# Patient Record
Sex: MALE | Race: OTHER | NOT HISPANIC OR LATINO
[De-identification: names, ages, dates, MRNs, and addresses within clinical notes are randomized per-mention and may not be internally consistent; named-entity substitution may affect disease eponyms.]

---

## 2017-01-09 ENCOUNTER — APPOINTMENT (OUTPATIENT)
Dept: NEUROLOGY | Facility: CLINIC | Age: 62
End: 2017-01-09

## 2017-01-09 VITALS
WEIGHT: 185 LBS | SYSTOLIC BLOOD PRESSURE: 125 MMHG | DIASTOLIC BLOOD PRESSURE: 80 MMHG | HEIGHT: 67 IN | HEART RATE: 74 BPM | BODY MASS INDEX: 29.03 KG/M2

## 2017-01-09 DIAGNOSIS — Z81.8 FAMILY HISTORY OF OTHER MENTAL AND BEHAVIORAL DISORDERS: ICD-10-CM

## 2017-01-09 DIAGNOSIS — M25.512 PAIN IN RIGHT SHOULDER: ICD-10-CM

## 2017-01-09 DIAGNOSIS — Z87.891 PERSONAL HISTORY OF NICOTINE DEPENDENCE: ICD-10-CM

## 2017-01-09 DIAGNOSIS — E11.9 TYPE 2 DIABETES MELLITUS W/OUT COMPLICATIONS: ICD-10-CM

## 2017-01-09 DIAGNOSIS — I10 ESSENTIAL (PRIMARY) HYPERTENSION: ICD-10-CM

## 2017-01-09 DIAGNOSIS — M25.511 PAIN IN RIGHT SHOULDER: ICD-10-CM

## 2017-01-09 DIAGNOSIS — E78.5 HYPERLIPIDEMIA, UNSPECIFIED: ICD-10-CM

## 2017-01-09 DIAGNOSIS — Z87.820 PERSONAL HISTORY OF TRAUMATIC BRAIN INJURY: ICD-10-CM

## 2017-01-09 RX ORDER — INSULIN DETEMIR 100 [IU]/ML
100 INJECTION, SOLUTION SUBCUTANEOUS
Refills: 0 | Status: ACTIVE | COMMUNITY
Start: 2017-01-09

## 2017-02-21 ENCOUNTER — APPOINTMENT (OUTPATIENT)
Dept: NEUROLOGY | Facility: CLINIC | Age: 62
End: 2017-02-21

## 2017-02-21 VITALS
BODY MASS INDEX: 29.03 KG/M2 | WEIGHT: 185 LBS | HEIGHT: 67 IN | SYSTOLIC BLOOD PRESSURE: 140 MMHG | HEART RATE: 75 BPM | DIASTOLIC BLOOD PRESSURE: 70 MMHG

## 2017-02-21 DIAGNOSIS — R41.89 OTHER SYMPTOMS AND SIGNS INVOLVING COGNITIVE FUNCTIONS AND AWARENESS: ICD-10-CM

## 2017-03-27 ENCOUNTER — FORM ENCOUNTER (OUTPATIENT)
Age: 62
End: 2017-03-27

## 2017-03-28 ENCOUNTER — OUTPATIENT (OUTPATIENT)
Dept: OUTPATIENT SERVICES | Facility: HOSPITAL | Age: 62
LOS: 1 days | End: 2017-03-28
Payer: SELF-PAY

## 2017-03-28 ENCOUNTER — APPOINTMENT (OUTPATIENT)
Dept: NUCLEAR MEDICINE | Facility: IMAGING CENTER | Age: 62
End: 2017-03-28

## 2017-03-28 DIAGNOSIS — R41.89 OTHER SYMPTOMS AND SIGNS INVOLVING COGNITIVE FUNCTIONS AND AWARENESS: ICD-10-CM

## 2017-03-28 PROCEDURE — A9552: CPT

## 2017-03-28 PROCEDURE — 78608 BRAIN IMAGING (PET): CPT

## 2017-04-24 ENCOUNTER — APPOINTMENT (OUTPATIENT)
Dept: NEUROLOGY | Facility: CLINIC | Age: 62
End: 2017-04-24

## 2017-04-24 VITALS — SYSTOLIC BLOOD PRESSURE: 131 MMHG | HEART RATE: 67 BPM | HEIGHT: 67 IN | DIASTOLIC BLOOD PRESSURE: 81 MMHG

## 2017-04-24 RX ORDER — VENLAFAXINE HYDROCHLORIDE 37.5 MG/1
37.5 CAPSULE, EXTENDED RELEASE ORAL DAILY
Qty: 30 | Refills: 2 | Status: DISCONTINUED | COMMUNITY
Start: 2017-02-21 | End: 2017-04-24

## 2017-04-24 RX ORDER — INSULIN DETEMIR 100 [IU]/ML
100 INJECTION, SOLUTION SUBCUTANEOUS
Qty: 15 | Refills: 0 | Status: ACTIVE | COMMUNITY
Start: 2017-02-21

## 2017-04-24 RX ORDER — OXYMETAZOLINE HCL 0.05% 0.05 MG/ML
31G X 8 MM SPRAY NASAL
Qty: 100 | Refills: 0 | Status: ACTIVE | COMMUNITY
Start: 2016-06-13

## 2017-05-30 ENCOUNTER — RX RENEWAL (OUTPATIENT)
Age: 62
End: 2017-05-30

## 2017-06-20 ENCOUNTER — APPOINTMENT (OUTPATIENT)
Dept: NEUROLOGY | Facility: CLINIC | Age: 62
End: 2017-06-20

## 2017-06-20 VITALS
WEIGHT: 185 LBS | HEART RATE: 74 BPM | DIASTOLIC BLOOD PRESSURE: 78 MMHG | BODY MASS INDEX: 29.03 KG/M2 | SYSTOLIC BLOOD PRESSURE: 132 MMHG | HEIGHT: 67 IN

## 2017-06-20 RX ORDER — GEMFIBROZIL 600 MG/1
600 TABLET, FILM COATED ORAL DAILY
Refills: 0 | Status: DISCONTINUED | COMMUNITY
Start: 2017-02-21 | End: 2017-06-20

## 2017-06-20 RX ORDER — MELOXICAM 7.5 MG/1
7.5 TABLET ORAL
Refills: 0 | Status: DISCONTINUED | COMMUNITY
Start: 2017-01-09 | End: 2017-06-20

## 2017-06-20 RX ORDER — SIMVASTATIN 40 MG/1
40 TABLET, FILM COATED ORAL
Refills: 0 | Status: DISCONTINUED | COMMUNITY
Start: 2017-01-09 | End: 2017-06-20

## 2017-06-20 RX ORDER — SAXAGLIPTIN AND METFORMIN HYDROCHLORIDE 2.5; 1 MG/1; MG/1
2.5-1 TABLET, FILM COATED, EXTENDED RELEASE ORAL TWICE DAILY
Refills: 0 | Status: DISCONTINUED | COMMUNITY
Start: 2017-01-09 | End: 2017-06-20

## 2018-04-24 ENCOUNTER — APPOINTMENT (OUTPATIENT)
Dept: NEUROLOGY | Facility: CLINIC | Age: 63
End: 2018-04-24
Payer: COMMERCIAL

## 2018-04-24 VITALS — SYSTOLIC BLOOD PRESSURE: 133 MMHG | HEART RATE: 60 BPM | DIASTOLIC BLOOD PRESSURE: 86 MMHG

## 2018-04-24 DIAGNOSIS — G30.0 ALZHEIMER'S DISEASE WITH EARLY ONSET: ICD-10-CM

## 2018-04-24 DIAGNOSIS — F02.80 ALZHEIMER'S DISEASE WITH EARLY ONSET: ICD-10-CM

## 2018-04-24 PROCEDURE — 99214 OFFICE O/P EST MOD 30 MIN: CPT

## 2018-04-24 RX ORDER — LANCETS 28 GAUGE
EACH MISCELLANEOUS
Qty: 100 | Refills: 0 | Status: COMPLETED | COMMUNITY
Start: 2018-02-14

## 2018-04-24 RX ORDER — INSULIN DEGLUDEC INJECTION 200 U/ML
200 INJECTION, SOLUTION SUBCUTANEOUS
Qty: 9 | Refills: 0 | Status: COMPLETED | COMMUNITY
Start: 2018-04-20

## 2018-04-24 RX ORDER — LISINOPRIL 10 MG/1
10 TABLET ORAL DAILY
Refills: 0 | Status: DISCONTINUED | COMMUNITY
Start: 2017-01-09 | End: 2018-04-24

## 2018-04-24 RX ORDER — BLOOD SUGAR DIAGNOSTIC
STRIP MISCELLANEOUS
Qty: 200 | Refills: 0 | Status: COMPLETED | COMMUNITY
Start: 2018-04-20

## 2018-04-24 RX ORDER — ERGOCALCIFEROL 1.25 MG/1
1.25 MG CAPSULE, LIQUID FILLED ORAL
Refills: 0 | Status: DISCONTINUED | COMMUNITY
Start: 2017-01-09 | End: 2018-04-24

## 2018-04-24 RX ORDER — INSULIN DEGLUDEC INJECTION 200 U/ML
200 INJECTION, SOLUTION SUBCUTANEOUS
Refills: 0 | Status: ACTIVE | COMMUNITY
Start: 2018-04-24

## 2018-04-24 RX ORDER — GLIMEPIRIDE 4 MG/1
4 TABLET ORAL DAILY
Refills: 0 | Status: DISCONTINUED | COMMUNITY
Start: 2017-01-09 | End: 2018-04-24

## 2018-04-24 RX ORDER — GLIPIZIDE 10 MG/1
10 TABLET ORAL
Qty: 30 | Refills: 0 | Status: COMPLETED | COMMUNITY
Start: 2018-04-20

## 2018-04-24 RX ORDER — GEMFIBROZIL 600 MG/1
600 TABLET, FILM COATED ORAL DAILY
Refills: 0 | Status: DISCONTINUED | COMMUNITY
Start: 2017-06-20 | End: 2018-04-24

## 2018-04-24 RX ORDER — ASPIRIN 81 MG/1
81 TABLET, CHEWABLE ORAL
Qty: 30 | Refills: 3 | Status: DISCONTINUED | COMMUNITY
Start: 2017-01-09 | End: 2018-04-24

## 2018-04-24 RX ORDER — ELECTROLYTES/DEXTROSE
SOLUTION, ORAL ORAL DAILY
Refills: 0 | Status: DISCONTINUED | COMMUNITY
Start: 2017-01-09 | End: 2018-04-24

## 2018-05-22 RX ORDER — DONEPEZIL HYDROCHLORIDE 10 MG/1
10 TABLET ORAL DAILY
Qty: 30 | Refills: 3 | Status: DISCONTINUED | COMMUNITY
Start: 2017-04-24 | End: 2018-05-22

## 2018-06-18 ENCOUNTER — APPOINTMENT (OUTPATIENT)
Dept: PEDIATRIC MEDICAL GENETICS | Facility: CLINIC | Age: 63
End: 2018-06-18

## 2018-07-17 ENCOUNTER — MEDICATION RENEWAL (OUTPATIENT)
Age: 63
End: 2018-07-17

## 2018-07-19 ENCOUNTER — RX RENEWAL (OUTPATIENT)
Age: 63
End: 2018-07-19

## 2018-10-15 ENCOUNTER — RX RENEWAL (OUTPATIENT)
Age: 63
End: 2018-10-15

## 2019-03-19 ENCOUNTER — APPOINTMENT (OUTPATIENT)
Dept: NEUROLOGY | Facility: CLINIC | Age: 64
End: 2019-03-19
Payer: COMMERCIAL

## 2019-03-19 VITALS
WEIGHT: 193 LBS | BODY MASS INDEX: 30.29 KG/M2 | HEIGHT: 67 IN | HEART RATE: 62 BPM | DIASTOLIC BLOOD PRESSURE: 83 MMHG | SYSTOLIC BLOOD PRESSURE: 163 MMHG

## 2019-03-19 DIAGNOSIS — F32.9 ANXIETY DISORDER, UNSPECIFIED: ICD-10-CM

## 2019-03-19 DIAGNOSIS — F41.9 ANXIETY DISORDER, UNSPECIFIED: ICD-10-CM

## 2019-03-19 DIAGNOSIS — F22 DELUSIONAL DISORDERS: ICD-10-CM

## 2019-03-19 PROCEDURE — 99214 OFFICE O/P EST MOD 30 MIN: CPT

## 2019-03-19 RX ORDER — GLIPIZIDE 10 MG/1
10 TABLET ORAL DAILY
Refills: 0 | Status: ACTIVE | COMMUNITY
Start: 2018-04-24

## 2019-03-19 NOTE — ASSESSMENT
[FreeTextEntry1] : Assessment:\par 62yo RH HM, with progressive cognitive impairment-memory, speech, executive issues. \par Given PET and family Hx, likely EOAD, but the recent progression, with disinhibited behavior and loss of speech, with perseverations suggests FTD. Network analysis had pointed out to PO, but there is no Parkinsonism at all. 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\par Paranoid ideation\par \par Plan:\par -raise Galantamine to 16mg Er\par -start Seroquel 50mg XR\par -will try to pursue genetic evaluation.\par \par A thorough discussion was entertained with the patient/caregiver regarding the use of psychoactive medications, their possible benefits and AE profile, including the risk of cardiovascular complications.\par We discussed the benefits of being active, physically and mentally, and the need to to establish a routine in this respect.\par Driving abilities and firearms possession and use were discussed, in relation to progression of the cognitive decline, and the need to assess them periodically.\par Patient/caregiver understand and agree with the plan.\par
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
## 2019-03-19 NOTE — DATA REVIEWED
[de-identified] : MRI BRAIN from 11/2016 (report only): no major findings.  [de-identified] : FDG-PET: PD? LBD? ADRP seems more likely.  [de-identified] : Labs: TSH, ft4, CBC, CMP, FA, wnl.

## 2019-03-19 NOTE — PHYSICAL EXAM
[General Appearance - Alert] : alert [General Appearance - In No Acute Distress] : in no acute distress [Oriented To Time, Place, And Person] : oriented to person, place, and time [Impaired Insight] : insight and judgment were intact [Affect] : the affect was normal [Person] : oriented to person [Remote Intact] : remote memory intact [Registration Intact] : recent registration memory intact [Concentration Intact] : normal concentrating ability [Visual Intact] : visual attention was ~T not ~L decreased [Naming Objects] : no difficulty naming common objects [Repeating Phrases] : no difficulty repeating a phrase [Writing A Sentence] : no difficulty writing a sentence [Fluency] : fluency intact [Comprehension] : comprehension intact [Reading] : reading intact [Current Events] : adequate knowledge of current events [Past History] : adequate knowledge of personal past history [Vocabulary] : adequate range of vocabulary [Total Score ___ / 30] : the patient achieved a score of [unfilled] /30 [Date / Time ___ / 5] : date / time [unfilled] / 5 [Place ___ / 5] : place [unfilled] / 5 [Registration ___ / 3] : registration [unfilled] / 3 [Serial Sevens ___/5] : serial sevens [unfilled] / 5 [Naming 2 Objects ___ / 2] : naming two objects [unfilled] / 2 [Repeating a Sentence ___ / 1] : repeating a sentence [unfilled] / 1 [Writing a Sentence ___ / 1] : write sentence [unfilled] / 1 [3-stage Verbal Command ___ / 3] : three-stage verbal command [unfilled] / 3 [Written Command ___ / 1] : written command [unfilled] / 1 [Copy a Design ___ / 1] : copy a design [unfilled] / 1 [Recall ___ / 3] : recall [unfilled] / 3 [Cranial Nerves Optic (II)] : visual acuity intact bilaterally,  visual fields full to confrontation, pupils equal round and reactive to light [Cranial Nerves Oculomotor (III)] : extraocular motion intact [Cranial Nerves Trigeminal (V)] : facial sensation intact symmetrically [Cranial Nerves Facial (VII)] : face symmetrical [Cranial Nerves Vestibulocochlear (VIII)] : hearing was intact bilaterally [Cranial Nerves Glossopharyngeal (IX)] : tongue and palate midline [Cranial Nerves Accessory (XI - Cranial And Spinal)] : head turning and shoulder shrug symmetric [Cranial Nerves Hypoglossal (XII)] : there was no tongue deviation with protrusion [Motor Strength] : muscle strength was normal in all four extremities [Involuntary Movements] : no involuntary movements were seen [No Muscle Atrophy] : normal bulk in all four extremities [Motor Handedness Right-Handed] : the patient is right hand dominant [Sensation Tactile Decrease] : light touch was intact [Sensation Pain / Temperature Decrease] : pain and temperature was intact [Sensation Vibration Decrease] : vibration was intact [Proprioception] : proprioception was intact [Balance] : balance was intact [2+] : Ankle jerk left 2+ [Sclera] : the sclera and conjunctiva were normal [PERRL With Normal Accommodation] : pupils were equal in size, round, reactive to light, with normal accommodation [Extraocular Movements] : extraocular movements were intact [Optic Disc Abnormality] : the optic disc were normal in size and color [No APD] : no afferent pupillary defect [No YVAN] : no internuclear ophthalmoplegia [Full Visual Field] : full visual field [Outer Ear] : the ears and nose were normal in appearance [Oropharynx] : the oropharynx was normal [Neck Appearance] : the appearance of the neck was normal [Neck Cervical Mass (___cm)] : no neck mass was observed [Jugular Venous Distention Increased] : there was no jugular-venous distention [Thyroid Nodule] : there were no palpable thyroid nodules [Thyroid Diffuse Enlargement] : the thyroid was not enlarged [Auscultation Breath Sounds / Voice Sounds] : lungs were clear to auscultation bilaterally [Heart Rate And Rhythm] : heart rate was normal and rhythm regular [Heart Sounds] : normal S1 and S2 [Heart Sounds Gallop] : no gallops [Heart Sounds Pericardial Friction Rub] : no pericardial rub [Murmurs] : no murmurs [Arterial Pulses Carotid] : carotid pulses were normal with no bruits [Edema] : there was no peripheral edema [Full Pulse] : the pedal pulses are present [Bowel Sounds] : normal bowel sounds [Abdomen Soft] : soft [Abdomen Tenderness] : non-tender [Abdomen Mass (___ Cm)] : no abdominal mass palpated [No CVA Tenderness] : no ~M costovertebral angle tenderness [No Spinal Tenderness] : no spinal tenderness [Abnormal Walk] : normal gait [Nail Clubbing] : no clubbing  or cyanosis of the fingernails [Musculoskeletal - Swelling] : no joint swelling seen [Motor Tone] : muscle strength and tone were normal [Skin Color & Pigmentation] : normal skin color and pigmentation [Skin Turgor] : normal skin turgor [] : no rash [FreeTextEntry1] : Speech is poor, circumlocutions and tangential.\par Poor judgement. [Place] : disoriented to place [Time] : disoriented to time [Short Term Intact] : short term memory impaired [Span Intact] : the attention span was decreased [Motor Strength Upper Extremities Bilaterally] : strength was normal in both upper extremities [Motor Strength Lower Extremities Bilaterally] : strength was normal in both lower extremities [Romberg's Sign] : Romberg's sign was negtive [Allodynia] : no ~T allodynia present [Hyperesthesia] : no hyperesthesia [Dysesthesia] : no dysesthesia [Past-pointing] : there was no past-pointing [Limited Balance] : balance was intact [Coordination - Dysmetria Impaired Finger-to-Nose Bilateral] : not present [Tremor] : no tremor present [Plantar Reflex Right Only] : normal on the right [Coordination - Dysmetria Impaired Heel-to-Shin Bilateral] : not present [___] : absent on the right [Plantar Reflex Left Only] : normal on the left [___] : absent on the left [FreeTextEntry4] : Alt pattern: intact.\par Clock: 1/3-only 10 numbers; cannot tell time on clocks.\par Luria: difficulty remembering sequence, may perseverate on flat, but overall gets it. \par A: 2/min, stops; animals: 8/min.\par Speech is poorer. [FreeTextEntry8] : no shuffling of gait today. [FreeTextEntry5] : mildly slurred and slow speech, ? spastic?

## 2019-03-19 NOTE — REASON FOR VISIT
[Follow-Up: _____] : a [unfilled] follow-up visit [Spouse] : spouse [Other: _____] : [unfilled] [FreeTextEntry1] : EOAD

## 2019-04-17 ENCOUNTER — RX RENEWAL (OUTPATIENT)
Age: 64
End: 2019-04-17

## 2019-04-23 ENCOUNTER — APPOINTMENT (OUTPATIENT)
Dept: OTHER | Facility: CLINIC | Age: 64
End: 2019-04-23

## 2019-05-23 DIAGNOSIS — G47.00 INSOMNIA, UNSPECIFIED: ICD-10-CM

## 2019-05-23 RX ORDER — GALANTAMINE 16 MG/1
16 CAPSULE, EXTENDED RELEASE ORAL
Qty: 30 | Refills: 2 | Status: DISCONTINUED | COMMUNITY
Start: 2018-05-22 | End: 2019-05-23

## 2019-06-28 ENCOUNTER — APPOINTMENT (OUTPATIENT)
Dept: PEDIATRIC MEDICAL GENETICS | Facility: CLINIC | Age: 64
End: 2019-06-28
Payer: COMMERCIAL

## 2019-06-28 PROCEDURE — 99242 OFF/OP CONSLTJ NEW/EST SF 20: CPT

## 2019-06-28 NOTE — PHYSICAL EXAM
[de-identified] : I tried to have Mr. Sterling perform serial 7's, but he was unable to even start.

## 2019-06-28 NOTE — FAMILY HISTORY
[FreeTextEntry1] : Romero's wife stated that his father, 3 paternal uncles and his paternal grandfather had a similar condition, and that his father  at age 63. His mother became affected with (possible) early onset Alzheimer's at age 70. Romero's daughter is concerned, and is hesitant to have children because of this history.

## 2019-06-28 NOTE — CONSULT LETTER
[Consult Letter:] : I had the pleasure of evaluating your patient, [unfilled]. [Dear  ___] : Dear  [unfilled], [Please see my note below.] : Please see my note below. [Sincerely,] : Sincerely, [FreeTextEntry2] : Dr. Oz Marie [FreeTextEntry3] : Stan Fair MD, PhD\par Division of Medical Genetics and Human Genomics\par [facility]\par

## 2019-06-28 NOTE — REASON FOR VISIT
[Initial - Scheduled] : [unfilled]  is being seen for  ~M an initial scheduled visit [Spouse] : spouse [FreeTextEntry3] : to evaluate for Frontotemporal Dementia (FTD) in this 63 year old male. Archana Stewart is the genetic counselor involved.
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
## 2019-07-30 ENCOUNTER — APPOINTMENT (OUTPATIENT)
Dept: NEUROLOGY | Facility: CLINIC | Age: 64
End: 2019-07-30
Payer: MEDICARE

## 2019-07-30 VITALS — HEART RATE: 92 BPM | DIASTOLIC BLOOD PRESSURE: 77 MMHG | SYSTOLIC BLOOD PRESSURE: 136 MMHG

## 2019-07-30 DIAGNOSIS — G31.09 OTHER FRONTOTEMPORAL DEMENTIA: ICD-10-CM

## 2019-07-30 DIAGNOSIS — F02.80 OTHER FRONTOTEMPORAL DEMENTIA: ICD-10-CM

## 2019-07-30 DIAGNOSIS — R45.1 RESTLESSNESS AND AGITATION: ICD-10-CM

## 2019-07-30 PROCEDURE — 99214 OFFICE O/P EST MOD 30 MIN: CPT

## 2019-07-30 RX ORDER — RISPERIDONE 1 MG/ML
1 SOLUTION ORAL TWICE DAILY
Qty: 30 | Refills: 0 | Status: ACTIVE | COMMUNITY
Start: 2019-07-30 | End: 1900-01-01
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
## 2019-07-30 NOTE — ASSESSMENT
[FreeTextEntry1] : Assessment:\par 63yo RH HM, with progressive cognitive impairment-memory, speech, executive issues. \par Given PET and family Hx, likely EOAD, but the recent progression, with disinhibited behavior and loss of speech, with perseverations suggests FTD. Network analysis had pointed out to PD, but there is no Parkinsonism at all. 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\par Paranoid ideation\par \par Plan:\par -restart Seroquel 50mg XR and use 25mg 2 tabs PRN along the day\par -will consider further changes along the way\par -encourage social and physical activity.\par \par A thorough discussion was entertained with the patient/caregiver regarding the use of psychoactive medications, their possible benefits and AE profile, including the risk of cardiovascular complications.\par We discussed the benefits of being active, physically and mentally, and the need to to establish a routine in this respect.\par Driving abilities and firearms possession and use were discussed, in relation to progression of the cognitive decline, and the need to assess them periodically.\par Patient/caregiver understand and agree with the plan.\par

## 2019-07-30 NOTE — DATA REVIEWED
[de-identified] : Labs: TSH, ft4, CBC, CMP, FA, wnl.  [de-identified] : MRI BRAIN from 11/2016 (report only): no major findings.  [de-identified] : FDG-PET: PD? LBD? ADRP seems more likely.

## 2019-07-30 NOTE — PHYSICAL EXAM
[General Appearance - Alert] : alert [General Appearance - In No Acute Distress] : in no acute distress [Oriented To Time, Place, And Person] : oriented to person, place, and time [Impaired Insight] : insight and judgment were intact [Affect] : the affect was normal [Person] : oriented to person [Remote Intact] : remote memory intact [Registration Intact] : recent registration memory intact [Concentration Intact] : normal concentrating ability [Visual Intact] : visual attention was ~T not ~L decreased [Naming Objects] : no difficulty naming common objects [Repeating Phrases] : no difficulty repeating a phrase [Writing A Sentence] : no difficulty writing a sentence [Fluency] : fluency intact [Comprehension] : comprehension intact [Reading] : reading intact [Current Events] : adequate knowledge of current events [Past History] : adequate knowledge of personal past history [Vocabulary] : adequate range of vocabulary [Total Score ___ / 30] : the patient achieved a score of [unfilled] /30 [Date / Time ___ / 5] : date / time [unfilled] / 5 [Place ___ / 5] : place [unfilled] / 5 [Registration ___ / 3] : registration [unfilled] / 3 [Serial Sevens ___/5] : serial sevens [unfilled] / 5 [Naming 2 Objects ___ / 2] : naming two objects [unfilled] / 2 [Repeating a Sentence ___ / 1] : repeating a sentence [unfilled] / 1 [Writing a Sentence ___ / 1] : write sentence [unfilled] / 1 [3-stage Verbal Command ___ / 3] : three-stage verbal command [unfilled] / 3 [Written Command ___ / 1] : written command [unfilled] / 1 [Copy a Design ___ / 1] : copy a design [unfilled] / 1 [Cranial Nerves Oculomotor (III)] : extraocular motion intact [Cranial Nerves Optic (II)] : visual acuity intact bilaterally,  visual fields full to confrontation, pupils equal round and reactive to light [Recall ___ / 3] : recall [unfilled] / 3 [Cranial Nerves Trigeminal (V)] : facial sensation intact symmetrically [Cranial Nerves Facial (VII)] : face symmetrical [Cranial Nerves Vestibulocochlear (VIII)] : hearing was intact bilaterally [Cranial Nerves Accessory (XI - Cranial And Spinal)] : head turning and shoulder shrug symmetric [Cranial Nerves Glossopharyngeal (IX)] : tongue and palate midline [Cranial Nerves Hypoglossal (XII)] : there was no tongue deviation with protrusion [No Muscle Atrophy] : normal bulk in all four extremities [Motor Strength] : muscle strength was normal in all four extremities [Involuntary Movements] : no involuntary movements were seen [Motor Handedness Right-Handed] : the patient is right hand dominant [Sensation Vibration Decrease] : vibration was intact [Sensation Pain / Temperature Decrease] : pain and temperature was intact [Sensation Tactile Decrease] : light touch was intact [Proprioception] : proprioception was intact [Balance] : balance was intact [2+] : Ankle jerk left 2+ [Sclera] : the sclera and conjunctiva were normal [Extraocular Movements] : extraocular movements were intact [PERRL With Normal Accommodation] : pupils were equal in size, round, reactive to light, with normal accommodation [Optic Disc Abnormality] : the optic disc were normal in size and color [No APD] : no afferent pupillary defect [No YVAN] : no internuclear ophthalmoplegia [Full Visual Field] : full visual field [Outer Ear] : the ears and nose were normal in appearance [Oropharynx] : the oropharynx was normal [Neck Appearance] : the appearance of the neck was normal [Neck Cervical Mass (___cm)] : no neck mass was observed [Jugular Venous Distention Increased] : there was no jugular-venous distention [Thyroid Diffuse Enlargement] : the thyroid was not enlarged [Thyroid Nodule] : there were no palpable thyroid nodules [Heart Sounds] : normal S1 and S2 [Heart Rate And Rhythm] : heart rate was normal and rhythm regular [Auscultation Breath Sounds / Voice Sounds] : lungs were clear to auscultation bilaterally [Murmurs] : no murmurs [Heart Sounds Gallop] : no gallops [Heart Sounds Pericardial Friction Rub] : no pericardial rub [Full Pulse] : the pedal pulses are present [Arterial Pulses Carotid] : carotid pulses were normal with no bruits [Edema] : there was no peripheral edema [Abdomen Tenderness] : non-tender [Bowel Sounds] : normal bowel sounds [Abdomen Soft] : soft [No CVA Tenderness] : no ~M costovertebral angle tenderness [Abdomen Mass (___ Cm)] : no abdominal mass palpated [Nail Clubbing] : no clubbing  or cyanosis of the fingernails [No Spinal Tenderness] : no spinal tenderness [Abnormal Walk] : normal gait [Musculoskeletal - Swelling] : no joint swelling seen [Motor Tone] : muscle strength and tone were normal [Skin Turgor] : normal skin turgor [] : no rash [Skin Color & Pigmentation] : normal skin color and pigmentation [FreeTextEntry1] : Speech is poor, circumlocutions and tangential.\par Poor judgement.\par Exam stable. [Place] : disoriented to place [Short Term Intact] : short term memory impaired [Time] : disoriented to time [Motor Strength Upper Extremities Bilaterally] : strength was normal in both upper extremities [Span Intact] : the attention span was decreased [Romberg's Sign] : Romberg's sign was negtive [Motor Strength Lower Extremities Bilaterally] : strength was normal in both lower extremities [Hyperesthesia] : no hyperesthesia [Dysesthesia] : no dysesthesia [Allodynia] : no ~T allodynia present [Tremor] : no tremor present [Past-pointing] : there was no past-pointing [Limited Balance] : balance was intact [Coordination - Dysmetria Impaired Heel-to-Shin Bilateral] : not present [Coordination - Dysmetria Impaired Finger-to-Nose Bilateral] : not present [Plantar Reflex Right Only] : normal on the right [___] : absent on the left [___] : absent on the right [Plantar Reflex Left Only] : normal on the left [FreeTextEntry5] : mildly slurred and slow speech, ? spastic? [FreeTextEntry4] : Alt pattern: intact.\par Clock: 1/3-only 10 numbers; cannot tell time on clocks.\par Luria: difficulty remembering sequence, may perseverate on flat, but overall gets it. \par A: 2/min, stops; animals: 8/min.\par Speech is poorer. [FreeTextEntry8] : no shuffling of gait today.

## 2019-10-09 ENCOUNTER — RX RENEWAL (OUTPATIENT)
Age: 64
End: 2019-10-09

## 2019-10-09 RX ORDER — QUETIAPINE 50 MG/1
50 TABLET, FILM COATED, EXTENDED RELEASE ORAL
Qty: 30 | Refills: 2 | Status: ACTIVE | COMMUNITY
Start: 2019-03-19 | End: 1900-01-01

## 2019-10-30 RX ORDER — QUETIAPINE FUMARATE 25 MG/1
25 TABLET ORAL TWICE DAILY
Qty: 120 | Refills: 2 | Status: ACTIVE | COMMUNITY
Start: 2019-05-23 | End: 1900-01-01

## 2019-11-11 ENCOUNTER — APPOINTMENT (OUTPATIENT)
Dept: NEUROLOGY | Facility: CLINIC | Age: 64
End: 2019-11-11

## 2019-12-31 ENCOUNTER — EMERGENCY (EMERGENCY)
Facility: HOSPITAL | Age: 64
LOS: 1 days | Discharge: HOME | End: 2019-12-31
Attending: EMERGENCY MEDICINE
Payer: MEDICARE

## 2019-12-31 VITALS
DIASTOLIC BLOOD PRESSURE: 79 MMHG | HEIGHT: 68 IN | TEMPERATURE: 98 F | SYSTOLIC BLOOD PRESSURE: 135 MMHG | RESPIRATION RATE: 16 BRPM | HEART RATE: 74 BPM | WEIGHT: 179.9 LBS | OXYGEN SATURATION: 96 %

## 2019-12-31 PROCEDURE — 99283 EMERGENCY DEPT VISIT LOW MDM: CPT

## 2019-12-31 RX ORDER — QUETIAPINE FUMARATE 200 MG/1
25 TABLET, FILM COATED ORAL ONCE
Refills: 0 | Status: COMPLETED | OUTPATIENT
Start: 2019-12-31 | End: 2019-12-31

## 2019-12-31 RX ADMIN — QUETIAPINE FUMARATE 25 MILLIGRAM(S): 200 TABLET, FILM COATED ORAL at 23:37

## 2019-12-31 NOTE — ED PROVIDER NOTE - CLINICAL SUMMARY MEDICAL DECISION MAKING FREE TEXT BOX
pt with hx of frontal lobe dementia   now calm and cooperative at baseline  spoke to [facility] and will send back via ambulance

## 2019-12-31 NOTE — ED PROVIDER NOTE - PATIENT PORTAL LINK FT
You can access the FollowMyHealth Patient Portal offered by [facility] by registering at the following website: [email or web address]. By joining [employer]’s FollowMyHealth portal, you will also be able to view your health information using other applications (apps) compatible with our system.
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
## 2020-01-01 VITALS
HEART RATE: 73 BPM | SYSTOLIC BLOOD PRESSURE: 113 MMHG | RESPIRATION RATE: 15 BRPM | OXYGEN SATURATION: 96 % | TEMPERATURE: 98 F | DIASTOLIC BLOOD PRESSURE: 77 MMHG

## 2020-01-01 PROCEDURE — 99283 EMERGENCY DEPT VISIT LOW MDM: CPT

## 2020-01-01 RX ADMIN — Medication 2 MILLIGRAM(S): at 00:51

## 2020-01-01 NOTE — ED ADULT NURSE NOTE - OBJECTIVE STATEMENT
pt brought in by ambulance from Assisted living for agitation. CUrrently cooperative. Placed on yellow gown. c/o of hoarse throat otherwise denies any other complaint

## 2020-01-01 NOTE — ED ADULT NURSE NOTE - NSIMPLEMENTINTERV_GEN_ALL_ED
Implemented All Universal Safety Interventions:  [place] to call system. Call bell, personal items and telephone within reach. Instruct patient to call for assistance. Room bathroom lighting operational. Non-slip footwear when patient is off stretcher. Physically safe environment: no spills, clutter or unnecessary equipment. Stretcher in lowest position, wheels locked, appropriate side rails in place.

## 2020-01-28 ENCOUNTER — EMERGENCY (EMERGENCY)
Facility: HOSPITAL | Age: 65
LOS: 1 days | Discharge: HOME | End: 2020-01-28
Attending: EMERGENCY MEDICINE
Payer: MEDICARE

## 2020-01-28 VITALS
RESPIRATION RATE: 18 BRPM | HEART RATE: 66 BPM | SYSTOLIC BLOOD PRESSURE: 104 MMHG | OXYGEN SATURATION: 99 % | DIASTOLIC BLOOD PRESSURE: 75 MMHG | TEMPERATURE: 99 F

## 2020-01-28 VITALS
HEART RATE: 118 BPM | SYSTOLIC BLOOD PRESSURE: 113 MMHG | DIASTOLIC BLOOD PRESSURE: 75 MMHG | TEMPERATURE: 98 F | WEIGHT: 169.98 LBS | OXYGEN SATURATION: 98 % | RESPIRATION RATE: 17 BRPM

## 2020-01-28 LAB
ALBUMIN SERPL ELPH-MCNC: 3.8 G/DL — SIGNIFICANT CHANGE UP (ref 3.5–5)
ALP SERPL-CCNC: 66 U/L — SIGNIFICANT CHANGE UP (ref 40–120)
ALT FLD-CCNC: 23 U/L DA — SIGNIFICANT CHANGE UP (ref 10–60)
ANION GAP SERPL CALC-SCNC: 7 MMOL/L — SIGNIFICANT CHANGE UP (ref 5–17)
APTT BLD: 33.7 SEC — SIGNIFICANT CHANGE UP (ref 27.5–36.3)
AST SERPL-CCNC: 21 U/L — SIGNIFICANT CHANGE UP (ref 10–40)
BASOPHILS # BLD AUTO: 0.02 K/UL — SIGNIFICANT CHANGE UP (ref 0–0.2)
BASOPHILS NFR BLD AUTO: 0.3 % — SIGNIFICANT CHANGE UP (ref 0–2)
BILIRUB SERPL-MCNC: 0.5 MG/DL — SIGNIFICANT CHANGE UP (ref 0.2–1.2)
BUN SERPL-MCNC: 11 MG/DL — SIGNIFICANT CHANGE UP (ref 7–18)
CALCIUM SERPL-MCNC: 9.1 MG/DL — SIGNIFICANT CHANGE UP (ref 8.4–10.5)
CHLORIDE SERPL-SCNC: 104 MMOL/L — SIGNIFICANT CHANGE UP (ref 96–108)
CO2 SERPL-SCNC: 30 MMOL/L — SIGNIFICANT CHANGE UP (ref 22–31)
CREAT SERPL-MCNC: 0.89 MG/DL — SIGNIFICANT CHANGE UP (ref 0.5–1.3)
EOSINOPHIL # BLD AUTO: 0.13 K/UL — SIGNIFICANT CHANGE UP (ref 0–0.5)
EOSINOPHIL NFR BLD AUTO: 2.2 % — SIGNIFICANT CHANGE UP (ref 0–6)
GLUCOSE SERPL-MCNC: 180 MG/DL — HIGH (ref 70–99)
HCT VFR BLD CALC: 40.2 % — SIGNIFICANT CHANGE UP (ref 39–50)
HGB BLD-MCNC: 13.8 G/DL — SIGNIFICANT CHANGE UP (ref 13–17)
IMM GRANULOCYTES NFR BLD AUTO: 0.2 % — SIGNIFICANT CHANGE UP (ref 0–1.5)
INR BLD: 1.07 RATIO — SIGNIFICANT CHANGE UP (ref 0.88–1.16)
LACTATE SERPL-SCNC: 1.3 MMOL/L — SIGNIFICANT CHANGE UP (ref 0.7–2)
LYMPHOCYTES # BLD AUTO: 1.66 K/UL — SIGNIFICANT CHANGE UP (ref 1–3.3)
LYMPHOCYTES # BLD AUTO: 28.4 % — SIGNIFICANT CHANGE UP (ref 13–44)
MCHC RBC-ENTMCNC: 29.1 PG — SIGNIFICANT CHANGE UP (ref 27–34)
MCHC RBC-ENTMCNC: 34.3 GM/DL — SIGNIFICANT CHANGE UP (ref 32–36)
MCV RBC AUTO: 84.8 FL — SIGNIFICANT CHANGE UP (ref 80–100)
MONOCYTES # BLD AUTO: 0.42 K/UL — SIGNIFICANT CHANGE UP (ref 0–0.9)
MONOCYTES NFR BLD AUTO: 7.2 % — SIGNIFICANT CHANGE UP (ref 2–14)
NEUTROPHILS # BLD AUTO: 3.6 K/UL — SIGNIFICANT CHANGE UP (ref 1.8–7.4)
NEUTROPHILS NFR BLD AUTO: 61.7 % — SIGNIFICANT CHANGE UP (ref 43–77)
NRBC # BLD: 0 /100 WBCS — SIGNIFICANT CHANGE UP (ref 0–0)
PLATELET # BLD AUTO: 211 K/UL — SIGNIFICANT CHANGE UP (ref 150–400)
POTASSIUM SERPL-MCNC: 4.4 MMOL/L — SIGNIFICANT CHANGE UP (ref 3.5–5.3)
POTASSIUM SERPL-SCNC: 4.4 MMOL/L — SIGNIFICANT CHANGE UP (ref 3.5–5.3)
PROT SERPL-MCNC: 7.1 G/DL — SIGNIFICANT CHANGE UP (ref 6–8.3)
PROTHROM AB SERPL-ACNC: 11.9 SEC — SIGNIFICANT CHANGE UP (ref 10–12.9)
RBC # BLD: 4.74 M/UL — SIGNIFICANT CHANGE UP (ref 4.2–5.8)
RBC # FLD: 12.2 % — SIGNIFICANT CHANGE UP (ref 10.3–14.5)
SODIUM SERPL-SCNC: 141 MMOL/L — SIGNIFICANT CHANGE UP (ref 135–145)
TROPONIN I SERPL-MCNC: <0.015 NG/ML — SIGNIFICANT CHANGE UP (ref 0–0.04)
WBC # BLD: 5.84 K/UL — SIGNIFICANT CHANGE UP (ref 3.8–10.5)
WBC # FLD AUTO: 5.84 K/UL — SIGNIFICANT CHANGE UP (ref 3.8–10.5)

## 2020-01-28 PROCEDURE — 85610 PROTHROMBIN TIME: CPT

## 2020-01-28 PROCEDURE — 85027 COMPLETE CBC AUTOMATED: CPT

## 2020-01-28 PROCEDURE — 99284 EMERGENCY DEPT VISIT MOD MDM: CPT

## 2020-01-28 PROCEDURE — 87040 BLOOD CULTURE FOR BACTERIA: CPT

## 2020-01-28 PROCEDURE — 80053 COMPREHEN METABOLIC PANEL: CPT

## 2020-01-28 PROCEDURE — 71045 X-RAY EXAM CHEST 1 VIEW: CPT | Mod: 26

## 2020-01-28 PROCEDURE — 83605 ASSAY OF LACTIC ACID: CPT

## 2020-01-28 PROCEDURE — 70450 CT HEAD/BRAIN W/O DYE: CPT | Mod: 26

## 2020-01-28 PROCEDURE — 99284 EMERGENCY DEPT VISIT MOD MDM: CPT | Mod: 25

## 2020-01-28 PROCEDURE — 71045 X-RAY EXAM CHEST 1 VIEW: CPT

## 2020-01-28 PROCEDURE — 85730 THROMBOPLASTIN TIME PARTIAL: CPT

## 2020-01-28 PROCEDURE — 70450 CT HEAD/BRAIN W/O DYE: CPT

## 2020-01-28 PROCEDURE — 96374 THER/PROPH/DIAG INJ IV PUSH: CPT

## 2020-01-28 PROCEDURE — 36415 COLL VENOUS BLD VENIPUNCTURE: CPT

## 2020-01-28 PROCEDURE — 84484 ASSAY OF TROPONIN QUANT: CPT

## 2020-01-28 NOTE — ED PROVIDER NOTE - PSYCHIATRIC, MLM
Alert and oriented to person, place, time/situation. normal mood and affect. no apparent risk to self or others. patient calm and cooperative, follows commands.
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
## 2020-01-28 NOTE — ED PROVIDER NOTE - OBJECTIVE STATEMENT
63yo M with hx frontal lobe dementia and NIDDM presents from senior care after an episode of agitation. Patient unable to give details regarding his agitated episode.

## 2020-01-28 NOTE — ED PROVIDER NOTE - PATIENT PORTAL LINK FT
You can access the FollowMyHealth Patient Portal offered by [facility] by registering at the following website: [email or web address]. By joining [employer]’s FollowMyHealth portal, you will also be able to view your health information using other applications (apps) compatible with our system.

## 2020-01-28 NOTE — ED ADULT NURSE NOTE - NSIMPLEMENTINTERV_GEN_ALL_ED
Implemented All Universal Safety Interventions:  [place] to call system. Call bell, personal items and telephone within reach. Instruct patient to call for assistance. Room bathroom lighting operational. Non-slip footwear when patient is off stretcher. Physically safe environment: no spills, clutter or unnecessary equipment. Stretcher in lowest position, wheels locked, appropriate side rails in place.

## 2020-01-28 NOTE — ED PROVIDER NOTE - CLINICAL SUMMARY MEDICAL DECISION MAKING FREE TEXT BOX
63yo M with hx frontal lobe dementia and NIDDM presents from snf after an episode of agitation. In ED patient calm and cooperative.   labs unremarkable, CT head unremarkable.  patient went to bathroom but did not give staff urine sample.  Discussed above with Dr. Raymundo who agrees with plan for discharge back to snf.

## 2020-02-02 LAB
CULTURE RESULTS: SIGNIFICANT CHANGE UP
CULTURE RESULTS: SIGNIFICANT CHANGE UP
SPECIMEN SOURCE: SIGNIFICANT CHANGE UP
SPECIMEN SOURCE: SIGNIFICANT CHANGE UP

## 2020-04-16 NOTE — ED ADULT NURSE REASSESSMENT NOTE - NS ED NURSE REASSESS COMMENT FT1
English Pt discharged, awaiting ambulette for transport back to [facility], spoke with supervisor Dustin to inform about patient's discharge. Pt in no distress at this time. Nursing monitoring continues.

## 2020-04-30 ENCOUNTER — INPATIENT (INPATIENT)
Facility: HOSPITAL | Age: 65
LOS: 49 days | Discharge: NURSING, REHAB OR LONG-TERM CARE | DRG: 177 | End: 2020-06-19
Attending: INTERNAL MEDICINE | Admitting: INTERNAL MEDICINE
Payer: MEDICARE

## 2020-04-30 VITALS
OXYGEN SATURATION: 98 % | HEIGHT: 66 IN | SYSTOLIC BLOOD PRESSURE: 112 MMHG | HEART RATE: 69 BPM | WEIGHT: 160.06 LBS | RESPIRATION RATE: 18 BRPM | TEMPERATURE: 98 F | DIASTOLIC BLOOD PRESSURE: 71 MMHG

## 2020-04-30 LAB
ALBUMIN SERPL ELPH-MCNC: 3.6 G/DL — SIGNIFICANT CHANGE UP (ref 3.5–5)
ALP SERPL-CCNC: 63 U/L — SIGNIFICANT CHANGE UP (ref 40–120)
ALT FLD-CCNC: 24 U/L DA — SIGNIFICANT CHANGE UP (ref 10–60)
ANION GAP SERPL CALC-SCNC: 9 MMOL/L — SIGNIFICANT CHANGE UP (ref 5–17)
APTT BLD: 29.6 SEC — SIGNIFICANT CHANGE UP (ref 27.5–36.3)
AST SERPL-CCNC: 16 U/L — SIGNIFICANT CHANGE UP (ref 10–40)
BILIRUB SERPL-MCNC: 0.5 MG/DL — SIGNIFICANT CHANGE UP (ref 0.2–1.2)
BUN SERPL-MCNC: 23 MG/DL — HIGH (ref 7–18)
CALCIUM SERPL-MCNC: 8.9 MG/DL — SIGNIFICANT CHANGE UP (ref 8.4–10.5)
CHLORIDE SERPL-SCNC: 101 MMOL/L — SIGNIFICANT CHANGE UP (ref 96–108)
CO2 SERPL-SCNC: 27 MMOL/L — SIGNIFICANT CHANGE UP (ref 22–31)
CREAT SERPL-MCNC: 1.07 MG/DL — SIGNIFICANT CHANGE UP (ref 0.5–1.3)
GLUCOSE SERPL-MCNC: 346 MG/DL — HIGH (ref 70–99)
HCT VFR BLD CALC: 40.2 % — SIGNIFICANT CHANGE UP (ref 39–50)
HGB BLD-MCNC: 13.8 G/DL — SIGNIFICANT CHANGE UP (ref 13–17)
INR BLD: 1.11 RATIO — SIGNIFICANT CHANGE UP (ref 0.88–1.16)
LACTATE SERPL-SCNC: 1.7 MMOL/L — SIGNIFICANT CHANGE UP (ref 0.7–2)
MAGNESIUM SERPL-MCNC: 1.9 MG/DL — SIGNIFICANT CHANGE UP (ref 1.6–2.6)
MCHC RBC-ENTMCNC: 28.7 PG — SIGNIFICANT CHANGE UP (ref 27–34)
MCHC RBC-ENTMCNC: 34.3 GM/DL — SIGNIFICANT CHANGE UP (ref 32–36)
MCV RBC AUTO: 83.6 FL — SIGNIFICANT CHANGE UP (ref 80–100)
NRBC # BLD: 0 /100 WBCS — SIGNIFICANT CHANGE UP (ref 0–0)
PLATELET # BLD AUTO: 213 K/UL — SIGNIFICANT CHANGE UP (ref 150–400)
POTASSIUM SERPL-MCNC: 4.5 MMOL/L — SIGNIFICANT CHANGE UP (ref 3.5–5.3)
POTASSIUM SERPL-SCNC: 4.5 MMOL/L — SIGNIFICANT CHANGE UP (ref 3.5–5.3)
PROT SERPL-MCNC: 7.5 G/DL — SIGNIFICANT CHANGE UP (ref 6–8.3)
PROTHROM AB SERPL-ACNC: 12.6 SEC — SIGNIFICANT CHANGE UP (ref 10–12.9)
RBC # BLD: 4.81 M/UL — SIGNIFICANT CHANGE UP (ref 4.2–5.8)
RBC # FLD: 11.9 % — SIGNIFICANT CHANGE UP (ref 10.3–14.5)
SODIUM SERPL-SCNC: 137 MMOL/L — SIGNIFICANT CHANGE UP (ref 135–145)
WBC # BLD: 4.75 K/UL — SIGNIFICANT CHANGE UP (ref 3.8–10.5)
WBC # FLD AUTO: 4.75 K/UL — SIGNIFICANT CHANGE UP (ref 3.8–10.5)

## 2020-04-30 PROCEDURE — 93010 ELECTROCARDIOGRAM REPORT: CPT

## 2020-04-30 RX ORDER — HALOPERIDOL DECANOATE 100 MG/ML
5 INJECTION INTRAMUSCULAR ONCE
Refills: 0 | Status: COMPLETED | OUTPATIENT
Start: 2020-04-30 | End: 2020-04-30

## 2020-04-30 RX ADMIN — HALOPERIDOL DECANOATE 5 MILLIGRAM(S): 100 INJECTION INTRAMUSCULAR at 23:37

## 2020-04-30 RX ADMIN — Medication 2 MILLIGRAM(S): at 23:37

## 2020-04-30 NOTE — ED PROVIDER NOTE - CLINICAL SUMMARY MEDICAL DECISION MAKING FREE TEXT BOX
64 year old male with seizure like activity. vitals WNL. PE as above. 64 year old male with seizure like activity. vitals WNL. PE as above.  labs are unremarkable. ct head WNL. no seizure activity in ED. pt required sedation 2/2 agitation likely sundowning. will admit for first time seizure.

## 2020-04-30 NOTE — ED PROVIDER NOTE - TOBACCO USE
Kenyetta Allison  1942  [id number]    Situation:  Verbal report given from: THAO Burton RN  Procedure: Procedure(s):  CATARACT EXTRACTION WITH INTRA OCULAR LENS IMPLANT LEFT EYE, COMPLEX WITH IRIS HOOKS    Background:    Preoperative diagnosis: Combined form of age-related cataract, left eye [H25.812]    Postoperative diagnosis: Combined form of age-related cataract, left eye [H25.812]    :  Dr. Rosina Simon    Assistant(s): Circ-1: Michael Yanez RN  Scrub Tech-1: Lisa Stover    Specimens: * No specimens in log *    Assessment:  Intra-procedure medications         Anesthesia gave intra-procedure sedation and medications, see anesthesia flow sheet     Intravenous fluids: LR@ KVO     Vital signs stable       Recommendation:    Permission to share finding with family or friend yes Unknown if ever smoked

## 2020-04-30 NOTE — ED PROVIDER NOTE - OBJECTIVE STATEMENT
64 year old male PMH dementia and DM coming in with seizures like activity while sitting at AL. pt unable to provide further history. denies all complaints at this time.

## 2020-05-01 DIAGNOSIS — F03.90 UNSPECIFIED DEMENTIA WITHOUT BEHAVIORAL DISTURBANCE: ICD-10-CM

## 2020-05-01 DIAGNOSIS — U07.1 COVID-19: ICD-10-CM

## 2020-05-01 DIAGNOSIS — R56.9 UNSPECIFIED CONVULSIONS: ICD-10-CM

## 2020-05-01 DIAGNOSIS — Z29.9 ENCOUNTER FOR PROPHYLACTIC MEASURES, UNSPECIFIED: ICD-10-CM

## 2020-05-01 DIAGNOSIS — E11.9 TYPE 2 DIABETES MELLITUS WITHOUT COMPLICATIONS: ICD-10-CM

## 2020-05-01 DIAGNOSIS — Z71.89 OTHER SPECIFIED COUNSELING: ICD-10-CM

## 2020-05-01 LAB
24R-OH-CALCIDIOL SERPL-MCNC: 21.1 NG/ML — LOW (ref 30–80)
A1C WITH ESTIMATED AVERAGE GLUCOSE RESULT: 9.9 % — HIGH (ref 4–5.6)
ALBUMIN SERPL ELPH-MCNC: 3.4 G/DL — LOW (ref 3.5–5)
ALP SERPL-CCNC: 59 U/L — SIGNIFICANT CHANGE UP (ref 40–120)
ALT FLD-CCNC: 21 U/L DA — SIGNIFICANT CHANGE UP (ref 10–60)
ANION GAP SERPL CALC-SCNC: 7 MMOL/L — SIGNIFICANT CHANGE UP (ref 5–17)
AST SERPL-CCNC: 15 U/L — SIGNIFICANT CHANGE UP (ref 10–40)
BASOPHILS # BLD AUTO: 0.01 K/UL — SIGNIFICANT CHANGE UP (ref 0–0.2)
BASOPHILS NFR BLD AUTO: 0.2 % — SIGNIFICANT CHANGE UP (ref 0–2)
BILIRUB SERPL-MCNC: 0.5 MG/DL — SIGNIFICANT CHANGE UP (ref 0.2–1.2)
BUN SERPL-MCNC: 14 MG/DL — SIGNIFICANT CHANGE UP (ref 7–18)
CALCIUM SERPL-MCNC: 8.5 MG/DL — SIGNIFICANT CHANGE UP (ref 8.4–10.5)
CHLORIDE SERPL-SCNC: 105 MMOL/L — SIGNIFICANT CHANGE UP (ref 96–108)
CHOLEST SERPL-MCNC: 161 MG/DL — SIGNIFICANT CHANGE UP (ref 10–199)
CK MB BLD-MCNC: <1.4 % — SIGNIFICANT CHANGE UP (ref 0–3.5)
CK MB CFR SERPL CALC: <1 NG/ML — SIGNIFICANT CHANGE UP (ref 0–3.6)
CK SERPL-CCNC: 73 U/L — SIGNIFICANT CHANGE UP (ref 35–232)
CO2 SERPL-SCNC: 28 MMOL/L — SIGNIFICANT CHANGE UP (ref 22–31)
CREAT SERPL-MCNC: 0.69 MG/DL — SIGNIFICANT CHANGE UP (ref 0.5–1.3)
CRP SERPL-MCNC: 2.96 MG/DL — HIGH (ref 0–0.4)
D DIMER BLD IA.RAPID-MCNC: <150 NG/ML DDU — SIGNIFICANT CHANGE UP
EOSINOPHIL # BLD AUTO: 0.02 K/UL — SIGNIFICANT CHANGE UP (ref 0–0.5)
EOSINOPHIL NFR BLD AUTO: 0.5 % — SIGNIFICANT CHANGE UP (ref 0–6)
ERYTHROCYTE [SEDIMENTATION RATE] IN BLOOD: 36 MM/HR — HIGH (ref 0–20)
ESTIMATED AVERAGE GLUCOSE: 237 MG/DL — HIGH (ref 68–114)
FERRITIN SERPL-MCNC: 1129 NG/ML — HIGH (ref 30–400)
FOLATE SERPL-MCNC: 19.2 NG/ML — SIGNIFICANT CHANGE UP
GLUCOSE BLDC GLUCOMTR-MCNC: 154 MG/DL — HIGH (ref 70–99)
GLUCOSE BLDC GLUCOMTR-MCNC: 185 MG/DL — HIGH (ref 70–99)
GLUCOSE BLDC GLUCOMTR-MCNC: 233 MG/DL — HIGH (ref 70–99)
GLUCOSE SERPL-MCNC: 170 MG/DL — HIGH (ref 70–99)
HCT VFR BLD CALC: 38.4 % — LOW (ref 39–50)
HDLC SERPL-MCNC: 35 MG/DL — LOW
HGB BLD-MCNC: 13.3 G/DL — SIGNIFICANT CHANGE UP (ref 13–17)
IMM GRANULOCYTES NFR BLD AUTO: 0.5 % — SIGNIFICANT CHANGE UP (ref 0–1.5)
LDH SERPL L TO P-CCNC: 188 U/L — SIGNIFICANT CHANGE UP (ref 120–225)
LIPID PNL WITH DIRECT LDL SERPL: 95 MG/DL — SIGNIFICANT CHANGE UP
LYMPHOCYTES # BLD AUTO: 0.72 K/UL — LOW (ref 1–3.3)
LYMPHOCYTES # BLD AUTO: 16.5 % — SIGNIFICANT CHANGE UP (ref 13–44)
MAGNESIUM SERPL-MCNC: 1.8 MG/DL — SIGNIFICANT CHANGE UP (ref 1.6–2.6)
MCHC RBC-ENTMCNC: 28.2 PG — SIGNIFICANT CHANGE UP (ref 27–34)
MCHC RBC-ENTMCNC: 34.6 GM/DL — SIGNIFICANT CHANGE UP (ref 32–36)
MCV RBC AUTO: 81.4 FL — SIGNIFICANT CHANGE UP (ref 80–100)
MONOCYTES # BLD AUTO: 0.41 K/UL — SIGNIFICANT CHANGE UP (ref 0–0.9)
MONOCYTES NFR BLD AUTO: 9.4 % — SIGNIFICANT CHANGE UP (ref 2–14)
NEUTROPHILS # BLD AUTO: 3.18 K/UL — SIGNIFICANT CHANGE UP (ref 1.8–7.4)
NEUTROPHILS NFR BLD AUTO: 72.9 % — SIGNIFICANT CHANGE UP (ref 43–77)
NRBC # BLD: 0 /100 WBCS — SIGNIFICANT CHANGE UP (ref 0–0)
NT-PROBNP SERPL-SCNC: 126 PG/ML — HIGH (ref 0–125)
PHOSPHATE SERPL-MCNC: 2.3 MG/DL — LOW (ref 2.5–4.5)
PLATELET # BLD AUTO: 203 K/UL — SIGNIFICANT CHANGE UP (ref 150–400)
POTASSIUM SERPL-MCNC: 3.4 MMOL/L — LOW (ref 3.5–5.3)
POTASSIUM SERPL-SCNC: 3.4 MMOL/L — LOW (ref 3.5–5.3)
PROCALCITONIN SERPL-MCNC: 0.06 NG/ML — SIGNIFICANT CHANGE UP (ref 0.02–0.1)
PROLACTIN SERPL-MCNC: 16.5 NG/ML — SIGNIFICANT CHANGE UP (ref 4.1–18.4)
PROT SERPL-MCNC: 6.9 G/DL — SIGNIFICANT CHANGE UP (ref 6–8.3)
RBC # BLD: 4.72 M/UL — SIGNIFICANT CHANGE UP (ref 4.2–5.8)
RBC # FLD: 11.6 % — SIGNIFICANT CHANGE UP (ref 10.3–14.5)
SARS-COV-2 RNA SPEC QL NAA+PROBE: DETECTED
SODIUM SERPL-SCNC: 140 MMOL/L — SIGNIFICANT CHANGE UP (ref 135–145)
TOTAL CHOLESTEROL/HDL RATIO MEASUREMENT: 4.6 RATIO — SIGNIFICANT CHANGE UP (ref 3.4–9.6)
TRIGL SERPL-MCNC: 153 MG/DL — HIGH (ref 10–149)
TROPONIN I SERPL-MCNC: <0.015 NG/ML — SIGNIFICANT CHANGE UP (ref 0–0.04)
TSH SERPL-MCNC: 2.39 UU/ML — SIGNIFICANT CHANGE UP (ref 0.34–4.82)
VIT B12 SERPL-MCNC: 402 PG/ML — SIGNIFICANT CHANGE UP (ref 232–1245)
WBC # BLD: 4.36 K/UL — SIGNIFICANT CHANGE UP (ref 3.8–10.5)
WBC # FLD AUTO: 4.36 K/UL — SIGNIFICANT CHANGE UP (ref 3.8–10.5)

## 2020-05-01 PROCEDURE — 99233 SBSQ HOSP IP/OBS HIGH 50: CPT

## 2020-05-01 PROCEDURE — 99255 IP/OBS CONSLTJ NEW/EST HI 80: CPT

## 2020-05-01 PROCEDURE — 71045 X-RAY EXAM CHEST 1 VIEW: CPT | Mod: 26

## 2020-05-01 PROCEDURE — 99285 EMERGENCY DEPT VISIT HI MDM: CPT | Mod: CS

## 2020-05-01 PROCEDURE — 70450 CT HEAD/BRAIN W/O DYE: CPT | Mod: 26

## 2020-05-01 RX ORDER — ENOXAPARIN SODIUM 100 MG/ML
40 INJECTION SUBCUTANEOUS DAILY
Refills: 0 | Status: DISCONTINUED | OUTPATIENT
Start: 2020-05-01 | End: 2020-05-15

## 2020-05-01 RX ORDER — QUETIAPINE FUMARATE 200 MG/1
50 TABLET, FILM COATED ORAL AT BEDTIME
Refills: 0 | Status: DISCONTINUED | OUTPATIENT
Start: 2020-05-01 | End: 2020-05-02

## 2020-05-01 RX ORDER — INSULIN LISPRO 100/ML
VIAL (ML) SUBCUTANEOUS EVERY 6 HOURS
Refills: 0 | Status: DISCONTINUED | OUTPATIENT
Start: 2020-05-01 | End: 2020-05-03

## 2020-05-01 RX ORDER — QUETIAPINE FUMARATE 200 MG/1
25 TABLET, FILM COATED ORAL DAILY
Refills: 0 | Status: DISCONTINUED | OUTPATIENT
Start: 2020-05-02 | End: 2020-05-02

## 2020-05-01 RX ORDER — HALOPERIDOL DECANOATE 100 MG/ML
2.5 INJECTION INTRAMUSCULAR ONCE
Refills: 0 | Status: COMPLETED | OUTPATIENT
Start: 2020-05-01 | End: 2020-05-01

## 2020-05-01 RX ORDER — ACETAMINOPHEN 500 MG
650 TABLET ORAL EVERY 6 HOURS
Refills: 0 | Status: DISCONTINUED | OUTPATIENT
Start: 2020-05-01 | End: 2020-06-01

## 2020-05-01 RX ORDER — ALBUTEROL 90 UG/1
2 AEROSOL, METERED ORAL EVERY 6 HOURS
Refills: 0 | Status: DISCONTINUED | OUTPATIENT
Start: 2020-05-01 | End: 2020-06-01

## 2020-05-01 RX ORDER — MIRTAZAPINE 45 MG/1
7.5 TABLET, ORALLY DISINTEGRATING ORAL DAILY
Refills: 0 | Status: DISCONTINUED | OUTPATIENT
Start: 2020-05-01 | End: 2020-05-02

## 2020-05-01 RX ORDER — DEXTROSE MONOHYDRATE, SODIUM CHLORIDE, AND POTASSIUM CHLORIDE 50; .745; 4.5 G/1000ML; G/1000ML; G/1000ML
1000 INJECTION, SOLUTION INTRAVENOUS
Refills: 0 | Status: DISCONTINUED | OUTPATIENT
Start: 2020-05-01 | End: 2020-05-02

## 2020-05-01 RX ORDER — OLANZAPINE 15 MG/1
2.5 TABLET, FILM COATED ORAL DAILY
Refills: 0 | Status: DISCONTINUED | OUTPATIENT
Start: 2020-05-02 | End: 2020-05-02

## 2020-05-01 RX ORDER — HALOPERIDOL DECANOATE 100 MG/ML
1 INJECTION INTRAMUSCULAR ONCE
Refills: 0 | Status: COMPLETED | OUTPATIENT
Start: 2020-05-01 | End: 2020-05-01

## 2020-05-01 RX ORDER — OLANZAPINE 15 MG/1
5 TABLET, FILM COATED ORAL AT BEDTIME
Refills: 0 | Status: DISCONTINUED | OUTPATIENT
Start: 2020-05-01 | End: 2020-05-02

## 2020-05-01 RX ORDER — MIRTAZAPINE 45 MG/1
7.5 TABLET, ORALLY DISINTEGRATING ORAL DAILY
Refills: 0 | Status: DISCONTINUED | OUTPATIENT
Start: 2020-05-02 | End: 2020-05-02

## 2020-05-01 RX ORDER — ERGOCALCIFEROL 1.25 MG/1
50000 CAPSULE ORAL
Refills: 0 | Status: DISCONTINUED | OUTPATIENT
Start: 2020-05-01 | End: 2020-05-15

## 2020-05-01 RX ORDER — LEVETIRACETAM 250 MG/1
500 TABLET, FILM COATED ORAL
Refills: 0 | Status: DISCONTINUED | OUTPATIENT
Start: 2020-05-01 | End: 2020-05-05

## 2020-05-01 RX ADMIN — ENOXAPARIN SODIUM 40 MILLIGRAM(S): 100 INJECTION SUBCUTANEOUS at 11:38

## 2020-05-01 RX ADMIN — Medication 2: at 04:21

## 2020-05-01 RX ADMIN — Medication 1: at 17:20

## 2020-05-01 RX ADMIN — QUETIAPINE FUMARATE 50 MILLIGRAM(S): 200 TABLET, FILM COATED ORAL at 21:58

## 2020-05-01 RX ADMIN — MIRTAZAPINE 7.5 MILLIGRAM(S): 45 TABLET, ORALLY DISINTEGRATING ORAL at 21:58

## 2020-05-01 RX ADMIN — Medication 1: at 11:37

## 2020-05-01 RX ADMIN — OLANZAPINE 5 MILLIGRAM(S): 15 TABLET, FILM COATED ORAL at 21:58

## 2020-05-01 RX ADMIN — HALOPERIDOL DECANOATE 2.5 MILLIGRAM(S): 100 INJECTION INTRAMUSCULAR at 23:28

## 2020-05-01 RX ADMIN — HALOPERIDOL DECANOATE 1 MILLIGRAM(S): 100 INJECTION INTRAMUSCULAR at 19:05

## 2020-05-01 RX ADMIN — Medication 2 MILLIGRAM(S): at 00:26

## 2020-05-01 NOTE — ED ADULT NURSE NOTE - NSIMPLEMENTINTERV_GEN_ALL_ED
Implemented All Fall Risk Interventions:  [place] to call system. Call bell, personal items and telephone within reach. Instruct patient to call for assistance. Room bathroom lighting operational. Non-slip footwear when patient is off stretcher. Physically safe environment: no spills, clutter or unnecessary equipment. Stretcher in lowest position, wheels locked, appropriate side rails in place. Provide visual cue, wrist band, yellow gown, etc. Monitor gait and stability. Monitor for mental status changes and reorient to person, place, and time. Review medications for side effects contributing to fall risk. Reinforce activity limits and safety measures with patient and family.

## 2020-05-01 NOTE — H&P ADULT - NSHPPHYSICALEXAM_GEN_ALL_CORE
Vital Signs Last 24 Hrs  T(C): 36.4 (30 Apr 2020 21:25), Max: 36.8 (30 Apr 2020 20:53)  T(F): 97.5 (30 Apr 2020 21:25), Max: 98.2 (30 Apr 2020 20:53)  HR: 69 (30 Apr 2020 20:53) (69 - 69)  BP: 112/71 (30 Apr 2020 20:53) (112/71 - 112/71)  BP(mean): --  RR: 18 (30 Apr 2020 20:53) (18 - 18)  SpO2: 98% (30 Apr 2020 20:53) (98% - 98%) Vital Signs Last 24 Hrs  T(C): 36.4 (30 Apr 2020 21:25), Max: 36.8 (30 Apr 2020 20:53)  T(F): 97.5 (30 Apr 2020 21:25), Max: 98.2 (30 Apr 2020 20:53)  HR: 69 (30 Apr 2020 20:53) (69 - 69)  BP: 112/71 (30 Apr 2020 20:53) (112/71 - 112/71)  BP(mean): --  RR: 18 (30 Apr 2020 20:53) (18 - 18)  SpO2: 98% (30 Apr 2020 20:53) (98% - 98%)      · Physical Examination:   moves all extremities spontaneously. AAOx0  · EYES: Clear bilaterally, pupils equal, round and reactive to light. EOMI  · CARDIAC: Normal rate, regular rhythm.  Heart sounds S1, S2.  No murmurs, rubs or gallops.  · RESPIRATORY: Breath sounds clear and equal bilaterally.  · GASTROINTESTINAL: Abdomen soft, non-tender, no guarding.  · SKIN: Skin normal color for race, warm, dry and intact. No evidence of rash.

## 2020-05-01 NOTE — CHART NOTE - NSCHARTNOTEFT_GEN_A_CORE
Source of information: JAZIEL LOPEZ, Chart review  Patient language: English  : n/a    HPI:  64 year old male from [facility]  with PMH dementia and DM coming in with seizures like activity while sitting at AL. pt unable to provide further history (poor historian) . denies all complaints at this time. NKDA     In ED :   EKG : Sinus rhythm with PVCs   Afebrile, no WBC elevation  normal electrolytes and renal functions   Lactate 1.7  CT head : unremarkable CT study of the brain. No acute abnormality suggested.  CXR : Clear lungs   no Leukocytosis  lymphocyte; WNl   pt is afebrile  O2 Sat on RA 98%     ****Morning team to contact Assisted Living for home med rec and GOC ****   Full code for now (01 May 2020 03:13)      Called by primary RN reporting patient is agitated, restless and trying to get out of bed. Pt received haldol 1mg IV at 19:05. Pt seen and examined at bedside. Pt awake laying in bed, calm. While attempting to speak with patient, pt turned in bed. No restless/ agitation noted. No non-verbal s/s of pain/ discomfort noted. No seizure activity noted. Pt on room air. Informed RN. Will continue to monitor.     PAST MEDICAL & SURGICAL HISTORY:  Dementia  No significant past surgical history      FAMILY HISTORY: non documented. unable to assess d/t mental status      Social History:  non documented. unable to assess d/t mental status    Allergies    No Known Allergies    MEDICATIONS  (STANDING):  dextrose 5% + sodium chloride 0.9% with potassium chloride 20 mEq/L 1000 milliLiter(s) (50 mL/Hr) IV Continuous <Continuous>  enoxaparin Injectable 40 milliGRAM(s) SubCutaneous daily  ergocalciferol 65698 Unit(s) Oral every week  insulin lispro (HumaLOG) corrective regimen sliding scale   SubCutaneous every 6 hours  levETIRAcetam 500 milliGRAM(s) Oral two times a day    MEDICATIONS  (PRN):  acetaminophen  Suppository .. 650 milliGRAM(s) Rectal every 6 hours PRN Temp greater or equal to 38C (100.4F)  ALBUTerol    90 MICROgram(s) HFA Inhaler 2 Puff(s) Inhalation every 6 hours PRN Shortness of Breath and/or Wheezing  guaiFENesin   Syrup  (Sugar-Free) 100 milliGRAM(s) Oral every 6 hours PRN Cough  LORazepam   Injectable 2 milliGRAM(s) IV Push every 6 hours PRN seizures      Vital Signs Last 24 Hrs  T(C): 36.7 (01 May 2020 15:53), Max: 37 (01 May 2020 04:57)  T(F): 98 (01 May 2020 15:53), Max: 98.6 (01 May 2020 04:57)  HR: 81 (01 May 2020 15:53) (80 - 110)  BP: 145/71 (01 May 2020 15:53) (114/74 - 145/71)  BP(mean): --  RR: 18 (01 May 2020 15:53) (17 - 18)  SpO2: 100% (01 May 2020 15:53) (97% - 100%)    LABS: Reviewed                          13.3   4.36  )-----------( 203      ( 01 May 2020 09:36 )             38.4     05-01    140  |  105  |  14  ----------------------------<  170<H>  3.4<L>   |  28  |  0.69    Ca    8.5      01 May 2020 09:36  Phos  2.3     05-01  Mg     1.8     05-01    TPro  6.9  /  Alb  3.4<L>  /  TBili  0.5  /  DBili  x   /  AST  15  /  ALT  21  /  AlkPhos  59  05-01    PT/INR - ( 30 Apr 2020 21:21 )   PT: 12.6 sec;   INR: 1.11 ratio         PTT - ( 30 Apr 2020 21:21 )  PTT:29.6 sec  LIVER FUNCTIONS - ( 01 May 2020 09:36 )  Alb: 3.4 g/dL / Pro: 6.9 g/dL / ALK PHOS: 59 U/L / ALT: 21 U/L DA / AST: 15 U/L / GGT: x             CAPILLARY BLOOD GLUCOSE  233 (01 May 2020 04:22)      POCT Blood Glucose.: 154 mg/dL (01 May 2020 17:15)  POCT Blood Glucose.: 185 mg/dL (01 May 2020 11:34)  POCT Blood Glucose.: 233 mg/dL (01 May 2020 04:19)  POCT Blood Glucose.: 338 mg/dL (30 Apr 2020 21:02)        COVID-19 PCR: Detected (01 May 2020 03:02)      Radiology: Reviewed    REVIEW OF SYSTEMS:  unable to perform d/t altered mental status     PHYSICAL EXAM:  GENERAL: awake, confused, turning in bed, NAD  CHEST/LUNG: Even and unlabored on room air   HEART: Regular rate and rhythm  MUSCULOSKELETAL: moves all extremities equally against gravity

## 2020-05-01 NOTE — ED ADULT NURSE NOTE - OBJECTIVE STATEMENT
awake male brought in by ems for seizure at assisted living facility. pt with hx of dementia and seizures. unknown baseline mental status.

## 2020-05-01 NOTE — H&P ADULT - PROBLEM SELECTOR PLAN 2
- p/w seizures  - CXR : Clear lungs   - no Leukocytosis  - lymphocyte; WNl   - pt is afebrile  - O2 Sat on RA 98%   - will differ on Abs for now  - Also Will rule out covid 19; testing : contact and airborne isolation precaution , Aspiration Precautions .  - c/w Albuterol Inhaler  - Supportive care , AntPyeretic Tylenol PRN  and anti-tussives Robitussin PRN , Supplemental O2 via nasal cannula if needed  - f/u Procalcitonin  , Legionella Ag , strept , mycoplasma antigen    - f/u Coags , D-Dimers , ESR , CRP , LDH, ferritin , Lactate , Cardiac enzymes , G6PD   ** (please get CRP daily and get ESR , D-Dimers , LDH, ferritin , Cardiac enzymes , Coags every 3 days if COVID +ve ) - p/w seizures  - CXR : Clear lungs   - no Leukocytosis  - lymphocyte; WNl   - pt is afebrile  - O2 Sat on RA 98%   - will differ on Abs for now  - Also Will rule out covid 19; testing : contact and airborne isolation precaution , Aspiration Precautions .  - c/w Albuterol Inhaler  - Supportive care , AntPyeretic Tylenol PRN  and anti-tussives Robitussin PRN , Supplemental O2 via nasal cannula if needed  - f/u Procalcitonin  - f/u Coags , D-Dimers , ESR , CRP , LDH, ferritin , Lactate , Cardiac enzymes , G6PD   ** (please get CRP daily and get ESR , D-Dimers , LDH, ferritin , Cardiac enzymes , Coags every 3 days if COVID +ve )

## 2020-05-01 NOTE — PROGRESS NOTE ADULT - SUBJECTIVE AND OBJECTIVE BOX
Pt Name: JAZIEL LOPEZ  MRN: [id number]      64yMaleHPI:  64 year old male from [facility]  with PMH dementia and DM coming in with seizures like activity while sitting at AL. pt unable to provide further history (poor historian) .    Patient seen and evaluated at bedside. Patient is confused and a poor historian, unable to provide history at this time.    In ED :   EKG : Sinus rythm with PVCs   Afebrile, no WBC elevation  normal electrolytes and renal functions   Lactate 1.7  CT head : unremarkable CT study of the brain. No acute abnormality suggested.  CXR : Clear lungs   no Leukocytosis  lymphocyte; WNl   pt is afebrile  O2 Sat on RA 98%     PHYSICAL EXAM:    Vital Signs Last 24 Hrs  T(C): 36.3 (01 May 2020 07:35), Max: 37 (01 May 2020 04:57)  T(F): 97.4 (01 May 2020 07:35), Max: 98.6 (01 May 2020 04:57)  HR: 110 (01 May 2020 07:35) (69 - 110)  BP: 126/90 (01 May 2020 07:35) (112/71 - 132/84)  BP(mean): --  RR: 17 (01 May 2020 07:35) (17 - 18)  SpO2: 97% (01 May 2020 07:35) (97% - 100%)    Gen: Confused  Extremities: no clubbing/cyanosis, no edema, moves all extremities spontaneously  Vascular:  DP/PT 2+ b/l, brisk  Skin: no rash on visible skin        LABS:                        13.3   4.36  )-----------( 203      ( 01 May 2020 09:36 )             38.4     05-01    140  |  105  |  14  ----------------------------<  170<H>  3.4<L>   |  28  |  0.69    Ca    8.5      01 May 2020 09:36  Phos  2.3     05-01  Mg     1.8     05-01    TPro  6.9  /  Alb  3.4<L>  /  TBili  0.5  /  DBili  x   /  AST  15  /  ALT  21  /  AlkPhos  59  05-01    PT/INR - ( 30 Apr 2020 21:21 )   PT: 12.6 sec;   INR: 1.11 ratio         PTT - ( 30 Apr 2020 21:21 )  PTT:29.6 sec

## 2020-05-01 NOTE — PROGRESS NOTE ADULT - PROBLEM SELECTOR PLAN 2
- Afebrile, no WBC elevation  - normal electrolytes and renal functions   - CT head : unremarkable CT study of the brain. No acute abnormality suggested.  - Aspiration precaution/Seizure precaution/Fall precautions   - c/w Ativan PRN  - NPO for now - speech and swallow eval pending  - f/u Prolactin , Vitamin B12 & Folate  - Neurology, Dr. Ray consulted overnight who recommends a MR brain without contrast and EEG

## 2020-05-01 NOTE — PROGRESS NOTE ADULT - PROBLEM SELECTOR PLAN 3
Home meds: Metformin 1000mg bid  - Monitor blood sugars AC/HS and adjust as needed  - goal -180.   - f/u HgA1c  - HSS

## 2020-05-01 NOTE — H&P ADULT - ASSESSMENT
64 year old male PMH dementia and DM coming in with seizures like activity while sitting at AL. pt unable to provide further history. denies all complaints at this time.      In ED :       Pt is admitted for management of Seizure 64 year old male from [facility]  with PMH dementia and DM coming in with seizures like activity while sitting at AL. pt unable to provide further history (poor historian) . denies all complaints at this time. NKDA     In ED :   EKG : Sinus rythm with PVCs   Afebrile, no WBC elevation  normal electrolytes and renal functions   Lactate 1.7  CT head : unremarkable CT study of the brain. No acute abnormality suggested.  CXR : Clear lungs   no Leukocytosis  lymphocyte; WNl   pt is afebrile  O2 Sat on RA 98%     ****Morning team to contact Assisted Living for home med rec and GOC ****   Full code for now     Pt is admitted for management of Seizure

## 2020-05-01 NOTE — H&P ADULT - PROBLEM SELECTOR PLAN 1
- p/w episode of seizure at AL   - no tongue bite or Lock jaw  - No focal weakness, CN 2-12 intact  - Afebrile, no WBC elevation  - normal electrolytes and renal functions   - Lactate 1.7  - CT head : unremarkable CT study of the brain. No acute abnormality suggested.  - Aspiration precaution/Seizure precaution/Fall precautions   - c/w Ativan PRN  - NPO for now  - f/u Orthostatics   - f/u Prolactin , Vitamin B12 & Folate  - f/u PT  ** Neurology consulted Dr Ray.

## 2020-05-01 NOTE — CHART NOTE - NSCHARTNOTEFT_GEN_A_CORE
Source of information: JAZIEL LOPEZ, Chart review  Patient language: English  : n/a    HPI:  64 year old male from [facility]  with PMH dementia and DM coming in with seizures like activity while sitting at AL. pt unable to provide further history (poor historian) . denies all complaints at this time. NKDA     In ED :   EKG : Sinus rhythm with PVCs   Afebrile, no WBC elevation  normal electrolytes and renal functions   Lactate 1.7  CT head : unremarkable CT study of the brain. No acute abnormality suggested.  CXR : Clear lungs   no Leukocytosis  lymphocyte; WNl   pt is afebrile  O2 Sat on RA 98%     ****Morning team to contact Assisted Living for home med rec and GOC ****   Full code for now (01 May 2020 03:13)      Called by primary RN reporting patient is agitated, restless and trying to get out of bed. Pt received haldol 1mg IV at 19:05. Pt seen and examined at bedside. Pt awake laying in bed, restless. Pt reaching arms in the arm, moving restlessly in bed. Pt talking to himself. No non-verbal s/s of pain/ discomfort noted. No seizure activity noted. Pt on room air. EKG reviewed. Haldol 2.5mg IV x 1 ordered. Informed RN. Will continue to monitor.     PAST MEDICAL & SURGICAL HISTORY:  Dementia  No significant past surgical history      FAMILY HISTORY: non documented. unable to assess d/t mental status      Social History:  non documented. unable to assess d/t mental status    Allergies    No Known Allergies    MEDICATIONS  (STANDING):  dextrose 5% + sodium chloride 0.9% with potassium chloride 20 mEq/L 1000 milliLiter(s) (50 mL/Hr) IV Continuous <Continuous>  enoxaparin Injectable 40 milliGRAM(s) SubCutaneous daily  ergocalciferol 77954 Unit(s) Oral every week  insulin lispro (HumaLOG) corrective regimen sliding scale   SubCutaneous every 6 hours  levETIRAcetam 500 milliGRAM(s) Oral two times a day    MEDICATIONS  (PRN):  acetaminophen  Suppository .. 650 milliGRAM(s) Rectal every 6 hours PRN Temp greater or equal to 38C (100.4F)  ALBUTerol    90 MICROgram(s) HFA Inhaler 2 Puff(s) Inhalation every 6 hours PRN Shortness of Breath and/or Wheezing  guaiFENesin   Syrup  (Sugar-Free) 100 milliGRAM(s) Oral every 6 hours PRN Cough  LORazepam   Injectable 2 milliGRAM(s) IV Push every 6 hours PRN seizures      Vital Signs Last 24 Hrs  T(C): 36.7 (01 May 2020 15:53), Max: 37 (01 May 2020 04:57)  T(F): 98 (01 May 2020 15:53), Max: 98.6 (01 May 2020 04:57)  HR: 81 (01 May 2020 15:53) (80 - 110)  BP: 145/71 (01 May 2020 15:53) (114/74 - 145/71)  BP(mean): --  RR: 18 (01 May 2020 15:53) (17 - 18)  SpO2: 100% (01 May 2020 15:53) (97% - 100%)    LABS: Reviewed                          13.3   4.36  )-----------( 203      ( 01 May 2020 09:36 )             38.4     05-01    140  |  105  |  14  ----------------------------<  170<H>  3.4<L>   |  28  |  0.69    Ca    8.5      01 May 2020 09:36  Phos  2.3     05-01  Mg     1.8     05-01    TPro  6.9  /  Alb  3.4<L>  /  TBili  0.5  /  DBili  x   /  AST  15  /  ALT  21  /  AlkPhos  59  05-01    PT/INR - ( 30 Apr 2020 21:21 )   PT: 12.6 sec;   INR: 1.11 ratio         PTT - ( 30 Apr 2020 21:21 )  PTT:29.6 sec  LIVER FUNCTIONS - ( 01 May 2020 09:36 )  Alb: 3.4 g/dL / Pro: 6.9 g/dL / ALK PHOS: 59 U/L / ALT: 21 U/L DA / AST: 15 U/L / GGT: x             CAPILLARY BLOOD GLUCOSE  233 (01 May 2020 04:22)      POCT Blood Glucose.: 154 mg/dL (01 May 2020 17:15)  POCT Blood Glucose.: 185 mg/dL (01 May 2020 11:34)  POCT Blood Glucose.: 233 mg/dL (01 May 2020 04:19)  POCT Blood Glucose.: 338 mg/dL (30 Apr 2020 21:02)        COVID-19 PCR: Detected (01 May 2020 03:02)      Radiology: Reviewed    REVIEW OF SYSTEMS:  unable to perform d/t altered mental status     PHYSICAL EXAM:  GENERAL: awake, confused, restless turning in bed, NAD  CHEST/LUNG: Even and unlabored on room air   HEART: Regular rate and rhythm  MUSCULOSKELETAL: moves all extremities equally against gravity

## 2020-05-01 NOTE — ED ADULT NURSE NOTE - ISOLATION TYPE:
Bettina Adams is a 23year old female. HPI:   Patient presents for physical exam. Comes in alone. 1. Mood Disorder: \"it's been better. \" she is focusing on taking things one day at a time. She has been reading. Focusing on her goals and tasks.  Has n °C) (Oral)   Resp 13   Ht 64.25\"   Wt 168 lb   LMP 03/19/2018 (Within Days)   SpO2 99%   Breastfeeding?  No   BMI 28.61 kg/m²   GENERAL: A&O well developed, well nourished,in no apparent distress  SKIN: no rashes,no suspicious lesions  HEENT: atraumatic, M None

## 2020-05-01 NOTE — CONSULT NOTE ADULT - SUBJECTIVE AND OBJECTIVE BOX
***TEMPLATE ONLY***      Patient is a 64y old  Male who presents with a chief complaint of seizures (01 May 2020 03:13)      HPI:  64 year old male from [facility]  with PMH dementia and DM coming in with seizures like activity while sitting at AL. pt unable to provide further history (poor historian) . denies all complaints at this time. NKDA     In ED :   EKG : Sinus rythm with PVCs   Afebrile, no WBC elevation  normal electrolytes and renal functions   Lactate 1.7  CT head : unremarkable CT study of the brain. No acute abnormality suggested.  CXR : Clear lungs   no Leukocytosis  lymphocyte; WNl   pt is afebrile  O2 Sat on RA 98%     ****Morning team to contact Assisted Living for home med rec and GOC ****   Full code for now (01 May 2020 03:13)         The seizure is described as  Seizure onset at  The frequency of seizure is  The seizure is brought up by  The patient has been previously on     History of head trauma/ concussion:  History of meningitis:  History of febrile seizures:  Family history of seizures:    Neurological Review of Systems:  No difficulty with language.  No vision loss or double vision.  No dizziness, vertigo or new hearing loss.  No difficulty with speech or swallowing.  No focal weakness.  No focal sensory changes.  No numbness or tingling in the bilateral lower extremities.  No difficulty with balance.  No difficulty with ambulation.        MEDICATIONS  (STANDING):  dextrose 5% + sodium chloride 0.9% with potassium chloride 20 mEq/L 1000 milliLiter(s) (50 mL/Hr) IV Continuous <Continuous>  enoxaparin Injectable 40 milliGRAM(s) SubCutaneous daily  insulin lispro (HumaLOG) corrective regimen sliding scale   SubCutaneous every 6 hours    MEDICATIONS  (PRN):  acetaminophen  Suppository .. 650 milliGRAM(s) Rectal every 6 hours PRN Temp greater or equal to 38C (100.4F)  ALBUTerol    90 MICROgram(s) HFA Inhaler 2 Puff(s) Inhalation every 6 hours PRN Shortness of Breath and/or Wheezing  guaiFENesin   Syrup  (Sugar-Free) 100 milliGRAM(s) Oral every 6 hours PRN Cough  LORazepam   Injectable 2 milliGRAM(s) IV Push every 6 hours PRN seizures    Allergies    No Known Allergies    Intolerances      PAST MEDICAL & SURGICAL HISTORY:  Dementia  No significant past surgical history    FAMILY HISTORY:    SOCIAL HISTORY: non smoker/ former smoker/ active smoker    Review of Systems:  Constitutional: No generalized weakness. No fevers or chills.                    Eyes, Ears, Mouth, Throat: No vision loss   Respiratory: No shortness of breath or cough.                                Cardiovascular: No chest pain or palpitations  Gastrointestinal: No nausea or vomiting.                                         Genitourinary: No urinary incontinence or burning on urination.  Musculoskeletal: No joint pain.                                                           Dermatologic: No rash.  Neurological: as per HPI                                                                      Psychiatric: No behavioral problems.  Endocrine: No known hypoglycemia.               Hematologic/Lymphatic: No easy bleeding.    O:  Vital Signs Last 24 Hrs  T(C): 36.3 (01 May 2020 07:35), Max: 37 (01 May 2020 04:57)  T(F): 97.4 (01 May 2020 07:35), Max: 98.6 (01 May 2020 04:57)  HR: 110 (01 May 2020 07:35) (69 - 110)  BP: 126/90 (01 May 2020 07:35) (112/71 - 132/84)  BP(mean): --  RR: 17 (01 May 2020 07:35) (17 - 18)  SpO2: 97% (01 May 2020 07:35) (97% - 100%)    General Exam:   General appearance: No acute distress                 Cardiovascular: Pedal dorsalis pulses intact bilaterally    Mental Status: Orientated to self, date and place.  Attention intact.  No dysarthria, aphasia or neglect.  Knowledge intact.  Registration intact.  Short and long term memory grossly intact.      Cranial Nerves: CN I - not tested.  PERRL, EOMI, VFF, no nystagmus or diplopia.  No APD.  Fundi not visualized.  CN V1-3 intact to light touch and pinprick.  No facial asymmetry.  Hearing intact to finger rub bilaterally.  Tongue, uvula and palate midline.  Sternocleidomastoid and Trapezius intact bilaterally.    Motor:   Tone: normal.                  Strength intact throughout  No pronator drift bilaterally                      No dysmetria on finger-nose-finger or heel-shin-heel  No truncal ataxia.  No resting, postural or action tremor.  No myoclonus.    Sensation: intact to light touch, pinprick, vibration and proprioception    Deep Tendon Reflexes: 1+ bilateral biceps, triceps, brachioradialis, knee and ankle  Toes flexor bilaterally    Gait: normal and stable.  Rhomberg -eitan.    Other:     LABS:                        13.3   4.36  )-----------( 203      ( 01 May 2020 09:36 )             38.4     05-01    140  |  105  |  14  ----------------------------<  170<H>  3.4<L>   |  28  |  0.69    Ca    8.5      01 May 2020 09:36  Phos  2.3     05-01  Mg     1.8     05-01    TPro  6.9  /  Alb  3.4<L>  /  TBili  0.5  /  DBili  x   /  AST  15  /  ALT  21  /  AlkPhos  59  05-01    PT/INR - ( 30 Apr 2020 21:21 )   PT: 12.6 sec;   INR: 1.11 ratio         PTT - ( 30 Apr 2020 21:21 )  PTT:29.6 sec      RADIOLOGY & ADDITIONAL STUDIES:    < from: CT Head No Cont (05.01.20 @ 01:44) > (images reviewed)  COMPARISON: CT head 1/20/2020    TECHNIQUE: Axial noncontrast CT images from the skull base to the vertex were obtained and submitted for interpretation. Coronal and sagittal reformatted images were performed. Bone and soft tissue windows were evaluated.    FINDINGS:  Patient motion degrades image quality.    There is no acute intracranial mass-effect, hemorrhage, midline shift,or abnormal extra-axial fluid collection. Dural thickening with calcification is noted.    Mild central volume loss and mild chronic microvascular ischemic changes are noted. No evidence hydrocephalus. Basal cisterns are patent.     Right greater than left maxillary sinus mucosal thickening with underlying retention cysts and/or polyps are noted. Scattered mucosal thickening noted. Remaining paranasal sinuses and mastoid air cells are clear. Calvarium is intact.     IMPRESSION:     No acute intracranial bleeding, mass effect, or shift.                 LUPIS AVINA M.D., ATTENDING RADIOLOGIST  This document has been electronically signed. May  1 2020  2:26AM    < end of copied text >      Impression:  Presumed seizure in patient with Alzheimers Dementia    Recommendations:  1.         pls evaluate for infectious cause as per primary team  2.         MRI brain without contrast  3.         EEG  4.         Please give Ativan 2mg for active seizure, can give another 2mg dose of Ativan if the seizure continues or re-occurs, for a maximum of 6mg Ativan in 24 hours.  5.         Seizure and fall precautions  .        The patient has been advised that driving is not allowed for 1 year after a seizure by [facility] law.  The patient is advised to avoid swimming and any activities that may put their life at danger shall they have a seizure.    11.        DVT PPx    Thank you for the courtesy of this consult. Patient is a 64y old  Male who presents with a chief complaint of seizures (01 May 2020 03:13)      HPI:  64 year old male from [facility]  with PMH dementia and DM coming in with seizures like activity while sitting at AL. Description of the seizure is not available.  It is unclear if the patient has had prior seizures as patient is unable to provide further history (poor historian) . Denies all complaints at this time.    In ED :   EKG : Sinus rythm with PVCs   Afebrile, no WBC elevation  normal electrolytes and renal functions   Lactate 1.7  CT head : unremarkable CT study of the brain. No acute abnormality suggested.  CXR : Clear lungs   no Leukocytosis  lymphocyte; WNl   pt is afebrile  O2 Sat on RA 98%       MEDICATIONS  (STANDING):  dextrose 5% + sodium chloride 0.9% with potassium chloride 20 mEq/L 1000 milliLiter(s) (50 mL/Hr) IV Continuous <Continuous>  enoxaparin Injectable 40 milliGRAM(s) SubCutaneous daily  insulin lispro (HumaLOG) corrective regimen sliding scale   SubCutaneous every 6 hours    MEDICATIONS  (PRN):  acetaminophen  Suppository .. 650 milliGRAM(s) Rectal every 6 hours PRN Temp greater or equal to 38C (100.4F)  ALBUTerol    90 MICROgram(s) HFA Inhaler 2 Puff(s) Inhalation every 6 hours PRN Shortness of Breath and/or Wheezing  guaiFENesin   Syrup  (Sugar-Free) 100 milliGRAM(s) Oral every 6 hours PRN Cough  LORazepam   Injectable 2 milliGRAM(s) IV Push every 6 hours PRN seizures    Allergies    No Known Allergies    Intolerances      PAST MEDICAL & SURGICAL HISTORY:  Dementia  No significant past surgical history    FAMILY HISTORY: unknown, patient unable to say    SOCIAL HISTORY: non smoker    Review of Systems:  Constitutional: No fevers.                    Eyes, Ears, Mouth, Throat: Uknown if has vision loss   Respiratory: No shortness of breath or cough.                                Cardiovascular: Uknown if has chest pain or palpitations  Gastrointestinal: Uknown if has vomiting.                                         Genitourinary: Uknown if has  urinary incontinence.  Musculoskeletal: Uknown if has joint pain.                                                           Dermatologic: Uknown if has rash.  Neurological: as per HPI                                                                      Psychiatric: No known behavioral problems.  Endocrine: No known hypoglycemia.               Hematologic/Lymphatic: Uknown if has easy bleeding.    O:  Vital Signs Last 24 Hrs  T(C): 36.3 (01 May 2020 07:35), Max: 37 (01 May 2020 04:57)  T(F): 97.4 (01 May 2020 07:35), Max: 98.6 (01 May 2020 04:57)  HR: 110 (01 May 2020 07:35) (69 - 110)  BP: 126/90 (01 May 2020 07:35) (112/71 - 132/84)  BP(mean): --  RR: 17 (01 May 2020 07:35) (17 - 18)  SpO2: 97% (01 May 2020 07:35) (97% - 100%)    General Exam:   General appearance: No acute distress                 Cardiovascular: Pedal dorsalis pulses intact bilaterally    Mental Status: Orientated to self but not to date and place.  Attention intact.  No gross dysarthria, aphasia or neglect.  Knowledge, Registration and memory unreliable.      Cranial Nerves: CN I - not tested.  PERRL, EOMI, VFF, no nystagmus or diplopia.  No APD.  Fundi not visualized.  CN V1-3 intact to light touch.  No facial asymmetry.  Hearing intact to finger rub bilaterally.  Tongue, uvula and palate midline.  Sternocleidomastoid and Trapezius intact bilaterally.    Motor:   Tone: normal.                  Strength intact throughout  No pronator drift bilaterally                      No dysmetria on finger-nose-finger or heel-shin-heel    Sensation: intact to light touch    Deep Tendon Reflexes: 1+ bilateral biceps, triceps, brachioradialis, knee and ankle  Toes flexor bilaterally    Gait: patient unable at this time    Other:     LABS:                        13.3   4.36  )-----------( 203      ( 01 May 2020 09:36 )             38.4     05-01    140  |  105  |  14  ----------------------------<  170<H>  3.4<L>   |  28  |  0.69    Ca    8.5      01 May 2020 09:36  Phos  2.3     05-01  Mg     1.8     05-01    TPro  6.9  /  Alb  3.4<L>  /  TBili  0.5  /  DBili  x   /  AST  15  /  ALT  21  /  AlkPhos  59  05-01    PT/INR - ( 30 Apr 2020 21:21 )   PT: 12.6 sec;   INR: 1.11 ratio         PTT - ( 30 Apr 2020 21:21 )  PTT:29.6 sec      RADIOLOGY & ADDITIONAL STUDIES:    < from: CT Head No Cont (05.01.20 @ 01:44) > (images reviewed)  COMPARISON: CT head 1/20/2020    TECHNIQUE: Axial noncontrast CT images from the skull base to the vertex were obtained and submitted for interpretation. Coronal and sagittal reformatted images were performed. Bone and soft tissue windows were evaluated.    FINDINGS:  Patient motion degrades image quality.    There is no acute intracranial mass-effect, hemorrhage, midline shift,or abnormal extra-axial fluid collection. Dural thickening with calcification is noted.    Mild central volume loss and mild chronic microvascular ischemic changes are noted. No evidence hydrocephalus. Basal cisterns are patent.     Right greater than left maxillary sinus mucosal thickening with underlying retention cysts and/or polyps are noted. Scattered mucosal thickening noted. Remaining paranasal sinuses and mastoid air cells are clear. Calvarium is intact.     IMPRESSION:     No acute intracranial bleeding, mass effect, or shift.                 LUPIS AVINA M.D., ATTENDING RADIOLOGIST  This document has been electronically signed. May  1 2020  2:26AM    < end of copied text >    COVID-19 PCR: Detected: This test has been validated by [employer] to be accurate;  though it has not been FDA cleared/approved by the usual pathway.  As with all laboratory tests, results should be correlated with clinical  findings.  https://www.fda.gov/media/072194/download  https://www.fda.gov/media/360219/download (05.01.20 @ 03:02)

## 2020-05-01 NOTE — H&P ADULT - PROBLEM SELECTOR PLAN 3
****Morning team to contact Assisted Living for home med rec****   - will start sliding scale  - Monitor blood sugars AC/HS and adjust as needed  - goal -180.   - f/u HgA1c  - Carbohydrate consistent diabetic diet with evening snacks.

## 2020-05-01 NOTE — PROGRESS NOTE ADULT - ASSESSMENT
64 year old male from [facility]  with PMH dementia and DM coming in with seizures like activity while sitting at AL. pt unable to provide further history (poor historian) .

## 2020-05-01 NOTE — PROGRESS NOTE ADULT - PROBLEM SELECTOR PLAN 1
- R/o COVID   - pt is afebrile  - O2 Sat on %  - will differ on Abx for now  - Also Will rule out covid 19; testing : contact and airborne isolation precaution , Aspiration Precautions .  - c/w Albuterol Inhaler  - Supportive care , AntPyeretic Tylenol PRN  and anti-tussives Robitussin PRN , Supplemental O2 via nasal cannula if needed

## 2020-05-01 NOTE — CONSULT NOTE ADULT - ASSESSMENT
Impression:  Presumed seizure in patient with Alzheimers Dementia in patient who is COVID 19 +eitan    Recommendations:  1.         pls evaluate for infectious cause as per primary team  2.         start Keppra 500mg bid    3.         EEG and MRI brain without contrast as outpatient once the patient's COVID 19 infection resolves  4.         Please give Ativan 2mg for active seizure, can give another 2mg dose of Ativan if the seizure continues or re-occurs, for a maximum of 6mg Ativan in 24 hours.  5.         Seizure and fall precautions  6.        The patient does not drive  7.        DVT PPx    dw Dr. Mirza  Thank you for the courtesy of this consult.

## 2020-05-02 LAB
ALBUMIN SERPL ELPH-MCNC: 3.2 G/DL — LOW (ref 3.5–5)
ALP SERPL-CCNC: 60 U/L — SIGNIFICANT CHANGE UP (ref 40–120)
ALT FLD-CCNC: 18 U/L DA — SIGNIFICANT CHANGE UP (ref 10–60)
ANION GAP SERPL CALC-SCNC: 9 MMOL/L — SIGNIFICANT CHANGE UP (ref 5–17)
AST SERPL-CCNC: 18 U/L — SIGNIFICANT CHANGE UP (ref 10–40)
BILIRUB SERPL-MCNC: 0.7 MG/DL — SIGNIFICANT CHANGE UP (ref 0.2–1.2)
BUN SERPL-MCNC: 11 MG/DL — SIGNIFICANT CHANGE UP (ref 7–18)
CALCIUM SERPL-MCNC: 8.5 MG/DL — SIGNIFICANT CHANGE UP (ref 8.4–10.5)
CHLORIDE SERPL-SCNC: 108 MMOL/L — SIGNIFICANT CHANGE UP (ref 96–108)
CO2 SERPL-SCNC: 25 MMOL/L — SIGNIFICANT CHANGE UP (ref 22–31)
CREAT SERPL-MCNC: 0.6 MG/DL — SIGNIFICANT CHANGE UP (ref 0.5–1.3)
GLUCOSE BLDC GLUCOMTR-MCNC: 145 MG/DL — HIGH (ref 70–99)
GLUCOSE BLDC GLUCOMTR-MCNC: 153 MG/DL — HIGH (ref 70–99)
GLUCOSE BLDC GLUCOMTR-MCNC: 169 MG/DL — HIGH (ref 70–99)
GLUCOSE BLDC GLUCOMTR-MCNC: 172 MG/DL — HIGH (ref 70–99)
GLUCOSE BLDC GLUCOMTR-MCNC: 176 MG/DL — HIGH (ref 70–99)
GLUCOSE BLDC GLUCOMTR-MCNC: 184 MG/DL — HIGH (ref 70–99)
GLUCOSE BLDC GLUCOMTR-MCNC: 199 MG/DL — HIGH (ref 70–99)
GLUCOSE SERPL-MCNC: 133 MG/DL — HIGH (ref 70–99)
HCT VFR BLD CALC: 37.9 % — LOW (ref 39–50)
HCV AB S/CO SERPL IA: 0.15 S/CO — SIGNIFICANT CHANGE UP (ref 0–0.99)
HCV AB SERPL-IMP: SIGNIFICANT CHANGE UP
HGB BLD-MCNC: 13.3 G/DL — SIGNIFICANT CHANGE UP (ref 13–17)
MAGNESIUM SERPL-MCNC: 1.7 MG/DL — SIGNIFICANT CHANGE UP (ref 1.6–2.6)
MCHC RBC-ENTMCNC: 29 PG — SIGNIFICANT CHANGE UP (ref 27–34)
MCHC RBC-ENTMCNC: 35.1 GM/DL — SIGNIFICANT CHANGE UP (ref 32–36)
MCV RBC AUTO: 82.8 FL — SIGNIFICANT CHANGE UP (ref 80–100)
NRBC # BLD: 0 /100 WBCS — SIGNIFICANT CHANGE UP (ref 0–0)
PHOSPHATE SERPL-MCNC: 3.1 MG/DL — SIGNIFICANT CHANGE UP (ref 2.5–4.5)
PLATELET # BLD AUTO: 197 K/UL — SIGNIFICANT CHANGE UP (ref 150–400)
POTASSIUM SERPL-MCNC: 3.1 MMOL/L — LOW (ref 3.5–5.3)
POTASSIUM SERPL-SCNC: 3.1 MMOL/L — LOW (ref 3.5–5.3)
PROT SERPL-MCNC: 6.7 G/DL — SIGNIFICANT CHANGE UP (ref 6–8.3)
RBC # BLD: 4.58 M/UL — SIGNIFICANT CHANGE UP (ref 4.2–5.8)
RBC # FLD: 11.7 % — SIGNIFICANT CHANGE UP (ref 10.3–14.5)
SODIUM SERPL-SCNC: 142 MMOL/L — SIGNIFICANT CHANGE UP (ref 135–145)
T PALLIDUM AB TITR SER: NEGATIVE — SIGNIFICANT CHANGE UP
VIT B12 SERPL-MCNC: 378 PG/ML — SIGNIFICANT CHANGE UP (ref 232–1245)
WBC # BLD: 4.45 K/UL — SIGNIFICANT CHANGE UP (ref 3.8–10.5)
WBC # FLD AUTO: 4.45 K/UL — SIGNIFICANT CHANGE UP (ref 3.8–10.5)

## 2020-05-02 RX ORDER — HALOPERIDOL DECANOATE 100 MG/ML
1 INJECTION INTRAMUSCULAR DAILY
Refills: 0 | Status: DISCONTINUED | OUTPATIENT
Start: 2020-05-02 | End: 2020-05-02

## 2020-05-02 RX ORDER — HALOPERIDOL DECANOATE 100 MG/ML
1 INJECTION INTRAMUSCULAR EVERY 4 HOURS
Refills: 0 | Status: DISCONTINUED | OUTPATIENT
Start: 2020-05-02 | End: 2020-05-12

## 2020-05-02 RX ORDER — OLANZAPINE 15 MG/1
5 TABLET, FILM COATED ORAL
Refills: 0 | Status: DISCONTINUED | OUTPATIENT
Start: 2020-05-02 | End: 2020-05-14

## 2020-05-02 RX ORDER — QUETIAPINE FUMARATE 200 MG/1
50 TABLET, FILM COATED ORAL DAILY
Refills: 0 | Status: DISCONTINUED | OUTPATIENT
Start: 2020-05-02 | End: 2020-05-02

## 2020-05-02 RX ORDER — QUETIAPINE FUMARATE 200 MG/1
25 TABLET, FILM COATED ORAL
Refills: 0 | Status: DISCONTINUED | OUTPATIENT
Start: 2020-05-02 | End: 2020-05-14

## 2020-05-02 RX ORDER — MIRTAZAPINE 45 MG/1
3.75 TABLET, ORALLY DISINTEGRATING ORAL
Refills: 0 | Status: DISCONTINUED | OUTPATIENT
Start: 2020-05-02 | End: 2020-05-15

## 2020-05-02 RX ORDER — POTASSIUM CHLORIDE 20 MEQ
10 PACKET (EA) ORAL
Refills: 0 | Status: COMPLETED | OUTPATIENT
Start: 2020-05-02 | End: 2020-05-02

## 2020-05-02 RX ORDER — QUETIAPINE FUMARATE 200 MG/1
50 TABLET, FILM COATED ORAL
Refills: 0 | Status: DISCONTINUED | OUTPATIENT
Start: 2020-05-02 | End: 2020-05-14

## 2020-05-02 RX ORDER — HALOPERIDOL DECANOATE 100 MG/ML
1 INJECTION INTRAMUSCULAR
Refills: 0 | Status: DISCONTINUED | OUTPATIENT
Start: 2020-05-02 | End: 2020-05-12

## 2020-05-02 RX ORDER — HALOPERIDOL DECANOATE 100 MG/ML
2.5 INJECTION INTRAMUSCULAR EVERY 8 HOURS
Refills: 0 | Status: DISCONTINUED | OUTPATIENT
Start: 2020-05-02 | End: 2020-05-02

## 2020-05-02 RX ORDER — OLANZAPINE 15 MG/1
2.5 TABLET, FILM COATED ORAL
Refills: 0 | Status: DISCONTINUED | OUTPATIENT
Start: 2020-05-02 | End: 2020-05-14

## 2020-05-02 RX ORDER — HALOPERIDOL DECANOATE 100 MG/ML
2 INJECTION INTRAMUSCULAR AT BEDTIME
Refills: 0 | Status: DISCONTINUED | OUTPATIENT
Start: 2020-05-02 | End: 2020-05-02

## 2020-05-02 RX ORDER — MIRTAZAPINE 45 MG/1
7.5 TABLET, ORALLY DISINTEGRATING ORAL
Refills: 0 | Status: DISCONTINUED | OUTPATIENT
Start: 2020-05-02 | End: 2020-05-15

## 2020-05-02 RX ORDER — POTASSIUM CHLORIDE 20 MEQ
40 PACKET (EA) ORAL EVERY 4 HOURS
Refills: 0 | Status: DISCONTINUED | OUTPATIENT
Start: 2020-05-02 | End: 2020-05-02

## 2020-05-02 RX ORDER — HALOPERIDOL DECANOATE 100 MG/ML
2 INJECTION INTRAMUSCULAR
Refills: 0 | Status: DISCONTINUED | OUTPATIENT
Start: 2020-05-02 | End: 2020-05-12

## 2020-05-02 RX ADMIN — Medication 40 MILLIEQUIVALENT(S): at 10:26

## 2020-05-02 RX ADMIN — Medication 100 MILLIEQUIVALENT(S): at 18:55

## 2020-05-02 RX ADMIN — MIRTAZAPINE 7.5 MILLIGRAM(S): 45 TABLET, ORALLY DISINTEGRATING ORAL at 12:21

## 2020-05-02 RX ADMIN — MIRTAZAPINE 7.5 MILLIGRAM(S): 45 TABLET, ORALLY DISINTEGRATING ORAL at 20:16

## 2020-05-02 RX ADMIN — Medication 100 MILLIEQUIVALENT(S): at 20:04

## 2020-05-02 RX ADMIN — Medication 1: at 00:15

## 2020-05-02 RX ADMIN — Medication 1: at 12:19

## 2020-05-02 RX ADMIN — Medication 1 MILLIGRAM(S): at 15:47

## 2020-05-02 RX ADMIN — OLANZAPINE 5 MILLIGRAM(S): 15 TABLET, FILM COATED ORAL at 20:23

## 2020-05-02 RX ADMIN — ENOXAPARIN SODIUM 40 MILLIGRAM(S): 100 INJECTION SUBCUTANEOUS at 12:19

## 2020-05-02 RX ADMIN — ERGOCALCIFEROL 50000 UNIT(S): 1.25 CAPSULE ORAL at 12:20

## 2020-05-02 RX ADMIN — Medication 1 MILLIGRAM(S): at 20:20

## 2020-05-02 RX ADMIN — OLANZAPINE 2.5 MILLIGRAM(S): 15 TABLET, FILM COATED ORAL at 12:19

## 2020-05-02 RX ADMIN — HALOPERIDOL DECANOATE 2.5 MILLIGRAM(S): 100 INJECTION INTRAMUSCULAR at 12:53

## 2020-05-02 RX ADMIN — LEVETIRACETAM 500 MILLIGRAM(S): 250 TABLET, FILM COATED ORAL at 06:17

## 2020-05-02 RX ADMIN — MIRTAZAPINE 7.5 MILLIGRAM(S): 45 TABLET, ORALLY DISINTEGRATING ORAL at 12:20

## 2020-05-02 RX ADMIN — Medication 1: at 17:46

## 2020-05-02 RX ADMIN — QUETIAPINE FUMARATE 25 MILLIGRAM(S): 200 TABLET, FILM COATED ORAL at 12:20

## 2020-05-02 RX ADMIN — QUETIAPINE FUMARATE 50 MILLIGRAM(S): 200 TABLET, FILM COATED ORAL at 20:16

## 2020-05-02 RX ADMIN — Medication 100 MILLIEQUIVALENT(S): at 17:47

## 2020-05-02 NOTE — PROGRESS NOTE ADULT - SUBJECTIVE AND OBJECTIVE BOX
CHIEF COMPLAINT:Patient is a 64y old  Male who presents with a chief complaint of seizures.Pt s/p agitation requiring IV haldolx2.    	  REVIEW OF SYSTEMS:    [x ] Unable to obtain    PHYSICAL EXAM:  T(C): 36.4 (05-02-20 @ 08:28), Max: 36.8 (05-01-20 @ 23:45)  HR: 86 (05-02-20 @ 08:28) (81 - 102)  BP: 138/79 (05-02-20 @ 08:28) (138/79 - 151/71)  RR: 18 (05-02-20 @ 08:28) (18 - 18)  SpO2: 96% (05-02-20 @ 08:28) (96% - 100%)  Wt(kg): --  I&O's Summary      Appearance: Normal	  HEENT:   Normal oral mucosa, PERRL, EOMI	  Lymphatic: No lymphadenopathy  Cardiovascular: Normal S1 S2, No JVD, No murmurs, No edema  Respiratory: Lungs clear to auscultation	  Gastrointestinal:  Soft, Non-tender, + BS	  Skin: No rashes, No ecchymoses, No cyanosis	  Extremities: Normal range of motion, No clubbing, cyanosis or edema  Vascular: Peripheral pulses palpable 2+ bilaterally    MEDICATIONS  (STANDING):  enoxaparin Injectable 40 milliGRAM(s) SubCutaneous daily  ergocalciferol 93330 Unit(s) Oral every week  insulin lispro (HumaLOG) corrective regimen sliding scale   SubCutaneous every 6 hours  levETIRAcetam 500 milliGRAM(s) Oral two times a day  mirtazapine 7.5 milliGRAM(s) Oral daily  mirtazapine 7.5 milliGRAM(s) Oral daily  OLANZapine 5 milliGRAM(s) Oral at bedtime  OLANZapine 2.5 milliGRAM(s) Oral daily  potassium chloride   Powder 40 milliEquivalent(s) Oral every 4 hours  QUEtiapine 25 milliGRAM(s) Oral daily  QUEtiapine 50 milliGRAM(s) Oral at bedtime    	  	  LABS:	 	    CARDIAC MARKERS ( 01 May 2020 09:36 )  <0.015 ng/mL / x     / 73 U/L / x     / <1.0 ng/mL                         13.3   4.45  )-----------( 197      ( 02 May 2020 06:51 )             37.9     05-02    142  |  108  |  11  ----------------------------<  133<H>  3.1<L>   |  25  |  0.60    Ca    8.5      02 May 2020 06:51  Phos  3.1     05-02  Mg     1.7     05-02    TPro  6.7  /  Alb  3.2<L>  /  TBili  0.7  /  DBili  x   /  AST  18  /  ALT  18  /  AlkPhos  60  05-02    proBNP: Serum Pro-Brain Natriuretic Peptide: 126 pg/mL (05-01 @ 09:36)    Lipid Profile: Cholesterol 161  LDL 95  HDL 35      A1C with Estimated Average Glucose Result: 9.9: Method: Immunoassay       Reference Range                4.0-5.6%       High risk (prediabetic)        5.7-6.4%       Diabetic, diagnostic             >=6.5%       ADA diabetic treatment goal       <7.0%  The Hemoglobin A1c testing is NGSP-certified.Reference ranges are based  upon the 2010 recommendations of  the American Diabetes Association.  Interpretation may vary for children  and adolescents. % (05.01.20 @ 13:56)      TSH: Thyroid Stimulating Hormone, Serum: 2.39 uU/mL (05-01 @ 09:36)      Vitamin D, 25-Hydroxy: 21.1: VITD Interpretive Data Result:  30.0-80.0 ng/mL Optimal levels (Reference Range)  >80.0 ng/mL Toxicity possible  20.0-29.0 ng/mL Insufficiency  10.0-19.0 ng/mL Mild to Moderate Deficiency  <10.0 ng/mL Severe Deficiency  Optimal levels for 25-Hydroxy vitamin D are 30.0 ng/mL and above based  upon the Endocrine Society guidelines 2011.  However, there is a lack of  consensus on this and the [place] of Medicine recommends 20.0 ng/mL and  above as optimal levels.  Vitamin D results may vary depending on the  method of analysis.  The Roche mal [id number] electrochemiluminescent  immunoassay method measures both D2 and D3. ng/mL (05.01.20 @ 13:56)    	    COVID-19 PCR: Detected: This test has been validated by [employer] to be accurate;  though it has not been FDA cleared/approved by the usual pathway.  As with all laboratory tests, results should be correlated with clinical  findings.  https://www.fda.gov/media/849411/download  https://www.fda.gov/media/829823/download (05.01.20 @ 03:02)

## 2020-05-02 NOTE — PROGRESS NOTE ADULT - ASSESSMENT
64 yr old male with PMHX of DM,dementia sent to ER from facility for seizure and now COVID+.  1.Seizure-Neurology eval noted, rec keppra 500mg bid and MRI,EEG as outpatient.  2.COVID +-ID eval called.  3.Speech and swallow eval noted, Dysphagia I diet.  4.DM-Insulin.  5.D/C IVF.  6.Replace k+.  7.Dementia with behavioral problems-cont current medication.RPR-p.  8.Replace vit d.  9.GI and DVT prophylaxis.

## 2020-05-02 NOTE — CHART NOTE - NSCHARTNOTEFT_GEN_A_CORE
Called by nurse stating that patient was agitated, trying to climb and stand on bed, as well as trying to pull out IV.  Patient was given 2.5mg Halidol about 20minutes prior to this episode however still remained agitated.   Patient was placed in wrist restraints to prevent patient from standing on bed and to prevent loosing IV access.    Dr. Mirza was made aware, and recommended me to consult psycho on call.  Spoke with Dr. Wilkes who recommended the following:      > UA r/o infectious process      > C/w current psych med regimen, however if patient not tolerating or taking his PO Zyprexa of 2.5mg qam and zyprexa 5mg qhs will give Haldol prn 1mg (in place of morning zyprexa dose) and 2mg respectively (in place of evening zyprexa dose      > Haldol 1mg IVP q4 prn agitation      > Ativan 1mg IVP q4 prn breakthrough agitation     - UA + UCx ordered   - Dr. Mirza made carlson

## 2020-05-02 NOTE — CHART NOTE - NSCHARTNOTEFT_GEN_A_CORE
Contacted [facility] at [phone] to verify medication regimen which sounds somewhat unusual. Have to call back in 15' Contacted Janelle at [phone] to verify medication regimen   RN relayed it as follows:   Remeron 3.75mg at 9am, 7.5mg at 1pm - 5pm - 9pm   Seroquel 25mg at 9am, 50mg at 9pm  Zyprexa 2.5mg at 9am, 5mg at 9pm  Indicates patient has a long h/o agitation - hence multiple times a day dosing and polypharmacy Contacted Janelle Babcock at [phone] to verify medication regimen   RN relayed it as follows:   Remeron 3.75mg at 9am, 7.5mg at 1pm - 5pm - 9pm   Seroquel 25mg at 9am, 50mg at 9pm  Zyprexa 2.5mg at 9am, 5mg at 9pm  Indicates patient has a long h/o agitation - hence multiple times a day dosing and polypharmacy  regimen reordered    Pt covid+ may be delirious on the top of baseline dementia with behavioral disturbance  UA pending  CT head negative   CXR shows atelectasis  ID consult pending

## 2020-05-03 ENCOUNTER — TRANSCRIPTION ENCOUNTER (OUTPATIENT)
Age: 65
End: 2020-05-03

## 2020-05-03 LAB
ALBUMIN SERPL ELPH-MCNC: 3.2 G/DL — LOW (ref 3.5–5)
ALP SERPL-CCNC: 69 U/L — SIGNIFICANT CHANGE UP (ref 40–120)
ALT FLD-CCNC: 20 U/L DA — SIGNIFICANT CHANGE UP (ref 10–60)
ANION GAP SERPL CALC-SCNC: 7 MMOL/L — SIGNIFICANT CHANGE UP (ref 5–17)
AST SERPL-CCNC: 19 U/L — SIGNIFICANT CHANGE UP (ref 10–40)
BILIRUB SERPL-MCNC: 0.9 MG/DL — SIGNIFICANT CHANGE UP (ref 0.2–1.2)
BUN SERPL-MCNC: 14 MG/DL — SIGNIFICANT CHANGE UP (ref 7–18)
CALCIUM SERPL-MCNC: 8.8 MG/DL — SIGNIFICANT CHANGE UP (ref 8.4–10.5)
CHLORIDE SERPL-SCNC: 110 MMOL/L — HIGH (ref 96–108)
CO2 SERPL-SCNC: 25 MMOL/L — SIGNIFICANT CHANGE UP (ref 22–31)
CREAT SERPL-MCNC: 0.69 MG/DL — SIGNIFICANT CHANGE UP (ref 0.5–1.3)
GLUCOSE BLDC GLUCOMTR-MCNC: 134 MG/DL — HIGH (ref 70–99)
GLUCOSE BLDC GLUCOMTR-MCNC: 145 MG/DL — HIGH (ref 70–99)
GLUCOSE BLDC GLUCOMTR-MCNC: 149 MG/DL — HIGH (ref 70–99)
GLUCOSE BLDC GLUCOMTR-MCNC: 157 MG/DL — HIGH (ref 70–99)
GLUCOSE BLDC GLUCOMTR-MCNC: 160 MG/DL — HIGH (ref 70–99)
GLUCOSE SERPL-MCNC: 143 MG/DL — HIGH (ref 70–99)
HCT VFR BLD CALC: 39.5 % — SIGNIFICANT CHANGE UP (ref 39–50)
HGB BLD-MCNC: 13.8 G/DL — SIGNIFICANT CHANGE UP (ref 13–17)
MAGNESIUM SERPL-MCNC: 1.7 MG/DL — SIGNIFICANT CHANGE UP (ref 1.6–2.6)
MCHC RBC-ENTMCNC: 28.8 PG — SIGNIFICANT CHANGE UP (ref 27–34)
MCHC RBC-ENTMCNC: 34.9 GM/DL — SIGNIFICANT CHANGE UP (ref 32–36)
MCV RBC AUTO: 82.5 FL — SIGNIFICANT CHANGE UP (ref 80–100)
NRBC # BLD: 0 /100 WBCS — SIGNIFICANT CHANGE UP (ref 0–0)
PHOSPHATE SERPL-MCNC: 2.7 MG/DL — SIGNIFICANT CHANGE UP (ref 2.5–4.5)
PLATELET # BLD AUTO: 233 K/UL — SIGNIFICANT CHANGE UP (ref 150–400)
POTASSIUM SERPL-MCNC: 3.5 MMOL/L — SIGNIFICANT CHANGE UP (ref 3.5–5.3)
POTASSIUM SERPL-SCNC: 3.5 MMOL/L — SIGNIFICANT CHANGE UP (ref 3.5–5.3)
PROT SERPL-MCNC: 7.2 G/DL — SIGNIFICANT CHANGE UP (ref 6–8.3)
RBC # BLD: 4.79 M/UL — SIGNIFICANT CHANGE UP (ref 4.2–5.8)
RBC # FLD: 11.8 % — SIGNIFICANT CHANGE UP (ref 10.3–14.5)
SODIUM SERPL-SCNC: 142 MMOL/L — SIGNIFICANT CHANGE UP (ref 135–145)
WBC # BLD: 5.69 K/UL — SIGNIFICANT CHANGE UP (ref 3.8–10.5)
WBC # FLD AUTO: 5.69 K/UL — SIGNIFICANT CHANGE UP (ref 3.8–10.5)

## 2020-05-03 RX ORDER — SODIUM CHLORIDE 9 MG/ML
1000 INJECTION, SOLUTION INTRAVENOUS
Refills: 0 | Status: DISCONTINUED | OUTPATIENT
Start: 2020-05-03 | End: 2020-05-13

## 2020-05-03 RX ORDER — INSULIN LISPRO 100/ML
VIAL (ML) SUBCUTANEOUS
Refills: 0 | Status: DISCONTINUED | OUTPATIENT
Start: 2020-05-03 | End: 2020-05-14

## 2020-05-03 RX ORDER — DEXTROSE 50 % IN WATER 50 %
15 SYRINGE (ML) INTRAVENOUS ONCE
Refills: 0 | Status: DISCONTINUED | OUTPATIENT
Start: 2020-05-03 | End: 2020-06-02

## 2020-05-03 RX ORDER — GLUCAGON INJECTION, SOLUTION 0.5 MG/.1ML
1 INJECTION, SOLUTION SUBCUTANEOUS ONCE
Refills: 0 | Status: DISCONTINUED | OUTPATIENT
Start: 2020-05-03 | End: 2020-06-02

## 2020-05-03 RX ORDER — INSULIN LISPRO 100/ML
VIAL (ML) SUBCUTANEOUS AT BEDTIME
Refills: 0 | Status: DISCONTINUED | OUTPATIENT
Start: 2020-05-03 | End: 2020-05-17

## 2020-05-03 RX ORDER — DEXTROSE 50 % IN WATER 50 %
25 SYRINGE (ML) INTRAVENOUS ONCE
Refills: 0 | Status: DISCONTINUED | OUTPATIENT
Start: 2020-05-03 | End: 2020-06-02

## 2020-05-03 RX ORDER — DEXTROSE 50 % IN WATER 50 %
12.5 SYRINGE (ML) INTRAVENOUS ONCE
Refills: 0 | Status: DISCONTINUED | OUTPATIENT
Start: 2020-05-03 | End: 2020-06-02

## 2020-05-03 RX ORDER — LANOLIN ALCOHOL/MO/W.PET/CERES
3 CREAM (GRAM) TOPICAL AT BEDTIME
Refills: 0 | Status: DISCONTINUED | OUTPATIENT
Start: 2020-05-03 | End: 2020-05-14

## 2020-05-03 RX ADMIN — QUETIAPINE FUMARATE 50 MILLIGRAM(S): 200 TABLET, FILM COATED ORAL at 21:02

## 2020-05-03 RX ADMIN — MIRTAZAPINE 7.5 MILLIGRAM(S): 45 TABLET, ORALLY DISINTEGRATING ORAL at 21:02

## 2020-05-03 RX ADMIN — MIRTAZAPINE 3.75 MILLIGRAM(S): 45 TABLET, ORALLY DISINTEGRATING ORAL at 08:20

## 2020-05-03 RX ADMIN — ENOXAPARIN SODIUM 40 MILLIGRAM(S): 100 INJECTION SUBCUTANEOUS at 12:10

## 2020-05-03 RX ADMIN — LEVETIRACETAM 500 MILLIGRAM(S): 250 TABLET, FILM COATED ORAL at 05:31

## 2020-05-03 RX ADMIN — Medication 1: at 12:09

## 2020-05-03 RX ADMIN — Medication 3 MILLIGRAM(S): at 21:02

## 2020-05-03 RX ADMIN — MIRTAZAPINE 7.5 MILLIGRAM(S): 45 TABLET, ORALLY DISINTEGRATING ORAL at 12:10

## 2020-05-03 RX ADMIN — Medication 1 MILLIGRAM(S): at 05:34

## 2020-05-03 RX ADMIN — QUETIAPINE FUMARATE 25 MILLIGRAM(S): 200 TABLET, FILM COATED ORAL at 08:20

## 2020-05-03 RX ADMIN — Medication 1: at 00:11

## 2020-05-03 RX ADMIN — OLANZAPINE 2.5 MILLIGRAM(S): 15 TABLET, FILM COATED ORAL at 08:21

## 2020-05-03 RX ADMIN — OLANZAPINE 5 MILLIGRAM(S): 15 TABLET, FILM COATED ORAL at 21:02

## 2020-05-03 RX ADMIN — HALOPERIDOL DECANOATE 1 MILLIGRAM(S): 100 INJECTION INTRAMUSCULAR at 14:35

## 2020-05-03 NOTE — DISCHARGE NOTE PROVIDER - NSDCFUADDINST_GEN_ALL_CORE_FT
CORONAVIRUS INSTRUCTIONS:   Based on your current clinical status and stability, it has been determined that you no longer need hospitalization and can recover while remaining in self-quarantine at home. You should follow the prevention steps below until a healthcare provider or local or state health department says you can return to your normal activities.     1. You should restrict activities outside your home, except for getting medical care.   2. Do not go to work, school, or public areas.   3. Avoid using public transportation, ride-sharing, or taxis.   4. Separate yourself from other people and animals in your home as much as possible.  When you are around other people (e.g., sharing a room or vehicle) you should wear a facemask.  5. Wash your hands often with soap and water for at least 20 seconds, especially after blowing your nose, coughing, or sneezing; going to the bathroom; and before eating or preparing food.  6. Cover your mouth and nose with a tissue when you cough or sneeze. Throw used tissues in a lined trash can. Immediately wash your hands with soap and water for at least 20 seconds  7. High touch surfaces include counters, tabletops, doorknobs, bathroom fixtures, toilets, phones, keyboards, tablets, and bedside tables.  8. Avoid sharing dishes, drinking glasses, cups, eating utensils, towels, or bedding with other people or pets in your home. After using these items, they should be washed thoroughly with soap and water.  You are strongly advised to seek prompt medical attention if your illness worsens or you develop new symptoms like fever or difficulty breathing.   Please call  [phone] to speak with your discharging physician if you have any questions.

## 2020-05-03 NOTE — DISCHARGE NOTE PROVIDER - HOSPITAL COURSE
64 year old male from [facility]  with PMH dementia and DM coming in with seizures like activity while sitting at AL. Admitted for seizures. Neurology consulted, recommending keppra 500mg BID and EEG and MRI brain without contrast as outpatient once the patient's COVID 19 infection resolves. Found to be COVID positive. Pt with behavioral disturbances while in hospital, recommendations provided by psych.         NOT COMPLETE          CORONAVIRUS INSTRUCTIONS:     Based on your current clinical status and stability, it has been determined that you no longer need hospitalization and can recover while remaining in self-quarantine at home. You should follow the prevention steps below until a healthcare provider or local or state health department says you can return to your normal activities.         1. You should restrict activities outside your home, except for getting medical care.     2. Do not go to work, school, or public areas.     3. Avoid using public transportation, ride-sharing, or taxis.     4. Separate yourself from other people and animals in your home as much as possible.  When you are around other people (e.g., sharing a room or vehicle) you should wear a facemask.    5. Wash your hands often with soap and water for at least 20 seconds, especially after blowing your nose, coughing, or sneezing; going to the bathroom; and before eating or preparing food.    6. Cover your mouth and nose with a tissue when you cough or sneeze. Throw used tissues in a lined trash can. Immediately wash your hands with soap and water for at least 20 seconds    7. High touch surfaces include counters, tabletops, doorknobs, bathroom fixtures, toilets, phones, keyboards, tablets, and bedside tables.    8. Avoid sharing dishes, drinking glasses, cups, eating utensils, towels, or bedding with other people or pets in your home. After using these items, they should be washed thoroughly with soap and water.    You are strongly advised to seek prompt medical attention if your illness worsens or you develop new symptoms like fever or difficulty breathing.     Please call  [phone] to speak with your discharging physician if you have any questions. 64 year old male from [facility]  with PMH dementia and DM coming in with seizures like activity while sitting at AL. Admitted for seizures. Neurology consulted, recommending keppra 500mg BID and EEG and MRI brain without contrast as outpatient once the patient's COVID 19 infection resolves. Found to be COVID positive. Pt with behavioral disturbances while in hospital, recommendations provided by psych. ID consulted, no need for treatment.         NOT COMPLETE     updated 5/7         CORONAVIRUS INSTRUCTIONS:     Based on your current clinical status and stability, it has been determined that you no longer need hospitalization and can recover while remaining in self-quarantine at home. You should follow the prevention steps below until a healthcare provider or local or state health department says you can return to your normal activities.         1. You should restrict activities outside your home, except for getting medical care.     2. Do not go to work, school, or public areas.     3. Avoid using public transportation, ride-sharing, or taxis.     4. Separate yourself from other people and animals in your home as much as possible.  When you are around other people (e.g., sharing a room or vehicle) you should wear a facemask.    5. Wash your hands often with soap and water for at least 20 seconds, especially after blowing your nose, coughing, or sneezing; going to the bathroom; and before eating or preparing food.    6. Cover your mouth and nose with a tissue when you cough or sneeze. Throw used tissues in a lined trash can. Immediately wash your hands with soap and water for at least 20 seconds    7. High touch surfaces include counters, tabletops, doorknobs, bathroom fixtures, toilets, phones, keyboards, tablets, and bedside tables.    8. Avoid sharing dishes, drinking glasses, cups, eating utensils, towels, or bedding with other people or pets in your home. After using these items, they should be washed thoroughly with soap and water.    You are strongly advised to seek prompt medical attention if your illness worsens or you develop new symptoms like fever or difficulty breathing.     Please call  [phone] to speak with your discharging physician if you have any questions. 64 year old male from [facility]  with PMH dementia and DM who presented  with seizures like activity while sitting at AL. In ED , pt afebrile, SpO2 98 on RA, EKG showed SR with PVC's, Head CT unremarkable, CXR clear lungs, no leukocytosis. COVID 19 detected. Seen by Neuro .No indication for antiepileptic medications now. Pt with long h/o behavioral disturbance on multiple standing antipsychotics, for which pt was seen by Psych and antipsychotic meds adjusted. Hospital course complicated by AMS,  unable to take po,  hypernatremia on D5W , MAXIME, uncontrolled DM with hyperglycemia , on basal/bolus Lantus/Humalog /HISS. Head CT shows right frontal sinus sinusitis, negative for hemorrhagic stroke/infarct.  Endo and Nephrology on board. Pt developed fever , likely due to Aspiration PNA and sinusitis, started on Zosyn. ID Dr. Barry on board.     Pt continues with lethargy, no po intake. pt's daughter Ailin, agreeable to NGT,  tube feeds and free water started via NGT         NOT COMPLETE     updated 5/17         CORONAVIRUS INSTRUCTIONS:     Based on your current clinical status and stability, it has been determined that you no longer need hospitalization and can recover while remaining in self-quarantine at home. You should follow the prevention steps below until a healthcare provider or local or state health department says you can return to your normal activities.         1. You should restrict activities outside your home, except for getting medical care.     2. Do not go to work, school, or public areas.     3. Avoid using public transportation, ride-sharing, or taxis.     4. Separate yourself from other people and animals in your home as much as possible.  When you are around other people (e.g., sharing a room or vehicle) you should wear a facemask.    5. Wash your hands often with soap and water for at least 20 seconds, especially after blowing your nose, coughing, or sneezing; going to the bathroom; and before eating or preparing food.    6. Cover your mouth and nose with a tissue when you cough or sneeze. Throw used tissues in a lined trash can. Immediately wash your hands with soap and water for at least 20 seconds    7. High touch surfaces include counters, tabletops, doorknobs, bathroom fixtures, toilets, phones, keyboards, tablets, and bedside tables.    8. Avoid sharing dishes, drinking glasses, cups, eating utensils, towels, or bedding with other people or pets in your home. After using these items, they should be washed thoroughly with soap and water.    You are strongly advised to seek prompt medical attention if your illness worsens or you develop new symptoms like fever or difficulty breathing.     Please call   to speak with your discharging physician if you have any questions. 64 year old male from [facility]  with PMH dementia and DM who presented  with seizures like activity while sitting at AL. In ED , pt afebrile, SpO2 98 on RA, EKG showed SR with PVC's, Head CT unremarkable, CXR clear lungs, no leukocytosis. COVID 19 detected. Seen by Neuro .No indication for antiepileptic medications now. Pt with long h/o behavioral disturbance on multiple standing antipsychotics, for which pt was seen by Psych and antipsychotic meds adjusted. Hospital course complicated by AMS,  unable to take po,  hypernatremia on D5W , MAXIME, uncontrolled DM with hyperglycemia , on basal/bolus Lantus/Humalog /HISS. Head CT shows right frontal sinus sinusitis, negative for hemorrhagic stroke/infarct.  Endo and Nephrology on board. Pt developed fever , likely due to Aspiration PNA and sinusitis, started on Zosyn. ID Dr. Barry on board.     Pt continues with lethargy, no po intake. pt's daughter Ailin, agreeable to NGT,  tube feeds and free water started via NGT then eventually pt underwent to OR for new PEG tube placement on 6.2.    Tube feeding started as GI recommended on 6.3, tolerating tube feeding well, so decision was made to d/c to [facility].             NOT COMPLETE     updated 5/17         CORONAVIRUS INSTRUCTIONS:     Based on your current clinical status and stability, it has been determined that you no longer need hospitalization and can recover while remaining in self-quarantine at home. You should follow the prevention steps below until a healthcare provider or local or state health department says you can return to your normal activities.         1. You should restrict activities outside your home, except for getting medical care.     2. Do not go to work, school, or public areas.     3. Avoid using public transportation, ride-sharing, or taxis.     4. Separate yourself from other people and animals in your home as much as possible.  When you are around other people (e.g., sharing a room or vehicle) you should wear a facemask.    5. Wash your hands often with soap and water for at least 20 seconds, especially after blowing your nose, coughing, or sneezing; going to the bathroom; and before eating or preparing food.    6. Cover your mouth and nose with a tissue when you cough or sneeze. Throw used tissues in a lined trash can. Immediately wash your hands with soap and water for at least 20 seconds    7. High touch surfaces include counters, tabletops, doorknobs, bathroom fixtures, toilets, phones, keyboards, tablets, and bedside tables.    8. Avoid sharing dishes, drinking glasses, cups, eating utensils, towels, or bedding with other people or pets in your home. After using these items, they should be washed thoroughly with soap and water.    You are strongly advised to seek prompt medical attention if your illness worsens or you develop new symptoms like fever or difficulty breathing.     Please call  [phone] to speak with your discharging physician if you have any questions. 64 year old male from [facility]  with PMH dementia and DM who presented  with seizures like activity while sitting at AL. In ED , pt afebrile, SpO2 98 on RA, EKG showed SR with PVC's, Head CT unremarkable, CXR clear lungs, no leukocytosis. COVID 19 detected. Seen by Neuro. No indication for antiepileptic medications now. Pt with long h/o behavioral disturbance on multiple standing antipsychotics, for which pt was seen by Psyche and antipsychotic meds adjusted. Hospital course complicated by AMS, unable to take po, for which NG tube feeds initiated,  hypernatremia on D5W, MAXIME, uncontrolled DM with hyperglycemia , on basal/bolus Lantus/Humalog /HISS. Head CT shows right frontal sinus sinusitis, negative for hemorrhagic stroke/infarct.  Endo and Nephrology following. Pt developed fever, likely due to Aspiration PNA and sinusitis, treated with zosyn.  ID following. Pt less lethargic, agitated at times. Antipsychotic home regimen resumed. S/p peg placement on 06/02/2020 for FTT 2/2 to dysphagia and poor oral intake related to changes in mental status. Pt tolerates Peg tube feeding. Pt with low grade fever, T max 100.1 associated with tachycardia. CXR shows Opacification of the left lower lung field suggestive of atelectasis, effusion, and/or pneumonia, CT chest with left lower lobe pneumonia, no PE. Pt also with cellulitis of abdominal wall. Treated with IV antibiotics. Pt is no longer eligible for AL and will need [facility] placement, choices were given to daughter by C/M.  COVID 6/16 positive, positive antibodies with high index of 9.6.  Repeated tests showed covid negative x 2.         Pt is stable for discharge to facility.         This patient had an extended hospital stay. This is a brief summary of hospital course. Please refer to medical record, including daily progress notes for further information.

## 2020-05-03 NOTE — CHART NOTE - NSCHARTNOTEFT_GEN_A_CORE
Discontinuing IV Ativan - please use IV Haldol PRN instead. There is a concern for Covid-delirium and Ativan can prolong confusion.   Not on any COVID meds - as per PA "awaiting ID consult" to see if there is an infectious cause (?)  Still awaiting UA as well   Please call back if patient continues to be agitated 24hrs after home meds have been administered as ordered - regimen checked with [facility] staff. Pt with a long h/o behavioral disturbance on multiple antipsychotics; he was off his meds for a while on the unit before they were reordered correctly which may, along with possible Covid delirium cause his agitation right now Pt with a long h/o behavioral disturbance on multiple standing antipsychotics tested COVID+  Not on any COVID meds - as per PA "awaiting ID consult" to see if there is an infectious cause (?)    Plan  Treat COVID  Still awaiting UA   Discontinuing IV Ativan - please use IV Haldol PRN instead. There is a concern for Covid-delirium and Ativan can prolong confusion.   Keppra is known to increase agitation - see with neurology if patient could be on a different antiepileptic  Home regimen reinstated; checked with [facility] staff; he was off his meds for a while on the unit before they were reordered correctly which may, along with possible Covid delirium and Keppra on board, cause his agitation right now.   Please call back if patient continues to be agitated 24hrs after home meds have been administered as ordered

## 2020-05-03 NOTE — DISCHARGE NOTE PROVIDER - CARE PROVIDER_API CALL
Scout Raymundo (DO)  Medicine  [address] [place] [address], [address] [place] [address]  Phone: [phone]  Fax: [phone]  Follow Up Time:

## 2020-05-03 NOTE — DISCHARGE NOTE PROVIDER - NSDCCPCAREPLAN_GEN_ALL_CORE_FT
PRINCIPAL DISCHARGE DIAGNOSIS  Diagnosis: Seizure  Assessment and Plan of Treatment: You were admitted for seizures. You were seen by neurology who recommended keppra 500mg twice a day. Please have an MRI and EEG as an outpatient when your covid 19 resolves. Follow up with neurology as an outpatient.      SECONDARY DISCHARGE DIAGNOSES  Diagnosis: Dementia  Assessment and Plan of Treatment: Please continue psych medications and follow up with your psychiatrist for continued care. PRINCIPAL DISCHARGE DIAGNOSIS  Diagnosis: Seizure  Assessment and Plan of Treatment: You were admitted for seizures. You were seen by neurology who recommended keppra 500mg twice a day. Please have an MRI and EEG as an outpatient when your covid 19 resolves. Follow up with neurology as an outpatient.      SECONDARY DISCHARGE DIAGNOSES  Diagnosis: Dementia  Assessment and Plan of Treatment: Please continue psych medications and follow up with your psychiatrist for continued care.    Diagnosis: Uncontrolled diabetes mellitus  Assessment and Plan of Treatment: HgA1C this admission 9.9  Make sure you get your HgA1c checked every three months.  If you take oral diabetes medications, check your blood glucose two times a day.  If you take insulin, check your blood glucose before meals and at bedtime.  It's important not to skip any meals.  Keep a log of your blood glucose results and always take it with you to your doctor appointments.  Keep a list of your current medications including injectables and over the counter medications and bring this medication list with you to all your doctor appointments.  If you have not seen your ophthalmologist this year call for appointment.  Check your feet daily for redness, sores, or openings. Do not self treat. If no improvement in two days call your primary care physician for an appointment.      Diagnosis: 2019 novel coronavirus disease (COVID-19)  Assessment and Plan of Treatment: 2019 novel coronavirus disease (COVID-19) PRINCIPAL DISCHARGE DIAGNOSIS  Diagnosis: Dementia  Assessment and Plan of Treatment: Please continue psych medications and follow up with your psychiatrist for continued care.      SECONDARY DISCHARGE DIAGNOSES  Diagnosis: Seizure  Assessment and Plan of Treatment: You were admitted for seizures. You were seen by neurology who recommended keppra 500mg twice a day. however, later on this admission keppra was d/gianna since EEG was normal without epilepsy form.        Diagnosis: Uncontrolled diabetes mellitus  Assessment and Plan of Treatment: HgA1C this admission 9.9  Make sure you get your HgA1c checked every three months.  If you take oral diabetes medications, check your blood glucose two times a day.  If you take insulin, check your blood glucose before meals and at bedtime.  It's important not to skip any meals.  Keep a log of your blood glucose results and always take it with you to your doctor appointments.  Keep a list of your current medications including injectables and over the counter medications and bring this medication list with you to all your doctor appointments.  If you have not seen your ophthalmologist this year call for appointment.  Check your feet daily for redness, sores, or openings. Do not self treat. If no improvement in two days call your primary care physician for an appointment.      Diagnosis: 2019 novel coronavirus disease (COVID-19)  Assessment and Plan of Treatment: 2019 novel coronavirus disease (COVID-19)    Diagnosis: PEG (percutaneous endoscopic gastrostomy) status  Assessment and Plan of Treatment: PEG (percutaneous endoscopic gastrostomy) status PRINCIPAL DISCHARGE DIAGNOSIS  Diagnosis: Dementia  Assessment and Plan of Treatment: Please continue psych medications and follow up with your psychiatrist for continued care.      SECONDARY DISCHARGE DIAGNOSES  Diagnosis: PNA (pneumonia)  Assessment and Plan of Treatment: You were treated with antibiotics for pneumonia, likely aspiration.  Please complete course with two more days of augmentin via PEG. Follow up with your primary care doctor at the facility. Seek medical attention if you develop fevers, chills, or shortness of breath.    Diagnosis: Cellulitis, abdominal wall  Assessment and Plan of Treatment: You were treated with antibiotics.    Diagnosis: Seizure  Assessment and Plan of Treatment: You were admitted for seizures. You were seen by neurology who recommended keppra 500mg twice a day. however, later on this admission keppra was d/gianna since EEG was normal without epilepsy form.        Diagnosis: PEG (percutaneous endoscopic gastrostomy) status  Assessment and Plan of Treatment: You had a PEG placed. It had some issues initially with leaking but that has now resolved. Follow up with your primary care doctor at the facility for further management. Continue tube feeds.    Diagnosis: Uncontrolled diabetes mellitus  Assessment and Plan of Treatment: HgA1C this admission 9.9  Continue lantus, humolog premeal and sliding scale.   Follow up with your primary care doctor or endocrinologist for further management of your diabetes at your facility.    Diagnosis: 2019 novel coronavirus disease (COVID-19)  Assessment and Plan of Treatment: You had covid early on in your hospital stay. You are now recovered and have tested positive for antibodies. PRINCIPAL DISCHARGE DIAGNOSIS  Diagnosis: Dementia  Assessment and Plan of Treatment: Please continue psych medications and follow up with your psychiatrist for continued care.      SECONDARY DISCHARGE DIAGNOSES  Diagnosis: PNA (pneumonia)  Assessment and Plan of Treatment: You were treated with antibiotics for pneumonia, likely aspiration.  Please complete course with two more days of augmentin via PEG. Follow up with your primary care doctor at the facility. Seek medical attention if you develop fevers, chills, or shortness of breath.    Diagnosis: Cellulitis, abdominal wall  Assessment and Plan of Treatment: You were treated with antibiotics.    Diagnosis: Seizure  Assessment and Plan of Treatment: You were admitted for seizures. You were seen by neurology who recommended keppra 500mg twice a day. however, later on this admission keppra was d/gianna since EEG was normal without epilepsy form.        Diagnosis: PEG (percutaneous endoscopic gastrostomy) status  Assessment and Plan of Treatment: You had a PEG placed. It had some issues initially with leaking but that has now resolved. Follow up with your primary care doctor at the facility for further management. Continue tube feeds.    Diagnosis: Uncontrolled diabetes mellitus  Assessment and Plan of Treatment: HgA1C this admission 9.9  Continue lantus, humolog premeal and sliding scale.   Follow up with your primary care doctor or endocrinologist for further management of your diabetes at your facility.    Diagnosis: Deep tissue injury  Assessment and Plan of Treatment: -There is an intact DTI to the L. Heel (1cm x 1cm) without drainage  -There is an Unstageable Pressure Injury to the Coccyx (3cm x 1cm x 0.2cm) with slough and scant drainage	  -Clean all wounds with normal saline and apply skin prep to the surrounding skin  -Apply Collagenase ointment to the slough areas of the Coccyx wound, apply saline moistened gauze to the wound bed, and cover with a Foam dressing Daily PRN  -Leave the L. Heel wound open to air  -Elevate/float the patients heels using heel protectors and reposition the patient Q 2hrs using wedges or pillows      Diagnosis: 2019 novel coronavirus disease (COVID-19)  Assessment and Plan of Treatment: You had covid early on in your hospital stay. You are now recovered and have tested positive for antibodies.

## 2020-05-03 NOTE — DISCHARGE NOTE PROVIDER - NSDCFUSCHEDAPPT_GEN_ALL_CORE_FT
JAZIEL LOPEZ ; 06/23/2020 ; [facility] [address] JAZIEL LOPEZ ; 06/23/2020 ; [facility] [address] [place] JAZIEL LOPEZ ; 06/23/2020 ; [facility] [address] [place] JAZIEL LOPEZ ; 06/23/2020 ; [facility] [address] [place]

## 2020-05-03 NOTE — PROGRESS NOTE ADULT - ASSESSMENT
64 yr old male with PMHX of DM,dementia sent to ER from facility for seizure and now COVID+,Delirium.  1.Seizure-Neurology eval noted, rec keppra 500mg bid and MRI,EEG as outpatient.  2.COVID +-ID eval, supportive treatment.  3.Speech and swallow eval noted, Dysphagia I diet.  4.DM-Insulin.  5.Delirium-Psych eval appreciated.  6.Dementia with behavioral problems-cont current medication with PRN haldol.  7.Replace vit d.  8.GI and DVT prophylaxis.

## 2020-05-03 NOTE — DISCHARGE NOTE PROVIDER - NSDCMRMEDTOKEN_GEN_ALL_CORE_FT
docusate potassium 100 mg oral capsule: 3 cap(s) orally once a day (at bedtime), As Needed for constipation  metFORMIN 1000 mg oral tablet: 1 tab(s) orally 2 times a day  mirtazapine 7.5 mg oral tablet: 0.5 tab(s) orally once a day (in the morning)  mirtazapine 7.5 mg oral tablet: 1 tab(s) orally 3 times a day    Takes at 1pm, 5pm and 9pm  OLANZapine 2.5 mg oral tablet: 1 tab(s) orally once a day (in the morning)  OLANZapine 5 mg oral tablet: 1 tab(s) orally once a day (at bedtime)  SEROquel 25 mg oral tablet: 1 tab(s) orally once a day (in the morning)  SEROquel 50 mg oral tablet: 1 tab(s) orally once a day (at bedtime) acetaminophen 160 mg/5 mL oral suspension: 20.31 milliliter(s) orally every 6 hours via PEG, As needed, Temp greater or equal to 38C (100.4F)  albuterol 90 mcg/inh inhalation aerosol: 2 puff(s) inhaled every 6 hours, As needed, Shortness of Breath and/or Wheezing  ascorbic acid 500 mg oral tablet: 1 tab(s) orally 2 times a day via PEG  Augmentin 875 mg-125 mg oral tablet: 1 tab(s) orally 2 times a day via PEG   last dose 6/20/20  cholecalciferol oral tablet: 1000 unit(s) orally once a day via PEG  collagenase 250 units/g topical ointment: 1 application topically once a day  HumaLOG 100 units/mL subcutaneous solution:   2 Unit(s) if Glucose 151 - 200  4 Unit(s) if Glucose 201 - 250  6 Unit(s) if Glucose 251 - 300  8 Unit(s) if Glucose 301 - 350  10 Unit(s) if Glucose 351 - 400  12 Unit(s) if Glucose Greater Than 400  HumaLOG 100 units/mL subcutaneous solution: 8 unit(s) subcutaneous every 6 hours  insulin glargine: 18 unit(s) subcutaneous once a day (at bedtime)  mirtazapine 7.5 mg oral tablet: 1 tab(s) orally once a day (at bedtime) via PEG  Multiple Vitamins with Minerals oral tablet: 1 tab(s) orally once a day via PEG  OLANZapine 2.5 mg oral tablet: 1 tab(s) orally once a day (at bedtime) via PEG  pantoprazole 40 mg oral granule, delayed release: 40 milligram(s) by gastrostomy tube once a day  QUEtiapine 25 mg oral tablet: 1 tab(s) orally once a day (at bedtime) via PEG  zinc sulfate 220 mg oral capsule: 1 cap(s) orally once a day via PEG

## 2020-05-03 NOTE — CONSULT NOTE ADULT - ASSESSMENT
COVID - 19 infection     Plan - No treatment from my end needed  might needed to be anticoagulated though  reconsult prn.

## 2020-05-03 NOTE — CONSULT NOTE ADULT - SUBJECTIVE AND OBJECTIVE BOX
HPI:  64 year old male from [facility]  with PMH dementia and DM coming in with seizures like activity while sitting at AL. pt unable to provide further history (poor historian) . denies all complaints at this time. NKDA     In ED :   EKG : Sinus rythm with PVCs   Afebrile, no WBC elevation  normal electrolytes and renal functions   Lactate 1.7  CT head : unremarkable CT study of the brain. No acute abnormality suggested.  CXR : Clear lungs   no Leukocytosis  lymphocyte; WNl   pt is afebrile  O2 Sat on RA 98%     ****Morning team to contact Assisted Living for home med rec and GOC ****   Full code for now (01 May 2020 03:13)      PAST MEDICAL & SURGICAL HISTORY:  Dementia  No significant past surgical history      No Known Allergies      Meds:  acetaminophen  Suppository .. 650 milliGRAM(s) Rectal every 6 hours PRN  ALBUTerol    90 MICROgram(s) HFA Inhaler 2 Puff(s) Inhalation every 6 hours PRN  enoxaparin Injectable 40 milliGRAM(s) SubCutaneous daily  ergocalciferol 31649 Unit(s) Oral every week  guaiFENesin   Syrup  (Sugar-Free) 100 milliGRAM(s) Oral every 6 hours PRN  haloperidol    Injectable 1 milliGRAM(s) IV Push every 4 hours PRN  haloperidol    Injectable 1 milliGRAM(s) IV Push <User Schedule> PRN  haloperidol    Injectable 2 milliGRAM(s) IV Push <User Schedule> PRN  insulin lispro (HumaLOG) corrective regimen sliding scale   SubCutaneous every 6 hours  levETIRAcetam 500 milliGRAM(s) Oral two times a day  melatonin 3 milliGRAM(s) Oral at bedtime  mirtazapine 7.5 milliGRAM(s) Oral <User Schedule>  mirtazapine 3.75 milliGRAM(s) Oral <User Schedule>  OLANZapine 2.5 milliGRAM(s) Oral <User Schedule>  OLANZapine 5 milliGRAM(s) Oral <User Schedule>  QUEtiapine 50 milliGRAM(s) Oral <User Schedule>  QUEtiapine 25 milliGRAM(s) Oral <User Schedule>      SOCIAL HISTORY:    FAMILY HISTORY:      VITALS:  Vital Signs Last 24 Hrs  T(C): 36.6 (03 May 2020 08:16), Max: 37.1 (03 May 2020 00:02)  T(F): 97.8 (03 May 2020 08:16), Max: 98.7 (03 May 2020 00:02)  HR: 90 (03 May 2020 10:32) (56 - 92)  BP: 117/65 (03 May 2020 08:16) (117/65 - 152/73)  BP(mean): --  RR: 16 (03 May 2020 08:16) (16 - 17)  SpO2: 98% (03 May 2020 10:32) (93% - 99%)    LABS/DIAGNOSTIC TESTS:                          13.8   5.69  )-----------( 233      ( 03 May 2020 07:05 )             39.5     WBC Count: 5.69 K/uL (05-03 @ 07:05)  WBC Count: 4.45 K/uL (05-02 @ 06:51)  WBC Count: 4.36 K/uL (05-01 @ 09:36)  WBC Count: 4.75 K/uL (04-30 @ 21:21)      05-03    142  |  110<H>  |  14  ----------------------------<  143<H>  3.5   |  25  |  0.69    Ca    8.8      03 May 2020 07:05  Phos  2.7     05-03  Mg     1.7     05-03    TPro  7.2  /  Alb  3.2<L>  /  TBili  0.9  /  DBili  x   /  AST  19  /  ALT  20  /  AlkPhos  69  05-03          LIVER FUNCTIONS - ( 03 May 2020 07:05 )  Alb: 3.2 g/dL / Pro: 7.2 g/dL / ALK PHOS: 69 U/L / ALT: 20 U/L DA / AST: 19 U/L / GGT: x                 LACTATE:    ABG -     CULTURES:       RADIOLOGY:< from: Xray Chest 1 View- PORTABLE-Urgent (05.01.20 @ 03:15) >    EXAM:  XR CHEST PORTABLE URGENT 1V                            PROCEDURE DATE:  05/01/2020          INTERPRETATION:  CLINICAL STATEMENT: Chest pain.    TECHNIQUE: AP view of the chest.    COMPARISON: 1/28/2020    FINDINGS/  IMPRESSION:  Mild opacityright lung base likely atelectasis.    No pleural effusion    Heart size within normal limits.                  GEORGE EARL M.D., ATTENDING RADIOLOGIST  This document has been electronically signed. May  1 2020  8:08AM        < end of copied text >        ROS  [  ] UNABLE TO ELICIT HPI:  64 year old male from [facility]  with PMH dementia and DM coming in with seizures like activity while sitting at AL. pt unable to provide further history (poor historian) . denies all complaints at this time. NKDA     In ED :   EKG : Sinus rythm with PVCs   Afebrile, no WBC elevation  normal electrolytes and renal functions   Lactate 1.7  CT head : unremarkable CT study of the brain. No acute abnormality suggested.  CXR : Clear lungs   no Leukocytosis  lymphocyte; WNl   pt is afebrile  O2 Sat on RA 98%     ****Morning team to contact Assisted Living for home med rec and GOC ****   Full code for now (01 May 2020 03:13)      History as above , who is  from an assisted living facility and has major psych issues, he came in with a seizure and was found to be COVID positive here. He is afebrile  and has no signs of acute infection. He was very agitated and so got haldol and is sedated now.    PAST MEDICAL & SURGICAL HISTORY:  Dementia  No significant past surgical history      No Known Allergies      Meds:  acetaminophen  Suppository .. 650 milliGRAM(s) Rectal every 6 hours PRN  ALBUTerol    90 MICROgram(s) HFA Inhaler 2 Puff(s) Inhalation every 6 hours PRN  enoxaparin Injectable 40 milliGRAM(s) SubCutaneous daily  ergocalciferol 90478 Unit(s) Oral every week  guaiFENesin   Syrup  (Sugar-Free) 100 milliGRAM(s) Oral every 6 hours PRN  haloperidol    Injectable 1 milliGRAM(s) IV Push every 4 hours PRN  haloperidol    Injectable 1 milliGRAM(s) IV Push <User Schedule> PRN  haloperidol    Injectable 2 milliGRAM(s) IV Push <User Schedule> PRN  insulin lispro (HumaLOG) corrective regimen sliding scale   SubCutaneous every 6 hours  levETIRAcetam 500 milliGRAM(s) Oral two times a day  melatonin 3 milliGRAM(s) Oral at bedtime  mirtazapine 7.5 milliGRAM(s) Oral <User Schedule>  mirtazapine 3.75 milliGRAM(s) Oral <User Schedule>  OLANZapine 2.5 milliGRAM(s) Oral <User Schedule>  OLANZapine 5 milliGRAM(s) Oral <User Schedule>  QUEtiapine 50 milliGRAM(s) Oral <User Schedule>  QUEtiapine 25 milliGRAM(s) Oral <User Schedule>      SOCIAL HISTORY: unknown    FAMILY HISTORY: unknown      VITALS:  Vital Signs Last 24 Hrs  T(C): 36.6 (03 May 2020 08:16), Max: 37.1 (03 May 2020 00:02)  T(F): 97.8 (03 May 2020 08:16), Max: 98.7 (03 May 2020 00:02)  HR: 90 (03 May 2020 10:32) (56 - 92)  BP: 117/65 (03 May 2020 08:16) (117/65 - 152/73)  BP(mean): --  RR: 16 (03 May 2020 08:16) (16 - 17)  SpO2: 98% (03 May 2020 10:32) (93% - 99%)    LABS/DIAGNOSTIC TESTS:                          13.8   5.69  )-----------( 233      ( 03 May 2020 07:05 )             39.5     WBC Count: 5.69 K/uL (05-03 @ 07:05)  WBC Count: 4.45 K/uL (05-02 @ 06:51)  WBC Count: 4.36 K/uL (05-01 @ 09:36)  WBC Count: 4.75 K/uL (04-30 @ 21:21)      05-03    142  |  110<H>  |  14  ----------------------------<  143<H>  3.5   |  25  |  0.69    Ca    8.8      03 May 2020 07:05  Phos  2.7     05-03  Mg     1.7     05-03    TPro  7.2  /  Alb  3.2<L>  /  TBili  0.9  /  DBili  x   /  AST  19  /  ALT  20  /  AlkPhos  69  05-03          LIVER FUNCTIONS - ( 03 May 2020 07:05 )  Alb: 3.2 g/dL / Pro: 7.2 g/dL / ALK PHOS: 69 U/L / ALT: 20 U/L DA / AST: 19 U/L / GGT: x                 LACTATE:    ABG -     CULTURES:       RADIOLOGY:< from: Xray Chest 1 View- PORTABLE-Urgent (05.01.20 @ 03:15) >    EXAM:  XR CHEST PORTABLE URGENT 1V                            PROCEDURE DATE:  05/01/2020          INTERPRETATION:  CLINICAL STATEMENT: Chest pain.    TECHNIQUE: AP view of the chest.    COMPARISON: 1/28/2020    FINDINGS/  IMPRESSION:  Mild opacityright lung base likely atelectasis.    No pleural effusion    Heart size within normal limits.                  GEORGE EARL M.D., ATTENDING RADIOLOGIST  This document has been electronically signed. May  1 2020  8:08AM        < end of copied text >        ROS  [ x ] UNABLE TO ELICIT

## 2020-05-03 NOTE — PROGRESS NOTE ADULT - SUBJECTIVE AND OBJECTIVE BOX
CARDIOLOGY/MEDICAL ATTENDING    CHIEF COMPLAINT:Patient is a 64y old  Male who presents with a chief complaint of seizures.Pt with recurrent agitation, requiring IV haldol.    	  REVIEW OF SYSTEMS:    [x ] Unable to obtain    PHYSICAL EXAM:  T(C): 36.6 (05-03-20 @ 08:16), Max: 37.1 (05-03-20 @ 00:02)  HR: 90 (05-03-20 @ 10:32) (56 - 92)  BP: 117/65 (05-03-20 @ 08:16) (117/65 - 152/73)  RR: 16 (05-03-20 @ 08:16) (16 - 17)  SpO2: 98% (05-03-20 @ 10:32) (93% - 99%)  Wt(kg): --  I&O's Summary      Appearance: Normal	  HEENT:   Normal oral mucosa, PERRL, EOMI	  Lymphatic: No lymphadenopathy  Cardiovascular: Normal S1 S2, No JVD, No murmurs, No edema  Respiratory: Lungs clear to auscultation	  Gastrointestinal:  Soft, Non-tender, + BS	  Skin: No rashes, No ecchymoses, No cyanosis	  Extremities: Normal range of motion, No clubbing, cyanosis or edema  Vascular: Peripheral pulses palpable 2+ bilaterally    MEDICATIONS  (STANDING):  enoxaparin Injectable 40 milliGRAM(s) SubCutaneous daily  ergocalciferol 76319 Unit(s) Oral every week  insulin lispro (HumaLOG) corrective regimen sliding scale   SubCutaneous every 6 hours  levETIRAcetam 500 milliGRAM(s) Oral two times a day  melatonin 3 milliGRAM(s) Oral at bedtime  mirtazapine 7.5 milliGRAM(s) Oral <User Schedule>  mirtazapine 3.75 milliGRAM(s) Oral <User Schedule>  OLANZapine 2.5 milliGRAM(s) Oral <User Schedule>  OLANZapine 5 milliGRAM(s) Oral <User Schedule>  QUEtiapine 50 milliGRAM(s) Oral <User Schedule>  QUEtiapine 25 milliGRAM(s) Oral <User Schedule>        	  LABS:	 	                        13.8   5.69  )-----------( 233      ( 03 May 2020 07:05 )             39.5     05-03    142  |  110<H>  |  14  ----------------------------<  143<H>  3.5   |  25  |  0.69    Ca    8.8      03 May 2020 07:05  Phos  2.7     05-03  Mg     1.7     05-03    TPro  7.2  /  Alb  3.2<L>  /  TBili  0.9  /  DBili  x   /  AST  19  /  ALT  20  /  AlkPhos  69  05-03    proBNP: Serum Pro-Brain Natriuretic Peptide: 126 pg/mL (05-01 @ 09:36)    Lipid Profile: Cholesterol 161  LDL 95  HDL 35        TSH: Thyroid Stimulating Hormone, Serum: 2.39 uU/mL (05-01 @ 09:36)

## 2020-05-04 LAB
ALBUMIN SERPL ELPH-MCNC: 3.5 G/DL — SIGNIFICANT CHANGE UP (ref 3.5–5)
ALP SERPL-CCNC: 72 U/L — SIGNIFICANT CHANGE UP (ref 40–120)
ALT FLD-CCNC: 21 U/L DA — SIGNIFICANT CHANGE UP (ref 10–60)
ANION GAP SERPL CALC-SCNC: 13 MMOL/L — SIGNIFICANT CHANGE UP (ref 5–17)
AST SERPL-CCNC: 15 U/L — SIGNIFICANT CHANGE UP (ref 10–40)
BASOPHILS # BLD AUTO: 0.01 K/UL — SIGNIFICANT CHANGE UP (ref 0–0.2)
BASOPHILS NFR BLD AUTO: 0.1 % — SIGNIFICANT CHANGE UP (ref 0–2)
BILIRUB SERPL-MCNC: 1.1 MG/DL — SIGNIFICANT CHANGE UP (ref 0.2–1.2)
BUN SERPL-MCNC: 19 MG/DL — HIGH (ref 7–18)
CALCIUM SERPL-MCNC: 9.5 MG/DL — SIGNIFICANT CHANGE UP (ref 8.4–10.5)
CHLORIDE SERPL-SCNC: 107 MMOL/L — SIGNIFICANT CHANGE UP (ref 96–108)
CK SERPL-CCNC: 194 U/L — SIGNIFICANT CHANGE UP (ref 35–232)
CO2 SERPL-SCNC: 23 MMOL/L — SIGNIFICANT CHANGE UP (ref 22–31)
CREAT SERPL-MCNC: 0.72 MG/DL — SIGNIFICANT CHANGE UP (ref 0.5–1.3)
CRP SERPL-MCNC: 5.75 MG/DL — HIGH (ref 0–0.4)
D DIMER BLD IA.RAPID-MCNC: 261 NG/ML DDU — HIGH
EOSINOPHIL # BLD AUTO: 0.04 K/UL — SIGNIFICANT CHANGE UP (ref 0–0.5)
EOSINOPHIL NFR BLD AUTO: 0.6 % — SIGNIFICANT CHANGE UP (ref 0–6)
FERRITIN SERPL-MCNC: 1107 NG/ML — HIGH (ref 30–400)
GLUCOSE BLDC GLUCOMTR-MCNC: 139 MG/DL — HIGH (ref 70–99)
GLUCOSE BLDC GLUCOMTR-MCNC: 149 MG/DL — HIGH (ref 70–99)
GLUCOSE BLDC GLUCOMTR-MCNC: 183 MG/DL — HIGH (ref 70–99)
GLUCOSE BLDC GLUCOMTR-MCNC: 190 MG/DL — HIGH (ref 70–99)
GLUCOSE SERPL-MCNC: 149 MG/DL — HIGH (ref 70–99)
HCT VFR BLD CALC: 42 % — SIGNIFICANT CHANGE UP (ref 39–50)
HGB BLD-MCNC: 14.6 G/DL — SIGNIFICANT CHANGE UP (ref 13–17)
IMM GRANULOCYTES NFR BLD AUTO: 0.3 % — SIGNIFICANT CHANGE UP (ref 0–1.5)
LDH SERPL L TO P-CCNC: 207 U/L — SIGNIFICANT CHANGE UP (ref 120–225)
LYMPHOCYTES # BLD AUTO: 0.81 K/UL — LOW (ref 1–3.3)
LYMPHOCYTES # BLD AUTO: 11.6 % — LOW (ref 13–44)
MAGNESIUM SERPL-MCNC: 1.9 MG/DL — SIGNIFICANT CHANGE UP (ref 1.6–2.6)
MCHC RBC-ENTMCNC: 28.7 PG — SIGNIFICANT CHANGE UP (ref 27–34)
MCHC RBC-ENTMCNC: 34.8 GM/DL — SIGNIFICANT CHANGE UP (ref 32–36)
MCV RBC AUTO: 82.5 FL — SIGNIFICANT CHANGE UP (ref 80–100)
MONOCYTES # BLD AUTO: 0.58 K/UL — SIGNIFICANT CHANGE UP (ref 0–0.9)
MONOCYTES NFR BLD AUTO: 8.3 % — SIGNIFICANT CHANGE UP (ref 2–14)
NEUTROPHILS # BLD AUTO: 5.5 K/UL — SIGNIFICANT CHANGE UP (ref 1.8–7.4)
NEUTROPHILS NFR BLD AUTO: 79.1 % — HIGH (ref 43–77)
NRBC # BLD: 0 /100 WBCS — SIGNIFICANT CHANGE UP (ref 0–0)
PHOSPHATE SERPL-MCNC: 3 MG/DL — SIGNIFICANT CHANGE UP (ref 2.5–4.5)
PLATELET # BLD AUTO: 258 K/UL — SIGNIFICANT CHANGE UP (ref 150–400)
POTASSIUM SERPL-MCNC: 3.3 MMOL/L — LOW (ref 3.5–5.3)
POTASSIUM SERPL-SCNC: 3.3 MMOL/L — LOW (ref 3.5–5.3)
PROT SERPL-MCNC: 7.5 G/DL — SIGNIFICANT CHANGE UP (ref 6–8.3)
RBC # BLD: 5.09 M/UL — SIGNIFICANT CHANGE UP (ref 4.2–5.8)
RBC # FLD: 11.9 % — SIGNIFICANT CHANGE UP (ref 10.3–14.5)
SODIUM SERPL-SCNC: 143 MMOL/L — SIGNIFICANT CHANGE UP (ref 135–145)
WBC # BLD: 6.96 K/UL — SIGNIFICANT CHANGE UP (ref 3.8–10.5)
WBC # FLD AUTO: 6.96 K/UL — SIGNIFICANT CHANGE UP (ref 3.8–10.5)

## 2020-05-04 PROCEDURE — 99233 SBSQ HOSP IP/OBS HIGH 50: CPT

## 2020-05-04 RX ORDER — POTASSIUM CHLORIDE 20 MEQ
10 PACKET (EA) ORAL
Refills: 0 | Status: COMPLETED | OUTPATIENT
Start: 2020-05-04 | End: 2020-05-04

## 2020-05-04 RX ADMIN — ENOXAPARIN SODIUM 40 MILLIGRAM(S): 100 INJECTION SUBCUTANEOUS at 11:11

## 2020-05-04 RX ADMIN — Medication 1: at 08:21

## 2020-05-04 RX ADMIN — HALOPERIDOL DECANOATE 1 MILLIGRAM(S): 100 INJECTION INTRAMUSCULAR at 19:55

## 2020-05-04 RX ADMIN — Medication 100 MILLIEQUIVALENT(S): at 12:34

## 2020-05-04 RX ADMIN — LEVETIRACETAM 500 MILLIGRAM(S): 250 TABLET, FILM COATED ORAL at 17:03

## 2020-05-04 RX ADMIN — MIRTAZAPINE 7.5 MILLIGRAM(S): 45 TABLET, ORALLY DISINTEGRATING ORAL at 12:16

## 2020-05-04 RX ADMIN — MIRTAZAPINE 3.75 MILLIGRAM(S): 45 TABLET, ORALLY DISINTEGRATING ORAL at 08:22

## 2020-05-04 RX ADMIN — QUETIAPINE FUMARATE 50 MILLIGRAM(S): 200 TABLET, FILM COATED ORAL at 21:23

## 2020-05-04 RX ADMIN — Medication 100 MILLIEQUIVALENT(S): at 13:40

## 2020-05-04 RX ADMIN — MIRTAZAPINE 7.5 MILLIGRAM(S): 45 TABLET, ORALLY DISINTEGRATING ORAL at 21:40

## 2020-05-04 RX ADMIN — MIRTAZAPINE 7.5 MILLIGRAM(S): 45 TABLET, ORALLY DISINTEGRATING ORAL at 17:03

## 2020-05-04 RX ADMIN — Medication 3 MILLIGRAM(S): at 21:23

## 2020-05-04 RX ADMIN — Medication 100 MILLIEQUIVALENT(S): at 15:15

## 2020-05-04 RX ADMIN — OLANZAPINE 2.5 MILLIGRAM(S): 15 TABLET, FILM COATED ORAL at 08:22

## 2020-05-04 RX ADMIN — QUETIAPINE FUMARATE 25 MILLIGRAM(S): 200 TABLET, FILM COATED ORAL at 08:22

## 2020-05-04 RX ADMIN — OLANZAPINE 5 MILLIGRAM(S): 15 TABLET, FILM COATED ORAL at 21:23

## 2020-05-04 NOTE — PROGRESS NOTE ADULT - SUBJECTIVE AND OBJECTIVE BOX
Patient was transferred from assisted living facility on 5/1/20 for "seizure," but the description is vague and not definitive for seizure. He tested positive for COVID-19 on admission.  Patient was empirically started on levetiracetam 500mg BID.  Patient has been agitated throughout this admission, requiring b/l wrist restraints.  At present, patient is asleep but can open eyes to voice, minimally verbal, and not following commands, although not agitated at present.    Dx: Convulsive syncope in setting of infection vs. New-onset seizure    Recs:  - At this time, stop levetiracetam; no role to start or maintain an antiepileptic medication for a single questionable seizure.       - No need to taper levetiracetam since it is at the starting dose and since it was started less than 5 days ago.  - If a new clinical seizure occurs and lasts 5 minutes or longer, treat with lorazepam 2-4 mg IV x 1.       - If a new antiepileptic medication is needed, recommend starting valproate 20-40 mg/kg IV x 1, valproic acid level, and then valproate 10-20mg IV BID for maintenance (this has lower association with agitation than levetiracetam)        NOTE TO BE COMPLETED - PLEASE REFER TO ABOVE ONLY AND IGNORE INFORMATION BELOW    Neurology Follow up note    Name  JAZIEL LOPEZ    HPI:  64 year old male from [facility]  with PMH dementia and DM coming in with seizures like activity while sitting at AL. pt unable to provide further history (poor historian) . denies all complaints at this time. NKDA     In ED :   EKG : Sinus rythm with PVCs   Afebrile, no WBC elevation  normal electrolytes and renal functions   Lactate 1.7  CT head : unremarkable CT study of the brain. No acute abnormality suggested.  CXR : Clear lungs   no Leukocytosis  lymphocyte; WNl   pt is afebrile  O2 Sat on RA 98%     ****Morning team to contact Assisted Living for home med rec and GOC ****   Full code for now (01 May 2020 03:13)      Interval History -        Subjective:        MEDICATIONS  (STANDING):  dextrose 5%. 1000 milliLiter(s) (50 mL/Hr) IV Continuous <Continuous>  dextrose 50% Injectable 12.5 Gram(s) IV Push once  dextrose 50% Injectable 25 Gram(s) IV Push once  dextrose 50% Injectable 25 Gram(s) IV Push once  enoxaparin Injectable 40 milliGRAM(s) SubCutaneous daily  ergocalciferol 78861 Unit(s) Oral every week  insulin lispro (HumaLOG) corrective regimen sliding scale   SubCutaneous three times a day before meals  insulin lispro (HumaLOG) corrective regimen sliding scale   SubCutaneous at bedtime  levETIRAcetam 500 milliGRAM(s) Oral two times a day  melatonin 3 milliGRAM(s) Oral at bedtime  mirtazapine 7.5 milliGRAM(s) Oral <User Schedule>  mirtazapine 3.75 milliGRAM(s) Oral <User Schedule>  OLANZapine 2.5 milliGRAM(s) Oral <User Schedule>  OLANZapine 5 milliGRAM(s) Oral <User Schedule>  QUEtiapine 50 milliGRAM(s) Oral <User Schedule>  QUEtiapine 25 milliGRAM(s) Oral <User Schedule>    MEDICATIONS  (PRN):  acetaminophen  Suppository .. 650 milliGRAM(s) Rectal every 6 hours PRN Temp greater or equal to 38C (100.4F)  ALBUTerol    90 MICROgram(s) HFA Inhaler 2 Puff(s) Inhalation every 6 hours PRN Shortness of Breath and/or Wheezing  dextrose 40% Gel 15 Gram(s) Oral once PRN Blood Glucose LESS THAN 70 milliGRAM(s)/deciliter  glucagon  Injectable 1 milliGRAM(s) IntraMuscular once PRN Glucose LESS THAN 70 milligrams/deciliter  guaiFENesin   Syrup  (Sugar-Free) 100 milliGRAM(s) Oral every 6 hours PRN Cough  haloperidol    Injectable 1 milliGRAM(s) IV Push every 4 hours PRN agitation  haloperidol    Injectable 1 milliGRAM(s) IV Push <User Schedule> PRN refusal of PO Zyprexa 2.5mg @9am  haloperidol    Injectable 2 milliGRAM(s) IV Push <User Schedule> PRN refusal of PO Zyprexa 5mg @9pm      Allergies    No Known Allergies    Intolerances        Review of Systems:  General: [ ] None, [ ] chills, [ ]fatigue, [ ] fevers  Skin: [ ] None, [ ] rash   HEENT: [ ] None, [ ] head injury, [ ] blurred vision, [ ] double vision, [ ] eye pain, [ ] visual loss, [ ] hearing loss, [ ] deafness, [ ] ear pain, [ ] ringing in the ears, [ ] vertigo, [ ] sinus pain, [ ] voice changes  Neck: [ ] None, [ ] neck stiffness  Respiratory: [ ] None, [ ] cough, [ ] difficulty breathing  Cardiovascular: [ ] None, [ ] calf cramps, [ ] chest pain, [ ] leg pain, [ ] swelling, [ ] rapid heart rate, [ ] shortness of breath  Gastrointestinal: [ ] None, [ ] abdominal pain, [ ] nausea, [ ] vomiting  Musculoskeletal: [ ] None, [ ] back pain, [ ] joint pain, [ ] joint stiffness, [ ] leg cramps, [ ] muscle atrophy, [ ] muscle cramps, [ ] muscle weakness, [ ] swelling of extremities  Neurological: [ ] None, [ ] Dizziness, [ ] decreased memory, [ ] fainting, [ ] focal neurological symptoms, [ ] headaches, [ ] incontinence of stool, [ ] incontinence of urine, [ ] loss of consciousness, [ ] numbness, [ ] seizures, [ ] spinning sensation, [ ] stroke, [ ] trouble walking, [ ] unsteadiness, [ ] visual changes, [ ] weakness  Psychiatric: [ ] None,  [ ] depression, [ ] anxiety, [ ] hallucinations, [ ] inability to concentrate, [ ] mood changes, [ ] panic attacks  Hematology: [ ] None,  [ ] blood clots, [ ] spontaneous bleeding      Objective:   Vital Signs Last 24 Hrs  T(C): 36.6 (04 May 2020 16:12), Max: 36.8 (03 May 2020 23:47)  T(F): 97.9 (04 May 2020 16:12), Max: 98.2 (03 May 2020 23:47)  HR: 82 (04 May 2020 16:12) (67 - 89)  BP: 159/89 (04 May 2020 16:12) (118/70 - 159/89)  BP(mean): --  RR: 17 (04 May 2020 16:12) (16 - 17)  SpO2: 100% (04 May 2020 16:12) (95% - 100%)    General Exam:   General appearance: No acute distress                 Cardiovascular: Pedal dorsalis pulses intact bilaterally    Neurological Exam:  Mental Status: Orientated to self, date and place.  Attention intact.  No dysarthria, aphasia or neglect.  Knowledge intact.  Registration intact.  Short and long term memory grossly intact.      Cranial Nerves: CN I - not tested.  PERRL, EOMI, VFF, no nystagmus or diplopia.  No APD.  Fundi not visualized bilaterally.  CN V1-3 intact to light touch and pinprick.  No facial asymmetry.  Hearing intact to finger rub bilaterally.  Tongue, uvula and palate midline.  Sternocleidomastoid and Trapezius intact bilaterally.    Motor:   Tone: normal.                  Strength: intact throughout  Pronator drift: none                 Dysmeria: None to finger-nose-finger or heel-shin-heel  No truncal ataxia.    Tremor: No resting, postural or action tremor.  No myoclonus.    Sensation: intact to light touch, pinprick, vibration and proprioception    Deep Tendon Reflexes: 1+ bilateral biceps, triceps, brachioradialis, knee and ankle  Toes flexor bilaterally    Gait: normal and stable.      Other:    05-04    143  |  107  |  19<H>  ----------------------------<  149<H>  3.3<L>   |  23  |  0.72    Ca    9.5      04 May 2020 08:34  Phos  3.0     05-04  Mg     1.9     05-04    TPro  7.5  /  Alb  3.5  /  TBili  1.1  /  DBili  x   /  AST  15  /  ALT  21  /  AlkPhos  72  05-04    05-04    143  |  107  |  19<H>  ----------------------------<  149<H>  3.3<L>   |  23  |  0.72    Ca    9.5      04 May 2020 08:34  Phos  3.0     05-04  Mg     1.9     05-04    TPro  7.5  /  Alb  3.5  /  TBili  1.1  /  DBili  x   /  AST  15  /  ALT  21  /  AlkPhos  72  05-04    LIVER FUNCTIONS - ( 04 May 2020 08:34 )  Alb: 3.5 g/dL / Pro: 7.5 g/dL / ALK PHOS: 72 U/L / ALT: 21 U/L DA / AST: 15 U/L / GGT: x             Radiology    EKG:  tele:  TTE:  EEG:                 Please contact the Neurology consult service with any questions.    Melchor Ford MD   of Neurology  [facility] Neurology Follow up note    Name  JAZIEL LOPEZ    HPI:  64 year old male from [facility]  with PMH dementia and DM coming in with seizures like activity while sitting at AL. pt unable to provide further history (poor historian) . denies all complaints at this time. NKDA   In ED :   EKG : Sinus rythm with PVCs   Afebrile, no WBC elevation  normal electrolytes and renal functions   Lactate 1.7  CT head : unremarkable CT study of the brain. No acute abnormality suggested.  CXR : Clear lungs   no Leukocytosis  lymphocyte; WNl   pt is afebrile  O2 Sat on RA 98%     NEURO HPI:  64 year old male from [facility]  with PMH dementia and DM coming in with seizures like activity while sitting at AL. Description of the seizure is not available.  It is unclear if the patient has had prior seizures as patient is unable to provide further history (poor historian) . Denies all complaints at this time.    Interval History -  The patient was transferred from assisted living facility on 5/1/20 for "seizure," but the description is vague and not definitive for seizure. He was empirically started on levetiracetam 500mg BID. He tested positive for COVID-19 on admission. He has been agitated throughout this admission, requiring bilateral wrist restraints.    MEDICATIONS  (STANDING):  dextrose 5%. 1000 milliLiter(s) (50 mL/Hr) IV Continuous <Continuous>  dextrose 50% Injectable 12.5 Gram(s) IV Push once  dextrose 50% Injectable 25 Gram(s) IV Push once  dextrose 50% Injectable 25 Gram(s) IV Push once  enoxaparin Injectable 40 milliGRAM(s) SubCutaneous daily  ergocalciferol 77478 Unit(s) Oral every week  insulin lispro (HumaLOG) corrective regimen sliding scale   SubCutaneous three times a day before meals  insulin lispro (HumaLOG) corrective regimen sliding scale   SubCutaneous at bedtime  levETIRAcetam 500 milliGRAM(s) Oral two times a day  melatonin 3 milliGRAM(s) Oral at bedtime  mirtazapine 7.5 milliGRAM(s) Oral <User Schedule>  mirtazapine 3.75 milliGRAM(s) Oral <User Schedule>  OLANZapine 2.5 milliGRAM(s) Oral <User Schedule>  OLANZapine 5 milliGRAM(s) Oral <User Schedule>  QUEtiapine 50 milliGRAM(s) Oral <User Schedule>  QUEtiapine 25 milliGRAM(s) Oral <User Schedule>    MEDICATIONS  (PRN):  acetaminophen  Suppository .. 650 milliGRAM(s) Rectal every 6 hours PRN Temp greater or equal to 38C (100.4F)  ALBUTerol    90 MICROgram(s) HFA Inhaler 2 Puff(s) Inhalation every 6 hours PRN Shortness of Breath and/or Wheezing  dextrose 40% Gel 15 Gram(s) Oral once PRN Blood Glucose LESS THAN 70 milliGRAM(s)/deciliter  glucagon  Injectable 1 milliGRAM(s) IntraMuscular once PRN Glucose LESS THAN 70 milligrams/deciliter  guaiFENesin   Syrup  (Sugar-Free) 100 milliGRAM(s) Oral every 6 hours PRN Cough  haloperidol    Injectable 1 milliGRAM(s) IV Push every 4 hours PRN agitation  haloperidol    Injectable 1 milliGRAM(s) IV Push <User Schedule> PRN refusal of PO Zyprexa 2.5mg @9am  haloperidol    Injectable 2 milliGRAM(s) IV Push <User Schedule> PRN refusal of PO Zyprexa 5mg @9pm    Allergies  No Known Allergies    Review of Systems: Fourteen systems reviewed and negative except as in HPI / Interval History.      Objective:   Vital Signs Last 24 Hrs  T(C): 36.6 (04 May 2020 16:12), Max: 36.8 (03 May 2020 23:47)  T(F): 97.9 (04 May 2020 16:12), Max: 98.2 (03 May 2020 23:47)  HR: 82 (04 May 2020 16:12) (67 - 89)  BP: 159/89 (04 May 2020 16:12) (118/70 - 159/89)  RR: 17 (04 May 2020 16:12) (16 - 17)  SpO2: 100% (04 May 2020 16:12) (95% - 100%)    General Exam:  General: No apparent acute distress, Not agitated  Respiratory: Diminished breath sound b/l  Cardiovascular: RRR, Full b/l radial and pedal pulses    Neurological Exam:  General / Mental Status: Asleep, Arousable to voice.  Minimally verbal, with no apparent aphasia or dysarthria.  Does not follow commands.  Neurological exam is limited.  Cranial Nerves: PERRL, Blink to threat absent b/l, Does not track finger movements with eyes b/l.  Grimaces response to pinprick at b/l V1-V3.  No response to snap at b/l ears.  No apparent facial asymmetry.  Midline palate and tongue.  No resistance to passive motion of neck b/l.  Motor: Diminished bulk and normal tone in all four extremities.  Spontaneous movements of all four extremities occur in the plane of the bed, without apparent focal weakness.  The patient does not participate in formal muscle strength testing.  Sensation: Withdraws to pinprick in all four extremities.  Reflexes: 1+ and symmetric at b/l biceps, triceps, brachioradialis, patellae, and ankles.  Toes mute b/l.  Unable to assess coordination, Romberg sign, or gait in uncooperative patient.      Labs:    05-04    143  |  107  |  19<H>  ----------------------------<  149<H>  3.3<L>   |  23  |  0.72    Ca    9.5      04 May 2020 08:34  Phos  3.0     05-04  Mg     1.9     05-04    TPro  7.5  /  Alb  3.5  /  TBili  1.1  /  DBili  x   /  AST  15  /  ALT  21  /  AlkPhos  72  05-04      Neuroimaging:    CT Head (5/1/0):  - No acute intracranial abnormality  - Mild chronic microvascular disease  - Mild central atrophy      Assessment:  64M with Convulsive syncope, less likely new-onset seizure, in setting of COVID-19 infection      Recommendations:    - At this time, stop levetiracetam; no role to start or maintain an antiepileptic medication for a single questionable seizure.       - No need to taper levetiracetam since it is at the starting dose and since it was started less than 5 days ago.    - If a new clinical seizure occurs and lasts 5 minutes or longer, treat with lorazepam 2-4 mg IV x 1.       - If a new antiepileptic medication is needed, recommend starting valproate 20-40 mg/kg IV x 1, valproic acid level, and then valproate 10-20mg IV BID for maintenance (this has lower association with agitation than levetiracetam)    - Patient may be considered for outpatient EEG and MRI Brain w/wo Contrast (Epilepsy protocol) after recovering from COVID-19 infection    - Complete infectious workup and treat as appropriate    - Minimize use of haloperidol for agitation due to risk of developing extrapyramidal symptoms. Instead, use quetiapine or olanzapine to minimize this risk      Please contact the Neurology consult service with any questions.    Melchor Ford MD   of Neurology  [facility] at [facility] Neurology Follow up note    Name  JAZIEL LOPEZ    HPI:  64 year old male from [facility]  with PMH dementia and DM coming in with seizures like activity while sitting at AL. pt unable to provide further history (poor historian) . denies all complaints at this time. NKDA   In ED :   EKG : Sinus rythm with PVCs   Afebrile, no WBC elevation  normal electrolytes and renal functions   Lactate 1.7  CT head : unremarkable CT study of the brain. No acute abnormality suggested.  CXR : Clear lungs   no Leukocytosis  lymphocyte; WNl   pt is afebrile  O2 Sat on RA 98%     NEURO HPI:  64 year old male from [facility]  with PMH dementia and DM coming in with seizures like activity while sitting at AL. Description of the seizure is not available.  It is unclear if the patient has had prior seizures as patient is unable to provide further history (poor historian) . Denies all complaints at this time.    Interval History -  The patient was transferred from assisted living facility on 5/1/20 for "seizure," but the description is vague and not definitive for seizure. He was empirically started on levetiracetam 500mg BID. He tested positive for COVID-19 on admission. He has been agitated throughout this admission, requiring bilateral wrist restraints.    MEDICATIONS  (STANDING):  dextrose 5%. 1000 milliLiter(s) (50 mL/Hr) IV Continuous <Continuous>  dextrose 50% Injectable 12.5 Gram(s) IV Push once  dextrose 50% Injectable 25 Gram(s) IV Push once  dextrose 50% Injectable 25 Gram(s) IV Push once  enoxaparin Injectable 40 milliGRAM(s) SubCutaneous daily  ergocalciferol 24028 Unit(s) Oral every week  insulin lispro (HumaLOG) corrective regimen sliding scale   SubCutaneous three times a day before meals  insulin lispro (HumaLOG) corrective regimen sliding scale   SubCutaneous at bedtime  levETIRAcetam 500 milliGRAM(s) Oral two times a day  melatonin 3 milliGRAM(s) Oral at bedtime  mirtazapine 7.5 milliGRAM(s) Oral <User Schedule>  mirtazapine 3.75 milliGRAM(s) Oral <User Schedule>  OLANZapine 2.5 milliGRAM(s) Oral <User Schedule>  OLANZapine 5 milliGRAM(s) Oral <User Schedule>  QUEtiapine 50 milliGRAM(s) Oral <User Schedule>  QUEtiapine 25 milliGRAM(s) Oral <User Schedule>    MEDICATIONS  (PRN):  acetaminophen  Suppository .. 650 milliGRAM(s) Rectal every 6 hours PRN Temp greater or equal to 38C (100.4F)  ALBUTerol    90 MICROgram(s) HFA Inhaler 2 Puff(s) Inhalation every 6 hours PRN Shortness of Breath and/or Wheezing  dextrose 40% Gel 15 Gram(s) Oral once PRN Blood Glucose LESS THAN 70 milliGRAM(s)/deciliter  glucagon  Injectable 1 milliGRAM(s) IntraMuscular once PRN Glucose LESS THAN 70 milligrams/deciliter  guaiFENesin   Syrup  (Sugar-Free) 100 milliGRAM(s) Oral every 6 hours PRN Cough  haloperidol    Injectable 1 milliGRAM(s) IV Push every 4 hours PRN agitation  haloperidol    Injectable 1 milliGRAM(s) IV Push <User Schedule> PRN refusal of PO Zyprexa 2.5mg @9am  haloperidol    Injectable 2 milliGRAM(s) IV Push <User Schedule> PRN refusal of PO Zyprexa 5mg @9pm    Allergies  No Known Allergies    Review of Systems: Fourteen systems reviewed and negative except as in HPI / Interval History.      Objective:   Vital Signs Last 24 Hrs  T(C): 36.6 (04 May 2020 16:12), Max: 36.8 (03 May 2020 23:47)  T(F): 97.9 (04 May 2020 16:12), Max: 98.2 (03 May 2020 23:47)  HR: 82 (04 May 2020 16:12) (67 - 89)  BP: 159/89 (04 May 2020 16:12) (118/70 - 159/89)  RR: 17 (04 May 2020 16:12) (16 - 17)  SpO2: 100% (04 May 2020 16:12) (95% - 100%)    General Exam:  General: No apparent acute distress, Not agitated  Respiratory: Diminished breath sound b/l  Cardiovascular: RRR, Full b/l radial and pedal pulses    Neurological Exam:  General / Mental Status: Asleep, Arousable to voice.  Minimally verbal, with no apparent aphasia or dysarthria.  Does not follow commands.  Neurological exam is limited.  Cranial Nerves: PERRL, Blink to threat absent b/l, Does not track finger movements with eyes b/l.  Grimaces response to pinprick at b/l V1-V3.  No response to snap at b/l ears.  No apparent facial asymmetry.  Midline palate and tongue.  No resistance to passive motion of neck b/l.  Motor: Diminished bulk and normal tone in all four extremities.  Spontaneous movements of all four extremities occur in the plane of the bed, without apparent focal weakness.  The patient does not participate in formal muscle strength testing.  Sensation: Withdraws to pinprick in all four extremities.  Reflexes: 1+ and symmetric at b/l biceps, triceps, brachioradialis, patellae, and ankles.  Toes mute b/l.  Unable to assess coordination, Romberg sign, or gait in uncooperative patient.      Labs:    05-04    143  |  107  |  19<H>  ----------------------------<  149<H>  3.3<L>   |  23  |  0.72    Ca    9.5      04 May 2020 08:34  Phos  3.0     05-04  Mg     1.9     05-04    TPro  7.5  /  Alb  3.5  /  TBili  1.1  /  DBili  x   /  AST  15  /  ALT  21  /  AlkPhos  72  05-04      Neuroimaging:    CT Head (5/1/0):  - No acute intracranial abnormality  - Mild chronic microvascular disease  - Mild central atrophy      Assessment:  64M with Convulsive syncope, less likely new-onset seizure, in setting of COVID-19 infection and early-onset Alzheimer's dementia      Recommendations:    - At this time, stop levetiracetam; no role to start or maintain an antiepileptic medication for a single questionable seizure.       - No need to taper levetiracetam since it is at the starting dose and since it was started less than 5 days ago.    - If a new clinical seizure occurs and lasts 5 minutes or longer, treat with lorazepam 2-4 mg IV x 1.       - If a new antiepileptic medication is needed, recommend starting valproate 20-40 mg/kg IV x 1, valproic acid level, and then valproate 10-20mg IV BID for maintenance (this has lower association with agitation than levetiracetam)    - Patient may be considered for outpatient EEG and MRI Brain w/wo Contrast (Epilepsy protocol) after recovering from COVID-19 infection    - Complete infectious workup and treat as appropriate    - Plentiful fluid hydration and caution with position changes    - PT/OT      Please contact the Neurology consult service with any questions.    Melchor Ford MD   of Neurology  [facility] at [facility] Neurology Follow up note    Name  JAZIEL LOPEZ    HPI:  64 year old male from [facility]  with PMH dementia and DM coming in with seizures like activity while sitting at AL. pt unable to provide further history (poor historian) . denies all complaints at this time. NKDA   In ED :   EKG : Sinus rythm with PVCs   Afebrile, no WBC elevation  normal electrolytes and renal functions   Lactate 1.7  CT head : unremarkable CT study of the brain. No acute abnormality suggested.  CXR : Clear lungs   no Leukocytosis  lymphocyte; WNl   pt is afebrile  O2 Sat on RA 98%     NEURO HPI:  64 year old male from [facility]  with PMH dementia and DM coming in with seizures like activity while sitting at AL. Description of the seizure is not available.  It is unclear if the patient has had prior seizures as patient is unable to provide further history (poor historian) . Denies all complaints at this time.    Interval History -  The patient was transferred from assisted living facility on 5/1/20 for "seizure," but the description is vague and not definitive for seizure. He was empirically started on levetiracetam 500mg BID. He tested positive for COVID-19 on admission. He has been agitated throughout this admission, requiring bilateral wrist restraints.    MEDICATIONS  (STANDING):  dextrose 5%. 1000 milliLiter(s) (50 mL/Hr) IV Continuous <Continuous>  dextrose 50% Injectable 12.5 Gram(s) IV Push once  dextrose 50% Injectable 25 Gram(s) IV Push once  dextrose 50% Injectable 25 Gram(s) IV Push once  enoxaparin Injectable 40 milliGRAM(s) SubCutaneous daily  ergocalciferol 23923 Unit(s) Oral every week  insulin lispro (HumaLOG) corrective regimen sliding scale   SubCutaneous three times a day before meals  insulin lispro (HumaLOG) corrective regimen sliding scale   SubCutaneous at bedtime  levETIRAcetam 500 milliGRAM(s) Oral two times a day  melatonin 3 milliGRAM(s) Oral at bedtime  mirtazapine 7.5 milliGRAM(s) Oral <User Schedule>  mirtazapine 3.75 milliGRAM(s) Oral <User Schedule>  OLANZapine 2.5 milliGRAM(s) Oral <User Schedule>  OLANZapine 5 milliGRAM(s) Oral <User Schedule>  QUEtiapine 50 milliGRAM(s) Oral <User Schedule>  QUEtiapine 25 milliGRAM(s) Oral <User Schedule>    MEDICATIONS  (PRN):  acetaminophen  Suppository .. 650 milliGRAM(s) Rectal every 6 hours PRN Temp greater or equal to 38C (100.4F)  ALBUTerol    90 MICROgram(s) HFA Inhaler 2 Puff(s) Inhalation every 6 hours PRN Shortness of Breath and/or Wheezing  dextrose 40% Gel 15 Gram(s) Oral once PRN Blood Glucose LESS THAN 70 milliGRAM(s)/deciliter  glucagon  Injectable 1 milliGRAM(s) IntraMuscular once PRN Glucose LESS THAN 70 milligrams/deciliter  guaiFENesin   Syrup  (Sugar-Free) 100 milliGRAM(s) Oral every 6 hours PRN Cough  haloperidol    Injectable 1 milliGRAM(s) IV Push every 4 hours PRN agitation  haloperidol    Injectable 1 milliGRAM(s) IV Push <User Schedule> PRN refusal of PO Zyprexa 2.5mg @9am  haloperidol    Injectable 2 milliGRAM(s) IV Push <User Schedule> PRN refusal of PO Zyprexa 5mg @9pm    Allergies  No Known Allergies    Review of Systems: Fourteen systems reviewed and negative except as in HPI / Interval History.      Objective:   Vital Signs Last 24 Hrs  T(C): 36.6 (04 May 2020 16:12), Max: 36.8 (03 May 2020 23:47)  T(F): 97.9 (04 May 2020 16:12), Max: 98.2 (03 May 2020 23:47)  HR: 82 (04 May 2020 16:12) (67 - 89)  BP: 159/89 (04 May 2020 16:12) (118/70 - 159/89)  RR: 17 (04 May 2020 16:12) (16 - 17)  SpO2: 100% (04 May 2020 16:12) (95% - 100%)    General Exam:  General: No apparent acute distress, Not agitated  Respiratory: Diminished breath sound b/l  Cardiovascular: RRR, Full b/l radial and pedal pulses    Neurological Exam:  General / Mental Status: Asleep, Arousable to voice.  Minimally verbal, with no apparent aphasia or dysarthria.  Does not follow commands.  Neurological exam is limited.  Cranial Nerves: PERRL, Blink to threat absent b/l, Does not track finger movements with eyes b/l.  Grimaces response to pinprick at b/l V1-V3.  No response to snap at b/l ears.  No apparent facial asymmetry.  Midline palate and tongue.  No resistance to passive motion of neck b/l.  Motor: Diminished bulk and normal tone in all four extremities.  Spontaneous movements of all four extremities occur in the plane of the bed, without apparent focal weakness.  The patient does not participate in formal muscle strength testing.  Sensation: Withdraws to pinprick in all four extremities.  Reflexes: 1+ and symmetric at b/l biceps, triceps, brachioradialis, patellae, and ankles.  Toes mute b/l.  Unable to assess coordination, Romberg sign, or gait in uncooperative patient.      Labs:    05-04    143  |  107  |  19<H>  ----------------------------<  149<H>  3.3<L>   |  23  |  0.72    Ca    9.5      04 May 2020 08:34  Phos  3.0     05-04  Mg     1.9     05-04    TPro  7.5  /  Alb  3.5  /  TBili  1.1  /  DBili  x   /  AST  15  /  ALT  21  /  AlkPhos  72  05-04      Neuroimaging:    CT Head (5/1/0):  - No acute intracranial abnormality  - Mild chronic microvascular disease  - Mild central atrophy      Assessment:  64M with Convulsive syncope, less likely new-onset seizure, in setting of COVID-19 infection and early-onset Alzheimer's dementia      Recommendations:    - At this time, stop levetiracetam; no role to start or maintain an antiepileptic medication for a single questionable seizure.       - No need to taper levetiracetam since it is at the starting dose and since it was started less than 5 days ago.    - If a new clinical seizure occurs and lasts 5 minutes or longer, treat with lorazepam 2-4 mg IV x 1.       - If a new antiepileptic medication is needed, recommend starting valproate 20-40 mg/kg IV x 1, valproic acid level, and then valproate 10-20mg IV BID for maintenance (this has lower association with agitation than levetiracetam)       - Valproate (Depakote) is selected because it is less associated with agitation than levetiracetam is, and may provide some mood stabilizing benefits    - Patient may be considered for outpatient EEG and MRI Brain w/wo Contrast (Epilepsy protocol) after recovering from COVID-19 infection    - Complete infectious workup and treat as appropriate    - Plentiful fluid hydration and caution with position changes    - PT/OT      Please contact the Neurology consult service with any questions.    Melchor Ford MD   of Neurology  [facility] [employer] at [facility] Neurology Follow up note    Name  JAZIEL LOPEZ    HPI:  64 year old male from [facility]  with PMH dementia and DM coming in with seizures like activity while sitting at AL. pt unable to provide further history (poor historian) . denies all complaints at this time. NKDA   In ED :   EKG : Sinus rythm with PVCs   Afebrile, no WBC elevation  normal electrolytes and renal functions   Lactate 1.7  CT head : unremarkable CT study of the brain. No acute abnormality suggested.  CXR : Clear lungs   no Leukocytosis  lymphocyte; WNl   pt is afebrile  O2 Sat on RA 98%     NEURO HPI:  64 year old male from [facility]  with PMH dementia and DM coming in with seizures like activity while sitting at AL. Description of the seizure is not available.  It is unclear if the patient has had prior seizures as patient is unable to provide further history (poor historian) . Denies all complaints at this time.    Interval History -  The patient was transferred from assisted living facility on 5/1/20 for "seizure," but the description is vague and not definitive for seizure. He was empirically started on levetiracetam 500mg BID. He tested positive for COVID-19 on admission. He has been agitated throughout this admission, requiring bilateral wrist restraints.    MEDICATIONS  (STANDING):  dextrose 5%. 1000 milliLiter(s) (50 mL/Hr) IV Continuous <Continuous>  dextrose 50% Injectable 12.5 Gram(s) IV Push once  dextrose 50% Injectable 25 Gram(s) IV Push once  dextrose 50% Injectable 25 Gram(s) IV Push once  enoxaparin Injectable 40 milliGRAM(s) SubCutaneous daily  ergocalciferol 59531 Unit(s) Oral every week  insulin lispro (HumaLOG) corrective regimen sliding scale   SubCutaneous three times a day before meals  insulin lispro (HumaLOG) corrective regimen sliding scale   SubCutaneous at bedtime  levETIRAcetam 500 milliGRAM(s) Oral two times a day  melatonin 3 milliGRAM(s) Oral at bedtime  mirtazapine 7.5 milliGRAM(s) Oral <User Schedule>  mirtazapine 3.75 milliGRAM(s) Oral <User Schedule>  OLANZapine 2.5 milliGRAM(s) Oral <User Schedule>  OLANZapine 5 milliGRAM(s) Oral <User Schedule>  QUEtiapine 50 milliGRAM(s) Oral <User Schedule>  QUEtiapine 25 milliGRAM(s) Oral <User Schedule>    MEDICATIONS  (PRN):  acetaminophen  Suppository .. 650 milliGRAM(s) Rectal every 6 hours PRN Temp greater or equal to 38C (100.4F)  ALBUTerol    90 MICROgram(s) HFA Inhaler 2 Puff(s) Inhalation every 6 hours PRN Shortness of Breath and/or Wheezing  dextrose 40% Gel 15 Gram(s) Oral once PRN Blood Glucose LESS THAN 70 milliGRAM(s)/deciliter  glucagon  Injectable 1 milliGRAM(s) IntraMuscular once PRN Glucose LESS THAN 70 milligrams/deciliter  guaiFENesin   Syrup  (Sugar-Free) 100 milliGRAM(s) Oral every 6 hours PRN Cough  haloperidol    Injectable 1 milliGRAM(s) IV Push every 4 hours PRN agitation  haloperidol    Injectable 1 milliGRAM(s) IV Push <User Schedule> PRN refusal of PO Zyprexa 2.5mg @9am  haloperidol    Injectable 2 milliGRAM(s) IV Push <User Schedule> PRN refusal of PO Zyprexa 5mg @9pm    Allergies  No Known Allergies    Review of Systems: Fourteen systems reviewed and negative except as in HPI / Interval History.      Objective:   Vital Signs Last 24 Hrs  T(C): 36.6 (04 May 2020 16:12), Max: 36.8 (03 May 2020 23:47)  T(F): 97.9 (04 May 2020 16:12), Max: 98.2 (03 May 2020 23:47)  HR: 82 (04 May 2020 16:12) (67 - 89)  BP: 159/89 (04 May 2020 16:12) (118/70 - 159/89)  RR: 17 (04 May 2020 16:12) (16 - 17)  SpO2: 100% (04 May 2020 16:12) (95% - 100%)    General Exam:  General: No apparent acute distress, Not agitated  Respiratory: Diminished breath sound b/l  Cardiovascular: RRR, Full b/l radial and pedal pulses    Neurological Exam:  General / Mental Status: Asleep, Arousable to voice.  Minimally verbal, with no apparent aphasia or dysarthria.  Does not follow commands.  Neurological exam is limited.  Cranial Nerves: PERRL, Blink to threat absent b/l, Does not track finger movements with eyes b/l.  Grimaces response to pinprick at b/l V1-V3.  No response to snap at b/l ears.  No apparent facial asymmetry.  Midline palate and tongue.  No resistance to passive motion of neck b/l.  Motor: Diminished bulk and normal tone in all four extremities.  Spontaneous movements of all four extremities occur in the plane of the bed, without apparent focal weakness.  The patient does not participate in formal muscle strength testing.  Sensation: Withdraws to pinprick in all four extremities.  Reflexes: 1+ and symmetric at b/l biceps, triceps, brachioradialis, patellae, and ankles.  Toes mute b/l.  Unable to assess coordination, Romberg sign, or gait in uncooperative patient.      Labs:    05-04    143  |  107  |  19<H>  ----------------------------<  149<H>  3.3<L>   |  23  |  0.72    Ca    9.5      04 May 2020 08:34  Phos  3.0     05-04  Mg     1.9     05-04    TPro  7.5  /  Alb  3.5  /  TBili  1.1  /  DBili  x   /  AST  15  /  ALT  21  /  AlkPhos  72  05-04      Neuroimaging:    CT Head (5/1/0):  - No acute intracranial abnormality  - Mild chronic microvascular disease  - Mild central atrophy      Assessment:  64M with Convulsive syncope, less likely new-onset seizure, in setting of COVID-19 infection and early-onset Alzheimer's dementia      Recommendations:    - At this time, stop levetiracetam; no role to start or maintain an antiepileptic medication for a single questionable seizure.       - No need to taper levetiracetam since it is at the starting dose and since it was started less than 5 days ago.    - If a new clinical seizure occurs and lasts 5 minutes or longer, treat with lorazepam 2-4 mg IV x 1.       - If a new antiepileptic medication is needed, recommend starting valproate 20-40 mg/kg IV x 1, valproic acid level, and then valproate 10-20 mg/kg IV BID for maintenance (this has lower association with agitation than levetiracetam)       - Valproate (Depakote) is selected because it is less associated with agitation than levetiracetam is, and may provide some mood stabilizing benefits    - Patient may be considered for outpatient EEG and MRI Brain w/wo Contrast (Epilepsy protocol) after recovering from COVID-19 infection    - Complete infectious workup and treat as appropriate    - Plentiful fluid hydration and caution with position changes    - PT/OT      Please contact the Neurology consult service with any questions.    Melchor Ford MD   of Neurology  [facility] [employer] [facility] at [facility] Neurology Follow up note    Name  JAZIEL LOPEZ    HPI:  64 year old male from [facility]  with PMH dementia and DM coming in with seizures like activity while sitting at AL. pt unable to provide further history (poor historian) . denies all complaints at this time. NKDA   In ED :   EKG : Sinus rythm with PVCs   Afebrile, no WBC elevation  normal electrolytes and renal functions   Lactate 1.7  CT head : unremarkable CT study of the brain. No acute abnormality suggested.  CXR : Clear lungs   no Leukocytosis  lymphocyte; WNl   pt is afebrile  O2 Sat on RA 98%     NEURO HPI:  64 year old male from [facility]  with PMH dementia and DM coming in with seizures like activity while sitting at AL. Description of the seizure is not available.  It is unclear if the patient has had prior seizures as patient is unable to provide further history (poor historian) . Denies all complaints at this time.    Interval History -  The patient was transferred from assisted living facility on 5/1/20 for "seizure," but the description is vague and not definitive for seizure. He was empirically started on levetiracetam 500mg BID. He tested positive for COVID-19 on admission. He has been agitated throughout this admission, requiring bilateral wrist restraints.    MEDICATIONS  (STANDING):  dextrose 5%. 1000 milliLiter(s) (50 mL/Hr) IV Continuous <Continuous>  dextrose 50% Injectable 12.5 Gram(s) IV Push once  dextrose 50% Injectable 25 Gram(s) IV Push once  dextrose 50% Injectable 25 Gram(s) IV Push once  enoxaparin Injectable 40 milliGRAM(s) SubCutaneous daily  ergocalciferol 04462 Unit(s) Oral every week  insulin lispro (HumaLOG) corrective regimen sliding scale   SubCutaneous three times a day before meals  insulin lispro (HumaLOG) corrective regimen sliding scale   SubCutaneous at bedtime  levETIRAcetam 500 milliGRAM(s) Oral two times a day  melatonin 3 milliGRAM(s) Oral at bedtime  mirtazapine 7.5 milliGRAM(s) Oral <User Schedule>  mirtazapine 3.75 milliGRAM(s) Oral <User Schedule>  OLANZapine 2.5 milliGRAM(s) Oral <User Schedule>  OLANZapine 5 milliGRAM(s) Oral <User Schedule>  QUEtiapine 50 milliGRAM(s) Oral <User Schedule>  QUEtiapine 25 milliGRAM(s) Oral <User Schedule>    MEDICATIONS  (PRN):  acetaminophen  Suppository .. 650 milliGRAM(s) Rectal every 6 hours PRN Temp greater or equal to 38C (100.4F)  ALBUTerol    90 MICROgram(s) HFA Inhaler 2 Puff(s) Inhalation every 6 hours PRN Shortness of Breath and/or Wheezing  dextrose 40% Gel 15 Gram(s) Oral once PRN Blood Glucose LESS THAN 70 milliGRAM(s)/deciliter  glucagon  Injectable 1 milliGRAM(s) IntraMuscular once PRN Glucose LESS THAN 70 milligrams/deciliter  guaiFENesin   Syrup  (Sugar-Free) 100 milliGRAM(s) Oral every 6 hours PRN Cough  haloperidol    Injectable 1 milliGRAM(s) IV Push every 4 hours PRN agitation  haloperidol    Injectable 1 milliGRAM(s) IV Push <User Schedule> PRN refusal of PO Zyprexa 2.5mg @9am  haloperidol    Injectable 2 milliGRAM(s) IV Push <User Schedule> PRN refusal of PO Zyprexa 5mg @9pm    Allergies  No Known Allergies    Review of Systems: Fourteen systems reviewed and negative except as in HPI / Interval History.      Objective:   Vital Signs Last 24 Hrs  T(C): 36.6 (04 May 2020 16:12), Max: 36.8 (03 May 2020 23:47)  T(F): 97.9 (04 May 2020 16:12), Max: 98.2 (03 May 2020 23:47)  HR: 82 (04 May 2020 16:12) (67 - 89)  BP: 159/89 (04 May 2020 16:12) (118/70 - 159/89)  RR: 17 (04 May 2020 16:12) (16 - 17)  SpO2: 100% (04 May 2020 16:12) (95% - 100%)    General Exam:  General: No apparent acute distress, Not agitated  Respiratory: Diminished breath sound b/l  Cardiovascular: RRR, Full b/l radial and pedal pulses    Neurological Exam:  General / Mental Status: Asleep, Arousable to voice.  Minimally verbal, with no apparent aphasia or dysarthria.  Does not follow commands.  Neurological exam is limited.  Cranial Nerves: PERRL, Blink to threat absent b/l, Does not track finger movements with eyes b/l.  Grimaces response to pinprick at b/l V1-V3.  No response to snap at b/l ears.  No apparent facial asymmetry.  Midline palate and tongue.  No resistance to passive motion of neck b/l.  Motor: Diminished bulk and normal tone in all four extremities.  Spontaneous movements of all four extremities occur in the plane of the bed, without apparent focal weakness.  The patient does not participate in formal muscle strength testing.  Sensation: Withdraws to pinprick in all four extremities.  Reflexes: 1+ and symmetric at b/l biceps, triceps, brachioradialis, patellae, and ankles.  Toes mute b/l.  Unable to assess coordination, Romberg sign, or gait in uncooperative patient.      Labs:    05-04    143  |  107  |  19<H>  ----------------------------<  149<H>  3.3<L>   |  23  |  0.72    Ca    9.5      04 May 2020 08:34  Phos  3.0     05-04  Mg     1.9     05-04    TPro  7.5  /  Alb  3.5  /  TBili  1.1  /  DBili  x   /  AST  15  /  ALT  21  /  AlkPhos  72  05-04      Neuroimaging:    CT Head (5/1/0):  - No acute intracranial abnormality  - Mild chronic microvascular disease  - Mild central atrophy      Assessment:  64M with Convulsive syncope, less likely new-onset seizure, in setting of COVID-19 infection and early-onset Alzheimer's dementia      Recommendations:    - At this time, stop levetiracetam; no role to start or maintain an antiepileptic medication for a single questionable seizure.       - No need to taper levetiracetam since it is at the starting dose and since it was started less than 5 days ago.    - If a new clinical seizure occurs and lasts 5 minutes or longer, treat with lorazepam 2-4 mg IV x 1.       - If a new antiepileptic medication is needed, recommend starting valproate 20-40 mg/kg IV x 1, valproic acid level, and then valproate 10-20 mg/kg IV BID for maintenance       - Valproate (Depakote) is selected because it is less associated with agitation than levetiracetam is, and may provide some mood stabilizing benefits    - Patient may be considered for outpatient EEG and MRI Brain w/wo Contrast (Epilepsy protocol) after recovering from COVID-19 infection    - Complete infectious workup and treat as appropriate    - Plentiful fluid hydration and caution with position changes    - PT/OT      Please contact the Neurology consult service with any questions.    Melchor Ford MD   of Neurology  [facility]

## 2020-05-04 NOTE — PROGRESS NOTE ADULT - ASSESSMENT
64 yr old male with PMHX of DM,dementia sent to ER from facility for seizure and now COVID+,Delirium.  1.Seizure-Neurology re- eval noted, pt refusing  keppra 500mg bid and Psych rec different medication, MRI,EEG as outpatient.  2.COVID +-ID eval, supportive treatment.  3.Replace k+.  4.DM-Insulin.  5.Delirium-Psych eval appreciated.  6.Dementia with behavioral problems-cont current medication with PRN haldol.  7.Replace vit d.  8.GI and DVT prophylaxis.  9.Await Ua and cx.

## 2020-05-04 NOTE — PROGRESS NOTE ADULT - SUBJECTIVE AND OBJECTIVE BOX
CARDIOLOGY/MEDICAL ATTENDING    CHIEF COMPLAINT:Patient is a 64y old  Male who presents with a chief complaint of seizures.Pt sedated this AM.    	  REVIEW OF SYSTEMS:  [x ] Unable to obtain    PHYSICAL EXAM:  T(C): 36.1 (05-04-20 @ 07:54), Max: 36.8 (05-03-20 @ 23:47)  HR: 89 (05-04-20 @ 07:54) (67 - 89)  BP: 118/70 (05-04-20 @ 07:54) (118/70 - 133/71)  RR: 16 (05-04-20 @ 07:54) (16 - 18)  SpO2: 96% (05-04-20 @ 07:54) (95% - 98%)  Wt(kg): --  I&O's Summary      Appearance: Normal	  HEENT:   Normal oral mucosa, PERRL, EOMI	  Lymphatic: No lymphadenopathy  Cardiovascular: Normal S1 S2, No JVD, No murmurs, No edema  Respiratory: Lungs clear to auscultation	  Gastrointestinal:  Soft, Non-tender, + BS	  Skin: No rashes, No ecchymoses, No cyanosis	  Extremities: Normal range of motion, No clubbing, cyanosis or edema  Vascular: Peripheral pulses palpable 2+ bilaterally    MEDICATIONS  (STANDING):  dextrose 5%. 1000 milliLiter(s) (50 mL/Hr) IV Continuous <Continuous>  dextrose 50% Injectable 12.5 Gram(s) IV Push once  dextrose 50% Injectable 25 Gram(s) IV Push once  dextrose 50% Injectable 25 Gram(s) IV Push once  enoxaparin Injectable 40 milliGRAM(s) SubCutaneous daily  ergocalciferol 12499 Unit(s) Oral every week  insulin lispro (HumaLOG) corrective regimen sliding scale   SubCutaneous three times a day before meals  insulin lispro (HumaLOG) corrective regimen sliding scale   SubCutaneous at bedtime  levETIRAcetam 500 milliGRAM(s) Oral two times a day  melatonin 3 milliGRAM(s) Oral at bedtime  mirtazapine 7.5 milliGRAM(s) Oral <User Schedule>  mirtazapine 3.75 milliGRAM(s) Oral <User Schedule>  OLANZapine 2.5 milliGRAM(s) Oral <User Schedule>  OLANZapine 5 milliGRAM(s) Oral <User Schedule>  QUEtiapine 50 milliGRAM(s) Oral <User Schedule>  QUEtiapine 25 milliGRAM(s) Oral <User Schedule>      	  	  LABS:	 	    CARDIAC MARKERS:  CARDIAC MARKERS ( 04 May 2020 08:34 )  x     / x     / 194 U/L / x     / x                                    14.6   6.96  )-----------( 258      ( 04 May 2020 08:34 )             42.0     05-04    143  |  107  |  19<H>  ----------------------------<  149<H>  3.3<L>   |  23  |  0.72    Ca    9.5      04 May 2020 08:34  Phos  3.0     05-04  Mg     1.9     05-04    TPro  7.5  /  Alb  3.5  /  TBili  1.1  /  DBili  x   /  AST  15  /  ALT  21  /  AlkPhos  72  05-04    proBNP: Serum Pro-Brain Natriuretic Peptide: 126 pg/mL (05-01 @ 09:36)    Lipid Profile: Cholesterol 161  LDL 95  HDL 35        TSH: Thyroid Stimulating Hormone, Serum: 2.39 uU/mL (05-01 @ 09:36)

## 2020-05-05 LAB
ANION GAP SERPL CALC-SCNC: 10 MMOL/L — SIGNIFICANT CHANGE UP (ref 5–17)
BUN SERPL-MCNC: 21 MG/DL — HIGH (ref 7–18)
CALCIUM SERPL-MCNC: 9.1 MG/DL — SIGNIFICANT CHANGE UP (ref 8.4–10.5)
CHLORIDE SERPL-SCNC: 111 MMOL/L — HIGH (ref 96–108)
CO2 SERPL-SCNC: 22 MMOL/L — SIGNIFICANT CHANGE UP (ref 22–31)
CREAT SERPL-MCNC: 0.63 MG/DL — SIGNIFICANT CHANGE UP (ref 0.5–1.3)
GLUCOSE BLDC GLUCOMTR-MCNC: 156 MG/DL — HIGH (ref 70–99)
GLUCOSE BLDC GLUCOMTR-MCNC: 161 MG/DL — HIGH (ref 70–99)
GLUCOSE BLDC GLUCOMTR-MCNC: 187 MG/DL — HIGH (ref 70–99)
GLUCOSE BLDC GLUCOMTR-MCNC: 190 MG/DL — HIGH (ref 70–99)
GLUCOSE SERPL-MCNC: 221 MG/DL — HIGH (ref 70–99)
POTASSIUM SERPL-MCNC: 3.4 MMOL/L — LOW (ref 3.5–5.3)
POTASSIUM SERPL-SCNC: 3.4 MMOL/L — LOW (ref 3.5–5.3)
SODIUM SERPL-SCNC: 143 MMOL/L — SIGNIFICANT CHANGE UP (ref 135–145)

## 2020-05-05 RX ADMIN — MIRTAZAPINE 3.75 MILLIGRAM(S): 45 TABLET, ORALLY DISINTEGRATING ORAL at 12:09

## 2020-05-05 RX ADMIN — QUETIAPINE FUMARATE 50 MILLIGRAM(S): 200 TABLET, FILM COATED ORAL at 23:06

## 2020-05-05 RX ADMIN — MIRTAZAPINE 7.5 MILLIGRAM(S): 45 TABLET, ORALLY DISINTEGRATING ORAL at 21:17

## 2020-05-05 RX ADMIN — QUETIAPINE FUMARATE 25 MILLIGRAM(S): 200 TABLET, FILM COATED ORAL at 09:06

## 2020-05-05 RX ADMIN — Medication 1: at 17:11

## 2020-05-05 RX ADMIN — ENOXAPARIN SODIUM 40 MILLIGRAM(S): 100 INJECTION SUBCUTANEOUS at 12:11

## 2020-05-05 RX ADMIN — Medication 1: at 09:06

## 2020-05-05 RX ADMIN — Medication 3 MILLIGRAM(S): at 21:20

## 2020-05-05 RX ADMIN — Medication 1: at 12:10

## 2020-05-05 RX ADMIN — OLANZAPINE 5 MILLIGRAM(S): 15 TABLET, FILM COATED ORAL at 21:20

## 2020-05-05 RX ADMIN — MIRTAZAPINE 7.5 MILLIGRAM(S): 45 TABLET, ORALLY DISINTEGRATING ORAL at 13:04

## 2020-05-05 RX ADMIN — OLANZAPINE 2.5 MILLIGRAM(S): 15 TABLET, FILM COATED ORAL at 09:06

## 2020-05-05 RX ADMIN — MIRTAZAPINE 7.5 MILLIGRAM(S): 45 TABLET, ORALLY DISINTEGRATING ORAL at 17:11

## 2020-05-05 NOTE — PROGRESS NOTE ADULT - SUBJECTIVE AND OBJECTIVE BOX
CARDIOLOGY/MEDICAL ATTENDING    CHIEF COMPLAINT:Patient is a 64y old  Male who presents with a chief complaint of seizures.Pt agitated still requiring IV haldol.    	  REVIEW OF SYSTEMS:    [x ] Unable to obtain    PHYSICAL EXAM:  T(C): 36.5 (05-05-20 @ 08:11), Max: 36.6 (05-04-20 @ 16:12)  HR: 80 (05-05-20 @ 08:11) (80 - 85)  BP: 131/72 (05-05-20 @ 08:11) (113/79 - 159/89)  RR: 17 (05-05-20 @ 08:11) (17 - 17)  SpO2: 99% (05-05-20 @ 08:11) (99% - 100%)  Wt(kg): --  I&O's Summary      Appearance: Normal	  HEENT:   Normal oral mucosa, PERRL, EOMI	  Lymphatic: No lymphadenopathy  Cardiovascular: Normal S1 S2, No JVD, No murmurs, No edema  Respiratory: Lungs clear to auscultation	  Gastrointestinal:  Soft, Non-tender, + BS	  Skin: No rashes, No ecchymoses, No cyanosis	  Extremities: Normal range of motion, No clubbing, cyanosis or edema  Vascular: Peripheral pulses palpable 2+ bilaterally    MEDICATIONS  (STANDING):  dextrose 5%. 1000 milliLiter(s) (50 mL/Hr) IV Continuous <Continuous>  dextrose 50% Injectable 12.5 Gram(s) IV Push once  dextrose 50% Injectable 25 Gram(s) IV Push once  dextrose 50% Injectable 25 Gram(s) IV Push once  enoxaparin Injectable 40 milliGRAM(s) SubCutaneous daily  ergocalciferol 93188 Unit(s) Oral every week  insulin lispro (HumaLOG) corrective regimen sliding scale   SubCutaneous three times a day before meals  insulin lispro (HumaLOG) corrective regimen sliding scale   SubCutaneous at bedtime  melatonin 3 milliGRAM(s) Oral at bedtime  mirtazapine 7.5 milliGRAM(s) Oral <User Schedule>  mirtazapine 3.75 milliGRAM(s) Oral <User Schedule>  OLANZapine 2.5 milliGRAM(s) Oral <User Schedule>  OLANZapine 5 milliGRAM(s) Oral <User Schedule>  QUEtiapine 50 milliGRAM(s) Oral <User Schedule>  QUEtiapine 25 milliGRAM(s) Oral <User Schedule>        LABS:	 	      CARDIAC MARKERS ( 04 May 2020 08:34 )  x     / x     / 194 U/L / x     / x                                    14.6   6.96  )-----------( 258      ( 04 May 2020 08:34 )             42.0     05-04    143  |  107  |  19<H>  ----------------------------<  149<H>  3.3<L>   |  23  |  0.72    Ca    9.5      04 May 2020 08:34  Phos  3.0     05-04  Mg     1.9     05-04    TPro  7.5  /  Alb  3.5  /  TBili  1.1  /  DBili  x   /  AST  15  /  ALT  21  /  AlkPhos  72  05-04    proBNP: Serum Pro-Brain Natriuretic Peptide: 126 pg/mL (05-01 @ 09:36)    Lipid Profile: Cholesterol 161  LDL 95  HDL 35      TSH: Thyroid Stimulating Hormone, Serum: 2.39 uU/mL (05-01 @ 09:36)      	    Syphilis Screen (05.02.20 @ 10:34)    Treponema Pallidum Antibody Interpretation: Negative: A negative treponemal  antibody screen indicates no serologic evidence of  syphilis (early infection cannot be excluded) and no additional testing  is required.   A positive screen is followed by testing for RPR.  Positive RPR indicates serologic evidence of new, inadequately treated,  or persistent syphilis infection and titer will be performed.  A negative  RPR following a positive treponemal screen may indicate adequately  treated or resolved past syphilis infection.  A negative RPR will trigger  a specific treponemal  antibody test, TPPA (treponema pallidum passive  particle agglutination).   A positive TPPA confirms previous or current  syphilis infection.   A negative TPPA suggests that the original  treponemal antibody screen was a false positive, but clinical assessment  of the patient is recommended.

## 2020-05-05 NOTE — PROGRESS NOTE ADULT - ASSESSMENT
64 yr old male with PMHX of DM,dementia sent to ER from facility for seizure and now COVID+,Delirium.  1.Seizure-Neurology re- eval appreciated.  2.COVID +-ID eval noted, supportive treatment.  3.Psych re-eval.  4.DM-Insulin.  5.Delirium-Psych eval appreciated.  6.Dementia with behavioral problems-cont current medication with PRN haldol.  7.Replace vit d.  8.GI and DVT prophylaxis.

## 2020-05-06 LAB
ANION GAP SERPL CALC-SCNC: 7 MMOL/L — SIGNIFICANT CHANGE UP (ref 5–17)
BUN SERPL-MCNC: 23 MG/DL — HIGH (ref 7–18)
CALCIUM SERPL-MCNC: 9.5 MG/DL — SIGNIFICANT CHANGE UP (ref 8.4–10.5)
CHLORIDE SERPL-SCNC: 110 MMOL/L — HIGH (ref 96–108)
CO2 SERPL-SCNC: 28 MMOL/L — SIGNIFICANT CHANGE UP (ref 22–31)
CREAT SERPL-MCNC: 0.7 MG/DL — SIGNIFICANT CHANGE UP (ref 0.5–1.3)
GLUCOSE BLDC GLUCOMTR-MCNC: 170 MG/DL — HIGH (ref 70–99)
GLUCOSE BLDC GLUCOMTR-MCNC: 197 MG/DL — HIGH (ref 70–99)
GLUCOSE BLDC GLUCOMTR-MCNC: 220 MG/DL — HIGH (ref 70–99)
GLUCOSE BLDC GLUCOMTR-MCNC: 221 MG/DL — HIGH (ref 70–99)
GLUCOSE SERPL-MCNC: 170 MG/DL — HIGH (ref 70–99)
HCT VFR BLD CALC: 41.7 % — SIGNIFICANT CHANGE UP (ref 39–50)
HGB BLD-MCNC: 14.4 G/DL — SIGNIFICANT CHANGE UP (ref 13–17)
MCHC RBC-ENTMCNC: 28.6 PG — SIGNIFICANT CHANGE UP (ref 27–34)
MCHC RBC-ENTMCNC: 34.5 GM/DL — SIGNIFICANT CHANGE UP (ref 32–36)
MCV RBC AUTO: 82.9 FL — SIGNIFICANT CHANGE UP (ref 80–100)
NRBC # BLD: 0 /100 WBCS — SIGNIFICANT CHANGE UP (ref 0–0)
PLATELET # BLD AUTO: 277 K/UL — SIGNIFICANT CHANGE UP (ref 150–400)
POTASSIUM SERPL-MCNC: 3.7 MMOL/L — SIGNIFICANT CHANGE UP (ref 3.5–5.3)
POTASSIUM SERPL-SCNC: 3.7 MMOL/L — SIGNIFICANT CHANGE UP (ref 3.5–5.3)
RBC # BLD: 5.03 M/UL — SIGNIFICANT CHANGE UP (ref 4.2–5.8)
RBC # FLD: 12 % — SIGNIFICANT CHANGE UP (ref 10.3–14.5)
SODIUM SERPL-SCNC: 145 MMOL/L — SIGNIFICANT CHANGE UP (ref 135–145)
WBC # BLD: 5.17 K/UL — SIGNIFICANT CHANGE UP (ref 3.8–10.5)
WBC # FLD AUTO: 5.17 K/UL — SIGNIFICANT CHANGE UP (ref 3.8–10.5)

## 2020-05-06 RX ADMIN — Medication 1: at 09:17

## 2020-05-06 RX ADMIN — HALOPERIDOL DECANOATE 1 MILLIGRAM(S): 100 INJECTION INTRAMUSCULAR at 09:41

## 2020-05-06 RX ADMIN — Medication 3 MILLIGRAM(S): at 21:39

## 2020-05-06 RX ADMIN — Medication 2: at 12:11

## 2020-05-06 RX ADMIN — MIRTAZAPINE 7.5 MILLIGRAM(S): 45 TABLET, ORALLY DISINTEGRATING ORAL at 13:56

## 2020-05-06 RX ADMIN — QUETIAPINE FUMARATE 25 MILLIGRAM(S): 200 TABLET, FILM COATED ORAL at 09:17

## 2020-05-06 RX ADMIN — MIRTAZAPINE 7.5 MILLIGRAM(S): 45 TABLET, ORALLY DISINTEGRATING ORAL at 17:04

## 2020-05-06 RX ADMIN — OLANZAPINE 5 MILLIGRAM(S): 15 TABLET, FILM COATED ORAL at 21:38

## 2020-05-06 RX ADMIN — Medication 2: at 17:03

## 2020-05-06 RX ADMIN — MIRTAZAPINE 3.75 MILLIGRAM(S): 45 TABLET, ORALLY DISINTEGRATING ORAL at 09:14

## 2020-05-06 RX ADMIN — OLANZAPINE 2.5 MILLIGRAM(S): 15 TABLET, FILM COATED ORAL at 09:13

## 2020-05-06 RX ADMIN — QUETIAPINE FUMARATE 50 MILLIGRAM(S): 200 TABLET, FILM COATED ORAL at 21:39

## 2020-05-06 RX ADMIN — MIRTAZAPINE 7.5 MILLIGRAM(S): 45 TABLET, ORALLY DISINTEGRATING ORAL at 21:39

## 2020-05-06 RX ADMIN — ENOXAPARIN SODIUM 40 MILLIGRAM(S): 100 INJECTION SUBCUTANEOUS at 12:16

## 2020-05-06 NOTE — PROGRESS NOTE ADULT - ASSESSMENT
64 yr old male with PMHX of DM,dementia sent to ER from facility for seizure and now COVID+,Delirium.  1.Seizure-Neurology re- eval appreciated.  2.COVID +-ID eval noted, supportive treatment.  3.Psych re-eval.  4.DM-Insulin.  5.Delirium-improved.  6.Dementia with behavioral problems-cont current medication with PRN haldol.  7.Replace vit d.  8.GI and DVT prophylaxis.

## 2020-05-06 NOTE — PROGRESS NOTE ADULT - SUBJECTIVE AND OBJECTIVE BOX
CARDIOLOGY/MEDICAL ATTENDING    CHIEF COMPLAINT:Patient is a 64y old  Male who presents with a chief complaint of seizures .Pt awake and alert.    	  REVIEW OF SYSTEMS:  CONSTITUTIONAL: No fever, weight loss, or fatigue  EYES: No eye pain, visual disturbances, or discharge  ENT:  No difficulty hearing, tinnitus, vertigo; No sinus or throat pain  NECK: No pain or stiffness  RESPIRATORY: No cough, wheezing, chills or hemoptysis; No Shortness of Breath  CARDIOVASCULAR: No chest pain, palpitations, passing out, dizziness, or leg swelling  GASTROINTESTINAL: No abdominal or epigastric pain. No nausea, vomiting, or hematemesis; No diarrhea or constipation. No melena or hematochezia.  GENITOURINARY: No dysuria, frequency, hematuria, or incontinence  NEUROLOGICAL: No headaches, memory loss, loss of strength, numbness, or tremors  SKIN: No itching, burning, rashes, or lesions   LYMPH Nodes: No enlarged glands  ENDOCRINE: No heat or cold intolerance; No hair loss  MUSCULOSKELETAL: No joint pain or swelling; No muscle, back, or extremity pain  PSYCHIATRIC: No depression, anxiety, mood swings, or difficulty sleeping  HEME/LYMPH: No easy bruising, or bleeding gums  ALLERGY AND IMMUNOLOGIC: No hives or eczema	        PHYSICAL EXAM:  T(C): 36.3 (05-06-20 @ 08:13), Max: 36.7 (05-05-20 @ 15:57)  HR: 67 (05-06-20 @ 08:13) (67 - 84)  BP: 134/69 (05-06-20 @ 08:13) (97/64 - 134/69)  RR: 16 (05-06-20 @ 08:13) (15 - 18)  SpO2: 97% (05-06-20 @ 08:13) (97% - 99%)        Appearance: Normal	  HEENT:   Normal oral mucosa, PERRL, EOMI	  Lymphatic: No lymphadenopathy  Cardiovascular: Normal S1 S2, No JVD, No murmurs, No edema  Respiratory: Lungs clear to auscultation	  Psychiatry: A & O x 3, Mood & affect appropriate  Gastrointestinal:  Soft, Non-tender, + BS	  Skin: No rashes, No ecchymoses, No cyanosis	  Neurologic: Non-focal  Extremities: Normal range of motion, No clubbing, cyanosis or edema  Vascular: Peripheral pulses palpable 2+ bilaterally    MEDICATIONS  (STANDING):  dextrose 5%. 1000 milliLiter(s) (50 mL/Hr) IV Continuous <Continuous>  dextrose 50% Injectable 12.5 Gram(s) IV Push once  dextrose 50% Injectable 25 Gram(s) IV Push once  dextrose 50% Injectable 25 Gram(s) IV Push once  enoxaparin Injectable 40 milliGRAM(s) SubCutaneous daily  ergocalciferol 63107 Unit(s) Oral every week  insulin lispro (HumaLOG) corrective regimen sliding scale   SubCutaneous three times a day before meals  insulin lispro (HumaLOG) corrective regimen sliding scale   SubCutaneous at bedtime  melatonin 3 milliGRAM(s) Oral at bedtime  mirtazapine 7.5 milliGRAM(s) Oral <User Schedule>  mirtazapine 3.75 milliGRAM(s) Oral <User Schedule>  OLANZapine 2.5 milliGRAM(s) Oral <User Schedule>  OLANZapine 5 milliGRAM(s) Oral <User Schedule>  QUEtiapine 50 milliGRAM(s) Oral <User Schedule>  QUEtiapine 25 milliGRAM(s) Oral <User Schedule>      	  	  LABS:	 	                        14.4   5.17  )-----------( 277      ( 06 May 2020 09:30 )             41.7     05-06    145  |  110<H>  |  23<H>  ----------------------------<  170<H>  3.7   |  28  |  0.70    Ca    9.5      06 May 2020 09:29      proBNP: Serum Pro-Brain Natriuretic Peptide: 126 pg/mL (05-01 @ 09:36)    Lipid Profile: Cholesterol 161  LDL 95  HDL 35        TSH: Thyroid Stimulating Hormone, Serum: 2.39 uU/mL (05-01 @ 09:36)

## 2020-05-07 LAB
ALBUMIN SERPL ELPH-MCNC: 3.7 G/DL — SIGNIFICANT CHANGE UP (ref 3.5–5)
ALP SERPL-CCNC: 78 U/L — SIGNIFICANT CHANGE UP (ref 40–120)
ALT FLD-CCNC: 30 U/L DA — SIGNIFICANT CHANGE UP (ref 10–60)
ANION GAP SERPL CALC-SCNC: 8 MMOL/L — SIGNIFICANT CHANGE UP (ref 5–17)
AST SERPL-CCNC: 20 U/L — SIGNIFICANT CHANGE UP (ref 10–40)
BASOPHILS # BLD AUTO: 0.02 K/UL — SIGNIFICANT CHANGE UP (ref 0–0.2)
BASOPHILS NFR BLD AUTO: 0.3 % — SIGNIFICANT CHANGE UP (ref 0–2)
BILIRUB SERPL-MCNC: 0.8 MG/DL — SIGNIFICANT CHANGE UP (ref 0.2–1.2)
BUN SERPL-MCNC: 26 MG/DL — HIGH (ref 7–18)
CALCIUM SERPL-MCNC: 9.6 MG/DL — SIGNIFICANT CHANGE UP (ref 8.4–10.5)
CHLORIDE SERPL-SCNC: 109 MMOL/L — HIGH (ref 96–108)
CO2 SERPL-SCNC: 28 MMOL/L — SIGNIFICANT CHANGE UP (ref 22–31)
CREAT SERPL-MCNC: 0.79 MG/DL — SIGNIFICANT CHANGE UP (ref 0.5–1.3)
CRP SERPL-MCNC: 2.14 MG/DL — HIGH (ref 0–0.4)
EOSINOPHIL # BLD AUTO: 0.05 K/UL — SIGNIFICANT CHANGE UP (ref 0–0.5)
EOSINOPHIL NFR BLD AUTO: 0.7 % — SIGNIFICANT CHANGE UP (ref 0–6)
FERRITIN SERPL-MCNC: 1075 NG/ML — HIGH (ref 30–400)
GLUCOSE BLDC GLUCOMTR-MCNC: 194 MG/DL — HIGH (ref 70–99)
GLUCOSE BLDC GLUCOMTR-MCNC: 208 MG/DL — HIGH (ref 70–99)
GLUCOSE BLDC GLUCOMTR-MCNC: 212 MG/DL — HIGH (ref 70–99)
GLUCOSE BLDC GLUCOMTR-MCNC: 253 MG/DL — HIGH (ref 70–99)
GLUCOSE SERPL-MCNC: 197 MG/DL — HIGH (ref 70–99)
HCT VFR BLD CALC: 43.1 % — SIGNIFICANT CHANGE UP (ref 39–50)
HGB BLD-MCNC: 15.1 G/DL — SIGNIFICANT CHANGE UP (ref 13–17)
IMM GRANULOCYTES NFR BLD AUTO: 0.4 % — SIGNIFICANT CHANGE UP (ref 0–1.5)
LYMPHOCYTES # BLD AUTO: 0.8 K/UL — LOW (ref 1–3.3)
LYMPHOCYTES # BLD AUTO: 12 % — LOW (ref 13–44)
MAGNESIUM SERPL-MCNC: 1.8 MG/DL — SIGNIFICANT CHANGE UP (ref 1.6–2.6)
MCHC RBC-ENTMCNC: 29.1 PG — SIGNIFICANT CHANGE UP (ref 27–34)
MCHC RBC-ENTMCNC: 35 GM/DL — SIGNIFICANT CHANGE UP (ref 32–36)
MCV RBC AUTO: 83 FL — SIGNIFICANT CHANGE UP (ref 80–100)
MONOCYTES # BLD AUTO: 0.52 K/UL — SIGNIFICANT CHANGE UP (ref 0–0.9)
MONOCYTES NFR BLD AUTO: 7.8 % — SIGNIFICANT CHANGE UP (ref 2–14)
NEUTROPHILS # BLD AUTO: 5.27 K/UL — SIGNIFICANT CHANGE UP (ref 1.8–7.4)
NEUTROPHILS NFR BLD AUTO: 78.8 % — HIGH (ref 43–77)
NRBC # BLD: 0 /100 WBCS — SIGNIFICANT CHANGE UP (ref 0–0)
PHOSPHATE SERPL-MCNC: 3.1 MG/DL — SIGNIFICANT CHANGE UP (ref 2.5–4.5)
PLATELET # BLD AUTO: 296 K/UL — SIGNIFICANT CHANGE UP (ref 150–400)
POTASSIUM SERPL-MCNC: 3.7 MMOL/L — SIGNIFICANT CHANGE UP (ref 3.5–5.3)
POTASSIUM SERPL-SCNC: 3.7 MMOL/L — SIGNIFICANT CHANGE UP (ref 3.5–5.3)
PROT SERPL-MCNC: 8.1 G/DL — SIGNIFICANT CHANGE UP (ref 6–8.3)
RBC # BLD: 5.19 M/UL — SIGNIFICANT CHANGE UP (ref 4.2–5.8)
RBC # FLD: 12 % — SIGNIFICANT CHANGE UP (ref 10.3–14.5)
SODIUM SERPL-SCNC: 145 MMOL/L — SIGNIFICANT CHANGE UP (ref 135–145)
WBC # BLD: 6.69 K/UL — SIGNIFICANT CHANGE UP (ref 3.8–10.5)
WBC # FLD AUTO: 6.69 K/UL — SIGNIFICANT CHANGE UP (ref 3.8–10.5)

## 2020-05-07 RX ADMIN — Medication 2: at 11:31

## 2020-05-07 RX ADMIN — MIRTAZAPINE 3.75 MILLIGRAM(S): 45 TABLET, ORALLY DISINTEGRATING ORAL at 08:57

## 2020-05-07 RX ADMIN — Medication 1: at 08:57

## 2020-05-07 RX ADMIN — OLANZAPINE 5 MILLIGRAM(S): 15 TABLET, FILM COATED ORAL at 21:18

## 2020-05-07 RX ADMIN — MIRTAZAPINE 7.5 MILLIGRAM(S): 45 TABLET, ORALLY DISINTEGRATING ORAL at 13:14

## 2020-05-07 RX ADMIN — OLANZAPINE 2.5 MILLIGRAM(S): 15 TABLET, FILM COATED ORAL at 08:57

## 2020-05-07 RX ADMIN — QUETIAPINE FUMARATE 25 MILLIGRAM(S): 200 TABLET, FILM COATED ORAL at 08:57

## 2020-05-07 RX ADMIN — Medication 1: at 21:19

## 2020-05-07 RX ADMIN — Medication 2: at 17:17

## 2020-05-07 RX ADMIN — MIRTAZAPINE 7.5 MILLIGRAM(S): 45 TABLET, ORALLY DISINTEGRATING ORAL at 17:17

## 2020-05-07 RX ADMIN — QUETIAPINE FUMARATE 50 MILLIGRAM(S): 200 TABLET, FILM COATED ORAL at 21:18

## 2020-05-07 RX ADMIN — ENOXAPARIN SODIUM 40 MILLIGRAM(S): 100 INJECTION SUBCUTANEOUS at 11:31

## 2020-05-07 RX ADMIN — MIRTAZAPINE 7.5 MILLIGRAM(S): 45 TABLET, ORALLY DISINTEGRATING ORAL at 21:18

## 2020-05-07 RX ADMIN — Medication 3 MILLIGRAM(S): at 21:18

## 2020-05-07 NOTE — PROGRESS NOTE ADULT - ASSESSMENT
64 yr old male with PMHX of DM,dementia sent to ER from facility for seizure and now COVID+,Delirium.  1.Seizure-Neurology re- eval appreciated.  2.COVID +supportive treatment.  3.Psych re-eval.  4.DM-Insulin.  5.Delirium-improving.  6.Dementia with behavioral problems-cont current medication.  7.Replace vit d.  8.GI and DVT prophylaxis.

## 2020-05-07 NOTE — DIETITIAN INITIAL EVALUATION ADULT. - ADD RECOMMEND
Add MVI/minerals daily with add Two Mihir HN 1 can BID as medically feasible Add Consistent carbohydrate w/no snack to dysphagia 1 pureed nectar consistency fluids diet & add MVI/minerals daily with add Two Mihir HN 1 can BID as medically feasible Add Consistent carbohydrate w/no snack to dysphagia 1 pureed nectar consistency fluids diet & add MVI/minerals daily with add Two Mihir HN 1 can BID/ 1 packet of "thick up" powder @BID as medically feasible

## 2020-05-07 NOTE — PROGRESS NOTE ADULT - SUBJECTIVE AND OBJECTIVE BOX
CARDIOLOGY/MEDICAL ATTENDING    CHIEF COMPLAINT:Patient is a 64y old  Male who presents with a chief complaint of seizures. Pt appears comfortable.    	  REVIEW OF SYSTEMS:  CONSTITUTIONAL: No fever, weight loss, or fatigue  EYES: No eye pain, visual disturbances, or discharge  ENT:  No difficulty hearing, tinnitus, vertigo; No sinus or throat pain  NECK: No pain or stiffness  RESPIRATORY: No cough, wheezing, chills or hemoptysis; No Shortness of Breath  CARDIOVASCULAR: No chest pain, palpitations, passing out, dizziness, or leg swelling  GASTROINTESTINAL: No abdominal or epigastric pain. No nausea, vomiting, or hematemesis; No diarrhea or constipation. No melena or hematochezia.  GENITOURINARY: No dysuria, frequency, hematuria, or incontinence  NEUROLOGICAL: No headaches, memory loss, loss of strength, numbness, or tremors  SKIN: No itching, burning, rashes, or lesions   LYMPH Nodes: No enlarged glands  ENDOCRINE: No heat or cold intolerance; No hair loss  MUSCULOSKELETAL: No joint pain or swelling; No muscle, back, or extremity pain  PSYCHIATRIC: No depression, anxiety, mood swings, or difficulty sleeping  HEME/LYMPH: No easy bruising, or bleeding gums  ALLERGY AND IMMUNOLOGIC: No hives or eczema	        PHYSICAL EXAM:  T(C): 36.6 (05-07-20 @ 08:31), Max: 36.7 (05-06-20 @ 16:09)  HR: 89 (05-07-20 @ 08:31) (76 - 89)  BP: 110/75 (05-07-20 @ 08:31) (110/75 - 151/85)  RR: 18 (05-07-20 @ 08:31) (16 - 18)  SpO2: 98% (05-07-20 @ 08:31) (98% - 99%)  Wt(kg): --  I&O's Summary      Appearance: Normal	  HEENT:   Normal oral mucosa, PERRL, EOMI	  Lymphatic: No lymphadenopathy  Cardiovascular: Normal S1 S2, No JVD, No murmurs, No edema  Respiratory: Lungs clear to auscultation	  Psychiatry: A & O x 3, Mood & affect appropriate  Gastrointestinal:  Soft, Non-tender, + BS	  Skin: No rashes, No ecchymoses, No cyanosis	  Neurologic: Non-focal  Extremities: Normal range of motion, No clubbing, cyanosis or edema  Vascular: Peripheral pulses palpable 2+ bilaterally    MEDICATIONS  (STANDING):  dextrose 5%. 1000 milliLiter(s) (50 mL/Hr) IV Continuous <Continuous>  dextrose 50% Injectable 12.5 Gram(s) IV Push once  dextrose 50% Injectable 25 Gram(s) IV Push once  dextrose 50% Injectable 25 Gram(s) IV Push once  enoxaparin Injectable 40 milliGRAM(s) SubCutaneous daily  ergocalciferol 70313 Unit(s) Oral every week  insulin lispro (HumaLOG) corrective regimen sliding scale   SubCutaneous three times a day before meals  insulin lispro (HumaLOG) corrective regimen sliding scale   SubCutaneous at bedtime  melatonin 3 milliGRAM(s) Oral at bedtime  mirtazapine 7.5 milliGRAM(s) Oral <User Schedule>  mirtazapine 3.75 milliGRAM(s) Oral <User Schedule>  OLANZapine 2.5 milliGRAM(s) Oral <User Schedule>  OLANZapine 5 milliGRAM(s) Oral <User Schedule>  QUEtiapine 50 milliGRAM(s) Oral <User Schedule>  QUEtiapine 25 milliGRAM(s) Oral <User Schedule>      	  	  LABS:	 	                        15.1   6.69  )-----------( 296      ( 07 May 2020 08:34 )             43.1     05-07    145  |  109<H>  |  26<H>  ----------------------------<  197<H>  3.7   |  28  |  0.79    Ca    9.6      07 May 2020 08:34  Phos  3.1     05-07  Mg     1.8     05-07    TPro  8.1  /  Alb  3.7  /  TBili  0.8  /  DBili  x   /  AST  20  /  ALT  30  /  AlkPhos  78  05-07    proBNP: Serum Pro-Brain Natriuretic Peptide: 126 pg/mL (05-01 @ 09:36)    Lipid Profile: Cholesterol 161  LDL 95  HDL 35        TSH: Thyroid Stimulating Hormone, Serum: 2.39 uU/mL (05-01 @ 09:36)

## 2020-05-07 NOTE — DIETITIAN INITIAL EVALUATION ADULT. - DIET TYPE
PTA - Assisted living diet - No concentrate sweets. Nursing to continue feeding assistance and encouragement, aspiration precaution

## 2020-05-07 NOTE — DIETITIAN INITIAL EVALUATION ADULT. - FACTORS AFF FOOD INTAKE
change in mental status/difficulty chewing/difficulty swallowing/other (specify)/convulsions, seizure, dementia, diabetes,

## 2020-05-07 NOTE — DIETITIAN INITIAL EVALUATION ADULT. - PERTINENT LABORATORY DATA
05-07 Na145 mmol/L Glu 197 mg/dL<H> K+ 3.7 mmol/L Cr  0.79 mg/dL BUN 26 mg/dL<H> 05-07 Phos 3.1 mg/dL 05-07 Alb 3.7 g/dL 05-01 Chol 161 mg/dL LDL 95 mg/dL HDL 35 mg/dL<L> Trig 153 mg/dL<H>

## 2020-05-07 NOTE — DIETITIAN INITIAL EVALUATION ADULT. - PERTINENT MEDS FT
MEDICATIONS  (STANDING):  dextrose 5%. 1000 milliLiter(s) (50 mL/Hr) IV Continuous <Continuous>  dextrose 50% Injectable 12.5 Gram(s) IV Push once  dextrose 50% Injectable 25 Gram(s) IV Push once  dextrose 50% Injectable 25 Gram(s) IV Push once  enoxaparin Injectable 40 milliGRAM(s) SubCutaneous daily  ergocalciferol 62058 Unit(s) Oral every week  insulin lispro (HumaLOG) corrective regimen sliding scale   SubCutaneous three times a day before meals  insulin lispro (HumaLOG) corrective regimen sliding scale   SubCutaneous at bedtime  melatonin 3 milliGRAM(s) Oral at bedtime  mirtazapine 7.5 milliGRAM(s) Oral <User Schedule>  mirtazapine 3.75 milliGRAM(s) Oral <User Schedule>  OLANZapine 2.5 milliGRAM(s) Oral <User Schedule>  OLANZapine 5 milliGRAM(s) Oral <User Schedule>  QUEtiapine 50 milliGRAM(s) Oral <User Schedule>  QUEtiapine 25 milliGRAM(s) Oral <User Schedule>    MEDICATIONS  (PRN):  acetaminophen  Suppository .. 650 milliGRAM(s) Rectal every 6 hours PRN Temp greater or equal to 38C (100.4F)  ALBUTerol    90 MICROgram(s) HFA Inhaler 2 Puff(s) Inhalation every 6 hours PRN Shortness of Breath and/or Wheezing  dextrose 40% Gel 15 Gram(s) Oral once PRN Blood Glucose LESS THAN 70 milliGRAM(s)/deciliter  glucagon  Injectable 1 milliGRAM(s) IntraMuscular once PRN Glucose LESS THAN 70 milligrams/deciliter  guaiFENesin   Syrup  (Sugar-Free) 100 milliGRAM(s) Oral every 6 hours PRN Cough  haloperidol    Injectable 1 milliGRAM(s) IV Push every 4 hours PRN agitation  haloperidol    Injectable 1 milliGRAM(s) IV Push <User Schedule> PRN refusal of PO Zyprexa 2.5mg @9am  haloperidol    Injectable 2 milliGRAM(s) IV Push <User Schedule> PRN refusal of PO Zyprexa 5mg @9pm

## 2020-05-07 NOTE — DIETITIAN INITIAL EVALUATION ADULT. - OTHER INFO
Patient from [facility] assisted living. Pt. on airborne precaution in isolation - COVID-19+, unable to see, d/w PCA, pt. confused & agitated on restraints, po intake fair, consuming ~40%, needs encouragement, seen by Speech & Swallow on 5/1/20 & recommendation noted, followed by Neurology, awaiting Psych re-evaluation, on inhaler, skin intact.

## 2020-05-07 NOTE — DIETITIAN INITIAL EVALUATION ADULT. - PROBLEM SELECTOR PLAN 2
- p/w seizures  - CXR : Clear lungs   - no Leukocytosis  - lymphocyte; WNl   - pt is afebrile  - O2 Sat on RA 98%   - will differ on Abs for now  - Also Will rule out covid 19; testing : contact and airborne isolation precaution , Aspiration Precautions .  - c/w Albuterol Inhaler  - Supportive care , AntPyeretic Tylenol PRN  and anti-tussives Robitussin PRN , Supplemental O2 via nasal cannula if needed  - f/u Procalcitonin  - f/u Coags , D-Dimers , ESR , CRP , LDH, ferritin , Lactate , Cardiac enzymes , G6PD   ** (please get CRP daily and get ESR , D-Dimers , LDH, ferritin , Cardiac enzymes , Coags every 3 days if COVID +ve )

## 2020-05-08 LAB
ALBUMIN SERPL ELPH-MCNC: 3.5 G/DL — SIGNIFICANT CHANGE UP (ref 3.5–5)
ALP SERPL-CCNC: 79 U/L — SIGNIFICANT CHANGE UP (ref 40–120)
ALT FLD-CCNC: 32 U/L DA — SIGNIFICANT CHANGE UP (ref 10–60)
ANION GAP SERPL CALC-SCNC: 5 MMOL/L — SIGNIFICANT CHANGE UP (ref 5–17)
AST SERPL-CCNC: 25 U/L — SIGNIFICANT CHANGE UP (ref 10–40)
BILIRUB SERPL-MCNC: 0.8 MG/DL — SIGNIFICANT CHANGE UP (ref 0.2–1.2)
BUN SERPL-MCNC: 29 MG/DL — HIGH (ref 7–18)
CALCIUM SERPL-MCNC: 9.2 MG/DL — SIGNIFICANT CHANGE UP (ref 8.4–10.5)
CHLORIDE SERPL-SCNC: 110 MMOL/L — HIGH (ref 96–108)
CO2 SERPL-SCNC: 30 MMOL/L — SIGNIFICANT CHANGE UP (ref 22–31)
CREAT SERPL-MCNC: 0.88 MG/DL — SIGNIFICANT CHANGE UP (ref 0.5–1.3)
GLUCOSE BLDC GLUCOMTR-MCNC: 196 MG/DL — HIGH (ref 70–99)
GLUCOSE BLDC GLUCOMTR-MCNC: 208 MG/DL — HIGH (ref 70–99)
GLUCOSE BLDC GLUCOMTR-MCNC: 215 MG/DL — HIGH (ref 70–99)
GLUCOSE BLDC GLUCOMTR-MCNC: 252 MG/DL — HIGH (ref 70–99)
GLUCOSE SERPL-MCNC: 222 MG/DL — HIGH (ref 70–99)
HCT VFR BLD CALC: 43.7 % — SIGNIFICANT CHANGE UP (ref 39–50)
HGB BLD-MCNC: 15 G/DL — SIGNIFICANT CHANGE UP (ref 13–17)
MCHC RBC-ENTMCNC: 28.8 PG — SIGNIFICANT CHANGE UP (ref 27–34)
MCHC RBC-ENTMCNC: 34.3 GM/DL — SIGNIFICANT CHANGE UP (ref 32–36)
MCV RBC AUTO: 83.9 FL — SIGNIFICANT CHANGE UP (ref 80–100)
NRBC # BLD: 0 /100 WBCS — SIGNIFICANT CHANGE UP (ref 0–0)
PLATELET # BLD AUTO: 285 K/UL — SIGNIFICANT CHANGE UP (ref 150–400)
POTASSIUM SERPL-MCNC: 3.3 MMOL/L — LOW (ref 3.5–5.3)
POTASSIUM SERPL-SCNC: 3.3 MMOL/L — LOW (ref 3.5–5.3)
PROT SERPL-MCNC: 7.8 G/DL — SIGNIFICANT CHANGE UP (ref 6–8.3)
RBC # BLD: 5.21 M/UL — SIGNIFICANT CHANGE UP (ref 4.2–5.8)
RBC # FLD: 12 % — SIGNIFICANT CHANGE UP (ref 10.3–14.5)
SODIUM SERPL-SCNC: 145 MMOL/L — SIGNIFICANT CHANGE UP (ref 135–145)
WBC # BLD: 9.8 K/UL — SIGNIFICANT CHANGE UP (ref 3.8–10.5)
WBC # FLD AUTO: 9.8 K/UL — SIGNIFICANT CHANGE UP (ref 3.8–10.5)

## 2020-05-08 PROCEDURE — 99233 SBSQ HOSP IP/OBS HIGH 50: CPT

## 2020-05-08 RX ORDER — POTASSIUM CHLORIDE 20 MEQ
40 PACKET (EA) ORAL EVERY 4 HOURS
Refills: 0 | Status: COMPLETED | OUTPATIENT
Start: 2020-05-08 | End: 2020-05-08

## 2020-05-08 RX ORDER — METFORMIN HYDROCHLORIDE 850 MG/1
1000 TABLET ORAL
Refills: 0 | Status: DISCONTINUED | OUTPATIENT
Start: 2020-05-08 | End: 2020-05-14

## 2020-05-08 RX ADMIN — Medication 1: at 21:18

## 2020-05-08 RX ADMIN — Medication 3 MILLIGRAM(S): at 21:19

## 2020-05-08 RX ADMIN — ENOXAPARIN SODIUM 40 MILLIGRAM(S): 100 INJECTION SUBCUTANEOUS at 11:51

## 2020-05-08 RX ADMIN — METFORMIN HYDROCHLORIDE 1000 MILLIGRAM(S): 850 TABLET ORAL at 17:03

## 2020-05-08 RX ADMIN — Medication 2: at 17:04

## 2020-05-08 RX ADMIN — Medication 1: at 11:51

## 2020-05-08 RX ADMIN — MIRTAZAPINE 7.5 MILLIGRAM(S): 45 TABLET, ORALLY DISINTEGRATING ORAL at 17:03

## 2020-05-08 RX ADMIN — Medication 40 MILLIEQUIVALENT(S): at 17:04

## 2020-05-08 RX ADMIN — OLANZAPINE 5 MILLIGRAM(S): 15 TABLET, FILM COATED ORAL at 21:19

## 2020-05-08 RX ADMIN — Medication 40 MILLIEQUIVALENT(S): at 13:58

## 2020-05-08 RX ADMIN — MIRTAZAPINE 3.75 MILLIGRAM(S): 45 TABLET, ORALLY DISINTEGRATING ORAL at 08:10

## 2020-05-08 RX ADMIN — MIRTAZAPINE 7.5 MILLIGRAM(S): 45 TABLET, ORALLY DISINTEGRATING ORAL at 13:58

## 2020-05-08 RX ADMIN — MIRTAZAPINE 7.5 MILLIGRAM(S): 45 TABLET, ORALLY DISINTEGRATING ORAL at 21:19

## 2020-05-08 RX ADMIN — Medication 2: at 08:27

## 2020-05-08 RX ADMIN — QUETIAPINE FUMARATE 25 MILLIGRAM(S): 200 TABLET, FILM COATED ORAL at 08:10

## 2020-05-08 RX ADMIN — OLANZAPINE 2.5 MILLIGRAM(S): 15 TABLET, FILM COATED ORAL at 08:10

## 2020-05-08 RX ADMIN — QUETIAPINE FUMARATE 50 MILLIGRAM(S): 200 TABLET, FILM COATED ORAL at 21:19

## 2020-05-08 NOTE — PROGRESS NOTE ADULT - SUBJECTIVE AND OBJECTIVE BOX
No seizures or other neurological events overnight.  Neuro exam is limited due to poor patient cooperation. Global aphasia, Hypomimia, Mild rigidity in b/l UE, Moves all 4 extremities in plane of bed, Withdraws all 4 extremities to pinprick    Dx: 64M with dementia and possible convulsive syncope, uncertain seizure    Recs:  - No indication for antiepileptic medications now. If new clinical seizure occurs, treat with lorazepam 2-4mg IV x 1, then start valproate to cover for seizures and behavioral disturbances: 20-40 mg/kg IV x 1, valproic acid level, and then valproate 10-20mg IV BID for maintenance  - Minimize use of PRN haloperidol to minimize the risk for developing extrapyramidal symptoms  - Plentiful fluid hydration and caution with position changes  - PT/OT as tolerated      NOTE TO BE COMPLETED - PLEASE REFER TO ABOVE ONLY AND IGNORE INFORMATION BELOW    Neurology Follow up note    Name  JAZIEL LOPEZ    HPI:  64 year old male from [facility]  with PMH dementia and DM coming in with seizures like activity while sitting at AL. pt unable to provide further history (poor historian) . denies all complaints at this time. NKDA     In ED :   EKG : Sinus rythm with PVCs   Afebrile, no WBC elevation  normal electrolytes and renal functions   Lactate 1.7  CT head : unremarkable CT study of the brain. No acute abnormality suggested.  CXR : Clear lungs   no Leukocytosis  lymphocyte; WNl   pt is afebrile  O2 Sat on RA 98%     ****Morning team to contact Assisted Living for home med rec and GOC ****   Full code for now (01 May 2020 03:13)      Interval History -        Subjective:        MEDICATIONS  (STANDING):  dextrose 5%. 1000 milliLiter(s) (50 mL/Hr) IV Continuous <Continuous>  dextrose 50% Injectable 12.5 Gram(s) IV Push once  dextrose 50% Injectable 25 Gram(s) IV Push once  dextrose 50% Injectable 25 Gram(s) IV Push once  enoxaparin Injectable 40 milliGRAM(s) SubCutaneous daily  ergocalciferol 14945 Unit(s) Oral every week  insulin lispro (HumaLOG) corrective regimen sliding scale   SubCutaneous three times a day before meals  insulin lispro (HumaLOG) corrective regimen sliding scale   SubCutaneous at bedtime  melatonin 3 milliGRAM(s) Oral at bedtime  metFORMIN 1000 milliGRAM(s) Oral two times a day with meals  mirtazapine 7.5 milliGRAM(s) Oral <User Schedule>  mirtazapine 3.75 milliGRAM(s) Oral <User Schedule>  OLANZapine 2.5 milliGRAM(s) Oral <User Schedule>  OLANZapine 5 milliGRAM(s) Oral <User Schedule>  QUEtiapine 50 milliGRAM(s) Oral <User Schedule>  QUEtiapine 25 milliGRAM(s) Oral <User Schedule>    MEDICATIONS  (PRN):  acetaminophen  Suppository .. 650 milliGRAM(s) Rectal every 6 hours PRN Temp greater or equal to 38C (100.4F)  ALBUTerol    90 MICROgram(s) HFA Inhaler 2 Puff(s) Inhalation every 6 hours PRN Shortness of Breath and/or Wheezing  dextrose 40% Gel 15 Gram(s) Oral once PRN Blood Glucose LESS THAN 70 milliGRAM(s)/deciliter  glucagon  Injectable 1 milliGRAM(s) IntraMuscular once PRN Glucose LESS THAN 70 milligrams/deciliter  guaiFENesin   Syrup  (Sugar-Free) 100 milliGRAM(s) Oral every 6 hours PRN Cough  haloperidol    Injectable 1 milliGRAM(s) IV Push every 4 hours PRN agitation  haloperidol    Injectable 1 milliGRAM(s) IV Push <User Schedule> PRN refusal of PO Zyprexa 2.5mg @9am  haloperidol    Injectable 2 milliGRAM(s) IV Push <User Schedule> PRN refusal of PO Zyprexa 5mg @9pm      Allergies    No Known Allergies    Intolerances        Review of Systems:  General: [ ] None, [ ] chills, [ ]fatigue, [ ] fevers  Skin: [ ] None, [ ] rash   HEENT: [ ] None, [ ] head injury, [ ] blurred vision, [ ] double vision, [ ] eye pain, [ ] visual loss, [ ] hearing loss, [ ] deafness, [ ] ear pain, [ ] ringing in the ears, [ ] vertigo, [ ] sinus pain, [ ] voice changes  Neck: [ ] None, [ ] neck stiffness  Respiratory: [ ] None, [ ] cough, [ ] difficulty breathing  Cardiovascular: [ ] None, [ ] calf cramps, [ ] chest pain, [ ] leg pain, [ ] swelling, [ ] rapid heart rate, [ ] shortness of breath  Gastrointestinal: [ ] None, [ ] abdominal pain, [ ] nausea, [ ] vomiting  Musculoskeletal: [ ] None, [ ] back pain, [ ] joint pain, [ ] joint stiffness, [ ] leg cramps, [ ] muscle atrophy, [ ] muscle cramps, [ ] muscle weakness, [ ] swelling of extremities  Neurological: [ ] None, [ ] Dizziness, [ ] decreased memory, [ ] fainting, [ ] focal neurological symptoms, [ ] headaches, [ ] incontinence of stool, [ ] incontinence of urine, [ ] loss of consciousness, [ ] numbness, [ ] seizures, [ ] spinning sensation, [ ] stroke, [ ] trouble walking, [ ] unsteadiness, [ ] visual changes, [ ] weakness  Psychiatric: [ ] None,  [ ] depression, [ ] anxiety, [ ] hallucinations, [ ] inability to concentrate, [ ] mood changes, [ ] panic attacks  Hematology: [ ] None,  [ ] blood clots, [ ] spontaneous bleeding      Objective:   Vital Signs Last 24 Hrs  T(C): 36.3 (08 May 2020 15:59), Max: 36.6 (07 May 2020 23:56)  T(F): 97.4 (08 May 2020 15:59), Max: 97.9 (07 May 2020 23:56)  HR: 63 (08 May 2020 15:59) (62 - 65)  BP: 123/80 (08 May 2020 15:59) (117/76 - 123/92)  BP(mean): --  RR: 18 (08 May 2020 15:59) (15 - 18)  SpO2: 99% (08 May 2020 15:59) (98% - 99%)    General Exam:   General appearance: No acute distress                 Cardiovascular: Pedal dorsalis pulses intact bilaterally    Neurological Exam:  Mental Status: Orientated to self, date and place.  Attention intact.  No dysarthria, aphasia or neglect.  Knowledge intact.  Registration intact.  Short and long term memory grossly intact.      Cranial Nerves: CN I - not tested.  PERRL, EOMI, VFF, no nystagmus or diplopia.  No APD.  Fundi not visualized bilaterally.  CN V1-3 intact to light touch and pinprick.  No facial asymmetry.  Hearing intact to finger rub bilaterally.  Tongue, uvula and palate midline.  Sternocleidomastoid and Trapezius intact bilaterally.    Motor:   Tone: normal.                  Strength: intact throughout  Pronator drift: none                 Dysmeria: None to finger-nose-finger or heel-shin-heel  No truncal ataxia.    Tremor: No resting, postural or action tremor.  No myoclonus.    Sensation: intact to light touch, pinprick, vibration and proprioception    Deep Tendon Reflexes: 1+ bilateral biceps, triceps, brachioradialis, knee and ankle  Toes flexor bilaterally    Gait: normal and stable.      Other:    05-08    145  |  110<H>  |  29<H>  ----------------------------<  222<H>  3.3<L>   |  30  |  0.88    Ca    9.2      08 May 2020 08:18  Phos  3.1     05-07  Mg     1.8     05-07    TPro  7.8  /  Alb  3.5  /  TBili  0.8  /  DBili  x   /  AST  25  /  ALT  32  /  AlkPhos  79  05-08    05-08    145  |  110<H>  |  29<H>  ----------------------------<  222<H>  3.3<L>   |  30  |  0.88    Ca    9.2      08 May 2020 08:18  Phos  3.1     05-07  Mg     1.8     05-07    TPro  7.8  /  Alb  3.5  /  TBili  0.8  /  DBili  x   /  AST  25  /  ALT  32  /  AlkPhos  79  05-08    LIVER FUNCTIONS - ( 08 May 2020 08:18 )  Alb: 3.5 g/dL / Pro: 7.8 g/dL / ALK PHOS: 79 U/L / ALT: 32 U/L DA / AST: 25 U/L / GGT: x             Radiology    EKG:  tele:  TTE:  EEG:                 Please contact the Neurology consult service with any questions.    Melchor Ford MD   of Neurology  [facility] [employer] [facility] at [facility] Neurology Follow up note    Name  JAZIEL LOPEZ    HPI:  64 year old male from [facility]  with PMH dementia and DM coming in with seizures like activity while sitting at AL. pt unable to provide further history (poor historian), denies all complaints at this time. NKDA   In ED :   EKG : Sinus rythm with PVCs   Afebrile, no WBC elevation  normal electrolytes and renal functions   Lactate 1.7  CT head : unremarkable CT study of the brain. No acute abnormality suggested.  CXR : Clear lungs   no Leukocytosis  lymphocyte; WNl   pt is afebrile  O2 Sat on RA 98%     NEURO HPI:  64 year old male from [facility]  with PMH dementia and DM coming in with seizures like activity while sitting at AL. Description of the seizure is not available.  It is unclear if the patient has had prior seizures as patient is unable to provide further history (poor historian) . Denies all complaints at this time.    Interval History -  The patient has had no seizures or other neurological events overnight.    MEDICATIONS  (STANDING):  dextrose 5%. 1000 milliLiter(s) (50 mL/Hr) IV Continuous <Continuous>  dextrose 50% Injectable 12.5 Gram(s) IV Push once  dextrose 50% Injectable 25 Gram(s) IV Push once  dextrose 50% Injectable 25 Gram(s) IV Push once  enoxaparin Injectable 40 milliGRAM(s) SubCutaneous daily  ergocalciferol 99515 Unit(s) Oral every week  insulin lispro (HumaLOG) corrective regimen sliding scale   SubCutaneous three times a day before meals  insulin lispro (HumaLOG) corrective regimen sliding scale   SubCutaneous at bedtime  melatonin 3 milliGRAM(s) Oral at bedtime  metFORMIN 1000 milliGRAM(s) Oral two times a day with meals  mirtazapine 7.5 milliGRAM(s) Oral <User Schedule>  mirtazapine 3.75 milliGRAM(s) Oral <User Schedule>  OLANZapine 2.5 milliGRAM(s) Oral <User Schedule>  OLANZapine 5 milliGRAM(s) Oral <User Schedule>  QUEtiapine 50 milliGRAM(s) Oral <User Schedule>  QUEtiapine 25 milliGRAM(s) Oral <User Schedule>    MEDICATIONS  (PRN):  acetaminophen  Suppository .. 650 milliGRAM(s) Rectal every 6 hours PRN Temp greater or equal to 38C (100.4F)  ALBUTerol    90 MICROgram(s) HFA Inhaler 2 Puff(s) Inhalation every 6 hours PRN Shortness of Breath and/or Wheezing  dextrose 40% Gel 15 Gram(s) Oral once PRN Blood Glucose LESS THAN 70 milliGRAM(s)/deciliter  glucagon  Injectable 1 milliGRAM(s) IntraMuscular once PRN Glucose LESS THAN 70 milligrams/deciliter  guaiFENesin   Syrup  (Sugar-Free) 100 milliGRAM(s) Oral every 6 hours PRN Cough  haloperidol    Injectable 1 milliGRAM(s) IV Push every 4 hours PRN agitation  haloperidol    Injectable 1 milliGRAM(s) IV Push <User Schedule> PRN refusal of PO Zyprexa 2.5mg @9am  haloperidol    Injectable 2 milliGRAM(s) IV Push <User Schedule> PRN refusal of PO Zyprexa 5mg @9pm    Allergies  No Known Allergies    Review of Systems: Fourteen systems reviewed and negative or unobtainable except as in HPI / Interval History      Objective:   Vital Signs Last 24 Hrs  T(C): 36.3 (08 May 2020 15:59), Max: 36.6 (07 May 2020 23:56)  T(F): 97.4 (08 May 2020 15:59), Max: 97.9 (07 May 2020 23:56)  HR: 63 (08 May 2020 15:59) (62 - 65)  BP: 123/80 (08 May 2020 15:59) (117/76 - 123/92)  RR: 18 (08 May 2020 15:59) (15 - 18)  SpO2: 99% (08 May 2020 15:59) (98% - 99%)    Neuro exam is limited due to poor patient cooperation. Global aphasia, Hypomimia, Mild rigidity in b/l UE, Moves all 4 extremities in plane of bed, Withdraws all 4 extremities to pinprick    General Exam:  General: No apparent acute distress, Not agitated  Respiratory: Diminished breath sound b/l  Cardiovascular: RRR, Full b/l radial and pedal pulses    Neurological Exam:  General / Mental Status: Awake, Not oriented.  Global aphasia present; unable to assess for dysarthria.  Does not follow commands.  Neurological exam is limited.  Cranial Nerves: PERRL, Blink to threat absent b/l, Does not track finger movements with eyes b/l.  Grimaces response to pinprick at b/l V1-V3.  No response to snap at b/l ears.  Hypomimia present, but with no apparent facial asymmetry.  Midline palate and tongue.  No resistance to passive motion of neck b/l.  Motor: Diminished bulk and normal tone in all four extremities.  Spontaneous movements of all four extremities occur in the plane of the bed, without apparent focal weakness.  Mild rigidity present in b/l upper extremities.  The patient does not participate in formal muscle strength testing.  Sensation: Withdraws to pinprick in all four extremities.  Reflexes: 1+ and symmetric at b/l biceps, triceps, brachioradialis, patellae, and ankles.  Toes mute b/l.  Unable to assess coordination, Romberg sign, or gait in uncooperative patient.      Labs:    05-08    145  |  110<H>  |  29<H>  ----------------------------<  222<H>  3.3<L>   |  30  |  0.88    Ca    9.2      08 May 2020 08:18  Phos  3.1     05-07  Mg     1.8     05-07    TPro  7.8  /  Alb  3.5  /  TBili  0.8  /  DBili  x   /  AST  25  /  ALT  32  /  AlkPhos  79  05-08    Vitamin B12, Serum in AM (05.02.20 @ 10:36)    Vitamin B12, Serum: 378 pg/mL    Folate, Serum (05.01.20 @ 13:56)    Folate, Serum: 19.2 ng/mL    Thyroid Stimulating Hormone, Serum (05.01.20 @ 09:36)    Thyroid Stimulating Hormone, Serum: 2.39 uU/mL      Neuroimaging:    CT Head (5/1/0):  - No acute intracranial abnormality  - Mild chronic microvascular disease  - Mild central atrophy      Assessment:  64M with convulsive syncope, less likely new-onset seizure, in setting of COVID-19 infection and early-onset Alzheimer's dementia      Recommendations:    - No indication for antiepileptic medications now. If new clinical seizure occurs, treat with lorazepam 2-4mg IV x 1, then start valproate to cover for seizures and behavioral disturbances: 20-40 mg/kg IV x 1, valproic acid level, and then valproate 10-20mg IV BID for maintenance    - Minimize use of PRN haloperidol to minimize the risk for developing extrapyramidal symptoms    - Plentiful fluid hydration and caution with position changes    - PT/OT as tolerated      Please contact the Neurology consult service with any questions.    Melchor Ford MD   of Neurology  [facility] at [facility] Neurology Follow up note    Name  JAZIEL LOPEZ    HPI:  64 year old male from [facility]  with PMH dementia and DM coming in with seizures like activity while sitting at AL. pt unable to provide further history (poor historian), denies all complaints at this time. NKDA   In ED :   EKG : Sinus rythm with PVCs   Afebrile, no WBC elevation  normal electrolytes and renal functions   Lactate 1.7  CT head : unremarkable CT study of the brain. No acute abnormality suggested.  CXR : Clear lungs   no Leukocytosis  lymphocyte; WNl   pt is afebrile  O2 Sat on RA 98%     NEURO HPI:  64 year old male from [facility]  with PMH dementia and DM coming in with seizures like activity while sitting at AL. Description of the seizure is not available.  It is unclear if the patient has had prior seizures as patient is unable to provide further history (poor historian) . Denies all complaints at this time.    Interval History -  The patient has had no seizures or other neurological events overnight.    MEDICATIONS  (STANDING):  dextrose 5%. 1000 milliLiter(s) (50 mL/Hr) IV Continuous <Continuous>  dextrose 50% Injectable 12.5 Gram(s) IV Push once  dextrose 50% Injectable 25 Gram(s) IV Push once  dextrose 50% Injectable 25 Gram(s) IV Push once  enoxaparin Injectable 40 milliGRAM(s) SubCutaneous daily  ergocalciferol 92595 Unit(s) Oral every week  insulin lispro (HumaLOG) corrective regimen sliding scale   SubCutaneous three times a day before meals  insulin lispro (HumaLOG) corrective regimen sliding scale   SubCutaneous at bedtime  melatonin 3 milliGRAM(s) Oral at bedtime  metFORMIN 1000 milliGRAM(s) Oral two times a day with meals  mirtazapine 7.5 milliGRAM(s) Oral <User Schedule>  mirtazapine 3.75 milliGRAM(s) Oral <User Schedule>  OLANZapine 2.5 milliGRAM(s) Oral <User Schedule>  OLANZapine 5 milliGRAM(s) Oral <User Schedule>  QUEtiapine 50 milliGRAM(s) Oral <User Schedule>  QUEtiapine 25 milliGRAM(s) Oral <User Schedule>    MEDICATIONS  (PRN):  acetaminophen  Suppository .. 650 milliGRAM(s) Rectal every 6 hours PRN Temp greater or equal to 38C (100.4F)  ALBUTerol    90 MICROgram(s) HFA Inhaler 2 Puff(s) Inhalation every 6 hours PRN Shortness of Breath and/or Wheezing  dextrose 40% Gel 15 Gram(s) Oral once PRN Blood Glucose LESS THAN 70 milliGRAM(s)/deciliter  glucagon  Injectable 1 milliGRAM(s) IntraMuscular once PRN Glucose LESS THAN 70 milligrams/deciliter  guaiFENesin   Syrup  (Sugar-Free) 100 milliGRAM(s) Oral every 6 hours PRN Cough  haloperidol    Injectable 1 milliGRAM(s) IV Push every 4 hours PRN agitation  haloperidol    Injectable 1 milliGRAM(s) IV Push <User Schedule> PRN refusal of PO Zyprexa 2.5mg @9am  haloperidol    Injectable 2 milliGRAM(s) IV Push <User Schedule> PRN refusal of PO Zyprexa 5mg @9pm    Allergies  No Known Allergies    Review of Systems: Fourteen systems reviewed and negative or unobtainable except as in HPI / Interval History      Objective:   Vital Signs Last 24 Hrs  T(C): 36.3 (08 May 2020 15:59), Max: 36.6 (07 May 2020 23:56)  T(F): 97.4 (08 May 2020 15:59), Max: 97.9 (07 May 2020 23:56)  HR: 63 (08 May 2020 15:59) (62 - 65)  BP: 123/80 (08 May 2020 15:59) (117/76 - 123/92)  RR: 18 (08 May 2020 15:59) (15 - 18)  SpO2: 99% (08 May 2020 15:59) (98% - 99%)    General Exam:  General: No apparent acute distress, Not agitated  Respiratory: Diminished breath sound b/l  Cardiovascular: RRR, Full b/l radial and pedal pulses    Neurological Exam:  General / Mental Status: Awake, Not oriented.  Global aphasia present; unable to assess for dysarthria.  Does not follow commands.  Neurological exam is limited.  Cranial Nerves: PERRL, Blink to threat absent b/l, Does not track finger movements with eyes b/l.  Grimaces response to pinprick at b/l V1-V3.  No response to snap at b/l ears.  Hypomimia present, but with no apparent facial asymmetry.  Midline palate and tongue.  No resistance to passive motion of neck b/l.  Motor: Diminished bulk and normal tone in all four extremities.  Spontaneous movements of all four extremities occur in the plane of the bed, without apparent focal weakness.  Mild rigidity present in b/l upper extremities.  The patient does not participate in formal muscle strength testing.  Sensation: Withdraws to pinprick in all four extremities.  Reflexes: 1+ and symmetric at b/l biceps, triceps, brachioradialis, patellae, and ankles.  Toes mute b/l.  Unable to assess coordination, Romberg sign, or gait in uncooperative patient.      Labs:    05-08    145  |  110<H>  |  29<H>  ----------------------------<  222<H>  3.3<L>   |  30  |  0.88    Ca    9.2      08 May 2020 08:18  Phos  3.1     05-07  Mg     1.8     05-07    TPro  7.8  /  Alb  3.5  /  TBili  0.8  /  DBili  x   /  AST  25  /  ALT  32  /  AlkPhos  79  05-08    Vitamin B12, Serum in AM (05.02.20 @ 10:36)    Vitamin B12, Serum: 378 pg/mL    Folate, Serum (05.01.20 @ 13:56)    Folate, Serum: 19.2 ng/mL    Thyroid Stimulating Hormone, Serum (05.01.20 @ 09:36)    Thyroid Stimulating Hormone, Serum: 2.39 uU/mL      Neuroimaging:    CT Head (5/1/0):  - No acute intracranial abnormality  - Mild chronic microvascular disease  - Mild central atrophy      Assessment:  64M with convulsive syncope, less likely new-onset seizure, in setting of COVID-19 infection and early-onset Alzheimer's dementia      Recommendations:    - No indication for antiepileptic medications now. If new clinical seizure occurs, treat with lorazepam 2-4mg IV x 1, then start valproate to cover for seizures and behavioral disturbances: 20-40 mg/kg IV x 1, valproic acid level, and then valproate 10-20 mg/kg IV BID for maintenance    - Minimize use of PRN haloperidol to minimize the risk for developing extrapyramidal symptoms    - Plentiful fluid hydration and caution with position changes    - PT/OT as tolerated      Please contact the Neurology consult service with any questions.    Melchor Ford MD   of Neurology  [facility] at [facility]

## 2020-05-08 NOTE — PROGRESS NOTE ADULT - SUBJECTIVE AND OBJECTIVE BOX
CHIEF COMPLAINT:Patient is a 64y old  Male who presents with a chief complaint of seizures .Pt still on restraints.    	  REVIEW OF SYSTEMS:    [x ] Unable to obtain    PHYSICAL EXAM:  T(C): 36.4 (05-08-20 @ 08:05), Max: 36.6 (05-07-20 @ 23:56)  HR: 65 (05-08-20 @ 08:05) (62 - 65)  BP: 123/92 (05-08-20 @ 08:05) (117/76 - 123/92)  RR: 18 (05-08-20 @ 08:05) (15 - 18)  SpO2: 98% (05-08-20 @ 08:05) (98% - 99%)        Appearance: Normal	  HEENT:   Normal oral mucosa, PERRL, EOMI	  Lymphatic: No lymphadenopathy  Cardiovascular: Normal S1 S2, No JVD, No murmurs, No edema  Respiratory: B/L ronchi  Gastrointestinal:  Soft, Non-tender, + BS	  Skin: No rashes, No ecchymoses, No cyanosis	  Extremities: Normal range of motion, No clubbing, cyanosis or edema  Vascular: Peripheral pulses palpable 2+ bilaterally    MEDICATIONS  (STANDING):  dextrose 5%. 1000 milliLiter(s) (50 mL/Hr) IV Continuous <Continuous>  dextrose 50% Injectable 12.5 Gram(s) IV Push once  dextrose 50% Injectable 25 Gram(s) IV Push once  dextrose 50% Injectable 25 Gram(s) IV Push once  enoxaparin Injectable 40 milliGRAM(s) SubCutaneous daily  ergocalciferol 38085 Unit(s) Oral every week  insulin lispro (HumaLOG) corrective regimen sliding scale   SubCutaneous three times a day before meals  insulin lispro (HumaLOG) corrective regimen sliding scale   SubCutaneous at bedtime  melatonin 3 milliGRAM(s) Oral at bedtime  metFORMIN 1000 milliGRAM(s) Oral two times a day with meals  mirtazapine 7.5 milliGRAM(s) Oral <User Schedule>  mirtazapine 3.75 milliGRAM(s) Oral <User Schedule>  OLANZapine 2.5 milliGRAM(s) Oral <User Schedule>  OLANZapine 5 milliGRAM(s) Oral <User Schedule>  potassium chloride   Powder 40 milliEquivalent(s) Oral every 4 hours  QUEtiapine 50 milliGRAM(s) Oral <User Schedule>  QUEtiapine 25 milliGRAM(s) Oral <User Schedule>      	  	  LABS:	 	                        15.0   9.80  )-----------( 285      ( 08 May 2020 08:18 )             43.7     05-08    145  |  110<H>  |  29<H>  ----------------------------<  222<H>  3.3<L>   |  30  |  0.88    Ca    9.2      08 May 2020 08:18  Phos  3.1     05-07  Mg     1.8     05-07    TPro  7.8  /  Alb  3.5  /  TBili  0.8  /  DBili  x   /  AST  25  /  ALT  32  /  AlkPhos  79  05-08    proBNP: Serum Pro-Brain Natriuretic Peptide: 126 pg/mL (05-01 @ 09:36)    Lipid Profile: Cholesterol 161  LDL 95  HDL 35        TSH: Thyroid Stimulating Hormone, Serum: 2.39 uU/mL (05-01 @ 09:36)

## 2020-05-09 DIAGNOSIS — R41.0 DISORIENTATION, UNSPECIFIED: ICD-10-CM

## 2020-05-09 DIAGNOSIS — G30.0 ALZHEIMER'S DISEASE WITH EARLY ONSET: ICD-10-CM

## 2020-05-09 LAB
ANION GAP SERPL CALC-SCNC: 8 MMOL/L — SIGNIFICANT CHANGE UP (ref 5–17)
BUN SERPL-MCNC: 33 MG/DL — HIGH (ref 7–18)
CALCIUM SERPL-MCNC: 9.3 MG/DL — SIGNIFICANT CHANGE UP (ref 8.4–10.5)
CHLORIDE SERPL-SCNC: 112 MMOL/L — HIGH (ref 96–108)
CO2 SERPL-SCNC: 27 MMOL/L — SIGNIFICANT CHANGE UP (ref 22–31)
CREAT SERPL-MCNC: 0.84 MG/DL — SIGNIFICANT CHANGE UP (ref 0.5–1.3)
GLUCOSE BLDC GLUCOMTR-MCNC: 169 MG/DL — HIGH (ref 70–99)
GLUCOSE BLDC GLUCOMTR-MCNC: 202 MG/DL — HIGH (ref 70–99)
GLUCOSE BLDC GLUCOMTR-MCNC: 214 MG/DL — HIGH (ref 70–99)
GLUCOSE BLDC GLUCOMTR-MCNC: 251 MG/DL — HIGH (ref 70–99)
GLUCOSE SERPL-MCNC: 250 MG/DL — HIGH (ref 70–99)
HCT VFR BLD CALC: 43.5 % — SIGNIFICANT CHANGE UP (ref 39–50)
HGB BLD-MCNC: 14.9 G/DL — SIGNIFICANT CHANGE UP (ref 13–17)
MCHC RBC-ENTMCNC: 28.8 PG — SIGNIFICANT CHANGE UP (ref 27–34)
MCHC RBC-ENTMCNC: 34.3 GM/DL — SIGNIFICANT CHANGE UP (ref 32–36)
MCV RBC AUTO: 84.1 FL — SIGNIFICANT CHANGE UP (ref 80–100)
NRBC # BLD: 0 /100 WBCS — SIGNIFICANT CHANGE UP (ref 0–0)
PLATELET # BLD AUTO: 265 K/UL — SIGNIFICANT CHANGE UP (ref 150–400)
POTASSIUM SERPL-MCNC: 4 MMOL/L — SIGNIFICANT CHANGE UP (ref 3.5–5.3)
POTASSIUM SERPL-SCNC: 4 MMOL/L — SIGNIFICANT CHANGE UP (ref 3.5–5.3)
RBC # BLD: 5.17 M/UL — SIGNIFICANT CHANGE UP (ref 4.2–5.8)
RBC # FLD: 12.2 % — SIGNIFICANT CHANGE UP (ref 10.3–14.5)
SODIUM SERPL-SCNC: 147 MMOL/L — HIGH (ref 135–145)
WBC # BLD: 10.81 K/UL — HIGH (ref 3.8–10.5)
WBC # FLD AUTO: 10.81 K/UL — HIGH (ref 3.8–10.5)

## 2020-05-09 PROCEDURE — 90792 PSYCH DIAG EVAL W/MED SRVCS: CPT

## 2020-05-09 PROCEDURE — 93010 ELECTROCARDIOGRAM REPORT: CPT

## 2020-05-09 RX ADMIN — ENOXAPARIN SODIUM 40 MILLIGRAM(S): 100 INJECTION SUBCUTANEOUS at 12:14

## 2020-05-09 RX ADMIN — QUETIAPINE FUMARATE 25 MILLIGRAM(S): 200 TABLET, FILM COATED ORAL at 08:10

## 2020-05-09 RX ADMIN — QUETIAPINE FUMARATE 50 MILLIGRAM(S): 200 TABLET, FILM COATED ORAL at 21:29

## 2020-05-09 RX ADMIN — METFORMIN HYDROCHLORIDE 1000 MILLIGRAM(S): 850 TABLET ORAL at 08:10

## 2020-05-09 RX ADMIN — OLANZAPINE 2.5 MILLIGRAM(S): 15 TABLET, FILM COATED ORAL at 08:11

## 2020-05-09 RX ADMIN — ERGOCALCIFEROL 50000 UNIT(S): 1.25 CAPSULE ORAL at 12:14

## 2020-05-09 RX ADMIN — MIRTAZAPINE 7.5 MILLIGRAM(S): 45 TABLET, ORALLY DISINTEGRATING ORAL at 17:01

## 2020-05-09 RX ADMIN — Medication 3 MILLIGRAM(S): at 21:30

## 2020-05-09 RX ADMIN — Medication 3: at 08:10

## 2020-05-09 RX ADMIN — Medication 2: at 12:14

## 2020-05-09 RX ADMIN — MIRTAZAPINE 7.5 MILLIGRAM(S): 45 TABLET, ORALLY DISINTEGRATING ORAL at 12:14

## 2020-05-09 RX ADMIN — Medication 2: at 17:00

## 2020-05-09 RX ADMIN — MIRTAZAPINE 3.75 MILLIGRAM(S): 45 TABLET, ORALLY DISINTEGRATING ORAL at 08:11

## 2020-05-09 RX ADMIN — MIRTAZAPINE 7.5 MILLIGRAM(S): 45 TABLET, ORALLY DISINTEGRATING ORAL at 21:30

## 2020-05-09 RX ADMIN — OLANZAPINE 5 MILLIGRAM(S): 15 TABLET, FILM COATED ORAL at 21:30

## 2020-05-09 RX ADMIN — METFORMIN HYDROCHLORIDE 1000 MILLIGRAM(S): 850 TABLET ORAL at 17:01

## 2020-05-09 NOTE — BEHAVIORAL HEALTH ASSESSMENT NOTE - HPI (INCLUDE ILLNESS QUALITY, SEVERITY, DURATION, TIMING, CONTEXT, MODIFYING FACTORS, ASSOCIATED SIGNS AND SYMPTOMS)
64 year old male from [facility]  with PMH dementia and DM admitted with seizures like activity, covid positive, was restarted on home meds now still agitated.     Contacted Janelle Babcock on 5/2 at [phone] to verify medication regimen   RN relayed it as follows:   Remeron 3.75mg at 9am, 7.5mg at 1pm - 5pm - 9pm   Seroquel 25mg at 9am, 50mg at 9pm  Zyprexa 2.5mg at 9am, 5mg at 9pm  Indicates patient has a long h/o agitation - hence multiple times a day dosing and polypharmacy  regimen reordered    CT head was negative   CXR showed atelectasis    Today, pt is attempted to be interviewed on video with MOA help. unable to participate in the interview - keeps eye contact, states he speaks English but doesn't answer questions - just mumbles to himself, can't make out what he is saying  As per RN, restless, confused, kicked 2 people last week, takes his meds with her but sometimes oppositional with other RNs  Patient's daughter Ailin was contacted on phone # in chart [phone] for PPH but no answer left phone number for callback 64 year old male from [facility]  with PMH dementia and DM admitted with seizures like activity, covid positive, was restarted on home meds now still agitated.     Contacted Janelle Babcock on 5/2 at [phone] to verify medication regimen   RN relayed it as follows:   Remeron 3.75mg at 9am, 7.5mg at 1pm - 5pm - 9pm   Seroquel 25mg at 9am, 50mg at 9pm  Zyprexa 2.5mg at 9am, 5mg at 9pm  Indicates patient has a long h/o agitation - hence multiple times a day dosing and polypharmacy  regimen reordered  no PRN use in 3 days - unclear why consult was called now. Days prior PRN Haldol was given once a day, at varying times [id number]   CT head was negative   CXR showed atelectasis    Today, pt is attempted to be interviewed on video with MOA help. unable to participate in the interview - keeps eye contact, states he speaks English but doesn't answer questions - just mumbles to himself, can't make out what he is saying  As per RN, restless, confused, kicked 2 people last week, takes his meds with her but sometimes oppositional with other RNs  Patient's daughter Ailin was contacted on phone # in chart [phone] for PPH but no answer left phone number for callback

## 2020-05-09 NOTE — BEHAVIORAL HEALTH ASSESSMENT NOTE - NSBHCHARTREVIEWVS_PSY_A_CORE FT
ICU Vital Signs Last 24 Hrs  T(C): 36.6 (09 May 2020 08:06), Max: 36.9 (08 May 2020 23:55)  T(F): 97.9 (09 May 2020 08:06), Max: 98.5 (08 May 2020 23:55)  HR: 85 (09 May 2020 08:06) (63 - 85)  BP: 129/78 (09 May 2020 08:06) (123/80 - 129/78)  BP(mean): --  ABP: --  ABP(mean): --  RR: 16 (09 May 2020 08:06) (15 - 18)  SpO2: 99% (09 May 2020 08:06) (97% - 99%)

## 2020-05-09 NOTE — PROGRESS NOTE ADULT - ASSESSMENT
64 yr old male with PMHX of DM,dementia sent to ER from facility for seizure and now COVID+,Delirium.  1.Seizure-Neurology f/u noted.  2.COVID +supportive treatment.  3.Psych re-eval noted, cont current meds, check EKG for QTC.  4.DM-Insulin,metformin resumed.  5.Delirium-improving.  6.Dementia with behavioral problems-cont current medication.  7.Replace vit d.  8.GI and DVT prophylaxis.

## 2020-05-09 NOTE — BEHAVIORAL HEALTH ASSESSMENT NOTE - RELATEDNESS
84 y/o F PMHx HTN, Mitral regurgitation, Sarcoidosis, smoldering Multiple myeloma, Nodular goiter presenting for chest pain.    Atypical Chest pain:  minimal increased troponin which has not changed . Awaiting nuclear stress test   Sarcoidosis   Nodular goiter. - she has been losing weigt over the past 6 months .   Plan:  Nuclear stress test today   Echo   If above is negative then follow up wiht Dr. Salinas next week - she has an appointment   check TFT's Fair

## 2020-05-09 NOTE — PROGRESS NOTE ADULT - SUBJECTIVE AND OBJECTIVE BOX
CARDIOLOGY/MEDICAL ATTENDING    CHIEF COMPLAINT:Patient is a 64y old  Male who presents with a chief complaint of seizures.Pt still in restraints.    	  REVIEW OF SYSTEMS:  [x ] Unable to obtain    PHYSICAL EXAM:  T(C): 36.6 (05-09-20 @ 08:06), Max: 36.9 (05-08-20 @ 23:55)  HR: 85 (05-09-20 @ 08:06) (63 - 85)  BP: 129/78 (05-09-20 @ 08:06) (123/80 - 129/78)  RR: 16 (05-09-20 @ 08:06) (15 - 18)  SpO2: 99% (05-09-20 @ 08:06) (97% - 99%)  Wt(kg): --  I&O's Summary      Appearance: Normal	  HEENT:   Normal oral mucosa, PERRL, EOMI	  Lymphatic: No lymphadenopathy  Cardiovascular: Normal S1 S2, No JVD, No murmurs, No edema  Respiratory: Lungs clear to auscultation	  Gastrointestinal:  Soft, Non-tender, + BS	  Skin: No rashes, No ecchymoses, No cyanosis	  Extremities: Normal range of motion, No clubbing, cyanosis or edema  Vascular: Peripheral pulses palpable 2+ bilaterally    MEDICATIONS  (STANDING):  dextrose 5%. 1000 milliLiter(s) (50 mL/Hr) IV Continuous <Continuous>  dextrose 50% Injectable 12.5 Gram(s) IV Push once  dextrose 50% Injectable 25 Gram(s) IV Push once  dextrose 50% Injectable 25 Gram(s) IV Push once  enoxaparin Injectable 40 milliGRAM(s) SubCutaneous daily  ergocalciferol 45286 Unit(s) Oral every week  insulin lispro (HumaLOG) corrective regimen sliding scale   SubCutaneous three times a day before meals  insulin lispro (HumaLOG) corrective regimen sliding scale   SubCutaneous at bedtime  melatonin 3 milliGRAM(s) Oral at bedtime  metFORMIN 1000 milliGRAM(s) Oral two times a day with meals  mirtazapine 7.5 milliGRAM(s) Oral <User Schedule>  mirtazapine 3.75 milliGRAM(s) Oral <User Schedule>  OLANZapine 2.5 milliGRAM(s) Oral <User Schedule>  OLANZapine 5 milliGRAM(s) Oral <User Schedule>  QUEtiapine 50 milliGRAM(s) Oral <User Schedule>  QUEtiapine 25 milliGRAM(s) Oral <User Schedule>      	  	  LABS:	 	                       14.9   10.81 )-----------( 265      ( 09 May 2020 06:24 )             43.5     05-09    147<H>  |  112<H>  |  33<H>  ----------------------------<  250<H>  4.0   |  27  |  0.84    Ca    9.3      09 May 2020 06:24    TPro  7.8  /  Alb  3.5  /  TBili  0.8  /  DBili  x   /  AST  25  /  ALT  32  /  AlkPhos  79  05-08    proBNP: Serum Pro-Brain Natriuretic Peptide: 126 pg/mL (05-01 @ 09:36)    Lipid Profile: Cholesterol 161  LDL 95  HDL 35        TSH: Thyroid Stimulating Hormone, Serum: 2.39 uU/mL (05-01 @ 09:36)

## 2020-05-09 NOTE — BEHAVIORAL HEALTH ASSESSMENT NOTE - SUMMARY
64 year old male from [facility]  with PMH dementia and DM admitted with seizures like activity, covid positive, was restarted on home meds now still agitated.

## 2020-05-10 LAB
GLUCOSE BLDC GLUCOMTR-MCNC: 179 MG/DL — HIGH (ref 70–99)
GLUCOSE BLDC GLUCOMTR-MCNC: 200 MG/DL — HIGH (ref 70–99)
GLUCOSE BLDC GLUCOMTR-MCNC: 216 MG/DL — HIGH (ref 70–99)
GLUCOSE BLDC GLUCOMTR-MCNC: 288 MG/DL — HIGH (ref 70–99)

## 2020-05-10 RX ADMIN — Medication 3: at 12:11

## 2020-05-10 RX ADMIN — METFORMIN HYDROCHLORIDE 1000 MILLIGRAM(S): 850 TABLET ORAL at 08:41

## 2020-05-10 RX ADMIN — Medication 3 MILLIGRAM(S): at 22:16

## 2020-05-10 RX ADMIN — QUETIAPINE FUMARATE 50 MILLIGRAM(S): 200 TABLET, FILM COATED ORAL at 22:16

## 2020-05-10 RX ADMIN — METFORMIN HYDROCHLORIDE 1000 MILLIGRAM(S): 850 TABLET ORAL at 16:51

## 2020-05-10 RX ADMIN — Medication 2: at 16:42

## 2020-05-10 RX ADMIN — MIRTAZAPINE 3.75 MILLIGRAM(S): 45 TABLET, ORALLY DISINTEGRATING ORAL at 08:41

## 2020-05-10 RX ADMIN — MIRTAZAPINE 7.5 MILLIGRAM(S): 45 TABLET, ORALLY DISINTEGRATING ORAL at 16:51

## 2020-05-10 RX ADMIN — MIRTAZAPINE 7.5 MILLIGRAM(S): 45 TABLET, ORALLY DISINTEGRATING ORAL at 22:16

## 2020-05-10 RX ADMIN — MIRTAZAPINE 7.5 MILLIGRAM(S): 45 TABLET, ORALLY DISINTEGRATING ORAL at 12:27

## 2020-05-10 RX ADMIN — OLANZAPINE 5 MILLIGRAM(S): 15 TABLET, FILM COATED ORAL at 22:16

## 2020-05-10 RX ADMIN — HALOPERIDOL DECANOATE 1 MILLIGRAM(S): 100 INJECTION INTRAMUSCULAR at 14:18

## 2020-05-10 RX ADMIN — Medication 1: at 08:40

## 2020-05-10 RX ADMIN — HALOPERIDOL DECANOATE 2 MILLIGRAM(S): 100 INJECTION INTRAMUSCULAR at 22:17

## 2020-05-10 RX ADMIN — ENOXAPARIN SODIUM 40 MILLIGRAM(S): 100 INJECTION SUBCUTANEOUS at 12:27

## 2020-05-10 RX ADMIN — HALOPERIDOL DECANOATE 1 MILLIGRAM(S): 100 INJECTION INTRAMUSCULAR at 10:50

## 2020-05-10 RX ADMIN — OLANZAPINE 2.5 MILLIGRAM(S): 15 TABLET, FILM COATED ORAL at 08:41

## 2020-05-10 RX ADMIN — QUETIAPINE FUMARATE 25 MILLIGRAM(S): 200 TABLET, FILM COATED ORAL at 08:41

## 2020-05-10 NOTE — PROGRESS NOTE ADULT - SUBJECTIVE AND OBJECTIVE BOX
CARDIOLOGY/MEDICAL ATTENDING    CHIEF COMPLAINT:Patient is a 64y old  Male who presents with a chief complaint of seizures.Pt still requiring restraints.    	  REVIEW OF SYSTEMS:  CONSTITUTIONAL: No fever, weight loss, or fatigue  EYES: No eye pain, visual disturbances, or discharge  ENT:  No difficulty hearing, tinnitus, vertigo; No sinus or throat pain  NECK: No pain or stiffness  RESPIRATORY: No cough, wheezing, chills or hemoptysis; No Shortness of Breath  CARDIOVASCULAR: No chest pain, palpitations, passing out, dizziness, or leg swelling  GASTROINTESTINAL: No abdominal or epigastric pain. No nausea, vomiting, or hematemesis; No diarrhea or constipation. No melena or hematochezia.  GENITOURINARY: No dysuria, frequency, hematuria, or incontinence  NEUROLOGICAL: No headaches, memory loss, loss of strength, numbness, or tremors  SKIN: No itching, burning, rashes, or lesions   LYMPH Nodes: No enlarged glands  ENDOCRINE: No heat or cold intolerance; No hair loss  MUSCULOSKELETAL: No joint pain or swelling; No muscle, back, or extremity pain  PSYCHIATRIC: No depression, anxiety, mood swings, or difficulty sleeping  HEME/LYMPH: No easy bruising, or bleeding gums  ALLERGY AND IMMUNOLOGIC: No hives or eczema	      PHYSICAL EXAM:  T(C): 36.4 (05-10-20 @ 08:23), Max: 36.7 (05-09-20 @ 15:38)  HR: 69 (05-10-20 @ 08:23) (64 - 70)  BP: 132/81 (05-10-20 @ 08:23) (115/72 - 132/81)  RR: 18 (05-10-20 @ 08:23) (18 - 18)  SpO2: 95% (05-10-20 @ 08:23) (95% - 97%)  Wt(kg): --  I&O's Summary      Appearance: Normal	  HEENT:   Normal oral mucosa, PERRL, EOMI	  Lymphatic: No lymphadenopathy  Cardiovascular: Normal S1 S2, No JVD, No murmurs, No edema  Respiratory: Lungs clear to auscultation	  Gastrointestinal:  Soft, Non-tender, + BS	  Skin: No rashes, No ecchymoses, No cyanosis	  Extremities: Normal range of motion, No clubbing, cyanosis or edema  Vascular: Peripheral pulses palpable 2+ bilaterally    MEDICATIONS  (STANDING):  dextrose 5%. 1000 milliLiter(s) (50 mL/Hr) IV Continuous <Continuous>  dextrose 50% Injectable 12.5 Gram(s) IV Push once  dextrose 50% Injectable 25 Gram(s) IV Push once  dextrose 50% Injectable 25 Gram(s) IV Push once  enoxaparin Injectable 40 milliGRAM(s) SubCutaneous daily  ergocalciferol 58251 Unit(s) Oral every week  insulin lispro (HumaLOG) corrective regimen sliding scale   SubCutaneous three times a day before meals  insulin lispro (HumaLOG) corrective regimen sliding scale   SubCutaneous at bedtime  melatonin 3 milliGRAM(s) Oral at bedtime  metFORMIN 1000 milliGRAM(s) Oral two times a day with meals  mirtazapine 7.5 milliGRAM(s) Oral <User Schedule>  mirtazapine 3.75 milliGRAM(s) Oral <User Schedule>  OLANZapine 2.5 milliGRAM(s) Oral <User Schedule>  OLANZapine 5 milliGRAM(s) Oral <User Schedule>  QUEtiapine 50 milliGRAM(s) Oral <User Schedule>  QUEtiapine 25 milliGRAM(s) Oral <User Schedule>      	  	  LABS:	 	                       14.9   10.81 )-----------( 265      ( 09 May 2020 06:24 )             43.5     05-09    147<H>  |  112<H>  |  33<H>  ----------------------------<  250<H>  4.0   |  27  |  0.84    Ca    9.3      09 May 2020 06:24      proBNP: Serum Pro-Brain Natriuretic Peptide: 126 pg/mL (05-01 @ 09:36)    Lipid Profile: Cholesterol 161  LDL 95  HDL 35        TSH: Thyroid Stimulating Hormone, Serum: 2.39 uU/mL (05-01 @ 09:36)       Sinus rhythm with marked sinus arrhythmia  Nonspecific ST and T wave abnormality  QTC Calculation(Martine) 462 ms

## 2020-05-10 NOTE — PROGRESS NOTE ADULT - ASSESSMENT
64 yr old male with PMHX of DM,dementia sent to ER from facility for seizure and now COVID+,Delirium.  1.Seizure-Neurology f/u noted.  2.COVID +supportive treatment.  3.Psych re-eval noted, cont current meds.  4.DM-Insulin,metformin resumed.  5.Delirium-improving.  6.Dementia with behavioral problems-cont current medication.  7.Replace vit d.  8.GI and DVT prophylaxis.

## 2020-05-11 LAB
ALBUMIN SERPL ELPH-MCNC: 3.4 G/DL — LOW (ref 3.5–5)
ALP SERPL-CCNC: 84 U/L — SIGNIFICANT CHANGE UP (ref 40–120)
ALT FLD-CCNC: 32 U/L DA — SIGNIFICANT CHANGE UP (ref 10–60)
ANION GAP SERPL CALC-SCNC: 7 MMOL/L — SIGNIFICANT CHANGE UP (ref 5–17)
AST SERPL-CCNC: 16 U/L — SIGNIFICANT CHANGE UP (ref 10–40)
BILIRUB SERPL-MCNC: 0.9 MG/DL — SIGNIFICANT CHANGE UP (ref 0.2–1.2)
BUN SERPL-MCNC: 46 MG/DL — HIGH (ref 7–18)
CALCIUM SERPL-MCNC: 9.6 MG/DL — SIGNIFICANT CHANGE UP (ref 8.4–10.5)
CHLORIDE SERPL-SCNC: 112 MMOL/L — HIGH (ref 96–108)
CO2 SERPL-SCNC: 28 MMOL/L — SIGNIFICANT CHANGE UP (ref 22–31)
CREAT SERPL-MCNC: 0.94 MG/DL — SIGNIFICANT CHANGE UP (ref 0.5–1.3)
GLUCOSE BLDC GLUCOMTR-MCNC: 198 MG/DL — HIGH (ref 70–99)
GLUCOSE BLDC GLUCOMTR-MCNC: 243 MG/DL — HIGH (ref 70–99)
GLUCOSE BLDC GLUCOMTR-MCNC: 251 MG/DL — HIGH (ref 70–99)
GLUCOSE BLDC GLUCOMTR-MCNC: 255 MG/DL — HIGH (ref 70–99)
GLUCOSE SERPL-MCNC: 257 MG/DL — HIGH (ref 70–99)
HCT VFR BLD CALC: 43.8 % — SIGNIFICANT CHANGE UP (ref 39–50)
HGB BLD-MCNC: 14.8 G/DL — SIGNIFICANT CHANGE UP (ref 13–17)
MCHC RBC-ENTMCNC: 28.5 PG — SIGNIFICANT CHANGE UP (ref 27–34)
MCHC RBC-ENTMCNC: 33.8 GM/DL — SIGNIFICANT CHANGE UP (ref 32–36)
MCV RBC AUTO: 84.4 FL — SIGNIFICANT CHANGE UP (ref 80–100)
NRBC # BLD: 0 /100 WBCS — SIGNIFICANT CHANGE UP (ref 0–0)
PLATELET # BLD AUTO: 306 K/UL — SIGNIFICANT CHANGE UP (ref 150–400)
POTASSIUM SERPL-MCNC: 3.6 MMOL/L — SIGNIFICANT CHANGE UP (ref 3.5–5.3)
POTASSIUM SERPL-SCNC: 3.6 MMOL/L — SIGNIFICANT CHANGE UP (ref 3.5–5.3)
PROT SERPL-MCNC: 7.8 G/DL — SIGNIFICANT CHANGE UP (ref 6–8.3)
RBC # BLD: 5.19 M/UL — SIGNIFICANT CHANGE UP (ref 4.2–5.8)
RBC # FLD: 12.3 % — SIGNIFICANT CHANGE UP (ref 10.3–14.5)
SODIUM SERPL-SCNC: 147 MMOL/L — HIGH (ref 135–145)
WBC # BLD: 9.79 K/UL — SIGNIFICANT CHANGE UP (ref 3.8–10.5)
WBC # FLD AUTO: 9.79 K/UL — SIGNIFICANT CHANGE UP (ref 3.8–10.5)

## 2020-05-11 RX ADMIN — MIRTAZAPINE 7.5 MILLIGRAM(S): 45 TABLET, ORALLY DISINTEGRATING ORAL at 17:35

## 2020-05-11 RX ADMIN — Medication 3: at 08:51

## 2020-05-11 RX ADMIN — Medication 3 MILLIGRAM(S): at 22:38

## 2020-05-11 RX ADMIN — QUETIAPINE FUMARATE 25 MILLIGRAM(S): 200 TABLET, FILM COATED ORAL at 08:52

## 2020-05-11 RX ADMIN — OLANZAPINE 2.5 MILLIGRAM(S): 15 TABLET, FILM COATED ORAL at 08:51

## 2020-05-11 RX ADMIN — OLANZAPINE 5 MILLIGRAM(S): 15 TABLET, FILM COATED ORAL at 22:37

## 2020-05-11 RX ADMIN — QUETIAPINE FUMARATE 50 MILLIGRAM(S): 200 TABLET, FILM COATED ORAL at 22:37

## 2020-05-11 RX ADMIN — ENOXAPARIN SODIUM 40 MILLIGRAM(S): 100 INJECTION SUBCUTANEOUS at 12:13

## 2020-05-11 RX ADMIN — METFORMIN HYDROCHLORIDE 1000 MILLIGRAM(S): 850 TABLET ORAL at 08:51

## 2020-05-11 RX ADMIN — METFORMIN HYDROCHLORIDE 1000 MILLIGRAM(S): 850 TABLET ORAL at 17:35

## 2020-05-11 RX ADMIN — Medication 1: at 17:35

## 2020-05-11 RX ADMIN — HALOPERIDOL DECANOATE 2 MILLIGRAM(S): 100 INJECTION INTRAMUSCULAR at 22:39

## 2020-05-11 RX ADMIN — Medication 2: at 12:12

## 2020-05-11 RX ADMIN — MIRTAZAPINE 7.5 MILLIGRAM(S): 45 TABLET, ORALLY DISINTEGRATING ORAL at 22:38

## 2020-05-11 RX ADMIN — MIRTAZAPINE 3.75 MILLIGRAM(S): 45 TABLET, ORALLY DISINTEGRATING ORAL at 08:52

## 2020-05-11 RX ADMIN — Medication 1: at 22:00

## 2020-05-11 NOTE — PROGRESS NOTE ADULT - SUBJECTIVE AND OBJECTIVE BOX
CARDIOLOGY/MEDICAL ATTENDING    CHIEF COMPLAINT:Patient is a 64y old  Male who presents with a chief complaint of seizures.Pt still in restraints    	  REVIEW OF SYSTEMS:  CONSTITUTIONAL: No fever, weight loss, or fatigue  EYES: No eye pain, visual disturbances, or discharge  ENT:  No difficulty hearing, tinnitus, vertigo; No sinus or throat pain  NECK: No pain or stiffness  RESPIRATORY: No cough, wheezing, chills or hemoptysis; No Shortness of Breath  CARDIOVASCULAR: No chest pain, palpitations, passing out, dizziness, or leg swelling  GASTROINTESTINAL: No abdominal or epigastric pain. No nausea, vomiting, or hematemesis; No diarrhea or constipation. No melena or hematochezia.  GENITOURINARY: No dysuria, frequency, hematuria, or incontinence  NEUROLOGICAL: No headaches, memory loss, loss of strength, numbness, or tremors  SKIN: No itching, burning, rashes, or lesions   LYMPH Nodes: No enlarged glands  ENDOCRINE: No heat or cold intolerance; No hair loss  MUSCULOSKELETAL: No joint pain or swelling; No muscle, back, or extremity pain  PSYCHIATRIC: No depression, anxiety, mood swings, or difficulty sleeping  HEME/LYMPH: No easy bruising, or bleeding gums  ALLERGY AND IMMUNOLOGIC: No hives or eczema	        PHYSICAL EXAM:  T(C): 36.3 (05-11-20 @ 08:32), Max: 36.4 (05-10-20 @ 15:51)  HR: 69 (05-11-20 @ 08:32) (69 - 89)  BP: 135/70 (05-11-20 @ 08:32) (126/62 - 154/94)  RR: 19 (05-11-20 @ 08:32) (18 - 19)  SpO2: 99% (05-11-20 @ 08:32) (95% - 99%)        Appearance: Normal	  HEENT:   Normal oral mucosa, PERRL, EOMI	  Lymphatic: No lymphadenopathy  Cardiovascular: Normal S1 S2, No JVD, No murmurs, No edema  Respiratory: Lungs clear to auscultation	  Gastrointestinal:  Soft, Non-tender, + BS	  Skin: No rashes, No ecchymoses, No cyanosis	  Extremities: Normal range of motion, No clubbing, cyanosis or edema  Vascular: Peripheral pulses palpable 2+ bilaterally    MEDICATIONS  (STANDING):  dextrose 5%. 1000 milliLiter(s) (50 mL/Hr) IV Continuous <Continuous>  dextrose 50% Injectable 12.5 Gram(s) IV Push once  dextrose 50% Injectable 25 Gram(s) IV Push once  dextrose 50% Injectable 25 Gram(s) IV Push once  enoxaparin Injectable 40 milliGRAM(s) SubCutaneous daily  ergocalciferol 82296 Unit(s) Oral every week  insulin lispro (HumaLOG) corrective regimen sliding scale   SubCutaneous three times a day before meals  insulin lispro (HumaLOG) corrective regimen sliding scale   SubCutaneous at bedtime  melatonin 3 milliGRAM(s) Oral at bedtime  metFORMIN 1000 milliGRAM(s) Oral two times a day with meals  mirtazapine 7.5 milliGRAM(s) Oral <User Schedule>  mirtazapine 3.75 milliGRAM(s) Oral <User Schedule>  OLANZapine 2.5 milliGRAM(s) Oral <User Schedule>  OLANZapine 5 milliGRAM(s) Oral <User Schedule>  QUEtiapine 50 milliGRAM(s) Oral <User Schedule>  QUEtiapine 25 milliGRAM(s) Oral <User Schedule>      	  	  LABS:	 	                         14.8   9.79  )-----------( 306      ( 11 May 2020 07:11 )             43.8     05-11    147<H>  |  112<H>  |  46<H>  ----------------------------<  257<H>  3.6   |  28  |  0.94    Ca    9.6      11 May 2020 07:11    TPro  7.8  /  Alb  3.4<L>  /  TBili  0.9  /  DBili  x   /  AST  16  /  ALT  32  /  AlkPhos  84  05-11    proBNP: Serum Pro-Brain Natriuretic Peptide: 126 pg/mL (05-01 @ 09:36)    Lipid Profile: Cholesterol 161  LDL 95  HDL 35        TSH: Thyroid Stimulating Hormone, Serum: 2.39 uU/mL (05-01 @ 09:36)

## 2020-05-12 LAB
ANION GAP SERPL CALC-SCNC: 12 MMOL/L — SIGNIFICANT CHANGE UP (ref 5–17)
BUN SERPL-MCNC: 54 MG/DL — HIGH (ref 7–18)
CALCIUM SERPL-MCNC: 9.7 MG/DL — SIGNIFICANT CHANGE UP (ref 8.4–10.5)
CHLORIDE SERPL-SCNC: 112 MMOL/L — HIGH (ref 96–108)
CO2 SERPL-SCNC: 25 MMOL/L — SIGNIFICANT CHANGE UP (ref 22–31)
CREAT SERPL-MCNC: 1.38 MG/DL — HIGH (ref 0.5–1.3)
GLUCOSE BLDC GLUCOMTR-MCNC: 271 MG/DL — HIGH (ref 70–99)
GLUCOSE BLDC GLUCOMTR-MCNC: 312 MG/DL — HIGH (ref 70–99)
GLUCOSE BLDC GLUCOMTR-MCNC: 323 MG/DL — HIGH (ref 70–99)
GLUCOSE BLDC GLUCOMTR-MCNC: 330 MG/DL — HIGH (ref 70–99)
GLUCOSE BLDC GLUCOMTR-MCNC: 369 MG/DL — HIGH (ref 70–99)
GLUCOSE SERPL-MCNC: 349 MG/DL — HIGH (ref 70–99)
HCT VFR BLD CALC: 45.5 % — SIGNIFICANT CHANGE UP (ref 39–50)
HGB BLD-MCNC: 15.2 G/DL — SIGNIFICANT CHANGE UP (ref 13–17)
MCHC RBC-ENTMCNC: 28.8 PG — SIGNIFICANT CHANGE UP (ref 27–34)
MCHC RBC-ENTMCNC: 33.4 GM/DL — SIGNIFICANT CHANGE UP (ref 32–36)
MCV RBC AUTO: 86.2 FL — SIGNIFICANT CHANGE UP (ref 80–100)
NRBC # BLD: 0 /100 WBCS — SIGNIFICANT CHANGE UP (ref 0–0)
PLATELET # BLD AUTO: 321 K/UL — SIGNIFICANT CHANGE UP (ref 150–400)
POTASSIUM SERPL-MCNC: 3.7 MMOL/L — SIGNIFICANT CHANGE UP (ref 3.5–5.3)
POTASSIUM SERPL-SCNC: 3.7 MMOL/L — SIGNIFICANT CHANGE UP (ref 3.5–5.3)
RBC # BLD: 5.28 M/UL — SIGNIFICANT CHANGE UP (ref 4.2–5.8)
RBC # FLD: 12.5 % — SIGNIFICANT CHANGE UP (ref 10.3–14.5)
SARS-COV-2 RNA SPEC QL NAA+PROBE: DETECTED
SODIUM SERPL-SCNC: 149 MMOL/L — HIGH (ref 135–145)
WBC # BLD: 11.82 K/UL — HIGH (ref 3.8–10.5)
WBC # FLD AUTO: 11.82 K/UL — HIGH (ref 3.8–10.5)

## 2020-05-12 RX ORDER — SODIUM CHLORIDE 9 MG/ML
1000 INJECTION INTRAMUSCULAR; INTRAVENOUS; SUBCUTANEOUS
Refills: 0 | Status: DISCONTINUED | OUTPATIENT
Start: 2020-05-12 | End: 2020-05-13

## 2020-05-12 RX ORDER — HALOPERIDOL DECANOATE 100 MG/ML
0.5 INJECTION INTRAMUSCULAR EVERY 6 HOURS
Refills: 0 | Status: DISCONTINUED | OUTPATIENT
Start: 2020-05-12 | End: 2020-05-14

## 2020-05-12 RX ADMIN — MIRTAZAPINE 7.5 MILLIGRAM(S): 45 TABLET, ORALLY DISINTEGRATING ORAL at 12:19

## 2020-05-12 RX ADMIN — Medication 4: at 08:56

## 2020-05-12 RX ADMIN — METFORMIN HYDROCHLORIDE 1000 MILLIGRAM(S): 850 TABLET ORAL at 08:57

## 2020-05-12 RX ADMIN — METFORMIN HYDROCHLORIDE 1000 MILLIGRAM(S): 850 TABLET ORAL at 17:09

## 2020-05-12 RX ADMIN — OLANZAPINE 5 MILLIGRAM(S): 15 TABLET, FILM COATED ORAL at 23:52

## 2020-05-12 RX ADMIN — Medication 3 MILLIGRAM(S): at 23:52

## 2020-05-12 RX ADMIN — HALOPERIDOL DECANOATE 0.5 MILLIGRAM(S): 100 INJECTION INTRAMUSCULAR at 17:28

## 2020-05-12 RX ADMIN — Medication 1: at 21:56

## 2020-05-12 RX ADMIN — Medication 650 MILLIGRAM(S): at 03:29

## 2020-05-12 RX ADMIN — QUETIAPINE FUMARATE 25 MILLIGRAM(S): 200 TABLET, FILM COATED ORAL at 12:19

## 2020-05-12 RX ADMIN — Medication 4: at 17:08

## 2020-05-12 RX ADMIN — MIRTAZAPINE 7.5 MILLIGRAM(S): 45 TABLET, ORALLY DISINTEGRATING ORAL at 17:09

## 2020-05-12 RX ADMIN — OLANZAPINE 2.5 MILLIGRAM(S): 15 TABLET, FILM COATED ORAL at 10:32

## 2020-05-12 RX ADMIN — Medication 5: at 12:22

## 2020-05-12 RX ADMIN — MIRTAZAPINE 3.75 MILLIGRAM(S): 45 TABLET, ORALLY DISINTEGRATING ORAL at 08:57

## 2020-05-12 RX ADMIN — Medication 0.25 MILLIGRAM(S): at 17:35

## 2020-05-12 RX ADMIN — MIRTAZAPINE 7.5 MILLIGRAM(S): 45 TABLET, ORALLY DISINTEGRATING ORAL at 23:53

## 2020-05-12 RX ADMIN — QUETIAPINE FUMARATE 50 MILLIGRAM(S): 200 TABLET, FILM COATED ORAL at 23:52

## 2020-05-12 RX ADMIN — ENOXAPARIN SODIUM 40 MILLIGRAM(S): 100 INJECTION SUBCUTANEOUS at 12:18

## 2020-05-12 NOTE — CHART NOTE - NSCHARTNOTEFT_GEN_A_CORE
discussed case with Psych Dr. Packer. Patient is still agitated and on restraints.  Dr. Packer recommended haldol 0.5mg q6hrs in conjunction with ativan 0.25mg  Discussed with Dr. Mirza

## 2020-05-12 NOTE — PROGRESS NOTE ADULT - SUBJECTIVE AND OBJECTIVE BOX
CARDIOLOGY/MEDICAL ATTENDING  CHIEF COMPLAINT:Patient is a 64y old  Male who presents with a chief complaint of seizures.Pt still requiring restraints, low grade temp overnight.    	  REVIEW OF SYSTEMS:    [x ] Unable to obtain    PHYSICAL EXAM:  T(C): 36.1 (05-12-20 @ 08:29), Max: 38.1 (05-12-20 @ 00:05)  HR: 79 (05-12-20 @ 08:29) (79 - 104)  BP: 124/78 (05-12-20 @ 08:29) (124/78 - 133/60)  RR: 18 (05-12-20 @ 08:29) (16 - 18)  SpO2: 97% (05-12-20 @ 08:29) (93% - 99%)  Wt(kg): --  I&O's Summary      Appearance: Normal	  HEENT:   Normal oral mucosa, PERRL, EOMI	  Lymphatic: No lymphadenopathy  Cardiovascular: Normal S1 S2, No JVD, No murmurs, No edema  Respiratory: Lungs clear to auscultation	  Gastrointestinal:  Soft, Non-tender, + BS	  Skin: No rashes, No ecchymoses, No cyanosis	  Extremities: Normal range of motion, No clubbing, cyanosis or edema  Vascular: Peripheral pulses palpable 2+ bilaterally    MEDICATIONS  (STANDING):  dextrose 5%. 1000 milliLiter(s) (50 mL/Hr) IV Continuous <Continuous>  dextrose 50% Injectable 12.5 Gram(s) IV Push once  dextrose 50% Injectable 25 Gram(s) IV Push once  dextrose 50% Injectable 25 Gram(s) IV Push once  enoxaparin Injectable 40 milliGRAM(s) SubCutaneous daily  ergocalciferol 26066 Unit(s) Oral every week  insulin lispro (HumaLOG) corrective regimen sliding scale   SubCutaneous three times a day before meals  insulin lispro (HumaLOG) corrective regimen sliding scale   SubCutaneous at bedtime  melatonin 3 milliGRAM(s) Oral at bedtime  metFORMIN 1000 milliGRAM(s) Oral two times a day with meals  mirtazapine 7.5 milliGRAM(s) Oral <User Schedule>  mirtazapine 3.75 milliGRAM(s) Oral <User Schedule>  OLANZapine 2.5 milliGRAM(s) Oral <User Schedule>  OLANZapine 5 milliGRAM(s) Oral <User Schedule>  QUEtiapine 50 milliGRAM(s) Oral <User Schedule>  QUEtiapine 25 milliGRAM(s) Oral <User Schedule>  sodium chloride 0.9%. 1000 milliLiter(s) (50 mL/Hr) IV Continuous <Continuous>      	  	  LABS:	 	                        15.2   11.82 )-----------( 321      ( 12 May 2020 07:33 )             45.5     05-12    149<H>  |  112<H>  |  54<H>  ----------------------------<  349<H>  3.7   |  25  |  1.38<H>    Ca    9.7      12 May 2020 07:33    TPro  7.8  /  Alb  3.4<L>  /  TBili  0.9  /  DBili  x   /  AST  16  /  ALT  32  /  AlkPhos  84  05-11    proBNP: Serum Pro-Brain Natriuretic Peptide: 126 pg/mL (05-01 @ 09:36)    Lipid Profile: Cholesterol 161  LDL 95  HDL 35        TSH: Thyroid Stimulating Hormone, Serum: 2.39 uU/mL (05-01 @ 09:36)

## 2020-05-12 NOTE — PROGRESS NOTE ADULT - ASSESSMENT
64 yr old male with PMHX of DM,dementia sent to ER from facility for seizure and now COVID+,Delirium.  1.MAXIME-IVF, check UA and urine cx.  2.COVID +supportive treatment.  3.Psych re-eval.  4.DM-Insulin.  5.Delirium-improving.  6.Dementia with behavioral problems-cont current medication.  7.Replace vit d.  8.GI and DVT prophylaxis.

## 2020-05-13 LAB
ANION GAP SERPL CALC-SCNC: 10 MMOL/L — SIGNIFICANT CHANGE UP (ref 5–17)
BUN SERPL-MCNC: 66 MG/DL — HIGH (ref 7–18)
CALCIUM SERPL-MCNC: 10.1 MG/DL — SIGNIFICANT CHANGE UP (ref 8.4–10.5)
CHLORIDE SERPL-SCNC: 115 MMOL/L — HIGH (ref 96–108)
CO2 SERPL-SCNC: 24 MMOL/L — SIGNIFICANT CHANGE UP (ref 22–31)
CREAT SERPL-MCNC: 1.38 MG/DL — HIGH (ref 0.5–1.3)
GLUCOSE BLDC GLUCOMTR-MCNC: 237 MG/DL — HIGH (ref 70–99)
GLUCOSE BLDC GLUCOMTR-MCNC: 320 MG/DL — HIGH (ref 70–99)
GLUCOSE BLDC GLUCOMTR-MCNC: 334 MG/DL — HIGH (ref 70–99)
GLUCOSE BLDC GLUCOMTR-MCNC: 398 MG/DL — HIGH (ref 70–99)
GLUCOSE SERPL-MCNC: 396 MG/DL — HIGH (ref 70–99)
POTASSIUM SERPL-MCNC: 3.8 MMOL/L — SIGNIFICANT CHANGE UP (ref 3.5–5.3)
POTASSIUM SERPL-SCNC: 3.8 MMOL/L — SIGNIFICANT CHANGE UP (ref 3.5–5.3)
SODIUM SERPL-SCNC: 149 MMOL/L — HIGH (ref 135–145)

## 2020-05-13 RX ORDER — SODIUM CHLORIDE 9 MG/ML
1000 INJECTION, SOLUTION INTRAVENOUS
Refills: 0 | Status: DISCONTINUED | OUTPATIENT
Start: 2020-05-13 | End: 2020-05-13

## 2020-05-13 RX ORDER — SODIUM CHLORIDE 9 MG/ML
1000 INJECTION INTRAMUSCULAR; INTRAVENOUS; SUBCUTANEOUS
Refills: 0 | Status: DISCONTINUED | OUTPATIENT
Start: 2020-05-13 | End: 2020-05-14

## 2020-05-13 RX ADMIN — METFORMIN HYDROCHLORIDE 1000 MILLIGRAM(S): 850 TABLET ORAL at 08:10

## 2020-05-13 RX ADMIN — Medication 3 MILLIGRAM(S): at 22:20

## 2020-05-13 RX ADMIN — METFORMIN HYDROCHLORIDE 1000 MILLIGRAM(S): 850 TABLET ORAL at 17:09

## 2020-05-13 RX ADMIN — MIRTAZAPINE 7.5 MILLIGRAM(S): 45 TABLET, ORALLY DISINTEGRATING ORAL at 17:09

## 2020-05-13 RX ADMIN — MIRTAZAPINE 3.75 MILLIGRAM(S): 45 TABLET, ORALLY DISINTEGRATING ORAL at 08:10

## 2020-05-13 RX ADMIN — OLANZAPINE 5 MILLIGRAM(S): 15 TABLET, FILM COATED ORAL at 22:20

## 2020-05-13 RX ADMIN — MIRTAZAPINE 7.5 MILLIGRAM(S): 45 TABLET, ORALLY DISINTEGRATING ORAL at 22:20

## 2020-05-13 RX ADMIN — QUETIAPINE FUMARATE 25 MILLIGRAM(S): 200 TABLET, FILM COATED ORAL at 08:11

## 2020-05-13 RX ADMIN — SODIUM CHLORIDE 50 MILLILITER(S): 9 INJECTION INTRAMUSCULAR; INTRAVENOUS; SUBCUTANEOUS at 11:01

## 2020-05-13 RX ADMIN — MIRTAZAPINE 7.5 MILLIGRAM(S): 45 TABLET, ORALLY DISINTEGRATING ORAL at 13:09

## 2020-05-13 RX ADMIN — Medication 5: at 08:11

## 2020-05-13 RX ADMIN — OLANZAPINE 2.5 MILLIGRAM(S): 15 TABLET, FILM COATED ORAL at 08:11

## 2020-05-13 RX ADMIN — Medication 0.25 MILLIGRAM(S): at 08:16

## 2020-05-13 RX ADMIN — ENOXAPARIN SODIUM 40 MILLIGRAM(S): 100 INJECTION SUBCUTANEOUS at 12:14

## 2020-05-13 RX ADMIN — QUETIAPINE FUMARATE 50 MILLIGRAM(S): 200 TABLET, FILM COATED ORAL at 22:20

## 2020-05-13 RX ADMIN — Medication 4: at 17:08

## 2020-05-13 RX ADMIN — HALOPERIDOL DECANOATE 0.5 MILLIGRAM(S): 100 INJECTION INTRAMUSCULAR at 08:16

## 2020-05-13 RX ADMIN — Medication 4: at 12:14

## 2020-05-13 NOTE — CHART NOTE - NSCHARTNOTEFT_GEN_A_CORE
Reassessment:   64yMalePatient is a 64y old  Male who presents with a chief complaint of seizures (13 May 2020 11:57)    Factors impacting intake: [ ] none [ ] nausea  [ ] vomiting [ ] diarrhea [ ] constipation  [ ]chewing problems [ ] swallowing issues  [X ] other: COVID-19+ with seizure, dementia     Diet Presciption: Diet, Dysphagia 1 Pureed-Nectar Consistency Fluid:   Consistent Carbohydrate {No Snacks}  Supplement Feeding Modality:  Oral  Glucerna Shake Cans or Servings Per Day:  1       Frequency:  Two Times a day (20 @ 13:26)    Intake: Patient in isolation, d/w nursing, pt. with poor po intake today, 0% intake at lunch meal, pt. more agitated today & on restraints, monitor & encourage po intake with feeding assistance, no reports of GI distress, psych consulted, rec. c/w Dysphagia 1 Pureed-Nectar Consistency Fluid, consistent carbohydrate no snack & Glucerna Shake @TID with MVI/minerals daily as medically feasible. RD available.       Daily Weight in k.4 (07 May 2020 12:39)    Pertinent Medications: MEDICATIONS  (STANDING):  dextrose 50% Injectable 12.5 Gram(s) IV Push once  dextrose 50% Injectable 25 Gram(s) IV Push once  dextrose 50% Injectable 25 Gram(s) IV Push once  enoxaparin Injectable 40 milliGRAM(s) SubCutaneous daily  ergocalciferol 92676 Unit(s) Oral every week  insulin lispro (HumaLOG) corrective regimen sliding scale   SubCutaneous three times a day before meals  insulin lispro (HumaLOG) corrective regimen sliding scale   SubCutaneous at bedtime  melatonin 3 milliGRAM(s) Oral at bedtime  metFORMIN 1000 milliGRAM(s) Oral two times a day with meals  mirtazapine 7.5 milliGRAM(s) Oral <User Schedule>  mirtazapine 3.75 milliGRAM(s) Oral <User Schedule>  OLANZapine 2.5 milliGRAM(s) Oral <User Schedule>  OLANZapine 5 milliGRAM(s) Oral <User Schedule>  QUEtiapine 50 milliGRAM(s) Oral <User Schedule>  QUEtiapine 25 milliGRAM(s) Oral <User Schedule>  sodium chloride 0.9%. 1000 milliLiter(s) (50 mL/Hr) IV Continuous <Continuous>    MEDICATIONS  (PRN):  acetaminophen  Suppository .. 650 milliGRAM(s) Rectal every 6 hours PRN Temp greater or equal to 38C (100.4F)  ALBUTerol    90 MICROgram(s) HFA Inhaler 2 Puff(s) Inhalation every 6 hours PRN Shortness of Breath and/or Wheezing  dextrose 40% Gel 15 Gram(s) Oral once PRN Blood Glucose LESS THAN 70 milliGRAM(s)/deciliter  glucagon  Injectable 1 milliGRAM(s) IntraMuscular once PRN Glucose LESS THAN 70 milligrams/deciliter  guaiFENesin   Syrup  (Sugar-Free) 100 milliGRAM(s) Oral every 6 hours PRN Cough  haloperidol     Tablet 0.5 milliGRAM(s) Oral every 6 hours PRN agitation  LORazepam     Tablet 0.25 milliGRAM(s) Oral every 6 hours PRN Agitation    Pertinent Labs:  Na149 mmol/L<H> Glu 396 mg/dL<H> K+ 3.8 mmol/L Cr  1.38 mg/dL<H> BUN 66 mg/dL<H>  Phos 3.1 mg/dL  Alb 3.4 g/dL<L>  Chol 161 mg/dL LDL 95 mg/dL HDL 35 mg/dL<L> Trig 153 mg/dL<H>     CAPILLARY BLOOD GLUCOSE      POCT Blood Glucose.: 334 mg/dL (13 May 2020 11:29)  POCT Blood Glucose.: 398 mg/dL (13 May 2020 08:06)  POCT Blood Glucose.: 271 mg/dL (12 May 2020 20:57)  POCT Blood Glucose.: 312 mg/dL (12 May 2020 16:36)    Skin: intact    Estimated Needs:   [ X] no change since previous assessment  [ ] recalculated:     Previous Nutrition Diagnosis:   [ ] Inadequate Energy Intake [X] Inadequate Oral Intake [ ] Excessive Energy Intake   [ ] Underweight [ ] Increased Nutrient Needs [ ] Overweight/Obesity   [ ] Altered GI Function [ ] Unintended Weight Loss [ ] Food & Nutrition Related Knowledge Deficit [ ] Malnutrition     Nutrition Diagnosis is [X ] ongoing  [ ] resolved [ ] not applicable     New Nutrition Diagnosis: [ ] not applicable     Interventions: To meet nutrition needs   Recommend  [ ] Change Diet To:  [ ] Nutrition Supplement  [ ] Nutrition Support  [ ] Other:     Monitoring and Evaluation:   [X ] PO intake [ x ] Tolerance to diet prescription [ x ] weights [ x ] labs[ x ] follow up per protocol  [ ] other:

## 2020-05-13 NOTE — PROGRESS NOTE ADULT - SUBJECTIVE AND OBJECTIVE BOX
CARDIOLOGY/MEDICAL ATTENDING    CHIEF COMPLAINT:Patient is a 64y old  Male who presents with a chief complaint of seizures.Pt still with restraints and poor oral intake.    	  REVIEW OF SYSTEMS:    [x ] Unable to obtain    PHYSICAL EXAM:  T(C): 36.6 (05-13-20 @ 08:16), Max: 37 (05-13-20 @ 00:32)  HR: 102 (05-13-20 @ 08:16) (62 - 102)  BP: 156/97 (05-13-20 @ 08:16) (120/89 - 156/97)  RR: 18 (05-13-20 @ 08:16) (16 - 19)  SpO2: 97% (05-13-20 @ 08:16) (95% - 97%)        Appearance: Normal	  HEENT:   Normal oral mucosa, PERRL, EOMI	  Lymphatic: No lymphadenopathy  Cardiovascular: Normal S1 S2, No JVD, No murmurs, No edema  Respiratory: Lungs clear to auscultation	  Gastrointestinal:  Soft, Non-tender, + BS	  Skin: No rashes, No ecchymoses, No cyanosis	  Extremities: Normal range of motion, No clubbing, cyanosis or edema  Vascular: Peripheral pulses palpable 2+ bilaterally    MEDICATIONS  (STANDING):  dextrose 50% Injectable 12.5 Gram(s) IV Push once  dextrose 50% Injectable 25 Gram(s) IV Push once  dextrose 50% Injectable 25 Gram(s) IV Push once  enoxaparin Injectable 40 milliGRAM(s) SubCutaneous daily  ergocalciferol 01510 Unit(s) Oral every week  insulin lispro (HumaLOG) corrective regimen sliding scale   SubCutaneous three times a day before meals  insulin lispro (HumaLOG) corrective regimen sliding scale   SubCutaneous at bedtime  melatonin 3 milliGRAM(s) Oral at bedtime  metFORMIN 1000 milliGRAM(s) Oral two times a day with meals  mirtazapine 7.5 milliGRAM(s) Oral <User Schedule>  mirtazapine 3.75 milliGRAM(s) Oral <User Schedule>  OLANZapine 2.5 milliGRAM(s) Oral <User Schedule>  OLANZapine 5 milliGRAM(s) Oral <User Schedule>  QUEtiapine 50 milliGRAM(s) Oral <User Schedule>  QUEtiapine 25 milliGRAM(s) Oral <User Schedule>  sodium chloride 0.9%. 1000 milliLiter(s) (50 mL/Hr) IV Continuous <Continuous>       	  	  LABS:	 	                         15.2   11.82 )-----------( 321      ( 12 May 2020 07:33 )             45.5     05-13    149<H>  |  115<H>  |  66<H>  ----------------------------<  396<H>  3.8   |  24  |  1.38<H>    Ca    10.1      13 May 2020 07:47      proBNP: Serum Pro-Brain Natriuretic Peptide: 126 pg/mL (05-01 @ 09:36)    Lipid Profile: Cholesterol 161  LDL 95  HDL 35         TSH: Thyroid Stimulating Hormone, Serum: 2.39 uU/mL (05-01 @ 09:36)

## 2020-05-14 DIAGNOSIS — E11.65 TYPE 2 DIABETES MELLITUS WITH HYPERGLYCEMIA: ICD-10-CM

## 2020-05-14 DIAGNOSIS — J96.01 ACUTE RESPIRATORY FAILURE WITH HYPOXIA: ICD-10-CM

## 2020-05-14 DIAGNOSIS — E87.0 HYPEROSMOLALITY AND HYPERNATREMIA: ICD-10-CM

## 2020-05-14 DIAGNOSIS — N17.9 ACUTE KIDNEY FAILURE, UNSPECIFIED: ICD-10-CM

## 2020-05-14 LAB
AMMONIA BLD-MCNC: 27 UMOL/L — SIGNIFICANT CHANGE UP (ref 11–32)
ANION GAP SERPL CALC-SCNC: 12 MMOL/L — SIGNIFICANT CHANGE UP (ref 5–17)
ANION GAP SERPL CALC-SCNC: 8 MMOL/L — SIGNIFICANT CHANGE UP (ref 5–17)
APPEARANCE UR: CLEAR — SIGNIFICANT CHANGE UP
BACTERIA # UR AUTO: ABNORMAL /HPF
BASE EXCESS BLDA CALC-SCNC: -0.1 MMOL/L — SIGNIFICANT CHANGE UP (ref -2–2)
BASE EXCESS BLDA CALC-SCNC: 0.7 MMOL/L — SIGNIFICANT CHANGE UP (ref -2–2)
BILIRUB UR-MCNC: NEGATIVE — SIGNIFICANT CHANGE UP
BLOOD GAS COMMENTS ARTERIAL: SIGNIFICANT CHANGE UP
BUN SERPL-MCNC: 77 MG/DL — HIGH (ref 7–18)
BUN SERPL-MCNC: 81 MG/DL — HIGH (ref 7–18)
CALCIUM SERPL-MCNC: 9.7 MG/DL — SIGNIFICANT CHANGE UP (ref 8.4–10.5)
CALCIUM SERPL-MCNC: 9.9 MG/DL — SIGNIFICANT CHANGE UP (ref 8.4–10.5)
CHLORIDE SERPL-SCNC: 121 MMOL/L — HIGH (ref 96–108)
CHLORIDE SERPL-SCNC: 123 MMOL/L — HIGH (ref 96–108)
CO2 SERPL-SCNC: 21 MMOL/L — LOW (ref 22–31)
CO2 SERPL-SCNC: 24 MMOL/L — SIGNIFICANT CHANGE UP (ref 22–31)
COLOR SPEC: YELLOW — SIGNIFICANT CHANGE UP
COMMENT - URINE: SIGNIFICANT CHANGE UP
CREAT ?TM UR-MCNC: 143 MG/DL — SIGNIFICANT CHANGE UP
CREAT SERPL-MCNC: 1.48 MG/DL — HIGH (ref 0.5–1.3)
CREAT SERPL-MCNC: 1.57 MG/DL — HIGH (ref 0.5–1.3)
D DIMER BLD IA.RAPID-MCNC: 196 NG/ML DDU — SIGNIFICANT CHANGE UP
DIFF PNL FLD: NEGATIVE — SIGNIFICANT CHANGE UP
EPI CELLS # UR: ABNORMAL /HPF
GLUCOSE BLDC GLUCOMTR-MCNC: 199 MG/DL — HIGH (ref 70–99)
GLUCOSE BLDC GLUCOMTR-MCNC: 332 MG/DL — HIGH (ref 70–99)
GLUCOSE BLDC GLUCOMTR-MCNC: 347 MG/DL — HIGH (ref 70–99)
GLUCOSE BLDC GLUCOMTR-MCNC: 349 MG/DL — HIGH (ref 70–99)
GLUCOSE SERPL-MCNC: 304 MG/DL — HIGH (ref 70–99)
GLUCOSE SERPL-MCNC: 357 MG/DL — HIGH (ref 70–99)
GLUCOSE UR QL: 250
GRAN CASTS # UR COMP ASSIST: ABNORMAL /LPF
HCO3 BLDA-SCNC: 22 MMOL/L — LOW (ref 23–27)
HCO3 BLDA-SCNC: 24 MMOL/L — SIGNIFICANT CHANGE UP (ref 23–27)
HCT VFR BLD CALC: 47.6 % — SIGNIFICANT CHANGE UP (ref 39–50)
HGB BLD-MCNC: 15.7 G/DL — SIGNIFICANT CHANGE UP (ref 13–17)
HOROWITZ INDEX BLDA+IHG-RTO: 100 — SIGNIFICANT CHANGE UP
HOROWITZ INDEX BLDA+IHG-RTO: 40 — SIGNIFICANT CHANGE UP
HYALINE CASTS # UR AUTO: ABNORMAL /LPF
KETONES UR-MCNC: NEGATIVE — SIGNIFICANT CHANGE UP
LEUKOCYTE ESTERASE UR-ACNC: NEGATIVE — SIGNIFICANT CHANGE UP
MCHC RBC-ENTMCNC: 28.8 PG — SIGNIFICANT CHANGE UP (ref 27–34)
MCHC RBC-ENTMCNC: 33 GM/DL — SIGNIFICANT CHANGE UP (ref 32–36)
MCV RBC AUTO: 87.2 FL — SIGNIFICANT CHANGE UP (ref 80–100)
NITRITE UR-MCNC: NEGATIVE — SIGNIFICANT CHANGE UP
NRBC # BLD: 0 /100 WBCS — SIGNIFICANT CHANGE UP (ref 0–0)
OSMOLALITY UR: 892 MOS/KG — SIGNIFICANT CHANGE UP (ref 50–1200)
PCO2 BLDA: 29 MMHG — LOW (ref 32–46)
PCO2 BLDA: 36 MMHG — SIGNIFICANT CHANGE UP (ref 32–46)
PH BLDA: 7.44 — SIGNIFICANT CHANGE UP (ref 7.35–7.45)
PH BLDA: 7.48 — HIGH (ref 7.35–7.45)
PH UR: 5 — SIGNIFICANT CHANGE UP (ref 5–8)
PLATELET # BLD AUTO: 321 K/UL — SIGNIFICANT CHANGE UP (ref 150–400)
PO2 BLDA: 45 MMHG — CRITICAL LOW (ref 74–108)
PO2 BLDA: 58 MMHG — LOW (ref 74–108)
POTASSIUM SERPL-MCNC: 3.5 MMOL/L — SIGNIFICANT CHANGE UP (ref 3.5–5.3)
POTASSIUM SERPL-MCNC: 3.6 MMOL/L — SIGNIFICANT CHANGE UP (ref 3.5–5.3)
POTASSIUM SERPL-SCNC: 3.5 MMOL/L — SIGNIFICANT CHANGE UP (ref 3.5–5.3)
POTASSIUM SERPL-SCNC: 3.6 MMOL/L — SIGNIFICANT CHANGE UP (ref 3.5–5.3)
POTASSIUM UR-SCNC: 45 MMOL/L — SIGNIFICANT CHANGE UP
PROT UR-MCNC: 30 MG/DL
RBC # BLD: 5.46 M/UL — SIGNIFICANT CHANGE UP (ref 4.2–5.8)
RBC # FLD: 12.8 % — SIGNIFICANT CHANGE UP (ref 10.3–14.5)
RBC CASTS # UR COMP ASSIST: ABNORMAL /HPF (ref 0–2)
SAO2 % BLDA: 80 % — LOW (ref 92–96)
SAO2 % BLDA: 91 % — LOW (ref 92–96)
SODIUM SERPL-SCNC: 154 MMOL/L — HIGH (ref 135–145)
SODIUM SERPL-SCNC: 155 MMOL/L — HIGH (ref 135–145)
SODIUM UR-SCNC: 23 MMOL/L — SIGNIFICANT CHANGE UP
SP GR SPEC: 1.02 — SIGNIFICANT CHANGE UP (ref 1.01–1.02)
UROBILINOGEN FLD QL: 1
WBC # BLD: 10.15 K/UL — SIGNIFICANT CHANGE UP (ref 3.8–10.5)
WBC # FLD AUTO: 10.15 K/UL — SIGNIFICANT CHANGE UP (ref 3.8–10.5)
WBC UR QL: SIGNIFICANT CHANGE UP /HPF (ref 0–5)

## 2020-05-14 PROCEDURE — 93010 ELECTROCARDIOGRAM REPORT: CPT

## 2020-05-14 PROCEDURE — 99233 SBSQ HOSP IP/OBS HIGH 50: CPT

## 2020-05-14 PROCEDURE — 71045 X-RAY EXAM CHEST 1 VIEW: CPT | Mod: 26

## 2020-05-14 PROCEDURE — 70450 CT HEAD/BRAIN W/O DYE: CPT | Mod: 26

## 2020-05-14 PROCEDURE — 99254 IP/OBS CNSLTJ NEW/EST MOD 60: CPT

## 2020-05-14 RX ORDER — SODIUM CHLORIDE 9 MG/ML
1000 INJECTION, SOLUTION INTRAVENOUS
Refills: 0 | Status: DISCONTINUED | OUTPATIENT
Start: 2020-05-14 | End: 2020-05-16

## 2020-05-14 RX ORDER — SODIUM CHLORIDE 9 MG/ML
1000 INJECTION, SOLUTION INTRAVENOUS
Refills: 0 | Status: DISCONTINUED | OUTPATIENT
Start: 2020-05-14 | End: 2020-05-14

## 2020-05-14 RX ORDER — INSULIN LISPRO 100/ML
2 VIAL (ML) SUBCUTANEOUS
Refills: 0 | Status: DISCONTINUED | OUTPATIENT
Start: 2020-05-14 | End: 2020-05-15

## 2020-05-14 RX ORDER — INSULIN GLARGINE 100 [IU]/ML
12 INJECTION, SOLUTION SUBCUTANEOUS EVERY MORNING
Refills: 0 | Status: DISCONTINUED | OUTPATIENT
Start: 2020-05-14 | End: 2020-05-15

## 2020-05-14 RX ORDER — HALOPERIDOL DECANOATE 100 MG/ML
0.5 INJECTION INTRAMUSCULAR EVERY 6 HOURS
Refills: 0 | Status: DISCONTINUED | OUTPATIENT
Start: 2020-05-14 | End: 2020-05-14

## 2020-05-14 RX ORDER — QUETIAPINE FUMARATE 200 MG/1
25 TABLET, FILM COATED ORAL AT BEDTIME
Refills: 0 | Status: DISCONTINUED | OUTPATIENT
Start: 2020-05-14 | End: 2020-05-15

## 2020-05-14 RX ORDER — INSULIN LISPRO 100/ML
VIAL (ML) SUBCUTANEOUS
Refills: 0 | Status: DISCONTINUED | OUTPATIENT
Start: 2020-05-14 | End: 2020-05-17

## 2020-05-14 RX ADMIN — OLANZAPINE 2.5 MILLIGRAM(S): 15 TABLET, FILM COATED ORAL at 12:16

## 2020-05-14 RX ADMIN — INSULIN GLARGINE 12 UNIT(S): 100 INJECTION, SOLUTION SUBCUTANEOUS at 12:14

## 2020-05-14 RX ADMIN — Medication 8: at 17:40

## 2020-05-14 RX ADMIN — Medication 2 UNIT(S): at 17:41

## 2020-05-14 RX ADMIN — QUETIAPINE FUMARATE 25 MILLIGRAM(S): 200 TABLET, FILM COATED ORAL at 08:27

## 2020-05-14 RX ADMIN — Medication 4: at 08:26

## 2020-05-14 RX ADMIN — METFORMIN HYDROCHLORIDE 1000 MILLIGRAM(S): 850 TABLET ORAL at 08:28

## 2020-05-14 RX ADMIN — Medication 2 UNIT(S): at 12:14

## 2020-05-14 RX ADMIN — MIRTAZAPINE 7.5 MILLIGRAM(S): 45 TABLET, ORALLY DISINTEGRATING ORAL at 12:15

## 2020-05-14 RX ADMIN — MIRTAZAPINE 3.75 MILLIGRAM(S): 45 TABLET, ORALLY DISINTEGRATING ORAL at 08:27

## 2020-05-14 RX ADMIN — Medication 8: at 12:14

## 2020-05-14 RX ADMIN — SODIUM CHLORIDE 75 MILLILITER(S): 9 INJECTION, SOLUTION INTRAVENOUS at 12:56

## 2020-05-14 RX ADMIN — ENOXAPARIN SODIUM 40 MILLIGRAM(S): 100 INJECTION SUBCUTANEOUS at 12:15

## 2020-05-14 NOTE — PROGRESS NOTE ADULT - PROBLEM SELECTOR PLAN 4
BG > 300mg/dL  A1C 9.9  Discontinue Metformin  Start basal /bolus insulin : Lantus 12 units SC qam, Humalog 2units TID with meals  Monitor BG ac/HS, and cover with moderate HISS  Hypoglycemia protocol  Appreciate Endo input

## 2020-05-14 NOTE — CONSULT NOTE ADULT - SUBJECTIVE AND OBJECTIVE BOX
64 year old male from [facility]isted living with PMH of dementia and type 2 DM presented with seizure activity while sitting at AL. Endocrinology was consulted for management of uncontrolled type 2 DM with hyperglycemia.    The patient is on a home DM regimen of Metformin 1000mg PO BID. A1c is above goal at 9.9%. The patient is nonverbal and unable to contribute to history. As per nurse, the patient has had poor PO intake during hospital stay. He has been hyperglycemic with average BG >300 on low dose insulin sliding scale with meals and Metformin 1000mg PO BID. Patient now with worsening hypernatremia to 154. The patient was COVID 19 + on admission.    Patient is a 64y old  Male who presents with a chief complaint of seizures (13 May 2020 11:57)      INTERVAL HPI/OVERNIGHT EVENTS:  T(C): 36.7 (20 @ 06:59), Max: 36.9 (20 @ 16:23)  HR: 104 (20 @ 06:59) (102 - 105)  BP: 137/82 (20 @ 06:59) (104/72 - 138/85)  RR: 18 (20 @ 06:59) (16 - 18)  SpO2: 95% (20 @ 06:59) (91% - 95%)  Wt(kg): --  I&O's Summary      PAST MEDICAL & SURGICAL HISTORY:  Dementia  No significant past surgical history      SOCIAL HISTORY  Alcohol:  Tobacco:  Illicit substance use:      FAMILY HISTORY:        MEDICATIONS  (STANDING):  dextrose 50% Injectable 12.5 Gram(s) IV Push once  dextrose 50% Injectable 25 Gram(s) IV Push once  dextrose 50% Injectable 25 Gram(s) IV Push once  enoxaparin Injectable 40 milliGRAM(s) SubCutaneous daily  ergocalciferol 66924 Unit(s) Oral every week  insulin lispro (HumaLOG) corrective regimen sliding scale   SubCutaneous three times a day before meals  insulin lispro (HumaLOG) corrective regimen sliding scale   SubCutaneous at bedtime  melatonin 3 milliGRAM(s) Oral at bedtime  mirtazapine 7.5 milliGRAM(s) Oral <User Schedule>  mirtazapine 3.75 milliGRAM(s) Oral <User Schedule>  OLANZapine 2.5 milliGRAM(s) Oral <User Schedule>  OLANZapine 5 milliGRAM(s) Oral <User Schedule>  QUEtiapine 50 milliGRAM(s) Oral <User Schedule>  QUEtiapine 25 milliGRAM(s) Oral <User Schedule>    MEDICATIONS  (PRN):  acetaminophen  Suppository .. 650 milliGRAM(s) Rectal every 6 hours PRN Temp greater or equal to 38C (100.4F)  ALBUTerol    90 MICROgram(s) HFA Inhaler 2 Puff(s) Inhalation every 6 hours PRN Shortness of Breath and/or Wheezing  dextrose 40% Gel 15 Gram(s) Oral once PRN Blood Glucose LESS THAN 70 milliGRAM(s)/deciliter  glucagon  Injectable 1 milliGRAM(s) IntraMuscular once PRN Glucose LESS THAN 70 milligrams/deciliter  guaiFENesin   Syrup  (Sugar-Free) 100 milliGRAM(s) Oral every 6 hours PRN Cough  haloperidol     Tablet 0.5 milliGRAM(s) Oral every 6 hours PRN agitation  LORazepam     Tablet 0.25 milliGRAM(s) Oral every 6 hours PRN Agitation      REVIEW OF SYSTEMS:  unable to obtain due to medical condition (patient nonverbal)      PHYSICAL EXAM:  GENERAL: NAD, well-developed  HEAD:  Atraumatic, Normocephalic  EYES: EOMI, PERRLA, conjunctiva and sclera clear  ENMT: No tonsillar erythema, exudates, or enlargement; No lesions  NECK: Supple, No JVD, Normal thyroid  NERVOUS SYSTEM:  alert, nonverbal,  CHEST/LUNG: Clear to ausculation bilaterally; No rales, rhonchi, wheezing, or rubs  HEART: Regular rate and rhythm; No murmurs, rubs, or gallops  ABDOMEN: Soft, Nontender, Nondistended; Bowel sounds present  EXTREMITIES:  2+ Peripheral Pulses, No clubbing, cyanosis, or edema  LYMPH: No lymphadenopathy noted  SKIN: No rashes or lesions      LABS:                        15.7   10.15 )-----------( 321      ( 14 May 2020 08:26 )             47.6     05-14    154<H>  |  121<H>  |  77<H>  ----------------------------<  357<H>  3.6   |  21<L>  |  1.57<H>    Ca    9.9      14 May 2020 08:26        Urinalysis Basic - ( 14 May 2020 06:35 )    Color: Yellow / Appearance: Clear / S.020 / pH: x  Gluc: x / Ketone: Negative  / Bili: Negative / Urobili: 1   Blood: x / Protein: 30 mg/dL / Nitrite: Negative   Leuk Esterase: Negative / RBC: 2-5 /HPF / WBC 3-5 /HPF   Sq Epi: x / Non Sq Epi: Occasional /HPF / Bacteria: Moderate /HPF      CAPILLARY BLOOD GLUCOSE      POCT Blood Glucose.: 332 mg/dL (14 May 2020 08:13)  POCT Blood Glucose.: 237 mg/dL (13 May 2020 21:53)  POCT Blood Glucose.: 320 mg/dL (13 May 2020 17:05)  POCT Blood Glucose.: 334 mg/dL (13 May 2020 11:29)      A1c 9.9%      Urinalysis Basic - ( 14 May 2020 06:35 )    Color: Yellow / Appearance: Clear / S.020 / pH: x  Gluc: x / Ketone: Negative  / Bili: Negative / Urobili: 1   Blood: x / Protein: 30 mg/dL / Nitrite: Negative   Leuk Esterase: Negative / RBC: 2-5 /HPF / WBC 3-5 /HPF   Sq Epi: x / Non Sq Epi: Occasional /HPF / Bacteria: Moderate /HPF

## 2020-05-14 NOTE — CONSULT NOTE ADULT - ASSESSMENT
MAXIME in a patient with dementia and seizure, myoclonic jerks  Hypernatremia  Increased H/H from admission  Pre renal MAXIME    Suggest to start IV fluids , started on iv fluids d5w   Free water deficit to Get sodium down by 8 is 2 liters  Continue IV fluids Hypotonic solution , 2 liters per day and follow urine output  Follow electrolytes in am  Respiratory alkalosis in am with hypoxia, no tachypnea at present, tachypnea in am due to hypoxia.  Follow UA and urine lytes.  Follow BP .

## 2020-05-14 NOTE — PROGRESS NOTE ADULT - PROBLEM SELECTOR PLAN 3
due to poor po intake  Na+uptrending, 154 today  Change IVF to D5W at 75ml/hr  Monitor BMP every 6 hr  F/u Renal consult with Dr. Carias

## 2020-05-14 NOTE — PROGRESS NOTE ADULT - ASSESSMENT
64 yr old male with PMHX of DM,dementia sent to ER from facility for seizure and now COVID+,Delirium.  1.MAXIME and hypernatremia Change IVF to d5w, check bladder scan and if urinary retentin-place goodwin.  2.COVID +supportive treatment.  3.Psych re-eval.  4.DM-Insulin, Endo eval appreciated.  5.Delirium-Psych re-eval.  6.Dementia with behavioral problems-cont current medication.  7.Replace vit d.  8.GI and DVT prophylaxis.

## 2020-05-14 NOTE — CONSULT NOTE ADULT - SUBJECTIVE AND OBJECTIVE BOX
64y  Male  No Known Allergies    Patient is a 64y old  Male who presents with a chief complaint of seizures (14 May 2020 13:22)    HPI:  64 year old male from [facility] living  with PMH dementia and DM presents 5/1  with seizures like activity while sitting at AL. Pt unable to provide further history on time of admission (poor historian).        ****Morning team to contact Assisted Living for home med [facility] and GOC ****   Full code for now (01 May 2020 03:13)    PAST MEDICAL & SURGICAL HISTORY:  Dementia  No significant past surgical history    FAMILY HISTORY:    MEDICATIONS  (STANDING):  dextrose 5%. 1000 milliLiter(s) (75 mL/Hr) IV Continuous <Continuous>  dextrose 50% Injectable 12.5 Gram(s) IV Push once  dextrose 50% Injectable 25 Gram(s) IV Push once  dextrose 50% Injectable 25 Gram(s) IV Push once  enoxaparin Injectable 40 milliGRAM(s) SubCutaneous daily  ergocalciferol 45601 Unit(s) Oral every week  insulin glargine Injectable (LANTUS) 12 Unit(s) SubCutaneous every morning  insulin lispro (HumaLOG) corrective regimen sliding scale   SubCutaneous three times a day before meals  insulin lispro (HumaLOG) corrective regimen sliding scale   SubCutaneous at bedtime  insulin lispro Injectable (HumaLOG) 2 Unit(s) SubCutaneous three times a day with meals  mirtazapine 7.5 milliGRAM(s) Oral <User Schedule>  mirtazapine 3.75 milliGRAM(s) Oral <User Schedule>  QUEtiapine 25 milliGRAM(s) Oral at bedtime    MEDICATIONS  (PRN):  acetaminophen  Suppository .. 650 milliGRAM(s) Rectal every 6 hours PRN Temp greater or equal to 38C (100.4F)  ALBUTerol    90 MICROgram(s) HFA Inhaler 2 Puff(s) Inhalation every 6 hours PRN Shortness of Breath and/or Wheezing  dextrose 40% Gel 15 Gram(s) Oral once PRN Blood Glucose LESS THAN 70 milliGRAM(s)/deciliter  glucagon  Injectable 1 milliGRAM(s) IntraMuscular once PRN Glucose LESS THAN 70 milligrams/deciliter  guaiFENesin   Syrup  (Sugar-Free) 100 milliGRAM(s) Oral every 6 hours PRN Cough      Vital Signs Last 24 Hrs  T(C): 36.7 (14 May 2020 06:59), Max: 36.9 (13 May 2020 16:23)  T(F): 98 (14 May 2020 06:59), Max: 98.4 (13 May 2020 16:23)  HR: 104 (14 May 2020 06:59) (102 - 105)  BP: 137/82 (14 May 2020 06:59) (104/72 - 138/85)  BP(mean): --  RR: 18 (14 May 2020 06:59) (16 - 18)  SpO2: 95% (14 May 2020 06:59) (91% - 95%)  CBC Full  -  ( 14 May 2020 08:26 )  WBC Count : 10.15 K/uL  RBC Count : 5.46 M/uL  Hemoglobin : 15.7 g/dL  Hematocrit : 47.6 %  Platelet Count - Automated : 321 K/uL  Mean Cell Volume : 87.2 fl  Mean Cell Hemoglobin : 28.8 pg  Mean Cell Hemoglobin Concentration : 33.0 gm/dL      05-14    154<H>  |  121<H>  |  77<H>  ----------------------------<  357<H>  3.6   |  21<L>  |  1.57<H>    Ca    9.9      14 May 2020 08:26

## 2020-05-14 NOTE — CONSULT NOTE ADULT - ATTENDING COMMENTS
Pt is a 64 year old male from [facility] living with PMH Frontal lobe dementia and DM presenting on 5/1 with seizure activity.  Pt was evaluated by neurology who initiated keppra therapy, and as there was no residual seizure activity recommended discontinuing all antiepileptics at this time.  Pt was continued on PRN ativan for seizure activity, and started on haldol for agitation  Pt was also continued on Seroquel, Zyprexa and Remeron.      ICU was consulted with concern for hypoxemia and delirium.  Pt initially presented in no acute distress, alert, but non-communicative which may be his baseline.  Pt has intact strength and was combative during physical exam.  Saturation on non-rebreather 99%, and pt was tapered down to ncann 4L.  Remaining vitals WNL.  Labs sig for mild leukocytosis, Na of 154, and creat of 1.5.  UA negative.  Repeat CXR clear.  ABG obtained is likely a VBG, and repeat ABG recommended, but no current signs of hypoxemia are present.  Delirium is likely multifactorial from polypharmacy, active infection, and electrolyte imbalance.     Plan:  -Recommend monitoring O2 saturation and attaining repeat ABG, D-dimer  -pat will need to be sedated for CTPA if needed   -Correct electrolyte imbalance- currently started on D5 @ 75ml/hr  -Monitor worsening renal dysfunction- attain urine lytes/ serum osm, renal consult   -monitor BMP q6 for now   -Tighter glycemic control- endo recommendation noted   -Taper down antipsychotics as they are likely contributing to delirium- psych recommendation Pt is a 64 year old male from [facility] living with PMH Frontal lobe dementia and DM presenting on 5/1 with seizure activity.  Pt was evaluated by neurology who initiated keppra therapy, and as there was no residual seizure activity recommended discontinuing all antiepileptics at this time.  Pt was continued on PRN ativan for seizure activity, and started on haldol for agitation  Pt was also continued on Seroquel, Zyprexa and Remeron.      ICU was consulted with concern for hypoxemia and delirium.  Pt initially presented in no acute distress, alert, but non-communicative which may be his baseline.  Pt has intact strength and was combative during physical exam.  Saturation on non-rebreather 99%, and pt was tapered down to ncann 4L.  Remaining vitals WNL.  Labs sig for mild leukocytosis, Na of 154, and creat of 1.5.  UA negative.  Repeat CXR clear.  ABG obtained is likely a VBG, and repeat ABG recommended, but no current signs of hypoxemia are present.  Delirium is likely multifactorial from polypharmacy, active infection, and electrolyte imbalance.   Will need to evaluate for possible PE, DDimer for now , if elevated therapeutic anticoag    Plan:  -Recommend monitoring O2 saturation and attaining repeat ABG, D-dimer  -pat will need to be sedated for CTPA if needed   -Correct electrolyte imbalance- currently started on D5 @ 75ml/hr  -Monitor worsening renal dysfunction- attain urine lytes/ serum osm, renal consult   -monitor BMP q6 for now   -Tighter glycemic control- endo recommendation noted   -Taper down antipsychotics as they are likely contributing to delirium- psych recommendation

## 2020-05-14 NOTE — CONSULT NOTE ADULT - ASSESSMENT
64 year old male from [facility] living with PMH of dementia and type 2 DM presented with seizure activity while sitting at AL. Endocrinology was consulted for management of uncontrolled type 2 DM with hyperglycemia.    1. Uncontrolled Type 2 DM with hyperglycemia  -BG above goal >300 despite poor PO intake  -d/c Metformin  -start Lantus 12 units at bedtime  -start Humalog 2 units TID with meals (administer only if patient eats >50% of the meal)  -increase mealtime rapid acting insulin to moderate scale as below  BG 70-100mg/dL 0 units  -150 mg/dL 0 units  -200 mg/dL 2 units  -250 mg/dL 4 units  -300 mg/dL 6 units  -350 mg/dL 8 units  -400 mg/dL 10 units  BG > 400mg/dL 12 units  -FS AC HS  -ensure consistent carbohydrate   -will monitor FS and adjust accordingly   --if patient is NPO for any reason please change FS and sliding scale to NPO lispro scale     2. Hypernatremia  -encourage PO hydration  -agree to d/c NS and start D5   -continue to monitor Na closely     3. COVID 19+  -patient saturating well on room air  -continue supportive measures    Plan discussed with primary team  Thank you for allowing me to participate in the care of this patient  Please  call  w/ any questions or concerns [phone] 64 year old male from [facility] living with PMH of dementia and type 2 DM presented with seizure activity while sitting at AL. Endocrinology was consulted for management of uncontrolled type 2 DM with hyperglycemia.    1. Uncontrolled Type 2 DM with hyperglycemia  -BG above goal >300 despite poor PO intake  -d/c Metformin  -start Lantus 12 units daily  -start Humalog 2 units TID with meals (administer only if patient eats >50% of the meal)  -increase mealtime rapid acting insulin to moderate scale as below  BG 70-100mg/dL 0 units  -150 mg/dL 0 units  -200 mg/dL 2 units  -250 mg/dL 4 units  -300 mg/dL 6 units  -350 mg/dL 8 units  -400 mg/dL 10 units  BG > 400mg/dL 12 units  -FS AC HS  -ensure consistent carbohydrate   -will monitor FS and adjust accordingly   --if patient is NPO for any reason please change FS and sliding scale to NPO lispro scale     2. Hypernatremia  -encourage PO hydration  -agree to d/c NS and start D5   -continue to monitor Na closely     3. COVID 19+  -patient saturating well on room air  -continue supportive measures    Plan discussed with primary team  Thank you for allowing me to participate in the care of this patient  Please  call  w/ any questions or concerns [phone] 64 year old male from [facility] living with PMH of dementia and type 2 DM presented with seizure activity while sitting at AL. Endocrinology was consulted for management of uncontrolled type 2 DM with hyperglycemia.    1. Uncontrolled Type 2 DM with hyperglycemia, A1c 9.9%  -BG above goal >300 despite poor PO intake  -d/c Metformin  -start Lantus 12 units daily  -start Humalog 2 units TID with meals (administer only if patient eats >50% of the meal)  -increase mealtime rapid acting insulin to moderate scale as below  BG 70-100mg/dL 0 units  -150 mg/dL 0 units  -200 mg/dL 2 units  -250 mg/dL 4 units  -300 mg/dL 6 units  -350 mg/dL 8 units  -400 mg/dL 10 units  BG > 400mg/dL 12 units  -FS AC HS  -ensure consistent carbohydrate   -will monitor FS and adjust accordingly   --if patient is NPO for any reason please change FS and sliding scale to NPO lispro scale     2. Hypernatremia  -encourage PO hydration  -agree to d/c NS and start D5   -continue to monitor Na closely     3. COVID 19+  -patient saturating well on room air  -continue supportive measures    Plan discussed with primary team  Thank you for allowing me to participate in the care of this patient  Please  call  w/ any questions or concerns [phone]

## 2020-05-14 NOTE — CONSULT NOTE ADULT - ASSESSMENT
Pt is a 64 year old male from [facility]isted living with PMH dementia and DM presenting on 5/1 with seizure activity. Pt was unable to provide further history (poor historian) on admission.  Pt was evaluated by neurology who initiated keppra therapy, and as there was no residual seizure activity recommended discontinuing all antiepileptics at this time.  Pt was continued on PRN ativan for seizure activity, and started on haldol for agitation  Pt was also continued on Seroquel, Zyprexa and Remeron.      ICU was consulted with concern for hypoxemia and delirium.  Pt initially presented in no acute distress, alert, but non-communicative which may be his baseline.  Pt has intact strength and was combative during physical exam.  Saturation on non-rebreather 99%, and pt was tapered down to ncann 4L.  Remaining vitals WNL.  Labs sig for mild leukocytosis, Na of 154, and creat of 1.5.  UA negative.  Repeat CXR clear.  ABG obtained is likely a VBG, and repeat ABG recommended, but no current signs of hypoxemia are present.  Delirium is likely multifactorial from polypharmacy, active infection, and electrolyte imbalance.     Plan:  -Recommend monitoring O2 saturation and attaining repeat ABG, D-dimer  -Correct electrolyte imbalance- currently started on D5 @ 75ml/hr  -Monitor worsening renal dysfunction- attain urine lytes/ serum osm, renal consult   -monitor BMP q6 for now   -Taper down antipsychotics as they are likely contributing to delirium- psych recommendation    Pt is not currently a candidate for ICU monitoring at this time.  Please re consult as clinically indicated. Pt is a 64 year old male from [facility] living with PMH dementia and DM presenting on 5/1 with seizure activity. Pt was unable to provide further history (poor historian) on admission.  Pt was evaluated by neurology who initiated keppra therapy, and as there was no residual seizure activity recommended discontinuing all antiepileptics at this time.  Pt was continued on PRN ativan for seizure activity, and started on haldol for agitation  Pt was also continued on Seroquel, Zyprexa and Remeron.      ICU was consulted with concern for hypoxemia and delirium.  Pt initially presented in no acute distress, alert, but non-communicative which may be his baseline.  Pt has intact strength and was combative during physical exam.  Saturation on non-rebreather 99%, and pt was tapered down to ncann 4L.  Remaining vitals WNL.  Labs sig for mild leukocytosis, Na of 154, and creat of 1.5.  UA negative.  Repeat CXR clear.  ABG obtained is likely a VBG, and repeat ABG recommended, but no current signs of hypoxemia are present.  Delirium is likely multifactorial from polypharmacy, active infection, and electrolyte imbalance.     Plan:  -Recommend monitoring O2 saturation and attaining repeat ABG, D-dimer  -Correct electrolyte imbalance- currently started on D5 @ 75ml/hr  -Monitor worsening renal dysfunction- attain urine lytes/ serum osm, renal consult   -monitor BMP q6 for now   -Tighter glycemic control- endo recommendation noted   -Taper down antipsychotics as they are likely contributing to delirium- psych recommendation    Pt is not currently a candidate for ICU monitoring at this time.  Please re consult as clinically indicated. Pt is a 64 year old male from [facility] living with PMH Frontal lobe dementia and DM presenting on 5/1 with seizure activity. Pt was unable to provide further history (poor historian) on admission.  Pt was evaluated by neurology who initiated keppra therapy, and as there was no residual seizure activity recommended discontinuing all antiepileptics at this time.  Pt was continued on PRN ativan for seizure activity, and started on haldol for agitation  Pt was also continued on Seroquel, Zyprexa and Remeron.      ICU was consulted with concern for hypoxemia and delirium.  Pt initially presented in no acute distress, alert, but non-communicative which may be his baseline.  Pt has intact strength and was combative during physical exam.  Saturation on non-rebreather 99%, and pt was tapered down to ncann 4L.  Remaining vitals WNL.  Labs sig for mild leukocytosis, Na of 154, and creat of 1.5.  UA negative.  Repeat CXR clear.  ABG obtained is likely a VBG, and repeat ABG recommended, but no current signs of hypoxemia are present.  Delirium is likely multifactorial from polypharmacy, active infection, and electrolyte imbalance.     Plan:  -Recommend monitoring O2 saturation and attaining repeat ABG, D-dimer  -Correct electrolyte imbalance- currently started on D5 @ 75ml/hr  -Monitor worsening renal dysfunction- attain urine lytes/ serum osm, renal consult   -monitor BMP q6 for now   -Tighter glycemic control- endo recommendation noted   -Taper down antipsychotics as they are likely contributing to delirium- psych recommendation    Pt is not currently a candidate for ICU monitoring at this time.  Please re consult as clinically indicated.

## 2020-05-14 NOTE — PROGRESS NOTE ADULT - SUBJECTIVE AND OBJECTIVE BOX
CHIEF COMPLAINT:Patient is a 64y old  Male who presents with a chief complaint of seizures.Pt still with restraints    	  REVIEW OF SYSTEMS:  [x ] Unable to obtain    PHYSICAL EXAM:  T(C): 36.7 (05-14-20 @ 06:59), Max: 36.9 (05-13-20 @ 16:23)  HR: 104 (05-14-20 @ 06:59) (102 - 105)  BP: 137/82 (05-14-20 @ 06:59) (104/72 - 138/85)  RR: 18 (05-14-20 @ 06:59) (16 - 18)  SpO2: 95% (05-14-20 @ 06:59) (91% - 95%)  Wt(kg): --  I&O's Summary      Appearance: Normal	  HEENT:   Normal oral mucosa, PERRL, EOMI	  Lymphatic: No lymphadenopathy  Cardiovascular: Normal S1 S2, No JVD, No murmurs, No edema  Respiratory: Lungs clear to auscultation	  Gastrointestinal:  Soft, Non-tender, + BS	  Skin: No rashes, No ecchymoses, No cyanosis	  Extremities: Normal range of motion, No clubbing, cyanosis or edema  Vascular: Peripheral pulses palpable 2+ bilaterally    MEDICATIONS  (STANDING):  dextrose 5%. 1000 milliLiter(s) (75 mL/Hr) IV Continuous <Continuous>  dextrose 50% Injectable 12.5 Gram(s) IV Push once  dextrose 50% Injectable 25 Gram(s) IV Push once  dextrose 50% Injectable 25 Gram(s) IV Push once  enoxaparin Injectable 40 milliGRAM(s) SubCutaneous daily  ergocalciferol 70616 Unit(s) Oral every week  insulin glargine Injectable (LANTUS) 12 Unit(s) SubCutaneous every morning  insulin lispro (HumaLOG) corrective regimen sliding scale   SubCutaneous three times a day before meals  insulin lispro (HumaLOG) corrective regimen sliding scale   SubCutaneous at bedtime  insulin lispro Injectable (HumaLOG) 2 Unit(s) SubCutaneous three times a day with meals  melatonin 3 milliGRAM(s) Oral at bedtime  mirtazapine 7.5 milliGRAM(s) Oral <User Schedule>  mirtazapine 3.75 milliGRAM(s) Oral <User Schedule>  OLANZapine 2.5 milliGRAM(s) Oral <User Schedule>  OLANZapine 5 milliGRAM(s) Oral <User Schedule>  QUEtiapine 50 milliGRAM(s) Oral <User Schedule>  QUEtiapine 25 milliGRAM(s) Oral <User Schedule>      TELEMETRY: 	    ECG:  	  RADIOLOGY:  OTHER: 	  	  LABS:	 	    CARDIAC MARKERS:                                15.7   10.15 )-----------( 321      ( 14 May 2020 08:26 )             47.6     05-14    154<H>  |  121<H>  |  77<H>  ----------------------------<  357<H>  3.6   |  21<L>  |  1.57<H>    Ca    9.9      14 May 2020 08:26      proBNP: Serum Pro-Brain Natriuretic Peptide: 126 pg/mL (05-01 @ 09:36)    Lipid Profile: Cholesterol 161  LDL 95  HDL 35      HgA1c:   TSH: Thyroid Stimulating Hormone, Serum: 2.39 uU/mL (05-01 @ 09:36)

## 2020-05-14 NOTE — CONSULT NOTE ADULT - SUBJECTIVE AND OBJECTIVE BOX
Chief complain  Admitted on   for Seizure without acute changes on CT brain.  He was placed on Keppra, Haldol for agitation , Seroquel, Zyprexa, Remeron.  ICU was called in am for Episode of delirium and SOB.    Renal consult was called for renal failure.  Admission creatinine 1.07  Increased creatinine on  to 1.38    COVID 19 posiitve. and   D-Dimer Assay, Quantitative (20 @ 08:34)    D-Dimer Assay, Quantitative: 261 ng/mL DDU  INTERPRETATION:  CLINICAL STATEMENT: Chest pain.    TECHNIQUE: AP view of the chest.    COMPARISON: 2020    FINDINGS/  IMPRESSION:  Mild opacityright lung base likely atelectasis.    No pleural effusion    Heart size within normal limits.        COMPARISON: 2020    AP view of the chest demonstrates a faint left lower lobe infiltrate. There is no pleural effusion. There is no pneumothorax.    The heart is normal in size. There is no mediastinal or hilar mass.     The pulmonary vasculature is normal.     Mild thoracic degenerative changes are present.    IMPRESSION:    Faint left lower lobe infiltrate.        DISCRETE X-RAY DATA:  Percent of LEFT lung opacification: 1-33%  Percent of RIGHT lung opacification: Clear  Change in lung opacification from most recent x-ray (<=3 days): No Prior  Change from prior dated 3 or more days (same admission): Increase          PAST MEDICAL & SURGICAL HISTORY:  Dementia  No significant past surgical history      Home Medications Reviewed    Hospital Medications:   MEDICATIONS  (STANDING):  dextrose 5%. 1000 milliLiter(s) (75 mL/Hr) IV Continuous <Continuous>  dextrose 50% Injectable 12.5 Gram(s) IV Push once  dextrose 50% Injectable 25 Gram(s) IV Push once  dextrose 50% Injectable 25 Gram(s) IV Push once  enoxaparin Injectable 40 milliGRAM(s) SubCutaneous daily  ergocalciferol 81186 Unit(s) Oral every week  insulin glargine Injectable (LANTUS) 12 Unit(s) SubCutaneous every morning  insulin lispro (HumaLOG) corrective regimen sliding scale   SubCutaneous three times a day before meals  insulin lispro (HumaLOG) corrective regimen sliding scale   SubCutaneous at bedtime  insulin lispro Injectable (HumaLOG) 2 Unit(s) SubCutaneous three times a day with meals  mirtazapine 7.5 milliGRAM(s) Oral <User Schedule>  mirtazapine 3.75 milliGRAM(s) Oral <User Schedule>  QUEtiapine 25 milliGRAM(s) Oral at bedtime    MEDICATIONS  (PRN):  acetaminophen  Suppository .. 650 milliGRAM(s) Rectal every 6 hours PRN Temp greater or equal to 38C (100.4F)  ALBUTerol    90 MICROgram(s) HFA Inhaler 2 Puff(s) Inhalation every 6 hours PRN Shortness of Breath and/or Wheezing  dextrose 40% Gel 15 Gram(s) Oral once PRN Blood Glucose LESS THAN 70 milliGRAM(s)/deciliter  glucagon  Injectable 1 milliGRAM(s) IntraMuscular once PRN Glucose LESS THAN 70 milligrams/deciliter  guaiFENesin   Syrup  (Sugar-Free) 100 milliGRAM(s) Oral every 6 hours PRN Cough      Allergies    No Known Allergies    Intolerances                              15.7   10.15 )-----------( 321      ( 14 May 2020 08:26 )             47.6     05-14    154<H>  |  121<H>  |  77<H>  ----------------------------<  357<H>  3.6   |  21<L>  |  1.57<H>    Ca    9.9      14 May 2020 08:26        Urinalysis Basic - ( 14 May 2020 06:35 )    Color: Yellow / Appearance: Clear / S.020 / pH: x  Gluc: x / Ketone: Negative  / Bili: Negative / Urobili: 1   Blood: x / Protein: 30 mg/dL / Nitrite: Negative   Leuk Esterase: Negative / RBC: 2-5 /HPF / WBC 3-5 /HPF   Sq Epi: x / Non Sq Epi: Occasional /HPF / Bacteria: Moderate /HPF        ABG - ( 14 May 2020 14:06 )  pH, Arterial: 7.48  pH, Blood: x     /  pCO2: 29    /  pO2: 58    / HCO3: 22    / Base Excess: -0.1  /  SaO2: 91                  RADIOLOGY & ADDITIONAL STUDIES:    SOCIAL HISTORY: Denies ETOh,Smoking,     FAMILY HISTORY:      REVIEW OF SYSTEMS:  CONSTITUTIONAL: No malaise, No fatigue, No fevers or chills, well developed, no diaphoresis  EYES/ENT: No visual changes;  No vertigo or throat pain   NECK: No pain or stiffness  RESPIRATORY: No cough, wheezing, hemoptysis; No shortness of breath  CARDIOVASCULAR: No chest pain or palpitations. No edema  GASTROINTESTINAL: No abdominal or epigastric pain. No nausea, vomiting, or hematemesis; No diarrhea or constipation. No melena or hematochezia.  GENITOURINARY: No dysuria, frequency, foamy urine, urinary urgency, incontinence or hematuria  NEUROLOGICAL: No numbness or weakness, No tremor  SKIN: No itching, burning, rashes, or lesions   VASCULAR: No claudication  Musculoskeletal: no arthralgia, no myalgia  All other review of systems is negative unless indicated above.    VITALS:  Vital Signs Last 24 Hrs  T(C): 37.2 (14 May 2020 14:32), Max: 37.2 (14 May 2020 14:32)  T(F): 98.9 (14 May 2020 14:32), Max: 98.9 (14 May 2020 14:32)  HR: 121 (14 May 2020 14:32) (104 - 121)  BP: 106/87 (14 May 2020 14:32) (104/72 - 137/82)  BP(mean): --  RR: 16 (14 May 2020 14:32) (16 - 18)  SpO2: 94% (14 May 2020 14:32) (94% - 95%)    -14 @ 07:01  -  14 @ 16:46  --------------------------------------------------------  IN: 0 mL / OUT: 400 mL / NET: -400 mL          PHYSICAL EXAM:  Constitutional: NAD  HEENT: anicteric sclera, oropharynx clear, MMM  Neck: No JVD  Respiratory: good air entrance B/L, no wheezes, rales or rhonchi  Cardiovascular: S1, S2, RRR, no pericardial rub, no murmur  Gastrointestinal: BS+, soft, no tenderness, no distension, no bruit  Pelvis: bladder non-distended, no CVA tenderness  Extremities: No cyanosis or clubbing. No peripheral edema  Neurological: A/O x 3, no focal deficits  Psychiatric: Normal mood, normal affect  : No CVA tenderness. No goodwin.   Skin: No rashes Chief complain  Admitted on   for Seizure without acute changes on CT brain.  He was placed on Keppra, Haldol for agitation , Seroquel, Zyprexa, Remeron.  ICU was called in am for Episode of delirium and SOB.    Renal consult was called for renal failure.  Admission creatinine 1.07  Increased creatinine on  to 1.38    COVID 19 posiitve. and   D-Dimer Assay, Quantitative (20 @ 08:34)    D-Dimer Assay, Quantitative: 261 ng/mL DDU  INTERPRETATION:  CLINICAL STATEMENT: Chest pain.    TECHNIQUE: AP view of the chest.    COMPARISON: 2020    FINDINGS/  IMPRESSION:  Mild opacityright lung base likely atelectasis.    No pleural effusion    Heart size within normal limits.        COMPARISON: 2020    AP view of the chest demonstrates a faint left lower lobe infiltrate. There is no pleural effusion. There is no pneumothorax.    The heart is normal in size. There is no mediastinal or hilar mass.     The pulmonary vasculature is normal.     Mild thoracic degenerative changes are present.    IMPRESSION:    Faint left lower lobe infiltrate.        DISCRETE X-RAY DATA:  Percent of LEFT lung opacification: 1-33%  Percent of RIGHT lung opacification: Clear  Change in lung opacification from most recent x-ray (<=3 days): No Prior  Change from prior dated 3 or more days (same admission): Increase          PAST MEDICAL & SURGICAL HISTORY:  Dementia        Home Medications Reviewed    Hospital Medications:   MEDICATIONS  (STANDING):  dextrose 5%. 1000 milliLiter(s) (75 mL/Hr) IV Continuous <Continuous>  dextrose 50% Injectable 12.5 Gram(s) IV Push once  dextrose 50% Injectable 25 Gram(s) IV Push once  dextrose 50% Injectable 25 Gram(s) IV Push once  enoxaparin Injectable 40 milliGRAM(s) SubCutaneous daily  ergocalciferol 06309 Unit(s) Oral every week  insulin glargine Injectable (LANTUS) 12 Unit(s) SubCutaneous every morning  insulin lispro (HumaLOG) corrective regimen sliding scale   SubCutaneous three times a day before meals  insulin lispro (HumaLOG) corrective regimen sliding scale   SubCutaneous at bedtime  insulin lispro Injectable (HumaLOG) 2 Unit(s) SubCutaneous three times a day with meals  mirtazapine 7.5 milliGRAM(s) Oral <User Schedule>  mirtazapine 3.75 milliGRAM(s) Oral <User Schedule>  QUEtiapine 25 milliGRAM(s) Oral at bedtime    MEDICATIONS  (PRN):  acetaminophen  Suppository .. 650 milliGRAM(s) Rectal every 6 hours PRN Temp greater or equal to 38C (100.4F)  ALBUTerol    90 MICROgram(s) HFA Inhaler 2 Puff(s) Inhalation every 6 hours PRN Shortness of Breath and/or Wheezing  dextrose 40% Gel 15 Gram(s) Oral once PRN Blood Glucose LESS THAN 70 milliGRAM(s)/deciliter  glucagon  Injectable 1 milliGRAM(s) IntraMuscular once PRN Glucose LESS THAN 70 milligrams/deciliter  guaiFENesin   Syrup  (Sugar-Free) 100 milliGRAM(s) Oral every 6 hours PRN Cough      Allergies    No Known Allergies    Intolerances                              15.7   10.15 )-----------( 321      ( 14 May 2020 08:26 )             47.6     05-14    154<H>  |  121<H>  |  77<H>  ----------------------------<  357<H>  3.6   |  21<L>  |  1.57<H>    Ca    9.9      14 May 2020 08:26        Urinalysis Basic - ( 14 May 2020 06:35 )    Color: Yellow / Appearance: Clear / S.020 / pH: x  Gluc: x / Ketone: Negative  / Bili: Negative / Urobili: 1   Blood: x / Protein: 30 mg/dL / Nitrite: Negative   Leuk Esterase: Negative / RBC: 2-5 /HPF / WBC 3-5 /HPF   Sq Epi: x / Non Sq Epi: Occasional /HPF / Bacteria: Moderate /HPF        ABG - ( 14 May 2020 14:06 )  pH, Arterial: 7.48  pH, Blood: x     /  pCO2: 29    /  pO2: 58    / HCO3: 22    / Base Excess: -0.1  /  SaO2: 91                  RADIOLOGY & ADDITIONAL STUDIES:    SOCIAL HISTORY: Denies ETOh,Smoking,         REVIEW OF SYSTEMS:  obtunded    VITALS:  Vital Signs Last 24 Hrs  T(C): 37.2 (14 May 2020 14:32), Max: 37.2 (14 May 2020 14:32)  T(F): 98.9 (14 May 2020 14:32), Max: 98.9 (14 May 2020 14:32)  HR: 121 (14 May 2020 14:32) (104 - 121)  BP: 106/87 (14 May 2020 14:32) (104/72 - 137/82)  BP(mean): --  RR: 16 (14 May 2020 14:32) (16 - 18)  SpO2: 94% (14 May 2020 14:32) (94% - 95%)    - @ 07:01  -   @ 16:46  --------------------------------------------------------  IN: 0 mL / OUT: 400 mL / NET: -400 mL          PHYSICAL EXAM:  Constitutional: NAD, respiratory rate 16  HEENT: anicteric sclera, oropharynx clear, MMM  Neck: No JVD  Respiratory:  air entrance B/L, no wheezes, rales or rhonchi  Cardiovascular: S1, S2, RRR, no pericardial rub, no murmur  Gastrointestinal: BS+, soft, no tenderness, no distension, no bruit  Pelvis: bladder non-distended, goodwin catheter olaced  Extremities: No cyanosis or clubbing. No peripheral edema  Neurological: A/O x 3, no focal deficits  Sleepy no response to verbal stimuli  Occasional upper extremities twitching

## 2020-05-14 NOTE — PROGRESS NOTE ADULT - SUBJECTIVE AND OBJECTIVE BOX
NP Note discussed with  Primary Attending    Patient is a 64y old  Male who presents with a chief complaint of seizures (14 May 2020 11:59)      INTERVAL HPI/OVERNIGHT EVENTS: Pt lethargic, tolerated few bites as per staff. Now found to be hypoxic on RA, SpO2 80's , increased to 95-97% on 100% NRB.     MEDICATIONS  (STANDING):  dextrose 5%. 1000 milliLiter(s) (75 mL/Hr) IV Continuous <Continuous>  dextrose 50% Injectable 12.5 Gram(s) IV Push once  dextrose 50% Injectable 25 Gram(s) IV Push once  dextrose 50% Injectable 25 Gram(s) IV Push once  enoxaparin Injectable 40 milliGRAM(s) SubCutaneous daily  ergocalciferol 27239 Unit(s) Oral every week  insulin glargine Injectable (LANTUS) 12 Unit(s) SubCutaneous every morning  insulin lispro (HumaLOG) corrective regimen sliding scale   SubCutaneous three times a day before meals  insulin lispro (HumaLOG) corrective regimen sliding scale   SubCutaneous at bedtime  insulin lispro Injectable (HumaLOG) 2 Unit(s) SubCutaneous three times a day with meals  mirtazapine 7.5 milliGRAM(s) Oral <User Schedule>  mirtazapine 3.75 milliGRAM(s) Oral <User Schedule>  QUEtiapine 25 milliGRAM(s) Oral at bedtime    MEDICATIONS  (PRN):  acetaminophen  Suppository .. 650 milliGRAM(s) Rectal every 6 hours PRN Temp greater or equal to 38C (100.4F)  ALBUTerol    90 MICROgram(s) HFA Inhaler 2 Puff(s) Inhalation every 6 hours PRN Shortness of Breath and/or Wheezing  dextrose 40% Gel 15 Gram(s) Oral once PRN Blood Glucose LESS THAN 70 milliGRAM(s)/deciliter  glucagon  Injectable 1 milliGRAM(s) IntraMuscular once PRN Glucose LESS THAN 70 milligrams/deciliter  guaiFENesin   Syrup  (Sugar-Free) 100 milliGRAM(s) Oral every 6 hours PRN Cough  haloperidol     Tablet 0.5 milliGRAM(s) Oral every 6 hours PRN severe agitation  LORazepam     Tablet 0.25 milliGRAM(s) Oral every 6 hours PRN severe agitation      __________________________________________________  REVIEW OF SYSTEMS:    Unable to illicit due to pt lethargic.       Vital Signs Last 24 Hrs  T(C): 36.7 (14 May 2020 06:59), Max: 36.9 (13 May 2020 16:23)  T(F): 98 (14 May 2020 06:59), Max: 98.4 (13 May 2020 16:23)  HR: 104 (14 May 2020 06:59) (102 - 105)  BP: 137/82 (14 May 2020 06:59) (104/72 - 138/85)  BP(mean): --  RR: 18 (14 May 2020 06:59) (16 - 18)  SpO2: 95% (14 May 2020 06:59) (91% - 95%)    ________________________________________________  PHYSICAL EXAM:  GENERAL: somnolent  HEENT: Normocephalic;  conjunctivae and sclerae clear; moist mucous membranes;   NECK : supple  CHEST/LUNG: Effort normal. Clear to auscultation bilaterally with good air entry   HEART: S1 S2  regular; no murmurs, gallops or rubs  ABDOMEN: Soft, Nontender, Nondistended; Bowel sounds present  EXTREMITIES: no cyanosis; no edema; no calf tenderness  SKIN: warm and dry; no rash  NERVOUS SYSTEM:  Lethargic , nonverbal, easily arousable to tactile stimuli, pushing my hands away,     _________________________________________________  LABS:                        15.7   10.15 )-----------( 321      ( 14 May 2020 08:26 )             47.6     05-14    154<H>  |  121<H>  |  77<H>  ----------------------------<  357<H>  3.6   |  21<L>  |  1.57<H>    Ca    9.9      14 May 2020 08:26        Urinalysis Basic - ( 14 May 2020 06:35 )    Color: Yellow / Appearance: Clear / S.020 / pH: x  Gluc: x / Ketone: Negative  / Bili: Negative / Urobili: 1   Blood: x / Protein: 30 mg/dL / Nitrite: Negative   Leuk Esterase: Negative / RBC: 2-5 /HPF / WBC 3-5 /HPF   Sq Epi: x / Non Sq Epi: Occasional /HPF / Bacteria: Moderate /HPF      CAPILLARY BLOOD GLUCOSE      POCT Blood Glucose.: 347 mg/dL (14 May 2020 12:01)  POCT Blood Glucose.: 332 mg/dL (14 May 2020 08:13)  POCT Blood Glucose.: 237 mg/dL (13 May 2020 21:53)  POCT Blood Glucose.: 320 mg/dL (13 May 2020 17:05)        RADIOLOGY & ADDITIONAL TESTS:    Imaging Personally Reviewed:  YES/NO    Consultant(s) Notes Reviewed:   YES/ No    Care Discussed with Consultants :     Plan of care was discussed with patient and /or primary care giver; all questions and concerns were addressed and care was aligned with patient's wishes. NP Note discussed with  Primary Attending    Patient is a 64y old  Male who presents with a chief complaint of seizures (14 May 2020 11:59)      INTERVAL HPI/OVERNIGHT EVENTS: Pt lethargic, tolerated few bites as per staff. Now found to be hypoxic on RA, SpO2 80's , increased to 95-97% on 100% NRB.     MEDICATIONS  (STANDING):  dextrose 5%. 1000 milliLiter(s) (75 mL/Hr) IV Continuous <Continuous>  dextrose 50% Injectable 12.5 Gram(s) IV Push once  dextrose 50% Injectable 25 Gram(s) IV Push once  dextrose 50% Injectable 25 Gram(s) IV Push once  enoxaparin Injectable 40 milliGRAM(s) SubCutaneous daily  ergocalciferol 98320 Unit(s) Oral every week  insulin glargine Injectable (LANTUS) 12 Unit(s) SubCutaneous every morning  insulin lispro (HumaLOG) corrective regimen sliding scale   SubCutaneous three times a day before meals  insulin lispro (HumaLOG) corrective regimen sliding scale   SubCutaneous at bedtime  insulin lispro Injectable (HumaLOG) 2 Unit(s) SubCutaneous three times a day with meals  mirtazapine 7.5 milliGRAM(s) Oral <User Schedule>  mirtazapine 3.75 milliGRAM(s) Oral <User Schedule>  QUEtiapine 25 milliGRAM(s) Oral at bedtime    MEDICATIONS  (PRN):  acetaminophen  Suppository .. 650 milliGRAM(s) Rectal every 6 hours PRN Temp greater or equal to 38C (100.4F)  ALBUTerol    90 MICROgram(s) HFA Inhaler 2 Puff(s) Inhalation every 6 hours PRN Shortness of Breath and/or Wheezing  dextrose 40% Gel 15 Gram(s) Oral once PRN Blood Glucose LESS THAN 70 milliGRAM(s)/deciliter  glucagon  Injectable 1 milliGRAM(s) IntraMuscular once PRN Glucose LESS THAN 70 milligrams/deciliter  guaiFENesin   Syrup  (Sugar-Free) 100 milliGRAM(s) Oral every 6 hours PRN Cough  haloperidol     Tablet 0.5 milliGRAM(s) Oral every 6 hours PRN severe agitation  LORazepam     Tablet 0.25 milliGRAM(s) Oral every 6 hours PRN severe agitation      __________________________________________________  REVIEW OF SYSTEMS:    Unable to illicit due to pt lethargic.       Vital Signs Last 24 Hrs  T(C): 36.7 (14 May 2020 06:59), Max: 36.9 (13 May 2020 16:23)  T(F): 98 (14 May 2020 06:59), Max: 98.4 (13 May 2020 16:23)  HR: 104 (14 May 2020 06:59) (102 - 105)  BP: 137/82 (14 May 2020 06:59) (104/72 - 138/85)  BP(mean): --  RR: 18 (14 May 2020 06:59) (16 - 18)  SpO2: 95% (14 May 2020 06:59) (91% - 95%)    ________________________________________________  PHYSICAL EXAM:  GENERAL: somnolent  HEENT: Normocephalic;  conjunctivae and sclerae clear; moist mucous membranes;   NECK : supple  CHEST/LUNG: Effort normal. Clear to auscultation bilaterally with good air entry   HEART: S1 S2  regular; no murmurs, gallops or rubs  ABDOMEN: Soft, Nontender, Nondistended; Bowel sounds present  EXTREMITIES: no cyanosis; no edema; no calf tenderness  SKIN: warm and dry; no rash  NERVOUS SYSTEM:  Lethargic , nonverbal, easily arousable to tactile stimuli, pushing my hands away,     _________________________________________________  LABS:                        15.7   10.15 )-----------( 321      ( 14 May 2020 08:26 )             47.6     05-14    154<H>  |  121<H>  |  77<H>  ----------------------------<  357<H>  3.6   |  21<L>  |  1.57<H>    Ca    9.9      14 May 2020 08:26    ---------  Blood Gas Profile - Arterial (20 @ 14:06)    pH, Arterial: 7.48    pCO2, Arterial: 29 mmHg    pO2, Arterial: 58 mmHg    HCO3, Arterial: 22 mmol/L    Base Excess, Arterial: -0.1 mmol/L    Oxygen Saturation, Arterial: 91 %    FIO2, Arterial: 40    Blood Gas Comments Arterial: Left radial    ----------  Blood Gas Profile - Arterial (20 @ 12:56)    pH, Arterial: 7.44    pCO2, Arterial: 36 mmHg    pO2, Arterial: 45: TYPE:(C=Critical, N=Notification, A=Abnormal) _C  TESTS: _PO2 45  DATE/TIME CALLED: _20 12:58  CALLED TO: SYLVESTER AUSTIN NP  READ BACK (2 Patient Identifiers)(Y/N): _Y  READ BACK VALUES (Y/N): _Y  CALLED BY: MILLA DUNN, RRT mmHg    HCO3, Arterial: 24 mmol/L    Base Excess, Arterial: 0.7 mmol/L    Oxygen Saturation, Arterial: 80 %    FIO2, Arterial: 100    Blood Gas Comments Arterial: RIGHT BRACHIAL        Urinalysis Basic - ( 14 May 2020 06:35 )    Color: Yellow / Appearance: Clear / S.020 / pH: x  Gluc: x / Ketone: Negative  / Bili: Negative / Urobili: 1   Blood: x / Protein: 30 mg/dL / Nitrite: Negative   Leuk Esterase: Negative / RBC: 2-5 /HPF / WBC 3-5 /HPF   Sq Epi: x / Non Sq Epi: Occasional /HPF / Bacteria: Moderate /HPF      CAPILLARY BLOOD GLUCOSE      POCT Blood Glucose.: 347 mg/dL (14 May 2020 12:01)  POCT Blood Glucose.: 332 mg/dL (14 May 2020 08:13)  POCT Blood Glucose.: 237 mg/dL (13 May 2020 21:53)  POCT Blood Glucose.: 320 mg/dL (13 May 2020 17:05)    < from: Xray Chest 1 View- PORTABLE-Urgent (20 @ 13:47) >  IMPRESSION:    Faint left lower lobe infiltrate.    < end of copied text >        Consultant(s) Notes Reviewed:   YES/ No    Care Discussed with Consultants :     Plan of care was discussed with patient and /or primary care giver; all questions and concerns were addressed and care was aligned with patient's wishes.

## 2020-05-14 NOTE — PROGRESS NOTE ADULT - PROBLEM SELECTOR PLAN 1
concerning for oversedation with antipsychotics vs PNA/aspiration vs emboli  SpO2 80's on RA, increased to 95-97% on NRB, titrated to 4L NC with SpO2 93-94%.  Initial ABG concerning for venous sample.  Repeat ABG shows SaO2 91% with uncompensated respiratory alkalosis hypoxemia on 4L NC.  Repeat CXR result as above.   Continue with Albuterol MDI  Oxygen supplement to keep SpO2 90% and  higher  C/w Lovenox ppx  F/u D-Dimer  Aspiration precautions  Ativan, Haldol, Zyprexa discontinued. C/w Remeron, Seroquel 25mg HS with hold parameters. Discussed with Psy[facility] Dr. Packer.   Appreciate ICU input due to multifactors: may be due to  oversedation with antipsychotics vs PNA 2/2 COVID 19/aspiration vs emboli  SpO2 80's on RA, increased to 95-97% on NRB, titrated to 4L NC with SpO2 93-94%.  Initial ABG concerning for venous sample.  Repeat ABG shows SaO2 91% with uncompensated respiratory alkalosis hypoxemia on 4L NC.  Increase O2 to 10L NRB.  Repeat CXR result as above, likely from COVID 19    Continue with Albuterol MDI  Oxygen supplement to keep SpO2 90% and  higher  C/w Lovenox ppx  F/u D-Dimer  Aspiration precautions  Ativan, Haldol, Zyprexa discontinued. C/w Remeron, Seroquel 25mg HS with hold parameters. Discussed with Psyche Dr. Packer.   Appreciate ICU input

## 2020-05-14 NOTE — PROGRESS NOTE ADULT - PROBLEM SELECTOR PLAN 2
due to hypoxic respiratory failure 2/2 COVID 19 vs oversedation with antipsychotics  Discontinue Haldol/Ativan prn, Zyprexa, and Seroquel 50mg at HS  C/w Remeron and Seroquel 25mg with hold parameters.  Appreciate Psyche Dr. Packer input  Appreciate ICU input

## 2020-05-15 LAB
ANION GAP SERPL CALC-SCNC: 10 MMOL/L — SIGNIFICANT CHANGE UP (ref 5–17)
ANION GAP SERPL CALC-SCNC: 7 MMOL/L — SIGNIFICANT CHANGE UP (ref 5–17)
BUN SERPL-MCNC: 67 MG/DL — HIGH (ref 7–18)
BUN SERPL-MCNC: 69 MG/DL — HIGH (ref 7–18)
CALCIUM SERPL-MCNC: 9.6 MG/DL — SIGNIFICANT CHANGE UP (ref 8.4–10.5)
CALCIUM SERPL-MCNC: 9.8 MG/DL — SIGNIFICANT CHANGE UP (ref 8.4–10.5)
CHLORIDE SERPL-SCNC: 121 MMOL/L — HIGH (ref 96–108)
CHLORIDE SERPL-SCNC: 123 MMOL/L — HIGH (ref 96–108)
CO2 SERPL-SCNC: 23 MMOL/L — SIGNIFICANT CHANGE UP (ref 22–31)
CO2 SERPL-SCNC: 26 MMOL/L — SIGNIFICANT CHANGE UP (ref 22–31)
CREAT SERPL-MCNC: 1.57 MG/DL — HIGH (ref 0.5–1.3)
CREAT SERPL-MCNC: 1.83 MG/DL — HIGH (ref 0.5–1.3)
CULTURE RESULTS: SIGNIFICANT CHANGE UP
GLUCOSE BLDC GLUCOMTR-MCNC: 154 MG/DL — HIGH (ref 70–99)
GLUCOSE BLDC GLUCOMTR-MCNC: 211 MG/DL — HIGH (ref 70–99)
GLUCOSE BLDC GLUCOMTR-MCNC: 325 MG/DL — HIGH (ref 70–99)
GLUCOSE BLDC GLUCOMTR-MCNC: 329 MG/DL — HIGH (ref 70–99)
GLUCOSE SERPL-MCNC: 210 MG/DL — HIGH (ref 70–99)
GLUCOSE SERPL-MCNC: 333 MG/DL — HIGH (ref 70–99)
HCT VFR BLD CALC: 46.5 % — SIGNIFICANT CHANGE UP (ref 39–50)
HGB BLD-MCNC: 15.3 G/DL — SIGNIFICANT CHANGE UP (ref 13–17)
MAGNESIUM SERPL-MCNC: 2.2 MG/DL — SIGNIFICANT CHANGE UP (ref 1.6–2.6)
MCHC RBC-ENTMCNC: 28.4 PG — SIGNIFICANT CHANGE UP (ref 27–34)
MCHC RBC-ENTMCNC: 32.9 GM/DL — SIGNIFICANT CHANGE UP (ref 32–36)
MCV RBC AUTO: 86.4 FL — SIGNIFICANT CHANGE UP (ref 80–100)
MRSA PCR RESULT.: SIGNIFICANT CHANGE UP
NRBC # BLD: 0 /100 WBCS — SIGNIFICANT CHANGE UP (ref 0–0)
PHOSPHATE SERPL-MCNC: 3.9 MG/DL — SIGNIFICANT CHANGE UP (ref 2.5–4.5)
PLATELET # BLD AUTO: 363 K/UL — SIGNIFICANT CHANGE UP (ref 150–400)
POTASSIUM SERPL-MCNC: 3.4 MMOL/L — LOW (ref 3.5–5.3)
POTASSIUM SERPL-MCNC: 4.1 MMOL/L — SIGNIFICANT CHANGE UP (ref 3.5–5.3)
POTASSIUM SERPL-SCNC: 3.4 MMOL/L — LOW (ref 3.5–5.3)
POTASSIUM SERPL-SCNC: 4.1 MMOL/L — SIGNIFICANT CHANGE UP (ref 3.5–5.3)
RBC # BLD: 5.38 M/UL — SIGNIFICANT CHANGE UP (ref 4.2–5.8)
RBC # FLD: 12.9 % — SIGNIFICANT CHANGE UP (ref 10.3–14.5)
S AUREUS DNA NOSE QL NAA+PROBE: DETECTED
SODIUM SERPL-SCNC: 154 MMOL/L — HIGH (ref 135–145)
SODIUM SERPL-SCNC: 156 MMOL/L — HIGH (ref 135–145)
SPECIMEN SOURCE: SIGNIFICANT CHANGE UP
WBC # BLD: 15.87 K/UL — HIGH (ref 3.8–10.5)
WBC # FLD AUTO: 15.87 K/UL — HIGH (ref 3.8–10.5)

## 2020-05-15 PROCEDURE — 99232 SBSQ HOSP IP/OBS MODERATE 35: CPT

## 2020-05-15 PROCEDURE — 71045 X-RAY EXAM CHEST 1 VIEW: CPT | Mod: 26

## 2020-05-15 RX ORDER — MULTIVIT-MIN/FERROUS GLUCONATE 9 MG/15 ML
1 LIQUID (ML) ORAL DAILY
Refills: 0 | Status: DISCONTINUED | OUTPATIENT
Start: 2020-05-15 | End: 2020-06-02

## 2020-05-15 RX ORDER — INSULIN LISPRO 100/ML
6 VIAL (ML) SUBCUTANEOUS
Refills: 0 | Status: DISCONTINUED | OUTPATIENT
Start: 2020-05-15 | End: 2020-05-15

## 2020-05-15 RX ORDER — PIPERACILLIN AND TAZOBACTAM 4; .5 G/20ML; G/20ML
3.38 INJECTION, POWDER, LYOPHILIZED, FOR SOLUTION INTRAVENOUS ONCE
Refills: 0 | Status: COMPLETED | OUTPATIENT
Start: 2020-05-15 | End: 2020-05-15

## 2020-05-15 RX ORDER — PIPERACILLIN AND TAZOBACTAM 4; .5 G/20ML; G/20ML
3.38 INJECTION, POWDER, LYOPHILIZED, FOR SOLUTION INTRAVENOUS EVERY 8 HOURS
Refills: 0 | Status: DISCONTINUED | OUTPATIENT
Start: 2020-05-15 | End: 2020-05-26

## 2020-05-15 RX ORDER — MIRTAZAPINE 45 MG/1
3.75 TABLET, ORALLY DISINTEGRATING ORAL
Refills: 0 | Status: DISCONTINUED | OUTPATIENT
Start: 2020-05-15 | End: 2020-05-16

## 2020-05-15 RX ORDER — POTASSIUM CHLORIDE 20 MEQ
10 PACKET (EA) ORAL
Refills: 0 | Status: COMPLETED | OUTPATIENT
Start: 2020-05-15 | End: 2020-05-15

## 2020-05-15 RX ORDER — MUPIROCIN 20 MG/G
1 OINTMENT TOPICAL
Refills: 0 | Status: COMPLETED | OUTPATIENT
Start: 2020-05-15 | End: 2020-05-20

## 2020-05-15 RX ORDER — MIRTAZAPINE 45 MG/1
7.5 TABLET, ORALLY DISINTEGRATING ORAL
Refills: 0 | Status: DISCONTINUED | OUTPATIENT
Start: 2020-05-15 | End: 2020-05-16

## 2020-05-15 RX ORDER — QUETIAPINE FUMARATE 200 MG/1
25 TABLET, FILM COATED ORAL AT BEDTIME
Refills: 0 | Status: DISCONTINUED | OUTPATIENT
Start: 2020-05-15 | End: 2020-05-16

## 2020-05-15 RX ORDER — HEPARIN SODIUM 5000 [USP'U]/ML
5000 INJECTION INTRAVENOUS; SUBCUTANEOUS EVERY 12 HOURS
Refills: 0 | Status: DISCONTINUED | OUTPATIENT
Start: 2020-05-15 | End: 2020-05-27

## 2020-05-15 RX ORDER — INSULIN GLARGINE 100 [IU]/ML
20 INJECTION, SOLUTION SUBCUTANEOUS EVERY MORNING
Refills: 0 | Status: DISCONTINUED | OUTPATIENT
Start: 2020-05-15 | End: 2020-05-16

## 2020-05-15 RX ORDER — ERGOCALCIFEROL 1.25 MG/1
50000 CAPSULE ORAL
Refills: 0 | Status: DISCONTINUED | OUTPATIENT
Start: 2020-05-15 | End: 2020-06-02

## 2020-05-15 RX ORDER — INSULIN LISPRO 100/ML
6 VIAL (ML) SUBCUTANEOUS EVERY 6 HOURS
Refills: 0 | Status: DISCONTINUED | OUTPATIENT
Start: 2020-05-15 | End: 2020-05-17

## 2020-05-15 RX ADMIN — Medication 100 MILLIEQUIVALENT(S): at 04:09

## 2020-05-15 RX ADMIN — ERGOCALCIFEROL 50000 UNIT(S): 1.25 CAPSULE ORAL at 22:30

## 2020-05-15 RX ADMIN — ALBUTEROL 2 PUFF(S): 90 AEROSOL, METERED ORAL at 17:21

## 2020-05-15 RX ADMIN — QUETIAPINE FUMARATE 25 MILLIGRAM(S): 200 TABLET, FILM COATED ORAL at 21:40

## 2020-05-15 RX ADMIN — Medication 650 MILLIGRAM(S): at 21:36

## 2020-05-15 RX ADMIN — Medication 8: at 08:14

## 2020-05-15 RX ADMIN — Medication 650 MILLIGRAM(S): at 00:35

## 2020-05-15 RX ADMIN — Medication 100 MILLIEQUIVALENT(S): at 05:07

## 2020-05-15 RX ADMIN — MIRTAZAPINE 7.5 MILLIGRAM(S): 45 TABLET, ORALLY DISINTEGRATING ORAL at 17:19

## 2020-05-15 RX ADMIN — Medication 6 UNIT(S): at 12:18

## 2020-05-15 RX ADMIN — MIRTAZAPINE 7.5 MILLIGRAM(S): 45 TABLET, ORALLY DISINTEGRATING ORAL at 21:40

## 2020-05-15 RX ADMIN — PIPERACILLIN AND TAZOBACTAM 25 GRAM(S): 4; .5 INJECTION, POWDER, LYOPHILIZED, FOR SOLUTION INTRAVENOUS at 14:16

## 2020-05-15 RX ADMIN — HEPARIN SODIUM 5000 UNIT(S): 5000 INJECTION INTRAVENOUS; SUBCUTANEOUS at 17:19

## 2020-05-15 RX ADMIN — Medication 4: at 17:20

## 2020-05-15 RX ADMIN — PIPERACILLIN AND TAZOBACTAM 200 GRAM(S): 4; .5 INJECTION, POWDER, LYOPHILIZED, FOR SOLUTION INTRAVENOUS at 09:13

## 2020-05-15 RX ADMIN — Medication 6 UNIT(S): at 17:20

## 2020-05-15 RX ADMIN — Medication 8: at 12:18

## 2020-05-15 RX ADMIN — INSULIN GLARGINE 20 UNIT(S): 100 INJECTION, SOLUTION SUBCUTANEOUS at 09:13

## 2020-05-15 RX ADMIN — MUPIROCIN 1 APPLICATION(S): 20 OINTMENT TOPICAL at 17:20

## 2020-05-15 RX ADMIN — PIPERACILLIN AND TAZOBACTAM 25 GRAM(S): 4; .5 INJECTION, POWDER, LYOPHILIZED, FOR SOLUTION INTRAVENOUS at 21:40

## 2020-05-15 NOTE — CHART NOTE - NSCHARTNOTEFT_GEN_A_CORE
64 year old male from [facility]  with PMH dementia and DM who presented  with seizures like activity while sitting at AL. 64 year old male from [facility]  with PMH dementia and DM who presented  with seizures like activity while sitting at AL. In ED , pt afebrile, SpO2 98 on RA, EKG showed SR with PVC's, Head CT unremarkable, CXR clear lungs, no leukocytosis. COVID 19 detected. Seen by Neuro .No indication for antiepileptic medications now. Pt with long h/o behavioral disturbance on multiple standing antipsychotics, for which pt was seen by Psyche and antipsychotic meds adjusted. Hospital course complicated by AMS,  unable to take po,  hypernatremia on D5W , MAXIME, uncontrolled DM with hyperglycemia , on basal/bolus Lantus/Humalog /HISS. Head CT shows right frontal sinus sinusitis, negative for hemorrhagic stroke/infarct.  Endo and Nephrology on board. 64 year old male from [facility]  with PMH dementia and DM who presented  with seizures like activity while sitting at AL. In ED , pt afebrile, SpO2 98 on RA, EKG showed SR with PVC's, Head CT unremarkable, CXR clear lungs, no leukocytosis. COVID 19 detected. Seen by Neuro .No indication for antiepileptic medications now. Pt with long h/o behavioral disturbance on multiple standing antipsychotics, for which pt was seen by Psyche and antipsychotic meds adjusted. Hospital course complicated by AMS,  unable to take po,  hypernatremia on D5W , MAXIME, uncontrolled DM with hyperglycemia , on basal/bolus Lantus/Humalog /HISS. Head CT shows right frontal sinus sinusitis, negative for hemorrhagic stroke/infarct.  Endo and Nephrology on board. Pt developed fever , likely due to Aspiration PNA and sinusitis, started on Zosyn. ID Dr. Barry on board.   Pt continues with lethargy, no po intake. Discussed above  with daughter Ailin, risk and benefit of NGT, . Daughter is agreeable to NGT, and wants "everything done for (pt). "  Emotional support provided      -NGT inserted without problem.  -Placement , confirmed per CXR  -Start free H2O 250ml q6h via NGT  -Start Nepro at 20ml/hr,  -Monitor Na+ in 6 hrs.   -Aspiration precautions  -Monitor BMP in am      Garima Stallings NP Medicine [facility] [id number] 64 year old male from [facility]  with PMH dementia and DM who presented  with seizures like activity while sitting at AL. In ED , pt afebrile, SpO2 98 on RA, EKG showed SR with PVC's, Head CT unremarkable, CXR clear lungs, no leukocytosis. COVID 19 detected. Seen by Neuro .No indication for antiepileptic medications now. Pt with long h/o behavioral disturbance on multiple standing antipsychotics, for which pt was seen by Psyche and antipsychotic meds adjusted. Hospital course complicated by AMS,  unable to take po,  hypernatremia on D5W , MAXIME, uncontrolled DM with hyperglycemia , on basal/bolus Lantus/Humalog /HISS. Head CT shows right frontal sinus sinusitis, negative for hemorrhagic stroke/infarct.  Endo and Nephrology on board. Pt developed fever , likely due to Aspiration PNA and sinusitis, started on Zosyn. ID Dr. Barry on board.   Pt continues with lethargy, no po intake. Discussed   with daughter Ailin, risk and benefit of NGT, Plan of care reviewed with daughter, who  is agreeable to NGT, and wants "everything done for (pt). "  Emotional support provided.      -NGT inserted without problem.  -Placement , confirmed per CXR  -Start free H2O 250ml q6h via NGT  -Start Nepro at 20ml/hr,  -Monitor Na+ in 6 hrs.   -Aspiration precautions  -Monitor BMP in am      Garima Stallings NP Medicine [facility] [id number]

## 2020-05-15 NOTE — PROGRESS NOTE ADULT - ASSESSMENT
64 year old male from [facility] with PMH of dementia and type 2 DM presented with seizure activity while sitting at AL. Endocrinology was consulted for management of uncontrolled type 2 DM with hyperglycemia.    1. Uncontrolled Type 2 DM with hyperglycemia, A1c 9.9%  -BG above goal >300  likely secondary to acute infection  -Increased to Lantus 20 units daily   -Increase to Humalog 6 units TID with meals (administer only if patient eats >50% of the meal)  -increase mealtime rapid acting insulin to moderate scale as below  BG 70-100mg/dL 0 units  -150 mg/dL 0 units  -200 mg/dL 2 units  -250 mg/dL 4 units  -300 mg/dL 6 units  -350 mg/dL 8 units  -400 mg/dL 10 units  BG > 400mg/dL 12 units  -FS AC HS  -ensure consistent carbohydrate   -will monitor FS and adjust accordingly   --if patient is NPO for any reason please change FS and sliding scale to NPO lispro scale     2. Hypernatremia  -continue D5   -plan for NGT for free water   -continue to monitor Na closely     3.  Aspiration pneumonia  -continue antibiotics as per primary team   -patient NPO following aspiration event    Plan discussed with primary team  Please  call  w/ any questions or concerns [phone]

## 2020-05-15 NOTE — PROGRESS NOTE ADULT - SUBJECTIVE AND OBJECTIVE BOX
64y Male    Meds:  piperacillin/tazobactam IVPB.. 3.375 Gram(s) IV Intermittent every 8 hours    Allergies    No Known Allergies    Intolerances        VITALS:  Vital Signs Last 24 Hrs  T(C): 38.1 (15 May 2020 13:15), Max: 38.6 (15 May 2020 06:11)  T(F): 100.6 (15 May 2020 13:15), Max: 101.4 (15 May 2020 06:11)  HR: 126 (15 May 2020 13:15) (98 - 126)  BP: 146/85 (15 May 2020 11:00) (106/87 - 146/85)  BP(mean): --  RR: 18 (15 May 2020 11:27) (16 - 24)  SpO2: 96% (15 May 2020 13:15) (93% - 99%)    LABS/DIAGNOSTIC TESTS:                          15.3   15.87 )-----------( 363      ( 15 May 2020 07:00 )             46.5         05-15    154<H>  |  121<H>  |  69<H>  ----------------------------<  333<H>  4.1   |  23  |  1.83<H>    Ca    9.6      15 May 2020 07:00  Phos  3.9     05-15  Mg     2.2     05-15            CULTURES: .Urine Clean Catch (Midstream)  05-14 @ 09:59   >=3 organisms. Probable collection contamination.  --  --            RADIOLOGY:  < from: Xray Chest 1 View- PORTABLE-Urgent (05.14.20 @ 13:47) >  EXAM:  XR CHEST PORTABLE URGENT 1V                            PROCEDURE DATE:  05/14/2020          INTERPRETATION:  CLINICAL INDICATION: 64 years  Male with Resp distress. Covid positive    COMPARISON: 5/1/2020    AP view of the chest demonstrates a faint left lower lobe infiltrate. There is no pleural effusion. There is no pneumothorax.    The heart is normal in size. There is no mediastinal or hilar mass.     The pulmonary vasculature is normal.     Mild thoracic degenerative changes are present.    IMPRESSION:    Faint left lower lobe infiltrate.        DISCRETE X-RAY DATA:  Percent of LEFT lung opacification: 1-33%  Percent of RIGHT lung opacification: Clear  Change in lung opacification from most recent x-ray (<=3 days): No Prior  Change from prior dated 3 or more days (same admission): Increase                NICOLE MARTINEZ M.D., ATTENDING RADIOLOGIST  This document has been electronically signed. May 14 2020  1:19PM    < end of copied text >      ROS:  [  ] UNABLE TO ELICIT 64y Male who has been here for over 2 weeks after being diagnosed with COVID - 19 infection, he was initially doing well but has become lethargic over the last few days and now has spiked a fever and has a new infiltrate. He is suspected to have aspirated and so has been started on Zosyn for it. He is very lethargic and not waking up or answering any of my questions. He is on a NRB currently.    Meds:  piperacillin/tazobactam IVPB.. 3.375 Gram(s) IV Intermittent every 8 hours    Allergies    No Known Allergies    Intolerances        VITALS:  Vital Signs Last 24 Hrs  T(C): 38.1 (15 May 2020 13:15), Max: 38.6 (15 May 2020 06:11)  T(F): 100.6 (15 May 2020 13:15), Max: 101.4 (15 May 2020 06:11)  HR: 126 (15 May 2020 13:15) (98 - 126)  BP: 146/85 (15 May 2020 11:00) (106/87 - 146/85)  BP(mean): --  RR: 18 (15 May 2020 11:27) (16 - 24)  SpO2: 96% (15 May 2020 13:15) (93% - 99%)    LABS/DIAGNOSTIC TESTS:                          15.3   15.87 )-----------( 363      ( 15 May 2020 07:00 )             46.5         05-15    154<H>  |  121<H>  |  69<H>  ----------------------------<  333<H>  4.1   |  23  |  1.83<H>    Ca    9.6      15 May 2020 07:00  Phos  3.9     05-15  Mg     2.2     05-15            CULTURES: .Urine Clean Catch (Midstream)  05-14 @ 09:59   >=3 organisms. Probable collection contamination.  --  --            RADIOLOGY:  < from: Xray Chest 1 View- PORTABLE-Urgent (05.14.20 @ 13:47) >  EXAM:  XR CHEST PORTABLE URGENT 1V                            PROCEDURE DATE:  05/14/2020          INTERPRETATION:  CLINICAL INDICATION: 64 years  Male with Resp distress. Covid positive    COMPARISON: 5/1/2020    AP view of the chest demonstrates a faint left lower lobe infiltrate. There is no pleural effusion. There is no pneumothorax.    The heart is normal in size. There is no mediastinal or hilar mass.     The pulmonary vasculature is normal.     Mild thoracic degenerative changes are present.    IMPRESSION:    Faint left lower lobe infiltrate.        DISCRETE X-RAY DATA:  Percent of LEFT lung opacification: 1-33%  Percent of RIGHT lung opacification: Clear  Change in lung opacification from most recent x-ray (<=3 days): No Prior  Change from prior dated 3 or more days (same admission): Increase                NICOLE MARTINEZ M.D., ATTENDING RADIOLOGIST  This document has been electronically signed. May 14 2020  1:19PM    < end of copied text >      ROS:  [ x ] UNABLE TO ELICIT

## 2020-05-15 NOTE — PROGRESS NOTE ADULT - SUBJECTIVE AND OBJECTIVE BOX
64 year old male from [facility] with PMH of dementia and type 2 DM presented with seizure activity while sitting at AL. Endocrinology was consulted for management of uncontrolled type 2 DM with hyperglycemia.    Patient developed fever overnight to 101.4. CXR was significant for left lower lobe infiltrate following desaturation event yesterday. FS remains above goal >300 despite Lantus 12 units daily and Humalog 2 units TID with meals.     MEDICATIONS  (STANDING):  dextrose 5%. 1000 milliLiter(s) (75 mL/Hr) IV Continuous <Continuous>  dextrose 50% Injectable 12.5 Gram(s) IV Push once  dextrose 50% Injectable 25 Gram(s) IV Push once  dextrose 50% Injectable 25 Gram(s) IV Push once  ergocalciferol 99381 Unit(s) Oral every week  heparin   Injectable 5000 Unit(s) SubCutaneous every 12 hours  insulin glargine Injectable (LANTUS) 20 Unit(s) SubCutaneous every morning  insulin lispro (HumaLOG) corrective regimen sliding scale   SubCutaneous three times a day before meals  insulin lispro (HumaLOG) corrective regimen sliding scale   SubCutaneous at bedtime  insulin lispro Injectable (HumaLOG) 6 Unit(s) SubCutaneous three times a day before meals  mirtazapine 7.5 milliGRAM(s) Oral <User Schedule>  mirtazapine 3.75 milliGRAM(s) Oral <User Schedule>  multivitamin/minerals 1 Tablet(s) Oral daily  piperacillin/tazobactam IVPB.. 3.375 Gram(s) IV Intermittent every 8 hours  QUEtiapine 25 milliGRAM(s) Oral at bedtime    MEDICATIONS  (PRN):  acetaminophen  Suppository .. 650 milliGRAM(s) Rectal every 6 hours PRN Temp greater or equal to 38C (100.4F)  ALBUTerol    90 MICROgram(s) HFA Inhaler 2 Puff(s) Inhalation every 6 hours PRN Shortness of Breath and/or Wheezing  dextrose 40% Gel 15 Gram(s) Oral once PRN Blood Glucose LESS THAN 70 milliGRAM(s)/deciliter  glucagon  Injectable 1 milliGRAM(s) IntraMuscular once PRN Glucose LESS THAN 70 milligrams/deciliter  guaiFENesin   Syrup  (Sugar-Free) 100 milliGRAM(s) Oral every 6 hours PRN Cough      REVIEW OF SYSTEMS:  unable to obtain due to medical condition    PHYSICAL EXAM:    VITAL SIGNS  T(C): 37.3 (05-15-20 @ 11:27), Max: 38.6 (05-15-20 @ 06:11)  HR: 102 (05-15-20 @ 11:27) (98 - 124)  BP: 123/77 (05-15-20 @ 06:11) (106/87 - 133/92)  RR: 18 (05-15-20 @ 11:27) (16 - 19)  SpO2: 94% (05-15-20 @ 11:27) (93% - 99%)    GENERAL: lethargic, NAD, well-developed  HEAD:  Atraumatic, Normocephalic  EYES: EOMI, PERRLA, conjunctiva and sclera clear  ENMT: No tonsillar erythema, exudates, or enlargement; No lesions  NECK: Supple, No JVD, Normal thyroid  NERVOUS SYSTEM:  alert, nonverbal,  CHEST/LUNG: decreased breath sounds, No rales, rhonchi, wheezing, or rubs  HEART: Regular rate and rhythm; No murmurs, rubs, or gallops  ABDOMEN: Soft, Nontender, Nondistended; Bowel sounds present  EXTREMITIES:  2+ Peripheral Pulses, No clubbing, cyanosis, or edema  LYMPH: No lymphadenopathy noted  SKIN: No rashes or lesions    LABS:                        15.3   15.87 )-----------( 363      ( 15 May 2020 07:00 )             46.5     05-15    154<H>  |  121<H>  |  69<H>  ----------------------------<  333<H>  4.1   |  23  |  1.83<H>    Ca    9.6      15 May 2020 07:00  Phos  3.9     05-15  Mg     2.2     05-15        Urinalysis Basic - ( 14 May 2020 06:35 )    Color: Yellow / Appearance: Clear / S.020 / pH: x  Gluc: x / Ketone: Negative  / Bili: Negative / Urobili: 1   Blood: x / Protein: 30 mg/dL / Nitrite: Negative   Leuk Esterase: Negative / RBC: 2-5 /HPF / WBC 3-5 /HPF   Sq Epi: x / Non Sq Epi: Occasional /HPF / Bacteria: Moderate /HPF      CAPILLARY BLOOD GLUCOSE      POCT Blood Glucose.: 325 mg/dL (15 May 2020 12:02)  POCT Blood Glucose.: 329 mg/dL (15 May 2020 08:00)  POCT Blood Glucose.: 199 mg/dL (14 May 2020 21:33)  POCT Blood Glucose.: 349 mg/dL (14 May 2020 16:59)    ABG - ( 14 May 2020 14:06 )  pH, Arterial: 7.48  pH, Blood: x     /  pCO2: 29    /  pO2: 58    / HCO3: 22    / Base Excess: -0.1  /  SaO2: 91                  Urinalysis Basic - ( 14 May 2020 06:35 )    Color: Yellow / Appearance: Clear / S.020 / pH: x  Gluc: x / Ketone: Negative  / Bili: Negative / Urobili: 1   Blood: x / Protein: 30 mg/dL / Nitrite: Negative   Leuk Esterase: Negative / RBC: 2-5 /HPF / WBC 3-5 /HPF   Sq Epi: x / Non Sq Epi: Occasional /HPF / Bacteria: Moderate /HPF

## 2020-05-15 NOTE — PROGRESS NOTE ADULT - ASSESSMENT
64 yr old male with PMHX of DM,dementia sent to ER from facility for seizure and now COVID+,Delirium,MAXIME,hypernatremia and suspected aspiration pneumonia.  1.MAXIME and hypernatremia-IVF to d5w,goodwin.  2.COVID +supportive treatment.  3.Fever and aspiration pneumonia-zosyn started, ID re-eval.  4.DM-Insulin, Endo f/u.  5.Delirium-Psych re-eval.  6.Dementia with behavioral problems-cont current medication.  7.Replace vit d.  8.ICU eval noted.  9.Will place NGT for free water and nutritional support.  10.GI and DVT prophylaxis.

## 2020-05-15 NOTE — PROGRESS NOTE ADULT - SUBJECTIVE AND OBJECTIVE BOX
CARDIOLOGY/MEDICAL ATTENDING    CHIEF COMPLAINT:Patient is a 64y old  Male who presents with a chief complaint of seizures.Pt still lethargic with temp max 101.8 overnight.    	  REVIEW OF SYSTEMS:  [x ] Unable to obtain    PHYSICAL EXAM:  T(C): 37.3 (05-15-20 @ 11:27), Max: 38.6 (05-15-20 @ 06:11)  HR: 102 (05-15-20 @ 11:27) (98 - 124)  BP: 123/77 (05-15-20 @ 06:11) (106/87 - 133/92)  RR: 18 (05-15-20 @ 11:27) (16 - 22)  SpO2: 94% (05-15-20 @ 11:27) (93% - 99%)  Wt(kg): --  I&O's Summary    14 May 2020 07:01  -  15 May 2020 07:00  --------------------------------------------------------  IN: 0 mL / OUT: 1250 mL / NET: -1250 mL        Appearance: Normal	  HEENT:   Normal oral mucosa, PERRL, EOMI	  Lymphatic: No lymphadenopathy  Cardiovascular: Normal S1 S2  Respiratory: Dec BS at bases  Gastrointestinal:  Soft, Non-tender, + BS	  Skin: No rashes, No ecchymoses, No cyanosis	  Extremities: Normal range of motion, No clubbing, cyanosis or edema  Vascular: Peripheral pulses palpable 2+ bilaterally    MEDICATIONS  (STANDING):  dextrose 5%. 1000 milliLiter(s) (75 mL/Hr) IV Continuous <Continuous>  dextrose 50% Injectable 12.5 Gram(s) IV Push once  dextrose 50% Injectable 25 Gram(s) IV Push once  dextrose 50% Injectable 25 Gram(s) IV Push once  ergocalciferol 56124 Unit(s) Oral every week  heparin   Injectable 5000 Unit(s) SubCutaneous every 12 hours  insulin glargine Injectable (LANTUS) 20 Unit(s) SubCutaneous every morning  insulin lispro (HumaLOG) corrective regimen sliding scale   SubCutaneous three times a day before meals  insulin lispro (HumaLOG) corrective regimen sliding scale   SubCutaneous at bedtime  insulin lispro Injectable (HumaLOG) 6 Unit(s) SubCutaneous three times a day before meals  mirtazapine 7.5 milliGRAM(s) Oral <User Schedule>  mirtazapine 3.75 milliGRAM(s) Oral <User Schedule>  multivitamin/minerals 1 Tablet(s) Oral daily  piperacillin/tazobactam IVPB.. 3.375 Gram(s) IV Intermittent every 8 hours  QUEtiapine 25 milliGRAM(s) Oral at bedtime      	  	  LABS:	 	                       15.3   15.87 )-----------( 363      ( 15 May 2020 07:00 )             46.5     05-15    154<H>  |  121<H>  |  69<H>  ----------------------------<  333<H>  4.1   |  23  |  1.83<H>    Ca    9.6      15 May 2020 07:00  Phos  3.9     05-15  Mg     2.2     05-15      proBNP: Serum Pro-Brain Natriuretic Peptide: 126 pg/mL (05-01 @ 09:36)    Lipid Profile: Cholesterol 161  LDL 95  HDL 35        TSH: Thyroid Stimulating Hormone, Serum: 2.39 uU/mL (05-01 @ 09:36)      Blood Gas Profile - Arterial (05.14.20 @ 14:06)    pH, Arterial: 7.48    pCO2, Arterial: 29 mmHg    pO2, Arterial: 58 mmHg    HCO3, Arterial: 22 mmol/L    Base Excess, Arterial: -0.1 mmol/L    Oxygen Saturation, Arterial: 91 %    FIO2, Arterial: 40    Blood Gas Comments Arterial: Left radial      EXAM:  CT BRAIN                            PROCEDURE DATE:  05/14/2020          INTERPRETATION:  CLINICAL Indications:  AMS    COMPARISON: CT head dated 5/1/2020    TECHNIQUE: Noncontrast CT of the head. Multiplanar reformations are submitted.    FINDINGS:   There is no compelling evidence for an acute transcortical infarction. There is no evidence of mass, mass effect, midline shift or extra-axial fluid collection. The lateral ventricles and cortical sulci are age-appropriate in size and configuration. Moderate polypoid inflammatory mucosal changes are seen throughout the various portions of the paranasal sinuses. Air-fluid level and bubbly changes in the right frontal sinus. The orbits and mastoid air cells are unremarkable. The calvariumis intact.    IMPRESSION: No evidence of an acute transcortical infarction or hemorrhage. MR is a more sensitive imaging modality for the evaluation of an acute infarction. Right frontal sinus sinusitis.    	      EXAM:  XR CHEST PORTABLE URGENT 1V                            PROCEDURE DATE:  05/14/2020          INTERPRETATION:  CLINICAL INDICATION: 64 years  Male with Resp distress. Covid positive    COMPARISON: 5/1/2020    AP view of the chest demonstrates a faint left lower lobe infiltrate. There is no pleural effusion. There is no pneumothorax.    The heart is normal in size. There is no mediastinal or hilar mass.     The pulmonary vasculature is normal.     Mild thoracic degenerative changes are present.    IMPRESSION:    Faint left lower lobe infiltrate.        DISCRETE X-RAY DATA:  Percent of LEFT lung opacification: 1-33%  Percent of RIGHT lung opacification: Clear  Change in lung opacification from most recent x-ray (<=3 days): No Prior  Change from prior dated 3 or more days (same admission): Increase

## 2020-05-15 NOTE — CHART NOTE - NSCHARTNOTEFT_GEN_A_CORE
EVENT: Potassium, Serum: 3.5 mmol/L (05.14.20 @ 17:59)    BRIEF HPI:       MEDICATIONS  (STANDING):  dextrose 5%. 1000 milliLiter(s) (75 mL/Hr) IV Continuous <Continuous>  dextrose 50% Injectable 12.5 Gram(s) IV Push once  dextrose 50% Injectable 25 Gram(s) IV Push once  dextrose 50% Injectable 25 Gram(s) IV Push once  enoxaparin Injectable 40 milliGRAM(s) SubCutaneous daily  ergocalciferol 99643 Unit(s) Oral every week  insulin glargine Injectable (LANTUS) 12 Unit(s) SubCutaneous every morning  insulin lispro (HumaLOG) corrective regimen sliding scale   SubCutaneous three times a day before meals  insulin lispro (HumaLOG) corrective regimen sliding scale   SubCutaneous at bedtime  insulin lispro Injectable (HumaLOG) 2 Unit(s) SubCutaneous three times a day with meals  mirtazapine 7.5 milliGRAM(s) Oral <User Schedule>  mirtazapine 3.75 milliGRAM(s) Oral <User Schedule>  potassium chloride  10 mEq/100 mL IVPB 10 milliEquivalent(s) IV Intermittent every 2 hours  QUEtiapine 25 milliGRAM(s) Oral at bedtime    MEDICATIONS  (PRN):  acetaminophen  Suppository .. 650 milliGRAM(s) Rectal every 6 hours PRN Temp greater or equal to 38C (100.4F)  ALBUTerol    90 MICROgram(s) HFA Inhaler 2 Puff(s) Inhalation every 6 hours PRN Shortness of Breath and/or Wheezing  dextrose 40% Gel 15 Gram(s) Oral once PRN Blood Glucose LESS THAN 70 milliGRAM(s)/deciliter  glucagon  Injectable 1 milliGRAM(s) IntraMuscular once PRN Glucose LESS THAN 70 milligrams/deciliter  guaiFENesin   Syrup  (Sugar-Free) 100 milliGRAM(s) Oral every 6 hours PRN Cough EVENT: Potassium, Serum: 3.5 mmol/L (05.14.20 @ 17:59)    Vital Signs Last 24 Hrs  T(C): 38.5 (15 May 2020 00:08), Max: 38.5 (15 May 2020 00:08)  T(F): 101.3 (15 May 2020 00:08), Max: 101.3 (15 May 2020 00:08)  HR: 119 (15 May 2020 00:08) (98 - 124)  BP: 133/92 (14 May 2020 21:04) (76/- - 137/82)  BP(mean): --  RR: 18 (15 May 2020 00:08) (16 - 22)  SpO2: 99% (15 May 2020 00:08) (89% - 99%)    PROBLEM: Hypokalemia probably related to poor oral intake  1. Potassium chloride 10 mEq/100 mL IVPB 10 milliequivalent  (s) IV Intermittent every 2 hours EVENT: Potassium, Serum: 3.5 mmol/L (05.14.20 @ 17:59)    Vital Signs Last 24 Hrs  T(C): 38.5 (15 May 2020 00:08), Max: 38.5 (15 May 2020 00:08)  T(F): 101.3 (15 May 2020 00:08), Max: 101.3 (15 May 2020 00:08)  HR: 119 (15 May 2020 00:08) (98 - 124)  BP: 133/92 (14 May 2020 21:04) (76/- - 137/82)  BP(mean): --  RR: 18 (15 May 2020 00:08) (16 - 22)  SpO2: 99% (15 May 2020 00:08) (89% - 99%)    PROBLEM: Hypokalemia probably related to poor oral intake  1. Potassium chloride 10 mEq/100 mL IVPB 10 milliequivalent  (s) IV Intermittent every 2 hours X 2  2. Multivitamin/minerals 1 Tablet(s) Oral daily added  3. F/U AM BMP

## 2020-05-15 NOTE — CHART NOTE - NSCHARTNOTEFT_GEN_A_CORE
EVENT: Temp spike 38.5    BRIEF HPI:    Vital Signs Last 24 Hrs  T(C): 38.5 (15 May 2020 00:08), Max: 38.5 (15 May 2020 00:08)  T(F): 101.3 (15 May 2020 00:08), Max: 101.3 (15 May 2020 00:08)  HR: 119 (15 May 2020 00:08) (98 - 124)  BP: 133/92 (14 May 2020 21:04) (76/- - 137/82)  BP(mean): --  RR: 18 (15 May 2020 00:08) (16 - 22)  SpO2: 99% (15 May 2020 00:08) (89% - 99%)    MEDICATIONS  (STANDING):  dextrose 5%. 1000 milliLiter(s) (75 mL/Hr) IV Continuous <Continuous>  dextrose 50% Injectable 12.5 Gram(s) IV Push once  dextrose 50% Injectable 25 Gram(s) IV Push once  dextrose 50% Injectable 25 Gram(s) IV Push once  enoxaparin Injectable 40 milliGRAM(s) SubCutaneous daily  ergocalciferol 47772 Unit(s) Oral every week  insulin glargine Injectable (LANTUS) 12 Unit(s) SubCutaneous every morning  insulin lispro (HumaLOG) corrective regimen sliding scale   SubCutaneous three times a day before meals  insulin lispro (HumaLOG) corrective regimen sliding scale   SubCutaneous at bedtime  insulin lispro Injectable (HumaLOG) 2 Unit(s) SubCutaneous three times a day with meals  mirtazapine 7.5 milliGRAM(s) Oral <User Schedule>  mirtazapine 3.75 milliGRAM(s) Oral <User Schedule>  QUEtiapine 25 milliGRAM(s) Oral at bedtime    MEDICATIONS  (PRN):  acetaminophen  Suppository .. 650 milliGRAM(s) Rectal every 6 hours PRN Temp greater or equal to 38C (100.4F)  ALBUTerol    90 MICROgram(s) HFA Inhaler 2 Puff(s) Inhalation every 6 hours PRN Shortness of Breath and/or Wheezing  dextrose 40% Gel 15 Gram(s) Oral once PRN Blood Glucose LESS THAN 70 milliGRAM(s)/deciliter  glucagon  Injectable 1 milliGRAM(s) IntraMuscular once PRN Glucose LESS THAN 70 milligrams/deciliter  guaiFENesin   Syrup  (Sugar-Free) 100 milliGRAM(s) Oral every 6 hours PRN Cough EVENT: Temp spike 38.5    BRIEF HPI: 64 year old male from [facility]  with PMH dementia and DM coming in with seizures like activity while sitting at AL. pt unable to provide further history (poor historian). CT head : unremarkable no acute abnormality suggested.  2019 novel coronavirus disease (COVID-19) positive. ICU was consulted with concern for hypoxemia and delirium, Pt is not currently a candidate for ICU monitoring at this time. Spiking fevers now.    Vital Signs Last 24 Hrs  T(C): 38.5 (15 May 2020 00:08), Max: 38.5 (15 May 2020 00:08)  T(F): 101.3 (15 May 2020 00:08), Max: 101.3 (15 May 2020 00:08)  HR: 119 (15 May 2020 00:08) (98 - 124)  BP: 133/92 (14 May 2020 21:04) (76/- - 137/82)  BP(mean): --  RR: 18 (15 May 2020 00:08) (16 - 22)  SpO2: 99% (15 May 2020 00:08) (89% - 99%)                     15.7   10.15 )-----------( 321      ( 14 May 2020 08:26 )             47.6   05-15    156<H>  |  123<H>  |  67<H>  ----------------------------<  210<H>  3.4<L>   |  26  |  1.57<H>    Ca    9.8      15 May 2020 00:21    EXAM:  XR CHEST PORTABLE URGENT 1V                        PROCEDURE DATE:  05/14/2020    INTERPRETATION:  CLINICAL INDICATION: 64 years  Male with Resp distress. Covid positive  COMPARISON: 5/1/2020  AP view of the chest demonstrates a faint left lower lobe infiltrate. There is no pleural effusion. There is no pneumothorax.  The heart is normal in size. There is no mediastinal or hilar mass.   The pulmonary vasculature is normal.   Mild thoracic degenerative changes are present.  IMPRESSION:  Faint left lower lobe infiltrate.  DISCRETE X-RAY DATA:  Percent of LEFT lung opacification: 1-33%  Percent of RIGHT lung opacification: Clear  Change in lung opacification from most recent x-ray (<=3 days): No Prior  Change from prior dated 3 or more days (same admission): Increase    PROBLEM: Fever probably due to Covid 19  PLAN:   1. Cont acetaminophen  Suppository 650 eddie GRAM(s) Rectal every 6 hours PRN Temp greater or equal to 38C (100.4F)  2. Cont dextrose 5%1000 milliLiter(s) (75 mL/Hr) IV Continuous   3. Monitor vital sign per policy

## 2020-05-15 NOTE — PROGRESS NOTE ADULT - ASSESSMENT
Pneumonia - likely aspiration  Fever  Leukocytosis  COVID - 19 infection    Plan - cont Zosyn 3.375gms iv q8hrs.

## 2020-05-16 DIAGNOSIS — F02.81 DEMENTIA IN OTHER DISEASES CLASSIFIED ELSEWHERE, UNSPECIFIED SEVERITY, WITH BEHAVIORAL DISTURBANCE: ICD-10-CM

## 2020-05-16 DIAGNOSIS — J69.0 PNEUMONITIS DUE TO INHALATION OF FOOD AND VOMIT: ICD-10-CM

## 2020-05-16 LAB
ANION GAP SERPL CALC-SCNC: 13 MMOL/L — SIGNIFICANT CHANGE UP (ref 5–17)
BUN SERPL-MCNC: 67 MG/DL — HIGH (ref 7–18)
CALCIUM SERPL-MCNC: 10 MG/DL — SIGNIFICANT CHANGE UP (ref 8.4–10.5)
CHLORIDE SERPL-SCNC: 119 MMOL/L — HIGH (ref 96–108)
CO2 SERPL-SCNC: 20 MMOL/L — LOW (ref 22–31)
CREAT SERPL-MCNC: 2.44 MG/DL — HIGH (ref 0.5–1.3)
GLUCOSE BLDC GLUCOMTR-MCNC: 200 MG/DL — HIGH (ref 70–99)
GLUCOSE BLDC GLUCOMTR-MCNC: 213 MG/DL — HIGH (ref 70–99)
GLUCOSE BLDC GLUCOMTR-MCNC: 300 MG/DL — HIGH (ref 70–99)
GLUCOSE BLDC GLUCOMTR-MCNC: 309 MG/DL — HIGH (ref 70–99)
GLUCOSE BLDC GLUCOMTR-MCNC: 324 MG/DL — HIGH (ref 70–99)
GLUCOSE SERPL-MCNC: 308 MG/DL — HIGH (ref 70–99)
HCT VFR BLD CALC: 46.1 % — SIGNIFICANT CHANGE UP (ref 39–50)
HGB BLD-MCNC: 15.1 G/DL — SIGNIFICANT CHANGE UP (ref 13–17)
MCHC RBC-ENTMCNC: 28.6 PG — SIGNIFICANT CHANGE UP (ref 27–34)
MCHC RBC-ENTMCNC: 32.8 GM/DL — SIGNIFICANT CHANGE UP (ref 32–36)
MCV RBC AUTO: 87.3 FL — SIGNIFICANT CHANGE UP (ref 80–100)
NRBC # BLD: 0 /100 WBCS — SIGNIFICANT CHANGE UP (ref 0–0)
PLATELET # BLD AUTO: 323 K/UL — SIGNIFICANT CHANGE UP (ref 150–400)
POTASSIUM SERPL-MCNC: 3.6 MMOL/L — SIGNIFICANT CHANGE UP (ref 3.5–5.3)
POTASSIUM SERPL-SCNC: 3.6 MMOL/L — SIGNIFICANT CHANGE UP (ref 3.5–5.3)
RBC # BLD: 5.28 M/UL — SIGNIFICANT CHANGE UP (ref 4.2–5.8)
RBC # FLD: 12.9 % — SIGNIFICANT CHANGE UP (ref 10.3–14.5)
SODIUM SERPL-SCNC: 152 MMOL/L — HIGH (ref 135–145)
WBC # BLD: 14.56 K/UL — HIGH (ref 3.8–10.5)
WBC # FLD AUTO: 14.56 K/UL — HIGH (ref 3.8–10.5)

## 2020-05-16 PROCEDURE — 99232 SBSQ HOSP IP/OBS MODERATE 35: CPT

## 2020-05-16 RX ORDER — INSULIN GLARGINE 100 [IU]/ML
10 INJECTION, SOLUTION SUBCUTANEOUS ONCE
Refills: 0 | Status: COMPLETED | OUTPATIENT
Start: 2020-05-16 | End: 2020-05-16

## 2020-05-16 RX ORDER — INSULIN GLARGINE 100 [IU]/ML
30 INJECTION, SOLUTION SUBCUTANEOUS EVERY MORNING
Refills: 0 | Status: DISCONTINUED | OUTPATIENT
Start: 2020-05-17 | End: 2020-05-17

## 2020-05-16 RX ORDER — INSULIN GLARGINE 100 [IU]/ML
30 INJECTION, SOLUTION SUBCUTANEOUS EVERY MORNING
Refills: 0 | Status: DISCONTINUED | OUTPATIENT
Start: 2020-05-16 | End: 2020-05-16

## 2020-05-16 RX ORDER — DEXTROSE MONOHYDRATE, SODIUM CHLORIDE, AND POTASSIUM CHLORIDE 50; .745; 4.5 G/1000ML; G/1000ML; G/1000ML
1000 INJECTION, SOLUTION INTRAVENOUS
Refills: 0 | Status: DISCONTINUED | OUTPATIENT
Start: 2020-05-16 | End: 2020-05-17

## 2020-05-16 RX ADMIN — MIRTAZAPINE 3.75 MILLIGRAM(S): 45 TABLET, ORALLY DISINTEGRATING ORAL at 08:21

## 2020-05-16 RX ADMIN — INSULIN GLARGINE 10 UNIT(S): 100 INJECTION, SOLUTION SUBCUTANEOUS at 12:29

## 2020-05-16 RX ADMIN — ERGOCALCIFEROL 50000 UNIT(S): 1.25 CAPSULE ORAL at 12:31

## 2020-05-16 RX ADMIN — Medication 1 TABLET(S): at 12:31

## 2020-05-16 RX ADMIN — DEXTROSE MONOHYDRATE, SODIUM CHLORIDE, AND POTASSIUM CHLORIDE 70 MILLILITER(S): 50; .745; 4.5 INJECTION, SOLUTION INTRAVENOUS at 16:37

## 2020-05-16 RX ADMIN — Medication 6 UNIT(S): at 12:30

## 2020-05-16 RX ADMIN — MUPIROCIN 1 APPLICATION(S): 20 OINTMENT TOPICAL at 05:43

## 2020-05-16 RX ADMIN — MUPIROCIN 1 APPLICATION(S): 20 OINTMENT TOPICAL at 17:15

## 2020-05-16 RX ADMIN — HEPARIN SODIUM 5000 UNIT(S): 5000 INJECTION INTRAVENOUS; SUBCUTANEOUS at 17:17

## 2020-05-16 RX ADMIN — Medication 650 MILLIGRAM(S): at 12:55

## 2020-05-16 RX ADMIN — PIPERACILLIN AND TAZOBACTAM 25 GRAM(S): 4; .5 INJECTION, POWDER, LYOPHILIZED, FOR SOLUTION INTRAVENOUS at 05:44

## 2020-05-16 RX ADMIN — PIPERACILLIN AND TAZOBACTAM 25 GRAM(S): 4; .5 INJECTION, POWDER, LYOPHILIZED, FOR SOLUTION INTRAVENOUS at 21:15

## 2020-05-16 RX ADMIN — Medication 6 UNIT(S): at 17:16

## 2020-05-16 RX ADMIN — Medication 6 UNIT(S): at 00:17

## 2020-05-16 RX ADMIN — Medication 650 MILLIGRAM(S): at 21:15

## 2020-05-16 RX ADMIN — INSULIN GLARGINE 20 UNIT(S): 100 INJECTION, SOLUTION SUBCUTANEOUS at 08:23

## 2020-05-16 RX ADMIN — Medication 6 UNIT(S): at 06:52

## 2020-05-16 RX ADMIN — PIPERACILLIN AND TAZOBACTAM 25 GRAM(S): 4; .5 INJECTION, POWDER, LYOPHILIZED, FOR SOLUTION INTRAVENOUS at 13:26

## 2020-05-16 RX ADMIN — Medication 8: at 08:21

## 2020-05-16 RX ADMIN — Medication 8: at 12:30

## 2020-05-16 RX ADMIN — Medication 4: at 17:16

## 2020-05-16 RX ADMIN — HEPARIN SODIUM 5000 UNIT(S): 5000 INJECTION INTRAVENOUS; SUBCUTANEOUS at 05:43

## 2020-05-16 NOTE — CONSULT NOTE ADULT - SUBJECTIVE AND OBJECTIVE BOX
PULMONARY CONSULT NOTE      JAZIEL LOPEZ  [id number]    Patient is a 64y old  Male who presents with a chief complaint of seizures (15 May 2020 14:00)    History of Present Illness:  Reason for Admission: seizures	  History of Present Illness: 	  64 year old male from [facility]  with PMH dementia and DM coming in with seizures like activity while sitting at AL. pt unable to provide further history (poor historian) . denies all complaints at this time. NKDA         HISTORY OF PRESENT ILLNESS: As above. Awake, alert, comfortable in bed in NAD    MEDICATIONS  (STANDING):  dextrose 5%. 1000 milliLiter(s) (75 mL/Hr) IV Continuous <Continuous>  dextrose 50% Injectable 12.5 Gram(s) IV Push once  dextrose 50% Injectable 25 Gram(s) IV Push once  dextrose 50% Injectable 25 Gram(s) IV Push once  ergocalciferol 47859 Unit(s) Oral every week  heparin   Injectable 5000 Unit(s) SubCutaneous every 12 hours  insulin glargine Injectable (LANTUS) 20 Unit(s) SubCutaneous every morning  insulin lispro (HumaLOG) corrective regimen sliding scale   SubCutaneous three times a day before meals  insulin lispro (HumaLOG) corrective regimen sliding scale   SubCutaneous at bedtime  insulin lispro Injectable (HumaLOG) 6 Unit(s) SubCutaneous every 6 hours  mirtazapine 7.5 milliGRAM(s) Oral <User Schedule>  mirtazapine 3.75 milliGRAM(s) Oral <User Schedule>  multivitamin/minerals 1 Tablet(s) Oral daily  mupirocin 2% Nasal 1 Application(s) Nasal two times a day  piperacillin/tazobactam IVPB.. 3.375 Gram(s) IV Intermittent every 8 hours  QUEtiapine 25 milliGRAM(s) Oral at bedtime      MEDICATIONS  (PRN):  acetaminophen  Suppository .. 650 milliGRAM(s) Rectal every 6 hours PRN Temp greater or equal to 38C (100.4F)  ALBUTerol    90 MICROgram(s) HFA Inhaler 2 Puff(s) Inhalation every 6 hours PRN Shortness of Breath and/or Wheezing  dextrose 40% Gel 15 Gram(s) Oral once PRN Blood Glucose LESS THAN 70 milliGRAM(s)/deciliter  glucagon  Injectable 1 milliGRAM(s) IntraMuscular once PRN Glucose LESS THAN 70 milligrams/deciliter  guaiFENesin   Syrup  (Sugar-Free) 100 milliGRAM(s) Oral every 6 hours PRN Cough      Allergies    No Known Allergies    Intolerances        PAST MEDICAL & SURGICAL HISTORY:  Dementia  No significant past surgical history      FAMILY HISTORY:      SOCIAL HISTORY  Smoking History:     REVIEW OF SYSTEMS:    CONSTITUTIONAL:  No fevers, chills, sweats    HEENT:  Eyes:  No diplopia or blurred vision. ENT:  No earache, sore throat or runny nose.    CARDIOVASCULAR:  No pressure, squeezing, tightness, or heaviness about the chest; no palpitations.    RESPIRATORY:  Per HPI    GASTROINTESTINAL:  No abdominal pain, nausea, vomiting or diarrhea.    GENITOURINARY:  No dysuria, frequency or urgency.    NEUROLOGIC:  No paresthesias, fasciculations, seizures or weakness.    PSYCHIATRIC:  No disorder of thought or mood.    Vital Signs Last 24 Hrs  T(C): 37.8 (16 May 2020 05:58), Max: 38.3 (15 May 2020 11:00)  T(F): 100 (16 May 2020 05:58), Max: 101 (15 May 2020 21:02)  HR: 111 (16 May 2020 05:58) (100 - 126)  BP: 124/78 (16 May 2020 05:58) (115/75 - 146/85)  BP(mean): --  RR: 20 (16 May 2020 05:58) (17 - 24)  SpO2: 95% (16 May 2020 05:58) (93% - 96%)  I&O's Detail    15 May 2020 07:01  -  16 May 2020 07:00  --------------------------------------------------------  IN:  Total IN: 0 mL    OUT:    Indwelling Catheter - Urethral: 1000 mL  Total OUT: 1000 mL    Total NET: -1000 mL          PHYSICAL EXAMINATION:    GENERAL: The patient is a well-developed, well-nourished _____in no apparent distress.     HEENT: Head is normocephalic and atraumatic. Extraocular muscles are intact. Mucous membranes are moist.     NECK: Supple.     LUNGS: Clear to auscultation without wheezing, rales, or rhonchi. Respirations unlabored    HEART: Regular rate and rhythm without murmur.    ABDOMEN: Soft, nontender, and nondistended.  No hepatosplenomegaly is noted.    EXTREMITIES: Without any cyanosis, clubbing, rash, lesions or edema.    NEUROLOGIC: Grossly intact.      LABS:                        15.1   14.56 )-----------( 323      ( 16 May 2020 07:07 )             46.1     05-16    152<H>  |  119<H>  |  67<H>  ----------------------------<  308<H>  3.6   |  20<L>  |  2.44<H>    Ca    10.0      16 May 2020 07:07  Phos  3.9     05-15  Mg     2.2     05-15          ABG - ( 14 May 2020 14:06 )  pH, Arterial: 7.48  pH, Blood: x     /  pCO2: 29    /  pO2: 58    / HCO3: 22    / Base Excess: -0.1  /  SaO2: 91                              MICROBIOLOGY:    RADIOLOGY & ADDITIONAL STUDIES:< from: Xray Chest 1 View- PORTABLE-Urgent (05.15.20 @ 15:55) >  Impression:  1.  Worsening left lower lobe pneumonia.    < end of copied text >      CXR:    Ct scan chest:    ekg;    echo: PULMONARY CONSULT NOTE      JAZIEL LOPEZ  [id number]    Patient is a 64y old  Male who presents with a chief complaint of seizures (15 May 2020 14:00)    History of Present Illness:  Reason for Admission: seizures	  History of Present Illness: 	  64 year old male from [facility]  with PMH dementia and DM coming in with seizures like activity while sitting at AL. pt unable to provide further history (poor historian) . denies all complaints at this time. NKDA         HISTORY OF PRESENT ILLNESS: As above. Awake,  not alert, comfortable in bed in NAD. Currently on NRBM. Oxygen sat 95%    MEDICATIONS  (STANDING):  dextrose 5%. 1000 milliLiter(s) (75 mL/Hr) IV Continuous <Continuous>  dextrose 50% Injectable 12.5 Gram(s) IV Push once  dextrose 50% Injectable 25 Gram(s) IV Push once  dextrose 50% Injectable 25 Gram(s) IV Push once  ergocalciferol 62423 Unit(s) Oral every week  heparin   Injectable 5000 Unit(s) SubCutaneous every 12 hours  insulin glargine Injectable (LANTUS) 20 Unit(s) SubCutaneous every morning  insulin lispro (HumaLOG) corrective regimen sliding scale   SubCutaneous three times a day before meals  insulin lispro (HumaLOG) corrective regimen sliding scale   SubCutaneous at bedtime  insulin lispro Injectable (HumaLOG) 6 Unit(s) SubCutaneous every 6 hours  mirtazapine 7.5 milliGRAM(s) Oral <User Schedule>  mirtazapine 3.75 milliGRAM(s) Oral <User Schedule>  multivitamin/minerals 1 Tablet(s) Oral daily  mupirocin 2% Nasal 1 Application(s) Nasal two times a day  piperacillin/tazobactam IVPB.. 3.375 Gram(s) IV Intermittent every 8 hours  QUEtiapine 25 milliGRAM(s) Oral at bedtime      MEDICATIONS  (PRN):  acetaminophen  Suppository .. 650 milliGRAM(s) Rectal every 6 hours PRN Temp greater or equal to 38C (100.4F)  ALBUTerol    90 MICROgram(s) HFA Inhaler 2 Puff(s) Inhalation every 6 hours PRN Shortness of Breath and/or Wheezing  dextrose 40% Gel 15 Gram(s) Oral once PRN Blood Glucose LESS THAN 70 milliGRAM(s)/deciliter  glucagon  Injectable 1 milliGRAM(s) IntraMuscular once PRN Glucose LESS THAN 70 milligrams/deciliter  guaiFENesin   Syrup  (Sugar-Free) 100 milliGRAM(s) Oral every 6 hours PRN Cough      Allergies    No Known Allergies    Intolerances        PAST MEDICAL & SURGICAL HISTORY:  Dementia  No significant past surgical history      FAMILY HISTORY:      SOCIAL HISTORY  Smoking History:     REVIEW OF SYSTEMS:    CONSTITUTIONAL:  No fevers, chills, sweats    HEENT:  Eyes:  No diplopia or blurred vision. ENT:  No earache, sore throat or runny nose.    CARDIOVASCULAR:  No pressure, squeezing, tightness, or heaviness about the chest; no palpitations.    RESPIRATORY:  Per HPI    GASTROINTESTINAL:  No abdominal pain, nausea, vomiting or diarrhea.    GENITOURINARY:  No dysuria, frequency or urgency.    NEUROLOGIC:  No paresthesias, fasciculations, seizures or weakness.    PSYCHIATRIC:  No disorder of thought or mood.    Vital Signs Last 24 Hrs  T(C): 37.8 (16 May 2020 05:58), Max: 38.3 (15 May 2020 11:00)  T(F): 100 (16 May 2020 05:58), Max: 101 (15 May 2020 21:02)  HR: 111 (16 May 2020 05:58) (100 - 126)  BP: 124/78 (16 May 2020 05:58) (115/75 - 146/85)  BP(mean): --  RR: 20 (16 May 2020 05:58) (17 - 24)  SpO2: 95% (16 May 2020 05:58) (93% - 96%)  I&O's Detail    15 May 2020 07:01  -  16 May 2020 07:00  --------------------------------------------------------  IN:  Total IN: 0 mL    OUT:    Indwelling Catheter - Urethral: 1000 mL  Total OUT: 1000 mL    Total NET: -1000 mL          PHYSICAL EXAMINATION:    GENERAL: The patient is a well-developed, well-nourished _____in no apparent distress.     HEENT: Head is normocephalic and atraumatic. Extraocular muscles are intact. Mucous membranes are moist.     NECK: Supple.     LUNGS: Clear to auscultation without wheezing, rales, or rhonchi. Respirations unlabored    HEART: Regular rate and rhythm without murmur.    ABDOMEN: Soft, nontender, and nondistended.  No hepatosplenomegaly is noted.    EXTREMITIES: Without any cyanosis, clubbing, rash, lesions or edema.    NEUROLOGIC: Grossly intact.      LABS:                        15.1   14.56 )-----------( 323      ( 16 May 2020 07:07 )             46.1     05-16    152<H>  |  119<H>  |  67<H>  ----------------------------<  308<H>  3.6   |  20<L>  |  2.44<H>    Ca    10.0      16 May 2020 07:07  Phos  3.9     05-15  Mg     2.2     05-15          ABG - ( 14 May 2020 14:06 )  pH, Arterial: 7.48  pH, Blood: x     /  pCO2: 29    /  pO2: 58    / HCO3: 22    / Base Excess: -0.1  /  SaO2: 91                              MICROBIOLOGY:    RADIOLOGY & ADDITIONAL STUDIES:< from: Xray Chest 1 View- PORTABLE-Urgent (05.15.20 @ 15:55) >  Impression:  1.  Worsening left lower lobe pneumonia.    < end of copied text >      CXR:    Ct scan chest:    ekg;    echo:

## 2020-05-16 NOTE — PROGRESS NOTE BEHAVIORAL HEALTH - NSBHFUPINTERVALHXFT_PSY_A_CORE
Last seen by Dr. Wilkes on 5/9. Spoke to RN and NP. According to RN, unable to engage in meaningful interview. According to NP, there has been no agitation, patient continues to be obtunded, and psych medications continue to be held. Spikes fevers intermittently. Primary team will try to place NG tube today. Visualized patient through telepsychiatry modality and attempted to interview. Unable to respond to name. Unable to follow commands. No agitation or restlessness. Unable to fully assess as cannot participate in meaningful interview.

## 2020-05-16 NOTE — PROGRESS NOTE ADULT - SUBJECTIVE AND OBJECTIVE BOX
64 year old male from [facility] with PMH of dementia and type 2 DM presented with seizure activity while sitting at AL. Endocrinology was consulted for management of uncontrolled type 2 DM with hyperglycemia.    Patient was started on NGT feedings with resulting fasting hyperglycemia >300 this AM despite Lantus 20 units daily. Patient with fever of 101 deg last night.     MEDICATIONS  (STANDING):  dextrose 5%. 1000 milliLiter(s) (75 mL/Hr) IV Continuous <Continuous>  dextrose 50% Injectable 12.5 Gram(s) IV Push once  dextrose 50% Injectable 25 Gram(s) IV Push once  dextrose 50% Injectable 25 Gram(s) IV Push once  ergocalciferol 28045 Unit(s) Oral every week  heparin   Injectable 5000 Unit(s) SubCutaneous every 12 hours  insulin glargine Injectable (LANTUS) 30 Unit(s) SubCutaneous every morning  insulin glargine Injectable (LANTUS) 10 Unit(s) SubCutaneous once  insulin lispro (HumaLOG) corrective regimen sliding scale   SubCutaneous three times a day before meals  insulin lispro (HumaLOG) corrective regimen sliding scale   SubCutaneous at bedtime  insulin lispro Injectable (HumaLOG) 6 Unit(s) SubCutaneous every 6 hours  mirtazapine 7.5 milliGRAM(s) Oral <User Schedule>  mirtazapine 3.75 milliGRAM(s) Oral <User Schedule>  multivitamin/minerals 1 Tablet(s) Oral daily  mupirocin 2% Nasal 1 Application(s) Nasal two times a day  piperacillin/tazobactam IVPB.. 3.375 Gram(s) IV Intermittent every 8 hours  QUEtiapine 25 milliGRAM(s) Oral at bedtime    MEDICATIONS  (PRN):  acetaminophen  Suppository .. 650 milliGRAM(s) Rectal every 6 hours PRN Temp greater or equal to 38C (100.4F)  ALBUTerol    90 MICROgram(s) HFA Inhaler 2 Puff(s) Inhalation every 6 hours PRN Shortness of Breath and/or Wheezing  dextrose 40% Gel 15 Gram(s) Oral once PRN Blood Glucose LESS THAN 70 milliGRAM(s)/deciliter  glucagon  Injectable 1 milliGRAM(s) IntraMuscular once PRN Glucose LESS THAN 70 milligrams/deciliter  guaiFENesin   Syrup  (Sugar-Free) 100 milliGRAM(s) Oral every 6 hours PRN Cough      REVIEW OF SYSTEMS:  unable to obtain due to medical condition    PHYSICAL EXAM:    VITAL SIGNS  T(C): 37.8 (05-16-20 @ 05:58), Max: 38.3 (05-15-20 @ 21:02)  HR: 111 (05-16-20 @ 05:58) (100 - 126)  BP: 124/78 (05-16-20 @ 05:58) (115/75 - 124/78)  RR: 20 (05-16-20 @ 05:58) (17 - 20)  SpO2: 95% (05-16-20 @ 05:58) (94% - 96%)    GENERAL: lethargic, NAD, well-developed  HEAD:  Atraumatic, Normocephalic  EYES: EOMI, PERRLA, conjunctiva and sclera clear  ENMT: No tonsillar erythema, exudates, or enlargement; No lesions  NECK: Supple, No JVD, Normal thyroid  NERVOUS SYSTEM:  alert, nonverbal,  CHEST/LUNG: decreased breath sounds, No rales, rhonchi, wheezing, or rubs  HEART: Regular rate and rhythm; No murmurs, rubs, or gallops  ABDOMEN: Soft, Nontender, Nondistended; Bowel sounds present  EXTREMITIES:  2+ Peripheral Pulses, No clubbing, cyanosis, or edema  LYMPH: No lymphadenopathy noted  SKIN: No rashes or lesions      LABS:                        15.1   14.56 )-----------( 323      ( 16 May 2020 07:07 )             46.1     05-16    152<H>  |  119<H>  |  67<H>  ----------------------------<  308<H>  3.6   |  20<L>  |  2.44<H>    Ca    10.0      16 May 2020 07:07  Phos  3.9     05-15  Mg     2.2     05-15          CAPILLARY BLOOD GLUCOSE      POCT Blood Glucose.: 324 mg/dL (16 May 2020 08:07)  POCT Blood Glucose.: 300 mg/dL (16 May 2020 06:44)  POCT Blood Glucose.: 200 mg/dL (16 May 2020 00:04)  POCT Blood Glucose.: 154 mg/dL (15 May 2020 21:24)  POCT Blood Glucose.: 211 mg/dL (15 May 2020 17:09)  POCT Blood Glucose.: 325 mg/dL (15 May 2020 12:02)    ABG - ( 14 May 2020 14:06 )  pH, Arterial: 7.48  pH, Blood: x     /  pCO2: 29    /  pO2: 58    / HCO3: 22    / Base Excess: -0.1  /  SaO2: 91

## 2020-05-16 NOTE — PROGRESS NOTE ADULT - ASSESSMENT
MAXIME , non oliguric.    Hypernatremia, increased urine osmolality and increased  SG.  Dehydration and sepsis    continue hypotonic solution IV D5W and via NG tube    Sepsis with LLL infiltrate, COVID 19.  Hypotension 2 days ago, off Midodrine    Continue Hypotonic solution D5W AND ADD Potassium

## 2020-05-16 NOTE — PROGRESS NOTE ADULT - ASSESSMENT
64 year old male from [facility] with PMH of dementia and type 2 DM presented with seizure activity while sitting at AL. Endocrinology was consulted for management of uncontrolled type 2 DM with hyperglycemia.    1. Uncontrolled Type 2 DM with hyperglycemia, A1c 9.9%  -BG above goal >300  likely secondary to acute infection, now on NGT feeding  -recommend and additional Lantus 10 units x 1 dose now  -increase to Lantus 30 units daily  -continue Humalog 6 units TID with meals (administer only if patient eats >50% of the meal)  -continue mealtime rapid acting insulin to moderate scale as below  BG 70-100mg/dL 0 units  -150 mg/dL 0 units  -200 mg/dL 2 units  -250 mg/dL 4 units  -300 mg/dL 6 units  -350 mg/dL 8 units  -400 mg/dL 10 units  BG > 400mg/dL 12 units  -FS AC HS  -ensure consistent carbohydrate   -will monitor FS and adjust accordingly   --if patient is NPO for any reason please change FS and sliding scale to NPO lispro scale     2. Hypernatremia  -Na 152  -continue D5 and free water   -continue to monitor Na closely     3.  Aspiration pneumonia  -oxygen supplementation as needed   -continue antibiotics as per primary team   -pulmonology follow up    Plan discussed with primary team  Please  call  w/ any questions or concerns [phone]

## 2020-05-16 NOTE — PROGRESS NOTE ADULT - SUBJECTIVE AND OBJECTIVE BOX
CARDIOLOGY/MEDICAL ATTENDING    CHIEF COMPLAINT:Patient is a 64y old  Male who presents with a chief complaint of seizures. Pt still lethargic,NGT in place,s/p temp spike overnight.    	  REVIEW OF SYSTEMS:  [x ] Unable to obtain    PHYSICAL EXAM:  T(C): 37.8 (05-16-20 @ 05:58), Max: 38.3 (05-15-20 @ 21:02)  HR: 111 (05-16-20 @ 05:58) (100 - 126)  BP: 124/78 (05-16-20 @ 05:58) (115/75 - 124/78)  RR: 20 (05-16-20 @ 05:58) (17 - 20)  SpO2: 95% (05-16-20 @ 05:58) (94% - 96%)  Wt(kg): --  I&O's Summary    15 May 2020 07:01  -  16 May 2020 07:00  --------------------------------------------------------  IN: 0 mL / OUT: 1000 mL / NET: -1000 mL        Appearance: Normal	  HEENT:   Normal oral mucosa, PERRL, EOMI	  Lymphatic: No lymphadenopathy  Cardiovascular: Normal S1 S2, No JVD, No murmurs, No edema  Respiratory: Dec BS  at bases  Gastrointestinal:  Soft, Non-tender, + BS	  Skin: No rashes, No ecchymoses, No cyanosis	  Extremities: Normal range of motion, No clubbing, cyanosis or edema  Vascular: Peripheral pulses palpable 2+ bilaterally    MEDICATIONS  (STANDING):  dextrose 5%. 1000 milliLiter(s) (75 mL/Hr) IV Continuous <Continuous>  dextrose 50% Injectable 12.5 Gram(s) IV Push once  dextrose 50% Injectable 25 Gram(s) IV Push once  dextrose 50% Injectable 25 Gram(s) IV Push once  ergocalciferol 82047 Unit(s) Oral every week  heparin   Injectable 5000 Unit(s) SubCutaneous every 12 hours  insulin glargine Injectable (LANTUS) 30 Unit(s) SubCutaneous every morning  insulin glargine Injectable (LANTUS) 10 Unit(s) SubCutaneous once  insulin lispro (HumaLOG) corrective regimen sliding scale   SubCutaneous three times a day before meals  insulin lispro (HumaLOG) corrective regimen sliding scale   SubCutaneous at bedtime  insulin lispro Injectable (HumaLOG) 6 Unit(s) SubCutaneous every 6 hours  multivitamin/minerals 1 Tablet(s) Oral daily  mupirocin 2% Nasal 1 Application(s) Nasal two times a day  piperacillin/tazobactam IVPB.. 3.375 Gram(s) IV Intermittent every 8 hours        	  LABS:	 	                       15.1   14.56 )-----------( 323      ( 16 May 2020 07:07 )             46.1     05-16    152<H>  |  119<H>  |  67<H>  ----------------------------<  308<H>  3.6   |  20<L>  |  2.44<H>    Ca    10.0      16 May 2020 07:07  Phos  3.9     05-15  Mg     2.2     05-15      proBNP: Serum Pro-Brain Natriuretic Peptide: 126 pg/mL (05-01 @ 09:36)    Lipid Profile: Cholesterol 161  LDL 95  HDL 35        TSH: Thyroid Stimulating Hormone, Serum: 2.39 uU/mL (05-01 @ 09:36)      	  EXAM:  XR CHEST PORTABLE URGENT 1V                            PROCEDURE DATE:  05/15/2020          INTERPRETATION:    Examination: XR CHEST URGENT    History: ADMDIAG1: R56.9 UNSPECIFIED CONVULSIONS/, NGT Placement positive for Covid 19 infection    Findings:  Large consolidation in the left lower lobe, has increased from the prior x-ray yesterday. No effusion or pneumothorax. Weighted feeding tube overlies the gastric fundus. Heart size is normal.    Impression:  1.  Worsening left lower lobe pneumonia.      DISCRETE X-RAY DATA:  Percent of LEFT lung opacification: 34-66%  Percent of RIGHT lung opacification: Clear  Change in lung opacification from most recent x-ray (<=3 days): Increase  Change from prior dated 3 or more days (same admission): No Prior

## 2020-05-16 NOTE — PROGRESS NOTE ADULT - SUBJECTIVE AND OBJECTIVE BOX
Problem List:  Hypernatremia  MAXIME  COVID 19, HYPOXEMIA - left lower lobe consolidation increased on 05/15        PAST MEDICAL & SURGICAL HISTORY:  Dementia  No significant past surgical history      No Known Allergies      MEDICATIONS  (STANDING):  dextrose 5%. 1000 milliLiter(s) (75 mL/Hr) IV Continuous <Continuous>  dextrose 50% Injectable 12.5 Gram(s) IV Push once  dextrose 50% Injectable 25 Gram(s) IV Push once  dextrose 50% Injectable 25 Gram(s) IV Push once  ergocalciferol 51735 Unit(s) Oral every week  heparin   Injectable 5000 Unit(s) SubCutaneous every 12 hours  insulin lispro (HumaLOG) corrective regimen sliding scale   SubCutaneous three times a day before meals  insulin lispro (HumaLOG) corrective regimen sliding scale   SubCutaneous at bedtime  insulin lispro Injectable (HumaLOG) 6 Unit(s) SubCutaneous every 6 hours  multivitamin/minerals 1 Tablet(s) Oral daily  mupirocin 2% Nasal 1 Application(s) Nasal two times a day  piperacillin/tazobactam IVPB.. 3.375 Gram(s) IV Intermittent every 8 hours    MEDICATIONS  (PRN):  acetaminophen  Suppository .. 650 milliGRAM(s) Rectal every 6 hours PRN Temp greater or equal to 38C (100.4F)  ALBUTerol    90 MICROgram(s) HFA Inhaler 2 Puff(s) Inhalation every 6 hours PRN Shortness of Breath and/or Wheezing  dextrose 40% Gel 15 Gram(s) Oral once PRN Blood Glucose LESS THAN 70 milliGRAM(s)/deciliter  glucagon  Injectable 1 milliGRAM(s) IntraMuscular once PRN Glucose LESS THAN 70 milligrams/deciliter  guaiFENesin   Syrup  (Sugar-Free) 100 milliGRAM(s) Oral every 6 hours PRN Cough                            15.1   14.56 )-----------( 323      ( 16 May 2020 07:07 )             46.1     05-16    152<H>  |  119<H>  |  67<H>  ----------------------------<  308<H>  3.6   |  20<L>  |  2.44<H>    Ca    10.0      16 May 2020 07:07  Phos  3.9     05-15  Mg     2.2     05-15    D-Dimer Assay, Quantitative: 196 ng/mL DDU (05.14.20 @ 17:59)    Sodium, Serum: 156 mmol/L (05.15.20 @ 00:21)          Osmolality, Random Urine: 892 mos/kg (05-14 @ 18:00)  Creatinine, Random Urine: 143 mg/dL (05-14 @ 17:59)  Sodium, Random Urine: 23 mmol/L (05-14 @ 17:59)  Potassium, Random Urine: 45 mmol/L (05-14 @ 17:59)      REVIEW OF SYSTEMS:  lethargic      VITALS:  T(F): 100.9 (05-16-20 @ 12:54), Max: 101 (05-15-20 @ 21:02)  HR: 114 (05-16-20 @ 12:24)  BP: 126/76 (05-16-20 @ 12:24)  RR: 20 (05-16-20 @ 12:24)  SpO2: 96% (05-16-20 @ 12:24)  Wt(kg): --  NRBM  05-15 @ 07:01  -  05-16 @ 07:00  --------------------------------------------------------  IN: 0 mL / OUT: 1000 mL / NET: -1000 mL        PHYSICAL EXAM:  Constitutional: rr 20  Neck: No JVD, no carotid bruit, supple, no adenopathy  Respiratory: air entrance to right, rales left    Abdomen: BS+, soft, no tenderness, no bruit  Pelvis: bladder nondistended  Extremities: No cyanosis or clubbing. No peripheral edema.

## 2020-05-16 NOTE — PROGRESS NOTE ADULT - ASSESSMENT
64 yr old male with PMHX of DM,dementia sent to ER from facility for seizure and now COVID+,Delirium,MAXIME,hypernatremia and suspected aspiration pneumonia.  1.MAXIME and hypernatremia-IVF,free water via NGT,goodwin.  2.COVID +supportive treatment.  3.Fever and aspiration pneumonia-zosyn, ID re-eval.  4.DM-Insulin, Endo f/u.  5.Delirium-Psych re-eval appreciated, rec d/c all psych medication.  6.NGT placed-TF.  7.Replace vit d.  8.GI and DVT prophylaxis.

## 2020-05-17 LAB
ALBUMIN SERPL ELPH-MCNC: 2.2 G/DL — LOW (ref 3.5–5)
ALP SERPL-CCNC: 117 U/L — SIGNIFICANT CHANGE UP (ref 40–120)
ALT FLD-CCNC: 24 U/L DA — SIGNIFICANT CHANGE UP (ref 10–60)
AMMONIA BLD-MCNC: 57 UMOL/L — HIGH (ref 11–32)
ANION GAP SERPL CALC-SCNC: 11 MMOL/L — SIGNIFICANT CHANGE UP (ref 5–17)
AST SERPL-CCNC: 26 U/L — SIGNIFICANT CHANGE UP (ref 10–40)
BILIRUB SERPL-MCNC: 0.8 MG/DL — SIGNIFICANT CHANGE UP (ref 0.2–1.2)
BUN SERPL-MCNC: 75 MG/DL — HIGH (ref 7–18)
CALCIUM SERPL-MCNC: 9.3 MG/DL — SIGNIFICANT CHANGE UP (ref 8.4–10.5)
CHLORIDE SERPL-SCNC: 119 MMOL/L — HIGH (ref 96–108)
CO2 SERPL-SCNC: 22 MMOL/L — SIGNIFICANT CHANGE UP (ref 22–31)
CREAT SERPL-MCNC: 2.34 MG/DL — HIGH (ref 0.5–1.3)
GLUCOSE BLDC GLUCOMTR-MCNC: 206 MG/DL — HIGH (ref 70–99)
GLUCOSE BLDC GLUCOMTR-MCNC: 336 MG/DL — HIGH (ref 70–99)
GLUCOSE BLDC GLUCOMTR-MCNC: 340 MG/DL — HIGH (ref 70–99)
GLUCOSE BLDC GLUCOMTR-MCNC: 369 MG/DL — HIGH (ref 70–99)
GLUCOSE BLDC GLUCOMTR-MCNC: 438 MG/DL — HIGH (ref 70–99)
GLUCOSE BLDC GLUCOMTR-MCNC: 472 MG/DL — CRITICAL HIGH (ref 70–99)
GLUCOSE SERPL-MCNC: 378 MG/DL — HIGH (ref 70–99)
HCT VFR BLD CALC: 45.4 % — SIGNIFICANT CHANGE UP (ref 39–50)
HGB BLD-MCNC: 14.9 G/DL — SIGNIFICANT CHANGE UP (ref 13–17)
MAGNESIUM SERPL-MCNC: 2.4 MG/DL — SIGNIFICANT CHANGE UP (ref 1.6–2.6)
MCHC RBC-ENTMCNC: 28.4 PG — SIGNIFICANT CHANGE UP (ref 27–34)
MCHC RBC-ENTMCNC: 32.8 GM/DL — SIGNIFICANT CHANGE UP (ref 32–36)
MCV RBC AUTO: 86.5 FL — SIGNIFICANT CHANGE UP (ref 80–100)
NRBC # BLD: 0 /100 WBCS — SIGNIFICANT CHANGE UP (ref 0–0)
PHOSPHATE SERPL-MCNC: 2.5 MG/DL — SIGNIFICANT CHANGE UP (ref 2.5–4.5)
PLATELET # BLD AUTO: 264 K/UL — SIGNIFICANT CHANGE UP (ref 150–400)
POTASSIUM SERPL-MCNC: 3.7 MMOL/L — SIGNIFICANT CHANGE UP (ref 3.5–5.3)
POTASSIUM SERPL-SCNC: 3.7 MMOL/L — SIGNIFICANT CHANGE UP (ref 3.5–5.3)
PROT SERPL-MCNC: 7.9 G/DL — SIGNIFICANT CHANGE UP (ref 6–8.3)
RBC # BLD: 5.25 M/UL — SIGNIFICANT CHANGE UP (ref 4.2–5.8)
RBC # FLD: 12.9 % — SIGNIFICANT CHANGE UP (ref 10.3–14.5)
SODIUM SERPL-SCNC: 152 MMOL/L — HIGH (ref 135–145)
WBC # BLD: 14.23 K/UL — HIGH (ref 3.8–10.5)
WBC # FLD AUTO: 14.23 K/UL — HIGH (ref 3.8–10.5)

## 2020-05-17 PROCEDURE — 71045 X-RAY EXAM CHEST 1 VIEW: CPT | Mod: 26

## 2020-05-17 RX ORDER — DEXTROSE MONOHYDRATE, SODIUM CHLORIDE, AND POTASSIUM CHLORIDE 50; .745; 4.5 G/1000ML; G/1000ML; G/1000ML
1000 INJECTION, SOLUTION INTRAVENOUS
Refills: 0 | Status: DISCONTINUED | OUTPATIENT
Start: 2020-05-17 | End: 2020-05-18

## 2020-05-17 RX ORDER — INSULIN LISPRO 100/ML
VIAL (ML) SUBCUTANEOUS EVERY 6 HOURS
Refills: 0 | Status: DISCONTINUED | OUTPATIENT
Start: 2020-05-17 | End: 2020-05-22

## 2020-05-17 RX ORDER — INSULIN GLARGINE 100 [IU]/ML
42 INJECTION, SOLUTION SUBCUTANEOUS EVERY MORNING
Refills: 0 | Status: DISCONTINUED | OUTPATIENT
Start: 2020-05-18 | End: 2020-05-18

## 2020-05-17 RX ORDER — INSULIN GLARGINE 100 [IU]/ML
12 INJECTION, SOLUTION SUBCUTANEOUS ONCE
Refills: 0 | Status: COMPLETED | OUTPATIENT
Start: 2020-05-17 | End: 2020-05-17

## 2020-05-17 RX ORDER — SODIUM CHLORIDE 9 MG/ML
1000 INJECTION, SOLUTION INTRAVENOUS
Refills: 0 | Status: DISCONTINUED | OUTPATIENT
Start: 2020-05-17 | End: 2020-05-17

## 2020-05-17 RX ORDER — METFORMIN HYDROCHLORIDE 850 MG/1
1 TABLET ORAL
Qty: 0 | Refills: 0 | DISCHARGE

## 2020-05-17 RX ORDER — LACTULOSE 10 G/15ML
10 SOLUTION ORAL THREE TIMES A DAY
Refills: 0 | Status: DISCONTINUED | OUTPATIENT
Start: 2020-05-17 | End: 2020-05-24

## 2020-05-17 RX ORDER — INSULIN LISPRO 100/ML
12 VIAL (ML) SUBCUTANEOUS EVERY 6 HOURS
Refills: 0 | Status: DISCONTINUED | OUTPATIENT
Start: 2020-05-17 | End: 2020-05-18

## 2020-05-17 RX ORDER — INSULIN LISPRO 100/ML
8 VIAL (ML) SUBCUTANEOUS ONCE
Refills: 0 | Status: COMPLETED | OUTPATIENT
Start: 2020-05-17 | End: 2020-05-17

## 2020-05-17 RX ADMIN — INSULIN GLARGINE 12 UNIT(S): 100 INJECTION, SOLUTION SUBCUTANEOUS at 11:13

## 2020-05-17 RX ADMIN — PIPERACILLIN AND TAZOBACTAM 25 GRAM(S): 4; .5 INJECTION, POWDER, LYOPHILIZED, FOR SOLUTION INTRAVENOUS at 12:18

## 2020-05-17 RX ADMIN — Medication 6 UNIT(S): at 08:23

## 2020-05-17 RX ADMIN — INSULIN GLARGINE 30 UNIT(S): 100 INJECTION, SOLUTION SUBCUTANEOUS at 08:24

## 2020-05-17 RX ADMIN — Medication 12 UNIT(S): at 12:07

## 2020-05-17 RX ADMIN — MUPIROCIN 1 APPLICATION(S): 20 OINTMENT TOPICAL at 17:34

## 2020-05-17 RX ADMIN — PIPERACILLIN AND TAZOBACTAM 25 GRAM(S): 4; .5 INJECTION, POWDER, LYOPHILIZED, FOR SOLUTION INTRAVENOUS at 22:35

## 2020-05-17 RX ADMIN — Medication 8: at 12:07

## 2020-05-17 RX ADMIN — PIPERACILLIN AND TAZOBACTAM 25 GRAM(S): 4; .5 INJECTION, POWDER, LYOPHILIZED, FOR SOLUTION INTRAVENOUS at 04:49

## 2020-05-17 RX ADMIN — DEXTROSE MONOHYDRATE, SODIUM CHLORIDE, AND POTASSIUM CHLORIDE 100 MILLILITER(S): 50; .745; 4.5 INJECTION, SOLUTION INTRAVENOUS at 17:34

## 2020-05-17 RX ADMIN — HEPARIN SODIUM 5000 UNIT(S): 5000 INJECTION INTRAVENOUS; SUBCUTANEOUS at 04:49

## 2020-05-17 RX ADMIN — Medication 6 UNIT(S): at 03:14

## 2020-05-17 RX ADMIN — Medication 4: at 03:12

## 2020-05-17 RX ADMIN — HEPARIN SODIUM 5000 UNIT(S): 5000 INJECTION INTRAVENOUS; SUBCUTANEOUS at 17:34

## 2020-05-17 RX ADMIN — Medication 4: at 17:34

## 2020-05-17 RX ADMIN — Medication 8 UNIT(S): at 04:22

## 2020-05-17 RX ADMIN — Medication 8: at 08:23

## 2020-05-17 RX ADMIN — MUPIROCIN 1 APPLICATION(S): 20 OINTMENT TOPICAL at 04:49

## 2020-05-17 RX ADMIN — LACTULOSE 10 GRAM(S): 10 SOLUTION ORAL at 12:07

## 2020-05-17 RX ADMIN — Medication 12 UNIT(S): at 17:34

## 2020-05-17 RX ADMIN — LACTULOSE 10 GRAM(S): 10 SOLUTION ORAL at 22:35

## 2020-05-17 RX ADMIN — Medication 1 TABLET(S): at 12:07

## 2020-05-17 RX ADMIN — SODIUM CHLORIDE 70 MILLILITER(S): 9 INJECTION, SOLUTION INTRAVENOUS at 03:42

## 2020-05-17 NOTE — CHART NOTE - NSCHARTNOTEFT_GEN_A_CORE
Patient with fever overnight to 100.6 deg F. FS reviewed.  BG >450 overnight despite Lantus 30 units daily and Humalog 6 units q 6 hrs in addition to moderate dose sliding scale.  D5W was discontinued by overnight team.     Assessment/Plan:   1. Uncontrolled Type 2 DM with hyperglycemia  -likely secondary to acute infection, patient on tube feedings  -administer an additional Lantus 12 units x 1 dose now  -Increase to Lantus 42 units daily  -Increase to Humalog 12 units q 6 hrs   -continue Moderate dose sliding scale q 6 hrs   -will continue to monitor FS and adjust as needed     Plan discussed with primary team.

## 2020-05-17 NOTE — PROGRESS NOTE ADULT - PROBLEM SELECTOR PLAN 1
Check pending cultures  Antibiotics  oxygen  supp  Monitor oxygen sat  Taper oxygen supp as feasible

## 2020-05-17 NOTE — CHART NOTE - NSCHARTNOTEFT_GEN_A_CORE
Called by RN to inform 6AM blood glucose is 369. Pt last received coverage at 3:15AM and 4:20AM for blood glucose 472. Informed RN to reschedule sliding scale and insulin coverage to 8AM have blood glucose rechecked at 8AM and follow up by primary team.

## 2020-05-17 NOTE — CHART NOTE - NSCHARTNOTEFT_GEN_A_CORE
Repeated blood glucose 438 at 3:49. Per Humalog sliding scale q6h while NPO pt to receive 12 units for blood glucose greater than 400. (Pt received 4 units per bedtime sliding scale). Humalog 8 units sq x1 ordered stat. Please recheck blood glucose in 1 hour.

## 2020-05-17 NOTE — PROGRESS NOTE ADULT - SUBJECTIVE AND OBJECTIVE BOX
Patient is a 64y old  Male who presents with a chief complaint of seizures (16 May 2020 14:11)  Awake, not alert, laying in bed in NAD. Remains on NRBM    INTERVAL HPI/OVERNIGHT EVENTS:      VITAL SIGNS:  T(F): 97.4 (05-17-20 @ 05:36)  HR: 104 (05-17-20 @ 05:36)  BP: 103/70 (05-17-20 @ 05:36)  RR: 20 (05-17-20 @ 05:36)  SpO2: 96% (05-17-20 @ 05:36)  Wt(kg): --  I&O's Detail    16 May 2020 07:01  -  17 May 2020 07:00  --------------------------------------------------------  IN:  Total IN: 0 mL    OUT:    Indwelling Catheter - Urethral: 1150 mL  Total OUT: 1150 mL    Total NET: -1150 mL              REVIEW OF SYSTEMS:    CONSTITUTIONAL:  No fevers, chills, sweats    HEENT:  Eyes:  No diplopia or blurred vision. ENT:  No earache, sore throat or runny nose.    CARDIOVASCULAR:  No pressure, squeezing, tightness, or heaviness about the chest; no palpitations.    RESPIRATORY:  Per HPI    GASTROINTESTINAL:  No abdominal pain, nausea, vomiting or diarrhea.    GENITOURINARY:  No dysuria, frequency or urgency.    NEUROLOGIC:  No paresthesias, fasciculations, seizures or weakness.    PSYCHIATRIC:  No disorder of thought or mood.      PHYSICAL EXAM:    Constitutional: Well developed and nourished  Eyes:Perrla  ENMT: normal  Neck:supple  Respiratory: good air entry  Cardiovascular: S1 S2 regular  Gastrointestinal: Soft, Non tender  Extremities: No edema  Vascular:normal  Neurological:Awake, not alert  Musculoskeletal:Normal      MEDICATIONS  (STANDING):  dextrose 50% Injectable 12.5 Gram(s) IV Push once  dextrose 50% Injectable 25 Gram(s) IV Push once  dextrose 50% Injectable 25 Gram(s) IV Push once  ergocalciferol 13550 Unit(s) Oral every week  heparin   Injectable 5000 Unit(s) SubCutaneous every 12 hours  insulin glargine Injectable (LANTUS) 12 Unit(s) SubCutaneous once  insulin lispro (HumaLOG) corrective regimen sliding scale   SubCutaneous every 6 hours  insulin lispro Injectable (HumaLOG) 12 Unit(s) SubCutaneous every 6 hours  multivitamin/minerals 1 Tablet(s) Oral daily  mupirocin 2% Nasal 1 Application(s) Nasal two times a day  piperacillin/tazobactam IVPB.. 3.375 Gram(s) IV Intermittent every 8 hours    MEDICATIONS  (PRN):  acetaminophen  Suppository .. 650 milliGRAM(s) Rectal every 6 hours PRN Temp greater or equal to 38C (100.4F)  ALBUTerol    90 MICROgram(s) HFA Inhaler 2 Puff(s) Inhalation every 6 hours PRN Shortness of Breath and/or Wheezing  dextrose 40% Gel 15 Gram(s) Oral once PRN Blood Glucose LESS THAN 70 milliGRAM(s)/deciliter  glucagon  Injectable 1 milliGRAM(s) IntraMuscular once PRN Glucose LESS THAN 70 milligrams/deciliter  guaiFENesin   Syrup  (Sugar-Free) 100 milliGRAM(s) Oral every 6 hours PRN Cough      Allergies    No Known Allergies    Intolerances        LABS:                        14.9   14.23 )-----------( 264      ( 17 May 2020 07:32 )             45.4     05-17    152<H>  |  119<H>  |  75<H>  ----------------------------<  378<H>  3.7   |  22  |  2.34<H>    Ca    9.3      17 May 2020 07:32  Phos  2.5     05-17  Mg     2.4     05-17    TPro  7.9  /  Alb  2.2<L>  /  TBili  0.8  /  DBili  x   /  AST  26  /  ALT  24  /  AlkPhos  117  05-17              CAPILLARY BLOOD GLUCOSE      POCT Blood Glucose.: 340 mg/dL (17 May 2020 08:02)  POCT Blood Glucose.: 369 mg/dL (17 May 2020 06:05)  POCT Blood Glucose.: 438 mg/dL (17 May 2020 03:49)  POCT Blood Glucose.: 472 mg/dL (17 May 2020 03:09)  POCT Blood Glucose.: 213 mg/dL (16 May 2020 16:55)  POCT Blood Glucose.: 309 mg/dL (16 May 2020 11:42)    pro-bnp -- 05-14 @ 17:59     d-dimer 196  05-14 @ 17:59      RADIOLOGY & ADDITIONAL TESTS:    CXR:  < from: Xray Chest 1 View- PORTABLE-Urgent (05.15.20 @ 15:55) >  Impression:  1.  Worsening left lower lobe pneumonia.      < end of copied text >    Ct scan chest:    ekg;    echo:

## 2020-05-17 NOTE — PROGRESS NOTE ADULT - ASSESSMENT
MAXIME due to sepsis and infection, pneumonia  Urine output 1150, 450 from am  Hypernatremia and mild hypokalemia.    Suggest to increase fluid intake D5W 100 ml per hour IV  Add KCL 20 meq .  Follow BS.    Follow PO4 in am

## 2020-05-17 NOTE — PROGRESS NOTE ADULT - SUBJECTIVE AND OBJECTIVE BOX
CARDIOLOGY/MEDICAL ATTENDING    CHIEF COMPLAINT:Patient is a 64y old  Male who presents with a chief complaint of seizures.Pt still lethargic.    	  REVIEW OF SYSTEMS:  [x ] Unable to obtain    PHYSICAL EXAM:  T(C): 36.3 (05-17-20 @ 05:36), Max: 38.3 (05-16-20 @ 12:54)  HR: 104 (05-17-20 @ 05:36) (104 - 116)  BP: 103/70 (05-17-20 @ 05:36) (103/70 - 126/76)  RR: 20 (05-17-20 @ 05:36) (20 - 20)  SpO2: 96% (05-17-20 @ 05:36) (96% - 97%)  Wt(kg): --  I&O's Summary    16 May 2020 07:01  -  17 May 2020 07:00  --------------------------------------------------------  IN: 0 mL / OUT: 1150 mL / NET: -1150 mL        Appearance: Normal	  HEENT:   Normal oral mucosa, PERRL, EOMI	  Lymphatic: No lymphadenopathy  Cardiovascular: Normal S1 S2, No JVD, No murmurs, No edema  Respiratory: B/L ronchi  Gastrointestinal:  Soft, Non-tender, + BS	  Skin: No rashes, No ecchymoses, No cyanosis	  Extremities: Normal range of motion, No clubbing, cyanosis or edema  Vascular: Peripheral pulses palpable 2+ bilaterally    MEDICATIONS  (STANDING):  dextrose 50% Injectable 12.5 Gram(s) IV Push once  dextrose 50% Injectable 25 Gram(s) IV Push once  dextrose 50% Injectable 25 Gram(s) IV Push once  ergocalciferol 29733 Unit(s) Oral every week  heparin   Injectable 5000 Unit(s) SubCutaneous every 12 hours  insulin lispro (HumaLOG) corrective regimen sliding scale   SubCutaneous every 6 hours  insulin lispro Injectable (HumaLOG) 12 Unit(s) SubCutaneous every 6 hours  lactulose Syrup 10 Gram(s) Oral three times a day  multivitamin/minerals 1 Tablet(s) Oral daily  mupirocin 2% Nasal 1 Application(s) Nasal two times a day  piperacillin/tazobactam IVPB.. 3.375 Gram(s) IV Intermittent every 8 hours      	  	  LABS:	 	                         14.9   14.23 )-----------( 264      ( 17 May 2020 07:32 )             45.4     05-17    152<H>  |  119<H>  |  75<H>  ----------------------------<  378<H>  3.7   |  22  |  2.34<H>    Ca    9.3      17 May 2020 07:32  Phos  2.5     05-17  Mg     2.4     05-17    TPro  7.9  /  Alb  2.2<L>  /  TBili  0.8  /  DBili  x   /  AST  26  /  ALT  24  /  AlkPhos  117  05-17    proBNP: Serum Pro-Brain Natriuretic Peptide: 126 pg/mL (05-01 @ 09:36)    Lipid Profile: Cholesterol 161  LDL 95  HDL 35        TSH: Thyroid Stimulating Hormone, Serum: 2.39 uU/mL (05-01 @ 09:36)      	    Ammonia, Serum: 57 umol/L (05.17.20 @ 07:31)

## 2020-05-17 NOTE — CHART NOTE - NSCHARTNOTEFT_GEN_A_CORE
Called by RN reporting blood glucose is 472 at 3:09. Per RN, 4 units of Humalog administered at 3:12 per sliding scale in addition to Humalog 6 units standing. Please recheck blood glucose in 1 hour. Discontinued IVF D5 with 20 KCL @ 70ml/ hr and started on 1/2NS @ 70ml/hr. Will change corrective sliding scale from before meals and at bedtime to q6h as pt is NPO with tube feeds.

## 2020-05-17 NOTE — PROGRESS NOTE ADULT - ASSESSMENT
64 yr old male with PMHX of DM,dementia sent to ER from facility for seizure and now COVID+,Delirium,MAXIME,hypernatremia and suspected aspiration pneumonia.  1.MAXIME and hypernatremia-IVF,free water via NGT,goodwin.  2.COVID +supportive treatment.  3.Fever and aspiration pneumonia-zosyn, ID f/u.  4.DM-Insulin, Endo f/u.  5.Delirium-Psych re-eval appreciated, rec d/c all psych medication.  6.NGT placed-TF.  7.Replace vit d.  8.Elevated ammonia-add lactulose.  9.GI and DVT prophylaxis.

## 2020-05-17 NOTE — CHART NOTE - NSCHARTNOTEFT_GEN_A_CORE
EVENT: notified by RN, pt with increased agitation     OBJECTIVE:  Vital Signs Last 24 Hrs  T(C): 37.7 (17 May 2020 13:11), Max: 38.1 (16 May 2020 21:06)  T(F): 99.8 (17 May 2020 13:11), Max: 100.6 (16 May 2020 21:06)  HR: 104 (17 May 2020 13:11) (104 - 116)  BP: 107/75 (17 May 2020 13:11) (103/70 - 125/84)  BP(mean): --  RR: 18 (17 May 2020 13:11) (18 - 20)  SpO2: 98% (17 May 2020 13:11) (96% - 98%)    FOCUSED PHYSICAL EXAM:  GEN: agitated, puling on lines.   Pulm: diffuse Rhonchi     LABS:                        14.9   14.23 )-----------( 264      ( 17 May 2020 07:32 )             45.4     05-17    152<H>  |  119<H>  |  75<H>  ----------------------------<  378<H>  3.7   |  22  |  2.34<H>    Ca    9.3      17 May 2020 07:32  Phos  2.5     05-17  Mg     2.4     05-17    TPro  7.9  /  Alb  2.2<L>  /  TBili  0.8  /  DBili  x   /  AST  26  /  ALT  24  /  AlkPhos  117  05-17        ASSESSMENT/PLAN: 64 year old male from [facility]  with PMH dementia and DM coming in with seizures like activity, Head CT unremarkable,  COVID 19 detected. Seen by Neuro .No indication for antiepileptic medications now. Pt with long h/o behavioral disturbance on multiple standing antipsychotics, for which pt was seen by Psyche and antipsychotic meds adjusted and d/c'd later on.  Hospital course complicated by AMS,  unable to take po,  hypernatremia, MAXIME, uncontrolled DM with hyperglycemia, NGT inserted and tube feeds started   -Aspiration PNA: cont Zosyn, pt suctioned of thick tan secretions, f/u chest X ray   -Encephalopathy: multifactorial, COVID 19, PNA, baseline dementia, elevated Ammonia level, MAXIME and hypernatremia, cont D5W, abx, Lactulose   -Dementia with behavioural disturbances: pt has been lethargic over past few days, periods of agitation this evening, supportive management, soft mittens and close observation

## 2020-05-17 NOTE — CHART NOTE - NSCHARTNOTEFT_GEN_A_CORE
Assessment:   64yMalePatient is a 64y old  Male who presents with a chief complaint of seizures (17 May 2020 11:40)      Factors impacting intake: [ ] none [ ] nausea  [ ] vomiting [ ] diarrhea [ ] constipation  [ ]chewing problems [ ] swallowing issues  [ x] other: still unable to interview patient face to face as has covid-19. Per RN. patient currently receiving Nepro at 50ml/hx24 and tolerating it. tube feeding provides: (1200ml,2160kcal,97g protein, 872ml free water). which exceeds needs, suggest to change rate of nepro to 40ml/hx24( 960ml,1728kcal,77 g protein,697ml free water), MD to monitor/assess fluid status as needed. spoke with RN     Diet Presciption: Diet, NPO with Tube Feed:   Tube Feeding Modality: Nasogastric  Nepro with Carb Steady  Total Volume for 24 Hours (mL): 1440  Continuous  Starting Tube Feed Rate {mL per Hour}: 20  Increase Tube Feed Rate by (mL): 10     Every 6 hours  Until Goal Tube Feed Rate (mL per Hour): 60  Tube Feed Duration (in Hours): 24  Tube Feed Start Time: 17:00 (05-15-20 @ 16:27)    Intake: exceeds needs with current rate of tube feeding    Current Weight: none  % Weight Change    Pertinent Medications: MEDICATIONS  (STANDING):  dextrose 50% Injectable 12.5 Gram(s) IV Push once  dextrose 50% Injectable 25 Gram(s) IV Push once  dextrose 50% Injectable 25 Gram(s) IV Push once  ergocalciferol 59225 Unit(s) Oral every week  heparin   Injectable 5000 Unit(s) SubCutaneous every 12 hours  insulin lispro (HumaLOG) corrective regimen sliding scale   SubCutaneous every 6 hours  insulin lispro Injectable (HumaLOG) 12 Unit(s) SubCutaneous every 6 hours  lactulose Syrup 10 Gram(s) Oral three times a day  multivitamin/minerals 1 Tablet(s) Oral daily  mupirocin 2% Nasal 1 Application(s) Nasal two times a day  piperacillin/tazobactam IVPB.. 3.375 Gram(s) IV Intermittent every 8 hours    MEDICATIONS  (PRN):  acetaminophen  Suppository .. 650 milliGRAM(s) Rectal every 6 hours PRN Temp greater or equal to 38C (100.4F)  ALBUTerol    90 MICROgram(s) HFA Inhaler 2 Puff(s) Inhalation every 6 hours PRN Shortness of Breath and/or Wheezing  dextrose 40% Gel 15 Gram(s) Oral once PRN Blood Glucose LESS THAN 70 milliGRAM(s)/deciliter  glucagon  Injectable 1 milliGRAM(s) IntraMuscular once PRN Glucose LESS THAN 70 milligrams/deciliter  guaiFENesin   Syrup  (Sugar-Free) 100 milliGRAM(s) Oral every 6 hours PRN Cough    Pertinent Labs: 05-17 Na152 mmol/L<H> Glu 378 mg/dL<H> K+ 3.7 mmol/L Cr  2.34 mg/dL<H> BUN 75 mg/dL<H> 05-17 Phos 2.5 mg/dL 05-17 Alb 2.2 g/dL<L> 05-01 Chol 161 mg/dL LDL 95 mg/dL HDL 35 mg/dL<L> Trig 153 mg/dL<H>     CAPILLARY BLOOD GLUCOSE      POCT Blood Glucose.: 336 mg/dL (17 May 2020 11:59)  POCT Blood Glucose.: 340 mg/dL (17 May 2020 08:02)  POCT Blood Glucose.: 369 mg/dL (17 May 2020 06:05)  POCT Blood Glucose.: 438 mg/dL (17 May 2020 03:49)  POCT Blood Glucose.: 472 mg/dL (17 May 2020 03:09)  POCT Blood Glucose.: 213 mg/dL (16 May 2020 16:55)    Skin: No pressure ulcers    Estimated Needs:   [x ] no change since previous assessment  [ ] recalculated:     Previous Nutrition Diagnosis:   [ ] Inadequate Energy Intake [ x]Inadequate Oral Intake [ ] Excessive Energy Intake   [ ] Underweight [ ] Increased Nutrient Needs [ ] Overweight/Obesity   [ ] Altered GI Function [ ] Unintended Weight Loss [ ] Food & Nutrition Related Knowledge Deficit [ ] Malnutrition     Nutrition Diagnosis is [x ] ongoing  [ ] resolved [ ] not applicable     New Nutrition Diagnosis: [ ] not applicable       Interventions:   Recommend  [ ] Change Diet To:  [ ] Nutrition Supplement  [ ] Nutrition Support  [x ] Other: suggest to change rate of nepro to 40ml/hx24( 960ml,1728kcal,77 g protein,697ml free water), MD to monitor/assess fluid status as needed. spoke with RN     Monitoring and Evaluation:   [ ] PO intake [ x ] Tolerance to diet prescription [ x ] weights [ x ] labs[ x ] follow up per protocol  [ ] other: Assessment:   64yMalePatient is a 64y old  Male who presents with a chief complaint of seizures (17 May 2020 11:40)      Factors impacting intake: [ ] none [ ] nausea  [ ] vomiting [ ] diarrhea [ ] constipation  [ ]chewing problems [ ] swallowing issues  [ x] other: still unable to interview patient face to face as has covid-19. Per RN. patient currently receiving Nepro at 50ml/hx24 and tolerating it. tube feeding provides: (1200ml,2160kcal,97g protein, 872ml free water). which exceeds needs, suggest to change rate of nepro to 40ml/hx24 at goal ( 960ml,1728kcal,77 g protein,697ml free water), MD to monitor/assess fluid status as needed. spoke with RN     Diet Presciption: Diet, NPO with Tube Feed:   Tube Feeding Modality: Nasogastric  Nepro with Carb Steady  Total Volume for 24 Hours (mL): 1440  Continuous  Starting Tube Feed Rate {mL per Hour}: 20  Increase Tube Feed Rate by (mL): 10     Every 6 hours  Until Goal Tube Feed Rate (mL per Hour): 60  Tube Feed Duration (in Hours): 24  Tube Feed Start Time: 17:00 (05-15-20 @ 16:27)    Intake: exceeds needs with current rate of tube feeding    Current Weight: none  % Weight Change    Pertinent Medications: MEDICATIONS  (STANDING):  dextrose 50% Injectable 12.5 Gram(s) IV Push once  dextrose 50% Injectable 25 Gram(s) IV Push once  dextrose 50% Injectable 25 Gram(s) IV Push once  ergocalciferol 48680 Unit(s) Oral every week  heparin   Injectable 5000 Unit(s) SubCutaneous every 12 hours  insulin lispro (HumaLOG) corrective regimen sliding scale   SubCutaneous every 6 hours  insulin lispro Injectable (HumaLOG) 12 Unit(s) SubCutaneous every 6 hours  lactulose Syrup 10 Gram(s) Oral three times a day  multivitamin/minerals 1 Tablet(s) Oral daily  mupirocin 2% Nasal 1 Application(s) Nasal two times a day  piperacillin/tazobactam IVPB.. 3.375 Gram(s) IV Intermittent every 8 hours    MEDICATIONS  (PRN):  acetaminophen  Suppository .. 650 milliGRAM(s) Rectal every 6 hours PRN Temp greater or equal to 38C (100.4F)  ALBUTerol    90 MICROgram(s) HFA Inhaler 2 Puff(s) Inhalation every 6 hours PRN Shortness of Breath and/or Wheezing  dextrose 40% Gel 15 Gram(s) Oral once PRN Blood Glucose LESS THAN 70 milliGRAM(s)/deciliter  glucagon  Injectable 1 milliGRAM(s) IntraMuscular once PRN Glucose LESS THAN 70 milligrams/deciliter  guaiFENesin   Syrup  (Sugar-Free) 100 milliGRAM(s) Oral every 6 hours PRN Cough    Pertinent Labs: 05-17 Na152 mmol/L<H> Glu 378 mg/dL<H> K+ 3.7 mmol/L Cr  2.34 mg/dL<H> BUN 75 mg/dL<H> 05-17 Phos 2.5 mg/dL 05-17 Alb 2.2 g/dL<L> 05-01 Chol 161 mg/dL LDL 95 mg/dL HDL 35 mg/dL<L> Trig 153 mg/dL<H>     CAPILLARY BLOOD GLUCOSE      POCT Blood Glucose.: 336 mg/dL (17 May 2020 11:59)  POCT Blood Glucose.: 340 mg/dL (17 May 2020 08:02)  POCT Blood Glucose.: 369 mg/dL (17 May 2020 06:05)  POCT Blood Glucose.: 438 mg/dL (17 May 2020 03:49)  POCT Blood Glucose.: 472 mg/dL (17 May 2020 03:09)  POCT Blood Glucose.: 213 mg/dL (16 May 2020 16:55)    Skin: No pressure ulcers    Estimated Needs:   [x ] no change since previous assessment  [ ] recalculated:     Previous Nutrition Diagnosis:   [ ] Inadequate Energy Intake [ x]Inadequate Oral Intake [ ] Excessive Energy Intake   [ ] Underweight [ ] Increased Nutrient Needs [ ] Overweight/Obesity   [ ] Altered GI Function [ ] Unintended Weight Loss [ ] Food & Nutrition Related Knowledge Deficit [ ] Malnutrition     Nutrition Diagnosis is [x ] ongoing  [ ] resolved [ ] not applicable     New Nutrition Diagnosis: [ ] not applicable       Interventions:   Recommend  [ ] Change Diet To:  [ ] Nutrition Supplement  [ ] Nutrition Support  [x ] Other: suggest to change rate of nepro to 40ml/hx24 at goal ( 960ml,1728kcal,77 g protein,697ml free water), MD to monitor/assess fluid status as needed. spoke with RN     Monitoring and Evaluation:   [ ] PO intake [ x ] Tolerance to diet prescription [ x ] weights [ x ] labs[ x ] follow up per protocol  [ ] other: Assessment:   64yMalePatient is a 64y old  Male who presents with a chief complaint of seizures (17 May 2020 11:40)      Factors impacting intake: [ ] none [ ] nausea  [ ] vomiting [ ] diarrhea [ ] constipation  [ ]chewing problems [ ] swallowing issues  [ x] other: still unable to interview patient face to face as has covid-19. Per RN. patient currently receiving Nepro at 50ml/hx24 and tolerating it. tube feeding provides: (1200ml,2160kcal,97g protein, 872ml free water). which exceeds needs, suggest to change rate of nepro to 40ml/hx24 at goal ( 960ml,1728kcal,77 g protein,697ml free water), MD to monitor/assess fluid status as needed. spoke with RN     Diet Presciption: Diet, NPO with Tube Feed:   Tube Feeding Modality: Nasogastric  Nepro with Carb Steady  Total Volume for 24 Hours (mL): 1440  Continuous  Starting Tube Feed Rate {mL per Hour}: 20  Increase Tube Feed Rate by (mL): 10     Every 6 hours  Until Goal Tube Feed Rate (mL per Hour): 60  Tube Feed Duration (in Hours): 24  Tube Feed Start Time: 17:00 (05-15-20 @ 16:27)    Intake: exceeds needs with current rate of tube feeding    Current Weight: none  % Weight Change    Pertinent Medications: MEDICATIONS  (STANDING):  dextrose 50% Injectable 12.5 Gram(s) IV Push once  dextrose 50% Injectable 25 Gram(s) IV Push once  dextrose 50% Injectable 25 Gram(s) IV Push once  ergocalciferol 68785 Unit(s) Oral every week  heparin   Injectable 5000 Unit(s) SubCutaneous every 12 hours  insulin lispro (HumaLOG) corrective regimen sliding scale   SubCutaneous every 6 hours  insulin lispro Injectable (HumaLOG) 12 Unit(s) SubCutaneous every 6 hours  lactulose Syrup 10 Gram(s) Oral three times a day  multivitamin/minerals 1 Tablet(s) Oral daily  mupirocin 2% Nasal 1 Application(s) Nasal two times a day  piperacillin/tazobactam IVPB.. 3.375 Gram(s) IV Intermittent every 8 hours    MEDICATIONS  (PRN):  acetaminophen  Suppository .. 650 milliGRAM(s) Rectal every 6 hours PRN Temp greater or equal to 38C (100.4F)  ALBUTerol    90 MICROgram(s) HFA Inhaler 2 Puff(s) Inhalation every 6 hours PRN Shortness of Breath and/or Wheezing  dextrose 40% Gel 15 Gram(s) Oral once PRN Blood Glucose LESS THAN 70 milliGRAM(s)/deciliter  glucagon  Injectable 1 milliGRAM(s) IntraMuscular once PRN Glucose LESS THAN 70 milligrams/deciliter  guaiFENesin   Syrup  (Sugar-Free) 100 milliGRAM(s) Oral every 6 hours PRN Cough    Pertinent Labs: 05-17 Na152 mmol/L<H> Glu 378 mg/dL<H> K+ 3.7 mmol/L Cr  2.34 mg/dL<H> BUN 75 mg/dL<H> 05-17 Phos 2.5 mg/dL 05-17 Alb 2.2 g/dL<L> 05-01 Chol 161 mg/dL LDL 95 mg/dL HDL 35 mg/dL<L> Trig 153 mg/dL<H>     CAPILLARY BLOOD GLUCOSE      POCT Blood Glucose.: 336 mg/dL (17 May 2020 11:59)  POCT Blood Glucose.: 340 mg/dL (17 May 2020 08:02)  POCT Blood Glucose.: 369 mg/dL (17 May 2020 06:05)  POCT Blood Glucose.: 438 mg/dL (17 May 2020 03:49)  POCT Blood Glucose.: 472 mg/dL (17 May 2020 03:09)  POCT Blood Glucose.: 213 mg/dL (16 May 2020 16:55)    Skin: No pressure ulcers    Estimated Needs:   [x ] no change since previous assessment  [ ] recalculated:     Previous Nutrition Diagnosis:   [ ] Inadequate Energy Intake [ x]Inadequate Oral Intake [ ] Excessive Energy Intake   [ ] Underweight [ ] Increased Nutrient Needs [ ] Overweight/Obesity   [ ] Altered GI Function [ ] Unintended Weight Loss [ ] Food & Nutrition Related Knowledge Deficit [ ] Malnutrition     Nutrition Diagnosis is [x ] ongoing  [ ] resolved [ ] not applicable     New Nutrition Diagnosis: [ ] not applicable       Interventions:   Recommend  [ ] Change Diet To:  [ ] Nutrition Supplement  [ ] Nutrition Support  [x ] Other: suggest to change rate of nepro to 40ml/hx24 at goal ( 960ml,1728kcal,77 g protein,697ml free water), MD to monitor/assess fluid status as needed. spoke with RN and NP    Monitoring and Evaluation:   [ ] PO intake [ x ] Tolerance to diet prescription [ x ] weights [ x ] labs[ x ] follow up per protocol  [ ] other:

## 2020-05-17 NOTE — PROGRESS NOTE ADULT - SUBJECTIVE AND OBJECTIVE BOX
Problem List:  COVID 19   Pneumonia  Sepsis  MAXIME non oliguric  Hypernatremia    PAST MEDICAL & SURGICAL HISTORY:  Dementia  No significant past surgical history      No Known Allergies      MEDICATIONS  (STANDING):  dextrose 50% Injectable 12.5 Gram(s) IV Push once  dextrose 50% Injectable 25 Gram(s) IV Push once  dextrose 50% Injectable 25 Gram(s) IV Push once  ergocalciferol 00270 Unit(s) Oral every week  heparin   Injectable 5000 Unit(s) SubCutaneous every 12 hours  insulin lispro (HumaLOG) corrective regimen sliding scale   SubCutaneous every 6 hours  insulin lispro Injectable (HumaLOG) 12 Unit(s) SubCutaneous every 6 hours  lactulose Syrup 10 Gram(s) Oral three times a day  multivitamin/minerals 1 Tablet(s) Oral daily  mupirocin 2% Nasal 1 Application(s) Nasal two times a day  piperacillin/tazobactam IVPB.. 3.375 Gram(s) IV Intermittent every 8 hours    MEDICATIONS  (PRN):  acetaminophen  Suppository .. 650 milliGRAM(s) Rectal every 6 hours PRN Temp greater or equal to 38C (100.4F)  ALBUTerol    90 MICROgram(s) HFA Inhaler 2 Puff(s) Inhalation every 6 hours PRN Shortness of Breath and/or Wheezing  dextrose 40% Gel 15 Gram(s) Oral once PRN Blood Glucose LESS THAN 70 milliGRAM(s)/deciliter  glucagon  Injectable 1 milliGRAM(s) IntraMuscular once PRN Glucose LESS THAN 70 milligrams/deciliter  guaiFENesin   Syrup  (Sugar-Free) 100 milliGRAM(s) Oral every 6 hours PRN Cough                            14.9   14.23 )-----------( 264      ( 17 May 2020 07:32 )             45.4     05-17    152<H>  |  119<H>  |  75<H>  ----------------------------<  378<H>  3.7   |  22  |  2.34<H>    Ca    9.3      17 May 2020 07:32  Phos  2.5     05-17  Mg     2.4     05-17    TPro  7.9  /  Alb  2.2<L>  /  TBili  0.8  /  DBili  x   /  AST  26  /  ALT  24  /  AlkPhos  117  05-17        Osmolality, Random Urine: 892 mos/kg (05-14 @ 18:00)  Creatinine, Random Urine: 143 mg/dL (05-14 @ 17:59)  Sodium, Random Urine: 23 mmol/L (05-14 @ 17:59)  Potassium, Random Urine: 45 mmol/L (05-14 @ 17:59)      REVIEW OF SYSTEMS:  General: no fever no chills, no weight loss.  dementia      VITALS:  T(F): 99.8 (05-17-20 @ 13:11), Max: 100.6 (05-16-20 @ 21:06)  HR: 104 (05-17-20 @ 13:11)  BP: 107/75 (05-17-20 @ 13:11)  RR: 18 (05-17-20 @ 13:11)  SpO2: 98% (05-17-20 @ 13:11)  Wt(kg): --    05-16 @ 07:01  -  05-17 @ 07:00  --------------------------------------------------------  IN: 0 mL / OUT: 1150 mL / NET: -1150 mL        PHYSICAL EXAM:  Constitutional:  no respiratory distress. Appears ill   Neck: No JVD, no carotid bruit, supple, no adenopathy  Respiratory: Good air entrance B/L, no wheezes, rales or rhonchi  Cardiovascular: S1, S2, RRR, no pericardial rub, no murmur  Abdomen: BS+, soft, no tenderness, no bruit  Pelvis: bladder nondistended  Extremities: No cyanosis or clubbing. No peripheral edema.   Confused

## 2020-05-17 NOTE — ED BEHAVIORAL HEALTH NOTE - BEHAVIORAL HEALTH NOTE
Spoke to RN and NP. According to RN, unable to engage in meaningful interview. According to NP, there has been no agitation, patient continues to be obtunded, and psych medications continue to be held. Spikes fevers intermittently. Primary team NP states patient remains medically acute and is unable to follow command or answer questions and does not want TeleCL interview today.  Primary team is informed that TeleCL team will remain available and CL services will attempt to interview patient when patient condition more appropriate.

## 2020-05-17 NOTE — CHART NOTE - NSCHARTNOTEFT_GEN_A_CORE
Called by patient's daughter Ms. Sterling. Updated on patient status and plan of care. Questions and concerns addressed.

## 2020-05-18 LAB
AMMONIA BLD-MCNC: 57 UMOL/L — HIGH (ref 11–32)
ANION GAP SERPL CALC-SCNC: 8 MMOL/L — SIGNIFICANT CHANGE UP (ref 5–17)
BUN SERPL-MCNC: 65 MG/DL — HIGH (ref 7–18)
CALCIUM SERPL-MCNC: 10.2 MG/DL — SIGNIFICANT CHANGE UP (ref 8.4–10.5)
CHLORIDE SERPL-SCNC: 120 MMOL/L — HIGH (ref 96–108)
CO2 SERPL-SCNC: 25 MMOL/L — SIGNIFICANT CHANGE UP (ref 22–31)
CREAT SERPL-MCNC: 1.88 MG/DL — HIGH (ref 0.5–1.3)
GLUCOSE BLDC GLUCOMTR-MCNC: 103 MG/DL — HIGH (ref 70–99)
GLUCOSE BLDC GLUCOMTR-MCNC: 137 MG/DL — HIGH (ref 70–99)
GLUCOSE BLDC GLUCOMTR-MCNC: 171 MG/DL — HIGH (ref 70–99)
GLUCOSE BLDC GLUCOMTR-MCNC: 174 MG/DL — HIGH (ref 70–99)
GLUCOSE BLDC GLUCOMTR-MCNC: 188 MG/DL — HIGH (ref 70–99)
GLUCOSE BLDC GLUCOMTR-MCNC: 212 MG/DL — HIGH (ref 70–99)
GLUCOSE SERPL-MCNC: 194 MG/DL — HIGH (ref 70–99)
HCT VFR BLD CALC: 44.5 % — SIGNIFICANT CHANGE UP (ref 39–50)
HGB BLD-MCNC: 14.5 G/DL — SIGNIFICANT CHANGE UP (ref 13–17)
MCHC RBC-ENTMCNC: 28.5 PG — SIGNIFICANT CHANGE UP (ref 27–34)
MCHC RBC-ENTMCNC: 32.6 GM/DL — SIGNIFICANT CHANGE UP (ref 32–36)
MCV RBC AUTO: 87.6 FL — SIGNIFICANT CHANGE UP (ref 80–100)
NRBC # BLD: 0 /100 WBCS — SIGNIFICANT CHANGE UP (ref 0–0)
PLATELET # BLD AUTO: 251 K/UL — SIGNIFICANT CHANGE UP (ref 150–400)
POTASSIUM SERPL-MCNC: 3.4 MMOL/L — LOW (ref 3.5–5.3)
POTASSIUM SERPL-SCNC: 3.4 MMOL/L — LOW (ref 3.5–5.3)
RBC # BLD: 5.08 M/UL — SIGNIFICANT CHANGE UP (ref 4.2–5.8)
RBC # FLD: 12.9 % — SIGNIFICANT CHANGE UP (ref 10.3–14.5)
SODIUM SERPL-SCNC: 153 MMOL/L — HIGH (ref 135–145)
WBC # BLD: 16.99 K/UL — HIGH (ref 3.8–10.5)
WBC # FLD AUTO: 16.99 K/UL — HIGH (ref 3.8–10.5)

## 2020-05-18 PROCEDURE — 99232 SBSQ HOSP IP/OBS MODERATE 35: CPT

## 2020-05-18 RX ORDER — INSULIN LISPRO 100/ML
5 VIAL (ML) SUBCUTANEOUS EVERY 6 HOURS
Refills: 0 | Status: DISCONTINUED | OUTPATIENT
Start: 2020-05-18 | End: 2020-05-20

## 2020-05-18 RX ORDER — SCOPALAMINE 1 MG/3D
1 PATCH, EXTENDED RELEASE TRANSDERMAL
Refills: 0 | Status: DISCONTINUED | OUTPATIENT
Start: 2020-05-18 | End: 2020-05-28

## 2020-05-18 RX ORDER — DEXTROSE MONOHYDRATE, SODIUM CHLORIDE, AND POTASSIUM CHLORIDE 50; .745; 4.5 G/1000ML; G/1000ML; G/1000ML
1000 INJECTION, SOLUTION INTRAVENOUS
Refills: 0 | Status: DISCONTINUED | OUTPATIENT
Start: 2020-05-18 | End: 2020-05-19

## 2020-05-18 RX ADMIN — MUPIROCIN 1 APPLICATION(S): 20 OINTMENT TOPICAL at 17:46

## 2020-05-18 RX ADMIN — Medication 5 UNIT(S): at 17:50

## 2020-05-18 RX ADMIN — LACTULOSE 10 GRAM(S): 10 SOLUTION ORAL at 13:14

## 2020-05-18 RX ADMIN — HEPARIN SODIUM 5000 UNIT(S): 5000 INJECTION INTRAVENOUS; SUBCUTANEOUS at 17:46

## 2020-05-18 RX ADMIN — LACTULOSE 10 GRAM(S): 10 SOLUTION ORAL at 22:31

## 2020-05-18 RX ADMIN — PIPERACILLIN AND TAZOBACTAM 25 GRAM(S): 4; .5 INJECTION, POWDER, LYOPHILIZED, FOR SOLUTION INTRAVENOUS at 22:31

## 2020-05-18 RX ADMIN — SCOPALAMINE 1 PATCH: 1 PATCH, EXTENDED RELEASE TRANSDERMAL at 17:46

## 2020-05-18 RX ADMIN — LACTULOSE 10 GRAM(S): 10 SOLUTION ORAL at 04:32

## 2020-05-18 RX ADMIN — MUPIROCIN 1 APPLICATION(S): 20 OINTMENT TOPICAL at 04:33

## 2020-05-18 RX ADMIN — Medication 12 UNIT(S): at 06:03

## 2020-05-18 RX ADMIN — Medication 2: at 06:03

## 2020-05-18 RX ADMIN — SCOPALAMINE 1 PATCH: 1 PATCH, EXTENDED RELEASE TRANSDERMAL at 20:12

## 2020-05-18 RX ADMIN — HEPARIN SODIUM 5000 UNIT(S): 5000 INJECTION INTRAVENOUS; SUBCUTANEOUS at 04:32

## 2020-05-18 RX ADMIN — Medication 1 TABLET(S): at 12:23

## 2020-05-18 RX ADMIN — DEXTROSE MONOHYDRATE, SODIUM CHLORIDE, AND POTASSIUM CHLORIDE 100 MILLILITER(S): 50; .745; 4.5 INJECTION, SOLUTION INTRAVENOUS at 13:24

## 2020-05-18 RX ADMIN — PIPERACILLIN AND TAZOBACTAM 25 GRAM(S): 4; .5 INJECTION, POWDER, LYOPHILIZED, FOR SOLUTION INTRAVENOUS at 04:32

## 2020-05-18 RX ADMIN — Medication 4: at 17:50

## 2020-05-18 RX ADMIN — PIPERACILLIN AND TAZOBACTAM 25 GRAM(S): 4; .5 INJECTION, POWDER, LYOPHILIZED, FOR SOLUTION INTRAVENOUS at 13:14

## 2020-05-18 RX ADMIN — Medication 2: at 12:23

## 2020-05-18 NOTE — PROGRESS NOTE ADULT - ASSESSMENT
64 yr old male with PMHX of DM,dementia sent to ER from facility for seizure and now COVID+,Delirium,MAXIME,hypernatremia and suspected aspiration pneumonia.  1.MAXIME and hypernatremia-IVF,free water via NGT,goodwin.  2.COVID +supportive treatment.  3.Fever and aspiration pneumonia-zosyn, ID f/u.  4.DM-Insulin, Endo f/u.  5.Delirium-Psych re-eval for agitation.  6.NGT placed-TF.  7.Replace vit d.  8.Elevated ammonia-lactulose.  9.GI and DVT prophylaxis.

## 2020-05-18 NOTE — PROGRESS NOTE ADULT - ASSESSMENT
Pneumonia - likely aspiration  Fever - improved  Leukocytosis - slightly higher  COVID - 19 infection    Plan - cont Zosyn 3.375gms iv q8hrs , will give for a total of 7-8 days.

## 2020-05-18 NOTE — PROGRESS NOTE ADULT - SUBJECTIVE AND OBJECTIVE BOX
CARDIOLOGY/MEDICAL ATTENDING    CHIEF COMPLAINT:Patient is a 64y old  Male who presents with a chief complaint of seizures.Pt more awake this AM.    	  REVIEW OF SYSTEMS:  [ x] Unable to obtain    PHYSICAL EXAM:  T(C): 37.2 (05-18-20 @ 05:58), Max: 37.7 (05-17-20 @ 13:11)  HR: 95 (05-18-20 @ 05:58) (95 - 106)  BP: 117/75 (05-18-20 @ 05:58) (102/66 - 117/75)  RR: 19 (05-18-20 @ 05:58) (18 - 19)  SpO2: 98% (05-18-20 @ 05:58) (95% - 98%)  Wt(kg): --  I&O's Summary    17 May 2020 07:01  -  18 May 2020 07:00  --------------------------------------------------------  IN: 400 mL / OUT: 825 mL / NET: -425 mL        Appearance: Normal	  HEENT:   Normal oral mucosa, PERRL, EOMI	  Lymphatic: No lymphadenopathy  Cardiovascular: Normal S1 S2, No JVD, No murmurs, No edema  Respiratory: Dec bS at bases  Gastrointestinal:  Soft, Non-tender, + BS	  Skin: No rashes, No ecchymoses, No cyanosis	  Extremities: Normal range of motion, No clubbing, cyanosis or edema  Vascular: Peripheral pulses palpable 2+ bilaterally    MEDICATIONS  (STANDING):  dextrose 5% with potassium chloride 20 mEq/L 1000 milliLiter(s) (100 mL/Hr) IV Continuous <Continuous>  dextrose 50% Injectable 12.5 Gram(s) IV Push once  dextrose 50% Injectable 25 Gram(s) IV Push once  dextrose 50% Injectable 25 Gram(s) IV Push once  ergocalciferol 44673 Unit(s) Oral every week  heparin   Injectable 5000 Unit(s) SubCutaneous every 12 hours  insulin glargine Injectable (LANTUS) 42 Unit(s) SubCutaneous every morning  insulin lispro (HumaLOG) corrective regimen sliding scale   SubCutaneous every 6 hours  insulin lispro Injectable (HumaLOG) 12 Unit(s) SubCutaneous every 6 hours  lactulose Syrup 10 Gram(s) Oral three times a day  multivitamin/minerals 1 Tablet(s) Oral daily  mupirocin 2% Nasal 1 Application(s) Nasal two times a day  piperacillin/tazobactam IVPB.. 3.375 Gram(s) IV Intermittent every 8 hours      	  	  LABS:	 	                       14.5   16.99 )-----------( 251      ( 18 May 2020 07:39 )             44.5     05-18    153<H>  |  120<H>  |  65<H>  ----------------------------<  194<H>  3.4<L>   |  25  |  1.88<H>    Ca    10.2      18 May 2020 07:39  Phos  2.5     05-17  Mg     2.4     05-17    TPro  7.9  /  Alb  2.2<L>  /  TBili  0.8  /  DBili  x   /  AST  26  /  ALT  24  /  AlkPhos  117  05-17    proBNP: Serum Pro-Brain Natriuretic Peptide: 126 pg/mL (05-01 @ 09:36)    Lipid Profile: Cholesterol 161  LDL 95  HDL 35        TSH: Thyroid Stimulating Hormone, Serum: 2.39 uU/mL (05-01 @ 09:36)

## 2020-05-18 NOTE — PROGRESS NOTE ADULT - SUBJECTIVE AND OBJECTIVE BOX
64y Male    Meds:  piperacillin/tazobactam IVPB.. 3.375 Gram(s) IV Intermittent every 8 hours    Allergies    No Known Allergies    Intolerances        VITALS:  Vital Signs Last 24 Hrs  T(C): 36.8 (18 May 2020 12:25), Max: 37.2 (18 May 2020 05:58)  T(F): 98.3 (18 May 2020 12:25), Max: 99 (18 May 2020 05:58)  HR: 88 (18 May 2020 12:25) (88 - 102)  BP: 110/69 (18 May 2020 12:25) (110/69 - 117/75)  BP(mean): --  RR: 18 (18 May 2020 12:25) (18 - 19)  SpO2: 100% (18 May 2020 12:25) (97% - 100%)    LABS/DIAGNOSTIC TESTS:                          14.5   16.99 )-----------( 251      ( 18 May 2020 07:39 )             44.5         05-18    153<H>  |  120<H>  |  65<H>  ----------------------------<  194<H>  3.4<L>   |  25  |  1.88<H>    Ca    10.2      18 May 2020 07:39  Phos  2.5     05-17  Mg     2.4     05-17    TPro  7.9  /  Alb  2.2<L>  /  TBili  0.8  /  DBili  x   /  AST  26  /  ALT  24  /  AlkPhos  117  05-17      LIVER FUNCTIONS - ( 17 May 2020 07:32 )  Alb: 2.2 g/dL / Pro: 7.9 g/dL / ALK PHOS: 117 U/L / ALT: 24 U/L DA / AST: 26 U/L / GGT: x             CULTURES: .Urine Clean Catch (Midstream)  05-14 @ 09:59   >=3 organisms. Probable collection contamination.  --  --            RADIOLOGY:      ROS:  [  ] UNABLE TO ELICIT 64y Male who is clinically looking better , he is more awake and alert but is very confused, he is still on a NRB mask, he has no fevers. He is unable to answer any questions.    Meds:  piperacillin/tazobactam IVPB.. 3.375 Gram(s) IV Intermittent every 8 hours    Allergies    No Known Allergies    Intolerances        VITALS:  Vital Signs Last 24 Hrs  T(C): 36.8 (18 May 2020 12:25), Max: 37.2 (18 May 2020 05:58)  T(F): 98.3 (18 May 2020 12:25), Max: 99 (18 May 2020 05:58)  HR: 88 (18 May 2020 12:25) (88 - 102)  BP: 110/69 (18 May 2020 12:25) (110/69 - 117/75)  BP(mean): --  RR: 18 (18 May 2020 12:25) (18 - 19)  SpO2: 100% (18 May 2020 12:25) (97% - 100%)    LABS/DIAGNOSTIC TESTS:                          14.5   16.99 )-----------( 251      ( 18 May 2020 07:39 )             44.5         05-18    153<H>  |  120<H>  |  65<H>  ----------------------------<  194<H>  3.4<L>   |  25  |  1.88<H>    Ca    10.2      18 May 2020 07:39  Phos  2.5     05-17  Mg     2.4     05-17    TPro  7.9  /  Alb  2.2<L>  /  TBili  0.8  /  DBili  x   /  AST  26  /  ALT  24  /  AlkPhos  117  05-17      LIVER FUNCTIONS - ( 17 May 2020 07:32 )  Alb: 2.2 g/dL / Pro: 7.9 g/dL / ALK PHOS: 117 U/L / ALT: 24 U/L DA / AST: 26 U/L / GGT: x             CULTURES: .Urine Clean Catch (Midstream)  05-14 @ 09:59   >=3 organisms. Probable collection contamination.  --  --            RADIOLOGY:      ROS:  [ x ] UNABLE TO ELICIT

## 2020-05-18 NOTE — PROGRESS NOTE ADULT - SUBJECTIVE AND OBJECTIVE BOX
Pt is lying in bed in NAD. Had low blood glucose of 103 overnight, Humalog held. Pt was also agitated yesterday.     INTERVAL HPI/OVERNIGHT EVENTS:      VITAL SIGNS:  T(F): 99 (05-18-20 @ 05:58)  HR: 95 (05-18-20 @ 05:58)  BP: 117/75 (05-18-20 @ 05:58)  RR: 19 (05-18-20 @ 05:58)  SpO2: 98% (05-18-20 @ 05:58)  Wt(kg): --  I&O's Detail    17 May 2020 07:01  -  18 May 2020 07:00  --------------------------------------------------------  IN:    dextrose 5% with potassium chloride 20 mEq/L: 400 mL  Total IN: 400 mL    OUT:    Indwelling Catheter - Urethral: 825 mL  Total OUT: 825 mL    Total NET: -425 mL              REVIEW OF SYSTEMS:    CONSTITUTIONAL:  No fevers, chills, sweats    HEENT:  Eyes:  No diplopia or blurred vision. ENT:  No earache, sore throat or runny nose.    CARDIOVASCULAR:  No pressure, squeezing, tightness, or heaviness about the chest; no palpitations.    RESPIRATORY:  Per HPI    GASTROINTESTINAL:  No abdominal pain, nausea, vomiting or diarrhea.    GENITOURINARY:  No dysuria, frequency or urgency.    NEUROLOGIC:  No paresthesias, fasciculations, seizures or weakness.    PSYCHIATRIC:  No disorder of thought or mood.    PHYSICAL EXAM:    Constitutional: Well developed and nourished  Eyes:Perrla  ENMT: normal  Neck:supple  Respiratory: good air entry  Cardiovascular: S1 S2 regular  Gastrointestinal: Soft, Non tender  Extremities: No edema  Vascular:normal  Neurological:Awake, alert   Musculoskeletal:Normal      MEDICATIONS  (STANDING):  dextrose 5% with potassium chloride 20 mEq/L 1000 milliLiter(s) (100 mL/Hr) IV Continuous <Continuous>  dextrose 50% Injectable 12.5 Gram(s) IV Push once  dextrose 50% Injectable 25 Gram(s) IV Push once  dextrose 50% Injectable 25 Gram(s) IV Push once  ergocalciferol 04411 Unit(s) Oral every week  heparin   Injectable 5000 Unit(s) SubCutaneous every 12 hours  insulin glargine Injectable (LANTUS) 42 Unit(s) SubCutaneous every morning  insulin lispro (HumaLOG) corrective regimen sliding scale   SubCutaneous every 6 hours  insulin lispro Injectable (HumaLOG) 12 Unit(s) SubCutaneous every 6 hours  lactulose Syrup 10 Gram(s) Oral three times a day  multivitamin/minerals 1 Tablet(s) Oral daily  mupirocin 2% Nasal 1 Application(s) Nasal two times a day  piperacillin/tazobactam IVPB.. 3.375 Gram(s) IV Intermittent every 8 hours    MEDICATIONS  (PRN):  acetaminophen  Suppository .. 650 milliGRAM(s) Rectal every 6 hours PRN Temp greater or equal to 38C (100.4F)  ALBUTerol    90 MICROgram(s) HFA Inhaler 2 Puff(s) Inhalation every 6 hours PRN Shortness of Breath and/or Wheezing  dextrose 40% Gel 15 Gram(s) Oral once PRN Blood Glucose LESS THAN 70 milliGRAM(s)/deciliter  glucagon  Injectable 1 milliGRAM(s) IntraMuscular once PRN Glucose LESS THAN 70 milligrams/deciliter  guaiFENesin   Syrup  (Sugar-Free) 100 milliGRAM(s) Oral every 6 hours PRN Cough      Allergies    No Known Allergies    Intolerances    LABS:                        14.5   16.99 )-----------( 251      ( 18 May 2020 07:39 )             44.5     05-18    153<H>  |  120<H>  |  65<H>  ----------------------------<  194<H>  3.4<L>   |  25  |  1.88<H>    Ca    10.2      18 May 2020 07:39  Phos  2.5     05-17  Mg     2.4     05-17    TPro  7.9  /  Alb  2.2<L>  /  TBili  0.8  /  DBili  x   /  AST  26  /  ALT  24  /  AlkPhos  117  05-17    CAPILLARY BLOOD GLUCOSE    POCT Blood Glucose.: 171 mg/dL (18 May 2020 10:14)  POCT Blood Glucose.: 174 mg/dL (18 May 2020 05:52)  POCT Blood Glucose.: 103 mg/dL (18 May 2020 00:07)  POCT Blood Glucose.: 206 mg/dL (17 May 2020 16:56)  POCT Blood Glucose.: 336 mg/dL (17 May 2020 11:59)    pro-bnp -- 05-14 @ 17:59     d-dimer 196  05-14 @ 17:59      RADIOLOGY & ADDITIONAL TESTS:    CXR:    Ct scan chest:    ekg;    echo: Pt is lying in bed in NAD. Had low blood glucose of 103 overnight, Humalog held. Pt was also agitated yesterday. Confused, awake but not alert.    INTERVAL HPI/OVERNIGHT EVENTS:      VITAL SIGNS:  T(F): 99 (05-18-20 @ 05:58)  HR: 95 (05-18-20 @ 05:58)  BP: 117/75 (05-18-20 @ 05:58)  RR: 19 (05-18-20 @ 05:58)  SpO2: 98% (05-18-20 @ 05:58)  Wt(kg): --  I&O's Detail    17 May 2020 07:01  -  18 May 2020 07:00  --------------------------------------------------------  IN:    dextrose 5% with potassium chloride 20 mEq/L: 400 mL  Total IN: 400 mL    OUT:    Indwelling Catheter - Urethral: 825 mL  Total OUT: 825 mL    Total NET: -425 mL              REVIEW OF SYSTEMS:    CONSTITUTIONAL:  No fevers, chills, sweats    HEENT:  Eyes:  No diplopia or blurred vision. ENT:  No earache, sore throat or runny nose.    CARDIOVASCULAR:  No pressure, squeezing, tightness, or heaviness about the chest; no palpitations.    RESPIRATORY:  Per HPI    GASTROINTESTINAL:  No abdominal pain, nausea, vomiting or diarrhea.    GENITOURINARY:  No dysuria, frequency or urgency.    NEUROLOGIC:  No paresthesias, fasciculations, seizures or weakness.    PSYCHIATRIC:  No disorder of thought or mood.    PHYSICAL EXAM:    Constitutional: Well developed and nourished  Eyes:Perrla  ENMT: normal  Neck:supple  Respiratory: good air entry  Cardiovascular: S1 S2 regular  Gastrointestinal: Soft, Non tender  Extremities: No edema  Vascular:normal  Neurological:Awake, alert   Musculoskeletal:Normal      MEDICATIONS  (STANDING):  dextrose 5% with potassium chloride 20 mEq/L 1000 milliLiter(s) (100 mL/Hr) IV Continuous <Continuous>  dextrose 50% Injectable 12.5 Gram(s) IV Push once  dextrose 50% Injectable 25 Gram(s) IV Push once  dextrose 50% Injectable 25 Gram(s) IV Push once  ergocalciferol 40090 Unit(s) Oral every week  heparin   Injectable 5000 Unit(s) SubCutaneous every 12 hours  insulin glargine Injectable (LANTUS) 42 Unit(s) SubCutaneous every morning  insulin lispro (HumaLOG) corrective regimen sliding scale   SubCutaneous every 6 hours  insulin lispro Injectable (HumaLOG) 12 Unit(s) SubCutaneous every 6 hours  lactulose Syrup 10 Gram(s) Oral three times a day  multivitamin/minerals 1 Tablet(s) Oral daily  mupirocin 2% Nasal 1 Application(s) Nasal two times a day  piperacillin/tazobactam IVPB.. 3.375 Gram(s) IV Intermittent every 8 hours    MEDICATIONS  (PRN):  acetaminophen  Suppository .. 650 milliGRAM(s) Rectal every 6 hours PRN Temp greater or equal to 38C (100.4F)  ALBUTerol    90 MICROgram(s) HFA Inhaler 2 Puff(s) Inhalation every 6 hours PRN Shortness of Breath and/or Wheezing  dextrose 40% Gel 15 Gram(s) Oral once PRN Blood Glucose LESS THAN 70 milliGRAM(s)/deciliter  glucagon  Injectable 1 milliGRAM(s) IntraMuscular once PRN Glucose LESS THAN 70 milligrams/deciliter  guaiFENesin   Syrup  (Sugar-Free) 100 milliGRAM(s) Oral every 6 hours PRN Cough      Allergies    No Known Allergies    Intolerances    LABS:                        14.5   16.99 )-----------( 251      ( 18 May 2020 07:39 )             44.5     05-18    153<H>  |  120<H>  |  65<H>  ----------------------------<  194<H>  3.4<L>   |  25  |  1.88<H>    Ca    10.2      18 May 2020 07:39  Phos  2.5     05-17  Mg     2.4     05-17    TPro  7.9  /  Alb  2.2<L>  /  TBili  0.8  /  DBili  x   /  AST  26  /  ALT  24  /  AlkPhos  117  05-17    CAPILLARY BLOOD GLUCOSE    POCT Blood Glucose.: 171 mg/dL (18 May 2020 10:14)  POCT Blood Glucose.: 174 mg/dL (18 May 2020 05:52)Culture - Urine (05.14.20 @ 09:59)    Specimen Source: .Urine Clean Catch (Midstream)    Culture Results:   >=3 organisms. Probable collection contamination.      POCT Blood Glucose.: 103 mg/dL (18 May 2020 00:07)  POCT Blood Glucose.: 206 mg/dL (17 May 2020 16:56)  POCT Blood Glucose.: 336 mg/dL (17 May 2020 11:59)    pro-bnp -- 05-14 @ 17:59     d-dimer 196  05-14 @ 17:59      RADIOLOGY & ADDITIONAL TESTS:    CXR:    Ct scan chest:    ekg;    echo:

## 2020-05-18 NOTE — PROGRESS NOTE ADULT - SUBJECTIVE AND OBJECTIVE BOX
Problem     PAST MEDICAL & SURGICAL HISTORY:  Dementia  MAXIME COVID 19 and lung pneumonia  Hypernatremia      MEDICATIONS  (STANDING):  dextrose 5% with potassium chloride 20 mEq/L 1000 milliLiter(s) (100 mL/Hr) IV Continuous <Continuous>  dextrose 50% Injectable 12.5 Gram(s) IV Push once  dextrose 50% Injectable 25 Gram(s) IV Push once  dextrose 50% Injectable 25 Gram(s) IV Push once  ergocalciferol 63573 Unit(s) Oral every week  heparin   Injectable 5000 Unit(s) SubCutaneous every 12 hours  insulin glargine Injectable (LANTUS) 42 Unit(s) SubCutaneous every morning  insulin lispro (HumaLOG) corrective regimen sliding scale   SubCutaneous every 6 hours  insulin lispro Injectable (HumaLOG) 12 Unit(s) SubCutaneous every 6 hours  lactulose Syrup 10 Gram(s) Oral three times a day  multivitamin/minerals 1 Tablet(s) Oral daily  mupirocin 2% Nasal 1 Application(s) Nasal two times a day  piperacillin/tazobactam IVPB.. 3.375 Gram(s) IV Intermittent every 8 hours    MEDICATIONS  (PRN):  acetaminophen  Suppository .. 650 milliGRAM(s) Rectal every 6 hours PRN Temp greater or equal to 38C (100.4F)  ALBUTerol    90 MICROgram(s) HFA Inhaler 2 Puff(s) Inhalation every 6 hours PRN Shortness of Breath and/or Wheezing  dextrose 40% Gel 15 Gram(s) Oral once PRN Blood Glucose LESS THAN 70 milliGRAM(s)/deciliter  glucagon  Injectable 1 milliGRAM(s) IntraMuscular once PRN Glucose LESS THAN 70 milligrams/deciliter  guaiFENesin   Syrup  (Sugar-Free) 100 milliGRAM(s) Oral every 6 hours PRN Cough                            14.5   16.99 )-----------( 251      ( 18 May 2020 07:39 )             44.5     05-18    153<H>  |  120<H>  |  65<H>  ----------------------------<  194<H>  3.4<L>   |  25  |  1.88<H>    Ca    10.2      18 May 2020 07:39  Phos  2.5     05-17  Mg     2.4     05-17    TPro  7.9  /  Alb  2.2<L>  /  TBili  0.8  /  DBili  x   /  AST  26  /  ALT  24  /  AlkPhos  117  05-17        Osmolality, Random Urine: 892 mos/kg (05-14 @ 18:00)  Creatinine, Random Urine: 143 mg/dL (05-14 @ 17:59)  Sodium, Random Urine: 23 mmol/L (05-14 @ 17:59)  Potassium, Random Urine: 45 mmol/L (05-14 @ 17:59)      REVIEW OF SYSTEMS:  dementia    VITALS:  T(F): 98.3 (05-18-20 @ 12:25), Max: 99.8 (05-17-20 @ 13:11)  HR: 88 (05-18-20 @ 12:25)  BP: 110/69 (05-18-20 @ 12:25)  RR: 18 (05-18-20 @ 12:25)  SpO2: 100% (05-18-20 @ 12:25)  Wt(kg): --    05-17 @ 07:01  -  05-18 @ 07:00  --------------------------------------------------------  IN: 400 mL / OUT: 825 mL / NET: -425 mL  From am 400 ml in the [place]        PHYSICAL EXAM:  Constitutional: obtunded on NRBM  Neck: No JVD, no carotid bruit, supple, no adenopathy  Respiratory:  air entrance B/L, no wheezes, rales or rhonchi  Cardiovascular: S1, S2, RRR, no pericardial rub, no murmur  Abdomen: BS+, soft, no tenderness, no bruit  Pelvis: bladder nondistended  Extremities: No cyanosis or clubbing. No peripheral edema.   Pulses: All present  Neurological: A/O x 3, no focal deficits

## 2020-05-18 NOTE — PROGRESS NOTE ADULT - PROBLEM SELECTOR PLAN 1
Check pending cultures  Antibiotics  Oxygen Supp  Monitor Oxygen sat  Taper Oxygen Supp as feasible Urine culture noted  Antibiotics  Oxygen Supp  Monitor Oxygen sat  Taper Oxygen Supp as feasible

## 2020-05-18 NOTE — PROGRESS NOTE ADULT - ASSESSMENT
MAXIME due to sepsis and dehydration , non oliguric  COVID 19 AND PNEUMONIA.  Hypernatremia , continue IV D5W.  Sodium still elevated, renal function improved

## 2020-05-18 NOTE — PROGRESS NOTE ADULT - ASSESSMENT
64 year old male from [facility] with PMH of dementia and type 2 DM presented with seizure activity while sitting at AL. Endocrinology was consulted for management of uncontrolled type 2 DM with hyperglycemia.    1. Uncontrolled Type 2 DM with hyperglycemia, A1c 9.9%  -BG now at goal <170 while NPO off tube feedings  -plan to resume trickle tube feedings today   -decrease to Lantus 20 units daily when tube feedings are restarted   -decrease to Humalog 5 units TID q 6 hrs while patient is on trickle feedings   -continue moderate dose rapid acting insulin q 6 hrs as below  BG 70-100mg/dL 0 units  -150 mg/dL 0 units  -200 mg/dL 2 units  -250 mg/dL 4 units  -300 mg/dL 6 units  -350 mg/dL 8 units  -400 mg/dL 10 units  BG > 400mg/dL 12 units  -FS AC HS  -ensure consistent carbohydrate   -will monitor FS and adjust accordingly   --if patient is NPO for any reason please change FS and sliding scale to NPO lispro scale     2. Hypernatremia  -Na 153  -continue D5 and free water as per nephrology  -continue to monitor Na closely     3.  Aspiration pneumonia  -oxygen supplementation as needed   -continue antibiotics as per primary team   -pulmonology follow up    Plan discussed with primary team, NP Pat   Please  call  w/ any questions or concerns [phone]

## 2020-05-18 NOTE — CHART NOTE - NSCHARTNOTEFT_GEN_A_CORE
Called by primary RN reporting blood glucose 103. Tube feeds have been on hold since this evening due to aspiration risk. Chest x-ray ordered by day team and performed around 20:00, no results as of yet. Per RN pt is continuing to have thick white secretions and was recently suctioned. Aspiration precautions maintained. RN questioned whether to administer standing insulin Humalog 12 units. Please hold Humalog 12 units now. Pt is receiving IVF D5 with 20KCL @100ml/hr. Check blood glucose again at 6 AM.

## 2020-05-18 NOTE — PROGRESS NOTE ADULT - SUBJECTIVE AND OBJECTIVE BOX
64 year old male from [facility] with PMH of dementia and type 2 DM presented with seizure activity while sitting at AL. Endocrinology was consulted for management of uncontrolled type 2 DM with hyperglycemia.    Patient seen and examined at bedside. FS reviewed. BG now at goal <170. Lantus 42 units was held this AM  as tube feedings were held overnight due to aspiration event. Plan to resume tube feedings today.        MEDICATIONS  (STANDING):  dextrose 5% with potassium chloride 20 mEq/L 1000 milliLiter(s) (100 mL/Hr) IV Continuous <Continuous>  dextrose 50% Injectable 12.5 Gram(s) IV Push once  dextrose 50% Injectable 25 Gram(s) IV Push once  dextrose 50% Injectable 25 Gram(s) IV Push once  ergocalciferol 34599 Unit(s) Oral every week  heparin   Injectable 5000 Unit(s) SubCutaneous every 12 hours  insulin glargine Injectable (LANTUS) 42 Unit(s) SubCutaneous every morning  insulin lispro (HumaLOG) corrective regimen sliding scale   SubCutaneous every 6 hours  insulin lispro Injectable (HumaLOG) 12 Unit(s) SubCutaneous every 6 hours  lactulose Syrup 10 Gram(s) Oral three times a day  multivitamin/minerals 1 Tablet(s) Oral daily  mupirocin 2% Nasal 1 Application(s) Nasal two times a day  piperacillin/tazobactam IVPB.. 3.375 Gram(s) IV Intermittent every 8 hours    MEDICATIONS  (PRN):  acetaminophen  Suppository .. 650 milliGRAM(s) Rectal every 6 hours PRN Temp greater or equal to 38C (100.4F)  ALBUTerol    90 MICROgram(s) HFA Inhaler 2 Puff(s) Inhalation every 6 hours PRN Shortness of Breath and/or Wheezing  dextrose 40% Gel 15 Gram(s) Oral once PRN Blood Glucose LESS THAN 70 milliGRAM(s)/deciliter  glucagon  Injectable 1 milliGRAM(s) IntraMuscular once PRN Glucose LESS THAN 70 milligrams/deciliter  guaiFENesin   Syrup  (Sugar-Free) 100 milliGRAM(s) Oral every 6 hours PRN Cough      REVIEW OF SYSTEMS:  unable to obtain due to medical condition    PHYSICAL EXAM:    VITAL SIGNS  T(C): 37.2 (05-18-20 @ 05:58), Max: 37.7 (05-17-20 @ 13:11)  HR: 95 (05-18-20 @ 05:58) (95 - 106)  BP: 117/75 (05-18-20 @ 05:58) (102/66 - 117/75)  RR: 19 (05-18-20 @ 05:58) (18 - 19)  SpO2: 98% (05-18-20 @ 05:58) (95% - 98%)    GENERAL: lethargic, NAD, well-developed  HEAD:  Atraumatic, Normocephalic  EYES: EOMI, PERRLA, conjunctiva and sclera clear  ENMT: No tonsillar erythema, exudates, or enlargement; No lesions  NECK: Supple, No JVD, Normal thyroid  NERVOUS SYSTEM:  nonverbal,  CHEST/LUNG: decreased breath sounds, No rales, rhonchi, wheezing, or rubs  HEART: Regular rate and rhythm; No murmurs, rubs, or gallops  ABDOMEN: Soft, Nontender, Nondistended; Bowel sounds present  EXTREMITIES:  2+ Peripheral Pulses, No clubbing, cyanosis, or edema  LYMPH: No lymphadenopathy noted  SKIN: No rashes or lesions    LABS:                        14.5   16.99 )-----------( 251      ( 18 May 2020 07:39 )             44.5     05-18    153<H>  |  120<H>  |  65<H>  ----------------------------<  194<H>  3.4<L>   |  25  |  1.88<H>    Ca    10.2      18 May 2020 07:39  Phos  2.5     05-17  Mg     2.4     05-17    TPro  7.9  /  Alb  2.2<L>  /  TBili  0.8  /  DBili  x   /  AST  26  /  ALT  24  /  AlkPhos  117  05-17        CAPILLARY BLOOD GLUCOSE      POCT Blood Glucose.: 188 mg/dL (18 May 2020 11:56)  POCT Blood Glucose.: 171 mg/dL (18 May 2020 10:14)  POCT Blood Glucose.: 174 mg/dL (18 May 2020 05:52)  POCT Blood Glucose.: 103 mg/dL (18 May 2020 00:07)  POCT Blood Glucose.: 206 mg/dL (17 May 2020 16:56)

## 2020-05-19 DIAGNOSIS — F05 DELIRIUM DUE TO KNOWN PHYSIOLOGICAL CONDITION: ICD-10-CM

## 2020-05-19 LAB
ALBUMIN SERPL ELPH-MCNC: 2.1 G/DL — LOW (ref 3.5–5)
ALP SERPL-CCNC: 97 U/L — SIGNIFICANT CHANGE UP (ref 40–120)
ALT FLD-CCNC: 39 U/L DA — SIGNIFICANT CHANGE UP (ref 10–60)
AMMONIA BLD-MCNC: 34 UMOL/L — HIGH (ref 11–32)
ANION GAP SERPL CALC-SCNC: 5 MMOL/L — SIGNIFICANT CHANGE UP (ref 5–17)
AST SERPL-CCNC: 40 U/L — SIGNIFICANT CHANGE UP (ref 10–40)
BASOPHILS # BLD AUTO: 0.03 K/UL — SIGNIFICANT CHANGE UP (ref 0–0.2)
BASOPHILS NFR BLD AUTO: 0.2 % — SIGNIFICANT CHANGE UP (ref 0–2)
BILIRUB SERPL-MCNC: 0.7 MG/DL — SIGNIFICANT CHANGE UP (ref 0.2–1.2)
BUN SERPL-MCNC: 52 MG/DL — HIGH (ref 7–18)
CALCIUM SERPL-MCNC: 9.1 MG/DL — SIGNIFICANT CHANGE UP (ref 8.4–10.5)
CHLORIDE SERPL-SCNC: 120 MMOL/L — HIGH (ref 96–108)
CO2 SERPL-SCNC: 27 MMOL/L — SIGNIFICANT CHANGE UP (ref 22–31)
CREAT SERPL-MCNC: 1.47 MG/DL — HIGH (ref 0.5–1.3)
CRP SERPL-MCNC: 10.23 MG/DL — HIGH (ref 0–0.4)
D DIMER BLD IA.RAPID-MCNC: 375 NG/ML DDU — HIGH
EOSINOPHIL # BLD AUTO: 0.21 K/UL — SIGNIFICANT CHANGE UP (ref 0–0.5)
EOSINOPHIL NFR BLD AUTO: 1.5 % — SIGNIFICANT CHANGE UP (ref 0–6)
FERRITIN SERPL-MCNC: 1960 NG/ML — HIGH (ref 30–400)
GLUCOSE BLDC GLUCOMTR-MCNC: 136 MG/DL — HIGH (ref 70–99)
GLUCOSE BLDC GLUCOMTR-MCNC: 199 MG/DL — HIGH (ref 70–99)
GLUCOSE BLDC GLUCOMTR-MCNC: 226 MG/DL — HIGH (ref 70–99)
GLUCOSE BLDC GLUCOMTR-MCNC: 242 MG/DL — HIGH (ref 70–99)
GLUCOSE SERPL-MCNC: 163 MG/DL — HIGH (ref 70–99)
HCT VFR BLD CALC: 40.4 % — SIGNIFICANT CHANGE UP (ref 39–50)
HGB BLD-MCNC: 13 G/DL — SIGNIFICANT CHANGE UP (ref 13–17)
IMM GRANULOCYTES NFR BLD AUTO: 0.6 % — SIGNIFICANT CHANGE UP (ref 0–1.5)
LYMPHOCYTES # BLD AUTO: 1.45 K/UL — SIGNIFICANT CHANGE UP (ref 1–3.3)
LYMPHOCYTES # BLD AUTO: 10.1 % — LOW (ref 13–44)
MCHC RBC-ENTMCNC: 28.5 PG — SIGNIFICANT CHANGE UP (ref 27–34)
MCHC RBC-ENTMCNC: 32.2 GM/DL — SIGNIFICANT CHANGE UP (ref 32–36)
MCV RBC AUTO: 88.6 FL — SIGNIFICANT CHANGE UP (ref 80–100)
MONOCYTES # BLD AUTO: 0.77 K/UL — SIGNIFICANT CHANGE UP (ref 0–0.9)
MONOCYTES NFR BLD AUTO: 5.4 % — SIGNIFICANT CHANGE UP (ref 2–14)
NEUTROPHILS # BLD AUTO: 11.76 K/UL — HIGH (ref 1.8–7.4)
NEUTROPHILS NFR BLD AUTO: 82.2 % — HIGH (ref 43–77)
NRBC # BLD: 0 /100 WBCS — SIGNIFICANT CHANGE UP (ref 0–0)
PHOSPHATE SERPL-MCNC: 2.4 MG/DL — LOW (ref 2.5–4.5)
PLATELET # BLD AUTO: 213 K/UL — SIGNIFICANT CHANGE UP (ref 150–400)
POTASSIUM SERPL-MCNC: 3.7 MMOL/L — SIGNIFICANT CHANGE UP (ref 3.5–5.3)
POTASSIUM SERPL-SCNC: 3.7 MMOL/L — SIGNIFICANT CHANGE UP (ref 3.5–5.3)
PROT SERPL-MCNC: 7 G/DL — SIGNIFICANT CHANGE UP (ref 6–8.3)
RBC # BLD: 4.56 M/UL — SIGNIFICANT CHANGE UP (ref 4.2–5.8)
RBC # FLD: 12.6 % — SIGNIFICANT CHANGE UP (ref 10.3–14.5)
SODIUM SERPL-SCNC: 152 MMOL/L — HIGH (ref 135–145)
WBC # BLD: 14.31 K/UL — HIGH (ref 3.8–10.5)
WBC # FLD AUTO: 14.31 K/UL — HIGH (ref 3.8–10.5)

## 2020-05-19 PROCEDURE — 99232 SBSQ HOSP IP/OBS MODERATE 35: CPT

## 2020-05-19 PROCEDURE — 99233 SBSQ HOSP IP/OBS HIGH 50: CPT | Mod: CS

## 2020-05-19 RX ORDER — POTASSIUM PHOSPHATE, MONOBASIC POTASSIUM PHOSPHATE, DIBASIC 236; 224 MG/ML; MG/ML
15 INJECTION, SOLUTION INTRAVENOUS ONCE
Refills: 0 | Status: COMPLETED | OUTPATIENT
Start: 2020-05-19 | End: 2020-05-19

## 2020-05-19 RX ORDER — QUETIAPINE FUMARATE 200 MG/1
25 TABLET, FILM COATED ORAL AT BEDTIME
Refills: 0 | Status: DISCONTINUED | OUTPATIENT
Start: 2020-05-19 | End: 2020-06-02

## 2020-05-19 RX ORDER — DEXTROSE MONOHYDRATE, SODIUM CHLORIDE, AND POTASSIUM CHLORIDE 50; .745; 4.5 G/1000ML; G/1000ML; G/1000ML
1000 INJECTION, SOLUTION INTRAVENOUS
Refills: 0 | Status: DISCONTINUED | OUTPATIENT
Start: 2020-05-19 | End: 2020-05-20

## 2020-05-19 RX ORDER — MIRTAZAPINE 45 MG/1
7.5 TABLET, ORALLY DISINTEGRATING ORAL AT BEDTIME
Refills: 0 | Status: DISCONTINUED | OUTPATIENT
Start: 2020-05-19 | End: 2020-06-02

## 2020-05-19 RX ORDER — OLANZAPINE 15 MG/1
2.5 TABLET, FILM COATED ORAL AT BEDTIME
Refills: 0 | Status: DISCONTINUED | OUTPATIENT
Start: 2020-05-19 | End: 2020-06-02

## 2020-05-19 RX ADMIN — Medication 5 UNIT(S): at 23:55

## 2020-05-19 RX ADMIN — HEPARIN SODIUM 5000 UNIT(S): 5000 INJECTION INTRAVENOUS; SUBCUTANEOUS at 05:22

## 2020-05-19 RX ADMIN — PIPERACILLIN AND TAZOBACTAM 25 GRAM(S): 4; .5 INJECTION, POWDER, LYOPHILIZED, FOR SOLUTION INTRAVENOUS at 22:10

## 2020-05-19 RX ADMIN — LACTULOSE 10 GRAM(S): 10 SOLUTION ORAL at 05:24

## 2020-05-19 RX ADMIN — LACTULOSE 10 GRAM(S): 10 SOLUTION ORAL at 13:22

## 2020-05-19 RX ADMIN — Medication 4: at 12:48

## 2020-05-19 RX ADMIN — HEPARIN SODIUM 5000 UNIT(S): 5000 INJECTION INTRAVENOUS; SUBCUTANEOUS at 17:43

## 2020-05-19 RX ADMIN — LACTULOSE 10 GRAM(S): 10 SOLUTION ORAL at 22:10

## 2020-05-19 RX ADMIN — Medication 5 UNIT(S): at 05:23

## 2020-05-19 RX ADMIN — OLANZAPINE 2.5 MILLIGRAM(S): 15 TABLET, FILM COATED ORAL at 22:10

## 2020-05-19 RX ADMIN — PIPERACILLIN AND TAZOBACTAM 25 GRAM(S): 4; .5 INJECTION, POWDER, LYOPHILIZED, FOR SOLUTION INTRAVENOUS at 05:25

## 2020-05-19 RX ADMIN — SCOPALAMINE 1 PATCH: 1 PATCH, EXTENDED RELEASE TRANSDERMAL at 07:19

## 2020-05-19 RX ADMIN — Medication 1 TABLET(S): at 11:31

## 2020-05-19 RX ADMIN — Medication 5 UNIT(S): at 12:49

## 2020-05-19 RX ADMIN — QUETIAPINE FUMARATE 25 MILLIGRAM(S): 200 TABLET, FILM COATED ORAL at 22:10

## 2020-05-19 RX ADMIN — SCOPALAMINE 1 PATCH: 1 PATCH, EXTENDED RELEASE TRANSDERMAL at 20:42

## 2020-05-19 RX ADMIN — Medication 2: at 23:56

## 2020-05-19 RX ADMIN — Medication 4: at 17:43

## 2020-05-19 RX ADMIN — Medication 5 UNIT(S): at 00:28

## 2020-05-19 RX ADMIN — POTASSIUM PHOSPHATE, MONOBASIC POTASSIUM PHOSPHATE, DIBASIC 62.5 MILLIMOLE(S): 236; 224 INJECTION, SOLUTION INTRAVENOUS at 12:54

## 2020-05-19 RX ADMIN — MUPIROCIN 1 APPLICATION(S): 20 OINTMENT TOPICAL at 05:24

## 2020-05-19 RX ADMIN — MUPIROCIN 1 APPLICATION(S): 20 OINTMENT TOPICAL at 17:44

## 2020-05-19 RX ADMIN — PIPERACILLIN AND TAZOBACTAM 25 GRAM(S): 4; .5 INJECTION, POWDER, LYOPHILIZED, FOR SOLUTION INTRAVENOUS at 13:22

## 2020-05-19 RX ADMIN — Medication 5 UNIT(S): at 17:43

## 2020-05-19 RX ADMIN — MIRTAZAPINE 7.5 MILLIGRAM(S): 45 TABLET, ORALLY DISINTEGRATING ORAL at 22:10

## 2020-05-19 NOTE — PROGRESS NOTE ADULT - SUBJECTIVE AND OBJECTIVE BOX
Pt is lying in bed, with mitten restraints in place. Agitated while awake. Currently comfortable. NGT in place with feeds.     INTERVAL HPI/OVERNIGHT EVENTS:    VITAL SIGNS:  T(F): 98.3 (05-19-20 @ 05:07)  HR: 83 (05-19-20 @ 05:07)  BP: 117/61 (05-19-20 @ 05:07)  RR: 18 (05-19-20 @ 05:07)  SpO2: 100% (05-19-20 @ 05:07)  Wt(kg): --  I&O's Detail    18 May 2020 07:01  -  19 May 2020 07:00  --------------------------------------------------------  IN:  Total IN: 0 mL    OUT:    Indwelling Catheter - Urethral: 1125 mL  Total OUT: 1125 mL    Total NET: -1125 mL    REVIEW OF SYSTEMS:    CONSTITUTIONAL:  No fevers, chills, sweats    HEENT:  Eyes:  No diplopia or blurred vision. ENT:  No earache, sore throat or runny nose.    CARDIOVASCULAR:  No pressure, squeezing, tightness, or heaviness about the chest; no palpitations.    RESPIRATORY:  Per HPI    GASTROINTESTINAL:  No abdominal pain, nausea, vomiting or diarrhea.    GENITOURINARY:  No dysuria, frequency or urgency.    NEUROLOGIC:  No paresthesias, fasciculations, seizures or weakness.    PSYCHIATRIC:  No disorder of thought or mood.    PHYSICAL EXAM:    Constitutional: Well developed and nourished  Eyes: Perrla  ENMT: normal  Neck: supple  Respiratory: good air entry  Cardiovascular: S1 S2 regular  Gastrointestinal: Soft, Non tender  Extremities: No edema  Vascular: normal  Neurological: Alert, agitated at times.   Musculoskeletal: Normal    MEDICATIONS  (STANDING):  dextrose 5% with potassium chloride 20 mEq/L 1000 milliLiter(s) (100 mL/Hr) IV Continuous <Continuous>  dextrose 50% Injectable 12.5 Gram(s) IV Push once  dextrose 50% Injectable 25 Gram(s) IV Push once  dextrose 50% Injectable 25 Gram(s) IV Push once  ergocalciferol 88784 Unit(s) Oral every week  heparin   Injectable 5000 Unit(s) SubCutaneous every 12 hours  insulin lispro (HumaLOG) corrective regimen sliding scale   SubCutaneous every 6 hours  insulin lispro Injectable (HumaLOG) 5 Unit(s) SubCutaneous every 6 hours  lactulose Syrup 10 Gram(s) Oral three times a day  multivitamin/minerals 1 Tablet(s) Oral daily  mupirocin 2% Nasal 1 Application(s) Nasal two times a day  piperacillin/tazobactam IVPB.. 3.375 Gram(s) IV Intermittent every 8 hours  potassium phosphate IVPB 15 milliMole(s) IV Intermittent once  scopolamine   Patch 1 Patch Transdermal every 72 hours    MEDICATIONS  (PRN):  acetaminophen  Suppository .. 650 milliGRAM(s) Rectal every 6 hours PRN Temp greater or equal to 38C (100.4F)  ALBUTerol    90 MICROgram(s) HFA Inhaler 2 Puff(s) Inhalation every 6 hours PRN Shortness of Breath and/or Wheezing  dextrose 40% Gel 15 Gram(s) Oral once PRN Blood Glucose LESS THAN 70 milliGRAM(s)/deciliter  glucagon  Injectable 1 milliGRAM(s) IntraMuscular once PRN Glucose LESS THAN 70 milligrams/deciliter  guaiFENesin   Syrup  (Sugar-Free) 100 milliGRAM(s) Oral every 6 hours PRN Cough      Allergies    No Known Allergies    Intolerances    LABS:                        13.0   14.31 )-----------( 213      ( 19 May 2020 06:41 )             40.4     05-19    152<H>  |  120<H>  |  52<H>  ----------------------------<  163<H>  3.7   |  27  |  1.47<H>    Ca    9.1      19 May 2020 06:41  Phos  2.4     05-19    TPro  7.0  /  Alb  2.1<L>  /  TBili  0.7  /  DBili  x   /  AST  40  /  ALT  39  /  AlkPhos  97  05-19    CAPILLARY BLOOD GLUCOSE    POCT Blood Glucose.: 136 mg/dL (19 May 2020 04:54)  POCT Blood Glucose.: 137 mg/dL (18 May 2020 23:48)  POCT Blood Glucose.: 212 mg/dL (18 May 2020 17:40)  POCT Blood Glucose.: 188 mg/dL (18 May 2020 11:56)    pro-bnp -- 05-19 @ 06:41     d-dimer 375  05-19 @ 06:41  pro-bnp -- 05-14 @ 17:59     d-dimer 196  05-14 @ 17:59      RADIOLOGY & ADDITIONAL TESTS:    CXR:    Ct scan chest:    ekg;    echo:

## 2020-05-19 NOTE — PROGRESS NOTE ADULT - PROBLEM SELECTOR PLAN 1
Urine culture noted  Antibiotics  Oxygen Supp  Monitor Oxygen sat  Taper Oxygen Supp as feasible  NGT in place with feeds.

## 2020-05-19 NOTE — PROGRESS NOTE BEHAVIORAL HEALTH - NSBHCHARTREVIEWLAB_PSY_A_CORE FT
Complete Blood Count + Automated Diff in AM (05.19.20 @ 06:41)    WBC Count: 14.31 K/uL    RBC Count: 4.56 M/uL    Hemoglobin: 13.0 g/dL    Hematocrit: 40.4 %    Mean Cell Volume: 88.6 fl    Mean Cell Hemoglobin: 28.5 pg    Mean Cell Hemoglobin Conc: 32.2 gm/dL    Red Cell Distrib Width: 12.6 %    Platelet Count - Automated: 213 K/uL    Auto Neutrophil #: 11.76 K/uL    Auto Lymphocyte #: 1.45 K/uL    Auto Monocyte #: 0.77 K/uL    Auto Eosinophil #: 0.21 K/uL    Auto Basophil #: 0.03 K/uL    Auto Neutrophil %: 82.2: Differential percentages must be correlated with absolute numbers for  clinical significance. %    Auto Lymphocyte %: 10.1 %    Auto Monocyte %: 5.4 %    Auto Eosinophil %: 1.5 %    Auto Basophil %: 0.2 %    Auto Immature Granulocyte %: 0.6 %    Nucleated RBC: 0 /100 WBCs    Comprehensive Metabolic Panel in AM (05.19.20 @ 06:41)    Sodium, Serum: 152 mmol/L    Potassium, Serum: 3.7 mmol/L    Chloride, Serum: 120 mmol/L    Carbon Dioxide, Serum: 27 mmol/L    Anion Gap, Serum: 5 mmol/L    Blood Urea Nitrogen, Serum: 52 mg/dL    Creatinine, Serum: 1.47 mg/dL    Glucose, Serum: 163 mg/dL    Calcium, Total Serum: 9.1 mg/dL    Protein Total, Serum: 7.0 g/dL    Albumin, Serum: 2.1 g/dL    Bilirubin Total, Serum: 0.7 mg/dL    Alkaline Phosphatase, Serum: 97 U/L    Aspartate Aminotransferase (AST/SGOT): 40 U/L    Alanine Aminotransferase (ALT/SGPT): 39 U/L DA    eGFR if Non : 50

## 2020-05-19 NOTE — PROGRESS NOTE ADULT - SUBJECTIVE AND OBJECTIVE BOX
CARDIOLOGY/MEDICAL ATTENDING    CHIEF COMPLAINT:Patient is a 64y old  Male who presents with a chief complaint of seizures.Pt with NGT in place with mittens.    	  REVIEW OF SYSTEMS:  [ x] Unable to obtain    PHYSICAL EXAM:  T(C): 36.8 (05-19-20 @ 05:07), Max: 36.8 (05-18-20 @ 12:25)  HR: 83 (05-19-20 @ 05:07) (83 - 88)  BP: 117/61 (05-19-20 @ 05:07) (110/69 - 117/61)  RR: 18 (05-19-20 @ 05:07) (18 - 19)  SpO2: 100% (05-19-20 @ 05:07) (100% - 100%)  Wt(kg): --  I&O's Summary    18 May 2020 07:01  -  19 May 2020 07:00  --------------------------------------------------------  IN: 0 mL / OUT: 1125 mL / NET: -1125 mL        Appearance: Normal	  HEENT:   Normal oral mucosa, PERRL, EOMI	  Lymphatic: No lymphadenopathy  Cardiovascular: Normal S1 S2, No JVD, No murmurs, No edema  Respiratory: Dec bS at bases  Gastrointestinal:  Soft, Non-tender, + BS	  Skin: No rashes, No ecchymoses, No cyanosis	  Extremities: Normal range of motion, No clubbing, cyanosis or edema  Vascular: Peripheral pulses palpable 2+ bilaterally    MEDICATIONS  (STANDING):  dextrose 5% with potassium chloride 20 mEq/L 1000 milliLiter(s) (100 mL/Hr) IV Continuous <Continuous>  dextrose 50% Injectable 12.5 Gram(s) IV Push once  dextrose 50% Injectable 25 Gram(s) IV Push once  dextrose 50% Injectable 25 Gram(s) IV Push once  ergocalciferol 11855 Unit(s) Oral every week  heparin   Injectable 5000 Unit(s) SubCutaneous every 12 hours  insulin lispro (HumaLOG) corrective regimen sliding scale   SubCutaneous every 6 hours  insulin lispro Injectable (HumaLOG) 5 Unit(s) SubCutaneous every 6 hours  lactulose Syrup 10 Gram(s) Oral three times a day  multivitamin/minerals 1 Tablet(s) Oral daily  mupirocin 2% Nasal 1 Application(s) Nasal two times a day  piperacillin/tazobactam IVPB.. 3.375 Gram(s) IV Intermittent every 8 hours  potassium phosphate IVPB 15 milliMole(s) IV Intermittent once  scopolamine   Patch 1 Patch Transdermal every 72 hours      	  	  LABS:	 	                         13.0   14.31 )-----------( 213      ( 19 May 2020 06:41 )             40.4     05-19    152<H>  |  120<H>  |  52<H>  ----------------------------<  163<H>  3.7   |  27  |  1.47<H>    Ca    9.1      19 May 2020 06:41  Phos  2.4     05-19    TPro  7.0  /  Alb  2.1<L>  /  TBili  0.7  /  DBili  x   /  AST  40  /  ALT  39  /  AlkPhos  97  05-19    proBNP: Serum Pro-Brain Natriuretic Peptide: 126 pg/mL (05-01 @ 09:36)    Lipid Profile: Cholesterol 161  LDL 95  HDL 35        TSH: Thyroid Stimulating Hormone, Serum: 2.39 uU/mL (05-01 @ 09:36)

## 2020-05-19 NOTE — PROGRESS NOTE ADULT - ASSESSMENT
64 yr old male with PMHX of DM,dementia sent to ER from facility for seizure and now COVID+, Delirium MAXIME, hypernatremia and suspected aspiration pneumonia.

## 2020-05-19 NOTE — PROGRESS NOTE BEHAVIORAL HEALTH - NSBHCHARTREVIEWIMAGING_PSY_A_CORE FT
< from: Xray Chest 1 View- PORTABLE-Routine (05.17.20 @ 20:04) >    EXAM:  XR CHEST PORTABLE ROUTINE 1V                            PROCEDURE DATE:  05/17/2020          INTERPRETATION:  CLINICAL INDICATION: 64 years  Male with increased secretions. Covid positive    COMPARISON: 5/15/2020    The tip of the feeding tube is below the diaphragm overlying the left upper quadrant of the abdomen.    AP view of the chest demonstrates persistent left lower lobe infiltrate. The right lung is clear. There is no pleural effusion. There is no pneumothorax.    The heart is normal in size. There is no mediastinal or hilar mass.     The pulmonary vasculature is normal.     Mild thoracic degenerative changes are present.    IMPRESSION:    Persistent left lower lobe infiltrate.    Feeding tube in satisfactory position.    < end of copied text >

## 2020-05-19 NOTE — PROGRESS NOTE BEHAVIORAL HEALTH - NSBHCONSULTMEDS_PSY_A_CORE FT
Please hold all psych medications.
Restart HS home meds at reduced dosing as such:  - Restart Zyprexa at 2.5 mg HS  - Restart Seroquel at 25 mg HS  - Restart Mirtazepine at 7.5 mg HS    Continue to Hold day time doses of Mirtazepine, Zyprexa and Seroquel except as indicated for PRN agitation below

## 2020-05-19 NOTE — PROGRESS NOTE ADULT - SUBJECTIVE AND OBJECTIVE BOX
64 year old male from [facility] with PMH of dementia and type 2 DM presented with seizure activity while sitting at AL. Endocrinology was consulted for management of uncontrolled type 2 DM with hyperglycemia.    Patient seen and examined at bedside. Patient is awake and responds to verbal stimuli. Tube feedings were restarted and BG now at goal <180 on current regimen of Humalog 5 units TID q 6 hrs and moderate dose sliding scale. He is currently off Lantus.    MEDICATIONS  (STANDING):  dextrose 5% with potassium chloride 20 mEq/L 1000 milliLiter(s) (100 mL/Hr) IV Continuous <Continuous>  dextrose 50% Injectable 12.5 Gram(s) IV Push once  dextrose 50% Injectable 25 Gram(s) IV Push once  dextrose 50% Injectable 25 Gram(s) IV Push once  ergocalciferol 64105 Unit(s) Oral every week  heparin   Injectable 5000 Unit(s) SubCutaneous every 12 hours  insulin lispro (HumaLOG) corrective regimen sliding scale   SubCutaneous every 6 hours  insulin lispro Injectable (HumaLOG) 5 Unit(s) SubCutaneous every 6 hours  lactulose Syrup 10 Gram(s) Oral three times a day  multivitamin/minerals 1 Tablet(s) Oral daily  mupirocin 2% Nasal 1 Application(s) Nasal two times a day  piperacillin/tazobactam IVPB.. 3.375 Gram(s) IV Intermittent every 8 hours  potassium phosphate IVPB 15 milliMole(s) IV Intermittent once  scopolamine   Patch 1 Patch Transdermal every 72 hours    MEDICATIONS  (PRN):  acetaminophen  Suppository .. 650 milliGRAM(s) Rectal every 6 hours PRN Temp greater or equal to 38C (100.4F)  ALBUTerol    90 MICROgram(s) HFA Inhaler 2 Puff(s) Inhalation every 6 hours PRN Shortness of Breath and/or Wheezing  dextrose 40% Gel 15 Gram(s) Oral once PRN Blood Glucose LESS THAN 70 milliGRAM(s)/deciliter  glucagon  Injectable 1 milliGRAM(s) IntraMuscular once PRN Glucose LESS THAN 70 milligrams/deciliter  guaiFENesin   Syrup  (Sugar-Free) 100 milliGRAM(s) Oral every 6 hours PRN Cough      REVIEW OF SYSTEMS:  unable to obtain due to medical condition    PHYSICAL EXAM:    VITAL SIGNS    T(C): 36.8 (05-19-20 @ 05:07), Max: 36.8 (05-18-20 @ 12:25)  HR: 83 (05-19-20 @ 05:07) (83 - 88)  BP: 117/61 (05-19-20 @ 05:07) (110/69 - 117/61)  RR: 18 (05-19-20 @ 05:07) (18 - 19)  SpO2: 100% (05-19-20 @ 05:07) (100% - 100%)    GENERAL: more awake tody, NAD, well-developed  HEAD:  Atraumatic, Normocephalic  EYES: EOMI, PERRLA, conjunctiva and sclera clear  ENMT: No tonsillar erythema, exudates, or enlargement; No lesions  NECK: Supple, No JVD, Normal thyroid  NERVOUS SYSTEM:  nonverbal,  CHEST/LUNG: decreased breath sounds, No rales, rhonchi, wheezing, or rubs  HEART: Regular rate and rhythm; No murmurs, rubs, or gallops  ABDOMEN: Soft, Nontender, Nondistended; Bowel sounds present  EXTREMITIES:  2+ Peripheral Pulses, No clubbing, cyanosis, or edema  LYMPH: No lymphadenopathy noted  SKIN: No rashes or lesions      LABS:                        13.0   14.31 )-----------( 213      ( 19 May 2020 06:41 )             40.4     05-19    152<H>  |  120<H>  |  52<H>  ----------------------------<  163<H>  3.7   |  27  |  1.47<H>    Ca    9.1      19 May 2020 06:41  Phos  2.4     05-19    TPro  7.0  /  Alb  2.1<L>  /  TBili  0.7  /  DBili  x   /  AST  40  /  ALT  39  /  AlkPhos  97  05-19        CAPILLARY BLOOD GLUCOSE      POCT Blood Glucose.: 136 mg/dL (19 May 2020 04:54)  POCT Blood Glucose.: 137 mg/dL (18 May 2020 23:48)  POCT Blood Glucose.: 212 mg/dL (18 May 2020 17:40)  POCT Blood Glucose.: 188 mg/dL (18 May 2020 11:56)

## 2020-05-19 NOTE — PROGRESS NOTE ADULT - PROBLEM SELECTOR PLAN 7
Maintain fall and safety precautions  Psych re-eval still pending.  Awaiting psych input on medications.  Continue one to one while psych eval pending.

## 2020-05-19 NOTE — PROGRESS NOTE BEHAVIORAL HEALTH - OTHER
looks much younger than age sedate unable to assess deferred not observed nonverbal so unable to assess speech

## 2020-05-19 NOTE — PROGRESS NOTE BEHAVIORAL HEALTH - NSBHFUPINTERVALHXFT_PSY_A_CORE
Psychiatry called upon once more to evaluate patient and facilitate medication management  Pt remains intermittently sedated, non-verbal, when alert noted to be restless with bouts of agitation, has soft mittens on.  On attempted assessment today he is very sedate unable to arouse to loud voice and moderate touch for interview.   His home medications remain on hold due to sedation; last attempt to resume from 5/15-16 then discontinued due to sedation  Nonetheless, he notably becomes alert (though confused) and profoundly agitated during necessary medical interventions (blood draws, medication administrations).  Initially HPI reviewed from 5/9/20 psychiatric evaluation as such:   64 year old male from [facility]  with PMH dementia and DM admitted with seizures like activity, covid positive, was restarted on home meds now still agitated.   	Contacted [facility] on 5/2 at [phone] to verify medication regimen   	RN relayed it as follows:   	Remeron 3.75mg at 9am, 7.5mg at 1pm - 5pm - 9pm   	Seroquel 25mg at 9am, 50mg at 9pm  	Zyprexa 2.5mg at 9am, 5mg at 9pm  	Indicates patient has a long h/o agitation - hence multiple times a day dosing and polypharmacy  	regimen reordered  	no PRN use in 3 days - unclear why consult was called now. Days prior PRN Haldol was given once a day, at varying times [id number]   	CT head was negative   	CXR showed atelectasis

## 2020-05-19 NOTE — PROGRESS NOTE ADULT - SUBJECTIVE AND OBJECTIVE BOX
Problem List:  MAXIME  Hypernatremia  COVID 19  pneumonia    PAST MEDICAL & SURGICAL HISTORY:  Dementia  No significant past surgical history      No Known Allergies      MEDICATIONS  (STANDING):  dextrose 5% with potassium chloride 20 mEq/L 1000 milliLiter(s) (100 mL/Hr) IV Continuous <Continuous>  dextrose 50% Injectable 12.5 Gram(s) IV Push once  dextrose 50% Injectable 25 Gram(s) IV Push once  dextrose 50% Injectable 25 Gram(s) IV Push once  ergocalciferol 44355 Unit(s) Oral every week  heparin   Injectable 5000 Unit(s) SubCutaneous every 12 hours  insulin lispro (HumaLOG) corrective regimen sliding scale   SubCutaneous every 6 hours  insulin lispro Injectable (HumaLOG) 5 Unit(s) SubCutaneous every 6 hours  lactulose Syrup 10 Gram(s) Oral three times a day  multivitamin/minerals 1 Tablet(s) Oral daily  mupirocin 2% Nasal 1 Application(s) Nasal two times a day  piperacillin/tazobactam IVPB.. 3.375 Gram(s) IV Intermittent every 8 hours  scopolamine   Patch 1 Patch Transdermal every 72 hours    MEDICATIONS  (PRN):  acetaminophen  Suppository .. 650 milliGRAM(s) Rectal every 6 hours PRN Temp greater or equal to 38C (100.4F)  ALBUTerol    90 MICROgram(s) HFA Inhaler 2 Puff(s) Inhalation every 6 hours PRN Shortness of Breath and/or Wheezing  dextrose 40% Gel 15 Gram(s) Oral once PRN Blood Glucose LESS THAN 70 milliGRAM(s)/deciliter  glucagon  Injectable 1 milliGRAM(s) IntraMuscular once PRN Glucose LESS THAN 70 milligrams/deciliter  guaiFENesin   Syrup  (Sugar-Free) 100 milliGRAM(s) Oral every 6 hours PRN Cough                            13.0   14.31 )-----------( 213      ( 19 May 2020 06:41 )             40.4     05-19    152<H>  |  120<H>  |  52<H>  ----------------------------<  163<H>  3.7   |  27  |  1.47<H>    Ca    9.1      19 May 2020 06:41  Phos  2.4     05-19    TPro  7.0  /  Alb  2.1<L>  /  TBili  0.7  /  DBili  x   /  AST  40  /  ALT  39  /  AlkPhos  97  05-19        Osmolality, Random Urine: 892 mos/kg (05-14 @ 18:00)  Creatinine, Random Urine: 143 mg/dL (05-14 @ 17:59)  Sodium, Random Urine: 23 mmol/L (05-14 @ 17:59)  Potassium, Random Urine: 45 mmol/L (05-14 @ 17:59)      REVIEW OF SYSTEMS:  dementia        VITALS:  T(F): 98.3 (05-19-20 @ 05:07), Max: 98.3 (05-18-20 @ 12:25)  HR: 83 (05-19-20 @ 05:07)  BP: 117/61 (05-19-20 @ 05:07)  RR: 18 (05-19-20 @ 05:07)  SpO2: 100% (05-19-20 @ 05:07)  Wt(kg): --    05-18 @ 07:01  -  05-19 @ 07:00  --------------------------------------------------------  IN: 0 mL / OUT: 1125 mL / NET: -1125 mL        PHYSICAL EXAM:  Constitutional: well developed, no diaphoresis, no distress.  Neck: No JVD, no carotid bruit, supple, no adenopathy  Respiratory: Good air entrance B/L, no wheezes, rales or rhonchi  Cardiovascular: S1, S2, RRR, no pericardial rub, no murmur  Abdomen: BS+, soft, no tenderness, no bruit  Pelvis: bladder nondistended with goodwin catheter  Extremities: No cyanosis or clubbing. No peripheral edema.   Pulses: All present  Confused

## 2020-05-19 NOTE — PROGRESS NOTE ADULT - SUBJECTIVE AND OBJECTIVE BOX
NP Note discussed with  Primary Attending    Patient is a 64y old  Male who presents with a chief complaint of seizures (19 May 2020 09:32)      INTERVAL HPI/OVERNIGHT EVENTS: no new complaints    MEDICATIONS  (STANDING):  dextrose 5% with potassium chloride 20 mEq/L 1000 milliLiter(s) (100 mL/Hr) IV Continuous <Continuous>  dextrose 50% Injectable 12.5 Gram(s) IV Push once  dextrose 50% Injectable 25 Gram(s) IV Push once  dextrose 50% Injectable 25 Gram(s) IV Push once  ergocalciferol 39271 Unit(s) Oral every week  heparin   Injectable 5000 Unit(s) SubCutaneous every 12 hours  insulin lispro (HumaLOG) corrective regimen sliding scale   SubCutaneous every 6 hours  insulin lispro Injectable (HumaLOG) 5 Unit(s) SubCutaneous every 6 hours  lactulose Syrup 10 Gram(s) Oral three times a day  multivitamin/minerals 1 Tablet(s) Oral daily  mupirocin 2% Nasal 1 Application(s) Nasal two times a day  piperacillin/tazobactam IVPB.. 3.375 Gram(s) IV Intermittent every 8 hours  potassium phosphate IVPB 15 milliMole(s) IV Intermittent once  scopolamine   Patch 1 Patch Transdermal every 72 hours    MEDICATIONS  (PRN):  acetaminophen  Suppository .. 650 milliGRAM(s) Rectal every 6 hours PRN Temp greater or equal to 38C (100.4F)  ALBUTerol    90 MICROgram(s) HFA Inhaler 2 Puff(s) Inhalation every 6 hours PRN Shortness of Breath and/or Wheezing  dextrose 40% Gel 15 Gram(s) Oral once PRN Blood Glucose LESS THAN 70 milliGRAM(s)/deciliter  glucagon  Injectable 1 milliGRAM(s) IntraMuscular once PRN Glucose LESS THAN 70 milligrams/deciliter  guaiFENesin   Syrup  (Sugar-Free) 100 milliGRAM(s) Oral every 6 hours PRN Cough      __________________________________________________  REVIEW OF SYSTEMS:  Unavailable      Vital Signs Last 24 Hrs  T(C): 36.8 (19 May 2020 05:07), Max: 36.8 (18 May 2020 12:25)  T(F): 98.3 (19 May 2020 05:07), Max: 98.3 (18 May 2020 12:25)  HR: 83 (19 May 2020 05:07) (83 - 88)  BP: 117/61 (19 May 2020 05:07) (110/69 - 117/61)  BP(mean): --  RR: 18 (19 May 2020 05:07) (18 - 19)  SpO2: 100% (19 May 2020 05:07) (100% - 100%)    ________________________________________________  PHYSICAL EXAM:  GENERAL: NAD  HEENT: Normocephalic;  conjunctivae and sclerae clear; moist mucous membranes; Nasal feeding tube  NECK : supple  CHEST/LUNG: Diminished breath sounds bilateral bases  HEART: S1 S2  regular; no murmurs, gallops or rubs  ABDOMEN: Soft, Nontender, Nondistended; Bowel sounds present  EXTREMITIES: Partially contracted. +Pulses.  SKIN: warm and dry; no rash  NERVOUS SYSTEM: Awake. Intermittent periods of agitation.    _________________________________________________  LABS:                        13.0   14.31 )-----------( 213      ( 19 May 2020 06:41 )             40.4     05-19    152<H>  |  120<H>  |  52<H>  ----------------------------<  163<H>  3.7   |  27  |  1.47<H>    Ca    9.1      19 May 2020 06:41  Phos  2.4     05-19    TPro  7.0  /  Alb  2.1<L>  /  TBili  0.7  /  DBili  x   /  AST  40  /  ALT  39  /  AlkPhos  97  05-19        CAPILLARY BLOOD GLUCOSE      POCT Blood Glucose.: 136 mg/dL (19 May 2020 04:54)  POCT Blood Glucose.: 137 mg/dL (18 May 2020 23:48)  POCT Blood Glucose.: 212 mg/dL (18 May 2020 17:40)  POCT Blood Glucose.: 188 mg/dL (18 May 2020 11:56)  POCT Blood Glucose.: 171 mg/dL (18 May 2020 10:14)        RADIOLOGY & ADDITIONAL TESTS:    Imaging Personally Reviewed:  YES  < from: Xray Chest 1 View- PORTABLE-Routine (05.17.20 @ 20:04) >  The tip of the feeding tube is below the diaphragm overlying the left upper quadrant of the abdomen.    AP view of the chest demonstrates persistent left lower lobe infiltrate. The right lung is clear. There is no pleural effusion. There is no pneumothorax.    The heart is normal in size. There is no mediastinal or hilar mass.     The pulmonary vasculature is normal.     Mild thoracic degenerative changes are present.    IMPRESSION:    Persistent left lower lobe infiltrate.    Feeding tube in satisfactory position.    < end of copied text >  < from: CT Head No Cont (05.14.20 @ 19:05) >  FINDINGS:   There is no compelling evidence for an acute transcortical infarction. There is no evidence of mass, mass effect, midline shift or extra-axial fluid collection. The lateral ventricles and cortical sulci are age-appropriate in size and configuration. Moderate polypoid inflammatory mucosal changes are seen throughout the various portions of the paranasal sinuses. Air-fluid level and bubbly changes in the right frontal sinus. The orbits and mastoid air cells are unremarkable. The calvariumis intact.    IMPRESSION: No evidence of an acute transcortical infarction or hemorrhage. MR is a more sensitive imaging modality for the evaluation of an acute infarction. Right frontal sinus sinusitis.    < end of copied text >      Consultant(s) Notes Reviewed:   YES    Care Discussed with Consultants :     Plan of care was discussed with patient and /or primary care giver; all questions and concerns were addressed and care was aligned with patient's wishes.

## 2020-05-19 NOTE — PROGRESS NOTE ADULT - ASSESSMENT
64 year old male from [facility] with PMH of dementia and type 2 DM presented with seizure activity while sitting at AL. Endocrinology was consulted for management of uncontrolled type 2 DM with hyperglycemia.    1. Uncontrolled Type 2 DM with hyperglycemia, A1c 9.9%  -BG now at goal <180   -tube feedings restarted yesterday  -continue to monitor off Lantus  -continue Humalog 5 units TID q 6 hrs   -continue moderate dose rapid acting insulin q 6 hrs as below  BG 70-100mg/dL 0 units  -150 mg/dL 0 units  -200 mg/dL 2 units  -250 mg/dL 4 units  -300 mg/dL 6 units  -350 mg/dL 8 units  -400 mg/dL 10 units  BG > 400mg/dL 12 units  -FS AC HS  -ensure consistent carbohydrate   -will monitor FS and adjust accordingly   --if patient is NPO for any reason please change FS and sliding scale to NPO lispro scale     2. Hypernatremia  -Na 152  -continue D5 and free water as per nephrology  -continue to monitor Na closely     3.  Aspiration pneumonia  -oxygen supplementation as needed   -continue antibiotics as per primary team   -pulmonology follow up    Plan discussed with primary team, NP Pat   Please  call  w/ any questions or concerns [phone]

## 2020-05-19 NOTE — PROGRESS NOTE BEHAVIORAL HEALTH - NSBHCHARTREVIEWINVESTIGATE_PSY_A_CORE FT
< from: 12 Lead ECG (05.14.20 @ 15:40) >      Ventricular Rate 106 BPM    Atrial Rate 106 BPM    P-R Interval 114 ms    QRS Duration 66 ms    Q-T Interval 334 ms    QTC Calculation(Bezet) 443 ms    P Axis 83 degrees    R Axis -53 degrees    T Axis 97 degrees    Diagnosis Line Sinus tachycardia  Biatrial enlargement  Left axis deviation  Nonspecific ST and T wave abnormality  Abnormal ECG    < end of copied text >    QTc calculated with Sawyer formula to account for tachycardia  Sawyer QTc = 414.5

## 2020-05-19 NOTE — PROGRESS NOTE ADULT - ASSESSMENT
MAXIME and Hypernatremia associated with dehydartion and sepsis  Renal function improved   Continue IV fluid  Low PO4 will supplement with IV phosphorus.    Continue to monitor intake and output.

## 2020-05-19 NOTE — PROGRESS NOTE BEHAVIORAL HEALTH - NSBHCHARTREVIEWVS_PSY_A_CORE FT
Vital Signs Last 24 Hrs  T(C): 36.6 (19 May 2020 12:35), Max: 36.8 (19 May 2020 05:07)  T(F): 97.9 (19 May 2020 12:35), Max: 98.3 (19 May 2020 05:07)  HR: 83 (19 May 2020 12:35) (83 - 85)  BP: 102/63 (19 May 2020 12:35) (102/63 - 117/61)  BP(mean): --  RR: 20 (19 May 2020 12:35) (18 - 20)  SpO2: 97% (19 May 2020 12:35) (89% - 100%)

## 2020-05-20 LAB
ALBUMIN SERPL ELPH-MCNC: 2 G/DL — LOW (ref 3.5–5)
ALP SERPL-CCNC: 98 U/L — SIGNIFICANT CHANGE UP (ref 40–120)
ALT FLD-CCNC: 39 U/L DA — SIGNIFICANT CHANGE UP (ref 10–60)
ANION GAP SERPL CALC-SCNC: 4 MMOL/L — LOW (ref 5–17)
AST SERPL-CCNC: 41 U/L — HIGH (ref 10–40)
BASOPHILS # BLD AUTO: 0.01 K/UL — SIGNIFICANT CHANGE UP (ref 0–0.2)
BASOPHILS NFR BLD AUTO: 0.1 % — SIGNIFICANT CHANGE UP (ref 0–2)
BILIRUB SERPL-MCNC: 0.6 MG/DL — SIGNIFICANT CHANGE UP (ref 0.2–1.2)
BUN SERPL-MCNC: 29 MG/DL — HIGH (ref 7–18)
CALCIUM SERPL-MCNC: 8.6 MG/DL — SIGNIFICANT CHANGE UP (ref 8.4–10.5)
CHLORIDE SERPL-SCNC: 113 MMOL/L — HIGH (ref 96–108)
CO2 SERPL-SCNC: 27 MMOL/L — SIGNIFICANT CHANGE UP (ref 22–31)
CREAT SERPL-MCNC: 1.18 MG/DL — SIGNIFICANT CHANGE UP (ref 0.5–1.3)
CRP SERPL-MCNC: 4.9 MG/DL — HIGH (ref 0–0.4)
EOSINOPHIL # BLD AUTO: 0.21 K/UL — SIGNIFICANT CHANGE UP (ref 0–0.5)
EOSINOPHIL NFR BLD AUTO: 1.6 % — SIGNIFICANT CHANGE UP (ref 0–6)
GLUCOSE BLDC GLUCOMTR-MCNC: 184 MG/DL — HIGH (ref 70–99)
GLUCOSE BLDC GLUCOMTR-MCNC: 194 MG/DL — HIGH (ref 70–99)
GLUCOSE BLDC GLUCOMTR-MCNC: 212 MG/DL — HIGH (ref 70–99)
GLUCOSE BLDC GLUCOMTR-MCNC: 243 MG/DL — HIGH (ref 70–99)
GLUCOSE SERPL-MCNC: 214 MG/DL — HIGH (ref 70–99)
HCT VFR BLD CALC: 37.4 % — LOW (ref 39–50)
HGB BLD-MCNC: 12.7 G/DL — LOW (ref 13–17)
IMM GRANULOCYTES NFR BLD AUTO: 0.7 % — SIGNIFICANT CHANGE UP (ref 0–1.5)
LYMPHOCYTES # BLD AUTO: 1.26 K/UL — SIGNIFICANT CHANGE UP (ref 1–3.3)
LYMPHOCYTES # BLD AUTO: 9.4 % — LOW (ref 13–44)
MCHC RBC-ENTMCNC: 28.7 PG — SIGNIFICANT CHANGE UP (ref 27–34)
MCHC RBC-ENTMCNC: 34 GM/DL — SIGNIFICANT CHANGE UP (ref 32–36)
MCV RBC AUTO: 84.6 FL — SIGNIFICANT CHANGE UP (ref 80–100)
MONOCYTES # BLD AUTO: 0.71 K/UL — SIGNIFICANT CHANGE UP (ref 0–0.9)
MONOCYTES NFR BLD AUTO: 5.3 % — SIGNIFICANT CHANGE UP (ref 2–14)
NEUTROPHILS # BLD AUTO: 11.17 K/UL — HIGH (ref 1.8–7.4)
NEUTROPHILS NFR BLD AUTO: 82.9 % — HIGH (ref 43–77)
NRBC # BLD: 0 /100 WBCS — SIGNIFICANT CHANGE UP (ref 0–0)
PLATELET # BLD AUTO: 185 K/UL — SIGNIFICANT CHANGE UP (ref 150–400)
POTASSIUM SERPL-MCNC: 4.2 MMOL/L — SIGNIFICANT CHANGE UP (ref 3.5–5.3)
POTASSIUM SERPL-SCNC: 4.2 MMOL/L — SIGNIFICANT CHANGE UP (ref 3.5–5.3)
PROT SERPL-MCNC: 6.8 G/DL — SIGNIFICANT CHANGE UP (ref 6–8.3)
RBC # BLD: 4.42 M/UL — SIGNIFICANT CHANGE UP (ref 4.2–5.8)
RBC # FLD: 11.9 % — SIGNIFICANT CHANGE UP (ref 10.3–14.5)
SODIUM SERPL-SCNC: 144 MMOL/L — SIGNIFICANT CHANGE UP (ref 135–145)
WBC # BLD: 13.46 K/UL — HIGH (ref 3.8–10.5)
WBC # FLD AUTO: 13.46 K/UL — HIGH (ref 3.8–10.5)

## 2020-05-20 PROCEDURE — 99233 SBSQ HOSP IP/OBS HIGH 50: CPT

## 2020-05-20 PROCEDURE — 99232 SBSQ HOSP IP/OBS MODERATE 35: CPT

## 2020-05-20 RX ORDER — METOCLOPRAMIDE HCL 10 MG
10 TABLET ORAL EVERY 8 HOURS
Refills: 0 | Status: COMPLETED | OUTPATIENT
Start: 2020-05-20 | End: 2020-05-21

## 2020-05-20 RX ORDER — PANTOPRAZOLE SODIUM 20 MG/1
40 TABLET, DELAYED RELEASE ORAL DAILY
Refills: 0 | Status: DISCONTINUED | OUTPATIENT
Start: 2020-05-20 | End: 2020-05-27

## 2020-05-20 RX ORDER — INSULIN GLARGINE 100 [IU]/ML
10 INJECTION, SOLUTION SUBCUTANEOUS AT BEDTIME
Refills: 0 | Status: DISCONTINUED | OUTPATIENT
Start: 2020-05-20 | End: 2020-05-21

## 2020-05-20 RX ORDER — INSULIN LISPRO 100/ML
7 VIAL (ML) SUBCUTANEOUS EVERY 6 HOURS
Refills: 0 | Status: DISCONTINUED | OUTPATIENT
Start: 2020-05-20 | End: 2020-05-22

## 2020-05-20 RX ORDER — SODIUM CHLORIDE 9 MG/ML
1000 INJECTION, SOLUTION INTRAVENOUS
Refills: 0 | Status: DISCONTINUED | OUTPATIENT
Start: 2020-05-20 | End: 2020-05-21

## 2020-05-20 RX ADMIN — PANTOPRAZOLE SODIUM 40 MILLIGRAM(S): 20 TABLET, DELAYED RELEASE ORAL at 11:10

## 2020-05-20 RX ADMIN — SODIUM CHLORIDE 60 MILLILITER(S): 9 INJECTION, SOLUTION INTRAVENOUS at 13:50

## 2020-05-20 RX ADMIN — DEXTROSE MONOHYDRATE, SODIUM CHLORIDE, AND POTASSIUM CHLORIDE 100 MILLILITER(S): 50; .745; 4.5 INJECTION, SOLUTION INTRAVENOUS at 01:04

## 2020-05-20 RX ADMIN — Medication 7 UNIT(S): at 17:35

## 2020-05-20 RX ADMIN — Medication 10 MILLIGRAM(S): at 22:46

## 2020-05-20 RX ADMIN — MIRTAZAPINE 7.5 MILLIGRAM(S): 45 TABLET, ORALLY DISINTEGRATING ORAL at 21:03

## 2020-05-20 RX ADMIN — LACTULOSE 10 GRAM(S): 10 SOLUTION ORAL at 21:02

## 2020-05-20 RX ADMIN — OLANZAPINE 2.5 MILLIGRAM(S): 15 TABLET, FILM COATED ORAL at 21:03

## 2020-05-20 RX ADMIN — HEPARIN SODIUM 5000 UNIT(S): 5000 INJECTION INTRAVENOUS; SUBCUTANEOUS at 05:35

## 2020-05-20 RX ADMIN — MUPIROCIN 1 APPLICATION(S): 20 OINTMENT TOPICAL at 05:35

## 2020-05-20 RX ADMIN — Medication 2: at 17:34

## 2020-05-20 RX ADMIN — Medication 5 UNIT(S): at 11:44

## 2020-05-20 RX ADMIN — LACTULOSE 10 GRAM(S): 10 SOLUTION ORAL at 13:07

## 2020-05-20 RX ADMIN — PIPERACILLIN AND TAZOBACTAM 25 GRAM(S): 4; .5 INJECTION, POWDER, LYOPHILIZED, FOR SOLUTION INTRAVENOUS at 21:02

## 2020-05-20 RX ADMIN — PIPERACILLIN AND TAZOBACTAM 25 GRAM(S): 4; .5 INJECTION, POWDER, LYOPHILIZED, FOR SOLUTION INTRAVENOUS at 05:35

## 2020-05-20 RX ADMIN — SCOPALAMINE 1 PATCH: 1 PATCH, EXTENDED RELEASE TRANSDERMAL at 09:57

## 2020-05-20 RX ADMIN — Medication 2: at 23:46

## 2020-05-20 RX ADMIN — Medication 4: at 05:35

## 2020-05-20 RX ADMIN — Medication 5 UNIT(S): at 05:36

## 2020-05-20 RX ADMIN — PIPERACILLIN AND TAZOBACTAM 25 GRAM(S): 4; .5 INJECTION, POWDER, LYOPHILIZED, FOR SOLUTION INTRAVENOUS at 13:07

## 2020-05-20 RX ADMIN — QUETIAPINE FUMARATE 25 MILLIGRAM(S): 200 TABLET, FILM COATED ORAL at 21:03

## 2020-05-20 RX ADMIN — SCOPALAMINE 1 PATCH: 1 PATCH, EXTENDED RELEASE TRANSDERMAL at 19:56

## 2020-05-20 RX ADMIN — Medication 7 UNIT(S): at 23:47

## 2020-05-20 RX ADMIN — Medication 4: at 11:44

## 2020-05-20 RX ADMIN — LACTULOSE 10 GRAM(S): 10 SOLUTION ORAL at 05:35

## 2020-05-20 RX ADMIN — Medication 1 TABLET(S): at 11:09

## 2020-05-20 RX ADMIN — INSULIN GLARGINE 10 UNIT(S): 100 INJECTION, SOLUTION SUBCUTANEOUS at 23:45

## 2020-05-20 RX ADMIN — HEPARIN SODIUM 5000 UNIT(S): 5000 INJECTION INTRAVENOUS; SUBCUTANEOUS at 17:19

## 2020-05-20 NOTE — PROGRESS NOTE ADULT - PROBLEM SELECTOR PLAN 4
c/w antipsychotic meds per home regimen: Zyprexa , Remeron, Seroquel  Enhanced supervision  Unsecured mitten restraints

## 2020-05-20 NOTE — PROGRESS NOTE ADULT - ASSESSMENT
1.	Pneumonia - likely aspiration  2.	Leukocytosis - slightly higher  3.	COVID - 19 infection  ·	cont Zosyn 3.375gms iv q8hrs , will give 2 days more 1.	Pneumonia - likely aspiration  2.	Leukocytosis - slightly higher  3.	COVID - 19 infection  ·	cont Zosyn 3.375gms iv q8hrs , will give 2 days more(DC all abxs on Friday)  ·	reconsult prn.

## 2020-05-20 NOTE — PROGRESS NOTE ADULT - SUBJECTIVE AND OBJECTIVE BOX
NP Note discussed with  Primary Attending    Patient is a 64y old  Male who presents with a chief complaint of seizures (19 May 2020 11:41)      INTERVAL HPI/OVERNIGHT EVENTS: no new complaints    MEDICATIONS  (STANDING):  dextrose 5% with potassium chloride 20 mEq/L 1000 milliLiter(s) (100 mL/Hr) IV Continuous <Continuous>  dextrose 50% Injectable 12.5 Gram(s) IV Push once  dextrose 50% Injectable 25 Gram(s) IV Push once  dextrose 50% Injectable 25 Gram(s) IV Push once  ergocalciferol 01258 Unit(s) Oral every week  heparin   Injectable 5000 Unit(s) SubCutaneous every 12 hours  insulin lispro (HumaLOG) corrective regimen sliding scale   SubCutaneous every 6 hours  insulin lispro Injectable (HumaLOG) 5 Unit(s) SubCutaneous every 6 hours  lactulose Syrup 10 Gram(s) Oral three times a day  mirtazapine 7.5 milliGRAM(s) Oral at bedtime  multivitamin/minerals 1 Tablet(s) Oral daily  OLANZapine 2.5 milliGRAM(s) Oral at bedtime  piperacillin/tazobactam IVPB.. 3.375 Gram(s) IV Intermittent every 8 hours  QUEtiapine 25 milliGRAM(s) Oral at bedtime  scopolamine   Patch 1 Patch Transdermal every 72 hours    MEDICATIONS  (PRN):  acetaminophen  Suppository .. 650 milliGRAM(s) Rectal every 6 hours PRN Temp greater or equal to 38C (100.4F)  ALBUTerol    90 MICROgram(s) HFA Inhaler 2 Puff(s) Inhalation every 6 hours PRN Shortness of Breath and/or Wheezing  dextrose 40% Gel 15 Gram(s) Oral once PRN Blood Glucose LESS THAN 70 milliGRAM(s)/deciliter  glucagon  Injectable 1 milliGRAM(s) IntraMuscular once PRN Glucose LESS THAN 70 milligrams/deciliter  guaiFENesin   Syrup  (Sugar-Free) 100 milliGRAM(s) Oral every 6 hours PRN Cough      __________________________________________________  REVIEW OF SYSTEMS:    Unable to illicit       Vital Signs Last 24 Hrs  T(C): 36.4 (20 May 2020 05:39), Max: 36.6 (19 May 2020 12:35)  T(F): 97.5 (20 May 2020 05:39), Max: 97.9 (19 May 2020 12:35)  HR: 70 (20 May 2020 05:39) (63 - 83)  BP: 114/75 (20 May 2020 05:39) (102/63 - 114/75)  BP(mean): --  RR: 17 (20 May 2020 05:39) (17 - 20)  SpO2: 95% (20 May 2020 05:39) (89% - 97%)    ________________________________________________  PHYSICAL EXAM:  GENERAL: NAD  HEENT: Normocephalic;  conjunctivae and sclerae clear; moist mucous membranes;   NECK : supple  CHEST/LUNG: Clear to auscultation bilaterally with good air entry   HEART: S1 S2  regular; no murmurs, gallops or rubs  ABDOMEN: Soft, Nontender, Nondistended; Bowel sounds present  EXTREMITIES: no cyanosis; no edema; no calf tenderness  SKIN: warm and dry; no rash  NERVOUS SYSTEM:  Awake and alert; Oriented  to place, person and time ; no new deficits    _________________________________________________  LABS:                        12.7   13.46 )-----------( 185      ( 20 May 2020 06:04 )             37.4     05-20    144  |  113<H>  |  29<H>  ----------------------------<  214<H>  4.2   |  27  |  1.18    Ca    8.6      20 May 2020 06:04  Phos  2.4     05-19    TPro  6.8  /  Alb  2.0<L>  /  TBili  0.6  /  DBili  x   /  AST  41<H>  /  ALT  39  /  AlkPhos  98  05-20        CAPILLARY BLOOD GLUCOSE      POCT Blood Glucose.: 212 mg/dL (20 May 2020 05:26)  POCT Blood Glucose.: 199 mg/dL (19 May 2020 23:52)  POCT Blood Glucose.: 242 mg/dL (19 May 2020 17:33)  POCT Blood Glucose.: 226 mg/dL (19 May 2020 12:20)        RADIOLOGY & ADDITIONAL TESTS:    Imaging Personally Reviewed:  YES/NO    Consultant(s) Notes Reviewed:   YES/ No    Care Discussed with Consultants :     Plan of care was discussed with patient and /or primary care giver; all questions and concerns were addressed and care was aligned with patient's wishes. NP Note discussed with  Primary Attending    Patient is a 64y old  Male who presents with a chief complaint of seizures (19 May 2020 11:41)      INTERVAL HPI/OVERNIGHT EVENTS: no new complaints    MEDICATIONS  (STANDING):  dextrose 5% with potassium chloride 20 mEq/L 1000 milliLiter(s) (100 mL/Hr) IV Continuous <Continuous>  dextrose 50% Injectable 12.5 Gram(s) IV Push once  dextrose 50% Injectable 25 Gram(s) IV Push once  dextrose 50% Injectable 25 Gram(s) IV Push once  ergocalciferol 84579 Unit(s) Oral every week  heparin   Injectable 5000 Unit(s) SubCutaneous every 12 hours  insulin lispro (HumaLOG) corrective regimen sliding scale   SubCutaneous every 6 hours  insulin lispro Injectable (HumaLOG) 5 Unit(s) SubCutaneous every 6 hours  lactulose Syrup 10 Gram(s) Oral three times a day  mirtazapine 7.5 milliGRAM(s) Oral at bedtime  multivitamin/minerals 1 Tablet(s) Oral daily  OLANZapine 2.5 milliGRAM(s) Oral at bedtime  piperacillin/tazobactam IVPB.. 3.375 Gram(s) IV Intermittent every 8 hours  QUEtiapine 25 milliGRAM(s) Oral at bedtime  scopolamine   Patch 1 Patch Transdermal every 72 hours    MEDICATIONS  (PRN):  acetaminophen  Suppository .. 650 milliGRAM(s) Rectal every 6 hours PRN Temp greater or equal to 38C (100.4F)  ALBUTerol    90 MICROgram(s) HFA Inhaler 2 Puff(s) Inhalation every 6 hours PRN Shortness of Breath and/or Wheezing  dextrose 40% Gel 15 Gram(s) Oral once PRN Blood Glucose LESS THAN 70 milliGRAM(s)/deciliter  glucagon  Injectable 1 milliGRAM(s) IntraMuscular once PRN Glucose LESS THAN 70 milligrams/deciliter  guaiFENesin   Syrup  (Sugar-Free) 100 milliGRAM(s) Oral every 6 hours PRN Cough      __________________________________________________  REVIEW OF SYSTEMS:    Unable to illicit as pt does not respond when asked questions.       Vital Signs Last 24 Hrs  T(C): 36.4 (20 May 2020 05:39), Max: 36.6 (19 May 2020 12:35)  T(F): 97.5 (20 May 2020 05:39), Max: 97.9 (19 May 2020 12:35)  HR: 70 (20 May 2020 05:39) (63 - 83)  BP: 114/75 (20 May 2020 05:39) (102/63 - 114/75)  BP(mean): --  RR: 17 (20 May 2020 05:39) (17 - 20)  SpO2: 95% (20 May 2020 05:39) (89% - 97%)    ________________________________________________  PHYSICAL EXAM:  GENERAL: NAD  HEENT: Normocephalic;  conjunctivae and sclerae clear; moist mucous membranes;   NECK : supple  CHEST/LUNG: Clear to auscultation bilaterally with good air entry   HEART: S1 S2  regular; no murmurs, gallops or rubs  ABDOMEN: Soft, Nontender, Nondistended; Bowel sounds present  EXTREMITIES: no cyanosis; no edema; no calf tenderness  SKIN: warm and dry; no rash  NERVOUS SYSTEM:  Lethargic , awakens easily upon verbal stimuli. Does not follow commands.     _________________________________________________  LABS:                        12.7   13.46 )-----------( 185      ( 20 May 2020 06:04 )             37.4     05-20    144  |  113<H>  |  29<H>  ----------------------------<  214<H>  4.2   |  27  |  1.18    Ca    8.6      20 May 2020 06:04  Phos  2.4     05-19    TPro  6.8  /  Alb  2.0<L>  /  TBili  0.6  /  DBili  x   /  AST  41<H>  /  ALT  39  /  AlkPhos  98  05-20        CAPILLARY BLOOD GLUCOSE      POCT Blood Glucose.: 212 mg/dL (20 May 2020 05:26)  POCT Blood Glucose.: 199 mg/dL (19 May 2020 23:52)  POCT Blood Glucose.: 242 mg/dL (19 May 2020 17:33)  POCT Blood Glucose.: 226 mg/dL (19 May 2020 12:20)        RADIOLOGY & ADDITIONAL TESTS:    Imaging Personally Reviewed:  YES/NO    Consultant(s) Notes Reviewed:   YES/ No    Care Discussed with Consultants :     Plan of care was discussed with patient and /or primary care giver; all questions and concerns were addressed and care was aligned with patient's wishes. NP Note discussed with  Primary Attending    Patient is a 64y old  Male who presents with a chief complaint of seizures (19 May 2020 11:41)    HPI    64 year old male from [facility]  with PMH dementia and DM who presented  with seizures like activity while sitting at AL. In ED , pt afebrile, SpO2 98 on RA, EKG showed SR with PVC's, Head CT unremarkable, CXR clear lungs, no leukocytosis. COVID 19 detected. Seen by Neuro .No indication for antiepileptic medications now. Pt with long h/o behavioral disturbance on multiple standing antipsychotics, for which pt was seen by Psyche and antipsychotic meds adjusted. Hospital course complicated by AMS ,  unable to take po, for which NG tube feeds initiated ,  hypernatremia on D5W , MAXIME, uncontrolled DM with hyperglycemia , on basal/bolus Lantus/Humalog /HISS. Head CT shows right frontal sinus sinusitis, negative for hemorrhagic stroke/infarct.  Endo and Nephrology on board. Pt developed fever , likely due to Aspiration PNA and sinusitis, started on Zosyn, currently Day 6.  ID Dr. Barry on board. Pt less lethargic, agitated at times. Antipsychotic home regimen resumed . Psyche following.     INTERVAL HPI/OVERNIGHT EVENTS: Pt seen and examined. Pt lethargic , easily awakens with eyes open to verbal stimuli, nonverbal , does not follow commands. RN reports no agitation overnight.     MEDICATIONS  (STANDING):  dextrose 5% with potassium chloride 20 mEq/L 1000 milliLiter(s) (100 mL/Hr) IV Continuous <Continuous>  dextrose 50% Injectable 12.5 Gram(s) IV Push once  dextrose 50% Injectable 25 Gram(s) IV Push once  dextrose 50% Injectable 25 Gram(s) IV Push once  ergocalciferol 02532 Unit(s) Oral every week  heparin   Injectable 5000 Unit(s) SubCutaneous every 12 hours  insulin lispro (HumaLOG) corrective regimen sliding scale   SubCutaneous every 6 hours  insulin lispro Injectable (HumaLOG) 5 Unit(s) SubCutaneous every 6 hours  lactulose Syrup 10 Gram(s) Oral three times a day  mirtazapine 7.5 milliGRAM(s) Oral at bedtime  multivitamin/minerals 1 Tablet(s) Oral daily  OLANZapine 2.5 milliGRAM(s) Oral at bedtime  piperacillin/tazobactam IVPB.. 3.375 Gram(s) IV Intermittent every 8 hours  QUEtiapine 25 milliGRAM(s) Oral at bedtime  scopolamine   Patch 1 Patch Transdermal every 72 hours    MEDICATIONS  (PRN):  acetaminophen  Suppository .. 650 milliGRAM(s) Rectal every 6 hours PRN Temp greater or equal to 38C (100.4F)  ALBUTerol    90 MICROgram(s) HFA Inhaler 2 Puff(s) Inhalation every 6 hours PRN Shortness of Breath and/or Wheezing  dextrose 40% Gel 15 Gram(s) Oral once PRN Blood Glucose LESS THAN 70 milliGRAM(s)/deciliter  glucagon  Injectable 1 milliGRAM(s) IntraMuscular once PRN Glucose LESS THAN 70 milligrams/deciliter  guaiFENesin   Syrup  (Sugar-Free) 100 milliGRAM(s) Oral every 6 hours PRN Cough      __________________________________________________  REVIEW OF SYSTEMS:    Unable to illicit as pt does not respond when asked questions.       Vital Signs Last 24 Hrs  T(C): 36.4 (20 May 2020 05:39), Max: 36.6 (19 May 2020 12:35)  T(F): 97.5 (20 May 2020 05:39), Max: 97.9 (19 May 2020 12:35)  HR: 70 (20 May 2020 05:39) (63 - 83)  BP: 114/75 (20 May 2020 05:39) (102/63 - 114/75)  BP(mean): --  RR: 17 (20 May 2020 05:39) (17 - 20)  SpO2: 95% (20 May 2020 05:39) (89% - 97%)    ________________________________________________  PHYSICAL EXAM:  GENERAL: NAD  HEENT: Normocephalic;  conjunctivae and sclerae clear; moist mucous membranes;   NECK : supple  CHEST/LUNG: Clear to auscultation bilaterally with good air entry   HEART: S1 S2  regular; no murmurs, gallops or rubs  ABDOMEN: Soft, Nontender, Nondistended; Bowel sounds present  EXTREMITIES: no cyanosis; no edema; no calf tenderness  SKIN: warm and dry; no rash  NERVOUS SYSTEM:  Lethargic , awakens easily upon verbal stimuli. Does not follow commands.     _________________________________________________  LABS:                        12.7   13.46 )-----------( 185      ( 20 May 2020 06:04 )             37.4     05-20    144  |  113<H>  |  29<H>  ----------------------------<  214<H>  4.2   |  27  |  1.18    Ca    8.6      20 May 2020 06:04  Phos  2.4     05-19    TPro  6.8  /  Alb  2.0<L>  /  TBili  0.6  /  DBili  x   /  AST  41<H>  /  ALT  39  /  AlkPhos  98  05-20        CAPILLARY BLOOD GLUCOSE      POCT Blood Glucose.: 212 mg/dL (20 May 2020 05:26)  POCT Blood Glucose.: 199 mg/dL (19 May 2020 23:52)  POCT Blood Glucose.: 242 mg/dL (19 May 2020 17:33)  POCT Blood Glucose.: 226 mg/dL (19 May 2020 12:20)        RADIOLOGY & ADDITIONAL TESTS:    Imaging Personally Reviewed:  YES/NO    Consultant(s) Notes Reviewed:   YES/ No    Care Discussed with Consultants :     Plan of care was discussed with patient and /or primary care giver; all questions and concerns were addressed and care was aligned with patient's wishes.

## 2020-05-20 NOTE — PROGRESS NOTE ADULT - SUBJECTIVE AND OBJECTIVE BOX
Problem List:  MAXIME , pre renal dehydration and hypotension  Non oliguric  Hypernatremia  COVID 19 and pneumonia    PAST MEDICAL & SURGICAL HISTORY:  Dementia  No significant past surgical history      No Known Allergies      MEDICATIONS  (STANDING):  dextrose 5% with potassium chloride 20 mEq/L 1000 milliLiter(s) (100 mL/Hr) IV Continuous <Continuous>  dextrose 50% Injectable 12.5 Gram(s) IV Push once  dextrose 50% Injectable 25 Gram(s) IV Push once  dextrose 50% Injectable 25 Gram(s) IV Push once  ergocalciferol 84314 Unit(s) Oral every week  heparin   Injectable 5000 Unit(s) SubCutaneous every 12 hours  insulin lispro (HumaLOG) corrective regimen sliding scale   SubCutaneous every 6 hours  insulin lispro Injectable (HumaLOG) 5 Unit(s) SubCutaneous every 6 hours  lactulose Syrup 10 Gram(s) Oral three times a day  mirtazapine 7.5 milliGRAM(s) Oral at bedtime  multivitamin/minerals 1 Tablet(s) Oral daily  OLANZapine 2.5 milliGRAM(s) Oral at bedtime  pantoprazole  Injectable 40 milliGRAM(s) IV Push daily  piperacillin/tazobactam IVPB.. 3.375 Gram(s) IV Intermittent every 8 hours  QUEtiapine 25 milliGRAM(s) Oral at bedtime  scopolamine   Patch 1 Patch Transdermal every 72 hours    MEDICATIONS  (PRN):  acetaminophen  Suppository .. 650 milliGRAM(s) Rectal every 6 hours PRN Temp greater or equal to 38C (100.4F)  ALBUTerol    90 MICROgram(s) HFA Inhaler 2 Puff(s) Inhalation every 6 hours PRN Shortness of Breath and/or Wheezing  dextrose 40% Gel 15 Gram(s) Oral once PRN Blood Glucose LESS THAN 70 milliGRAM(s)/deciliter  glucagon  Injectable 1 milliGRAM(s) IntraMuscular once PRN Glucose LESS THAN 70 milligrams/deciliter  guaiFENesin   Syrup  (Sugar-Free) 100 milliGRAM(s) Oral every 6 hours PRN Cough                            12.7   13.46 )-----------( 185      ( 20 May 2020 06:04 )             37.4     05-20    144  |  113<H>  |  29<H>  ----------------------------<  214<H>  4.2   |  27  |  1.18    Ca    8.6      20 May 2020 06:04  Phos  2.4     05-19    TPro  6.8  /  Alb  2.0<L>  /  TBili  0.6  /  DBili  x   /  AST  41<H>  /  ALT  39  /  AlkPhos  98  05-20        Osmolality, Random Urine: 892 mos/kg (05-14 @ 18:00)  Creatinine, Random Urine: 143 mg/dL (05-14 @ 17:59)  Sodium, Random Urine: 23 mmol/L (05-14 @ 17:59)  Potassium, Random Urine: 45 mmol/L (05-14 @ 17:59)      REVIEW OF SYSTEMS:  dementia      VITALS:  T(F): 97.5 (05-20-20 @ 05:39), Max: 97.5 (05-19-20 @ 22:09)  HR: 70 (05-20-20 @ 05:39)  BP: 114/75 (05-20-20 @ 05:39)  RR: 17 (05-20-20 @ 05:39)  SpO2: 95% (05-20-20 @ 05:39)  Wt(kg): --    05-19 @ 07:01  -  05-20 @ 07:00  --------------------------------------------------------  IN: 0 mL / OUT: 1050 mL / NET: -1050 mL        PHYSICAL EXAM:  Constitutional: mold reduced muscle mass  Neck: No JVD, no carotid bruit, supple, no adenopathy  Respiratory: Good air entrance B/L, no wheezes, rales or rhonchi  Cardiovascular: S1, S2, RRR, no pericardial rub, no murmur  Abdomen: BS+, soft, no tenderness, no bruit  Pelvis: bladder nondistended  Extremities: No cyanosis or clubbing. No peripheral edema.   Pulses: All present  Confused and agitated when awake.

## 2020-05-20 NOTE — PROGRESS NOTE ADULT - PROBLEM SELECTOR PLAN 7
Have You Had Botox Before?: has had botox When Was Your Last Botox Treatment?: 2018 afebrile  CRP downtrending  D-Dimer elevated at 375 on 5/19  Ferritin uptrending , elevated at 1960 5/19  c/w Heparin SQ  c/w supplemental Oxygen , titrate to keep SpO2 >/= 94%  Trend D-Dimer, CRP , Ferritin  ID Dr. Barry following

## 2020-05-20 NOTE — PROGRESS NOTE ADULT - PROBLEM SELECTOR PLAN 5
Improving  Monitor BG q6h while on NGT feeds (or NPO) and cover with moderate HISS  Humalog 5 units SC q6h  Hypoglycemia protocol  Endo Dr. Goodwin following

## 2020-05-20 NOTE — PROGRESS NOTE ADULT - PROBLEM SELECTOR PLAN 1
Urine culture noted  Antibiotics  Oxygen Supp  Monitor Oxygen sat  Taper Oxygen Supp as feasible  NGT in place with feeds.  Follow up CXR

## 2020-05-20 NOTE — PROGRESS NOTE ADULT - SUBJECTIVE AND OBJECTIVE BOX
64y Male is under our care for aspiration pneumonia, leukocytosis and COVID - 19 infection. Patient is confused and not able to answer any questions. No recent fevers and wbc count has been trending downward.     REVIEW OF SYSTEMS:  [ X ] Not able to illicit    MEDS:  piperacillin/tazobactam IVPB.. 3.375 Gram(s) IV Intermittent every 8 hours    ALLERGIES: Allergies    No Known Allergies    Intolerances      VITALS:  Vital Signs Last 24 Hrs  T(C): 36.3 (20 May 2020 14:26), Max: 36.4 (19 May 2020 22:09)  T(F): 97.4 (20 May 2020 14:26), Max: 97.5 (19 May 2020 22:09)  HR: 66 (20 May 2020 14:26) (63 - 70)  BP: 107/61 (20 May 2020 14:26) (107/61 - 114/75)  BP(mean): --  RR: 18 (20 May 2020 14:26) (17 - 20)  SpO2: 98% (20 May 2020 14:26) (95% - 98%)      PHYSICAL EXAM:  HEENT: +NGT +NC  Neck: supple no LN's   Respiratory: bilateral scattered rhonchi with scattered crepes no wheezing  Cardiovascular: S1 S2 reg no murmurs  Gastrointestinal: +BS with soft, nondistended abdomen; nontender  +goodwin with some sediment in tubing  Extremities: no edema  Skin: no rashes  Ortho: n/a  Neuro: awake and demented      LABS/DIAGNOSTIC TESTS:                        12.7   13.46 )-----------( 185      ( 20 May 2020 06:04 )             37.4     WBC Count: 13.46 K/uL (05-20 @ 06:04)  WBC Count: 14.31 K/uL (05-19 @ 06:41)  WBC Count: 16.99 K/uL (05-18 @ 07:39)  WBC Count: 14.23 K/uL (05-17 @ 07:32)  WBC Count: 14.56 K/uL (05-16 @ 07:07)    05-20    144  |  113<H>  |  29<H>  ----------------------------<  214<H>  4.2   |  27  |  1.18    Ca    8.6      20 May 2020 06:04  Phos  2.4     05-19    TPro  6.8  /  Alb  2.0<L>  /  TBili  0.6  /  DBili  x   /  AST  41<H>  /  ALT  39  /  AlkPhos  98  05-20      CULTURES:   .Urine Clean Catch (Midstream)  05-14 @ 09:59   >=3 organisms. Probable collection contamination.  --  --        RADIOLOGY:  no new studies

## 2020-05-20 NOTE — PROGRESS NOTE ADULT - SUBJECTIVE AND OBJECTIVE BOX
64 year old male from [facility] with PMH of dementia and type 2 DM presented with seizure activity while sitting at AL. Endocrinology was consulted for management of uncontrolled type 2 DM with hyperglycemia.    Patient seen and examined at bedside. He remains on tube feedings at starting rate of 20ml/hr. Plan to change to 24hr tube feedings as per primary team. FS reviewed. BG remains above goal >200.    MEDICATIONS  (STANDING):  dextrose 5%. 1000 milliLiter(s) (60 mL/Hr) IV Continuous <Continuous>  dextrose 50% Injectable 12.5 Gram(s) IV Push once  dextrose 50% Injectable 25 Gram(s) IV Push once  dextrose 50% Injectable 25 Gram(s) IV Push once  ergocalciferol 45536 Unit(s) Oral every week  heparin   Injectable 5000 Unit(s) SubCutaneous every 12 hours  insulin glargine Injectable (LANTUS) 10 Unit(s) SubCutaneous at bedtime  insulin lispro (HumaLOG) corrective regimen sliding scale   SubCutaneous every 6 hours  insulin lispro Injectable (HumaLOG) 7 Unit(s) SubCutaneous every 6 hours  lactulose Syrup 10 Gram(s) Oral three times a day  mirtazapine 7.5 milliGRAM(s) Oral at bedtime  multivitamin/minerals 1 Tablet(s) Oral daily  OLANZapine 2.5 milliGRAM(s) Oral at bedtime  pantoprazole  Injectable 40 milliGRAM(s) IV Push daily  piperacillin/tazobactam IVPB.. 3.375 Gram(s) IV Intermittent every 8 hours  QUEtiapine 25 milliGRAM(s) Oral at bedtime  scopolamine   Patch 1 Patch Transdermal every 72 hours    MEDICATIONS  (PRN):  acetaminophen  Suppository .. 650 milliGRAM(s) Rectal every 6 hours PRN Temp greater or equal to 38C (100.4F)  ALBUTerol    90 MICROgram(s) HFA Inhaler 2 Puff(s) Inhalation every 6 hours PRN Shortness of Breath and/or Wheezing  dextrose 40% Gel 15 Gram(s) Oral once PRN Blood Glucose LESS THAN 70 milliGRAM(s)/deciliter  glucagon  Injectable 1 milliGRAM(s) IntraMuscular once PRN Glucose LESS THAN 70 milligrams/deciliter  guaiFENesin   Syrup  (Sugar-Free) 100 milliGRAM(s) Oral every 6 hours PRN Cough      REVIEW OF SYSTEMS:  unable to obtain due to medical condition    PHYSICAL EXAM:    VITAL SIGNS  T(C): 36.4 (05-20-20 @ 05:39), Max: 36.4 (05-19-20 @ 22:09)  HR: 70 (05-20-20 @ 05:39) (63 - 70)  BP: 114/75 (05-20-20 @ 05:39) (108/53 - 114/75)  RR: 17 (05-20-20 @ 05:39) (17 - 20)  SpO2: 95% (05-20-20 @ 05:39) (95% - 96%)      GENERAL: more awake tody, NAD, well-developed  HEAD:  Atraumatic, Normocephalic  EYES: EOMI, PERRLA, conjunctiva and sclera clear  ENMT: No tonsillar erythema, exudates, or enlargement; No lesions  NECK: Supple, No JVD, Normal thyroid  NERVOUS SYSTEM:  nonverbal,  CHEST/LUNG: decreased breath sounds, No rales, rhonchi, wheezing, or rubs  HEART: Regular rate and rhythm; No murmurs, rubs, or gallops  ABDOMEN: Soft, Nontender, Nondistended; Bowel sounds present  EXTREMITIES:  2+ Peripheral Pulses, No clubbing, cyanosis, or edema  SKIN: No rashes or lesions    LABS:                        12.7   13.46 )-----------( 185      ( 20 May 2020 06:04 )             37.4     05-20    144  |  113<H>  |  29<H>  ----------------------------<  214<H>  4.2   |  27  |  1.18    Ca    8.6      20 May 2020 06:04  Phos  2.4     05-19    TPro  6.8  /  Alb  2.0<L>  /  TBili  0.6  /  DBili  x   /  AST  41<H>  /  ALT  39  /  AlkPhos  98  05-20        CAPILLARY BLOOD GLUCOSE      POCT Blood Glucose.: 243 mg/dL (20 May 2020 11:35)  POCT Blood Glucose.: 212 mg/dL (20 May 2020 05:26)  POCT Blood Glucose.: 199 mg/dL (19 May 2020 23:52)  POCT Blood Glucose.: 242 mg/dL (19 May 2020 17:33)

## 2020-05-20 NOTE — PROGRESS NOTE ADULT - ASSESSMENT
MAXIME and Hypernatremia associated with dehydartion and sepsis  Renal function improved , sodium inproved  Continue IV fluid, reduced hour maintenance to 60 ml/hour  Low PO4 post  supplement, follow lab in am .  K improved with IV supplement      Continue to monitor intake and output.

## 2020-05-20 NOTE — PROGRESS NOTE ADULT - ATTENDING COMMENTS
I counseled the primary provider that antiepileptic medications do not need to be resumed at this time given the lack of clinical seizures since admission.
I agree with above
I counseled the primary team about the likely diagnosis and the plan to discontinue levetiracetam.
I counseled the primary team about the potential to use valproic acid (Depakote) if the patient has evidence of a new clinical seizure, although antiepileptic medications can be held for now.

## 2020-05-20 NOTE — PROGRESS NOTE ADULT - PROBLEM SELECTOR PLAN 3
due to multifactors:  sepsis/ Aspiration PNA; metabolic , elevated ammonia dehydration  CT head negative infarct, hemorrhage  Less lethargic today  c/w Zosyn  c/w Lactulose  NG tube feeds  Aspiration precautions  Supportive measures

## 2020-05-20 NOTE — PROGRESS NOTE ADULT - SUBJECTIVE AND OBJECTIVE BOX
CARDIOLOGY/MEDICAL ATTENDING    CHIEF COMPLAINT:Patient is a 64y old  Male who presents with a chief complaint of seizures.Pt appears comfortable.    	  REVIEW OF SYSTEMS:    [x ] Unable to obtain    PHYSICAL EXAM:  T(C): 36.4 (05-20-20 @ 05:39), Max: 36.6 (05-19-20 @ 12:35)  HR: 70 (05-20-20 @ 05:39) (63 - 83)  BP: 114/75 (05-20-20 @ 05:39) (102/63 - 114/75)  RR: 17 (05-20-20 @ 05:39) (17 - 20)  SpO2: 95% (05-20-20 @ 05:39) (95% - 97%)  Wt(kg): --  I&O's Summary    19 May 2020 07:01  -  20 May 2020 07:00  --------------------------------------------------------  IN: 0 mL / OUT: 1050 mL / NET: -1050 mL        Appearance: Normal	  HEENT:   Normal oral mucosa, PERRL, EOMI	  Lymphatic: No lymphadenopathy  Cardiovascular: Normal S1 S2, No JVD, No murmurs, No edema  Respiratory: Dec bS at bases  Gastrointestinal:  Soft, Non-tender, + BS	  Skin: No rashes, No ecchymoses, No cyanosis	  Extremities: Normal range of motion, No clubbing, cyanosis or edema  Vascular: Peripheral pulses palpable 2+ bilaterally    MEDICATIONS  (STANDING):  dextrose 5% with potassium chloride 20 mEq/L 1000 milliLiter(s) (100 mL/Hr) IV Continuous <Continuous>  dextrose 50% Injectable 12.5 Gram(s) IV Push once  dextrose 50% Injectable 25 Gram(s) IV Push once  dextrose 50% Injectable 25 Gram(s) IV Push once  ergocalciferol 95858 Unit(s) Oral every week  heparin   Injectable 5000 Unit(s) SubCutaneous every 12 hours  insulin lispro (HumaLOG) corrective regimen sliding scale   SubCutaneous every 6 hours  insulin lispro Injectable (HumaLOG) 5 Unit(s) SubCutaneous every 6 hours  lactulose Syrup 10 Gram(s) Oral three times a day  mirtazapine 7.5 milliGRAM(s) Oral at bedtime  multivitamin/minerals 1 Tablet(s) Oral daily  OLANZapine 2.5 milliGRAM(s) Oral at bedtime  pantoprazole  Injectable 40 milliGRAM(s) IV Push daily  piperacillin/tazobactam IVPB.. 3.375 Gram(s) IV Intermittent every 8 hours  QUEtiapine 25 milliGRAM(s) Oral at bedtime  scopolamine   Patch 1 Patch Transdermal every 72 hours      	  LABS:	 	                       12.7   13.46 )-----------( 185      ( 20 May 2020 06:04 )             37.4     05-20    144  |  113<H>  |  29<H>  ----------------------------<  214<H>  4.2   |  27  |  1.18    Ca    8.6      20 May 2020 06:04  Phos  2.4     05-19    TPro  6.8  /  Alb  2.0<L>  /  TBili  0.6  /  DBili  x   /  AST  41<H>  /  ALT  39  /  AlkPhos  98  05-20    proBNP: Serum Pro-Brain Natriuretic Peptide: 126 pg/mL (05-01 @ 09:36)    Lipid Profile: Cholesterol 161  LDL 95  HDL 35        TSH: Thyroid Stimulating Hormone, Serum: 2.39 uU/mL (05-01 @ 09:36)      	    EXAM:  XR CHEST PORTABLE ROUTINE 1V                            PROCEDURE DATE:  05/17/2020          INTERPRETATION:  CLINICAL INDICATION: 64 years  Male with increased secretions. Covid positive    COMPARISON: 5/15/2020    The tip of the feeding tube is below the diaphragm overlying the left upper quadrant of the abdomen.    AP view of the chest demonstrates persistent left lower lobe infiltrate. The right lung is clear. There is no pleural effusion. There is no pneumothorax.    The heart is normal in size. There is no mediastinal or hilar mass.     The pulmonary vasculature is normal.     Mild thoracic degenerative changes are present.    IMPRESSION:    Persistent left lower lobe infiltrate.    Feeding tube in satisfactory position.

## 2020-05-20 NOTE — PROGRESS NOTE ADULT - PROBLEM SELECTOR PLAN 1
afebrile, leukocytosis downtrending  Oxygenating improving, 95% on O2 5L NC  c/w Zosyn , Day 6  c/w Albuterol MDI prn  c/w supplemental oxygen . Titrate to keep SpO2 >/=94%  Aspiration precautions  Pulm Dr. Noriega following  ID Dr. Barry following

## 2020-05-20 NOTE — PROGRESS NOTE ADULT - PROBLEM SELECTOR PLAN 3
Check LDH, LFTs, Ferritin, D-Dimer, CRP and procalcitonin  Vit C, D, B12 and zinc supp  isolation precautions

## 2020-05-20 NOTE — PROGRESS NOTE ADULT - ASSESSMENT
64 year old male from [facility] with PMH of dementia and type 2 DM presented with seizure activity while sitting at AL. Endocrinology was consulted for management of uncontrolled type 2 DM with hyperglycemia.    1. Uncontrolled Type 2 DM with hyperglycemia, A1c 9.9%  -BG above goal >200  -plan to change to 24hr tube feedings  -start Lantus 10 units at bedtime  -Increase to Humalog 7 units TID q 6 hrs   -continue moderate dose rapid acting insulin q 6 hrs as below  BG 70-100mg/dL 0 units  -150 mg/dL 0 units  -200 mg/dL 2 units  -250 mg/dL 4 units  -300 mg/dL 6 units  -350 mg/dL 8 units  -400 mg/dL 10 units  BG > 400mg/dL 12 units  -FS AC HS  -ensure consistent carbohydrate   -will monitor FS and adjust accordingly   --if patient is NPO for any reason please change FS and sliding scale to NPO lispro scale     2. Hypernatremia  -resolved, Na 144 today  -continue IVF as per nephrology    3.  Aspiration pneumonia  -oxygen supplementation as needed   -continue antibiotics as per primary team   -pulmonology follow up    Plan discussed with primary team, NP Pat   Please  call  w/ any questions or concerns [phone]

## 2020-05-20 NOTE — PROGRESS NOTE ADULT - SUBJECTIVE AND OBJECTIVE BOX
Neurology Follow up note    Name  JAZIEL LOPEZ    HPI:  64 year old male from [facility]  with PMH dementia and DM coming in with seizures like activity while sitting at AL. pt unable to provide further history (poor historian) . denies all complaints at this time. NKDA   In ED :   EKG : Sinus rythm with PVCs   Afebrile, no WBC elevation  normal electrolytes and renal functions   Lactate 1.7  CT head : unremarkable CT study of the brain. No acute abnormality suggested.  CXR : Clear lungs   no Leukocytosis  lymphocyte; WNl   pt is afebrile  O2 Sat on RA 98%     NEURO HPI:  64 year old male from [facility] with PMH dementia and DM coming in with seizures like activity while sitting at AL. Description of the seizure is not available.  It is unclear if the patient has had prior seizures as patient is unable to provide further history (poor historian) . Denies all complaints at this time.    Interval History -  No clinical seizure activity has been documented since the Neurology consult evaluation on 5/8/20.	    MEDICATIONS  (STANDING):  dextrose 5% with potassium chloride 20 mEq/L 1000 milliLiter(s) (100 mL/Hr) IV Continuous <Continuous>  dextrose 50% Injectable 12.5 Gram(s) IV Push once  dextrose 50% Injectable 25 Gram(s) IV Push once  dextrose 50% Injectable 25 Gram(s) IV Push once  ergocalciferol 28728 Unit(s) Oral every week  heparin   Injectable 5000 Unit(s) SubCutaneous every 12 hours  insulin lispro (HumaLOG) corrective regimen sliding scale   SubCutaneous every 6 hours  insulin lispro Injectable (HumaLOG) 5 Unit(s) SubCutaneous every 6 hours  lactulose Syrup 10 Gram(s) Oral three times a day  mirtazapine 7.5 milliGRAM(s) Oral at bedtime  multivitamin/minerals 1 Tablet(s) Oral daily  OLANZapine 2.5 milliGRAM(s) Oral at bedtime  pantoprazole  Injectable 40 milliGRAM(s) IV Push daily  piperacillin/tazobactam IVPB.. 3.375 Gram(s) IV Intermittent every 8 hours  QUEtiapine 25 milliGRAM(s) Oral at bedtime  scopolamine   Patch 1 Patch Transdermal every 72 hours    MEDICATIONS  (PRN):  acetaminophen  Suppository .. 650 milliGRAM(s) Rectal every 6 hours PRN Temp greater or equal to 38C (100.4F)  ALBUTerol    90 MICROgram(s) HFA Inhaler 2 Puff(s) Inhalation every 6 hours PRN Shortness of Breath and/or Wheezing  dextrose 40% Gel 15 Gram(s) Oral once PRN Blood Glucose LESS THAN 70 milliGRAM(s)/deciliter  glucagon  Injectable 1 milliGRAM(s) IntraMuscular once PRN Glucose LESS THAN 70 milligrams/deciliter  guaiFENesin   Syrup  (Sugar-Free) 100 milliGRAM(s) Oral every 6 hours PRN Cough    Allergies  No Known Allergies    Review of Systems: Fourteen systems reviewed and negative or unobtainable except as in HPI / Interval History      Objective:   Vital Signs Last 24 Hrs  T(C): 36.4 (20 May 2020 05:39), Max: 36.6 (19 May 2020 12:35)  T(F): 97.5 (20 May 2020 05:39), Max: 97.9 (19 May 2020 12:35)  HR: 70 (20 May 2020 05:39) (63 - 83)  BP: 114/75 (20 May 2020 05:39) (102/63 - 114/75)  RR: 17 (20 May 2020 05:39) (17 - 20)  SpO2: 95% (20 May 2020 05:39) (95% - 97%)    Neuro exam is limited due to poor patient cooperation. Global aphasia, Hypomimia, Mild rigidity in b/l UE, Moves all 4 extremities in plane of bed, Withdraws all 4 extremities to pinprick    General Exam:  General: Awake but unresponsive to commands, No apparent acute distress  Respiratory: Diminished breath sounds b/l  Cardiovascular: RRR, Full b/l radial and pedal pulses    Neurological Exam:  General / Mental Status: Nonverbal, Does not follow commands.  Neurological exam is limited.  Cranial Nerves: PERRL, Blink to threat present b/l, Does not track finger movements with eyes b/l.  Blinks in response to pinprick at b/l V1-V3.  No response to snap at b/l ears.  No apparent facial asymmetry.  Midline palate and tongue.  No resistance to passive motion of neck b/l.  Motor: Diminished bulk and tone in all four extremities.  Rigidity present in left upper and left lower extremities; no rigidity present in right upper and right lower extremities.  Moves all four extremities weakly in the plane of the bed, but does not participate in formal muscle strength testing.  Sensation: Withdraws weakly to nailbed pressure in all four extremities.  Reflexes: 1+ and symmetric at b/l biceps, triceps, and brachioradialis.  1+ at right patella and ankle; absent at left patella and ankle.  Toes upgoing on right, mute on left.  Unable to assess coordination, Romberg sign, or gait in minimally responsive patient.      Labs:    05-20    144  |  113<H>  |  29<H>  ----------------------------<  214<H>  4.2   |  27  |  1.18    Ca    8.6      20 May 2020 06:04  Phos  2.4     05-19    TPro  6.8  /  Alb  2.0<L>  /  TBili  0.6  /  DBili  x   /  AST  41<H>  /  ALT  39  /  AlkPhos  98  05-20    Vitamin B12, Serum in AM (05.02.20 @ 10:36)    Vitamin B12, Serum: 378 pg/mL    Folate, Serum (05.01.20 @ 13:56)    Folate, Serum: 19.2 ng/mL    Thyroid Stimulating Hormone, Serum (05.01.20 @ 09:36)    Thyroid Stimulating Hormone, Serum: 2.39 uU/mL      Neuroimaging:    CT Head (5/1/0):  - No acute intracranial abnormality  - Mild chronic microvascular disease  - Mild central atrophy      Assessment:  64M with convulsive syncope, less likely new-onset seizure, in setting of COVID-19 infection and early-onset Alzheimer's dementia      Recommendations:    - No indication for antiepileptic medications now.       - If new clinical seizure occurs, treat with lorazepam 2-4mg IV x 1, then start valproate to cover for seizures and behavioral disturbances       - If valproate needs to be started, administer 20-40 mg/kg IV x 1, then valproate 10-20 mg/kg IV BID for maintenance, and check valproic acid level after the second dose    - Follow Psychiatry consult recommendations regarding dosing of antipsychotic medications; it is appreciated that the medication regimen has been simplified to include quetiapine and avoid haloperidol to minimize the risk for developing extrapyramidal symptoms given the presence of left-sided rigidity    - Plentiful fluid hydration and caution with position changes    - PT/OT as tolerated      Please contact the Neurology consult service with any questions.    Melchor Ford MD   of Neurology  [facility] [employer] at [facility] Neurology Follow up note    Name  JAZIEL LOPEZ    HPI:  64 year old male from [facility]  with PMH dementia and DM coming in with seizures like activity while sitting at AL. pt unable to provide further history (poor historian) . denies all complaints at this time. NKDA   In ED :   EKG : Sinus rythm with PVCs   Afebrile, no WBC elevation  normal electrolytes and renal functions   Lactate 1.7  CT head : unremarkable CT study of the brain. No acute abnormality suggested.  CXR : Clear lungs   no Leukocytosis  lymphocyte; WNl   pt is afebrile  O2 Sat on RA 98%     NEURO HPI:  64 year old male from [facility] with PMH dementia and DM coming in with seizures like activity while sitting at AL. Description of the seizure is not available.  It is unclear if the patient has had prior seizures as patient is unable to provide further history (poor historian) . Denies all complaints at this time.    Interval History -  No clinical seizure activity has been documented since the Neurology consult evaluation on 5/8/20.	    MEDICATIONS  (STANDING):  dextrose 5% with potassium chloride 20 mEq/L 1000 milliLiter(s) (100 mL/Hr) IV Continuous <Continuous>  dextrose 50% Injectable 12.5 Gram(s) IV Push once  dextrose 50% Injectable 25 Gram(s) IV Push once  dextrose 50% Injectable 25 Gram(s) IV Push once  ergocalciferol 39541 Unit(s) Oral every week  heparin   Injectable 5000 Unit(s) SubCutaneous every 12 hours  insulin lispro (HumaLOG) corrective regimen sliding scale   SubCutaneous every 6 hours  insulin lispro Injectable (HumaLOG) 5 Unit(s) SubCutaneous every 6 hours  lactulose Syrup 10 Gram(s) Oral three times a day  mirtazapine 7.5 milliGRAM(s) Oral at bedtime  multivitamin/minerals 1 Tablet(s) Oral daily  OLANZapine 2.5 milliGRAM(s) Oral at bedtime  pantoprazole  Injectable 40 milliGRAM(s) IV Push daily  piperacillin/tazobactam IVPB.. 3.375 Gram(s) IV Intermittent every 8 hours  QUEtiapine 25 milliGRAM(s) Oral at bedtime  scopolamine   Patch 1 Patch Transdermal every 72 hours    MEDICATIONS  (PRN):  acetaminophen  Suppository .. 650 milliGRAM(s) Rectal every 6 hours PRN Temp greater or equal to 38C (100.4F)  ALBUTerol    90 MICROgram(s) HFA Inhaler 2 Puff(s) Inhalation every 6 hours PRN Shortness of Breath and/or Wheezing  dextrose 40% Gel 15 Gram(s) Oral once PRN Blood Glucose LESS THAN 70 milliGRAM(s)/deciliter  glucagon  Injectable 1 milliGRAM(s) IntraMuscular once PRN Glucose LESS THAN 70 milligrams/deciliter  guaiFENesin   Syrup  (Sugar-Free) 100 milliGRAM(s) Oral every 6 hours PRN Cough    Allergies  No Known Allergies    Review of Systems: Fourteen systems reviewed and negative or unobtainable except as in HPI / Interval History      Objective:   Vital Signs Last 24 Hrs  T(C): 36.4 (20 May 2020 05:39), Max: 36.6 (19 May 2020 12:35)  T(F): 97.5 (20 May 2020 05:39), Max: 97.9 (19 May 2020 12:35)  HR: 70 (20 May 2020 05:39) (63 - 83)  BP: 114/75 (20 May 2020 05:39) (102/63 - 114/75)  RR: 17 (20 May 2020 05:39) (17 - 20)  SpO2: 95% (20 May 2020 05:39) (95% - 97%)    General Exam:  General: Awake but unresponsive to commands, No apparent acute distress  Respiratory: Diminished breath sounds b/l  Cardiovascular: RRR, Full b/l radial and pedal pulses    Neurological Exam:  General / Mental Status: Nonverbal, Does not follow commands.  Neurological exam is limited.  Cranial Nerves: PERRL, Blink to threat present b/l, Does not track finger movements with eyes b/l.  Blinks in response to pinprick at b/l V1-V3.  No response to snap at b/l ears.  Hypomimia present, although with no apparent facial asymmetry.  Midline palate and tongue.  No resistance to passive motion of neck b/l.  Motor: Diminished bulk and tone in all four extremities.  Rigidity present in left upper and left lower extremities; no rigidity present in right upper and right lower extremities.  Moves all four extremities weakly in the plane of the bed, but does not participate in formal muscle strength testing.  Sensation: Withdraws weakly to nailbed pressure in all four extremities.  Reflexes: 1+ and symmetric at b/l biceps, triceps, and brachioradialis.  1+ at right patella and ankle; absent at left patella and ankle.  Toes upgoing on right, mute on left.  Unable to assess coordination, Romberg sign, or gait in minimally responsive patient.      Labs:    05-20    144  |  113<H>  |  29<H>  ----------------------------<  214<H>  4.2   |  27  |  1.18    Ca    8.6      20 May 2020 06:04  Phos  2.4     05-19    TPro  6.8  /  Alb  2.0<L>  /  TBili  0.6  /  DBili  x   /  AST  41<H>  /  ALT  39  /  AlkPhos  98  05-20    Vitamin B12, Serum in AM (05.02.20 @ 10:36)    Vitamin B12, Serum: 378 pg/mL    Folate, Serum (05.01.20 @ 13:56)    Folate, Serum: 19.2 ng/mL    Thyroid Stimulating Hormone, Serum (05.01.20 @ 09:36)    Thyroid Stimulating Hormone, Serum: 2.39 uU/mL      Neuroimaging:    CT Head (5/1/0):  - No acute intracranial abnormality  - Mild chronic microvascular disease  - Mild central atrophy      Assessment:  64M with convulsive syncope, less likely new-onset seizure, in setting of COVID-19 infection and early-onset Alzheimer's dementia; also with possible drug-induced parkinsonism with antipsychotic medication use      Recommendations:    - No indication for antiepileptic medications now.       - If new clinical seizure occurs, treat with lorazepam 2-4mg IV x 1, then start valproate to cover for seizures and behavioral disturbances       - If valproate needs to be started, administer 20-40 mg/kg IV x 1, then valproate 10-20 mg/kg IV BID for maintenance, and check valproic acid level after the second dose    - Continue to follow Psychiatry consult recommendations regarding dosing of antipsychotic medications       - It is appreciated that the medication regimen has been simplified to include quetiapine and avoid haloperidol to minimize the risk for developing extrapyramidal symptoms, given the presence of parkinsonian features on neurological examination    - Plentiful fluid hydration and caution with position changes    - PT/OT as tolerated      Please contact the Neurology consult service with any questions.    Melchor Ford MD   of Neurology  [facility] [employer] [facility]

## 2020-05-20 NOTE — PROGRESS NOTE ADULT - ASSESSMENT
64 yr old male with PMHX of DM,dementia sent to ER from facility for seizure and now COVID+,Delirium,MAXIME,hypernatremia and suspected aspiration pneumonia.  1.MAXIME and hypernatremia-resolved.  2.COVID +supportive treatment.  3.Fever and aspiration pneumonia-zosyn, ID f/u.  4.DM-Insulin, Endo f/u.  5.Delirium-Psych f/u.  6.NGT placed-TF.  7.Replace vit d.  8.Elevated ammonia-lactulose.  9.GI and DVT prophylaxis.

## 2020-05-20 NOTE — PROGRESS NOTE ADULT - SUBJECTIVE AND OBJECTIVE BOX
Patient is a 64y old  Male who presents with a chief complaint of seizures (20 May 2020 10:32)  Awake, not alert, laying in bed in NAD    INTERVAL HPI/OVERNIGHT EVENTS:      VITAL SIGNS:  T(F): 97.5 (05-20-20 @ 05:39)  HR: 70 (05-20-20 @ 05:39)  BP: 114/75 (05-20-20 @ 05:39)  RR: 17 (05-20-20 @ 05:39)  SpO2: 95% (05-20-20 @ 05:39)  Wt(kg): --  I&O's Detail    19 May 2020 07:01  -  20 May 2020 07:00  --------------------------------------------------------  IN:  Total IN: 0 mL    OUT:    Indwelling Catheter - Urethral: 1050 mL  Total OUT: 1050 mL    Total NET: -1050 mL              REVIEW OF SYSTEMS:    CONSTITUTIONAL:  No fevers, chills, sweats    HEENT:  Eyes:  No diplopia or blurred vision. ENT:  No earache, sore throat or runny nose.    CARDIOVASCULAR:  No pressure, squeezing, tightness, or heaviness about the chest; no palpitations.    RESPIRATORY:  Per HPI    GASTROINTESTINAL:  No abdominal pain, nausea, vomiting or diarrhea.    GENITOURINARY:  No dysuria, frequency or urgency.    NEUROLOGIC:  No paresthesias, fasciculations, seizures or weakness.    PSYCHIATRIC:  No disorder of thought or mood.      PHYSICAL EXAM:    Constitutional: Well developed and nourished  Eyes:Perrla  ENMT: normal  Neck:supple  Respiratory: good air entry  Cardiovascular: S1 S2 regular  Gastrointestinal: Soft, Non tender  Extremities: No edema  Vascular:normal  Neurological:Awake, not alert, confused  Musculoskeletal:Normal      MEDICATIONS  (STANDING):  dextrose 5% with potassium chloride 20 mEq/L 1000 milliLiter(s) (100 mL/Hr) IV Continuous <Continuous>  dextrose 50% Injectable 12.5 Gram(s) IV Push once  dextrose 50% Injectable 25 Gram(s) IV Push once  dextrose 50% Injectable 25 Gram(s) IV Push once  ergocalciferol 39010 Unit(s) Oral every week  heparin   Injectable 5000 Unit(s) SubCutaneous every 12 hours  insulin lispro (HumaLOG) corrective regimen sliding scale   SubCutaneous every 6 hours  insulin lispro Injectable (HumaLOG) 5 Unit(s) SubCutaneous every 6 hours  lactulose Syrup 10 Gram(s) Oral three times a day  mirtazapine 7.5 milliGRAM(s) Oral at bedtime  multivitamin/minerals 1 Tablet(s) Oral daily  OLANZapine 2.5 milliGRAM(s) Oral at bedtime  pantoprazole  Injectable 40 milliGRAM(s) IV Push daily  piperacillin/tazobactam IVPB.. 3.375 Gram(s) IV Intermittent every 8 hours  QUEtiapine 25 milliGRAM(s) Oral at bedtime  scopolamine   Patch 1 Patch Transdermal every 72 hours    MEDICATIONS  (PRN):  acetaminophen  Suppository .. 650 milliGRAM(s) Rectal every 6 hours PRN Temp greater or equal to 38C (100.4F)  ALBUTerol    90 MICROgram(s) HFA Inhaler 2 Puff(s) Inhalation every 6 hours PRN Shortness of Breath and/or Wheezing  dextrose 40% Gel 15 Gram(s) Oral once PRN Blood Glucose LESS THAN 70 milliGRAM(s)/deciliter  glucagon  Injectable 1 milliGRAM(s) IntraMuscular once PRN Glucose LESS THAN 70 milligrams/deciliter  guaiFENesin   Syrup  (Sugar-Free) 100 milliGRAM(s) Oral every 6 hours PRN Cough      Allergies    No Known Allergies    Intolerances        LABS:                        12.7   13.46 )-----------( 185      ( 20 May 2020 06:04 )             37.4     05-20    144  |  113<H>  |  29<H>  ----------------------------<  214<H>  4.2   |  27  |  1.18    Ca    8.6      20 May 2020 06:04  Phos  2.4     05-19    TPro  6.8  /  Alb  2.0<L>  /  TBili  0.6  /  DBili  x   /  AST  41<H>  /  ALT  39  /  AlkPhos  98  05-20              CAPILLARY BLOOD GLUCOSECOVID-19 PCR . (05.12.20 @ 12:13)    COVID-19 PCR: Detected: This test has been validated by [facility] to be accurate;  though it has not been FDA cleared/approved by the usual pathway.  As with all laboratory tests, results should be correlated with clinical  findings.  https://www.fda.gov/media/982741/download  https://www.fda.gov/media/457967/download          POCT Blood Glucose.: 212 mg/dL (20 May 2020 05:26)  POCT Blood Glucose.: 199 mg/dL (19 May 2020 23:52)  POCT Blood Glucose.: 242 mg/dL (19 May 2020 17:33)  POCT Blood Glucose.: 226 mg/dL (19 May 2020 12:20)    pro-bnp -- 05-19 @ 06:41     d-dimer 375  05-19 @ 06:41  pro-bnp -- 05-14 @ 17:59     d-dimer 196  05-14 @ 17:59      RADIOLOGY & ADDITIONAL TESTS:    CXR:  < from: Xray Chest 1 View- PORTABLE-Routine (05.17.20 @ 20:04) >  IMPRESSION:    Persistent left lower lobe infiltrate.    Feeding tube in satisfactory position.        < end of copied text >    Ct scan chest:    ekg;    echo:

## 2020-05-20 NOTE — CHART NOTE - NSCHARTNOTEFT_GEN_A_CORE
Assessment:   64yMalePatient is a 64y old  Male who presents with a chief complaint of seizures (20 May 2020 13:09)  Pt on Airborne isolation.  Pt w Covid +. Pt W NG TF ON Nepro @ 40 ml /hr x 24 hr as tolerated to provide 960 ml, 1728 calories, protein 78 gms, free water 698 ml/day. D/W RN Pt with increased Residual so Infusing @ 10 ml/hr. Per RN Reglan Ordered.  Bilateral  Mittens Noted. Meds. D/W NP Pt Lethargic  so NG tube inserted.  Nepro Consult Noted. Labs noted Alb 2.0, Phos 2.4.      Factors impacting intake: [ ] none [ ] nausea  [ ] vomiting [ ] diarrhea [ ] constipation  [ ]chewing problems [ ] swallowing issues  [ ] other:     Diet Prescription: Diet, NPO with Tube Feed:   Tube Feeding Modality: Nasogastric  Nepro with Carb Steady  Total Volume for 24 Hours (mL): 960  Continuous  Starting Tube Feed Rate {mL per Hour}: 20  Increase Tube Feed Rate by (mL): 10     Every 8 hours  Until Goal Tube Feed Rate (mL per Hour): 40  Tube Feed Duration (in Hours): 24  Tube Feed Start Time: 17:00 (05-17-20 @ 13:24)    Intake: NPO     Current Weight:   % Weight Change    Pertinent Medications: MEDICATIONS  (STANDING):  dextrose 5%. 1000 milliLiter(s) (60 mL/Hr) IV Continuous <Continuous>  dextrose 50% Injectable 12.5 Gram(s) IV Push once  dextrose 50% Injectable 25 Gram(s) IV Push once  dextrose 50% Injectable 25 Gram(s) IV Push once  ergocalciferol 61743 Unit(s) Oral every week  heparin   Injectable 5000 Unit(s) SubCutaneous every 12 hours  insulin glargine Injectable (LANTUS) 10 Unit(s) SubCutaneous at bedtime  insulin lispro (HumaLOG) corrective regimen sliding scale   SubCutaneous every 6 hours  insulin lispro Injectable (HumaLOG) 7 Unit(s) SubCutaneous every 6 hours  lactulose Syrup 10 Gram(s) Oral three times a day  metoclopramide Injectable 10 milliGRAM(s) IV Push every 8 hours  mirtazapine 7.5 milliGRAM(s) Oral at bedtime  multivitamin/minerals 1 Tablet(s) Oral daily  OLANZapine 2.5 milliGRAM(s) Oral at bedtime  pantoprazole  Injectable 40 milliGRAM(s) IV Push daily  piperacillin/tazobactam IVPB.. 3.375 Gram(s) IV Intermittent every 8 hours  QUEtiapine 25 milliGRAM(s) Oral at bedtime  scopolamine   Patch 1 Patch Transdermal every 72 hours    MEDICATIONS  (PRN):  acetaminophen  Suppository .. 650 milliGRAM(s) Rectal every 6 hours PRN Temp greater or equal to 38C (100.4F)  ALBUTerol    90 MICROgram(s) HFA Inhaler 2 Puff(s) Inhalation every 6 hours PRN Shortness of Breath and/or Wheezing  dextrose 40% Gel 15 Gram(s) Oral once PRN Blood Glucose LESS THAN 70 milliGRAM(s)/deciliter  glucagon  Injectable 1 milliGRAM(s) IntraMuscular once PRN Glucose LESS THAN 70 milligrams/deciliter  guaiFENesin   Syrup  (Sugar-Free) 100 milliGRAM(s) Oral every 6 hours PRN Cough    Pertinent Labs: 05-20 Na144 mmol/L Glu 214 mg/dL<H> K+ 4.2 mmol/L Cr  1.18 mg/dL BUN 29 mg/dL<H> 05-19 Phos 2.4 mg/dL<L> 05-20 Alb 2.0 g/dL<L> 05-01 Chol 161 mg/dL LDL 95 mg/dL HDL 35 mg/dL<L> Trig 153 mg/dL<H>     CAPILLARY BLOOD GLUCOSE      POCT Blood Glucose.: 243 mg/dL (20 May 2020 11:35)  POCT Blood Glucose.: 212 mg/dL (20 May 2020 05:26)  POCT Blood Glucose.: 199 mg/dL (19 May 2020 23:52)  POCT Blood Glucose.: 242 mg/dL (19 May 2020 17:33)    Skin:     Estimated Needs:   [ ] no change since previous assessment  [ ] recalculated:     Previous Nutrition Diagnosis:   [ ] Inadequate Energy Intake [ x]Inadequate Oral Intake [ ] Excessive Energy Intake   [ ] Underweight [ ] Increased Nutrient Needs [ ] Overweight/Obesity   [ ] Altered GI Function [ ] Unintended Weight Loss [ ] Food & Nutrition Related Knowledge Deficit [ ] Malnutrition     Nutrition Diagnosis is [xx ] ongoing  [ ] resolved [ ] not applicable     New Nutrition Diagnosis: [ ] not applicable       Interventions:   Recommend  [ ] Change Diet To:  [ ] Nutrition Supplement  [x ] Nutrition Support  Nepro initial Goal rate @ 40 ml/hr x 24 hr as tolerated.  [x ] Other: D/W RN     Monitoring and Evaluation:   [ ] PO intake [ x ] Tolerance to diet prescription [ x ] weights [ x ] labs[ x ] follow up per protocol  [ ] other:

## 2020-05-20 NOTE — PROGRESS NOTE ADULT - PROBLEM SELECTOR PLAN 6
due to sepsis, dehydration  Resolved  SCr 1.18 today  c/w IVF until pt tolerates increased tube feeds   Avoid nephrotoxins, NSAIDs  Renal Dr. Carias following

## 2020-05-21 DIAGNOSIS — R13.10 DYSPHAGIA, UNSPECIFIED: ICD-10-CM

## 2020-05-21 LAB
ANION GAP SERPL CALC-SCNC: 5 MMOL/L — SIGNIFICANT CHANGE UP (ref 5–17)
BUN SERPL-MCNC: 18 MG/DL — SIGNIFICANT CHANGE UP (ref 7–18)
CALCIUM SERPL-MCNC: 8.5 MG/DL — SIGNIFICANT CHANGE UP (ref 8.4–10.5)
CHLORIDE SERPL-SCNC: 110 MMOL/L — HIGH (ref 96–108)
CO2 SERPL-SCNC: 28 MMOL/L — SIGNIFICANT CHANGE UP (ref 22–31)
CREAT SERPL-MCNC: 1.1 MG/DL — SIGNIFICANT CHANGE UP (ref 0.5–1.3)
CRP SERPL-MCNC: 7.57 MG/DL — HIGH (ref 0–0.4)
D DIMER BLD IA.RAPID-MCNC: 483 NG/ML DDU — HIGH
FERRITIN SERPL-MCNC: 1789 NG/ML — HIGH (ref 30–400)
GLUCOSE BLDC GLUCOMTR-MCNC: 175 MG/DL — HIGH (ref 70–99)
GLUCOSE BLDC GLUCOMTR-MCNC: 188 MG/DL — HIGH (ref 70–99)
GLUCOSE BLDC GLUCOMTR-MCNC: 261 MG/DL — HIGH (ref 70–99)
GLUCOSE SERPL-MCNC: 201 MG/DL — HIGH (ref 70–99)
HCT VFR BLD CALC: 37.2 % — LOW (ref 39–50)
HGB BLD-MCNC: 12.8 G/DL — LOW (ref 13–17)
MCHC RBC-ENTMCNC: 28.4 PG — SIGNIFICANT CHANGE UP (ref 27–34)
MCHC RBC-ENTMCNC: 34.4 GM/DL — SIGNIFICANT CHANGE UP (ref 32–36)
MCV RBC AUTO: 82.7 FL — SIGNIFICANT CHANGE UP (ref 80–100)
NRBC # BLD: 0 /100 WBCS — SIGNIFICANT CHANGE UP (ref 0–0)
PLATELET # BLD AUTO: 237 K/UL — SIGNIFICANT CHANGE UP (ref 150–400)
POTASSIUM SERPL-MCNC: 3.5 MMOL/L — SIGNIFICANT CHANGE UP (ref 3.5–5.3)
POTASSIUM SERPL-SCNC: 3.5 MMOL/L — SIGNIFICANT CHANGE UP (ref 3.5–5.3)
RBC # BLD: 4.5 M/UL — SIGNIFICANT CHANGE UP (ref 4.2–5.8)
RBC # FLD: 11.9 % — SIGNIFICANT CHANGE UP (ref 10.3–14.5)
SODIUM SERPL-SCNC: 143 MMOL/L — SIGNIFICANT CHANGE UP (ref 135–145)
WBC # BLD: 10.68 K/UL — HIGH (ref 3.8–10.5)
WBC # FLD AUTO: 10.68 K/UL — HIGH (ref 3.8–10.5)

## 2020-05-21 PROCEDURE — 99232 SBSQ HOSP IP/OBS MODERATE 35: CPT

## 2020-05-21 RX ORDER — SODIUM CHLORIDE 9 MG/ML
1000 INJECTION, SOLUTION INTRAVENOUS
Refills: 0 | Status: DISCONTINUED | OUTPATIENT
Start: 2020-05-21 | End: 2020-05-22

## 2020-05-21 RX ORDER — INSULIN GLARGINE 100 [IU]/ML
12 INJECTION, SOLUTION SUBCUTANEOUS AT BEDTIME
Refills: 0 | Status: DISCONTINUED | OUTPATIENT
Start: 2020-05-21 | End: 2020-05-23

## 2020-05-21 RX ADMIN — LACTULOSE 10 GRAM(S): 10 SOLUTION ORAL at 05:08

## 2020-05-21 RX ADMIN — Medication 2: at 17:29

## 2020-05-21 RX ADMIN — PIPERACILLIN AND TAZOBACTAM 25 GRAM(S): 4; .5 INJECTION, POWDER, LYOPHILIZED, FOR SOLUTION INTRAVENOUS at 05:09

## 2020-05-21 RX ADMIN — Medication 2: at 05:30

## 2020-05-21 RX ADMIN — PIPERACILLIN AND TAZOBACTAM 25 GRAM(S): 4; .5 INJECTION, POWDER, LYOPHILIZED, FOR SOLUTION INTRAVENOUS at 21:16

## 2020-05-21 RX ADMIN — Medication 7 UNIT(S): at 17:29

## 2020-05-21 RX ADMIN — OLANZAPINE 2.5 MILLIGRAM(S): 15 TABLET, FILM COATED ORAL at 21:17

## 2020-05-21 RX ADMIN — Medication 10 MILLIGRAM(S): at 05:09

## 2020-05-21 RX ADMIN — PIPERACILLIN AND TAZOBACTAM 25 GRAM(S): 4; .5 INJECTION, POWDER, LYOPHILIZED, FOR SOLUTION INTRAVENOUS at 13:41

## 2020-05-21 RX ADMIN — SCOPALAMINE 1 PATCH: 1 PATCH, EXTENDED RELEASE TRANSDERMAL at 17:03

## 2020-05-21 RX ADMIN — SCOPALAMINE 1 PATCH: 1 PATCH, EXTENDED RELEASE TRANSDERMAL at 17:02

## 2020-05-21 RX ADMIN — LACTULOSE 10 GRAM(S): 10 SOLUTION ORAL at 21:16

## 2020-05-21 RX ADMIN — SCOPALAMINE 1 PATCH: 1 PATCH, EXTENDED RELEASE TRANSDERMAL at 18:00

## 2020-05-21 RX ADMIN — Medication 7 UNIT(S): at 11:49

## 2020-05-21 RX ADMIN — MIRTAZAPINE 7.5 MILLIGRAM(S): 45 TABLET, ORALLY DISINTEGRATING ORAL at 21:17

## 2020-05-21 RX ADMIN — Medication 10 MILLIGRAM(S): at 13:40

## 2020-05-21 RX ADMIN — Medication 1 TABLET(S): at 11:50

## 2020-05-21 RX ADMIN — HEPARIN SODIUM 5000 UNIT(S): 5000 INJECTION INTRAVENOUS; SUBCUTANEOUS at 17:02

## 2020-05-21 RX ADMIN — Medication 6: at 11:48

## 2020-05-21 RX ADMIN — SCOPALAMINE 1 PATCH: 1 PATCH, EXTENDED RELEASE TRANSDERMAL at 07:59

## 2020-05-21 RX ADMIN — Medication 7 UNIT(S): at 05:30

## 2020-05-21 RX ADMIN — QUETIAPINE FUMARATE 25 MILLIGRAM(S): 200 TABLET, FILM COATED ORAL at 21:17

## 2020-05-21 RX ADMIN — PANTOPRAZOLE SODIUM 40 MILLIGRAM(S): 20 TABLET, DELAYED RELEASE ORAL at 11:49

## 2020-05-21 RX ADMIN — LACTULOSE 10 GRAM(S): 10 SOLUTION ORAL at 13:38

## 2020-05-21 RX ADMIN — HEPARIN SODIUM 5000 UNIT(S): 5000 INJECTION INTRAVENOUS; SUBCUTANEOUS at 05:08

## 2020-05-21 NOTE — PROGRESS NOTE ADULT - ASSESSMENT
64 yr old male with PMHX of DM,dementia sent to ER from facility for seizure and now COVID+,Delirium,MAXIME,hypernatremia and suspected aspiration pneumonia.  1.Speech and swallow eval.  2.COVID +supportive treatment.  3.Fever and aspiration pneumonia-zosyn, ID f/u.  4.DM-Insulin, Endo f/u.  5.Delirium-Psych f/u.  6.NGT placed-TF.  7.Replace vit d.  8.Elevated ammonia-lactulose.  9.GI and DVT prophylaxis.

## 2020-05-21 NOTE — PROGRESS NOTE ADULT - PROBLEM SELECTOR PLAN 8
due to poor po intake, dehydration  Resolved  Na+ 143 today  Free water reduced to 300ml q6h  Renal Dr. Carias following

## 2020-05-21 NOTE — PROGRESS NOTE ADULT - ASSESSMENT
64 year old male from [facility] with PMH of dementia and type 2 DM presented with seizure activity while sitting at AL. Endocrinology was consulted for management of uncontrolled type 2 DM with hyperglycemia.    1. Uncontrolled Type 2 DM with hyperglycemia, A1c 9.9%  -BG better controlled  -patient on tube feedings 10cc/hr  -Increase to Lantus 12 units at bedtime  -continue Humalog 7 units TID q 6 hrs   -continue moderate dose rapid acting insulin q 6 hrs as below  BG 70-100mg/dL 0 units  -150 mg/dL 0 units  -200 mg/dL 2 units  -250 mg/dL 4 units  -300 mg/dL 6 units  -350 mg/dL 8 units  -400 mg/dL 10 units  BG > 400mg/dL 12 units  -FS AC HS  -ensure consistent carbohydrate   -will monitor FS and adjust accordingly   --if patient is NPO for any reason please change FS and sliding scale to NPO lispro scale     2.  Aspiration pneumonia  -oxygen supplementation as needed   -continue antibiotics as per primary team   -pulmonology follow up    3. Delirium  -patient mostly lethargic with periods of agitation  -psychiatry follow up    Plan discussed with primary team]  Please  call  w/ any questions or concerns [phone]

## 2020-05-21 NOTE — PROGRESS NOTE ADULT - SUBJECTIVE AND OBJECTIVE BOX
Pt is arousable, sleeps often. Lying in bed with mitten restraints and NGT in place. On 1:1 observation.     INTERVAL HPI/OVERNIGHT EVENTS:      VITAL SIGNS:  T(F): 98.1 (05-21-20 @ 05:30)  HR: 89 (05-21-20 @ 05:30)  BP: 126/49 (05-21-20 @ 05:30)  RR: 18 (05-21-20 @ 05:30)  SpO2: 98% (05-21-20 @ 05:30)  Wt(kg): --  I&O's Detail    20 May 2020 07:01  -  21 May 2020 07:00  --------------------------------------------------------  IN:  Total IN: 0 mL    OUT:    Indwelling Catheter - Urethral: 1400 mL  Total OUT: 1400 mL    Total NET: -1400 mL    REVIEW OF SYSTEMS:    CONSTITUTIONAL:  No fevers, chills, sweats    HEENT:  Eyes:  No diplopia or blurred vision. ENT:  No earache, sore throat or runny nose.    CARDIOVASCULAR:  No pressure, squeezing, tightness, or heaviness about the chest; no palpitations.    RESPIRATORY:  Per HPI    GASTROINTESTINAL:  No abdominal pain, nausea, vomiting or diarrhea.    GENITOURINARY:  No dysuria, frequency or urgency.    NEUROLOGIC:  No paresthesias, fasciculations, seizures or weakness.    PSYCHIATRIC:  No disorder of thought or mood.      PHYSICAL EXAM:    Constitutional: Well developed  Eyes: Perrla  ENMT: normal  Neck: supple  Respiratory: good air entry  Cardiovascular: S1 S2 regular  Gastrointestinal: Soft, Non tender; NGT in place with tube feeds.   Extremities: No edema  Vascular: normal  Neurological: increased sleeping. agitated at times.   Musculoskeletal: Normal      MEDICATIONS  (STANDING):  dextrose 5%. 1000 milliLiter(s) (40 mL/Hr) IV Continuous <Continuous>  dextrose 50% Injectable 12.5 Gram(s) IV Push once  dextrose 50% Injectable 25 Gram(s) IV Push once  dextrose 50% Injectable 25 Gram(s) IV Push once  ergocalciferol 89660 Unit(s) Oral every week  heparin   Injectable 5000 Unit(s) SubCutaneous every 12 hours  insulin glargine Injectable (LANTUS) 12 Unit(s) SubCutaneous at bedtime  insulin lispro (HumaLOG) corrective regimen sliding scale   SubCutaneous every 6 hours  insulin lispro Injectable (HumaLOG) 7 Unit(s) SubCutaneous every 6 hours  lactulose Syrup 10 Gram(s) Oral three times a day  metoclopramide Injectable 10 milliGRAM(s) IV Push every 8 hours  mirtazapine 7.5 milliGRAM(s) Oral at bedtime  multivitamin/minerals 1 Tablet(s) Oral daily  OLANZapine 2.5 milliGRAM(s) Oral at bedtime  pantoprazole  Injectable 40 milliGRAM(s) IV Push daily  piperacillin/tazobactam IVPB.. 3.375 Gram(s) IV Intermittent every 8 hours  QUEtiapine 25 milliGRAM(s) Oral at bedtime  scopolamine   Patch 1 Patch Transdermal every 72 hours    MEDICATIONS  (PRN):  acetaminophen  Suppository .. 650 milliGRAM(s) Rectal every 6 hours PRN Temp greater or equal to 38C (100.4F)  ALBUTerol    90 MICROgram(s) HFA Inhaler 2 Puff(s) Inhalation every 6 hours PRN Shortness of Breath and/or Wheezing  dextrose 40% Gel 15 Gram(s) Oral once PRN Blood Glucose LESS THAN 70 milliGRAM(s)/deciliter  glucagon  Injectable 1 milliGRAM(s) IntraMuscular once PRN Glucose LESS THAN 70 milligrams/deciliter  guaiFENesin   Syrup  (Sugar-Free) 100 milliGRAM(s) Oral every 6 hours PRN Cough      Allergies    No Known Allergies    Intolerances        LABS:                        12.8   10.68 )-----------( 237      ( 21 May 2020 07:00 )             37.2     05-21    143  |  110<H>  |  18  ----------------------------<  201<H>  3.5   |  28  |  1.10    Ca    8.5      21 May 2020 07:00    TPro  6.8  /  Alb  2.0<L>  /  TBili  0.6  /  DBili  x   /  AST  41<H>  /  ALT  39  /  AlkPhos  98  05-20              CAPILLARY BLOOD GLUCOSE      POCT Blood Glucose.: 175 mg/dL (21 May 2020 05:27)  POCT Blood Glucose.: 184 mg/dL (20 May 2020 23:41)  POCT Blood Glucose.: 194 mg/dL (20 May 2020 17:24)  POCT Blood Glucose.: 243 mg/dL (20 May 2020 11:35)    pro-bnp -- 05-21 @ 07:00     d-dimer 483  05-21 @ 07:00  pro-bnp -- 05-19 @ 06:41     d-dimer 375  05-19 @ 06:41  pro-bnp -- 05-14 @ 17:59     d-dimer 196  05-14 @ 17:59      RADIOLOGY & ADDITIONAL TESTS:    CXR:    Ct scan chest:    ekg;    echo:

## 2020-05-21 NOTE — PROGRESS NOTE ADULT - SUBJECTIVE AND OBJECTIVE BOX
CARDIOLOGY/MEDICINE ATTENDING    CHIEF COMPLAINT:Patient is a 64y old  Male who presents with a chief complaint of seizures. Pt with NGT in place and mittens.    	  REVIEW OF SYSTEMS:    [x ] Unable to obtain    PHYSICAL EXAM:  T(C): 36.7 (05-21-20 @ 05:30), Max: 36.7 (05-21-20 @ 05:30)  HR: 89 (05-21-20 @ 05:30) (66 - 89)  BP: 126/49 (05-21-20 @ 05:30) (107/61 - 126/49)  RR: 18 (05-21-20 @ 05:30) (18 - 20)  SpO2: 98% (05-21-20 @ 05:30) (98% - 99%)  Wt(kg): --  I&O's Summary    20 May 2020 07:01  -  21 May 2020 07:00  --------------------------------------------------------  IN: 0 mL / OUT: 1400 mL / NET: -1400 mL        Appearance: Normal	  HEENT:   Normal oral mucosa, PERRL, EOMI	  Lymphatic: No lymphadenopathy  Cardiovascular: Normal S1 S2, No JVD, No murmurs, No edema  Respiratory:B/L ronchi	  Gastrointestinal:  Soft, Non-tender, + BS	  Skin: No rashes, No ecchymoses, No cyanosis	  Extremities: Normal range of motion, No clubbing, cyanosis or edema  Vascular: Peripheral pulses palpable 2+ bilaterally    MEDICATIONS  (STANDING):  dextrose 5%. 1000 milliLiter(s) (40 mL/Hr) IV Continuous <Continuous>  dextrose 50% Injectable 12.5 Gram(s) IV Push once  dextrose 50% Injectable 25 Gram(s) IV Push once  dextrose 50% Injectable 25 Gram(s) IV Push once  ergocalciferol 36791 Unit(s) Oral every week  heparin   Injectable 5000 Unit(s) SubCutaneous every 12 hours  insulin glargine Injectable (LANTUS) 12 Unit(s) SubCutaneous at bedtime  insulin lispro (HumaLOG) corrective regimen sliding scale   SubCutaneous every 6 hours  insulin lispro Injectable (HumaLOG) 7 Unit(s) SubCutaneous every 6 hours  lactulose Syrup 10 Gram(s) Oral three times a day  metoclopramide Injectable 10 milliGRAM(s) IV Push every 8 hours  mirtazapine 7.5 milliGRAM(s) Oral at bedtime  multivitamin/minerals 1 Tablet(s) Oral daily  OLANZapine 2.5 milliGRAM(s) Oral at bedtime  pantoprazole  Injectable 40 milliGRAM(s) IV Push daily  piperacillin/tazobactam IVPB.. 3.375 Gram(s) IV Intermittent every 8 hours  QUEtiapine 25 milliGRAM(s) Oral at bedtime  scopolamine   Patch 1 Patch Transdermal every 72 hours        	  LABS:	 	                        12.8   10.68 )-----------( 237      ( 21 May 2020 07:00 )             37.2     05-21    143  |  110<H>  |  18  ----------------------------<  201<H>  3.5   |  28  |  1.10    Ca    8.5      21 May 2020 07:00    TPro  6.8  /  Alb  2.0<L>  /  TBili  0.6  /  DBili  x   /  AST  41<H>  /  ALT  39  /  AlkPhos  98  05-20    proBNP: Serum Pro-Brain Natriuretic Peptide: 126 pg/mL (05-01 @ 09:36)    Lipid Profile: Cholesterol 161  LDL 95  HDL 35        TSH: Thyroid Stimulating Hormone, Serum: 2.39 uU/mL (05-01 @ 09:36)

## 2020-05-21 NOTE — PROGRESS NOTE ADULT - SUBJECTIVE AND OBJECTIVE BOX
64 year old male from [facility] with PMH of dementia and type 2 DM presented with seizure activity while sitting at AL. Endocrinology was consulted for management of uncontrolled type 2 DM with hyperglycemia.    Patient seen and examined at bedside. He remains lethargic but responds to verbal stimuli. BG now better controlled, ranging 175-201 on current regimen of Lantus 10 units at bedtime and Humalog 7 units q 6 hrs. Patient back on trickle tube feedings at 10cc/hr.      MEDICATIONS  (STANDING):  dextrose 5%. 1000 milliLiter(s) (40 mL/Hr) IV Continuous <Continuous>  dextrose 50% Injectable 12.5 Gram(s) IV Push once  dextrose 50% Injectable 25 Gram(s) IV Push once  dextrose 50% Injectable 25 Gram(s) IV Push once  ergocalciferol 79411 Unit(s) Oral every week  heparin   Injectable 5000 Unit(s) SubCutaneous every 12 hours  insulin glargine Injectable (LANTUS) 10 Unit(s) SubCutaneous at bedtime  insulin lispro (HumaLOG) corrective regimen sliding scale   SubCutaneous every 6 hours  insulin lispro Injectable (HumaLOG) 7 Unit(s) SubCutaneous every 6 hours  lactulose Syrup 10 Gram(s) Oral three times a day  metoclopramide Injectable 10 milliGRAM(s) IV Push every 8 hours  mirtazapine 7.5 milliGRAM(s) Oral at bedtime  multivitamin/minerals 1 Tablet(s) Oral daily  OLANZapine 2.5 milliGRAM(s) Oral at bedtime  pantoprazole  Injectable 40 milliGRAM(s) IV Push daily  piperacillin/tazobactam IVPB.. 3.375 Gram(s) IV Intermittent every 8 hours  QUEtiapine 25 milliGRAM(s) Oral at bedtime  scopolamine   Patch 1 Patch Transdermal every 72 hours    MEDICATIONS  (PRN):  acetaminophen  Suppository .. 650 milliGRAM(s) Rectal every 6 hours PRN Temp greater or equal to 38C (100.4F)  ALBUTerol    90 MICROgram(s) HFA Inhaler 2 Puff(s) Inhalation every 6 hours PRN Shortness of Breath and/or Wheezing  dextrose 40% Gel 15 Gram(s) Oral once PRN Blood Glucose LESS THAN 70 milliGRAM(s)/deciliter  glucagon  Injectable 1 milliGRAM(s) IntraMuscular once PRN Glucose LESS THAN 70 milligrams/deciliter  guaiFENesin   Syrup  (Sugar-Free) 100 milliGRAM(s) Oral every 6 hours PRN Cough      REVIEW OF SYSTEMS:  unable to obtain due to medical condition    PHYSICAL EXAM:    VITAL SIGNS  VITAL SIGNS  T(C): 36.7 (05-21-20 @ 05:30), Max: 36.7 (05-21-20 @ 05:30)  HR: 89 (05-21-20 @ 05:30) (66 - 89)  BP: 126/49 (05-21-20 @ 05:30) (107/61 - 126/49)  RR: 18 (05-21-20 @ 05:30) (18 - 20)  SpO2: 98% (05-21-20 @ 05:30) (98% - 99%)      GENERAL: more awake tody, NAD, well-developed  HEAD:  Atraumatic, Normocephalic  EYES: EOMI, PERRLA, conjunctiva and sclera clear  ENMT: No tonsillar erythema, exudates, or enlargement; No lesions  NECK: Supple, No JVD, Normal thyroid  NERVOUS SYSTEM:  nonverbal,  CHEST/LUNG: decreased breath sounds, No rales, rhonchi, wheezing, or rubs  HEART: Regular rate and rhythm; No murmurs, rubs, or gallops  ABDOMEN: Soft, Nontender, Nondistended; Bowel sounds present  EXTREMITIES:  2+ Peripheral Pulses, No clubbing, cyanosis, or edema  SKIN: No rashes or lesions      LABS:                        12.8   10.68 )-----------( 237      ( 21 May 2020 07:00 )             37.2     05-21    143  |  110<H>  |  18  ----------------------------<  201<H>  3.5   |  28  |  1.10    Ca    8.5      21 May 2020 07:00    TPro  6.8  /  Alb  2.0<L>  /  TBili  0.6  /  DBili  x   /  AST  41<H>  /  ALT  39  /  AlkPhos  98  05-20        CAPILLARY BLOOD GLUCOSE      POCT Blood Glucose.: 175 mg/dL (21 May 2020 05:27)  POCT Blood Glucose.: 184 mg/dL (20 May 2020 23:41)  POCT Blood Glucose.: 194 mg/dL (20 May 2020 17:24)  POCT Blood Glucose.: 243 mg/dL (20 May 2020 11:35)

## 2020-05-21 NOTE — PROGRESS NOTE ADULT - PROBLEM SELECTOR PLAN 6
Improving  Monitor BG q6h while on NGT feeds (or NPO) and cover with moderate HISS  Lantus 10 Units at HS  Humalog 7 units SC q6h  Hypoglycemia protocol  Endo Dr. Allison following

## 2020-05-21 NOTE — PROGRESS NOTE ADULT - SUBJECTIVE AND OBJECTIVE BOX
Problem List:  MAXIME, pre renal due to dehydration and hypotension.  Poor po intake , started on NG feeds  Hypernatremia  COVID 19  Pneumonia  Dementia    PAST MEDICAL & SURGICAL HISTORY:  Dementia  No significant past surgical history      No Known Allergies      MEDICATIONS  (STANDING):  dextrose 5%. 1000 milliLiter(s) (60 mL/Hr) IV Continuous <Continuous>  dextrose 50% Injectable 12.5 Gram(s) IV Push once  dextrose 50% Injectable 25 Gram(s) IV Push once  dextrose 50% Injectable 25 Gram(s) IV Push once  ergocalciferol 82436 Unit(s) Oral every week  heparin   Injectable 5000 Unit(s) SubCutaneous every 12 hours  insulin glargine Injectable (LANTUS) 10 Unit(s) SubCutaneous at bedtime  insulin lispro (HumaLOG) corrective regimen sliding scale   SubCutaneous every 6 hours  insulin lispro Injectable (HumaLOG) 7 Unit(s) SubCutaneous every 6 hours  lactulose Syrup 10 Gram(s) Oral three times a day  metoclopramide Injectable 10 milliGRAM(s) IV Push every 8 hours  mirtazapine 7.5 milliGRAM(s) Oral at bedtime  multivitamin/minerals 1 Tablet(s) Oral daily  OLANZapine 2.5 milliGRAM(s) Oral at bedtime  pantoprazole  Injectable 40 milliGRAM(s) IV Push daily  piperacillin/tazobactam IVPB.. 3.375 Gram(s) IV Intermittent every 8 hours  QUEtiapine 25 milliGRAM(s) Oral at bedtime  scopolamine   Patch 1 Patch Transdermal every 72 hours    MEDICATIONS  (PRN):  acetaminophen  Suppository .. 650 milliGRAM(s) Rectal every 6 hours PRN Temp greater or equal to 38C (100.4F)  ALBUTerol    90 MICROgram(s) HFA Inhaler 2 Puff(s) Inhalation every 6 hours PRN Shortness of Breath and/or Wheezing  dextrose 40% Gel 15 Gram(s) Oral once PRN Blood Glucose LESS THAN 70 milliGRAM(s)/deciliter  glucagon  Injectable 1 milliGRAM(s) IntraMuscular once PRN Glucose LESS THAN 70 milligrams/deciliter  guaiFENesin   Syrup  (Sugar-Free) 100 milliGRAM(s) Oral every 6 hours PRN Cough                            12.8   10.68 )-----------( 237      ( 21 May 2020 07:00 )             37.2     05-21    143  |  110<H>  |  18  ----------------------------<  201<H>  3.5   |  28  |  1.10    Ca    8.5      21 May 2020 07:00    TPro  6.8  /  Alb  2.0<L>  /  TBili  0.6  /  DBili  x   /  AST  41<H>  /  ALT  39  /  AlkPhos  98  05-20        Osmolality, Random Urine: 892 mos/kg (05-14 @ 18:00)  Creatinine, Random Urine: 143 mg/dL (05-14 @ 17:59)  Sodium, Random Urine: 23 mmol/L (05-14 @ 17:59)  Potassium, Random Urine: 45 mmol/L (05-14 @ 17:59)      REVIEW OF SYSTEMS:  dementia      VITALS:  T(F): 98.1 (05-21-20 @ 05:30), Max: 98.1 (05-21-20 @ 05:30)  HR: 89 (05-21-20 @ 05:30)  BP: 126/49 (05-21-20 @ 05:30)  RR: 18 (05-21-20 @ 05:30)  SpO2: 98% (05-21-20 @ 05:30)  Wt(kg): --    05-20 @ 07:01  -  05-21 @ 07:00  --------------------------------------------------------  IN: 0 mL / OUT: 1400 mL / NET: -1400 mL        PHYSICAL EXAM:  Constitutional: well developed, no diaphoresis, no distress.  Neck: No JVD, no carotid bruit, supple, no adenopathy  Respiratory: Good air entrance B/L, no wheezes, rales or rhonchi  Cardiovascular: S1, S2, RRR, no pericardial rub, no murmur  Abdomen: BS+, soft, no tenderness, no bruit  Pelvis: bladder nondistended, goodwin catheter  Extremities: No cyanosis or clubbing. No peripheral edema.     Awake but not follow commends

## 2020-05-21 NOTE — PROGRESS NOTE ADULT - ASSESSMENT
MAXIME pre renal, non oliguric  Renal function improved with hydration. He was placed on NG feeds and daily fluid should be at least 2 liters per day.  Continue with NG feeds and add free water, reduced IV fluids to 40 ml/hour  Follow lab in am

## 2020-05-21 NOTE — PROGRESS NOTE ADULT - PROBLEM SELECTOR PLAN 3
due to multifactors:  sepsis/ Aspiration PNA; metabolic , elevated ammonia dehydration  CT head negative infarct, hemorrhage  More awake, and verbal  today  c/w Zosyn  c/w Lactulose  NG tube feeds  Aspiration precautions  Supportive measures

## 2020-05-21 NOTE — PROGRESS NOTE ADULT - PROBLEM SELECTOR PLAN 1
afebrile, leukocytosis downtrending  Oxygenating improving, 98-99% on O2 4L NC  c/w Zosyn , Day 7/8  c/w Albuterol MDI prn  c/w supplemental oxygen . Titrate to keep SpO2 >/=94%  Aspiration precautions  S/S james   Puljaime Noriega following  ID Dr. Barry following

## 2020-05-21 NOTE — PROGRESS NOTE ADULT - PROBLEM SELECTOR PLAN 7
afebrile  CRP downtrending  D-Dimer elevated at 375 on 5/19  Ferritin uptrending , elevated at 1960 5/19  c/w Heparin SQ  c/w supplemental Oxygen , titrate to keep SpO2 >/= 94%  Trend D-Dimer, CRP , Ferritin  ID Dr. Barry following

## 2020-05-22 DIAGNOSIS — E87.6 HYPOKALEMIA: ICD-10-CM

## 2020-05-22 LAB
ANION GAP SERPL CALC-SCNC: 7 MMOL/L — SIGNIFICANT CHANGE UP (ref 5–17)
BUN SERPL-MCNC: 13 MG/DL — SIGNIFICANT CHANGE UP (ref 7–18)
CALCIUM SERPL-MCNC: 8.4 MG/DL — SIGNIFICANT CHANGE UP (ref 8.4–10.5)
CHLORIDE SERPL-SCNC: 112 MMOL/L — HIGH (ref 96–108)
CO2 SERPL-SCNC: 23 MMOL/L — SIGNIFICANT CHANGE UP (ref 22–31)
CREAT SERPL-MCNC: 0.94 MG/DL — SIGNIFICANT CHANGE UP (ref 0.5–1.3)
CRP SERPL-MCNC: 4.23 MG/DL — HIGH (ref 0–0.4)
GLUCOSE BLDC GLUCOMTR-MCNC: 148 MG/DL — HIGH (ref 70–99)
GLUCOSE BLDC GLUCOMTR-MCNC: 164 MG/DL — HIGH (ref 70–99)
GLUCOSE BLDC GLUCOMTR-MCNC: 175 MG/DL — HIGH (ref 70–99)
GLUCOSE BLDC GLUCOMTR-MCNC: 184 MG/DL — HIGH (ref 70–99)
GLUCOSE BLDC GLUCOMTR-MCNC: 84 MG/DL — SIGNIFICANT CHANGE UP (ref 70–99)
GLUCOSE SERPL-MCNC: 113 MG/DL — HIGH (ref 70–99)
HCT VFR BLD CALC: 29.9 % — LOW (ref 39–50)
HGB BLD-MCNC: 10.2 G/DL — LOW (ref 13–17)
MAGNESIUM SERPL-MCNC: 1.6 MG/DL — SIGNIFICANT CHANGE UP (ref 1.6–2.6)
MCHC RBC-ENTMCNC: 28.1 PG — SIGNIFICANT CHANGE UP (ref 27–34)
MCHC RBC-ENTMCNC: 34.1 GM/DL — SIGNIFICANT CHANGE UP (ref 32–36)
MCV RBC AUTO: 82.4 FL — SIGNIFICANT CHANGE UP (ref 80–100)
NRBC # BLD: 0 /100 WBCS — SIGNIFICANT CHANGE UP (ref 0–0)
PLATELET # BLD AUTO: 187 K/UL — SIGNIFICANT CHANGE UP (ref 150–400)
POTASSIUM SERPL-MCNC: 3 MMOL/L — LOW (ref 3.5–5.3)
POTASSIUM SERPL-SCNC: 3 MMOL/L — LOW (ref 3.5–5.3)
RBC # BLD: 3.63 M/UL — LOW (ref 4.2–5.8)
RBC # FLD: 12 % — SIGNIFICANT CHANGE UP (ref 10.3–14.5)
SODIUM SERPL-SCNC: 142 MMOL/L — SIGNIFICANT CHANGE UP (ref 135–145)
WBC # BLD: 10.24 K/UL — SIGNIFICANT CHANGE UP (ref 3.8–10.5)
WBC # FLD AUTO: 10.24 K/UL — SIGNIFICANT CHANGE UP (ref 3.8–10.5)

## 2020-05-22 PROCEDURE — 99232 SBSQ HOSP IP/OBS MODERATE 35: CPT

## 2020-05-22 RX ORDER — POTASSIUM CHLORIDE 20 MEQ
40 PACKET (EA) ORAL EVERY 4 HOURS
Refills: 0 | Status: COMPLETED | OUTPATIENT
Start: 2020-05-22 | End: 2020-05-22

## 2020-05-22 RX ORDER — POTASSIUM CHLORIDE 20 MEQ
40 PACKET (EA) ORAL EVERY 4 HOURS
Refills: 0 | Status: DISCONTINUED | OUTPATIENT
Start: 2020-05-22 | End: 2020-05-22

## 2020-05-22 RX ORDER — DEXTROSE MONOHYDRATE, SODIUM CHLORIDE, AND POTASSIUM CHLORIDE 50; .745; 4.5 G/1000ML; G/1000ML; G/1000ML
1000 INJECTION, SOLUTION INTRAVENOUS
Refills: 0 | Status: DISCONTINUED | OUTPATIENT
Start: 2020-05-22 | End: 2020-05-25

## 2020-05-22 RX ORDER — INSULIN LISPRO 100/ML
5 VIAL (ML) SUBCUTANEOUS EVERY 6 HOURS
Refills: 0 | Status: DISCONTINUED | OUTPATIENT
Start: 2020-05-22 | End: 2020-06-02

## 2020-05-22 RX ORDER — INSULIN LISPRO 100/ML
VIAL (ML) SUBCUTANEOUS EVERY 6 HOURS
Refills: 0 | Status: DISCONTINUED | OUTPATIENT
Start: 2020-05-22 | End: 2020-06-02

## 2020-05-22 RX ADMIN — SCOPALAMINE 1 PATCH: 1 PATCH, EXTENDED RELEASE TRANSDERMAL at 19:45

## 2020-05-22 RX ADMIN — Medication 2: at 11:47

## 2020-05-22 RX ADMIN — HEPARIN SODIUM 5000 UNIT(S): 5000 INJECTION INTRAVENOUS; SUBCUTANEOUS at 17:44

## 2020-05-22 RX ADMIN — QUETIAPINE FUMARATE 25 MILLIGRAM(S): 200 TABLET, FILM COATED ORAL at 21:04

## 2020-05-22 RX ADMIN — OLANZAPINE 2.5 MILLIGRAM(S): 15 TABLET, FILM COATED ORAL at 21:04

## 2020-05-22 RX ADMIN — Medication 7 UNIT(S): at 00:49

## 2020-05-22 RX ADMIN — Medication 1: at 23:35

## 2020-05-22 RX ADMIN — Medication 40 MILLIEQUIVALENT(S): at 13:08

## 2020-05-22 RX ADMIN — LACTULOSE 10 GRAM(S): 10 SOLUTION ORAL at 21:04

## 2020-05-22 RX ADMIN — PIPERACILLIN AND TAZOBACTAM 25 GRAM(S): 4; .5 INJECTION, POWDER, LYOPHILIZED, FOR SOLUTION INTRAVENOUS at 13:08

## 2020-05-22 RX ADMIN — PIPERACILLIN AND TAZOBACTAM 25 GRAM(S): 4; .5 INJECTION, POWDER, LYOPHILIZED, FOR SOLUTION INTRAVENOUS at 21:05

## 2020-05-22 RX ADMIN — LACTULOSE 10 GRAM(S): 10 SOLUTION ORAL at 05:04

## 2020-05-22 RX ADMIN — Medication 7 UNIT(S): at 11:48

## 2020-05-22 RX ADMIN — MIRTAZAPINE 7.5 MILLIGRAM(S): 45 TABLET, ORALLY DISINTEGRATING ORAL at 21:04

## 2020-05-22 RX ADMIN — Medication 40 MILLIEQUIVALENT(S): at 11:48

## 2020-05-22 RX ADMIN — DEXTROSE MONOHYDRATE, SODIUM CHLORIDE, AND POTASSIUM CHLORIDE 40 MILLILITER(S): 50; .745; 4.5 INJECTION, SOLUTION INTRAVENOUS at 11:50

## 2020-05-22 RX ADMIN — Medication 1 TABLET(S): at 11:44

## 2020-05-22 RX ADMIN — Medication 5 UNIT(S): at 23:35

## 2020-05-22 RX ADMIN — PANTOPRAZOLE SODIUM 40 MILLIGRAM(S): 20 TABLET, DELAYED RELEASE ORAL at 11:44

## 2020-05-22 RX ADMIN — HEPARIN SODIUM 5000 UNIT(S): 5000 INJECTION INTRAVENOUS; SUBCUTANEOUS at 05:04

## 2020-05-22 RX ADMIN — LACTULOSE 10 GRAM(S): 10 SOLUTION ORAL at 13:04

## 2020-05-22 RX ADMIN — INSULIN GLARGINE 12 UNIT(S): 100 INJECTION, SOLUTION SUBCUTANEOUS at 00:47

## 2020-05-22 RX ADMIN — PIPERACILLIN AND TAZOBACTAM 25 GRAM(S): 4; .5 INJECTION, POWDER, LYOPHILIZED, FOR SOLUTION INTRAVENOUS at 05:04

## 2020-05-22 RX ADMIN — Medication 2: at 00:48

## 2020-05-22 RX ADMIN — Medication 5 UNIT(S): at 18:34

## 2020-05-22 RX ADMIN — INSULIN GLARGINE 12 UNIT(S): 100 INJECTION, SOLUTION SUBCUTANEOUS at 23:34

## 2020-05-22 RX ADMIN — SCOPALAMINE 1 PATCH: 1 PATCH, EXTENDED RELEASE TRANSDERMAL at 07:30

## 2020-05-22 NOTE — PROGRESS NOTE ADULT - SUBJECTIVE AND OBJECTIVE BOX
64 year old male from [facility] with PMH of dementia and type 2 DM presented with seizure activity while sitting at AL. Endocrinology was consulted for management of uncontrolled type 2 DM with hyperglycemia.    Patient seen and examined at bedside. FS reviewed. BG now well controlled on Lantus 12 units at bedtime and Humalog 7 units TID with meals. Plan to increase rate of tube feedings.     MEDICATIONS  (STANDING):  dextrose 50% Injectable 12.5 Gram(s) IV Push once  dextrose 50% Injectable 25 Gram(s) IV Push once  dextrose 50% Injectable 25 Gram(s) IV Push once  ergocalciferol 76328 Unit(s) Oral every week  heparin   Injectable 5000 Unit(s) SubCutaneous every 12 hours  insulin glargine Injectable (LANTUS) 12 Unit(s) SubCutaneous at bedtime  insulin lispro (HumaLOG) corrective regimen sliding scale   SubCutaneous every 6 hours  insulin lispro Injectable (HumaLOG) 7 Unit(s) SubCutaneous every 6 hours  lactulose Syrup 10 Gram(s) Oral three times a day  mirtazapine 7.5 milliGRAM(s) Oral at bedtime  multivitamin/minerals 1 Tablet(s) Oral daily  OLANZapine 2.5 milliGRAM(s) Oral at bedtime  pantoprazole  Injectable 40 milliGRAM(s) IV Push daily  piperacillin/tazobactam IVPB.. 3.375 Gram(s) IV Intermittent every 8 hours  QUEtiapine 25 milliGRAM(s) Oral at bedtime  scopolamine   Patch 1 Patch Transdermal every 72 hours  sodium chloride 0.45% with potassium chloride 20 mEq/L 1000 milliLiter(s) (40 mL/Hr) IV Continuous <Continuous>    MEDICATIONS  (PRN):  acetaminophen  Suppository .. 650 milliGRAM(s) Rectal every 6 hours PRN Temp greater or equal to 38C (100.4F)  ALBUTerol    90 MICROgram(s) HFA Inhaler 2 Puff(s) Inhalation every 6 hours PRN Shortness of Breath and/or Wheezing  dextrose 40% Gel 15 Gram(s) Oral once PRN Blood Glucose LESS THAN 70 milliGRAM(s)/deciliter  glucagon  Injectable 1 milliGRAM(s) IntraMuscular once PRN Glucose LESS THAN 70 milligrams/deciliter  guaiFENesin   Syrup  (Sugar-Free) 100 milliGRAM(s) Oral every 6 hours PRN Cough      REVIEW OF SYSTEMS:  unable to obtain due to medical condition    PHYSICAL EXAM:    VITAL SIGNS  T(C): 36.5 (05-22-20 @ 04:36), Max: 37.1 (05-21-20 @ 14:51)  HR: 64 (05-22-20 @ 04:36) (64 - 77)  BP: 120/57 (05-22-20 @ 04:36) (108/57 - 120/57)  RR: 18 (05-22-20 @ 04:36) (18 - 20)  SpO2: 100% (05-22-20 @ 04:36) (98% - 100%)      GENERAL: more awake tody, NAD, well-developed  HEAD:  Atraumatic, Normocephalic  EYES: EOMI, PERRLA, conjunctiva and sclera clear  ENMT: No tonsillar erythema, exudates, or enlargement; No lesions  NECK: Supple, No JVD, Normal thyroid  NERVOUS SYSTEM:  nonverbal,  CHEST/LUNG: decreased breath sounds, No rales, rhonchi, wheezing, or rubs  HEART: Regular rate and rhythm; No murmurs, rubs, or gallops  ABDOMEN: Soft, Nontender, Nondistended; Bowel sounds present  EXTREMITIES:  2+ Peripheral Pulses, No clubbing, cyanosis, or edema  SKIN: No rashes or lesions    LABS:                        10.2   10.24 )-----------( 187      ( 22 May 2020 08:35 )             29.9     05-22    142  |  112<H>  |  13  ----------------------------<  113<H>  3.0<L>   |  23  |  0.94    Ca    8.4      22 May 2020 08:35  Mg     1.6     05-22          CAPILLARY BLOOD GLUCOSE      POCT Blood Glucose.: 164 mg/dL (22 May 2020 11:29)  POCT Blood Glucose.: 84 mg/dL (22 May 2020 05:14)  POCT Blood Glucose.: 175 mg/dL (22 May 2020 00:40)  POCT Blood Glucose.: 188 mg/dL (21 May 2020 17:11)

## 2020-05-22 NOTE — PROGRESS NOTE ADULT - ASSESSMENT
64 yr old male with PMHX of DM,dementia sent to ER from facility for seizure and now COVID+,Delirium,MAXIME,hypernatremia and suspected aspiration pneumonia.  1.Speech and swallow eval.  2.COVID +supportive treatment.  3.Fever and aspiration pneumonia-zosyn, ID f/u.  4.DM-Insulin, Endo f/u.  5.Delirium-Psych f/u.  6.NGT placed-TF.  7.Replace K+.  8.Repeat speach and swallow eval.  9.GI and DVT prophylaxis.

## 2020-05-22 NOTE — PROGRESS NOTE ADULT - SUBJECTIVE AND OBJECTIVE BOX
NP Note discussed with  Primary Attending    Patient is a 64y old  Male who presents with a chief complaint of seizures (22 May 2020 09:32)      INTERVAL HPI/OVERNIGHT EVENTS: no new complaints    MEDICATIONS  (STANDING):  dextrose 50% Injectable 12.5 Gram(s) IV Push once  dextrose 50% Injectable 25 Gram(s) IV Push once  dextrose 50% Injectable 25 Gram(s) IV Push once  ergocalciferol 14038 Unit(s) Oral every week  heparin   Injectable 5000 Unit(s) SubCutaneous every 12 hours  insulin glargine Injectable (LANTUS) 12 Unit(s) SubCutaneous at bedtime  insulin lispro (HumaLOG) corrective regimen sliding scale   SubCutaneous every 6 hours  insulin lispro Injectable (HumaLOG) 7 Unit(s) SubCutaneous every 6 hours  lactulose Syrup 10 Gram(s) Oral three times a day  mirtazapine 7.5 milliGRAM(s) Oral at bedtime  multivitamin/minerals 1 Tablet(s) Oral daily  OLANZapine 2.5 milliGRAM(s) Oral at bedtime  pantoprazole  Injectable 40 milliGRAM(s) IV Push daily  piperacillin/tazobactam IVPB.. 3.375 Gram(s) IV Intermittent every 8 hours  potassium chloride   Powder 40 milliEquivalent(s) Enteral Tube every 4 hours  QUEtiapine 25 milliGRAM(s) Oral at bedtime  scopolamine   Patch 1 Patch Transdermal every 72 hours  sodium chloride 0.45% with potassium chloride 20 mEq/L 1000 milliLiter(s) (40 mL/Hr) IV Continuous <Continuous>    MEDICATIONS  (PRN):  acetaminophen  Suppository .. 650 milliGRAM(s) Rectal every 6 hours PRN Temp greater or equal to 38C (100.4F)  ALBUTerol    90 MICROgram(s) HFA Inhaler 2 Puff(s) Inhalation every 6 hours PRN Shortness of Breath and/or Wheezing  dextrose 40% Gel 15 Gram(s) Oral once PRN Blood Glucose LESS THAN 70 milliGRAM(s)/deciliter  glucagon  Injectable 1 milliGRAM(s) IntraMuscular once PRN Glucose LESS THAN 70 milligrams/deciliter  guaiFENesin   Syrup  (Sugar-Free) 100 milliGRAM(s) Oral every 6 hours PRN Cough      __________________________________________________  REVIEW OF SYSTEMS:          Vital Signs Last 24 Hrs  T(C): 36.5 (22 May 2020 04:36), Max: 37.1 (21 May 2020 14:51)  T(F): 97.7 (22 May 2020 04:36), Max: 98.8 (21 May 2020 14:51)  HR: 64 (22 May 2020 04:36) (64 - 77)  BP: 120/57 (22 May 2020 04:36) (108/57 - 120/57)  BP(mean): --  RR: 18 (22 May 2020 04:36) (18 - 20)  SpO2: 100% (22 May 2020 04:36) (98% - 100%)    ________________________________________________  PHYSICAL EXAM:  GENERAL: NAD  HEENT: Normocephalic;  conjunctivae and sclerae clear; moist mucous membranes;   NECK : supple  CHEST/LUNG: Clear to auscultation bilaterally with good air entry   HEART: S1 S2  regular; no murmurs, gallops or rubs  ABDOMEN: Soft, Nontender, Nondistended; Bowel sounds present  EXTREMITIES: no cyanosis; no edema; no calf tenderness  SKIN: warm and dry; no rash  NERVOUS SYSTEM:  Awakens easily. Oriented person . no new deficits    _________________________________________________  LABS:                        10.2   10.24 )-----------( 187      ( 22 May 2020 08:35 )             29.9     05-22    142  |  112<H>  |  13  ----------------------------<  113<H>  3.0<L>   |  23  |  0.94    Ca    8.4      22 May 2020 08:35          CAPILLARY BLOOD GLUCOSE      POCT Blood Glucose.: 84 mg/dL (22 May 2020 05:14)  POCT Blood Glucose.: 175 mg/dL (22 May 2020 00:40)  POCT Blood Glucose.: 188 mg/dL (21 May 2020 17:11)  POCT Blood Glucose.: 261 mg/dL (21 May 2020 11:23)        RADIOLOGY & ADDITIONAL TESTS:    Imaging Personally Reviewed:  YES/NO    Consultant(s) Notes Reviewed:   YES/ No    Care Discussed with Consultants :     Plan of care was discussed with patient and /or primary care giver; all questions and concerns were addressed and care was aligned with patient's wishes. NP Note discussed with  Primary Attending    Patient is a 64y old  Male who presents with a chief complaint of seizures (22 May 2020 09:32)    HPI  64 year old male from [facility]  with PMH dementia and DM who presented  with seizures like activity while sitting at AL. In ED , pt afebrile, SpO2 98 on RA, EKG showed SR with PVC's, Head CT unremarkable, CXR clear lungs, no leukocytosis. COVID 19 detected. Seen by Neuro .No indication for antiepileptic medications now. Pt with long h/o behavioral disturbance on multiple standing antipsychotics, for which pt was seen by Psyche and antipsychotic meds adjusted. Hospital course complicated by AMS ,  unable to take po, for which NG tube feeds initiated ,  hypernatremia on D5W , MAXIME, uncontrolled DM with hyperglycemia , on basal/bolus Lantus/Humalog /HISS. Head CT shows right frontal  sinusitis, negative for hemorrhagic stroke/infarct.  Endo and Nephrology on board. Pt developed fever , likely due to Aspiration PNA and sinusitis, started on Zosyn, currently Day 8/8.  ID Dr. Barry on board. MAXIME and hypernatremia resolved. T2DM better controlled. Pt more awake,  agitated at times. Antipsychotic home regimen resumed . Psyche following.       INTERVAL HPI/OVERNIGHT EVENTS: Pt seen and examined this morning. Pt awakens easily, moves extremities when touched , does not answer when asked of ROS.     MEDICATIONS  (STANDING):  dextrose 50% Injectable 12.5 Gram(s) IV Push once  dextrose 50% Injectable 25 Gram(s) IV Push once  dextrose 50% Injectable 25 Gram(s) IV Push once  ergocalciferol 24289 Unit(s) Oral every week  heparin   Injectable 5000 Unit(s) SubCutaneous every 12 hours  insulin glargine Injectable (LANTUS) 12 Unit(s) SubCutaneous at bedtime  insulin lispro (HumaLOG) corrective regimen sliding scale   SubCutaneous every 6 hours  insulin lispro Injectable (HumaLOG) 7 Unit(s) SubCutaneous every 6 hours  lactulose Syrup 10 Gram(s) Oral three times a day  mirtazapine 7.5 milliGRAM(s) Oral at bedtime  multivitamin/minerals 1 Tablet(s) Oral daily  OLANZapine 2.5 milliGRAM(s) Oral at bedtime  pantoprazole  Injectable 40 milliGRAM(s) IV Push daily  piperacillin/tazobactam IVPB.. 3.375 Gram(s) IV Intermittent every 8 hours  potassium chloride   Powder 40 milliEquivalent(s) Enteral Tube every 4 hours  QUEtiapine 25 milliGRAM(s) Oral at bedtime  scopolamine   Patch 1 Patch Transdermal every 72 hours  sodium chloride 0.45% with potassium chloride 20 mEq/L 1000 milliLiter(s) (40 mL/Hr) IV Continuous <Continuous>    MEDICATIONS  (PRN):  acetaminophen  Suppository .. 650 milliGRAM(s) Rectal every 6 hours PRN Temp greater or equal to 38C (100.4F)  ALBUTerol    90 MICROgram(s) HFA Inhaler 2 Puff(s) Inhalation every 6 hours PRN Shortness of Breath and/or Wheezing  dextrose 40% Gel 15 Gram(s) Oral once PRN Blood Glucose LESS THAN 70 milliGRAM(s)/deciliter  glucagon  Injectable 1 milliGRAM(s) IntraMuscular once PRN Glucose LESS THAN 70 milligrams/deciliter  guaiFENesin   Syrup  (Sugar-Free) 100 milliGRAM(s) Oral every 6 hours PRN Cough      __________________________________________________  REVIEW OF SYSTEMS:    Unable to illicit ROS as pt does not answer questions and does not follow commands.       Vital Signs Last 24 Hrs  T(C): 36.5 (22 May 2020 04:36), Max: 37.1 (21 May 2020 14:51)  T(F): 97.7 (22 May 2020 04:36), Max: 98.8 (21 May 2020 14:51)  HR: 64 (22 May 2020 04:36) (64 - 77)  BP: 120/57 (22 May 2020 04:36) (108/57 - 120/57)  BP(mean): --  RR: 18 (22 May 2020 04:36) (18 - 20)  SpO2: 100% (22 May 2020 04:36) (98% - 100%)    ________________________________________________  PHYSICAL EXAM:  GENERAL: NAD  HEENT: Normocephalic;  conjunctivae and sclerae clear; moist mucous membranes;   NECK : supple  CHEST/LUNG: Clear to auscultation bilaterally with good air entry   HEART: S1 S2  regular; no murmurs, gallops or rubs  ABDOMEN: Soft, Nontender, Nondistended; Bowel sounds present  EXTREMITIES: no cyanosis; no edema; no calf tenderness  SKIN: warm and dry; no rash  NERVOUS SYSTEM:  Awakens easily. Oriented person . no new deficits    _________________________________________________  LABS:                        10.2   10.24 )-----------( 187      ( 22 May 2020 08:35 )             29.9     05-22    142  |  112<H>  |  13  ----------------------------<  113<H>  3.0<L>   |  23  |  0.94    Ca    8.4      22 May 2020 08:35          CAPILLARY BLOOD GLUCOSE      POCT Blood Glucose.: 84 mg/dL (22 May 2020 05:14)  POCT Blood Glucose.: 175 mg/dL (22 May 2020 00:40)  POCT Blood Glucose.: 188 mg/dL (21 May 2020 17:11)  POCT Blood Glucose.: 261 mg/dL (21 May 2020 11:23)        RADIOLOGY & ADDITIONAL TESTS:    Imaging Personally Reviewed:  YES/NO    Consultant(s) Notes Reviewed:   YES/ No    Care Discussed with Consultants :     Plan of care was discussed with patient and /or primary care giver; all questions and concerns were addressed and care was aligned with patient's wishes.

## 2020-05-22 NOTE — PROGRESS NOTE ADULT - ASSESSMENT
MAXIME pre renal due to hypovolemia and hypotension  Hypernatremia due to hypovolemia improved with hydration  Hypokalemia, supplemy K via feeds.  Follow 2 liters fluid intake a day.

## 2020-05-22 NOTE — PROGRESS NOTE ADULT - PROBLEM SELECTOR PLAN 1
afebrile, leukocytosis resolved  Oxygenating improving, 98-99% on O2 4L NC  c/w Zosyn , Day 8/8  c/w Albuterol MDI prn  c/w supplemental oxygen . Titrate to keep SpO2 >/=94%  Aspiration precautions  S/S eval   Puljaime Noriega following  ID Dr. Barry following

## 2020-05-22 NOTE — PROGRESS NOTE ADULT - PROBLEM SELECTOR PLAN 6
Improving  Monitor BG q6h while on NGT feeds (or NPO) and cover with low HISS  Lantus 12 Units at HS  Humalog 7 units SC q6h  Hypoglycemia protocol  Endo Dr. Allison following

## 2020-05-22 NOTE — PROGRESS NOTE ADULT - SUBJECTIVE AND OBJECTIVE BOX
CARDIOLOGY/MEDICAL ATTENDING    CHIEF COMPLAINT:Patient is a 64y old  Male who presents with a chief complaint of seizures.Pt with NGT in place and mittens on.    	  REVIEW OF SYSTEMS:  [x ] Unable to obtain    PHYSICAL EXAM:  T(C): 36.5 (05-22-20 @ 04:36), Max: 37.1 (05-21-20 @ 14:51)  HR: 64 (05-22-20 @ 04:36) (64 - 77)  BP: 120/57 (05-22-20 @ 04:36) (108/57 - 120/57)  RR: 18 (05-22-20 @ 04:36) (18 - 20)  SpO2: 100% (05-22-20 @ 04:36) (98% - 100%)  Wt(kg): --  I&O's Summary    21 May 2020 07:01  -  22 May 2020 07:00  --------------------------------------------------------  IN: 1335 mL / OUT: 650 mL / NET: 685 mL        Appearance: Normal	  HEENT:   Normal oral mucosa, PERRL, EOMI	  Lymphatic: No lymphadenopathy  Cardiovascular: Normal S1 S2, No JVD, No murmurs, No edema  Respiratory: Lungs clear to auscultation	  Gastrointestinal:  Soft, Non-tender, + BS	  Skin: No rashes, No ecchymoses, No cyanosis	  Extremities: Normal range of motion, No clubbing, cyanosis or edema  Vascular: Peripheral pulses palpable 2+ bilaterally    MEDICATIONS  (STANDING):  dextrose 50% Injectable 12.5 Gram(s) IV Push once  dextrose 50% Injectable 25 Gram(s) IV Push once  dextrose 50% Injectable 25 Gram(s) IV Push once  ergocalciferol 28825 Unit(s) Oral every week  heparin   Injectable 5000 Unit(s) SubCutaneous every 12 hours  insulin glargine Injectable (LANTUS) 12 Unit(s) SubCutaneous at bedtime  insulin lispro (HumaLOG) corrective regimen sliding scale   SubCutaneous every 6 hours  insulin lispro Injectable (HumaLOG) 7 Unit(s) SubCutaneous every 6 hours  lactulose Syrup 10 Gram(s) Oral three times a day  mirtazapine 7.5 milliGRAM(s) Oral at bedtime  multivitamin/minerals 1 Tablet(s) Oral daily  OLANZapine 2.5 milliGRAM(s) Oral at bedtime  pantoprazole  Injectable 40 milliGRAM(s) IV Push daily  piperacillin/tazobactam IVPB.. 3.375 Gram(s) IV Intermittent every 8 hours  potassium chloride   Powder 40 milliEquivalent(s) Enteral Tube every 4 hours  QUEtiapine 25 milliGRAM(s) Oral at bedtime  scopolamine   Patch 1 Patch Transdermal every 72 hours  sodium chloride 0.45% with potassium chloride 20 mEq/L 1000 milliLiter(s) (40 mL/Hr) IV Continuous <Continuous>    	  	  LABS:	 	                       10.2   10.24 )-----------( 187      ( 22 May 2020 08:35 )             29.9     05-22    142  |  112<H>  |  13  ----------------------------<  113<H>  3.0<L>   |  23  |  0.94    Ca    8.4      22 May 2020 08:35  Mg     1.6     05-22      proBNP: Serum Pro-Brain Natriuretic Peptide: 126 pg/mL (05-01 @ 09:36)    Lipid Profile: Cholesterol 161  LDL 95  HDL 35        TSH: Thyroid Stimulating Hormone, Serum: 2.39 uU/mL (05-01 @ 09:36)

## 2020-05-22 NOTE — PROGRESS NOTE ADULT - SUBJECTIVE AND OBJECTIVE BOX
Problem List:  MAXIME  Hypokalemia  Hypernatremia resolved    PAST MEDICAL & SURGICAL HISTORY:  Dementia  No significant past surgical history      No Known Allergies      MEDICATIONS  (STANDING):  dextrose 5%. 1000 milliLiter(s) (40 mL/Hr) IV Continuous <Continuous>  dextrose 50% Injectable 12.5 Gram(s) IV Push once  dextrose 50% Injectable 25 Gram(s) IV Push once  dextrose 50% Injectable 25 Gram(s) IV Push once  ergocalciferol 56555 Unit(s) Oral every week  heparin   Injectable 5000 Unit(s) SubCutaneous every 12 hours  insulin glargine Injectable (LANTUS) 12 Unit(s) SubCutaneous at bedtime  insulin lispro (HumaLOG) corrective regimen sliding scale   SubCutaneous every 6 hours  insulin lispro Injectable (HumaLOG) 7 Unit(s) SubCutaneous every 6 hours  lactulose Syrup 10 Gram(s) Oral three times a day  mirtazapine 7.5 milliGRAM(s) Oral at bedtime  multivitamin/minerals 1 Tablet(s) Oral daily  OLANZapine 2.5 milliGRAM(s) Oral at bedtime  pantoprazole  Injectable 40 milliGRAM(s) IV Push daily  piperacillin/tazobactam IVPB.. 3.375 Gram(s) IV Intermittent every 8 hours  QUEtiapine 25 milliGRAM(s) Oral at bedtime  scopolamine   Patch 1 Patch Transdermal every 72 hours    MEDICATIONS  (PRN):  acetaminophen  Suppository .. 650 milliGRAM(s) Rectal every 6 hours PRN Temp greater or equal to 38C (100.4F)  ALBUTerol    90 MICROgram(s) HFA Inhaler 2 Puff(s) Inhalation every 6 hours PRN Shortness of Breath and/or Wheezing  dextrose 40% Gel 15 Gram(s) Oral once PRN Blood Glucose LESS THAN 70 milliGRAM(s)/deciliter  glucagon  Injectable 1 milliGRAM(s) IntraMuscular once PRN Glucose LESS THAN 70 milligrams/deciliter  guaiFENesin   Syrup  (Sugar-Free) 100 milliGRAM(s) Oral every 6 hours PRN Cough                            10.2   10.24 )-----------( 187      ( 22 May 2020 08:35 )             29.9     05-22    142  |  112<H>  |  13  ----------------------------<  113<H>  3.0<L>   |  23  |  0.94    Ca    8.4      22 May 2020 08:35              REVIEW OF SYSTEMS:  DEMENTIA      VITALS:  T(F): 97.7 (05-22-20 @ 04:36), Max: 98.8 (05-21-20 @ 14:51)  HR: 64 (05-22-20 @ 04:36)  BP: 120/57 (05-22-20 @ 04:36)  RR: 18 (05-22-20 @ 04:36)  SpO2: 100% (05-22-20 @ 04:36)  Wt(kg): --    05-21 @ 07:01  -  05-22 @ 07:00  --------------------------------------------------------  IN: 1335 mL / OUT: 650 mL / NET: 685 mL        PHYSICAL EXAM:  Constitutional: well developed, no diaphoresis, no distress.  Neck: No JVD, no carotid bruit, supple, no adenopathy  Respiratory: Good air entrance B/L, no wheezes, rales or rhonchi  Cardiovascular: S1, S2, RRR, no pericardial rub, no murmur  Abdomen: BS+, soft, no tenderness, no bruit  Pelvis: bladder nondistended  Extremities: No cyanosis or clubbing. No peripheral edema.   confused NO VERBAL RESPONSE

## 2020-05-22 NOTE — PROGRESS NOTE ADULT - PROBLEM SELECTOR PLAN 8
afebrile  CRP downtrending  D-Dimer uptrended from  375 to 483 on 5/21.   Ferritin downtrending to 1789 from 1960   c/w Heparin SQ  c/w supplemental Oxygen , titrate to keep SpO2 >/= 94%  Trend D-Dimer, CRP , Ferritin  ID Dr. Barry following

## 2020-05-22 NOTE — PROGRESS NOTE ADULT - ASSESSMENT
64 year old male from [facility] with PMH of dementia and type 2 DM presented with seizure activity while sitting at AL. Endocrinology was consulted for management of uncontrolled type 2 DM with hyperglycemia.    1. Uncontrolled Type 2 DM with hyperglycemia, A1c 9.9%  -BG better controlled  -patient on tube feedings 10cc/hr, plan to increase to 20cc/hr today  -continue Lantus 12 units at bedtime  -continue Humalog 7 units TID q 6 hrs   -decrease to low dose rapid acting insulin q 6 hrs as below  BG 70-100mg/dL 0 units  -150 mg/dL 0 units  -200 mg/dL 1 unit  -250 mg/dL 2 units  -300 mg/dL 3 units  -350 mg/dL 4 units  -400 mg/dL 5 units  BG > 400mg/dL 6 units  -FS AC HS  -ensure consistent carbohydrate   -will monitor FS and adjust accordingly   --if patient is NPO for any reason please change FS and sliding scale to NPO lispro scale     2.  Aspiration pneumonia  -oxygen supplementation as needed   -continue antibiotics as per primary team   -pulmonology follow up    3. Delirium  -patient now slightly more awake with periods of agitation  -psychiatry follow up    Plan discussed with primary team]  Please  call  w/ any questions or concerns [phone]

## 2020-05-22 NOTE — PROGRESS NOTE ADULT - SUBJECTIVE AND OBJECTIVE BOX
Pt is alert, agitated at times. On enhanced supervision. NGT in place with tube feeds. Mitten restraints. Afebrile.     INTERVAL HPI/OVERNIGHT EVENTS:      VITAL SIGNS:  T(F): 97.7 (05-22-20 @ 04:36)  HR: 64 (05-22-20 @ 04:36)  BP: 120/57 (05-22-20 @ 04:36)  RR: 18 (05-22-20 @ 04:36)  SpO2: 100% (05-22-20 @ 04:36)  Wt(kg): --  I&O's Detail    21 May 2020 07:01  -  22 May 2020 07:00  --------------------------------------------------------  IN:    dextrose 5%.: 360 mL    Enteral Tube Flush: 975 mL  Total IN: 1335 mL    OUT:    Indwelling Catheter - Urethral: 650 mL  Total OUT: 650 mL    Total NET: 685 mL              REVIEW OF SYSTEMS:    CONSTITUTIONAL:  No fevers, chills, sweats    HEENT:  Eyes:  No diplopia or blurred vision. ENT:  No earache, sore throat or runny nose.    CARDIOVASCULAR:  No pressure, squeezing, tightness, or heaviness about the chest; no palpitations.    RESPIRATORY:  Per HPI    GASTROINTESTINAL:  No abdominal pain, nausea, vomiting or diarrhea.    GENITOURINARY:  No dysuria, frequency or urgency.    NEUROLOGIC:  No paresthesias, fasciculations, seizures or weakness.    PSYCHIATRIC:  No disorder of thought or mood.      PHYSICAL EXAM:    Constitutional: Well developed  Eyes: Perrla  ENMT: normal  Neck: supple  Respiratory: good air entry  Cardiovascular: S1 S2 regular  Gastrointestinal: Soft, Non tender  Extremities: No edema  Vascular:normal  Neurological:Alert, agitated at times.   Musculoskeletal: bedbound.       MEDICATIONS  (STANDING):  dextrose 50% Injectable 12.5 Gram(s) IV Push once  dextrose 50% Injectable 25 Gram(s) IV Push once  dextrose 50% Injectable 25 Gram(s) IV Push once  ergocalciferol 88044 Unit(s) Oral every week  heparin   Injectable 5000 Unit(s) SubCutaneous every 12 hours  insulin glargine Injectable (LANTUS) 12 Unit(s) SubCutaneous at bedtime  insulin lispro (HumaLOG) corrective regimen sliding scale   SubCutaneous every 6 hours  insulin lispro Injectable (HumaLOG) 7 Unit(s) SubCutaneous every 6 hours  lactulose Syrup 10 Gram(s) Oral three times a day  mirtazapine 7.5 milliGRAM(s) Oral at bedtime  multivitamin/minerals 1 Tablet(s) Oral daily  OLANZapine 2.5 milliGRAM(s) Oral at bedtime  pantoprazole  Injectable 40 milliGRAM(s) IV Push daily  piperacillin/tazobactam IVPB.. 3.375 Gram(s) IV Intermittent every 8 hours  potassium chloride   Powder 40 milliEquivalent(s) Enteral Tube every 4 hours  QUEtiapine 25 milliGRAM(s) Oral at bedtime  scopolamine   Patch 1 Patch Transdermal every 72 hours  sodium chloride 0.45% with potassium chloride 20 mEq/L 1000 milliLiter(s) (40 mL/Hr) IV Continuous <Continuous>    MEDICATIONS  (PRN):  acetaminophen  Suppository .. 650 milliGRAM(s) Rectal every 6 hours PRN Temp greater or equal to 38C (100.4F)  ALBUTerol    90 MICROgram(s) HFA Inhaler 2 Puff(s) Inhalation every 6 hours PRN Shortness of Breath and/or Wheezing  dextrose 40% Gel 15 Gram(s) Oral once PRN Blood Glucose LESS THAN 70 milliGRAM(s)/deciliter  glucagon  Injectable 1 milliGRAM(s) IntraMuscular once PRN Glucose LESS THAN 70 milligrams/deciliter  guaiFENesin   Syrup  (Sugar-Free) 100 milliGRAM(s) Oral every 6 hours PRN Cough      Allergies    No Known Allergies    Intolerances        LABS:                        10.2   10.24 )-----------( 187      ( 22 May 2020 08:35 )             29.9     05-22    142  |  112<H>  |  13  ----------------------------<  113<H>  3.0<L>   |  23  |  0.94    Ca    8.4      22 May 2020 08:35                CAPILLARY BLOOD GLUCOSE      POCT Blood Glucose.: 84 mg/dL (22 May 2020 05:14)  POCT Blood Glucose.: 175 mg/dL (22 May 2020 00:40)  POCT Blood Glucose.: 188 mg/dL (21 May 2020 17:11)  POCT Blood Glucose.: 261 mg/dL (21 May 2020 11:23)    pro-bnp -- 05-21 @ 07:00     d-dimer 483  05-21 @ 07:00  pro-bnp -- 05-19 @ 06:41     d-dimer 375  05-19 @ 06:41      RADIOLOGY & ADDITIONAL TESTS:    CXR:    Ct scan chest:    ekg;    echo:

## 2020-05-23 LAB
ANION GAP SERPL CALC-SCNC: 5 MMOL/L — SIGNIFICANT CHANGE UP (ref 5–17)
BUN SERPL-MCNC: 10 MG/DL — SIGNIFICANT CHANGE UP (ref 7–18)
CALCIUM SERPL-MCNC: 8.6 MG/DL — SIGNIFICANT CHANGE UP (ref 8.4–10.5)
CHLORIDE SERPL-SCNC: 113 MMOL/L — HIGH (ref 96–108)
CO2 SERPL-SCNC: 26 MMOL/L — SIGNIFICANT CHANGE UP (ref 22–31)
CREAT SERPL-MCNC: 0.98 MG/DL — SIGNIFICANT CHANGE UP (ref 0.5–1.3)
GLUCOSE BLDC GLUCOMTR-MCNC: 108 MG/DL — HIGH (ref 70–99)
GLUCOSE BLDC GLUCOMTR-MCNC: 131 MG/DL — HIGH (ref 70–99)
GLUCOSE BLDC GLUCOMTR-MCNC: 188 MG/DL — HIGH (ref 70–99)
GLUCOSE BLDC GLUCOMTR-MCNC: 97 MG/DL — SIGNIFICANT CHANGE UP (ref 70–99)
GLUCOSE SERPL-MCNC: 115 MG/DL — HIGH (ref 70–99)
HCT VFR BLD CALC: 31.9 % — LOW (ref 39–50)
HGB BLD-MCNC: 10.9 G/DL — LOW (ref 13–17)
MCHC RBC-ENTMCNC: 28.5 PG — SIGNIFICANT CHANGE UP (ref 27–34)
MCHC RBC-ENTMCNC: 34.2 GM/DL — SIGNIFICANT CHANGE UP (ref 32–36)
MCV RBC AUTO: 83.3 FL — SIGNIFICANT CHANGE UP (ref 80–100)
NRBC # BLD: 0 /100 WBCS — SIGNIFICANT CHANGE UP (ref 0–0)
PLATELET # BLD AUTO: 285 K/UL — SIGNIFICANT CHANGE UP (ref 150–400)
POTASSIUM SERPL-MCNC: 3.3 MMOL/L — LOW (ref 3.5–5.3)
POTASSIUM SERPL-SCNC: 3.3 MMOL/L — LOW (ref 3.5–5.3)
RBC # BLD: 3.83 M/UL — LOW (ref 4.2–5.8)
RBC # FLD: 12.2 % — SIGNIFICANT CHANGE UP (ref 10.3–14.5)
SODIUM SERPL-SCNC: 144 MMOL/L — SIGNIFICANT CHANGE UP (ref 135–145)
WBC # BLD: 9.16 K/UL — SIGNIFICANT CHANGE UP (ref 3.8–10.5)
WBC # FLD AUTO: 9.16 K/UL — SIGNIFICANT CHANGE UP (ref 3.8–10.5)

## 2020-05-23 PROCEDURE — 71045 X-RAY EXAM CHEST 1 VIEW: CPT | Mod: 26

## 2020-05-23 PROCEDURE — 99232 SBSQ HOSP IP/OBS MODERATE 35: CPT

## 2020-05-23 RX ORDER — POTASSIUM CHLORIDE 20 MEQ
10 PACKET (EA) ORAL
Refills: 0 | Status: COMPLETED | OUTPATIENT
Start: 2020-05-23 | End: 2020-05-23

## 2020-05-23 RX ORDER — INSULIN GLARGINE 100 [IU]/ML
10 INJECTION, SOLUTION SUBCUTANEOUS AT BEDTIME
Refills: 0 | Status: DISCONTINUED | OUTPATIENT
Start: 2020-05-23 | End: 2020-05-26

## 2020-05-23 RX ADMIN — PIPERACILLIN AND TAZOBACTAM 25 GRAM(S): 4; .5 INJECTION, POWDER, LYOPHILIZED, FOR SOLUTION INTRAVENOUS at 05:27

## 2020-05-23 RX ADMIN — INSULIN GLARGINE 10 UNIT(S): 100 INJECTION, SOLUTION SUBCUTANEOUS at 22:48

## 2020-05-23 RX ADMIN — ERGOCALCIFEROL 50000 UNIT(S): 1.25 CAPSULE ORAL at 12:06

## 2020-05-23 RX ADMIN — HEPARIN SODIUM 5000 UNIT(S): 5000 INJECTION INTRAVENOUS; SUBCUTANEOUS at 05:27

## 2020-05-23 RX ADMIN — Medication 5 UNIT(S): at 05:28

## 2020-05-23 RX ADMIN — Medication 1: at 18:11

## 2020-05-23 RX ADMIN — SCOPALAMINE 1 PATCH: 1 PATCH, EXTENDED RELEASE TRANSDERMAL at 08:00

## 2020-05-23 RX ADMIN — Medication 5 UNIT(S): at 12:08

## 2020-05-23 RX ADMIN — HEPARIN SODIUM 5000 UNIT(S): 5000 INJECTION INTRAVENOUS; SUBCUTANEOUS at 18:11

## 2020-05-23 RX ADMIN — Medication 100 MILLIEQUIVALENT(S): at 14:21

## 2020-05-23 RX ADMIN — PIPERACILLIN AND TAZOBACTAM 25 GRAM(S): 4; .5 INJECTION, POWDER, LYOPHILIZED, FOR SOLUTION INTRAVENOUS at 14:22

## 2020-05-23 RX ADMIN — PIPERACILLIN AND TAZOBACTAM 25 GRAM(S): 4; .5 INJECTION, POWDER, LYOPHILIZED, FOR SOLUTION INTRAVENOUS at 22:48

## 2020-05-23 RX ADMIN — MIRTAZAPINE 7.5 MILLIGRAM(S): 45 TABLET, ORALLY DISINTEGRATING ORAL at 22:49

## 2020-05-23 RX ADMIN — QUETIAPINE FUMARATE 25 MILLIGRAM(S): 200 TABLET, FILM COATED ORAL at 22:49

## 2020-05-23 RX ADMIN — LACTULOSE 10 GRAM(S): 10 SOLUTION ORAL at 22:48

## 2020-05-23 RX ADMIN — Medication 100 MILLIEQUIVALENT(S): at 12:08

## 2020-05-23 RX ADMIN — PANTOPRAZOLE SODIUM 40 MILLIGRAM(S): 20 TABLET, DELAYED RELEASE ORAL at 12:08

## 2020-05-23 RX ADMIN — OLANZAPINE 2.5 MILLIGRAM(S): 15 TABLET, FILM COATED ORAL at 22:49

## 2020-05-23 RX ADMIN — SCOPALAMINE 1 PATCH: 1 PATCH, EXTENDED RELEASE TRANSDERMAL at 18:12

## 2020-05-23 RX ADMIN — Medication 100 MILLIEQUIVALENT(S): at 10:18

## 2020-05-23 RX ADMIN — LACTULOSE 10 GRAM(S): 10 SOLUTION ORAL at 05:28

## 2020-05-23 RX ADMIN — Medication 5 UNIT(S): at 18:12

## 2020-05-23 RX ADMIN — Medication 1 TABLET(S): at 12:09

## 2020-05-23 NOTE — PROGRESS NOTE ADULT - ASSESSMENT
64 year old male from [facility] with PMH of dementia and type 2 DM presented with seizure activity while sitting at AL. Endocrinology was consulted for management of uncontrolled type 2 DM with hyperglycemia.    1. Uncontrolled Type 2 DM with hyperglycemia, A1c 9.9%  -BG better controlled, overall at goal <180  -patient on tube feedings 30cc/hr  -decrease to Lantus 10 units at bedtime  -continue Humalog 5 units TID q 6 hrs   -continue low dose rapid acting insulin q 6 hrs as below  BG 70-100mg/dL 0 units  -150 mg/dL 0 units  -200 mg/dL 1 unit  -250 mg/dL 2 units  -300 mg/dL 3 units  -350 mg/dL 4 units  -400 mg/dL 5 units  BG > 400mg/dL 6 units  -FS AC HS  -ensure consistent carbohydrate   -will monitor FS and adjust accordingly   --if patient is NPO for any reason please change FS and sliding scale to NPO lispro scale     2.  Aspiration pneumonia  -oxygen supplementation as needed   -continue antibiotics as per primary team   -pulmonology follow up    3. Delirium  -patient now slightly more awake with periods of agitation  -psychiatry follow up    Plan discussed with primary team]  Please  call  w/ any questions or concerns [phone]

## 2020-05-23 NOTE — PROGRESS NOTE ADULT - SUBJECTIVE AND OBJECTIVE BOX
Patient is a 64y old  Male who presents with a chief complaint of seizures (23 May 2020 11:18)  Awake, not  alert, comfortable in bed in NAD. Confuseed and occasionally agitated. Currently on nasal cannula.    INTERVAL HPI/OVERNIGHT EVENTS:      VITAL SIGNS:  T(F): 97.4 (05-23-20 @ 05:44)  HR: 61 (05-23-20 @ 05:44)  BP: 92/50 (05-23-20 @ 05:44)  RR: 16 (05-23-20 @ 05:44)  SpO2: 100% (05-23-20 @ 05:44)  Wt(kg): --  I&O's Detail    22 May 2020 07:01  -  23 May 2020 07:00  --------------------------------------------------------  IN:    IV PiggyBack: 100 mL    sodium chloride 0.45% with potassium chloride 20 mEq/L: 480 mL  Total IN: 580 mL    OUT:  Total OUT: 0 mL    Total NET: 580 mL              REVIEW OF SYSTEMS:    CONSTITUTIONAL:  No fevers, chills, sweats    HEENT:  Eyes:  No diplopia or blurred vision. ENT:  No earache, sore throat or runny nose.    CARDIOVASCULAR:  No pressure, squeezing, tightness, or heaviness about the chest; no palpitations.    RESPIRATORY:  Per HPI    GASTROINTESTINAL:  No abdominal pain, nausea, vomiting or diarrhea.    GENITOURINARY:  No dysuria, frequency or urgency.    NEUROLOGIC:  No paresthesias, fasciculations, seizures or weakness.    PSYCHIATRIC:  No disorder of thought or mood.      PHYSICAL EXAM:    Constitutional: Well developed and nourished  Eyes:Perrla  ENMT: normal  Neck:supple  Respiratory: good air entry  Cardiovascular: S1 S2 regular  Gastrointestinal: Soft, Non tender  Extremities: No edema  Vascular:normal  Neurological:Awake, not alert  Musculoskeletal:Normal      MEDICATIONS  (STANDING):  dextrose 50% Injectable 12.5 Gram(s) IV Push once  dextrose 50% Injectable 25 Gram(s) IV Push once  dextrose 50% Injectable 25 Gram(s) IV Push once  ergocalciferol 40712 Unit(s) Oral every week  heparin   Injectable 5000 Unit(s) SubCutaneous every 12 hours  insulin glargine Injectable (LANTUS) 10 Unit(s) SubCutaneous at bedtime  insulin lispro (HumaLOG) corrective regimen sliding scale   SubCutaneous every 6 hours  insulin lispro Injectable (HumaLOG) 5 Unit(s) SubCutaneous every 6 hours  lactulose Syrup 10 Gram(s) Oral three times a day  mirtazapine 7.5 milliGRAM(s) Oral at bedtime  multivitamin/minerals 1 Tablet(s) Oral daily  OLANZapine 2.5 milliGRAM(s) Oral at bedtime  pantoprazole  Injectable 40 milliGRAM(s) IV Push daily  piperacillin/tazobactam IVPB.. 3.375 Gram(s) IV Intermittent every 8 hours  potassium chloride  10 mEq/100 mL IVPB 10 milliEquivalent(s) IV Intermittent every 1 hour  QUEtiapine 25 milliGRAM(s) Oral at bedtime  scopolamine   Patch 1 Patch Transdermal every 72 hours  sodium chloride 0.45% with potassium chloride 20 mEq/L 1000 milliLiter(s) (40 mL/Hr) IV Continuous <Continuous>    MEDICATIONS  (PRN):  acetaminophen  Suppository .. 650 milliGRAM(s) Rectal every 6 hours PRN Temp greater or equal to 38C (100.4F)  ALBUTerol    90 MICROgram(s) HFA Inhaler 2 Puff(s) Inhalation every 6 hours PRN Shortness of Breath and/or Wheezing  dextrose 40% Gel 15 Gram(s) Oral once PRN Blood Glucose LESS THAN 70 milliGRAM(s)/deciliter  glucagon  Injectable 1 milliGRAM(s) IntraMuscular once PRN Glucose LESS THAN 70 milligrams/deciliter  guaiFENesin   Syrup  (Sugar-Free) 100 milliGRAM(s) Oral every 6 hours PRN Cough      Allergies    No Known Allergies    Intolerances        LABS:                        10.9   9.16  )-----------( 285      ( 23 May 2020 06:04 )             31.9     05-23    144  |  113<H>  |  10  ----------------------------<  115<H>  3.3<L>   |  26  |  0.98    Ca    8.6      23 May 2020 06:04  Mg     1.6     05-22                CAPILLARY BLOOD GLUCOSE      POCT Blood Glucose.: 131 mg/dL (23 May 2020 11:36)  POCT Blood Glucose.: 97 mg/dL (23 May 2020 07:47)  POCT Blood Glucose.: 108 mg/dL (23 May 2020 05:09)  POCT Blood Glucose.: 184 mg/dL (22 May 2020 23:12)  POCT Blood Glucose.: 148 mg/dL (22 May 2020 16:45)    pro-bnp -- 05-21 @ 07:00     d-dimer 483  05-21 @ 07:00  pro-bnp -- 05-19 @ 06:41     d-dimer 375  05-19 @ 06:41      RADIOLOGY & ADDITIONAL TESTS:    CXR:  < from: Xray Chest 1 View- PORTABLE-Routine (05.17.20 @ 20:04) >  IMPRESSION:    Persistent left lower lobe infiltrate.    Feeding tube in satisfactory position.    < end of copied text >    Ct scan chest:    ekg;    echo:

## 2020-05-23 NOTE — PROGRESS NOTE ADULT - ASSESSMENT
64 yr old male with PMHX of DM,dementia sent to ER from facility for seizure and now COVID+,Delirium,MAXIME,hypernatremia and suspected aspiration pneumonia.  1.Speech and swallow re-eval.  2.COVID +supportive treatment.  3.Fever and aspiration pneumonia-zosyn, ID f/u.  4.DM-Insulin, Endo f/u.  5.Delirium-Psych f/u.  6.NGT placed-TF.  7.Replace K+.  8.GI and DVT prophylaxis.  9.Repeat CXR today.

## 2020-05-23 NOTE — PROGRESS NOTE ADULT - SUBJECTIVE AND OBJECTIVE BOX
CARDIOLOGY/MEDICAL ATTENDING    CHIEF COMPLAINT:Patient is a 64y old  Male who presents with a chief complaint of seizures.Pt awake and alert this am, still NGT in place and mittens.    	  REVIEW OF SYSTEMS:  CONSTITUTIONAL: No fever, weight loss, or fatigue  EYES: No eye pain, visual disturbances, or discharge  ENT:  No difficulty hearing, tinnitus, vertigo; No sinus or throat pain  NECK: No pain or stiffness  RESPIRATORY: No cough, wheezing, chills or hemoptysis; No Shortness of Breath  CARDIOVASCULAR: No chest pain, palpitations, passing out, dizziness, or leg swelling  GASTROINTESTINAL: No abdominal or epigastric pain. No nausea, vomiting, or hematemesis; No diarrhea or constipation. No melena or hematochezia.  GENITOURINARY: No dysuria, frequency, hematuria, or incontinence  NEUROLOGICAL: No headaches, memory loss, loss of strength, numbness, or tremors  SKIN: No itching, burning, rashes, or lesions   LYMPH Nodes: No enlarged glands  ENDOCRINE: No heat or cold intolerance; No hair loss  MUSCULOSKELETAL: No joint pain or swelling; No muscle, back, or extremity pain  PSYCHIATRIC: No depression, anxiety, mood swings, or difficulty sleeping  HEME/LYMPH: No easy bruising, or bleeding gums  ALLERGY AND IMMUNOLOGIC: No hives or eczema	        PHYSICAL EXAM:  T(C): 36.3 (05-23-20 @ 05:44), Max: 36.4 (05-22-20 @ 14:23)  HR: 61 (05-23-20 @ 05:44) (61 - 88)  BP: 92/50 (05-23-20 @ 05:44) (92/50 - 123/60)  RR: 16 (05-23-20 @ 05:44) (16 - 20)  SpO2: 100% (05-23-20 @ 05:44) (98% - 100%)  Wt(kg): --  I&O's Summary    22 May 2020 07:01  -  23 May 2020 07:00  --------------------------------------------------------  IN: 580 mL / OUT: 0 mL / NET: 580 mL        Appearance: Normal	  HEENT:   Normal oral mucosa, PERRL, EOMI	  Lymphatic: No lymphadenopathy  Cardiovascular: Normal S1 S2, No JVD, No murmurs, No edema  Respiratory: Dec BS at bases  Gastrointestinal:  Soft, Non-tender, + BS	  Skin: No rashes, No ecchymoses, No cyanosis	  Neurologic: Non-focal  Extremities: Normal range of motion, No clubbing, cyanosis or edema  Vascular: Peripheral pulses palpable 2+ bilaterally    MEDICATIONS  (STANDING):  dextrose 50% Injectable 12.5 Gram(s) IV Push once  dextrose 50% Injectable 25 Gram(s) IV Push once  dextrose 50% Injectable 25 Gram(s) IV Push once  ergocalciferol 40895 Unit(s) Oral every week  heparin   Injectable 5000 Unit(s) SubCutaneous every 12 hours  insulin glargine Injectable (LANTUS) 10 Unit(s) SubCutaneous at bedtime  insulin lispro (HumaLOG) corrective regimen sliding scale   SubCutaneous every 6 hours  insulin lispro Injectable (HumaLOG) 5 Unit(s) SubCutaneous every 6 hours  lactulose Syrup 10 Gram(s) Oral three times a day  mirtazapine 7.5 milliGRAM(s) Oral at bedtime  multivitamin/minerals 1 Tablet(s) Oral daily  OLANZapine 2.5 milliGRAM(s) Oral at bedtime  pantoprazole  Injectable 40 milliGRAM(s) IV Push daily  piperacillin/tazobactam IVPB.. 3.375 Gram(s) IV Intermittent every 8 hours  potassium chloride  10 mEq/100 mL IVPB 10 milliEquivalent(s) IV Intermittent every 1 hour  QUEtiapine 25 milliGRAM(s) Oral at bedtime  scopolamine   Patch 1 Patch Transdermal every 72 hours  sodium chloride 0.45% with potassium chloride 20 mEq/L 1000 milliLiter(s) (40 mL/Hr) IV Continuous <Continuous>      	  	  LABS:	 	                       10.9   9.16  )-----------( 285      ( 23 May 2020 06:04 )             31.9     05-23    144  |  113<H>  |  10  ----------------------------<  115<H>  3.3<L>   |  26  |  0.98    Ca    8.6      23 May 2020 06:04  Mg     1.6     05-22      proBNP: Serum Pro-Brain Natriuretic Peptide: 126 pg/mL (05-01 @ 09:36)    Lipid Profile: Cholesterol 161  LDL 95  HDL 35      TSH: Thyroid Stimulating Hormone, Serum: 2.39 uU/mL (05-01 @ 09:36)

## 2020-05-23 NOTE — PROGRESS NOTE ADULT - SUBJECTIVE AND OBJECTIVE BOX
64 year old male from [facility] with PMH of dementia and type 2 DM presented with seizure activity while sitting at AL. Endocrinology was consulted for management of uncontrolled type 2 DM with hyperglycemia.    Patient seen and examined at bedside. He is now on tube feeds at rate of 30cc/hr. FS reviewed. BG well controlled overall. Fasting BG today 97.    MEDICATIONS  (STANDING):  dextrose 50% Injectable 12.5 Gram(s) IV Push once  dextrose 50% Injectable 25 Gram(s) IV Push once  dextrose 50% Injectable 25 Gram(s) IV Push once  ergocalciferol 00292 Unit(s) Oral every week  heparin   Injectable 5000 Unit(s) SubCutaneous every 12 hours  insulin glargine Injectable (LANTUS) 10 Unit(s) SubCutaneous at bedtime  insulin lispro (HumaLOG) corrective regimen sliding scale   SubCutaneous every 6 hours  insulin lispro Injectable (HumaLOG) 5 Unit(s) SubCutaneous every 6 hours  lactulose Syrup 10 Gram(s) Oral three times a day  mirtazapine 7.5 milliGRAM(s) Oral at bedtime  multivitamin/minerals 1 Tablet(s) Oral daily  OLANZapine 2.5 milliGRAM(s) Oral at bedtime  pantoprazole  Injectable 40 milliGRAM(s) IV Push daily  piperacillin/tazobactam IVPB.. 3.375 Gram(s) IV Intermittent every 8 hours  potassium chloride  10 mEq/100 mL IVPB 10 milliEquivalent(s) IV Intermittent every 1 hour  QUEtiapine 25 milliGRAM(s) Oral at bedtime  scopolamine   Patch 1 Patch Transdermal every 72 hours  sodium chloride 0.45% with potassium chloride 20 mEq/L 1000 milliLiter(s) (40 mL/Hr) IV Continuous <Continuous>    MEDICATIONS  (PRN):  acetaminophen  Suppository .. 650 milliGRAM(s) Rectal every 6 hours PRN Temp greater or equal to 38C (100.4F)  ALBUTerol    90 MICROgram(s) HFA Inhaler 2 Puff(s) Inhalation every 6 hours PRN Shortness of Breath and/or Wheezing  dextrose 40% Gel 15 Gram(s) Oral once PRN Blood Glucose LESS THAN 70 milliGRAM(s)/deciliter  glucagon  Injectable 1 milliGRAM(s) IntraMuscular once PRN Glucose LESS THAN 70 milligrams/deciliter  guaiFENesin   Syrup  (Sugar-Free) 100 milliGRAM(s) Oral every 6 hours PRN Cough      REVIEW OF SYSTEMS:  unable to obtain due to medical condition    PHYSICAL EXAM:  VITAL SIGNS  T(C): 36.3 (05-23-20 @ 05:44), Max: 36.4 (05-22-20 @ 14:23)  HR: 61 (05-23-20 @ 05:44) (61 - 88)  BP: 92/50 (05-23-20 @ 05:44) (92/50 - 123/60)  RR: 16 (05-23-20 @ 05:44) (16 - 20)  SpO2: 100% (05-23-20 @ 05:44) (98% - 100%)    GENERAL: more awake tody, NAD, well-developed  HEAD:  Atraumatic, Normocephalic  EYES: EOMI, PERRLA, conjunctiva and sclera clear  ENMT: No tonsillar erythema, exudates, or enlargement; No lesions  NECK: Supple, No JVD, Normal thyroid  NERVOUS SYSTEM:  nonverbal,  CHEST/LUNG: decreased breath sounds, No rales, rhonchi, wheezing, or rubs  HEART: Regular rate and rhythm; No murmurs, rubs, or gallops  ABDOMEN: Soft, Nontender, Nondistended; Bowel sounds present  EXTREMITIES:  2+ Peripheral Pulses, No clubbing, cyanosis, or edema  SKIN: No rashes or lesions      LABS:                        10.9   9.16  )-----------( 285      ( 23 May 2020 06:04 )             31.9     05-23    144  |  113<H>  |  10  ----------------------------<  115<H>  3.3<L>   |  26  |  0.98    Ca    8.6      23 May 2020 06:04  Mg     1.6     05-22          CAPILLARY BLOOD GLUCOSE      POCT Blood Glucose.: 97 mg/dL (23 May 2020 07:47)  POCT Blood Glucose.: 108 mg/dL (23 May 2020 05:09)  POCT Blood Glucose.: 184 mg/dL (22 May 2020 23:12)  POCT Blood Glucose.: 148 mg/dL (22 May 2020 16:45)  POCT Blood Glucose.: 164 mg/dL (22 May 2020 11:29)

## 2020-05-24 LAB
ANION GAP SERPL CALC-SCNC: 7 MMOL/L — SIGNIFICANT CHANGE UP (ref 5–17)
BUN SERPL-MCNC: 8 MG/DL — SIGNIFICANT CHANGE UP (ref 7–18)
CALCIUM SERPL-MCNC: 8.7 MG/DL — SIGNIFICANT CHANGE UP (ref 8.4–10.5)
CHLORIDE SERPL-SCNC: 112 MMOL/L — HIGH (ref 96–108)
CO2 SERPL-SCNC: 27 MMOL/L — SIGNIFICANT CHANGE UP (ref 22–31)
CREAT SERPL-MCNC: 0.94 MG/DL — SIGNIFICANT CHANGE UP (ref 0.5–1.3)
D DIMER BLD IA.RAPID-MCNC: 1075 NG/ML DDU — HIGH
FERRITIN SERPL-MCNC: 1172 NG/ML — HIGH (ref 30–400)
GLUCOSE BLDC GLUCOMTR-MCNC: 100 MG/DL — HIGH (ref 70–99)
GLUCOSE BLDC GLUCOMTR-MCNC: 110 MG/DL — HIGH (ref 70–99)
GLUCOSE BLDC GLUCOMTR-MCNC: 119 MG/DL — HIGH (ref 70–99)
GLUCOSE BLDC GLUCOMTR-MCNC: 145 MG/DL — HIGH (ref 70–99)
GLUCOSE BLDC GLUCOMTR-MCNC: 160 MG/DL — HIGH (ref 70–99)
GLUCOSE BLDC GLUCOMTR-MCNC: 166 MG/DL — HIGH (ref 70–99)
GLUCOSE SERPL-MCNC: 106 MG/DL — HIGH (ref 70–99)
HCT VFR BLD CALC: 31.3 % — LOW (ref 39–50)
HGB BLD-MCNC: 10.9 G/DL — LOW (ref 13–17)
MCHC RBC-ENTMCNC: 29.1 PG — SIGNIFICANT CHANGE UP (ref 27–34)
MCHC RBC-ENTMCNC: 34.8 GM/DL — SIGNIFICANT CHANGE UP (ref 32–36)
MCV RBC AUTO: 83.5 FL — SIGNIFICANT CHANGE UP (ref 80–100)
NRBC # BLD: 0 /100 WBCS — SIGNIFICANT CHANGE UP (ref 0–0)
OSMOLALITY UR: 438 MOS/KG — SIGNIFICANT CHANGE UP (ref 50–1200)
PLATELET # BLD AUTO: 336 K/UL — SIGNIFICANT CHANGE UP (ref 150–400)
POTASSIUM SERPL-MCNC: 2.8 MMOL/L — CRITICAL LOW (ref 3.5–5.3)
POTASSIUM SERPL-SCNC: 2.8 MMOL/L — CRITICAL LOW (ref 3.5–5.3)
POTASSIUM UR-SCNC: 38 MMOL/L — SIGNIFICANT CHANGE UP
RBC # BLD: 3.75 M/UL — LOW (ref 4.2–5.8)
RBC # FLD: 12.4 % — SIGNIFICANT CHANGE UP (ref 10.3–14.5)
SODIUM SERPL-SCNC: 146 MMOL/L — HIGH (ref 135–145)
SODIUM UR-SCNC: 99 MMOL/L — SIGNIFICANT CHANGE UP
WBC # BLD: 8.71 K/UL — SIGNIFICANT CHANGE UP (ref 3.8–10.5)
WBC # FLD AUTO: 8.71 K/UL — SIGNIFICANT CHANGE UP (ref 3.8–10.5)

## 2020-05-24 RX ORDER — POTASSIUM CHLORIDE 20 MEQ
10 PACKET (EA) ORAL
Refills: 0 | Status: COMPLETED | OUTPATIENT
Start: 2020-05-24 | End: 2020-05-24

## 2020-05-24 RX ORDER — POTASSIUM CHLORIDE 20 MEQ
20 PACKET (EA) ORAL DAILY
Refills: 0 | Status: COMPLETED | OUTPATIENT
Start: 2020-05-25 | End: 2020-05-26

## 2020-05-24 RX ORDER — POTASSIUM CHLORIDE 20 MEQ
40 PACKET (EA) ORAL EVERY 4 HOURS
Refills: 0 | Status: COMPLETED | OUTPATIENT
Start: 2020-05-24 | End: 2020-05-24

## 2020-05-24 RX ADMIN — PIPERACILLIN AND TAZOBACTAM 25 GRAM(S): 4; .5 INJECTION, POWDER, LYOPHILIZED, FOR SOLUTION INTRAVENOUS at 21:36

## 2020-05-24 RX ADMIN — Medication 5 UNIT(S): at 11:57

## 2020-05-24 RX ADMIN — Medication 5 UNIT(S): at 05:45

## 2020-05-24 RX ADMIN — PIPERACILLIN AND TAZOBACTAM 25 GRAM(S): 4; .5 INJECTION, POWDER, LYOPHILIZED, FOR SOLUTION INTRAVENOUS at 11:58

## 2020-05-24 RX ADMIN — Medication 40 MILLIEQUIVALENT(S): at 11:58

## 2020-05-24 RX ADMIN — Medication 5 UNIT(S): at 02:13

## 2020-05-24 RX ADMIN — OLANZAPINE 2.5 MILLIGRAM(S): 15 TABLET, FILM COATED ORAL at 21:37

## 2020-05-24 RX ADMIN — SCOPALAMINE 1 PATCH: 1 PATCH, EXTENDED RELEASE TRANSDERMAL at 17:25

## 2020-05-24 RX ADMIN — Medication 1 TABLET(S): at 11:58

## 2020-05-24 RX ADMIN — SCOPALAMINE 1 PATCH: 1 PATCH, EXTENDED RELEASE TRANSDERMAL at 08:03

## 2020-05-24 RX ADMIN — LACTULOSE 10 GRAM(S): 10 SOLUTION ORAL at 12:00

## 2020-05-24 RX ADMIN — PIPERACILLIN AND TAZOBACTAM 25 GRAM(S): 4; .5 INJECTION, POWDER, LYOPHILIZED, FOR SOLUTION INTRAVENOUS at 05:46

## 2020-05-24 RX ADMIN — QUETIAPINE FUMARATE 25 MILLIGRAM(S): 200 TABLET, FILM COATED ORAL at 21:37

## 2020-05-24 RX ADMIN — Medication 100 MILLIEQUIVALENT(S): at 13:09

## 2020-05-24 RX ADMIN — MIRTAZAPINE 7.5 MILLIGRAM(S): 45 TABLET, ORALLY DISINTEGRATING ORAL at 21:37

## 2020-05-24 RX ADMIN — Medication 1: at 11:58

## 2020-05-24 RX ADMIN — Medication 1: at 02:12

## 2020-05-24 RX ADMIN — Medication 100 MILLIEQUIVALENT(S): at 10:12

## 2020-05-24 RX ADMIN — LACTULOSE 10 GRAM(S): 10 SOLUTION ORAL at 05:44

## 2020-05-24 RX ADMIN — HEPARIN SODIUM 5000 UNIT(S): 5000 INJECTION INTRAVENOUS; SUBCUTANEOUS at 17:24

## 2020-05-24 RX ADMIN — Medication 40 MILLIEQUIVALENT(S): at 10:12

## 2020-05-24 RX ADMIN — HEPARIN SODIUM 5000 UNIT(S): 5000 INJECTION INTRAVENOUS; SUBCUTANEOUS at 05:45

## 2020-05-24 RX ADMIN — Medication 5 UNIT(S): at 17:25

## 2020-05-24 RX ADMIN — INSULIN GLARGINE 10 UNIT(S): 100 INJECTION, SOLUTION SUBCUTANEOUS at 21:36

## 2020-05-24 RX ADMIN — Medication 100 MILLIEQUIVALENT(S): at 11:28

## 2020-05-24 RX ADMIN — PANTOPRAZOLE SODIUM 40 MILLIGRAM(S): 20 TABLET, DELAYED RELEASE ORAL at 11:58

## 2020-05-24 NOTE — PROGRESS NOTE ADULT - ASSESSMENT
64 yr old male with PMHX of DM,dementia sent to ER from facility for seizure and now COVID+,Delirium,MAXIME,hypernatremia and suspected aspiration pneumonia.  1.Speech and swallow re-eval.  2.COVID +supportive treatment.  3.Fever and aspiration pneumonia-zosyn, ID f/u.  4.DM-Insulin, Endo f/u.  5.Delirium-Psych f/u.  6.NGT placed-TF.  7.Replace K+.  8.GI and DVT prophylaxis.

## 2020-05-24 NOTE — PROGRESS NOTE ADULT - SUBJECTIVE AND OBJECTIVE BOX
Patient is a 64y old  Male who presents with a chief complaint of seizures (23 May 2020 12:12)  lethargic, arousable, confused and agitated at times. Remains with hands mittens.    INTERVAL HPI/OVERNIGHT EVENTS:      VITAL SIGNS:  T(F): 97.4 (05-24-20 @ 05:26)  HR: 50 (05-24-20 @ 05:26)  BP: 109/55 (05-24-20 @ 05:26)  RR: 17 (05-24-20 @ 05:26)  SpO2: 99% (05-24-20 @ 05:26)  Wt(kg): --  I&O's Detail    23 May 2020 07:01  -  24 May 2020 07:00  --------------------------------------------------------  IN:    Enteral Tube Flush: 600 mL    Free Water: 400 mL  Total IN: 1000 mL    OUT:  Total OUT: 0 mL    Total NET: 1000 mL              REVIEW OF SYSTEMS:    CONSTITUTIONAL:  No fevers, chills, sweats    HEENT:  Eyes:  No diplopia or blurred vision. ENT:  No earache, sore throat or runny nose.    CARDIOVASCULAR:  No pressure, squeezing, tightness, or heaviness about the chest; no palpitations.    RESPIRATORY:  Per HPI    GASTROINTESTINAL:  No abdominal pain, nausea, vomiting or diarrhea.    GENITOURINARY:  No dysuria, frequency or urgency.    NEUROLOGIC:  No paresthesias, fasciculations, seizures or weakness.    PSYCHIATRIC:  No disorder of thought or mood.      PHYSICAL EXAM:    Constitutional: Well developed and nourished  Eyes:Perrla  ENMT: normal  Neck:supple  Respiratory: good air entry  Cardiovascular: S1 S2 regular  Gastrointestinal: Soft, Non tender  Extremities: No edema  Vascular:normal  Neurological:Awake, alert,Ox3  Musculoskeletal:Normal      MEDICATIONS  (STANDING):  dextrose 50% Injectable 12.5 Gram(s) IV Push once  dextrose 50% Injectable 25 Gram(s) IV Push once  dextrose 50% Injectable 25 Gram(s) IV Push once  ergocalciferol 88122 Unit(s) Oral every week  heparin   Injectable 5000 Unit(s) SubCutaneous every 12 hours  insulin glargine Injectable (LANTUS) 10 Unit(s) SubCutaneous at bedtime  insulin lispro (HumaLOG) corrective regimen sliding scale   SubCutaneous every 6 hours  insulin lispro Injectable (HumaLOG) 5 Unit(s) SubCutaneous every 6 hours  lactulose Syrup 10 Gram(s) Oral three times a day  mirtazapine 7.5 milliGRAM(s) Oral at bedtime  multivitamin/minerals 1 Tablet(s) Oral daily  OLANZapine 2.5 milliGRAM(s) Oral at bedtime  pantoprazole  Injectable 40 milliGRAM(s) IV Push daily  piperacillin/tazobactam IVPB.. 3.375 Gram(s) IV Intermittent every 8 hours  potassium chloride   Powder 40 milliEquivalent(s) Oral every 4 hours  potassium chloride  10 mEq/100 mL IVPB 10 milliEquivalent(s) IV Intermittent every 1 hour  QUEtiapine 25 milliGRAM(s) Oral at bedtime  scopolamine   Patch 1 Patch Transdermal every 72 hours  sodium chloride 0.45% with potassium chloride 20 mEq/L 1000 milliLiter(s) (40 mL/Hr) IV Continuous <Continuous>    MEDICATIONS  (PRN):  acetaminophen  Suppository .. 650 milliGRAM(s) Rectal every 6 hours PRN Temp greater or equal to 38C (100.4F)  ALBUTerol    90 MICROgram(s) HFA Inhaler 2 Puff(s) Inhalation every 6 hours PRN Shortness of Breath and/or Wheezing  dextrose 40% Gel 15 Gram(s) Oral once PRN Blood Glucose LESS THAN 70 milliGRAM(s)/deciliter  glucagon  Injectable 1 milliGRAM(s) IntraMuscular once PRN Glucose LESS THAN 70 milligrams/deciliter  guaiFENesin   Syrup  (Sugar-Free) 100 milliGRAM(s) Oral every 6 hours PRN Cough      Allergies    No Known Allergies    Intolerances        LABS:                        10.9   8.71  )-----------( 336      ( 24 May 2020 08:16 )             31.3     05-24    146<H>  |  112<H>  |  8   ----------------------------<  106<H>  2.8<LL>   |  27  |  0.94    Ca    8.7      24 May 2020 08:16                CAPILLARY BLOOD GLUCOSE      POCT Blood Glucose.: 110 mg/dL (24 May 2020 07:41)  POCT Blood Glucose.: 100 mg/dL (24 May 2020 05:37)  POCT Blood Glucose.: 188 mg/dL (23 May 2020 18:07)  POCT Blood Glucose.: 131 mg/dL (23 May 2020 11:36)    pro-bnp -- 05-24 @ 08:16     d-dimer 1075  05-24 @ 08:16  pro-bnp -- 05-21 @ 07:00     d-dimer 483  05-21 @ 07:00  pro-bnp -- 05-19 @ 06:41     d-dimer 375  05-19 @ 06:41      RADIOLOGY & ADDITIONAL TESTS:    CXR:    < from: Xray Chest 1 View- PORTABLE-Routine (05.17.20 @ 20:04) >  IMPRESSION:    Persistent left lower lobe infiltrate.    Feeding tube in satisfactory position.      < end of copied text >  Ct scan chest:    ekg;    echo:

## 2020-05-24 NOTE — PROGRESS NOTE ADULT - SUBJECTIVE AND OBJECTIVE BOX
Problem List:  MAXIME resolved  Hypernatremia and Hypokalemia    PAST MEDICAL & SURGICAL HISTORY:  Dementia  No significant past surgical history      No Known Allergies      MEDICATIONS  (STANDING):  dextrose 50% Injectable 12.5 Gram(s) IV Push once  dextrose 50% Injectable 25 Gram(s) IV Push once  dextrose 50% Injectable 25 Gram(s) IV Push once  ergocalciferol 09623 Unit(s) Oral every week  heparin   Injectable 5000 Unit(s) SubCutaneous every 12 hours  insulin glargine Injectable (LANTUS) 10 Unit(s) SubCutaneous at bedtime  insulin lispro (HumaLOG) corrective regimen sliding scale   SubCutaneous every 6 hours  insulin lispro Injectable (HumaLOG) 5 Unit(s) SubCutaneous every 6 hours  lactulose Syrup 10 Gram(s) Oral three times a day  mirtazapine 7.5 milliGRAM(s) Oral at bedtime  multivitamin/minerals 1 Tablet(s) Oral daily  OLANZapine 2.5 milliGRAM(s) Oral at bedtime  pantoprazole  Injectable 40 milliGRAM(s) IV Push daily  piperacillin/tazobactam IVPB.. 3.375 Gram(s) IV Intermittent every 8 hours  potassium chloride   Powder 40 milliEquivalent(s) Oral every 4 hours  potassium chloride  10 mEq/100 mL IVPB 10 milliEquivalent(s) IV Intermittent every 1 hour  QUEtiapine 25 milliGRAM(s) Oral at bedtime  scopolamine   Patch 1 Patch Transdermal every 72 hours  sodium chloride 0.45% with potassium chloride 20 mEq/L 1000 milliLiter(s) (40 mL/Hr) IV Continuous <Continuous>    MEDICATIONS  (PRN):  acetaminophen  Suppository .. 650 milliGRAM(s) Rectal every 6 hours PRN Temp greater or equal to 38C (100.4F)  ALBUTerol    90 MICROgram(s) HFA Inhaler 2 Puff(s) Inhalation every 6 hours PRN Shortness of Breath and/or Wheezing  dextrose 40% Gel 15 Gram(s) Oral once PRN Blood Glucose LESS THAN 70 milliGRAM(s)/deciliter  glucagon  Injectable 1 milliGRAM(s) IntraMuscular once PRN Glucose LESS THAN 70 milligrams/deciliter  guaiFENesin   Syrup  (Sugar-Free) 100 milliGRAM(s) Oral every 6 hours PRN Cough                            10.9   8.71  )-----------( 336      ( 24 May 2020 08:16 )             31.3     05-24    146<H>  |  112<H>  |  8   ----------------------------<  106<H>  2.8<LL>   |  27  |  0.94    Ca    8.7      24 May 2020 08:16    Magnesium, Serum: 1.6 mg/dL (05.22.20 @ 09:48)        REVIEW OF SYSTEMS:  dementia.  One episode of BM today    VITALS:  T(F): 97.4 (05-24-20 @ 05:26), Max: 98.2 (05-23-20 @ 15:19)  HR: 50 (05-24-20 @ 05:26)  BP: 109/55 (05-24-20 @ 05:26)  RR: 17 (05-24-20 @ 05:26)  SpO2: 99% (05-24-20 @ 05:26)  Wt(kg): --    05-23 @ 07:01  -  05-24 @ 07:00  --------------------------------------------------------  IN: 1000 mL / OUT: 0 mL / NET: 1000 mL        PHYSICAL EXAM:  Constitutional:  no diaphoresis, no distress.  Neck: No JVD, no carotid bruit, supple, no adenopathy  Respiratory: Good air entrance B/L, no wheezes, rales or rhonchi  Cardiovascular: S1, S2, RRR, no pericardial rub, no murmur  Abdomen: BS+, soft, no tenderness, no bruit  Pelvis: bladder nondistended  Extremities: No cyanosis or clubbing. No peripheral edema.   Dementia

## 2020-05-24 NOTE — PROGRESS NOTE ADULT - SUBJECTIVE AND OBJECTIVE BOX
CARDIOLOGY/MEDICAL ATTENDING    CHIEF COMPLAINT:Patient is a 64y old  Male who presents with a chief complaint of seizures.Pt still with NGT in place and mittens.    	  REVIEW OF SYSTEMS:  [x ] Unable to obtain    PHYSICAL EXAM:  T(C): 36.3 (05-24-20 @ 05:26), Max: 36.8 (05-23-20 @ 15:19)  HR: 50 (05-24-20 @ 05:26) (50 - 78)  BP: 109/55 (05-24-20 @ 05:26) (109/55 - 120/70)  RR: 17 (05-24-20 @ 05:26) (17 - 17)  SpO2: 99% (05-24-20 @ 05:26) (99% - 100%)  Wt(kg): --  I&O's Summary    23 May 2020 07:01  -  24 May 2020 07:00  --------------------------------------------------------  IN: 1000 mL / OUT: 0 mL / NET: 1000 mL        Appearance: Normal	  HEENT:   Normal oral mucosa, PERRL, EOMI	  Lymphatic: No lymphadenopathy  Cardiovascular: Normal S1 S2, No JVD, No murmurs, No edema  Respiratory: B/L ronchi  Gastrointestinal:  Soft, Non-tender, + BS	  Skin: No rashes, No ecchymoses, No cyanosis	  Extremities: Normal range of motion, No clubbing, cyanosis or edema  Vascular: Peripheral pulses palpable 2+ bilaterally    MEDICATIONS  (STANDING):  dextrose 50% Injectable 12.5 Gram(s) IV Push once  dextrose 50% Injectable 25 Gram(s) IV Push once  dextrose 50% Injectable 25 Gram(s) IV Push once  ergocalciferol 43287 Unit(s) Oral every week  heparin   Injectable 5000 Unit(s) SubCutaneous every 12 hours  insulin glargine Injectable (LANTUS) 10 Unit(s) SubCutaneous at bedtime  insulin lispro (HumaLOG) corrective regimen sliding scale   SubCutaneous every 6 hours  insulin lispro Injectable (HumaLOG) 5 Unit(s) SubCutaneous every 6 hours  mirtazapine 7.5 milliGRAM(s) Oral at bedtime  multivitamin/minerals 1 Tablet(s) Oral daily  OLANZapine 2.5 milliGRAM(s) Oral at bedtime  pantoprazole  Injectable 40 milliGRAM(s) IV Push daily  piperacillin/tazobactam IVPB.. 3.375 Gram(s) IV Intermittent every 8 hours  potassium chloride  10 mEq/100 mL IVPB 10 milliEquivalent(s) IV Intermittent every 1 hour  QUEtiapine 25 milliGRAM(s) Oral at bedtime  scopolamine   Patch 1 Patch Transdermal every 72 hours  sodium chloride 0.45% with potassium chloride 20 mEq/L 1000 milliLiter(s) (40 mL/Hr) IV Continuous <Continuous>      	  	  LABS:	 	                      10.9   8.71  )-----------( 336      ( 24 May 2020 08:16 )             31.3     05-24    146<H>  |  112<H>  |  8   ----------------------------<  106<H>  2.8<LL>   |  27  |  0.94    Ca    8.7      24 May 2020 08:16      proBNP: Serum Pro-Brain Natriuretic Peptide: 126 pg/mL (05-01 @ 09:36)    Lipid Profile: Cholesterol 161  LDL 95  HDL 35        TSH: Thyroid Stimulating Hormone, Serum: 2.39 uU/mL (05-01 @ 09:36)      	  EXAM:  XR CHEST PORTABLE ROUTINE 1V                            PROCEDURE DATE:  05/23/2020          INTERPRETATION:  Portable chest x-ray    Indication: pneumonia.    Portable chest x-ray is compared to a previous examination dated 5/17/2020.    Impression: Stable NG tube.    Possible mild left pleural effusion.    No evidence for pneumothorax.    Opacification in the left mid to lower lung zone, slightly progressed.    The trachea is midline.    Stable cardiac silhouette.

## 2020-05-24 NOTE — PROGRESS NOTE ADULT - ASSESSMENT
Hypernatremia due to dehydration Hypokalemia , follow urine lytes.  He was placed on K supplement today.      Change diet to Glucerna from Nepro.  Follow magnesium and PO4 in am

## 2020-05-24 NOTE — PROGRESS NOTE ADULT - PROBLEM SELECTOR PLAN 1
Urine culture noted  Antibiotics  Oxygen Supp  Monitor Oxygen sat  Taper Oxygen Supp as feasible  NGT in place with feeds.  Follow up CXR  ID follow up

## 2020-05-25 LAB
ANION GAP SERPL CALC-SCNC: 9 MMOL/L — SIGNIFICANT CHANGE UP (ref 5–17)
BUN SERPL-MCNC: 9 MG/DL — SIGNIFICANT CHANGE UP (ref 7–18)
CALCIUM SERPL-MCNC: 9 MG/DL — SIGNIFICANT CHANGE UP (ref 8.4–10.5)
CHLORIDE SERPL-SCNC: 111 MMOL/L — HIGH (ref 96–108)
CO2 SERPL-SCNC: 24 MMOL/L — SIGNIFICANT CHANGE UP (ref 22–31)
CREAT SERPL-MCNC: 0.9 MG/DL — SIGNIFICANT CHANGE UP (ref 0.5–1.3)
GLUCOSE BLDC GLUCOMTR-MCNC: 102 MG/DL — HIGH (ref 70–99)
GLUCOSE BLDC GLUCOMTR-MCNC: 136 MG/DL — HIGH (ref 70–99)
GLUCOSE BLDC GLUCOMTR-MCNC: 168 MG/DL — HIGH (ref 70–99)
GLUCOSE BLDC GLUCOMTR-MCNC: 172 MG/DL — HIGH (ref 70–99)
GLUCOSE BLDC GLUCOMTR-MCNC: 206 MG/DL — HIGH (ref 70–99)
GLUCOSE SERPL-MCNC: 105 MG/DL — HIGH (ref 70–99)
HCT VFR BLD CALC: 33.4 % — LOW (ref 39–50)
HGB BLD-MCNC: 11.6 G/DL — LOW (ref 13–17)
MAGNESIUM SERPL-MCNC: 1.8 MG/DL — SIGNIFICANT CHANGE UP (ref 1.6–2.6)
MCHC RBC-ENTMCNC: 29.1 PG — SIGNIFICANT CHANGE UP (ref 27–34)
MCHC RBC-ENTMCNC: 34.7 GM/DL — SIGNIFICANT CHANGE UP (ref 32–36)
MCV RBC AUTO: 83.9 FL — SIGNIFICANT CHANGE UP (ref 80–100)
NRBC # BLD: 0 /100 WBCS — SIGNIFICANT CHANGE UP (ref 0–0)
PHOSPHATE SERPL-MCNC: 2.8 MG/DL — SIGNIFICANT CHANGE UP (ref 2.5–4.5)
PLATELET # BLD AUTO: 340 K/UL — SIGNIFICANT CHANGE UP (ref 150–400)
POTASSIUM SERPL-MCNC: 3.8 MMOL/L — SIGNIFICANT CHANGE UP (ref 3.5–5.3)
POTASSIUM SERPL-SCNC: 3.8 MMOL/L — SIGNIFICANT CHANGE UP (ref 3.5–5.3)
RBC # BLD: 3.98 M/UL — LOW (ref 4.2–5.8)
RBC # FLD: 12.7 % — SIGNIFICANT CHANGE UP (ref 10.3–14.5)
SODIUM SERPL-SCNC: 144 MMOL/L — SIGNIFICANT CHANGE UP (ref 135–145)
WBC # BLD: 7 K/UL — SIGNIFICANT CHANGE UP (ref 3.8–10.5)
WBC # FLD AUTO: 7 K/UL — SIGNIFICANT CHANGE UP (ref 3.8–10.5)

## 2020-05-25 PROCEDURE — 99232 SBSQ HOSP IP/OBS MODERATE 35: CPT

## 2020-05-25 RX ADMIN — PIPERACILLIN AND TAZOBACTAM 25 GRAM(S): 4; .5 INJECTION, POWDER, LYOPHILIZED, FOR SOLUTION INTRAVENOUS at 15:07

## 2020-05-25 RX ADMIN — QUETIAPINE FUMARATE 25 MILLIGRAM(S): 200 TABLET, FILM COATED ORAL at 18:31

## 2020-05-25 RX ADMIN — PIPERACILLIN AND TAZOBACTAM 25 GRAM(S): 4; .5 INJECTION, POWDER, LYOPHILIZED, FOR SOLUTION INTRAVENOUS at 21:01

## 2020-05-25 RX ADMIN — Medication 20 MILLIEQUIVALENT(S): at 15:05

## 2020-05-25 RX ADMIN — Medication 1: at 23:39

## 2020-05-25 RX ADMIN — MIRTAZAPINE 7.5 MILLIGRAM(S): 45 TABLET, ORALLY DISINTEGRATING ORAL at 18:31

## 2020-05-25 RX ADMIN — SCOPALAMINE 1 PATCH: 1 PATCH, EXTENDED RELEASE TRANSDERMAL at 07:27

## 2020-05-25 RX ADMIN — PIPERACILLIN AND TAZOBACTAM 25 GRAM(S): 4; .5 INJECTION, POWDER, LYOPHILIZED, FOR SOLUTION INTRAVENOUS at 06:13

## 2020-05-25 RX ADMIN — SCOPALAMINE 1 PATCH: 1 PATCH, EXTENDED RELEASE TRANSDERMAL at 20:19

## 2020-05-25 RX ADMIN — Medication 5 UNIT(S): at 01:33

## 2020-05-25 RX ADMIN — HEPARIN SODIUM 5000 UNIT(S): 5000 INJECTION INTRAVENOUS; SUBCUTANEOUS at 06:12

## 2020-05-25 RX ADMIN — OLANZAPINE 2.5 MILLIGRAM(S): 15 TABLET, FILM COATED ORAL at 18:31

## 2020-05-25 RX ADMIN — HEPARIN SODIUM 5000 UNIT(S): 5000 INJECTION INTRAVENOUS; SUBCUTANEOUS at 17:51

## 2020-05-25 RX ADMIN — SCOPALAMINE 1 PATCH: 1 PATCH, EXTENDED RELEASE TRANSDERMAL at 01:35

## 2020-05-25 RX ADMIN — Medication 5 UNIT(S): at 17:52

## 2020-05-25 RX ADMIN — Medication 2: at 17:50

## 2020-05-25 RX ADMIN — INSULIN GLARGINE 10 UNIT(S): 100 INJECTION, SOLUTION SUBCUTANEOUS at 23:39

## 2020-05-25 RX ADMIN — Medication 5 UNIT(S): at 13:15

## 2020-05-25 RX ADMIN — Medication 1 TABLET(S): at 15:05

## 2020-05-25 RX ADMIN — PANTOPRAZOLE SODIUM 40 MILLIGRAM(S): 20 TABLET, DELAYED RELEASE ORAL at 15:05

## 2020-05-25 RX ADMIN — Medication 5 UNIT(S): at 06:13

## 2020-05-25 RX ADMIN — Medication 5 UNIT(S): at 23:39

## 2020-05-25 NOTE — PROGRESS NOTE ADULT - SUBJECTIVE AND OBJECTIVE BOX
Pt is lethargic with periods of agitation, lying in bed in NAD. NGT in place.     INTERVAL HPI/OVERNIGHT EVENTS:      VITAL SIGNS:  T(F): 98 (05-25-20 @ 06:19)  HR: 55 (05-25-20 @ 06:19)  BP: 120/68 (05-25-20 @ 06:19)  RR: 18 (05-25-20 @ 06:19)  SpO2: 97% (05-25-20 @ 06:19)  Wt(kg): --  I&O's Detail          REVIEW OF SYSTEMS:    CONSTITUTIONAL:  No fevers, chills, sweats    HEENT:  Eyes:  No diplopia or blurred vision. ENT:  No earache, sore throat or runny nose.    CARDIOVASCULAR:  No pressure, squeezing, tightness, or heaviness about the chest; no palpitations.    RESPIRATORY:  Per HPI    GASTROINTESTINAL:  No abdominal pain, nausea, vomiting or diarrhea.    GENITOURINARY:  No dysuria, frequency or urgency.    NEUROLOGIC:  No paresthesias, fasciculations, seizures or weakness.    PSYCHIATRIC:  No disorder of thought or mood.      PHYSICAL EXAM:    Constitutional: Well developed  Eyes:Perrla  ENMT: normal  Neck:supple  Respiratory: good air entry  Cardiovascular: S1 S2 regular  Gastrointestinal: Soft, Non tender; NGT with feeds.   Extremities: No edema  Vascular:normal  Neurological: agitated at times.   Musculoskeletal: weak.       MEDICATIONS  (STANDING):  dextrose 50% Injectable 12.5 Gram(s) IV Push once  dextrose 50% Injectable 25 Gram(s) IV Push once  dextrose 50% Injectable 25 Gram(s) IV Push once  ergocalciferol 91276 Unit(s) Oral every week  heparin   Injectable 5000 Unit(s) SubCutaneous every 12 hours  insulin glargine Injectable (LANTUS) 10 Unit(s) SubCutaneous at bedtime  insulin lispro (HumaLOG) corrective regimen sliding scale   SubCutaneous every 6 hours  insulin lispro Injectable (HumaLOG) 5 Unit(s) SubCutaneous every 6 hours  mirtazapine 7.5 milliGRAM(s) Oral at bedtime  multivitamin/minerals 1 Tablet(s) Oral daily  OLANZapine 2.5 milliGRAM(s) Oral at bedtime  pantoprazole  Injectable 40 milliGRAM(s) IV Push daily  piperacillin/tazobactam IVPB.. 3.375 Gram(s) IV Intermittent every 8 hours  potassium chloride   Powder 20 milliEquivalent(s) Oral daily  QUEtiapine 25 milliGRAM(s) Oral at bedtime  scopolamine   Patch 1 Patch Transdermal every 72 hours    MEDICATIONS  (PRN):  acetaminophen  Suppository .. 650 milliGRAM(s) Rectal every 6 hours PRN Temp greater or equal to 38C (100.4F)  ALBUTerol    90 MICROgram(s) HFA Inhaler 2 Puff(s) Inhalation every 6 hours PRN Shortness of Breath and/or Wheezing  dextrose 40% Gel 15 Gram(s) Oral once PRN Blood Glucose LESS THAN 70 milliGRAM(s)/deciliter  glucagon  Injectable 1 milliGRAM(s) IntraMuscular once PRN Glucose LESS THAN 70 milligrams/deciliter  guaiFENesin   Syrup  (Sugar-Free) 100 milliGRAM(s) Oral every 6 hours PRN Cough      Allergies    No Known Allergies    Intolerances        LABS:                        11.6   7.00  )-----------( 340      ( 25 May 2020 07:20 )             33.4     05-25    144  |  111<H>  |  9   ----------------------------<  105<H>  3.8   |  24  |  0.90    Ca    9.0      25 May 2020 07:20  Phos  2.8     05-25  Mg     1.8     05-25                CAPILLARY BLOOD GLUCOSE      POCT Blood Glucose.: 102 mg/dL (25 May 2020 06:17)  POCT Blood Glucose.: 168 mg/dL (25 May 2020 01:46)  POCT Blood Glucose.: 119 mg/dL (24 May 2020 21:07)  POCT Blood Glucose.: 145 mg/dL (24 May 2020 17:01)  POCT Blood Glucose.: 160 mg/dL (24 May 2020 11:51)    pro-bnp -- 05-24 @ 08:16     d-dimer 1075  05-24 @ 08:16  pro-bnp -- 05-21 @ 07:00     d-dimer 483  05-21 @ 07:00  pro-bnp -- 05-19 @ 06:41     d-dimer 375  05-19 @ 06:41      RADIOLOGY & ADDITIONAL TESTS:    CXR:  < from: Xray Chest 1 View- PORTABLE-Routine (05.23.20 @ 17:40) >  Impression: Stable NG tube.    Possible mild left pleural effusion.    No evidence for pneumothorax.    Opacification in the left mid to lower lung zone, slightly progressed.    The trachea is midline.    Stable cardiac silhouette.    < end of copied text >    Ct scan chest:    ekg;    echo:

## 2020-05-25 NOTE — PROGRESS NOTE ADULT - PROBLEM SELECTOR PLAN 1
Urine culture noted  Antibiotics  Oxygen Supp  Monitor Oxygen sat  Taper Oxygen Supp as feasible  NGT in place with feeds.  Follow up CXR noted.   Repeat S/S eval.   ID follow up

## 2020-05-25 NOTE — PROGRESS NOTE ADULT - ASSESSMENT
64 yr old male with PMHX of DM,dementia sent to ER from facility for seizure and now COVID+,Delirium,MAXIME,hypernatremia and suspected aspiration pneumonia.  1.Speech and swallow re-eval.  2.COVID +supportive treatment.  3.Fever and aspiration pneumonia-zosyn, ID f/u.  4.DM-Insulin, Endo f/u.  5.Delirium-Psych f/u.  6.NGT placed-TF.  7.GI and DVT prophylaxis.

## 2020-05-25 NOTE — PROGRESS NOTE ADULT - SUBJECTIVE AND OBJECTIVE BOX
CARDIOLOGY/MEDICAL ATTENDING    CHIEF COMPLAINT:Patient is a 64y old  Male who presents with a chief complaint of seizures. Pt still with NGT in place and mittens.    	  REVIEW OF SYSTEMS:    [x ] Unable to obtain    PHYSICAL EXAM:  T(C): 36.1 (05-25-20 @ 12:21), Max: 36.7 (05-25-20 @ 06:19)  HR: 59 (05-25-20 @ 12:21) (55 - 72)  BP: 128/68 (05-25-20 @ 12:21) (108/57 - 128/68)  RR: 16 (05-25-20 @ 12:21) (16 - 18)  SpO2: 97% (05-25-20 @ 12:21) (97% - 100%)  Wt(kg): --  I&O's Summary      Appearance: Normal	  HEENT:   Normal oral mucosa, PERRL, EOMI	  Lymphatic: No lymphadenopathy  Cardiovascular: Normal S1 S2, No JVD, No murmurs, No edema  Respiratory: B/L ronchi  Gastrointestinal:  Soft, Non-tender, + BS	  Skin: No rashes, No ecchymoses, No cyanosis	  Extremities: Normal range of motion, No clubbing, cyanosis or edema  Vascular: Peripheral pulses palpable 2+ bilaterally    MEDICATIONS  (STANDING):  dextrose 50% Injectable 12.5 Gram(s) IV Push once  dextrose 50% Injectable 25 Gram(s) IV Push once  dextrose 50% Injectable 25 Gram(s) IV Push once  ergocalciferol 12638 Unit(s) Oral every week  heparin   Injectable 5000 Unit(s) SubCutaneous every 12 hours  insulin glargine Injectable (LANTUS) 10 Unit(s) SubCutaneous at bedtime  insulin lispro (HumaLOG) corrective regimen sliding scale   SubCutaneous every 6 hours  insulin lispro Injectable (HumaLOG) 5 Unit(s) SubCutaneous every 6 hours  mirtazapine 7.5 milliGRAM(s) Oral at bedtime  multivitamin/minerals 1 Tablet(s) Oral daily  OLANZapine 2.5 milliGRAM(s) Oral at bedtime  pantoprazole  Injectable 40 milliGRAM(s) IV Push daily  piperacillin/tazobactam IVPB.. 3.375 Gram(s) IV Intermittent every 8 hours  potassium chloride   Powder 20 milliEquivalent(s) Oral daily  QUEtiapine 25 milliGRAM(s) Oral at bedtime  scopolamine   Patch 1 Patch Transdermal every 72 hours      	  	  LABS:	 	                        11.6   7.00  )-----------( 340      ( 25 May 2020 07:20 )             33.4     05-25    144  |  111<H>  |  9   ----------------------------<  105<H>  3.8   |  24  |  0.90    Ca    9.0      25 May 2020 07:20  Phos  2.8     05-25  Mg     1.8     05-25      proBNP: Serum Pro-Brain Natriuretic Peptide: 126 pg/mL (05-01 @ 09:36)    Lipid Profile: Cholesterol 161  LDL 95  HDL 35        TSH: Thyroid Stimulating Hormone, Serum: 2.39 uU/mL (05-01 @ 09:36)

## 2020-05-25 NOTE — PROGRESS NOTE ADULT - SUBJECTIVE AND OBJECTIVE BOX
Problem List:  MAXIME due to hypovolemia and hypotension resolved  Hypokalemia improved, feeding changed  COVID 19, PNEUMONIA    PAST MEDICAL & SURGICAL HISTORY:  Dementia  No significant past surgical history      No Known Allergies      MEDICATIONS  (STANDING):  dextrose 50% Injectable 12.5 Gram(s) IV Push once  dextrose 50% Injectable 25 Gram(s) IV Push once  dextrose 50% Injectable 25 Gram(s) IV Push once  ergocalciferol 15588 Unit(s) Oral every week  heparin   Injectable 5000 Unit(s) SubCutaneous every 12 hours  insulin glargine Injectable (LANTUS) 10 Unit(s) SubCutaneous at bedtime  insulin lispro (HumaLOG) corrective regimen sliding scale   SubCutaneous every 6 hours  insulin lispro Injectable (HumaLOG) 5 Unit(s) SubCutaneous every 6 hours  mirtazapine 7.5 milliGRAM(s) Oral at bedtime  multivitamin/minerals 1 Tablet(s) Oral daily  OLANZapine 2.5 milliGRAM(s) Oral at bedtime  pantoprazole  Injectable 40 milliGRAM(s) IV Push daily  piperacillin/tazobactam IVPB.. 3.375 Gram(s) IV Intermittent every 8 hours  potassium chloride   Powder 20 milliEquivalent(s) Oral daily  QUEtiapine 25 milliGRAM(s) Oral at bedtime  scopolamine   Patch 1 Patch Transdermal every 72 hours    MEDICATIONS  (PRN):  acetaminophen  Suppository .. 650 milliGRAM(s) Rectal every 6 hours PRN Temp greater or equal to 38C (100.4F)  ALBUTerol    90 MICROgram(s) HFA Inhaler 2 Puff(s) Inhalation every 6 hours PRN Shortness of Breath and/or Wheezing  dextrose 40% Gel 15 Gram(s) Oral once PRN Blood Glucose LESS THAN 70 milliGRAM(s)/deciliter  glucagon  Injectable 1 milliGRAM(s) IntraMuscular once PRN Glucose LESS THAN 70 milligrams/deciliter  guaiFENesin   Syrup  (Sugar-Free) 100 milliGRAM(s) Oral every 6 hours PRN Cough                            11.6   7.00  )-----------( 340      ( 25 May 2020 07:20 )             33.4     05-25    144  |  111<H>  |  9   ----------------------------<  105<H>  3.8   |  24  |  0.90    Ca    9.0      25 May 2020 07:20  Phos  2.8     05-25  Mg     1.8     05-25          Potassium, Random Urine: 38 mmol/L (05-24 @ 16:26)  Osmolality, Random Urine: 438 mos/kg (05-24 @ 16:26)  Sodium, Random Urine: 99 mmol/L (05-24 @ 16:26)      REVIEW OF SYSTEMS:  SEVERE DENENTAI, non communicative      VITALS:  T(F): 98 (05-25-20 @ 06:19), Max: 98 (05-25-20 @ 06:19)  HR: 55 (05-25-20 @ 06:19)  BP: 120/68 (05-25-20 @ 06:19)  RR: 18 (05-25-20 @ 06:19)  SpO2: 97% (05-25-20 @ 06:19)  Wt(kg): --      PHYSICAL EXAM:  Constitutional: well developed, no diaphoresis, no distress.  Neck: No JVD, no carotid bruit, supple, no adenopathy  Respiratory: Good air entrance B/L, no wheezes, rales or rhonchi  Cardiovascular: S1, S2, RRR, no pericardial rub, no murmur  Abdomen: BS+, soft, no tenderness, no bruit  Pelvis: bladder nondistended  Extremities: No cyanosis or clubbing. No peripheral edema.   LETHARGUC

## 2020-05-25 NOTE — PROGRESS NOTE ADULT - SUBJECTIVE AND OBJECTIVE BOX
64 year old male from [facility] with PMH of dementia and type 2 DM presented with seizure activity while sitting at AL. Endocrinology was consulted for management of uncontrolled type 2 DM with hyperglycemia.    Patient seen and examined at bedside. He is resting comfortably. FS reviewed. BG at goal <180, ranging from 105-168. Patient remains on tube feedings.     MEDICATIONS  (STANDING):  dextrose 50% Injectable 12.5 Gram(s) IV Push once  dextrose 50% Injectable 25 Gram(s) IV Push once  dextrose 50% Injectable 25 Gram(s) IV Push once  ergocalciferol 05680 Unit(s) Oral every week  heparin   Injectable 5000 Unit(s) SubCutaneous every 12 hours  insulin glargine Injectable (LANTUS) 10 Unit(s) SubCutaneous at bedtime  insulin lispro (HumaLOG) corrective regimen sliding scale   SubCutaneous every 6 hours  insulin lispro Injectable (HumaLOG) 5 Unit(s) SubCutaneous every 6 hours  mirtazapine 7.5 milliGRAM(s) Oral at bedtime  multivitamin/minerals 1 Tablet(s) Oral daily  OLANZapine 2.5 milliGRAM(s) Oral at bedtime  pantoprazole  Injectable 40 milliGRAM(s) IV Push daily  piperacillin/tazobactam IVPB.. 3.375 Gram(s) IV Intermittent every 8 hours  potassium chloride   Powder 20 milliEquivalent(s) Oral daily  QUEtiapine 25 milliGRAM(s) Oral at bedtime  scopolamine   Patch 1 Patch Transdermal every 72 hours    MEDICATIONS  (PRN):  acetaminophen  Suppository .. 650 milliGRAM(s) Rectal every 6 hours PRN Temp greater or equal to 38C (100.4F)  ALBUTerol    90 MICROgram(s) HFA Inhaler 2 Puff(s) Inhalation every 6 hours PRN Shortness of Breath and/or Wheezing  dextrose 40% Gel 15 Gram(s) Oral once PRN Blood Glucose LESS THAN 70 milliGRAM(s)/deciliter  glucagon  Injectable 1 milliGRAM(s) IntraMuscular once PRN Glucose LESS THAN 70 milligrams/deciliter  guaiFENesin   Syrup  (Sugar-Free) 100 milliGRAM(s) Oral every 6 hours PRN Cough      REVIEW OF SYSTEMS:  unable to obtain due to medical condition    PHYSICAL EXAM:  VITAL SIGNS  T(C): 36.7 (05-25-20 @ 06:19), Max: 36.7 (05-25-20 @ 06:19)  HR: 55 (05-25-20 @ 06:19) (55 - 72)  BP: 120/68 (05-25-20 @ 06:19) (108/57 - 120/68)  RR: 18 (05-25-20 @ 06:19) (16 - 18)  SpO2: 97% (05-25-20 @ 06:19) (97% - 100%)        GENERAL: more awake tody, NAD, well-developed  HEAD:  Atraumatic, Normocephalic  EYES: EOMI, PERRLA, conjunctiva and sclera clear  ENMT: No tonsillar erythema, exudates, or enlargement; No lesions  NECK: Supple, No JVD, Normal thyroid  NERVOUS SYSTEM:  nonverbal,  CHEST/LUNG: decreased breath sounds, No rales, rhonchi, wheezing, or rubs  HEART: Regular rate and rhythm; No murmurs, rubs, or gallops  ABDOMEN: Soft, Nontender, Nondistended; Bowel sounds present  EXTREMITIES:  2+ Peripheral Pulses, No clubbing, cyanosis, or edema  SKIN: No rashes or lesions    LABS:                        11.6   7.00  )-----------( 340      ( 25 May 2020 07:20 )             33.4     05-25    144  |  111<H>  |  9   ----------------------------<  105<H>  3.8   |  24  |  0.90    Ca    9.0      25 May 2020 07:20  Phos  2.8     05-25  Mg     1.8     05-25          CAPILLARY BLOOD GLUCOSE      POCT Blood Glucose.: 102 mg/dL (25 May 2020 06:17)  POCT Blood Glucose.: 168 mg/dL (25 May 2020 01:46)  POCT Blood Glucose.: 119 mg/dL (24 May 2020 21:07)  POCT Blood Glucose.: 145 mg/dL (24 May 2020 17:01)  POCT Blood Glucose.: 160 mg/dL (24 May 2020 11:51)

## 2020-05-25 NOTE — PROGRESS NOTE ADULT - PROBLEM SELECTOR PLAN 6

## 2020-05-25 NOTE — PROGRESS NOTE ADULT - ASSESSMENT
64 year old male from [facility] with PMH of dementia and type 2 DM presented with seizure activity while sitting at AL. Endocrinology was consulted for management of uncontrolled type 2 DM with hyperglycemia.    1. Uncontrolled Type 2 DM with hyperglycemia, A1c 9.9%  -BG at goal <180  -patient on tube feedings  -continue Lantus 10 units at bedtime  -continue Humalog 5 units TID q 6 hrs   -continue low dose rapid acting insulin q 6 hrs as below  BG 70-100mg/dL 0 units  -150 mg/dL 0 units  -200 mg/dL 1 unit  -250 mg/dL 2 units  -300 mg/dL 3 units  -350 mg/dL 4 units  -400 mg/dL 5 units  BG > 400mg/dL 6 units  -FS AC HS  -ensure consistent carbohydrate   -will monitor FS and adjust accordingly   -patient pending repeat speech and swallow evalution     2.  Aspiration pneumonia  -oxygen supplementation as needed   -continue antibiotics as per primary team   -pulmonology follow up    3. Delirium  -patient now slightly more awake with periods of agitation  -psychiatry follow up    Plan discussed with primary team]  Please  call  w/ any questions or concerns [phone] 64 year old male from [facility] with PMH of dementia and type 2 DM presented with seizure activity while sitting at AL. Endocrinology was consulted for management of uncontrolled type 2 DM with hyperglycemia.    1. Uncontrolled Type 2 DM with hyperglycemia, A1c 9.9%  -BG at goal <180  -patient on tube feedings  -continue Lantus 10 units at bedtime  -continue Humalog 5 units TID q 6 hrs   -continue low dose rapid acting insulin q 6 hrs as below  BG 70-100mg/dL 0 units  -150 mg/dL 0 units  -200 mg/dL 1 unit  -250 mg/dL 2 units  -300 mg/dL 3 units  -350 mg/dL 4 units  -400 mg/dL 5 units  BG > 400mg/dL 6 units  -FS AC HS  -ensure consistent carbohydrate   -will monitor FS and adjust accordingly   -patient pending repeat speech and swallow evaluation     2.  Aspiration pneumonia  -oxygen supplementation as needed   -continue antibiotics as per primary team   -pulmonology follow up    3. Delirium  -patient now slightly more awake with periods of agitation  -psychiatry follow up    Plan discussed with primary team]  Please  call  w/ any questions or concerns [phone]

## 2020-05-25 NOTE — PROGRESS NOTE ADULT - ASSESSMENT
Hypernatremia due to dehydration   Hypokalemia , IMPROVED  Magnesium and  PO4 are in normal range    No need for further renal followup  Continue to monitor electrolytes  Continue Hydration of at least 2 liters per day.  Call me if need

## 2020-05-26 LAB
ANION GAP SERPL CALC-SCNC: 8 MMOL/L — SIGNIFICANT CHANGE UP (ref 5–17)
BUN SERPL-MCNC: 9 MG/DL — SIGNIFICANT CHANGE UP (ref 7–18)
CALCIUM SERPL-MCNC: 9.5 MG/DL — SIGNIFICANT CHANGE UP (ref 8.4–10.5)
CHLORIDE SERPL-SCNC: 108 MMOL/L — SIGNIFICANT CHANGE UP (ref 96–108)
CO2 SERPL-SCNC: 27 MMOL/L — SIGNIFICANT CHANGE UP (ref 22–31)
CREAT SERPL-MCNC: 1.01 MG/DL — SIGNIFICANT CHANGE UP (ref 0.5–1.3)
GLUCOSE BLDC GLUCOMTR-MCNC: 167 MG/DL — HIGH (ref 70–99)
GLUCOSE BLDC GLUCOMTR-MCNC: 201 MG/DL — HIGH (ref 70–99)
GLUCOSE BLDC GLUCOMTR-MCNC: 204 MG/DL — HIGH (ref 70–99)
GLUCOSE BLDC GLUCOMTR-MCNC: 207 MG/DL — HIGH (ref 70–99)
GLUCOSE BLDC GLUCOMTR-MCNC: 98 MG/DL — SIGNIFICANT CHANGE UP (ref 70–99)
GLUCOSE SERPL-MCNC: 86 MG/DL — SIGNIFICANT CHANGE UP (ref 70–99)
HCT VFR BLD CALC: 35.4 % — LOW (ref 39–50)
HGB BLD-MCNC: 12 G/DL — LOW (ref 13–17)
MCHC RBC-ENTMCNC: 28.3 PG — SIGNIFICANT CHANGE UP (ref 27–34)
MCHC RBC-ENTMCNC: 33.9 GM/DL — SIGNIFICANT CHANGE UP (ref 32–36)
MCV RBC AUTO: 83.5 FL — SIGNIFICANT CHANGE UP (ref 80–100)
NRBC # BLD: 0 /100 WBCS — SIGNIFICANT CHANGE UP (ref 0–0)
PLATELET # BLD AUTO: 409 K/UL — HIGH (ref 150–400)
POTASSIUM SERPL-MCNC: 3.6 MMOL/L — SIGNIFICANT CHANGE UP (ref 3.5–5.3)
POTASSIUM SERPL-SCNC: 3.6 MMOL/L — SIGNIFICANT CHANGE UP (ref 3.5–5.3)
RBC # BLD: 4.24 M/UL — SIGNIFICANT CHANGE UP (ref 4.2–5.8)
RBC # FLD: 13.2 % — SIGNIFICANT CHANGE UP (ref 10.3–14.5)
SARS-COV-2 RNA SPEC QL NAA+PROBE: DETECTED
SODIUM SERPL-SCNC: 143 MMOL/L — SIGNIFICANT CHANGE UP (ref 135–145)
WBC # BLD: 8.56 K/UL — SIGNIFICANT CHANGE UP (ref 3.8–10.5)
WBC # FLD AUTO: 8.56 K/UL — SIGNIFICANT CHANGE UP (ref 3.8–10.5)

## 2020-05-26 PROCEDURE — 99232 SBSQ HOSP IP/OBS MODERATE 35: CPT

## 2020-05-26 RX ORDER — INSULIN GLARGINE 100 [IU]/ML
8 INJECTION, SOLUTION SUBCUTANEOUS AT BEDTIME
Refills: 0 | Status: DISCONTINUED | OUTPATIENT
Start: 2020-05-26 | End: 2020-06-01

## 2020-05-26 RX ADMIN — HEPARIN SODIUM 5000 UNIT(S): 5000 INJECTION INTRAVENOUS; SUBCUTANEOUS at 18:06

## 2020-05-26 RX ADMIN — PANTOPRAZOLE SODIUM 40 MILLIGRAM(S): 20 TABLET, DELAYED RELEASE ORAL at 12:36

## 2020-05-26 RX ADMIN — HEPARIN SODIUM 5000 UNIT(S): 5000 INJECTION INTRAVENOUS; SUBCUTANEOUS at 05:25

## 2020-05-26 RX ADMIN — SCOPALAMINE 1 PATCH: 1 PATCH, EXTENDED RELEASE TRANSDERMAL at 20:30

## 2020-05-26 RX ADMIN — OLANZAPINE 2.5 MILLIGRAM(S): 15 TABLET, FILM COATED ORAL at 21:11

## 2020-05-26 RX ADMIN — PIPERACILLIN AND TAZOBACTAM 25 GRAM(S): 4; .5 INJECTION, POWDER, LYOPHILIZED, FOR SOLUTION INTRAVENOUS at 05:25

## 2020-05-26 RX ADMIN — Medication 5 UNIT(S): at 23:49

## 2020-05-26 RX ADMIN — Medication 1: at 12:35

## 2020-05-26 RX ADMIN — Medication 20 MILLIEQUIVALENT(S): at 12:36

## 2020-05-26 RX ADMIN — INSULIN GLARGINE 8 UNIT(S): 100 INJECTION, SOLUTION SUBCUTANEOUS at 21:11

## 2020-05-26 RX ADMIN — Medication 5 UNIT(S): at 18:09

## 2020-05-26 RX ADMIN — QUETIAPINE FUMARATE 25 MILLIGRAM(S): 200 TABLET, FILM COATED ORAL at 21:12

## 2020-05-26 RX ADMIN — MIRTAZAPINE 7.5 MILLIGRAM(S): 45 TABLET, ORALLY DISINTEGRATING ORAL at 21:11

## 2020-05-26 RX ADMIN — SCOPALAMINE 1 PATCH: 1 PATCH, EXTENDED RELEASE TRANSDERMAL at 08:30

## 2020-05-26 RX ADMIN — Medication 1 TABLET(S): at 12:35

## 2020-05-26 RX ADMIN — Medication 5 UNIT(S): at 12:35

## 2020-05-26 RX ADMIN — Medication 2: at 23:49

## 2020-05-26 RX ADMIN — PIPERACILLIN AND TAZOBACTAM 25 GRAM(S): 4; .5 INJECTION, POWDER, LYOPHILIZED, FOR SOLUTION INTRAVENOUS at 13:41

## 2020-05-26 RX ADMIN — Medication 2: at 18:08

## 2020-05-26 NOTE — ADVANCED PRACTICE NURSE CONSULT - ASSESSMENT
This is a 64yr old male patient admitted for Convulsions, presenting with the following:  -There is an intact DTI to the L. Heel (1cm x 1cm) without drainage  -There is an Unstageable Pressure Injury to the Coccyx (3cm x 1cm x 0.2cm) with slough and scant drainage

## 2020-05-26 NOTE — PROGRESS NOTE ADULT - PROBLEM SELECTOR PLAN 1
Resolved  Afebrile  No leukocytosis  O2 sats 97% on RA  Completed abx  Continue with aspiration precautions   Dr. Noriega (pulmonary) following  Dr. Barry (ID) following

## 2020-05-26 NOTE — PROGRESS NOTE ADULT - PROBLEM SELECTOR PLAN 5
Speech unable to perform swallow exam secondary to lethargy  Continue with tube feeding per NGT  Continue with aspiration precautions   Reconsult speech when pt appropriate for evaluation

## 2020-05-26 NOTE — PROGRESS NOTE ADULT - SUBJECTIVE AND OBJECTIVE BOX
HPI:  Patient is a 64y old  Male who presents with a chief complaint of seizures  Patient examined at bedside, resting comfortably, lethargic, unable to follow commands, denies pain or SOB.  NAD    OVERNIGHT EVENTS:  No new overnight events     REVIEW OF SYSTEMS:      CONSTITUTIONAL: No fever,     Vital Signs Last 24 Hrs  T(C): 36.7 (26 May 2020 13:11), Max: 36.7 (25 May 2020 20:38)  T(F): 98 (26 May 2020 13:11), Max: 98 (25 May 2020 20:38)  HR: 63 (26 May 2020 13:11) (57 - 85)  BP: 109/72 (26 May 2020 13:11) (104/65 - 128/70)  BP(mean): --  RR: 18 (26 May 2020 13:11) (17 - 18)  SpO2: 97% (26 May 2020 13:11) (97% - 98%)    ________________________________________________  PHYSICAL EXAM:    GENERAL: NAD  HEENT: Normocephalic; conjunctivae and sclerae clear; moist mucous membranes;   NECK : supple, no JVD  CHEST/LUNG: Clear to auscultation bilaterally   HEART: S1 S2  regular  ABDOMEN: Soft, Nontender, Nondistended; Bowel sounds present  EXTREMITIES: no cyanosis; no LE edema; no calf tenderness  SKIN: warm and dry; no rash  NERVOUS SYSTEM:  Alert & Oriented x3; no new deficits    _________________________________________________  CURRENT MEDICATIONS:    MEDICATIONS  (STANDING):  dextrose 50% Injectable 12.5 Gram(s) IV Push once  dextrose 50% Injectable 25 Gram(s) IV Push once  dextrose 50% Injectable 25 Gram(s) IV Push once  ergocalciferol 83997 Unit(s) Oral every week  heparin   Injectable 5000 Unit(s) SubCutaneous every 12 hours  insulin glargine Injectable (LANTUS) 8 Unit(s) SubCutaneous at bedtime  insulin lispro (HumaLOG) corrective regimen sliding scale   SubCutaneous every 6 hours  insulin lispro Injectable (HumaLOG) 5 Unit(s) SubCutaneous every 6 hours  mirtazapine 7.5 milliGRAM(s) Oral at bedtime  multivitamin/minerals 1 Tablet(s) Oral daily  OLANZapine 2.5 milliGRAM(s) Oral at bedtime  pantoprazole  Injectable 40 milliGRAM(s) IV Push daily  piperacillin/tazobactam IVPB.. 3.375 Gram(s) IV Intermittent every 8 hours  QUEtiapine 25 milliGRAM(s) Oral at bedtime  scopolamine   Patch 1 Patch Transdermal every 72 hours    MEDICATIONS  (PRN):  acetaminophen  Suppository .. 650 milliGRAM(s) Rectal every 6 hours PRN Temp greater or equal to 38C (100.4F)  ALBUTerol    90 MICROgram(s) HFA Inhaler 2 Puff(s) Inhalation every 6 hours PRN Shortness of Breath and/or Wheezing  dextrose 40% Gel 15 Gram(s) Oral once PRN Blood Glucose LESS THAN 70 milliGRAM(s)/deciliter  glucagon  Injectable 1 milliGRAM(s) IntraMuscular once PRN Glucose LESS THAN 70 milligrams/deciliter  guaiFENesin   Syrup  (Sugar-Free) 100 milliGRAM(s) Oral every 6 hours PRN Cough      __________________________________________________  LABS:                          12.0   8.56  )-----------( 409      ( 26 May 2020 07:15 )             35.4     05-26    143  |  108  |  9   ----------------------------<  86  3.6   |  27  |  1.01    Ca    9.5      26 May 2020 07:15  Phos  2.8     05-25  Mg     1.8     05-25          CAPILLARY BLOOD GLUCOSE      POCT Blood Glucose.: 207 mg/dL (26 May 2020 16:47)  POCT Blood Glucose.: 167 mg/dL (26 May 2020 11:52)  POCT Blood Glucose.: 98 mg/dL (26 May 2020 05:36)  POCT Blood Glucose.: 172 mg/dL (25 May 2020 23:35)      __________________________________________________  RADIOLOGY & ADDITIONAL TESTS:    Imaging Personally Reviewed:  YES    < from: CT Head No Cont (05.14.20 @ 19:05) >  IMPRESSION: No evidence of an acute transcortical infarction or hemorrhage. MR is a more sensitive imaging modality for the evaluation of an acute infarction. Right frontal sinus sinusitis.    < end of copied text >    Consultant(s) Notes Reviewed:   YES     Plan of care was discussed with patient and /or primary care giver; all questions and concerns were addressed and care was aligned with patient's wishes.    Plan discussed with attending and consulting physicians.

## 2020-05-26 NOTE — PROGRESS NOTE ADULT - PROBLEM SELECTOR PLAN 8
Repeat PCR pending  Continue with Isolation  Continue with O2 support  Continue with medications regimen  Continue with Tylenol  Continue with supportive care  Follow up lab results

## 2020-05-26 NOTE — ADVANCED PRACTICE NURSE CONSULT - RECOMMEDATIONS
-Clean all wounds with normal saline and apply skin prep to the surrounding skin  -Apply Collagenase ointment to the slough areas of the Coccyx wound, apply saline moistened gauze to the wound bed, and cover with a Foam dressing Daily PRN  -Leave the L. Heel wound open to air  -Elevate/float the patients heels using heel protectors and reposition the patient Q 2hrs using wedges or pillows

## 2020-05-26 NOTE — PROGRESS NOTE ADULT - PROBLEM SELECTOR PLAN 6
Uncontrolled as evidenced by A1c 9.9  Continue with sliding scale insulin coverage  Continue with Lantus at HS  Continue with glucose monitoring  Hypoglycemia protocol  Dr. Allison (Endocrine) following

## 2020-05-26 NOTE — PROGRESS NOTE ADULT - SUBJECTIVE AND OBJECTIVE BOX
Pt is awake, alert, agitated this morning. Lying in bed with mitten restraints. On enhanced supervision.     INTERVAL HPI/OVERNIGHT EVENTS:      VITAL SIGNS:  T(F): 97.4 (05-26-20 @ 05:40)  HR: 57 (05-26-20 @ 05:40)  BP: 104/65 (05-26-20 @ 05:40)  RR: 17 (05-26-20 @ 05:40)  SpO2: 97% (05-26-20 @ 05:40)  Wt(kg): --  I&O's Detail          REVIEW OF SYSTEMS:    CONSTITUTIONAL:  No fevers, chills, sweats    HEENT:  Eyes:  No diplopia or blurred vision. ENT:  No earache, sore throat or runny nose.    CARDIOVASCULAR:  No pressure, squeezing, tightness, or heaviness about the chest; no palpitations.    RESPIRATORY:  Per HPI    GASTROINTESTINAL:  No abdominal pain, nausea, vomiting or diarrhea.    GENITOURINARY:  No dysuria, frequency or urgency.    NEUROLOGIC:  No paresthesias, fasciculations, seizures or weakness.    PSYCHIATRIC:  No disorder of thought or mood.      PHYSICAL EXAM:    Constitutional: Well developed and nourished  Eyes: Perrla  ENMT: normal  Neck: supple  Respiratory: good air entry  Cardiovascular: S1 S2 regular  Gastrointestinal: Soft, Non tender  Extremities: No edema  Vascular: normal  Neurological: Awake, alert, agitated.   Musculoskeletal: unsteady.       MEDICATIONS  (STANDING):  dextrose 50% Injectable 12.5 Gram(s) IV Push once  dextrose 50% Injectable 25 Gram(s) IV Push once  dextrose 50% Injectable 25 Gram(s) IV Push once  ergocalciferol 45527 Unit(s) Oral every week  heparin   Injectable 5000 Unit(s) SubCutaneous every 12 hours  insulin glargine Injectable (LANTUS) 10 Unit(s) SubCutaneous at bedtime  insulin lispro (HumaLOG) corrective regimen sliding scale   SubCutaneous every 6 hours  insulin lispro Injectable (HumaLOG) 5 Unit(s) SubCutaneous every 6 hours  mirtazapine 7.5 milliGRAM(s) Oral at bedtime  multivitamin/minerals 1 Tablet(s) Oral daily  OLANZapine 2.5 milliGRAM(s) Oral at bedtime  pantoprazole  Injectable 40 milliGRAM(s) IV Push daily  piperacillin/tazobactam IVPB.. 3.375 Gram(s) IV Intermittent every 8 hours  potassium chloride   Powder 20 milliEquivalent(s) Oral daily  QUEtiapine 25 milliGRAM(s) Oral at bedtime  scopolamine   Patch 1 Patch Transdermal every 72 hours    MEDICATIONS  (PRN):  acetaminophen  Suppository .. 650 milliGRAM(s) Rectal every 6 hours PRN Temp greater or equal to 38C (100.4F)  ALBUTerol    90 MICROgram(s) HFA Inhaler 2 Puff(s) Inhalation every 6 hours PRN Shortness of Breath and/or Wheezing  dextrose 40% Gel 15 Gram(s) Oral once PRN Blood Glucose LESS THAN 70 milliGRAM(s)/deciliter  glucagon  Injectable 1 milliGRAM(s) IntraMuscular once PRN Glucose LESS THAN 70 milligrams/deciliter  guaiFENesin   Syrup  (Sugar-Free) 100 milliGRAM(s) Oral every 6 hours PRN Cough      Allergies    No Known Allergies    Intolerances        LABS:                        12.0   8.56  )-----------( 409      ( 26 May 2020 07:15 )             35.4     05-26    143  |  108  |  9   ----------------------------<  86  3.6   |  27  |  1.01    Ca    9.5      26 May 2020 07:15  Phos  2.8     05-25  Mg     1.8     05-25    CAPILLARY BLOOD GLUCOSE    POCT Blood Glucose.: 98 mg/dL (26 May 2020 05:36)  POCT Blood Glucose.: 172 mg/dL (25 May 2020 23:35)  POCT Blood Glucose.: 206 mg/dL (25 May 2020 17:04)  POCT Blood Glucose.: 136 mg/dL (25 May 2020 11:53)    pro-bnp -- 05-24 @ 08:16     d-dimer 1075  05-24 @ 08:16  pro-bnp -- 05-21 @ 07:00     d-dimer 483  05-21 @ 07:00      RADIOLOGY & ADDITIONAL TESTS:    CXR:  < from: Xray Chest 1 View- PORTABLE-Routine (05.23.20 @ 17:40) >  Impression: Stable NG tube.    Possible mild left pleural effusion.    No evidence for pneumothorax.    Opacification in the left mid to lower lung zone, slightly progressed.    The trachea is midline.    Stable cardiac silhouette.    < end of copied text >    Ct scan chest:    ekg;    echo:

## 2020-05-26 NOTE — PROGRESS NOTE ADULT - SUBJECTIVE AND OBJECTIVE BOX
64 year old male from [facility] with PMH of dementia and type 2 DM presented with seizure activity while sitting at AL. Endocrinology was consulted for management of uncontrolled type 2 DM with hyperglycemia.    Patient seen and examined at bedside. He is resting comfortably with mitten restraints. FS reviewed. BG at goal <180, ranging from . Patient remains on tube feedings.     MEDICATIONS  (STANDING):  dextrose 50% Injectable 12.5 Gram(s) IV Push once  dextrose 50% Injectable 25 Gram(s) IV Push once  dextrose 50% Injectable 25 Gram(s) IV Push once  ergocalciferol 51577 Unit(s) Oral every week  heparin   Injectable 5000 Unit(s) SubCutaneous every 12 hours  insulin glargine Injectable (LANTUS) 8 Unit(s) SubCutaneous at bedtime  insulin lispro (HumaLOG) corrective regimen sliding scale   SubCutaneous every 6 hours  insulin lispro Injectable (HumaLOG) 5 Unit(s) SubCutaneous every 6 hours  mirtazapine 7.5 milliGRAM(s) Oral at bedtime  multivitamin/minerals 1 Tablet(s) Oral daily  OLANZapine 2.5 milliGRAM(s) Oral at bedtime  pantoprazole  Injectable 40 milliGRAM(s) IV Push daily  piperacillin/tazobactam IVPB.. 3.375 Gram(s) IV Intermittent every 8 hours  potassium chloride   Powder 20 milliEquivalent(s) Oral daily  QUEtiapine 25 milliGRAM(s) Oral at bedtime  scopolamine   Patch 1 Patch Transdermal every 72 hours    MEDICATIONS  (PRN):  acetaminophen  Suppository .. 650 milliGRAM(s) Rectal every 6 hours PRN Temp greater or equal to 38C (100.4F)  ALBUTerol    90 MICROgram(s) HFA Inhaler 2 Puff(s) Inhalation every 6 hours PRN Shortness of Breath and/or Wheezing  dextrose 40% Gel 15 Gram(s) Oral once PRN Blood Glucose LESS THAN 70 milliGRAM(s)/deciliter  glucagon  Injectable 1 milliGRAM(s) IntraMuscular once PRN Glucose LESS THAN 70 milligrams/deciliter  guaiFENesin   Syrup  (Sugar-Free) 100 milliGRAM(s) Oral every 6 hours PRN Cough      REVIEW OF SYSTEMS:  unable to obtain due to medical condition    PHYSICAL EXAM:    VITAL SIGNS  T(C): 36.3 (05-26-20 @ 05:40), Max: 36.7 (05-25-20 @ 20:38)  HR: 57 (05-26-20 @ 05:40) (57 - 85)  BP: 104/65 (05-26-20 @ 05:40) (104/65 - 128/70)  RR: 17 (05-26-20 @ 05:40) (16 - 17)  SpO2: 97% (05-26-20 @ 05:40) (97% - 98%)    GENERAL: more awake tody, NAD, well-developed  HEAD:  Atraumatic, Normocephalic  EYES: EOMI, PERRLA, conjunctiva and sclera clear  ENMT: No tonsillar erythema, exudates, or enlargement; No lesions  NECK: Supple, No JVD, Normal thyroid  NERVOUS SYSTEM:  nonverbal,  CHEST/LUNG: decreased breath sounds, No rales, rhonchi, wheezing, or rubs  HEART: Regular rate and rhythm; No murmurs, rubs, or gallops  ABDOMEN: Soft, Nontender, Nondistended; Bowel sounds present  EXTREMITIES:  2+ Peripheral Pulses, No clubbing, cyanosis, or edema  SKIN: No rashes or lesions    LABS:                        12.0   8.56  )-----------( 409      ( 26 May 2020 07:15 )             35.4     05-26    143  |  108  |  9   ----------------------------<  86  3.6   |  27  |  1.01    Ca    9.5      26 May 2020 07:15  Phos  2.8     05-25  Mg     1.8     05-25          CAPILLARY BLOOD GLUCOSE      POCT Blood Glucose.: 167 mg/dL (26 May 2020 11:52)  POCT Blood Glucose.: 98 mg/dL (26 May 2020 05:36)  POCT Blood Glucose.: 172 mg/dL (25 May 2020 23:35)  POCT Blood Glucose.: 206 mg/dL (25 May 2020 17:04)

## 2020-05-26 NOTE — PROGRESS NOTE ADULT - ASSESSMENT
64 year old male from [facility] with PMH of dementia and type 2 DM presented with seizure activity while sitting at AL. Endocrinology was consulted for management of uncontrolled type 2 DM with hyperglycemia.    1. Uncontrolled Type 2 DM with hyperglycemia, A1c 9.9%  -BG at goal <180   -patient on tube feedings  -decrease to Lantus 8 units at bedtime given low-normal fasting BG of 86  -continue Humalog 5 units TID q 6 hrs   -continue low dose rapid acting insulin q 6 hrs as below  BG 70-100mg/dL 0 units  -150 mg/dL 0 units  -200 mg/dL 1 unit  -250 mg/dL 2 units  -300 mg/dL 3 units  -350 mg/dL 4 units  -400 mg/dL 5 units  BG > 400mg/dL 6 units  -FS AC HS  -ensure consistent carbohydrate   -will monitor FS and adjust accordingly   -patient pending repeat speech and swallow evaluation     2.  Aspiration pneumonia  -oxygen supplementation as needed   -continue antibiotics as per primary team   -pulmonology follow up    3. Delirium  -patient now slightly more awake with periods of agitation  -psychiatry follow up    Plan discussed with primary team]  Please  call  w/ any questions or concerns [phone]

## 2020-05-26 NOTE — PROGRESS NOTE ADULT - PROBLEM SELECTOR PLAN 9
No seizure activity noted  No indication for antiepileptic pharm at this time  Dr. Ford (Neurology) following

## 2020-05-26 NOTE — PROGRESS NOTE ADULT - PROBLEM SELECTOR PLAN 3
Pt takes Zyprexa , Remeron, Seroquel at home  Continue with home regimen   Continue with enhanced supervision  Continue with [facility] restraints

## 2020-05-26 NOTE — CHART NOTE - NSCHARTNOTEFT_GEN_A_CORE
Assessment:   64yMalePatient is a 64y old  Male who presents with a chief complaint of seizures (26 May 2020 10:28)  Pt on airborne isolation. Pt w covid +. Pt is On NG Tube Glucerna  1.2 @ 40 ml/hr  x 16 hr providing 640 ml, 768 calories, Protein 38 gms, free water 515 ml/day. Pt is Asleep. PT with Mittens on both Hands. Pt is On Enhanced Supervision. Pt with  Pressure ulcer suspected DTI and Unstageable @  R Buttocks.  Pt with agitation and Delirium Psych Eval Note don 5/9 .Pt is on Psych Medication. D/W NP Plan  is for Psych R eval.  SLP eval done on 5/4- Dysphagia Pureed Nectar Liquids. NG tube Inserted ON 5/15 to support Nutrition and Hydration status. May Reconsider SLP eval,.       Factors impacting intake: [ ] none [ ] nausea  [ ] vomiting [ ] diarrhea [ ] constipation  [ ]chewing problems [ x] swallowing issues  [ ] other:     Diet Prescription: Diet, NPO:   Tube Feeding Modality: Nasogastric  Glucerna 1.2 Mihir  Total Volume for 24 Hours (mL): 640  Continuous  Starting Tube Feed Rate {mL per Hour}: 40  Until Goal Tube Feed Rate (mL per Hour): 40  Tube Feed Duration (in Hours): 16  Tube Feed Start Time: 10:00  Tube Feed Stop Time: 18:00 (05-24-20 @ 11:22)    Intake:     Current Weight:   % Weight Change    Pertinent Medications: MEDICATIONS  (STANDING):  dextrose 50% Injectable 12.5 Gram(s) IV Push once  dextrose 50% Injectable 25 Gram(s) IV Push once  dextrose 50% Injectable 25 Gram(s) IV Push once  ergocalciferol 55776 Unit(s) Oral every week  heparin   Injectable 5000 Unit(s) SubCutaneous every 12 hours  insulin glargine Injectable (LANTUS) 10 Unit(s) SubCutaneous at bedtime  insulin lispro (HumaLOG) corrective regimen sliding scale   SubCutaneous every 6 hours  insulin lispro Injectable (HumaLOG) 5 Unit(s) SubCutaneous every 6 hours  mirtazapine 7.5 milliGRAM(s) Oral at bedtime  multivitamin/minerals 1 Tablet(s) Oral daily  OLANZapine 2.5 milliGRAM(s) Oral at bedtime  pantoprazole  Injectable 40 milliGRAM(s) IV Push daily  piperacillin/tazobactam IVPB.. 3.375 Gram(s) IV Intermittent every 8 hours  potassium chloride   Powder 20 milliEquivalent(s) Oral daily  QUEtiapine 25 milliGRAM(s) Oral at bedtime  scopolamine   Patch 1 Patch Transdermal every 72 hours    MEDICATIONS  (PRN):  acetaminophen  Suppository .. 650 milliGRAM(s) Rectal every 6 hours PRN Temp greater or equal to 38C (100.4F)  ALBUTerol    90 MICROgram(s) HFA Inhaler 2 Puff(s) Inhalation every 6 hours PRN Shortness of Breath and/or Wheezing  dextrose 40% Gel 15 Gram(s) Oral once PRN Blood Glucose LESS THAN 70 milliGRAM(s)/deciliter  glucagon  Injectable 1 milliGRAM(s) IntraMuscular once PRN Glucose LESS THAN 70 milligrams/deciliter  guaiFENesin   Syrup  (Sugar-Free) 100 milliGRAM(s) Oral every 6 hours PRN Cough    Pertinent Labs: 05-26 Na143 mmol/L Glu 86 mg/dL K+ 3.6 mmol/L Cr  1.01 mg/dL BUN 9 mg/dL 05-25 Phos 2.8 mg/dL 05-20 Alb 2.0 g/dL<L> 05-01 Chol 161 mg/dL LDL 95 mg/dL HDL 35 mg/dL<L> Trig 153 mg/dL<H>     CAPILLARY BLOOD GLUCOSE      POCT Blood Glucose.: 98 mg/dL (26 May 2020 05:36)  POCT Blood Glucose.: 172 mg/dL (25 May 2020 23:35)  POCT Blood Glucose.: 206 mg/dL (25 May 2020 17:04)  POCT Blood Glucose.: 136 mg/dL (25 May 2020 11:53)    Skin:     Estimated Needs:   [ ] no change since previous assessment  [ ] recalculated:     Previous Nutrition Diagnosis:   [ ] Inadequate Energy Intake [x ]Inadequate Oral Intake [ ] Excessive Energy Intake   [ ] Underweight [ ] Increased Nutrient Needs [ ] Overweight/Obesity   [ ] Altered GI Function [ ] Unintended Weight Loss [ ] Food & Nutrition Related Knowledge Deficit [ ] Malnutrition     Nutrition Diagnosis is [x ] ongoing  [ ] resolved [ ] not applicable     New Nutrition Diagnosis: [ ] not applicable       Interventions:   Recommend  [ ] Change Diet To:  [ ] Nutrition Supplement  [ ] Nutrition Support  [x ] Other:  MVI, Vit c , Zn .  (x) Glucerna 1,2 Initial Goal Rate @  90 ml/hr x 16 hr as tolerated to  provide 1440 ml,1728 calories,  Protein 86 gm's, Free water 1159 ml/day.   (x) SLP  Sharri ramirez.   (x) D/W MD, RN.  Monitoring and Evaluation:   [ ] PO intake [ x ] Tolerance to diet prescription [ x ] weights [ x ] labs[ x ] follow up per protocol  [ ] other:

## 2020-05-27 DIAGNOSIS — T14.8XXA OTHER INJURY OF UNSPECIFIED BODY REGION, INITIAL ENCOUNTER: ICD-10-CM

## 2020-05-27 DIAGNOSIS — L89.150 PRESSURE ULCER OF SACRAL REGION, UNSTAGEABLE: ICD-10-CM

## 2020-05-27 DIAGNOSIS — Z02.9 ENCOUNTER FOR ADMINISTRATIVE EXAMINATIONS, UNSPECIFIED: ICD-10-CM

## 2020-05-27 LAB
ALBUMIN SERPL ELPH-MCNC: 2.5 G/DL — LOW (ref 3.5–5)
ALP SERPL-CCNC: 96 U/L — SIGNIFICANT CHANGE UP (ref 40–120)
ALT FLD-CCNC: 50 U/L DA — SIGNIFICANT CHANGE UP (ref 10–60)
ANION GAP SERPL CALC-SCNC: 7 MMOL/L — SIGNIFICANT CHANGE UP (ref 5–17)
AST SERPL-CCNC: 27 U/L — SIGNIFICANT CHANGE UP (ref 10–40)
BILIRUB SERPL-MCNC: 0.4 MG/DL — SIGNIFICANT CHANGE UP (ref 0.2–1.2)
BUN SERPL-MCNC: 11 MG/DL — SIGNIFICANT CHANGE UP (ref 7–18)
CALCIUM SERPL-MCNC: 9.5 MG/DL — SIGNIFICANT CHANGE UP (ref 8.4–10.5)
CHLORIDE SERPL-SCNC: 106 MMOL/L — SIGNIFICANT CHANGE UP (ref 96–108)
CO2 SERPL-SCNC: 27 MMOL/L — SIGNIFICANT CHANGE UP (ref 22–31)
CREAT SERPL-MCNC: 0.88 MG/DL — SIGNIFICANT CHANGE UP (ref 0.5–1.3)
GLUCOSE BLDC GLUCOMTR-MCNC: 174 MG/DL — HIGH (ref 70–99)
GLUCOSE BLDC GLUCOMTR-MCNC: 180 MG/DL — HIGH (ref 70–99)
GLUCOSE BLDC GLUCOMTR-MCNC: 192 MG/DL — HIGH (ref 70–99)
GLUCOSE BLDC GLUCOMTR-MCNC: 91 MG/DL — SIGNIFICANT CHANGE UP (ref 70–99)
GLUCOSE SERPL-MCNC: 87 MG/DL — SIGNIFICANT CHANGE UP (ref 70–99)
HCT VFR BLD CALC: 35.4 % — LOW (ref 39–50)
HGB BLD-MCNC: 12 G/DL — LOW (ref 13–17)
MCHC RBC-ENTMCNC: 28.4 PG — SIGNIFICANT CHANGE UP (ref 27–34)
MCHC RBC-ENTMCNC: 33.9 GM/DL — SIGNIFICANT CHANGE UP (ref 32–36)
MCV RBC AUTO: 83.7 FL — SIGNIFICANT CHANGE UP (ref 80–100)
NRBC # BLD: 0 /100 WBCS — SIGNIFICANT CHANGE UP (ref 0–0)
PLATELET # BLD AUTO: 384 K/UL — SIGNIFICANT CHANGE UP (ref 150–400)
POTASSIUM SERPL-MCNC: 3.6 MMOL/L — SIGNIFICANT CHANGE UP (ref 3.5–5.3)
POTASSIUM SERPL-SCNC: 3.6 MMOL/L — SIGNIFICANT CHANGE UP (ref 3.5–5.3)
PROT SERPL-MCNC: 7.1 G/DL — SIGNIFICANT CHANGE UP (ref 6–8.3)
RBC # BLD: 4.23 M/UL — SIGNIFICANT CHANGE UP (ref 4.2–5.8)
RBC # FLD: 13.3 % — SIGNIFICANT CHANGE UP (ref 10.3–14.5)
SODIUM SERPL-SCNC: 140 MMOL/L — SIGNIFICANT CHANGE UP (ref 135–145)
WBC # BLD: 9.87 K/UL — SIGNIFICANT CHANGE UP (ref 3.8–10.5)
WBC # FLD AUTO: 9.87 K/UL — SIGNIFICANT CHANGE UP (ref 3.8–10.5)

## 2020-05-27 PROCEDURE — 99232 SBSQ HOSP IP/OBS MODERATE 35: CPT

## 2020-05-27 RX ORDER — HEPARIN SODIUM 5000 [USP'U]/ML
5000 INJECTION INTRAVENOUS; SUBCUTANEOUS EVERY 12 HOURS
Refills: 0 | Status: DISCONTINUED | OUTPATIENT
Start: 2020-05-27 | End: 2020-06-02

## 2020-05-27 RX ORDER — PANTOPRAZOLE SODIUM 20 MG/1
40 TABLET, DELAYED RELEASE ORAL DAILY
Refills: 0 | Status: DISCONTINUED | OUTPATIENT
Start: 2020-05-27 | End: 2020-06-01

## 2020-05-27 RX ADMIN — SCOPALAMINE 1 PATCH: 1 PATCH, EXTENDED RELEASE TRANSDERMAL at 07:13

## 2020-05-27 RX ADMIN — Medication 5 UNIT(S): at 12:25

## 2020-05-27 RX ADMIN — Medication 1: at 17:02

## 2020-05-27 RX ADMIN — Medication 5 UNIT(S): at 17:03

## 2020-05-27 RX ADMIN — OLANZAPINE 2.5 MILLIGRAM(S): 15 TABLET, FILM COATED ORAL at 21:42

## 2020-05-27 RX ADMIN — INSULIN GLARGINE 8 UNIT(S): 100 INJECTION, SOLUTION SUBCUTANEOUS at 21:42

## 2020-05-27 RX ADMIN — Medication 1 TABLET(S): at 12:25

## 2020-05-27 RX ADMIN — HEPARIN SODIUM 5000 UNIT(S): 5000 INJECTION INTRAVENOUS; SUBCUTANEOUS at 05:41

## 2020-05-27 RX ADMIN — HEPARIN SODIUM 5000 UNIT(S): 5000 INJECTION INTRAVENOUS; SUBCUTANEOUS at 17:03

## 2020-05-27 RX ADMIN — Medication 1: at 12:25

## 2020-05-27 RX ADMIN — SCOPALAMINE 1 PATCH: 1 PATCH, EXTENDED RELEASE TRANSDERMAL at 17:04

## 2020-05-27 RX ADMIN — MIRTAZAPINE 7.5 MILLIGRAM(S): 45 TABLET, ORALLY DISINTEGRATING ORAL at 21:42

## 2020-05-27 RX ADMIN — QUETIAPINE FUMARATE 25 MILLIGRAM(S): 200 TABLET, FILM COATED ORAL at 21:42

## 2020-05-27 RX ADMIN — PANTOPRAZOLE SODIUM 40 MILLIGRAM(S): 20 TABLET, DELAYED RELEASE ORAL at 12:25

## 2020-05-27 RX ADMIN — SCOPALAMINE 1 PATCH: 1 PATCH, EXTENDED RELEASE TRANSDERMAL at 17:03

## 2020-05-27 RX ADMIN — SCOPALAMINE 1 PATCH: 1 PATCH, EXTENDED RELEASE TRANSDERMAL at 21:44

## 2020-05-27 NOTE — PROGRESS NOTE ADULT - SUBJECTIVE AND OBJECTIVE BOX
Patient is a 64y old  Male who presents with a chief complaint of seizures (26 May 2020 17:05)  Patient is laying in bed in NAD. Not agitated at this time. Asleep    INTERVAL HPI/OVERNIGHT EVENTS:      VITAL SIGNS:  T(F): 98.1 (05-27-20 @ 05:53)  HR: 78 (05-26-20 @ 20:52)  BP: 110/67 (05-27-20 @ 05:53)  RR: 18 (05-27-20 @ 05:53)  SpO2: 100% (05-27-20 @ 05:53)  Wt(kg): --  I&O's Detail    26 May 2020 07:01  -  27 May 2020 07:00  --------------------------------------------------------  IN:  Total IN: 0 mL    OUT:    Voided: 3 mL  Total OUT: 3 mL    Total NET: -3 mL              REVIEW OF SYSTEMS:    CONSTITUTIONAL:  No fevers, chills, sweats    HEENT:  Eyes:  No diplopia or blurred vision. ENT:  No earache, sore throat or runny nose.    CARDIOVASCULAR:  No pressure, squeezing, tightness, or heaviness about the chest; no palpitations.    RESPIRATORY:  Per HPI    GASTROINTESTINAL:  No abdominal pain, nausea, vomiting or diarrhea.    GENITOURINARY:  No dysuria, frequency or urgency.    NEUROLOGIC:  No paresthesias, fasciculations, seizures or weakness.    PSYCHIATRIC:  No disorder of thought or mood.      PHYSICAL EXAM:    Constitutional: Well developed and nourished  Eyes:Perrla  ENMT: normal  Neck:supple  Respiratory: good air entry  Cardiovascular: S1 S2 regular  Gastrointestinal: Soft, Non tender  Extremities: No edema  Vascular:normal  Neurological:Asleep  Musculoskeletal:Normal      MEDICATIONS  (STANDING):  dextrose 50% Injectable 12.5 Gram(s) IV Push once  dextrose 50% Injectable 25 Gram(s) IV Push once  dextrose 50% Injectable 25 Gram(s) IV Push once  ergocalciferol 59773 Unit(s) Oral every week  heparin   Injectable 5000 Unit(s) SubCutaneous every 12 hours  insulin glargine Injectable (LANTUS) 8 Unit(s) SubCutaneous at bedtime  insulin lispro (HumaLOG) corrective regimen sliding scale   SubCutaneous every 6 hours  insulin lispro Injectable (HumaLOG) 5 Unit(s) SubCutaneous every 6 hours  mirtazapine 7.5 milliGRAM(s) Oral at bedtime  multivitamin/minerals 1 Tablet(s) Oral daily  OLANZapine 2.5 milliGRAM(s) Oral at bedtime  pantoprazole  Injectable 40 milliGRAM(s) IV Push daily  QUEtiapine 25 milliGRAM(s) Oral at bedtime  scopolamine   Patch 1 Patch Transdermal every 72 hours    MEDICATIONS  (PRN):  acetaminophen  Suppository .. 650 milliGRAM(s) Rectal every 6 hours PRN Temp greater or equal to 38C (100.4F)  ALBUTerol    90 MICROgram(s) HFA Inhaler 2 Puff(s) Inhalation every 6 hours PRN Shortness of Breath and/or Wheezing  dextrose 40% Gel 15 Gram(s) Oral once PRN Blood Glucose LESS THAN 70 milliGRAM(s)/deciliter  glucagon  Injectable 1 milliGRAM(s) IntraMuscular once PRN Glucose LESS THAN 70 milligrams/deciliter  guaiFENesin   Syrup  (Sugar-Free) 100 milliGRAM(s) Oral every 6 hours PRN Cough      Allergies    No Known Allergies    Intolerances        LABS:                        12.0   9.87  )-----------( 384      ( 27 May 2020 07:10 )             35.4     05-27    140  |  106  |  11  ----------------------------<  87  3.6   |  27  |  0.88    Ca    9.5      27 May 2020 07:10    TPro  7.1  /  Alb  2.5<L>  /  TBili  0.4  /  DBili  x   /  AST  27  /  ALT  50  /  AlkPhos  96  05-27              CAPILLARY BLOOD GLUCOSE      POCT Blood Glucose.: 174 mg/dL (27 May 2020 11:27)  POCT Blood Glucose.: 91 mg/dL (27 May 2020 05:39)  POCT Blood Glucose.: 201 mg/dL (26 May 2020 23:14)  POCT Blood Glucose.: 204 mg/dL (26 May 2020 20:59)  POCT Blood Glucose.: 207 mg/dL (26 May 2020 16:47)  POCT Blood Glucose.: 167 mg/dL (26 May 2020 11:52)    pro-bnp -- 05-24 @ 08:16     d-dimer 1075  05-24 @ 08:16  pro-bnp -- 05-21 @ 07:00     d-dimer 483  05-21 @ 07:00      RADIOLOGY & ADDITIONAL TESTS:    CXR:    Ct scan chest:    ekg;    echo:

## 2020-05-27 NOTE — PROGRESS NOTE ADULT - ASSESSMENT
64 year old male from [facility]  with PMH dementia and DM coming in with seizures like activity while sitting at AL. pt unable to provide further history (poor historian) . denies all complaints at this time. NKDA   Pt. seen and evaluated, noted laying comfortably in bed, does not respond to verbal stimuli.  Pt. is with NGT feeding, needs psych eval for capacity to make medical decisions, however since he is non verbal, will likely not be possible to assess, psych consulted.  SLP requested (pt. failed past consultation).  Palliative care consulted for GOC/peg placement

## 2020-05-27 NOTE — PROGRESS NOTE ADULT - PROBLEM SELECTOR PLAN 7
-There is an intact DTI to the L. Heel (1cm x 1cm) without drainage  -Leave the L. Heel wound open to air  -Elevate/float the patients heels using heel protectors and reposition the patient Q 2hrs using wedges or pillows

## 2020-05-27 NOTE — PROGRESS NOTE ADULT - PROBLEM SELECTOR PLAN 2
Still with NGT feeding  -Will repeat s/s tomorrow to determine need for PEG  -Palliative consulted for GOC/PEG

## 2020-05-27 NOTE — PROGRESS NOTE ADULT - PROBLEM SELECTOR PLAN 3
A1c 9.9, endo Dr. Allison following  -Cont FS AC&HS  -Cont Lantus 8 units HS  -Cont Humalog 6units q6hrs  -Cont low dose HSS  -Cont NGT feeding  -Awaiting SLP

## 2020-05-27 NOTE — PROGRESS NOTE ADULT - PROBLEM SELECTOR PLAN 9
Still with NGT feeding, will need repeat SLP and likely discussion with family re peg placement.  Palliative consulted.  Pt. is mostly non verbal therefore likely lacks capacity to make medical decisions.

## 2020-05-27 NOTE — PROGRESS NOTE ADULT - PROBLEM SELECTOR PLAN 1
Pt. is calmer as reported by nursing staff  -Cont Zyprexa at 2.5 mg HS  -Cont Seroquel at 25 mg HS  -Cont Mirtazepine at 7.5 mg HS  -Safety precautions with mittens

## 2020-05-27 NOTE — PROGRESS NOTE ADULT - ASSESSMENT
64 year old male from [facility] living with PMH of dementia and type 2 DM presented with seizure activity while sitting at AL. Endocrinology was consulted for management of uncontrolled type 2 DM with hyperglycemia.    1. Uncontrolled Type 2 DM with hyperglycemia, A1c 9.9%  -BG close to goal  -patient on tube feedings  -continue Lantus 8 units at bedtime given low-normal fasting BG of 86  -continue Humalog 5 units TID q 6 hrs   -continue low dose rapid acting insulin q 6 hrs as below  BG 70-100mg/dL 0 units  -150 mg/dL 0 units  -200 mg/dL 1 unit  -250 mg/dL 2 units  -300 mg/dL 3 units  -350 mg/dL 4 units  -400 mg/dL 5 units  BG > 400mg/dL 6 units  -FS AC HS  -ensure consistent carbohydrate   -will monitor FS and adjust accordingly   -patient pending repeat speech and swallow evaluation     2.  Aspiration pneumonia  -oxygen supplementation as needed   -continue antibiotics as per primary team   -pulmonology follow up    3. Delirium  -patient with mitten restraints  -psychiatry follow up    Plan discussed with primary team]  Please  call  w/ any questions or concerns [phone]

## 2020-05-27 NOTE — PROGRESS NOTE ADULT - SUBJECTIVE AND OBJECTIVE BOX
CARDIOLOGY/MEDICAL ATTENDING    CHIEF COMPLAINT:Patient is a 64y old  Male who presents with a chief complaint of seizures.Pt still with mittens and NGT.    	  REVIEW OF SYSTEMS:  [x ] Unable to obtain    PHYSICAL EXAM:  T(C): 36.7 (05-27-20 @ 05:53), Max: 36.7 (05-26-20 @ 13:11)  HR: 78 (05-26-20 @ 20:52) (63 - 78)  BP: 110/67 (05-27-20 @ 05:53) (109/64 - 110/67)  RR: 18 (05-27-20 @ 05:53) (18 - 18)  SpO2: 100% (05-27-20 @ 05:53) (97% - 100%)  Wt(kg): --  I&O's Summary    26 May 2020 07:01  -  27 May 2020 07:00  --------------------------------------------------------  IN: 0 mL / OUT: 3 mL / NET: -3 mL        Appearance: Normal	  HEENT:   Normal oral mucosa, PERRL, EOMI	  Lymphatic: No lymphadenopathy  Cardiovascular: Normal S1 S2, No JVD, No murmurs, No edema  Respiratory: Lungs clear to auscultation	  Gastrointestinal:  Soft, Non-tender, + BS	  Skin: No rashes, No ecchymoses, No cyanosis	  Extremities: Normal range of motion, No clubbing, cyanosis or edema  Vascular: Peripheral pulses palpable 2+ bilaterally    MEDICATIONS  (STANDING):  dextrose 50% Injectable 12.5 Gram(s) IV Push once  dextrose 50% Injectable 25 Gram(s) IV Push once  dextrose 50% Injectable 25 Gram(s) IV Push once  ergocalciferol 73894 Unit(s) Oral every week  heparin   Injectable 5000 Unit(s) SubCutaneous every 12 hours  insulin glargine Injectable (LANTUS) 8 Unit(s) SubCutaneous at bedtime  insulin lispro (HumaLOG) corrective regimen sliding scale   SubCutaneous every 6 hours  insulin lispro Injectable (HumaLOG) 5 Unit(s) SubCutaneous every 6 hours  mirtazapine 7.5 milliGRAM(s) Oral at bedtime  multivitamin/minerals 1 Tablet(s) Oral daily  OLANZapine 2.5 milliGRAM(s) Oral at bedtime  pantoprazole  Injectable 40 milliGRAM(s) IV Push daily  QUEtiapine 25 milliGRAM(s) Oral at bedtime  scopolamine   Patch 1 Patch Transdermal every 72 hours      LABS:	 	                       12.0   9.87  )-----------( 384      ( 27 May 2020 07:10 )             35.4     05-27    140  |  106  |  11  ----------------------------<  87  3.6   |  27  |  0.88    Ca    9.5      27 May 2020 07:10    TPro  7.1  /  Alb  2.5<L>  /  TBili  0.4  /  DBili  x   /  AST  27  /  ALT  50  /  AlkPhos  96  05-27    proBNP: Serum Pro-Brain Natriuretic Peptide: 126 pg/mL (05-01 @ 09:36)    Lipid Profile: Cholesterol 161  LDL 95  HDL 35      HgA1c:   TSH: Thyroid Stimulating Hormone, Serum: 2.39 uU/mL (05-01 @ 09:36)

## 2020-05-27 NOTE — PROGRESS NOTE ADULT - PROBLEM SELECTOR PLAN 1
Urine culture noted  Antibiotics OFF  Oxygen Supp  Monitor Oxygen sat  Taper Oxygen Supp as feasible  NGT in place with feeds.  Follow up CXR noted.   Repeat S/S eval.   ID follow up

## 2020-05-27 NOTE — PROGRESS NOTE ADULT - PROBLEM SELECTOR PLAN 6
Advanced practice nurse note appreciated  -There is an Unstageable Pressure Injury to the Coccyx (3cm x 1cm x 0.2cm) with slough and scant drainage	  -Clean all wounds with normal saline and apply skin prep to the surrounding skin  -Apply Collagenase ointment to the slough areas of the Coccyx wound, apply saline moistened gauze to the wound bed, and cover with a Foam dressing Daily PRN

## 2020-05-27 NOTE — PROGRESS NOTE ADULT - SUBJECTIVE AND OBJECTIVE BOX
NP Note discussed with  Primary Attending    Patient is a 64y old  Male who presents with a chief complaint of seizures (27 May 2020 12:17)      INTERVAL HPI/OVERNIGHT EVENTS: no new complaints    MEDICATIONS  (STANDING):  dextrose 50% Injectable 12.5 Gram(s) IV Push once  dextrose 50% Injectable 25 Gram(s) IV Push once  dextrose 50% Injectable 25 Gram(s) IV Push once  ergocalciferol 38368 Unit(s) Oral every week  heparin   Injectable 5000 Unit(s) SubCutaneous every 12 hours  insulin glargine Injectable (LANTUS) 8 Unit(s) SubCutaneous at bedtime  insulin lispro (HumaLOG) corrective regimen sliding scale   SubCutaneous every 6 hours  insulin lispro Injectable (HumaLOG) 5 Unit(s) SubCutaneous every 6 hours  mirtazapine 7.5 milliGRAM(s) Oral at bedtime  multivitamin/minerals 1 Tablet(s) Oral daily  OLANZapine 2.5 milliGRAM(s) Oral at bedtime  pantoprazole  Injectable 40 milliGRAM(s) IV Push daily  QUEtiapine 25 milliGRAM(s) Oral at bedtime  scopolamine   Patch 1 Patch Transdermal every 72 hours    MEDICATIONS  (PRN):  acetaminophen  Suppository .. 650 milliGRAM(s) Rectal every 6 hours PRN Temp greater or equal to 38C (100.4F)  ALBUTerol    90 MICROgram(s) HFA Inhaler 2 Puff(s) Inhalation every 6 hours PRN Shortness of Breath and/or Wheezing  dextrose 40% Gel 15 Gram(s) Oral once PRN Blood Glucose LESS THAN 70 milliGRAM(s)/deciliter  glucagon  Injectable 1 milliGRAM(s) IntraMuscular once PRN Glucose LESS THAN 70 milligrams/deciliter  guaiFENesin   Syrup  (Sugar-Free) 100 milliGRAM(s) Oral every 6 hours PRN Cough      __________________________________________________  REVIEW OF SYSTEMS: Unable to assess as pt. is not verbalizing, per nursing staff he only says a few words at times    Vital Signs Last 24 Hrs  T(C): 36.1 (27 May 2020 14:25), Max: 36.7 (27 May 2020 05:53)  T(F): 97 (27 May 2020 14:25), Max: 98.1 (27 May 2020 05:53)  HR: 64 (27 May 2020 14:25) (64 - 78)  BP: 107/58 (27 May 2020 14:25) (107/58 - 110/67)  BP(mean): --  RR: 18 (27 May 2020 14:25) (18 - 18)  SpO2: 98% (27 May 2020 14:25) (98% - 100%)    ________________________________________________  PHYSICAL EXAM:  chronically ill appearing  GENERAL: NAD  HEENT: Normocephalic;  conjunctivae and sclerae clear; moist mucous membranes;   NECK : supple  CHEST/LUNG: Clear to auscultation bilaterally with good air entry   HEART: S1 S2  regular; no murmurs, gallops or rubs  ABDOMEN: NGT with Glucerna feeding, Soft, Nontender, Nondistended; Bowel sounds present  EXTREMITIES: no cyanosis; no edema; no calf tenderness  SKIN: warm and dry; no rash  NERVOUS SYSTEM:  Awake and alert; Unable to assess as pt. is non verbal    _________________________________________________  LABS:                        12.0   9.87  )-----------( 384      ( 27 May 2020 07:10 )             35.4     05-27    140  |  106  |  11  ----------------------------<  87  3.6   |  27  |  0.88    Ca    9.5      27 May 2020 07:10    TPro  7.1  /  Alb  2.5<L>  /  TBili  0.4  /  DBili  x   /  AST  27  /  ALT  50  /  AlkPhos  96  05-27        CAPILLARY BLOOD GLUCOSE      POCT Blood Glucose.: 174 mg/dL (27 May 2020 11:27)  POCT Blood Glucose.: 91 mg/dL (27 May 2020 05:39)  POCT Blood Glucose.: 201 mg/dL (26 May 2020 23:14)  POCT Blood Glucose.: 204 mg/dL (26 May 2020 20:59)  POCT Blood Glucose.: 207 mg/dL (26 May 2020 16:47)    RADIOLOGY & ADDITIONAL TESTS:    Xray Chest 1 View- PORTABLE-Routine (05.23.20 @ 17:40) >  EXAM:  XR CHEST PORTABLE ROUTINE 1V                            PROCEDURE DATE:  05/23/2020          INTERPRETATION:  Portable chest x-ray    Indication: pneumonia.    Portable chest x-ray is compared to a previous examination dated 5/17/2020.    Impression: Stable NG tube.    Possible mild left pleural effusion.    No evidence for pneumothorax.    Opacification in the left mid to lower lung zone, slightly progressed.    The trachea is midline.    Stable cardiac silhouette.    < end of copied text >      CT Head No Cont (05.14.20 @ 19:05) >  EXAM:  CT BRAIN                            PROCEDURE DATE:  05/14/2020          INTERPRETATION:  CLINICAL Indications:  AMS    COMPARISON: CT head dated 5/1/2020    TECHNIQUE: Noncontrast CT of the head. Multiplanar reformations are submitted.    FINDINGS:   There is no compelling evidence for an acute transcortical infarction. There is no evidence of mass, mass effect, midline shift or extra-axial fluid collection. The lateral ventricles and cortical sulci are age-appropriate in size and configuration. Moderate polypoid inflammatory mucosal changes are seen throughout the various portions of the paranasal sinuses. Air-fluid level and bubbly changes in the right frontal sinus. The orbits and mastoid air cells are unremarkable. The calvariumis intact.    IMPRESSION: No evidence of an acute transcortical infarction or hemorrhage. MR is a more sensitive imaging modality for the evaluation of an acute infarction. Right frontal sinus sinusitis.    < end of copied text >        Imaging Personally Reviewed:  YES/NO    Consultant(s) Notes Reviewed:   YES/ No    Care Discussed with Consultants :     Plan of care was discussed with patient and /or primary care giver; all questions and concerns were addressed and care was aligned with patient's wishes.

## 2020-05-27 NOTE — PROGRESS NOTE ADULT - SUBJECTIVE AND OBJECTIVE BOX
64 year old male from [facility] with PMH of dementia and type 2 DM presented with seizure activity while sitting at AL. Endocrinology was consulted for management of uncontrolled type 2 DM with hyperglycemia.    Patient seen and examined at bedside. Patient remains with mitten restraints. FS reviewed. BG overall close to goal of <180, ranging . Patient remains on tube feedings.    MEDICATIONS  (STANDING):  dextrose 50% Injectable 12.5 Gram(s) IV Push once  dextrose 50% Injectable 25 Gram(s) IV Push once  dextrose 50% Injectable 25 Gram(s) IV Push once  ergocalciferol 04281 Unit(s) Oral every week  heparin   Injectable 5000 Unit(s) SubCutaneous every 12 hours  insulin glargine Injectable (LANTUS) 8 Unit(s) SubCutaneous at bedtime  insulin lispro (HumaLOG) corrective regimen sliding scale   SubCutaneous every 6 hours  insulin lispro Injectable (HumaLOG) 5 Unit(s) SubCutaneous every 6 hours  mirtazapine 7.5 milliGRAM(s) Oral at bedtime  multivitamin/minerals 1 Tablet(s) Oral daily  OLANZapine 2.5 milliGRAM(s) Oral at bedtime  pantoprazole  Injectable 40 milliGRAM(s) IV Push daily  QUEtiapine 25 milliGRAM(s) Oral at bedtime  scopolamine   Patch 1 Patch Transdermal every 72 hours    MEDICATIONS  (PRN):  acetaminophen  Suppository .. 650 milliGRAM(s) Rectal every 6 hours PRN Temp greater or equal to 38C (100.4F)  ALBUTerol    90 MICROgram(s) HFA Inhaler 2 Puff(s) Inhalation every 6 hours PRN Shortness of Breath and/or Wheezing  dextrose 40% Gel 15 Gram(s) Oral once PRN Blood Glucose LESS THAN 70 milliGRAM(s)/deciliter  glucagon  Injectable 1 milliGRAM(s) IntraMuscular once PRN Glucose LESS THAN 70 milligrams/deciliter  guaiFENesin   Syrup  (Sugar-Free) 100 milliGRAM(s) Oral every 6 hours PRN Cough      REVIEW OF SYSTEMS:  unable to obtain due to medical condition    PHYSICAL EXAM:    VITAL SIGNS  T(C): 36.7 (05-27-20 @ 05:53), Max: 36.7 (05-26-20 @ 13:11)  HR: 78 (05-26-20 @ 20:52) (63 - 78)  BP: 110/67 (05-27-20 @ 05:53) (109/64 - 110/67)  RR: 18 (05-27-20 @ 05:53) (18 - 18)  SpO2: 100% (05-27-20 @ 05:53) (97% - 100%)    GENERAL: more awake tody, NAD, well-developed  HEAD:  Atraumatic, Normocephalic  EYES: EOMI, PERRLA, conjunctiva and sclera clear  ENMT: No tonsillar erythema, exudates, or enlargement; No lesions  NECK: Supple, No JVD, Normal thyroid  NERVOUS SYSTEM:  nonverbal,  CHEST/LUNG: decreased breath sounds, No rales, rhonchi, wheezing, or rubs  HEART: Regular rate and rhythm; No murmurs, rubs, or gallops  ABDOMEN: Soft, Nontender, Nondistended; Bowel sounds present  EXTREMITIES:  2+ Peripheral Pulses, No clubbing, cyanosis, or edema  SKIN: No rashes or lesions    LABS:                        12.0   9.87  )-----------( 384      ( 27 May 2020 07:10 )             35.4     05-27    140  |  106  |  11  ----------------------------<  87  3.6   |  27  |  0.88    Ca    9.5      27 May 2020 07:10    TPro  7.1  /  Alb  2.5<L>  /  TBili  0.4  /  DBili  x   /  AST  27  /  ALT  50  /  AlkPhos  96  05-27        CAPILLARY BLOOD GLUCOSE      POCT Blood Glucose.: 174 mg/dL (27 May 2020 11:27)  POCT Blood Glucose.: 91 mg/dL (27 May 2020 05:39)  POCT Blood Glucose.: 201 mg/dL (26 May 2020 23:14)  POCT Blood Glucose.: 204 mg/dL (26 May 2020 20:59)  POCT Blood Glucose.: 207 mg/dL (26 May 2020 16:47)

## 2020-05-27 NOTE — PROGRESS NOTE ADULT - ASSESSMENT
64 yr old male with PMHX of DM,dementia sent to ER from facility for seizure and now COVID+,Delirium,MAXIME,hypernatremia and suspected aspiration pneumonia.  1.Speech and swallow re-eval.  2.COVID +supportive treatment.  3.Fever and aspiration pneumonia-zosyn completed.  4.DM-Insulin, Endo f/u.  5.Delirium-Psych f/u.  6.NGT placed-TF.  7.GI and DVT prophylaxis.

## 2020-05-28 DIAGNOSIS — F03.91 UNSPECIFIED DEMENTIA WITH BEHAVIORAL DISTURBANCE: ICD-10-CM

## 2020-05-28 LAB
ANION GAP SERPL CALC-SCNC: 8 MMOL/L — SIGNIFICANT CHANGE UP (ref 5–17)
BUN SERPL-MCNC: 13 MG/DL — SIGNIFICANT CHANGE UP (ref 7–18)
CALCIUM SERPL-MCNC: 9.4 MG/DL — SIGNIFICANT CHANGE UP (ref 8.4–10.5)
CHLORIDE SERPL-SCNC: 105 MMOL/L — SIGNIFICANT CHANGE UP (ref 96–108)
CO2 SERPL-SCNC: 28 MMOL/L — SIGNIFICANT CHANGE UP (ref 22–31)
CREAT SERPL-MCNC: 0.87 MG/DL — SIGNIFICANT CHANGE UP (ref 0.5–1.3)
GLUCOSE BLDC GLUCOMTR-MCNC: 101 MG/DL — HIGH (ref 70–99)
GLUCOSE BLDC GLUCOMTR-MCNC: 131 MG/DL — HIGH (ref 70–99)
GLUCOSE BLDC GLUCOMTR-MCNC: 164 MG/DL — HIGH (ref 70–99)
GLUCOSE BLDC GLUCOMTR-MCNC: 184 MG/DL — HIGH (ref 70–99)
GLUCOSE BLDC GLUCOMTR-MCNC: 242 MG/DL — HIGH (ref 70–99)
GLUCOSE SERPL-MCNC: 76 MG/DL — SIGNIFICANT CHANGE UP (ref 70–99)
HCT VFR BLD CALC: 35 % — LOW (ref 39–50)
HGB BLD-MCNC: 11.9 G/DL — LOW (ref 13–17)
MCHC RBC-ENTMCNC: 29 PG — SIGNIFICANT CHANGE UP (ref 27–34)
MCHC RBC-ENTMCNC: 34 GM/DL — SIGNIFICANT CHANGE UP (ref 32–36)
MCV RBC AUTO: 85.4 FL — SIGNIFICANT CHANGE UP (ref 80–100)
NRBC # BLD: 0 /100 WBCS — SIGNIFICANT CHANGE UP (ref 0–0)
PLATELET # BLD AUTO: 356 K/UL — SIGNIFICANT CHANGE UP (ref 150–400)
POTASSIUM SERPL-MCNC: 3.8 MMOL/L — SIGNIFICANT CHANGE UP (ref 3.5–5.3)
POTASSIUM SERPL-SCNC: 3.8 MMOL/L — SIGNIFICANT CHANGE UP (ref 3.5–5.3)
RBC # BLD: 4.1 M/UL — LOW (ref 4.2–5.8)
RBC # FLD: 13.5 % — SIGNIFICANT CHANGE UP (ref 10.3–14.5)
SODIUM SERPL-SCNC: 141 MMOL/L — SIGNIFICANT CHANGE UP (ref 135–145)
WBC # BLD: 7.07 K/UL — SIGNIFICANT CHANGE UP (ref 3.8–10.5)
WBC # FLD AUTO: 7.07 K/UL — SIGNIFICANT CHANGE UP (ref 3.8–10.5)

## 2020-05-28 PROCEDURE — 99233 SBSQ HOSP IP/OBS HIGH 50: CPT | Mod: 95

## 2020-05-28 PROCEDURE — 99232 SBSQ HOSP IP/OBS MODERATE 35: CPT

## 2020-05-28 RX ORDER — ASCORBIC ACID 60 MG
500 TABLET,CHEWABLE ORAL
Refills: 0 | Status: DISCONTINUED | OUTPATIENT
Start: 2020-05-28 | End: 2020-06-02

## 2020-05-28 RX ORDER — ASCORBIC ACID 60 MG
500 TABLET,CHEWABLE ORAL
Refills: 0 | Status: DISCONTINUED | OUTPATIENT
Start: 2020-05-28 | End: 2020-05-28

## 2020-05-28 RX ORDER — ZINC SULFATE TAB 220 MG (50 MG ZINC EQUIVALENT) 220 (50 ZN) MG
220 TAB ORAL DAILY
Refills: 0 | Status: DISCONTINUED | OUTPATIENT
Start: 2020-05-28 | End: 2020-06-02

## 2020-05-28 RX ORDER — CHLORHEXIDINE GLUCONATE 213 G/1000ML
1 SOLUTION TOPICAL DAILY
Refills: 0 | Status: DISCONTINUED | OUTPATIENT
Start: 2020-05-28 | End: 2020-06-02

## 2020-05-28 RX ADMIN — MIRTAZAPINE 7.5 MILLIGRAM(S): 45 TABLET, ORALLY DISINTEGRATING ORAL at 21:35

## 2020-05-28 RX ADMIN — Medication 1: at 17:32

## 2020-05-28 RX ADMIN — PANTOPRAZOLE SODIUM 40 MILLIGRAM(S): 20 TABLET, DELAYED RELEASE ORAL at 12:16

## 2020-05-28 RX ADMIN — Medication 1: at 12:16

## 2020-05-28 RX ADMIN — Medication 1 TABLET(S): at 12:16

## 2020-05-28 RX ADMIN — INSULIN GLARGINE 8 UNIT(S): 100 INJECTION, SOLUTION SUBCUTANEOUS at 23:56

## 2020-05-28 RX ADMIN — QUETIAPINE FUMARATE 25 MILLIGRAM(S): 200 TABLET, FILM COATED ORAL at 21:35

## 2020-05-28 RX ADMIN — Medication 5 UNIT(S): at 23:56

## 2020-05-28 RX ADMIN — Medication 5 UNIT(S): at 17:32

## 2020-05-28 RX ADMIN — HEPARIN SODIUM 5000 UNIT(S): 5000 INJECTION INTRAVENOUS; SUBCUTANEOUS at 17:34

## 2020-05-28 RX ADMIN — HEPARIN SODIUM 5000 UNIT(S): 5000 INJECTION INTRAVENOUS; SUBCUTANEOUS at 05:40

## 2020-05-28 RX ADMIN — SCOPALAMINE 1 PATCH: 1 PATCH, EXTENDED RELEASE TRANSDERMAL at 20:04

## 2020-05-28 RX ADMIN — ZINC SULFATE TAB 220 MG (50 MG ZINC EQUIVALENT) 220 MILLIGRAM(S): 220 (50 ZN) TAB at 17:34

## 2020-05-28 RX ADMIN — SCOPALAMINE 1 PATCH: 1 PATCH, EXTENDED RELEASE TRANSDERMAL at 11:31

## 2020-05-28 RX ADMIN — OLANZAPINE 2.5 MILLIGRAM(S): 15 TABLET, FILM COATED ORAL at 21:35

## 2020-05-28 RX ADMIN — Medication 5 UNIT(S): at 03:10

## 2020-05-28 RX ADMIN — Medication 500 MILLIGRAM(S): at 17:34

## 2020-05-28 RX ADMIN — Medication 2: at 02:52

## 2020-05-28 RX ADMIN — Medication 5 UNIT(S): at 12:13

## 2020-05-28 RX ADMIN — CHLORHEXIDINE GLUCONATE 1 APPLICATION(S): 213 SOLUTION TOPICAL at 12:16
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
## 2020-05-28 NOTE — SWALLOW BEDSIDE ASSESSMENT ADULT - H & P REVIEW
yes/Consult received, EMR, labs, radiology reviewed.
Re-Consult received, EMR, labs, radiology reviewed./yes

## 2020-05-28 NOTE — PROGRESS NOTE ADULT - ASSESSMENT
64 year old male from [facility]  with PMH dementia and DM coming in with seizures like activity while sitting at AL. pt unable to provide further history (poor historian) . denies all complaints at this time. NKDA   Pt. seen and evaluated, noted laying comfortably in bed, does not respond to verbal stimuli.  Pt. is with NGT feeding, needs psych eval for capacity to make medical decisions, however since he is non verbal, will likely not be possible to assess, psych consulted.  SLP requested (pt. failed past consultation).  Palliative care consulted for GOC/peg placement.  5/28-Pt. is more awake today, able to say few words at a time.  Pt. will be reevaluated by SLP, currently still with NGT with Glucerna feeding. 64 year old male from [facility]  with PMH dementia and DM coming in with seizures like activity while sitting at AL. pt unable to provide further history (poor historian) . denies all complaints at this time. NKDA   Pt. seen and evaluated, noted laying comfortably in bed, does not respond to verbal stimuli.  Pt. is with NGT feeding, needs psych eval for capacity to make medical decisions, however since he is non verbal, will likely not be possible to assess, psych consulted.  SLP requested (pt. failed past consultation).  Palliative care consulted for GOC/peg placement.  5/28-Pt. is more awake today, able to say few words at a time.  Pt. will be reevaluated by SLP, currently still with NGT with Glucerna feeding.  Palliative following, awaiting s/s to plan further mode of feeding.

## 2020-05-28 NOTE — PROGRESS NOTE BEHAVIORAL HEALTH - NSBHFUPINTERVALHXFT_PSY_A_CORE
Pt seen for f/u at request of primary team for eval of capacity to consent to PEG. Multiple attempts have been made to interview pt in the past week, but in all of them including today, pt has been encountered sleeping deeply and unarousable. Pt seen today in his room via telepsych, sleeping in bed with NGT in nose, unarousable by voice (by MD and NATHAN) and light touch by MOASHWIN. Pt appears comfortable and in NAD, but cannot be interviewed due to somnolence.

## 2020-05-28 NOTE — SWALLOW BEDSIDE ASSESSMENT ADULT - COMMENTS
Pt seen for bedside swallow evaluation, verbal but very confused. Needed max encouragment/coaxing to participated with PO trials. At end of exam during clean up, Pt quickly attempted to climb out of bed.
Pt more awake compared to last visit. HOB elevated to 90°. AA+Ox0

## 2020-05-28 NOTE — PROGRESS NOTE BEHAVIORAL HEALTH - PRIMARY DX
Dementia associated with other underlying disease with behavioral disturbance Dementia with behavioral disturbance, unspecified dementia type

## 2020-05-28 NOTE — SWALLOW BEDSIDE ASSESSMENT ADULT - ORAL PREPARATORY PHASE
Anterior loss of bolus Reduced oral grading poor pucker to cup presentation/Reduced oral grading Decreased mastication ability

## 2020-05-28 NOTE — SWALLOW BEDSIDE ASSESSMENT ADULT - ASR SWALLOW REFERRAL
Malnutrition risk present. Pt may not be amendable to feeding./Registered Dietitian
Registered Dietitian/Malnutrition risk present. Pt may not be amendable to feeding.

## 2020-05-28 NOTE — PROGRESS NOTE ADULT - ASSESSMENT
64 year old male from [facility] with PMH of dementia and type 2 DM presented with seizure activity while sitting at AL. Endocrinology was consulted for management of uncontrolled type 2 DM with hyperglycemia.    1. Uncontrolled Type 2 DM with hyperglycemia, A1c 9.9%  -BG overall at goal but with fasting borderline hypoglycemia to 76 this AM  -patient on tube feedings x 16 hrs/day.   -If patient to remain on tube feedings, recommend to increase to tube feedings x 24hrs/day for steadier BG and increased nutrition  -continue Lantus 8 units at bedtime. Can decrease to Lantus 5 units at bedtime if patient to remain on tube feedings 16 hrs/day  -continue Humalog 5 units TID q 6 hrs   -continue low dose rapid acting insulin q 6 hrs as below  BG 70-100mg/dL 0 units  -150 mg/dL 0 units  -200 mg/dL 1 unit  -250 mg/dL 2 units  -300 mg/dL 3 units  -350 mg/dL 4 units  -400 mg/dL 5 units  BG > 400mg/dL 6 units  -FS AC HS  -ensure consistent carbohydrate   -will monitor FS and adjust accordingly   -patient pending repeat speech and swallow evaluation today    2.  Aspiration pneumonia  -oxygen supplementation as needed   -continue antibiotics as per primary team   -pulmonology follow up    3. Delirium  -patient with mitten restraints  -psychiatry follow up    Plan discussed with primary team]  Please  call  w/ any questions or concerns [phone]

## 2020-05-28 NOTE — PROGRESS NOTE BEHAVIORAL HEALTH - OTHER
looks much younger than age Somnolent, unresponsive Not assessed Not observed Does not speak (somnolent, unresponsive) Unable to assess (somnolent, unresponsive)

## 2020-05-28 NOTE — SWALLOW BEDSIDE ASSESSMENT ADULT - ORAL PHASE
prolonged oral bolus holding/Delayed oral transit time phasic bit; prolonged oral bolus holding/Decreased anterior-posterior movement of the bolus/Delayed oral transit time Decreased anterior-posterior movement of the bolus/Delayed oral transit time Delayed oral transit time/Decreased anterior-posterior movement of the bolus

## 2020-05-28 NOTE — PROGRESS NOTE ADULT - SUBJECTIVE AND OBJECTIVE BOX
CARDIOLOGY/MEDICAL ATTENDING    CHIEF COMPLAINT:Patient is a 64y old  Male who presents with a chief complaint of seizures.Pt appears comfortable, more awake and alert today..    	  REVIEW OF SYSTEMS:  unable to obtain      PHYSICAL EXAM:  T(C): 36.4 (05-28-20 @ 06:18), Max: 37.1 (05-27-20 @ 20:53)  HR: 54 (05-28-20 @ 06:18) (54 - 66)  BP: 108/90 (05-28-20 @ 06:18) (107/58 - 110/61)  RR: 16 (05-28-20 @ 06:18) (16 - 18)  SpO2: 98% (05-28-20 @ 06:18) (98% - 100%)  Wt(kg): --  I&O's Summary    27 May 2020 07:01  -  28 May 2020 07:00  --------------------------------------------------------  IN: 0 mL / OUT: 4 mL / NET: -4 mL        Appearance: Normal	  HEENT:   Normal oral mucosa, PERRL, EOMI	  Lymphatic: No lymphadenopathy  Cardiovascular: Normal S1 S2, No JVD, No murmurs, No edema  Respiratory: Lungs clear to auscultation	  Gastrointestinal:  Soft, Non-tender, + BS	  Skin: No rashes, No ecchymoses, No cyanosis	  Extremities: Normal range of motion, No clubbing, cyanosis or edema  Vascular: Peripheral pulses palpable 2+ bilaterally    MEDICATIONS  (STANDING):  chlorhexidine 2% Cloths 1 Application(s) Topical daily  dextrose 50% Injectable 12.5 Gram(s) IV Push once  dextrose 50% Injectable 25 Gram(s) IV Push once  dextrose 50% Injectable 25 Gram(s) IV Push once  ergocalciferol 72417 Unit(s) Oral every week  heparin   Injectable 5000 Unit(s) SubCutaneous every 12 hours  insulin glargine Injectable (LANTUS) 8 Unit(s) SubCutaneous at bedtime  insulin lispro (HumaLOG) corrective regimen sliding scale   SubCutaneous every 6 hours  insulin lispro Injectable (HumaLOG) 5 Unit(s) SubCutaneous every 6 hours  mirtazapine 7.5 milliGRAM(s) Oral at bedtime  multivitamin/minerals 1 Tablet(s) Oral daily  OLANZapine 2.5 milliGRAM(s) Oral at bedtime  pantoprazole  Injectable 40 milliGRAM(s) IV Push daily  QUEtiapine 25 milliGRAM(s) Oral at bedtime      	  	  LABS:	 	                       11.9   7.07  )-----------( 356      ( 28 May 2020 07:09 )             35.0     05-28    141  |  105  |  13  ----------------------------<  76  3.8   |  28  |  0.87    Ca    9.4      28 May 2020 07:09    TPro  7.1  /  Alb  2.5<L>  /  TBili  0.4  /  DBili  x   /  AST  27  /  ALT  50  /  AlkPhos  96  05-27    proBNP: Serum Pro-Brain Natriuretic Peptide: 126 pg/mL (05-01 @ 09:36)    Lipid Profile: Cholesterol 161  LDL 95  HDL 35         TSH: Thyroid Stimulating Hormone, Serum: 2.39 uU/mL (05-01 @ 09:36)

## 2020-05-28 NOTE — PROGRESS NOTE BEHAVIORAL HEALTH - NSBHCONSULTOBSREASON_PSY_A_CORE FT
Delirium/dementia
ongoing agitation when awake with safety risks to self and others
Safety i/s/o intermittent agitation

## 2020-05-28 NOTE — SWALLOW BEDSIDE ASSESSMENT ADULT - PHARYNGEAL PHASE
Within functional limits Multiple swallows/Delayed pharyngeal swallow/Decreased laryngeal elevation Decreased laryngeal elevation/Multiple swallows/Delayed pharyngeal swallow Delayed pharyngeal swallow/Decreased laryngeal elevation Throat clear post oral intake/Cough post oral intake/Wet vocal quality post oral intake

## 2020-05-28 NOTE — PROGRESS NOTE BEHAVIORAL HEALTH - NSBHCONSFOLLOWNEEDS_PSY_A_CORE
Patient needs further psychiatric safety assessment prior to discharge/will need re-evaluation once mental status improves
Patient needs further psychiatric safety assessment prior to discharge
no psychiatric contraindications to discharge

## 2020-05-28 NOTE — SWALLOW BEDSIDE ASSESSMENT ADULT - ASR SWALLOW ASPIRATION MONITOR
oral hygiene/position upright (90Y)/throat clearing/upper respiratory infection/gurgly voice/pneumonia/change of breathing pattern/cough/fever
cough/gurgly voice/position upright (90Y)/change of breathing pattern/pneumonia/throat clearing/upper respiratory infection/oral hygiene/fever

## 2020-05-28 NOTE — CHART NOTE - NSCHARTNOTEFT_GEN_A_CORE
Assessment:   64yMalePatient is a 64y old  Male who presents with a chief complaint of seizures (28 May 2020 13:46)  Pt On Airborne isolation. Pt w COvid +. Pt is on NG TF. Nutrition Consult Ordered verbally by NP D/T Blood sugars in AM Low. (74) So suggesting 24 hr TF Rate.  SLP eval ordered. D/W NP Awaiting SLP eval.       Factors impacting intake: [ ] none [ ] nausea  [ ] vomiting [ ] diarrhea [ ] constipation  [ ]chewing problems [ ] swallowing issues  [ x] other:  Mental status.     Diet Presciption: Diet, NPO:   Tube Feeding Modality: Nasogastric  Glucerna 1.2 Mihir  Total Volume for 24 Hours (mL): 640  Continuous  Starting Tube Feed Rate {mL per Hour}: 40  Until Goal Tube Feed Rate (mL per Hour): 40  Tube Feed Duration (in Hours): 16  Tube Feed Start Time: 10:00  Tube Feed Stop Time: 18:00 (05-24-20 @ 11:22)    Intake:     Current Weight:   % Weight Change    Pertinent Medications: MEDICATIONS  (STANDING):  chlorhexidine 2% Cloths 1 Application(s) Topical daily  dextrose 50% Injectable 12.5 Gram(s) IV Push once  dextrose 50% Injectable 25 Gram(s) IV Push once  dextrose 50% Injectable 25 Gram(s) IV Push once  ergocalciferol 39760 Unit(s) Oral every week  heparin   Injectable 5000 Unit(s) SubCutaneous every 12 hours  insulin glargine Injectable (LANTUS) 8 Unit(s) SubCutaneous at bedtime  insulin lispro (HumaLOG) corrective regimen sliding scale   SubCutaneous every 6 hours  insulin lispro Injectable (HumaLOG) 5 Unit(s) SubCutaneous every 6 hours  mirtazapine 7.5 milliGRAM(s) Oral at bedtime  multivitamin/minerals 1 Tablet(s) Oral daily  OLANZapine 2.5 milliGRAM(s) Oral at bedtime  pantoprazole  Injectable 40 milliGRAM(s) IV Push daily  QUEtiapine 25 milliGRAM(s) Oral at bedtime    MEDICATIONS  (PRN):  acetaminophen  Suppository .. 650 milliGRAM(s) Rectal every 6 hours PRN Temp greater or equal to 38C (100.4F)  ALBUTerol    90 MICROgram(s) HFA Inhaler 2 Puff(s) Inhalation every 6 hours PRN Shortness of Breath and/or Wheezing  dextrose 40% Gel 15 Gram(s) Oral once PRN Blood Glucose LESS THAN 70 milliGRAM(s)/deciliter  glucagon  Injectable 1 milliGRAM(s) IntraMuscular once PRN Glucose LESS THAN 70 milligrams/deciliter  guaiFENesin   Syrup  (Sugar-Free) 100 milliGRAM(s) Oral every 6 hours PRN Cough    Pertinent Labs: 05-28 Na141 mmol/L Glu 76 mg/dL K+ 3.8 mmol/L Cr  0.87 mg/dL BUN 13 mg/dL 05-25 Phos 2.8 mg/dL 05-27 Alb 2.5 g/dL<L> 05-01 Chol 161 mg/dL LDL 95 mg/dL HDL 35 mg/dL<L> Trig 153 mg/dL<H>     CAPILLARY BLOOD GLUCOSE      POCT Blood Glucose.: 184 mg/dL (28 May 2020 11:43)  POCT Blood Glucose.: 101 mg/dL (28 May 2020 05:36)  POCT Blood Glucose.: 242 mg/dL (28 May 2020 02:43)  POCT Blood Glucose.: 180 mg/dL (27 May 2020 20:30)  POCT Blood Glucose.: 192 mg/dL (27 May 2020 16:42)    Skin: L heel DTI  Unstageable R Buttocks.   L heel  DTI    Estimated Needs:   [ ] no change since previous assessment  [ ] recalculated:     Previous Nutrition Diagnosis:   [ ] Inadequate Energy Intake [ ]Inadequate Oral Intake [ ] Excessive Energy Intake   [ ] Underweight [ ] Increased Nutrient Needs [ ] Overweight/Obesity   [ ] Altered GI Function [ ] Unintended Weight Loss [ ] Food & Nutrition Related Knowledge Deficit [ x] Malnutrition severe    Nutrition Diagnosis is [x ] ongoing  [ ] resolved [ ] not applicable     New Nutrition Diagnosis: [ ] not applicable       Interventions:   Recommend  [ ] Change Diet To:  [ ] Nutrition Supplement  [ x] Nutrition Support Glucerna 1.2 Initial Goal Rate @ 60 ml/ hr x 24 as tolerated to provide 1440 ml, 1728 calories. Protein 86 gms, free water 1159 ml/day.  (x) Prosource  1 pkt Bid to provide additional 120 calories, 30 gms.  (x) Vitc , Zn.  (x) D/W NP. Diet verification order Placed.   [ ] Other:     Monitoring and Evaluation:   [ ] PO intake [ x ] Tolerance to diet prescription [ x ] weights [ x ] labs[ x ] follow up per protocol  [ ] other:

## 2020-05-28 NOTE — PROGRESS NOTE ADULT - ASSESSMENT
64 yr old male with PMHX of DM,dementia sent to ER from facility for seizure and now COVID+,Delirium,MAXIME,hypernatremia and suspected aspiration pneumonia.  1.Speech and swallow re-eval.  2.COVID +supportive treatment.  3.DM-Insulin, Endo f/u.  4.Delirium-Psych f/u.  5.NGT placed-TF.  6.GI and DVT prophylaxis.

## 2020-05-28 NOTE — PROGRESS NOTE BEHAVIORAL HEALTH - NSBHCHARTREVIEWLAB_PSY_A_CORE FT
11.9   7.07  )-----------( 356      ( 28 May 2020 07:09 )             35.0   05-28    141  |  105  |  13  ----------------------------<  76  3.8   |  28  |  0.87    Ca    9.4      28 May 2020 07:09    TPro  7.1  /  Alb  2.5<L>  /  TBili  0.4  /  DBili  x   /  AST  27  /  ALT  50  /  AlkPhos  96  05-27    COVID-19 PCR: Detected (26 May 2020 08:44)

## 2020-05-28 NOTE — SWALLOW BEDSIDE ASSESSMENT ADULT - SLP PERTINENT HISTORY OF CURRENT PROBLEM
64 year old male from [facility]  with PMH dementia and DM coming in with seizures like activity while sitting at AL. now COVID+. Pt s/p agitation requiring IV haldolx2.
64 year old male from [facility]  with PMH dementia and DM coming in with seizures like activity while sitting at AL. now COVID+. Pt s/p agitation requiring IV haldolx2.

## 2020-05-28 NOTE — PROGRESS NOTE ADULT - SUBJECTIVE AND OBJECTIVE BOX
NP Note discussed with  Primary Attending    Patient is a 64y old  Male who presents with a chief complaint of seizures (28 May 2020 11:28)      INTERVAL HPI/OVERNIGHT EVENTS: no new complaints    MEDICATIONS  (STANDING):  chlorhexidine 2% Cloths 1 Application(s) Topical daily  dextrose 50% Injectable 12.5 Gram(s) IV Push once  dextrose 50% Injectable 25 Gram(s) IV Push once  dextrose 50% Injectable 25 Gram(s) IV Push once  ergocalciferol 26852 Unit(s) Oral every week  heparin   Injectable 5000 Unit(s) SubCutaneous every 12 hours  insulin glargine Injectable (LANTUS) 8 Unit(s) SubCutaneous at bedtime  insulin lispro (HumaLOG) corrective regimen sliding scale   SubCutaneous every 6 hours  insulin lispro Injectable (HumaLOG) 5 Unit(s) SubCutaneous every 6 hours  mirtazapine 7.5 milliGRAM(s) Oral at bedtime  multivitamin/minerals 1 Tablet(s) Oral daily  OLANZapine 2.5 milliGRAM(s) Oral at bedtime  pantoprazole  Injectable 40 milliGRAM(s) IV Push daily  QUEtiapine 25 milliGRAM(s) Oral at bedtime    MEDICATIONS  (PRN):  acetaminophen  Suppository .. 650 milliGRAM(s) Rectal every 6 hours PRN Temp greater or equal to 38C (100.4F)  ALBUTerol    90 MICROgram(s) HFA Inhaler 2 Puff(s) Inhalation every 6 hours PRN Shortness of Breath and/or Wheezing  dextrose 40% Gel 15 Gram(s) Oral once PRN Blood Glucose LESS THAN 70 milliGRAM(s)/deciliter  glucagon  Injectable 1 milliGRAM(s) IntraMuscular once PRN Glucose LESS THAN 70 milligrams/deciliter  guaiFENesin   Syrup  (Sugar-Free) 100 milliGRAM(s) Oral every 6 hours PRN Cough      __________________________________________________  REVIEW OF SYSTEMS:    CONSTITUTIONAL: No fever,   EYES: no acute visual disturbances  NECK: No pain or stiffness  RESPIRATORY: No cough; No shortness of breath  CARDIOVASCULAR: No chest pain, no palpitations  GASTROINTESTINAL: No pain. No nausea or vomiting; No diarrhea   NEUROLOGICAL: No headache or numbness, no tremors  MUSCULOSKELETAL: No joint pain, no muscle pain  GENITOURINARY: no dysuria, no frequency, no hesitancy  PSYCHIATRY: no depression , no anxiety  ALL OTHER  ROS negative        Vital Signs Last 24 Hrs  T(C): 36.6 (28 May 2020 13:13), Max: 37.1 (27 May 2020 20:53)  T(F): 97.8 (28 May 2020 13:13), Max: 98.7 (27 May 2020 20:53)  HR: 68 (28 May 2020 13:13) (54 - 68)  BP: 115/68 (28 May 2020 13:13) (107/58 - 115/68)  BP(mean): --  RR: 16 (28 May 2020 13:13) (16 - 18)  SpO2: 100% (28 May 2020 13:13) (98% - 100%)    ________________________________________________  PHYSICAL EXAM: well developed  GENERAL: NAD  HEENT: Normocephalic;  conjunctivae and sclerae clear; moist mucous membranes;   NECK : supple  CHEST/LUNG: Clear to auscultation bilaterally with good air entry   HEART: S1 S2  regular; no murmurs, gallops or rubs  ABDOMEN: NGT with Glucerna feedingSoft, Nontender, Nondistended; Bowel sounds present  EXTREMITIES: no cyanosis; no edema; no calf tenderness  SKIN: warm and dry; no rash  NERVOUS SYSTEM:  Awake and alert; able to verbalize few words  _________________________________________________  LABS:                        11.9   7.07  )-----------( 356      ( 28 May 2020 07:09 )             35.0     05-28    141  |  105  |  13  ----------------------------<  76  3.8   |  28  |  0.87    Ca    9.4      28 May 2020 07:09    TPro  7.1  /  Alb  2.5<L>  /  TBili  0.4  /  DBili  x   /  AST  27  /  ALT  50  /  AlkPhos  96  05-27        CAPILLARY BLOOD GLUCOSE      POCT Blood Glucose.: 184 mg/dL (28 May 2020 11:43)  POCT Blood Glucose.: 101 mg/dL (28 May 2020 05:36)  POCT Blood Glucose.: 242 mg/dL (28 May 2020 02:43)  POCT Blood Glucose.: 180 mg/dL (27 May 2020 20:30)  POCT Blood Glucose.: 192 mg/dL (27 May 2020 16:42)        RADIOLOGY & ADDITIONAL TESTS:    Imaging Personally Reviewed:  YES/NO    Consultant(s) Notes Reviewed:   YES/ No    Care Discussed with Consultants :     Plan of care was discussed with patient and /or primary care giver; all questions and concerns were addressed and care was aligned with patient's wishes.

## 2020-05-28 NOTE — PROGRESS NOTE ADULT - PROBLEM SELECTOR PLAN 4
Repeat PCR pending  Continue with Isolation  Continue with O2 support  Continue with medications regimen  Continue with Tylenol  Continue with supportive care  Follow up lab results -COVID19 positive on 5/26  -Will repeat COVID19 PCR in am  -Continue with Isolation  -Continue with O2 support

## 2020-05-28 NOTE — PROGRESS NOTE ADULT - PROBLEM SELECTOR PLAN 2
Still with NGT feeding  -Will repeat s/s today to determine need for PEG  -Palliative consulted for GOC/PEG Still with NGT feeding  -Will repeat s/s today to determine need for PEG  -Palliative following

## 2020-05-28 NOTE — SWALLOW BEDSIDE ASSESSMENT ADULT - SWALLOW EVAL: RECOMMENDED DIET
dysphagia puree diet with nectar thick liquids, asst feeding.
dysphagia mechanical soft diet with nectar thick liquids

## 2020-05-28 NOTE — SWALLOW BEDSIDE ASSESSMENT ADULT - SWALLOW EVAL: RECOMMENDED FEEDING/EATING TECHNIQUES
position upright (90 degrees)/oral hygiene/turn head right/allow for swallow between intakes/alternate food with liquid
alternate food with liquid/oral hygiene/allow for swallow between intakes/position upright (90 degrees)/turn head right

## 2020-05-28 NOTE — PROGRESS NOTE BEHAVIORAL HEALTH - NSBHCHARTREVIEWIMAGING_PSY_A_CORE FT
< from: Xray Chest 1 View- PORTABLE-Routine (05.23.20 @ 17:40) >    Impression: Stable NG tube.    Possible mild left pleural effusion.    No evidence for pneumothorax.    Opacification in the left mid to lower lung zone, slightly progressed.    The trachea is midline.    Stable cardiac silhouette.      < end of copied text >

## 2020-05-28 NOTE — SWALLOW BEDSIDE ASSESSMENT ADULT - SLP PRECAUTIONS/LIMITATIONS: VISION
Cannot ascertain visual acutiy; poor eye tracking to speaker.
Cannot ascertain visual acutiy; poor eye tracking to speaker.

## 2020-05-28 NOTE — SWALLOW BEDSIDE ASSESSMENT ADULT - SWALLOW EVAL: DIAGNOSIS
Pt p/w behavioral dysphagia c/b prolonged mastication and bolus propulsion, prolonged oral bolus holding, delayed swallow trigger. While no overt s/s of laryngeal aspiration at this exam, Pt aggressive behavior places him at risk for aspiration.
Pt p/w s&s oropharyngeal dysphagia with psychogenic component c/b impaired reception to food, poor labial coordination & seal, poor bolus formation, phasic bite, slow A-P transport, suspected premature spillage of bolus to the pharynx, delayed swallow trigger, decreased hyolaryngeal elevation, cough on this liquids. Aspiration risk present.

## 2020-05-28 NOTE — PROGRESS NOTE ADULT - PROBLEM SELECTOR PLAN 3
A1c 9.9, endo Dr. Allison following  -Cont FS AC&HS  -Cont Lantus 8 units HS  -Cont Humalog 6units q6hrs  -Cont low dose HSS  -Cont NGT feeding  -Awaiting SLP A1c 9.9, endo Dr. Allison following  -Cont FS AC&HS  -Cont Lantus 8 units HS  -Cont Humalog 6units q6hrs  -Cont low dose HSS  -Cont NGT feeding  -Awaiting SLP  -Will discuss ATC feeding with dietician as pt. tends to be slightly hypoglycemic in am

## 2020-05-28 NOTE — PROGRESS NOTE BEHAVIORAL HEALTH - NSBHCONSULTRECOMMENDOTHER_PSY_A_CORE FT
Environmental Provisions:   - Maintain daytime awakeness / night-time sleep  - Windows open during the day and mobilize as tolerated during the day (i.e. transfer to chair / seated position even if unable to ambulate otherwise)  - Low lighting and noise avoidance to promote sleep  - Ensure access to any sensory aides used prior to admission (i.e. glasses, hearing aids etc)
1. C/w current reduced-dose medication regimen:  - Zyprexa 2.5 mg HS  - Seroquel 25 mg HS  - Mirtazepine at 7.5 mg HS  Continue to Hold day time doses of Mirtazepine, Zyprexa and Seroquel except as indicated for PRN agitation below  2. For agitation during the day; Zyprexa 2.5 mg BID PRN can be given  3. For insomnia at night, additional dose of Zyprexa 2.5 mg and Seroquel 25 mg can be given  4. Continue delirium precautions: Frequent reorientation, familiar pictures and objects at bedside, natural light in daytime, consistent sleep/wake schedule, adequate hydration and nutrition, sensory aids (hearing aids, glasses) present if needed, minimizing noise and overstimulation, clustering care to minimize overnight interruptions, judicious use of deliriogenic medications (anticholinergics, benzodiazepines and opioid analgesics)  5. Pt does NOT appear to have capacity for any decision-making at this time, given profoundly obtunded state  -For lifesaving or highly beneficial care, recommend consulting surrogate for necessary consents  -For non-lifesaving or elective care, recommend forgoing the care  6. Psychiatry is signing off. Reconsult if additional issues arise as inpatient  7. Pt is psych cleared for D/C back to nursing home as soon as he is medically optimized      Florence Packer MD  Director, Consultation-Liaison Psychiatry Service  [phone]

## 2020-05-28 NOTE — PROGRESS NOTE ADULT - PROBLEM SELECTOR PLAN 1
Pt. is calmer as reported by nursing staff  -Cont Zyprexa at 2.5 mg HS  -Cont Seroquel at 25 mg HS  -Cont Mirtazepine at 7.5 mg HS  -Safety precautions with mittens  -Scopolamine patch d/c'd as it may decrease mentation

## 2020-05-28 NOTE — PROGRESS NOTE ADULT - SUBJECTIVE AND OBJECTIVE BOX
Pt is awake, alert, lying in bed. NGT in place. Mitten restraints in place.     INTERVAL HPI/OVERNIGHT EVENTS:    VITAL SIGNS:  T(F): 97.5 (05-28-20 @ 06:18)  HR: 54 (05-28-20 @ 06:18)  BP: 108/90 (05-28-20 @ 06:18)  RR: 16 (05-28-20 @ 06:18)  SpO2: 98% (05-28-20 @ 06:18)  Wt(kg): --  I&O's Detail    27 May 2020 07:01  -  28 May 2020 07:00  --------------------------------------------------------  IN:  Total IN: 0 mL    OUT:    Voided: 4 mL  Total OUT: 4 mL    Total NET: -4 mL    REVIEW OF SYSTEMS:    CONSTITUTIONAL:  No fevers, chills, sweats    HEENT:  Eyes:  No diplopia or blurred vision. ENT:  No earache, sore throat or runny nose.    CARDIOVASCULAR:  No pressure, squeezing, tightness, or heaviness about the chest; no palpitations.    RESPIRATORY:  Per HPI    GASTROINTESTINAL:  No abdominal pain, nausea, vomiting or diarrhea.    GENITOURINARY:  No dysuria, frequency or urgency.    NEUROLOGIC:  No paresthesias, fasciculations, seizures or weakness.    PSYCHIATRIC:  No disorder of thought or mood.      PHYSICAL EXAM:  Constitutional: Well developed and nourished  Eyes: Perrla  ENMT: Normal  Neck: Supple  Respiratory: Good air entry  Cardiovascular: S1 S2 regular  Gastrointestinal: Soft, Non tender; NGT   Extremities: No edema  Vascular: Normal  Neurological: Awake, alert   Musculoskeletal:  weak.     MEDICATIONS  (STANDING):  chlorhexidine 2% Cloths 1 Application(s) Topical daily  dextrose 50% Injectable 12.5 Gram(s) IV Push once  dextrose 50% Injectable 25 Gram(s) IV Push once  dextrose 50% Injectable 25 Gram(s) IV Push once  ergocalciferol 17937 Unit(s) Oral every week  heparin   Injectable 5000 Unit(s) SubCutaneous every 12 hours  insulin glargine Injectable (LANTUS) 8 Unit(s) SubCutaneous at bedtime  insulin lispro (HumaLOG) corrective regimen sliding scale   SubCutaneous every 6 hours  insulin lispro Injectable (HumaLOG) 5 Unit(s) SubCutaneous every 6 hours  mirtazapine 7.5 milliGRAM(s) Oral at bedtime  multivitamin/minerals 1 Tablet(s) Oral daily  OLANZapine 2.5 milliGRAM(s) Oral at bedtime  pantoprazole  Injectable 40 milliGRAM(s) IV Push daily  QUEtiapine 25 milliGRAM(s) Oral at bedtime  scopolamine   Patch 1 Patch Transdermal every 72 hours    MEDICATIONS  (PRN):  acetaminophen  Suppository .. 650 milliGRAM(s) Rectal every 6 hours PRN Temp greater or equal to 38C (100.4F)  ALBUTerol    90 MICROgram(s) HFA Inhaler 2 Puff(s) Inhalation every 6 hours PRN Shortness of Breath and/or Wheezing  dextrose 40% Gel 15 Gram(s) Oral once PRN Blood Glucose LESS THAN 70 milliGRAM(s)/deciliter  glucagon  Injectable 1 milliGRAM(s) IntraMuscular once PRN Glucose LESS THAN 70 milligrams/deciliter  guaiFENesin   Syrup  (Sugar-Free) 100 milliGRAM(s) Oral every 6 hours PRN Cough      Allergies    No Known Allergies    Intolerances        LABS:                        11.9   7.07  )-----------( 356      ( 28 May 2020 07:09 )             35.0     05-28    141  |  105  |  13  ----------------------------<  76  3.8   |  28  |  0.87    Ca    9.4      28 May 2020 07:09    TPro  7.1  /  Alb  2.5<L>  /  TBili  0.4  /  DBili  x   /  AST  27  /  ALT  50  /  AlkPhos  96  05-27    CAPILLARY BLOOD GLUCOSE    POCT Blood Glucose.: 101 mg/dL (28 May 2020 05:36)  POCT Blood Glucose.: 242 mg/dL (28 May 2020 02:43)  POCT Blood Glucose.: 180 mg/dL (27 May 2020 20:30)  POCT Blood Glucose.: 192 mg/dL (27 May 2020 16:42)  POCT Blood Glucose.: 174 mg/dL (27 May 2020 11:27)    pro-bnp -- 05-24 @ 08:16     d-dimer 1075  05-24 @ 08:16      RADIOLOGY & ADDITIONAL TESTS:    CXR:    Ct scan chest:    ekg;    echo:

## 2020-05-28 NOTE — PROGRESS NOTE BEHAVIORAL HEALTH - SECONDARY DX1
Dementia associated with other underlying disease with behavioral disturbance
Delirium due to another medical condition
Delirium due to another medical condition

## 2020-05-28 NOTE — PROGRESS NOTE ADULT - SUBJECTIVE AND OBJECTIVE BOX
64 year old male from [facility] with PMH of dementia and type 2 DM presented with seizure activity while sitting at AL. Endocrinology was consulted for management of uncontrolled type 2 DM with hyperglycemia.    Patient seen and examined at bedside. He is more awake and alert today with less agitation on mitten restraints. He is pending repeat speech and swallow evaluation today. He remains on tube feedings. Fasting BG borderline low to 76 this AM following Lantus 8 units at bedtime. Tube feeds held overnight.    MEDICATIONS  (STANDING):  chlorhexidine 2% Cloths 1 Application(s) Topical daily  dextrose 50% Injectable 12.5 Gram(s) IV Push once  dextrose 50% Injectable 25 Gram(s) IV Push once  dextrose 50% Injectable 25 Gram(s) IV Push once  ergocalciferol 14258 Unit(s) Oral every week  heparin   Injectable 5000 Unit(s) SubCutaneous every 12 hours  insulin glargine Injectable (LANTUS) 8 Unit(s) SubCutaneous at bedtime  insulin lispro (HumaLOG) corrective regimen sliding scale   SubCutaneous every 6 hours  insulin lispro Injectable (HumaLOG) 5 Unit(s) SubCutaneous every 6 hours  mirtazapine 7.5 milliGRAM(s) Oral at bedtime  multivitamin/minerals 1 Tablet(s) Oral daily  OLANZapine 2.5 milliGRAM(s) Oral at bedtime  pantoprazole  Injectable 40 milliGRAM(s) IV Push daily  QUEtiapine 25 milliGRAM(s) Oral at bedtime  scopolamine   Patch 1 Patch Transdermal every 72 hours    MEDICATIONS  (PRN):  acetaminophen  Suppository .. 650 milliGRAM(s) Rectal every 6 hours PRN Temp greater or equal to 38C (100.4F)  ALBUTerol    90 MICROgram(s) HFA Inhaler 2 Puff(s) Inhalation every 6 hours PRN Shortness of Breath and/or Wheezing  dextrose 40% Gel 15 Gram(s) Oral once PRN Blood Glucose LESS THAN 70 milliGRAM(s)/deciliter  glucagon  Injectable 1 milliGRAM(s) IntraMuscular once PRN Glucose LESS THAN 70 milligrams/deciliter  guaiFENesin   Syrup  (Sugar-Free) 100 milliGRAM(s) Oral every 6 hours PRN Cough      REVIEW OF SYSTEMS:  unable to obtain due to medical condition    PHYSICAL EXAM:      VITAL SIGNS  T(C): 36.4 (05-28-20 @ 06:18), Max: 37.1 (05-27-20 @ 20:53)  HR: 54 (05-28-20 @ 06:18) (54 - 66)  BP: 108/90 (05-28-20 @ 06:18) (107/58 - 110/61)  RR: 16 (05-28-20 @ 06:18) (16 - 18)  SpO2: 98% (05-28-20 @ 06:18) (98% - 100%)      GENERAL: more awake tody, NAD, well-developed  HEAD:  Atraumatic, Normocephalic  EYES: EOMI, PERRLA, conjunctiva and sclera clear  ENMT: No tonsillar erythema, exudates, or enlargement; No lesions  NECK: Supple, No JVD, Normal thyroid  NERVOUS SYSTEM:  nonverbal,  CHEST/LUNG: decreased breath sounds, No rales, rhonchi, wheezing, or rubs  HEART: Regular rate and rhythm; No murmurs, rubs, or gallops  ABDOMEN: Soft, Nontender, Nondistended; Bowel sounds present  EXTREMITIES:  2+ Peripheral Pulses, No clubbing, cyanosis, or edema  SKIN: No rashes or lesions        LABS:                        11.9   7.07  )-----------( 356      ( 28 May 2020 07:09 )             35.0     05-28    141  |  105  |  13  ----------------------------<  76  3.8   |  28  |  0.87    Ca    9.4      28 May 2020 07:09    TPro  7.1  /  Alb  2.5<L>  /  TBili  0.4  /  DBili  x   /  AST  27  /  ALT  50  /  AlkPhos  96  05-27        CAPILLARY BLOOD GLUCOSE      POCT Blood Glucose.: 101 mg/dL (28 May 2020 05:36)  POCT Blood Glucose.: 242 mg/dL (28 May 2020 02:43)  POCT Blood Glucose.: 180 mg/dL (27 May 2020 20:30)  POCT Blood Glucose.: 192 mg/dL (27 May 2020 16:42)  POCT Blood Glucose.: 174 mg/dL (27 May 2020 11:27)

## 2020-05-28 NOTE — PROGRESS NOTE BEHAVIORAL HEALTH - NSBHCHARTREVIEWVS_PSY_A_CORE FT
Vital Signs Last 24 Hrs  T(C): 36.6 (28 May 2020 13:13), Max: 37.1 (27 May 2020 20:53)  T(F): 97.8 (28 May 2020 13:13), Max: 98.7 (27 May 2020 20:53)  HR: 68 (28 May 2020 17:15) (54 - 68)  BP: 97/- (28 May 2020 17:15) (97/- - 115/68)  BP(mean): --  RR: 16 (28 May 2020 13:13) (16 - 18)  SpO2: 100% (28 May 2020 13:13) (98% - 100%)

## 2020-05-29 LAB
ANION GAP SERPL CALC-SCNC: 10 MMOL/L — SIGNIFICANT CHANGE UP (ref 5–17)
BUN SERPL-MCNC: 11 MG/DL — SIGNIFICANT CHANGE UP (ref 7–18)
CALCIUM SERPL-MCNC: 9.5 MG/DL — SIGNIFICANT CHANGE UP (ref 8.4–10.5)
CHLORIDE SERPL-SCNC: 103 MMOL/L — SIGNIFICANT CHANGE UP (ref 96–108)
CO2 SERPL-SCNC: 27 MMOL/L — SIGNIFICANT CHANGE UP (ref 22–31)
CREAT SERPL-MCNC: 0.89 MG/DL — SIGNIFICANT CHANGE UP (ref 0.5–1.3)
GLUCOSE BLDC GLUCOMTR-MCNC: 120 MG/DL — HIGH (ref 70–99)
GLUCOSE BLDC GLUCOMTR-MCNC: 140 MG/DL — HIGH (ref 70–99)
GLUCOSE BLDC GLUCOMTR-MCNC: 174 MG/DL — HIGH (ref 70–99)
GLUCOSE BLDC GLUCOMTR-MCNC: 180 MG/DL — HIGH (ref 70–99)
GLUCOSE BLDC GLUCOMTR-MCNC: 88 MG/DL — SIGNIFICANT CHANGE UP (ref 70–99)
GLUCOSE SERPL-MCNC: 86 MG/DL — SIGNIFICANT CHANGE UP (ref 70–99)
HCT VFR BLD CALC: 37.1 % — LOW (ref 39–50)
HGB BLD-MCNC: 12.1 G/DL — LOW (ref 13–17)
MCHC RBC-ENTMCNC: 27.9 PG — SIGNIFICANT CHANGE UP (ref 27–34)
MCHC RBC-ENTMCNC: 32.6 GM/DL — SIGNIFICANT CHANGE UP (ref 32–36)
MCV RBC AUTO: 85.7 FL — SIGNIFICANT CHANGE UP (ref 80–100)
NRBC # BLD: 0 /100 WBCS — SIGNIFICANT CHANGE UP (ref 0–0)
PLATELET # BLD AUTO: 356 K/UL — SIGNIFICANT CHANGE UP (ref 150–400)
POTASSIUM SERPL-MCNC: 3.5 MMOL/L — SIGNIFICANT CHANGE UP (ref 3.5–5.3)
POTASSIUM SERPL-SCNC: 3.5 MMOL/L — SIGNIFICANT CHANGE UP (ref 3.5–5.3)
RBC # BLD: 4.33 M/UL — SIGNIFICANT CHANGE UP (ref 4.2–5.8)
RBC # FLD: 13.7 % — SIGNIFICANT CHANGE UP (ref 10.3–14.5)
SARS-COV-2 RNA SPEC QL NAA+PROBE: DETECTED
SODIUM SERPL-SCNC: 140 MMOL/L — SIGNIFICANT CHANGE UP (ref 135–145)
WBC # BLD: 7.23 K/UL — SIGNIFICANT CHANGE UP (ref 3.8–10.5)
WBC # FLD AUTO: 7.23 K/UL — SIGNIFICANT CHANGE UP (ref 3.8–10.5)

## 2020-05-29 PROCEDURE — 99232 SBSQ HOSP IP/OBS MODERATE 35: CPT

## 2020-05-29 RX ORDER — NYSTATIN CREAM 100000 [USP'U]/G
1 CREAM TOPICAL
Refills: 0 | Status: DISCONTINUED | OUTPATIENT
Start: 2020-05-29 | End: 2020-06-02

## 2020-05-29 RX ADMIN — HEPARIN SODIUM 5000 UNIT(S): 5000 INJECTION INTRAVENOUS; SUBCUTANEOUS at 17:42

## 2020-05-29 RX ADMIN — SCOPALAMINE 1 PATCH: 1 PATCH, EXTENDED RELEASE TRANSDERMAL at 19:30

## 2020-05-29 RX ADMIN — ZINC SULFATE TAB 220 MG (50 MG ZINC EQUIVALENT) 220 MILLIGRAM(S): 220 (50 ZN) TAB at 11:41

## 2020-05-29 RX ADMIN — OLANZAPINE 2.5 MILLIGRAM(S): 15 TABLET, FILM COATED ORAL at 22:07

## 2020-05-29 RX ADMIN — HEPARIN SODIUM 5000 UNIT(S): 5000 INJECTION INTRAVENOUS; SUBCUTANEOUS at 05:18

## 2020-05-29 RX ADMIN — Medication 5 UNIT(S): at 11:40

## 2020-05-29 RX ADMIN — Medication 1: at 17:41

## 2020-05-29 RX ADMIN — Medication 1: at 11:40

## 2020-05-29 RX ADMIN — Medication 5 UNIT(S): at 17:42

## 2020-05-29 RX ADMIN — Medication 500 MILLIGRAM(S): at 17:42

## 2020-05-29 RX ADMIN — Medication 500 MILLIGRAM(S): at 05:19

## 2020-05-29 RX ADMIN — PANTOPRAZOLE SODIUM 40 MILLIGRAM(S): 20 TABLET, DELAYED RELEASE ORAL at 11:41

## 2020-05-29 RX ADMIN — NYSTATIN CREAM 1 APPLICATION(S): 100000 CREAM TOPICAL at 21:07

## 2020-05-29 RX ADMIN — INSULIN GLARGINE 8 UNIT(S): 100 INJECTION, SOLUTION SUBCUTANEOUS at 22:07

## 2020-05-29 RX ADMIN — Medication 1 TABLET(S): at 11:41

## 2020-05-29 RX ADMIN — SCOPALAMINE 1 PATCH: 1 PATCH, EXTENDED RELEASE TRANSDERMAL at 07:09

## 2020-05-29 RX ADMIN — MIRTAZAPINE 7.5 MILLIGRAM(S): 45 TABLET, ORALLY DISINTEGRATING ORAL at 22:07

## 2020-05-29 RX ADMIN — CHLORHEXIDINE GLUCONATE 1 APPLICATION(S): 213 SOLUTION TOPICAL at 11:40

## 2020-05-29 RX ADMIN — QUETIAPINE FUMARATE 25 MILLIGRAM(S): 200 TABLET, FILM COATED ORAL at 22:07

## 2020-05-29 NOTE — PROGRESS NOTE ADULT - SUBJECTIVE AND OBJECTIVE BOX
CARDIOLOGY/MEDICAL ATTENDING    CHIEF COMPLAINT:Patient is a 64y old  Male who presents with a chief complaint of seizures.Pt still with NGT in place.    	  REVIEW OF SYSTEMS:  [x ] Unable to obtain    PHYSICAL EXAM:  T(C): 36.3 (05-29-20 @ 05:00), Max: 36.7 (05-28-20 @ 20:27)  HR: 57 (05-29-20 @ 05:00) (57 - 93)  BP: 120/71 (05-29-20 @ 05:00) (97/- - 120/71)  RR: 16 (05-29-20 @ 05:00) (16 - 16)  SpO2: 98% (05-29-20 @ 05:00) (97% - 100%)  Wt(kg): --  I&O's Summary    28 May 2020 07:01  -  29 May 2020 07:00  --------------------------------------------------------  IN: 480 mL / OUT: 0 mL / NET: 480 mL        Appearance: Normal	  HEENT:   Normal oral mucosa, PERRL, EOMI	  Lymphatic: No lymphadenopathy  Cardiovascular: Normal S1 S2, No JVD, No murmurs, No edema  Respiratory: Lungs clear to auscultation	  Gastrointestinal:  Soft, Non-tender, + BS	  Skin: No rashes, No ecchymoses, No cyanosis	  Extremities: Normal range of motion, No clubbing, cyanosis or edema  Vascular: Peripheral pulses palpable 2+ bilaterally    MEDICATIONS  (STANDING):  ascorbic acid 500 milliGRAM(s) Oral two times a day  chlorhexidine 2% Cloths 1 Application(s) Topical daily  dextrose 50% Injectable 12.5 Gram(s) IV Push once  dextrose 50% Injectable 25 Gram(s) IV Push once  dextrose 50% Injectable 25 Gram(s) IV Push once  ergocalciferol 47933 Unit(s) Oral every week  heparin   Injectable 5000 Unit(s) SubCutaneous every 12 hours  insulin glargine Injectable (LANTUS) 8 Unit(s) SubCutaneous at bedtime  insulin lispro (HumaLOG) corrective regimen sliding scale   SubCutaneous every 6 hours  insulin lispro Injectable (HumaLOG) 5 Unit(s) SubCutaneous every 6 hours  mirtazapine 7.5 milliGRAM(s) Oral at bedtime  multivitamin/minerals 1 Tablet(s) Oral daily  OLANZapine 2.5 milliGRAM(s) Oral at bedtime  pantoprazole  Injectable 40 milliGRAM(s) IV Push daily  QUEtiapine 25 milliGRAM(s) Oral at bedtime  zinc sulfate 220 milliGRAM(s) Oral daily      	  LABS:	 	                      12.1   7.23  )-----------( 356      ( 29 May 2020 06:58 )             37.1     05-29    140  |  103  |  11  ----------------------------<  86  3.5   |  27  |  0.89    Ca    9.5      29 May 2020 06:58      proBNP: Serum Pro-Brain Natriuretic Peptide: 126 pg/mL (05-01 @ 09:36)    Lipid Profile: Cholesterol 161  LDL 95  HDL 35        TSH: Thyroid Stimulating Hormone, Serum: 2.39 uU/mL (05-01 @ 09:36)

## 2020-05-29 NOTE — CHART NOTE - NSCHARTNOTEFT_GEN_A_CORE
PO mechanical soft diet started however pt. noted pocketing food, does not swallow food even with sip of nectar thick water.   Dr. Leon updated pt.'s daughter about pt.'s condition and possible need for PEG placement.  Daughter is agreeable to see if pt.'s intake improves over the next few days.  Will continue NGT feeding for now while reattempting PO feeding.

## 2020-05-29 NOTE — CHART NOTE - NSCHARTNOTEFT_GEN_A_CORE
Assessment:   64yMalePatient is a 64y old  Male who presents with a chief complaint of seizures (28 May 2020 13:46)      Factors impacting intake: [ ] none [ ] nausea  [ ] vomiting [ ] diarrhea [ ] constipation  [ ]chewing problems [ ] swallowing issues  [ ] other:     Diet Presciption: Diet, Dysphagia 2 Mechanical Soft-Nectar Consistency Fluid:   Tube Feeding Modality: Orogastric  Glucerna 1.2 Mihir  Total Volume for 24 Hours (mL): 1440  Continuous  Starting Tube Feed Rate {mL per Hour}: 30  Increase Tube Feed Rate by (mL): 30     Every 6 hours  Until Goal Tube Feed Rate (mL per Hour): 60  Tube Feed Duration (in Hours): 24  Tube Feed Start Time: 11:00 (05-29-20 @ 10:45)    Intake:     Current Weight:   % Weight Change    Pertinent Medications: MEDICATIONS  (STANDING):  ascorbic acid 500 milliGRAM(s) Oral two times a day  chlorhexidine 2% Cloths 1 Application(s) Topical daily  dextrose 50% Injectable 12.5 Gram(s) IV Push once  dextrose 50% Injectable 25 Gram(s) IV Push once  dextrose 50% Injectable 25 Gram(s) IV Push once  ergocalciferol 04153 Unit(s) Oral every week  heparin   Injectable 5000 Unit(s) SubCutaneous every 12 hours  insulin glargine Injectable (LANTUS) 8 Unit(s) SubCutaneous at bedtime  insulin lispro (HumaLOG) corrective regimen sliding scale   SubCutaneous every 6 hours  insulin lispro Injectable (HumaLOG) 5 Unit(s) SubCutaneous every 6 hours  mirtazapine 7.5 milliGRAM(s) Oral at bedtime  multivitamin/minerals 1 Tablet(s) Oral daily  OLANZapine 2.5 milliGRAM(s) Oral at bedtime  pantoprazole  Injectable 40 milliGRAM(s) IV Push daily  QUEtiapine 25 milliGRAM(s) Oral at bedtime  zinc sulfate 220 milliGRAM(s) Oral daily    MEDICATIONS  (PRN):  acetaminophen  Suppository .. 650 milliGRAM(s) Rectal every 6 hours PRN Temp greater or equal to 38C (100.4F)  ALBUTerol    90 MICROgram(s) HFA Inhaler 2 Puff(s) Inhalation every 6 hours PRN Shortness of Breath and/or Wheezing  dextrose 40% Gel 15 Gram(s) Oral once PRN Blood Glucose LESS THAN 70 milliGRAM(s)/deciliter  glucagon  Injectable 1 milliGRAM(s) IntraMuscular once PRN Glucose LESS THAN 70 milligrams/deciliter  guaiFENesin   Syrup  (Sugar-Free) 100 milliGRAM(s) Oral every 6 hours PRN Cough    Pertinent Labs: 05-29 Na140 mmol/L Glu 86 mg/dL K+ 3.5 mmol/L Cr  0.89 mg/dL BUN 11 mg/dL 05-25 Phos 2.8 mg/dL 05-27 Alb 2.5 g/dL<L> 05-01 Chol 161 mg/dL LDL 95 mg/dL HDL 35 mg/dL<L> Trig 153 mg/dL<H>     CAPILLARY BLOOD GLUCOSE      POCT Blood Glucose.: 180 mg/dL (29 May 2020 11:24)  POCT Blood Glucose.: 88 mg/dL (29 May 2020 05:26)  POCT Blood Glucose.: 131 mg/dL (28 May 2020 23:53)  POCT Blood Glucose.: 164 mg/dL (28 May 2020 16:55)  POCT Blood Glucose.: 184 mg/dL (28 May 2020 11:43)    Skin:     Estimated Needs:   [ ] no change since previous assessment  [ ] recalculated:     Previous Nutrition Diagnosis:   [ ] Inadequate Energy Intake [ ]Inadequate Oral Intake [ ] Excessive Energy Intake   [ ] Underweight [ ] Increased Nutrient Needs [ ] Overweight/Obesity   [ ] Altered GI Function [ ] Unintended Weight Loss [ ] Food & Nutrition Related Knowledge Deficit [ ] Malnutrition     Nutrition Diagnosis is [ ] ongoing  [ ] resolved [ ] not applicable     New Nutrition Diagnosis: [ ] not applicable       Interventions:   Recommend  [ ] Change Diet To:  [ ] Nutrition Supplement  [ ] Nutrition Support  [ ] Other:     Monitoring and Evaluation:   [ ] PO intake [ x ] Tolerance to diet prescription [ x ] weights [ x ] labs[ x ] follow up per protocol  [ ] other: Assessment:   64yMalePatient is a 64y old  Male who presents with a chief complaint of seizures (28 May 2020 13:46) S/P SLP eval Dysphagia Mech soft Nectar Liquids. Pt ia also On NG TF 24 hr to prevent Hypoglycemia. Pt is On Glucerna 1.2  60 ml/hr x 24 hr. D/W NP. Diet verification order Placed. S/P Psych consult /F/U noted.       Factors impacting intake: [ ] none [ ] nausea  [ ] vomiting [ ] diarrhea [ ] constipation  [ ]chewing problems [ ] swallowing issues  [x ] other:     Diet Presciption: Diet, Dysphagia 2 Mechanical Soft-Nectar Consistency Fluid:   Tube Feeding Modality: Orogastric  Glucerna 1.2 Mihir  Total Volume for 24 Hours (mL): 1440  Continuous  Starting Tube Feed Rate {mL per Hour}: 30  Increase Tube Feed Rate by (mL): 30     Every 6 hours  Until Goal Tube Feed Rate (mL per Hour): 60  Tube Feed Duration (in Hours): 24  Tube Feed Start Time: 11:00 (05-29-20 @ 10:45)    Intake:     Current Weight:   % Weight Change    Pertinent Medications: MEDICATIONS  (STANDING):  ascorbic acid 500 milliGRAM(s) Oral two times a day  chlorhexidine 2% Cloths 1 Application(s) Topical daily  dextrose 50% Injectable 12.5 Gram(s) IV Push once  dextrose 50% Injectable 25 Gram(s) IV Push once  dextrose 50% Injectable 25 Gram(s) IV Push once  ergocalciferol 85544 Unit(s) Oral every week  heparin   Injectable 5000 Unit(s) SubCutaneous every 12 hours  insulin glargine Injectable (LANTUS) 8 Unit(s) SubCutaneous at bedtime  insulin lispro (HumaLOG) corrective regimen sliding scale   SubCutaneous every 6 hours  insulin lispro Injectable (HumaLOG) 5 Unit(s) SubCutaneous every 6 hours  mirtazapine 7.5 milliGRAM(s) Oral at bedtime  multivitamin/minerals 1 Tablet(s) Oral daily  OLANZapine 2.5 milliGRAM(s) Oral at bedtime  pantoprazole  Injectable 40 milliGRAM(s) IV Push daily  QUEtiapine 25 milliGRAM(s) Oral at bedtime  zinc sulfate 220 milliGRAM(s) Oral daily    MEDICATIONS  (PRN):  acetaminophen  Suppository .. 650 milliGRAM(s) Rectal every 6 hours PRN Temp greater or equal to 38C (100.4F)  ALBUTerol    90 MICROgram(s) HFA Inhaler 2 Puff(s) Inhalation every 6 hours PRN Shortness of Breath and/or Wheezing  dextrose 40% Gel 15 Gram(s) Oral once PRN Blood Glucose LESS THAN 70 milliGRAM(s)/deciliter  glucagon  Injectable 1 milliGRAM(s) IntraMuscular once PRN Glucose LESS THAN 70 milligrams/deciliter  guaiFENesin   Syrup  (Sugar-Free) 100 milliGRAM(s) Oral every 6 hours PRN Cough    Pertinent Labs: 05-29 Na140 mmol/L Glu 86 mg/dL K+ 3.5 mmol/L Cr  0.89 mg/dL BUN 11 mg/dL 05-25 Phos 2.8 mg/dL 05-27 Alb 2.5 g/dL<L> 05-01 Chol 161 mg/dL LDL 95 mg/dL HDL 35 mg/dL<L> Trig 153 mg/dL<H>     CAPILLARY BLOOD GLUCOSE      POCT Blood Glucose.: 180 mg/dL (29 May 2020 11:24)  POCT Blood Glucose.: 88 mg/dL (29 May 2020 05:26)  POCT Blood Glucose.: 131 mg/dL (28 May 2020 23:53)  POCT Blood Glucose.: 164 mg/dL (28 May 2020 16:55)  POCT Blood Glucose.: 184 mg/dL (28 May 2020 11:43)    Skin: DTI @ L Heel.    Estimated Needs:   [ ] no change since previous assessment  [ ] recalculated:     Previous Nutrition Diagnosis:   [ ] Inadequate Energy Intake [ ]Inadequate Oral Intake [ ] Excessive Energy Intake   [ ] Underweight [ ] Increased Nutrient Needs [ ] Overweight/Obesity   [ ] Altered GI Function [ ] Unintended Weight Loss [ ] Food & Nutrition Related Knowledge Deficit [x ] Malnutrition severe     Nutrition Diagnosis is [x ] ongoing  [ ] resolved [ ] not applicable     New Nutrition Diagnosis: [ ] not applicable       Interventions:   Recommend  [ ] Change Diet To:  [ ] Nutrition Supplement  [x ] Nutrition Support  On NG TF   [x ] Other: Dysphagia 2 mech soft Nectar liquid   (x) D/W MD JET.   (x) Diet verification Order Placed.     Monitoring and Evaluation:   [ ] PO intake [ x ] Tolerance to diet prescription [ x ] weights [ x ] labs[ x ] follow up per protocol  [ ] other:

## 2020-05-29 NOTE — PROGRESS NOTE ADULT - PROBLEM SELECTOR PLAN 3
A1c 9.9, endo Dr. Allison following  -Cont FS AC&HS  -Cont Lantus 8 units HS  -Cont Humalog 6units q6hrs  -Cont low dose HSS  -Cont NGT feeding  -Awaiting SLP  -Will discuss ATC feeding with dietician as pt. tends to be slightly hypoglycemic in am

## 2020-05-29 NOTE — PROGRESS NOTE ADULT - SUBJECTIVE AND OBJECTIVE BOX
Pt is awake, alert, lying in bed in NAD. Still with NGT. For repeat S/S eval; may consider PEG.     INTERVAL HPI/OVERNIGHT EVENTS:    VITAL SIGNS:  T(F): 97.4 (05-29-20 @ 05:00)  HR: 57 (05-29-20 @ 05:00)  BP: 120/71 (05-29-20 @ 05:00)  RR: 16 (05-29-20 @ 05:00)  SpO2: 98% (05-29-20 @ 05:00)  Wt(kg): --  I&O's Detail    28 May 2020 07:01  -  29 May 2020 07:00  --------------------------------------------------------  IN:    Glucerna: 480 mL  Total IN: 480 mL    OUT:  Total OUT: 0 mL    Total NET: 480 mL      REVIEW OF SYSTEMS:    CONSTITUTIONAL:  No fevers, chills, sweats    HEENT:  Eyes:  No diplopia or blurred vision. ENT:  No earache, sore throat or runny nose.    CARDIOVASCULAR:  No pressure, squeezing, tightness, or heaviness about the chest; no palpitations.    RESPIRATORY:  Per HPI    GASTROINTESTINAL:  No abdominal pain, nausea, vomiting or diarrhea.    GENITOURINARY:  No dysuria, frequency or urgency.    NEUROLOGIC:  No paresthesias, fasciculations, seizures or weakness.    PSYCHIATRIC:  No disorder of thought or mood.      PHYSICAL EXAM:    Constitutional: Well developed and nourished  Eyes:Perrla  ENMT: normal  Neck:supple  Respiratory: good air entry  Cardiovascular: S1 S2 regular  Gastrointestinal: Soft, Non tender  Extremities: No edema  Vascular:normal  Neurological:Awake, alert,Ox3  Musculoskeletal:Normal      MEDICATIONS  (STANDING):  ascorbic acid 500 milliGRAM(s) Oral two times a day  chlorhexidine 2% Cloths 1 Application(s) Topical daily  dextrose 50% Injectable 12.5 Gram(s) IV Push once  dextrose 50% Injectable 25 Gram(s) IV Push once  dextrose 50% Injectable 25 Gram(s) IV Push once  ergocalciferol 45184 Unit(s) Oral every week  heparin   Injectable 5000 Unit(s) SubCutaneous every 12 hours  insulin glargine Injectable (LANTUS) 8 Unit(s) SubCutaneous at bedtime  insulin lispro (HumaLOG) corrective regimen sliding scale   SubCutaneous every 6 hours  insulin lispro Injectable (HumaLOG) 5 Unit(s) SubCutaneous every 6 hours  mirtazapine 7.5 milliGRAM(s) Oral at bedtime  multivitamin/minerals 1 Tablet(s) Oral daily  OLANZapine 2.5 milliGRAM(s) Oral at bedtime  pantoprazole  Injectable 40 milliGRAM(s) IV Push daily  QUEtiapine 25 milliGRAM(s) Oral at bedtime  zinc sulfate 220 milliGRAM(s) Oral daily    MEDICATIONS  (PRN):  acetaminophen  Suppository .. 650 milliGRAM(s) Rectal every 6 hours PRN Temp greater or equal to 38C (100.4F)  ALBUTerol    90 MICROgram(s) HFA Inhaler 2 Puff(s) Inhalation every 6 hours PRN Shortness of Breath and/or Wheezing  dextrose 40% Gel 15 Gram(s) Oral once PRN Blood Glucose LESS THAN 70 milliGRAM(s)/deciliter  glucagon  Injectable 1 milliGRAM(s) IntraMuscular once PRN Glucose LESS THAN 70 milligrams/deciliter  guaiFENesin   Syrup  (Sugar-Free) 100 milliGRAM(s) Oral every 6 hours PRN Cough      Allergies    No Known Allergies    Intolerances    LABS:                        12.1   7.23  )-----------( 356      ( 29 May 2020 06:58 )             37.1     05-29    140  |  103  |  11  ----------------------------<  86  3.5   |  27  |  0.89    Ca    9.5      29 May 2020 06:58    CAPILLARY BLOOD GLUCOSE    POCT Blood Glucose.: 180 mg/dL (29 May 2020 11:24)  POCT Blood Glucose.: 88 mg/dL (29 May 2020 05:26)  POCT Blood Glucose.: 131 mg/dL (28 May 2020 23:53)  POCT Blood Glucose.: 164 mg/dL (28 May 2020 16:55)    pro-bnp -- 05-24 @ 08:16     d-dimer 1075  05-24 @ 08:16    RADIOLOGY & ADDITIONAL TESTS:    CXR:    Ct scan chest:    ekg;    echo:

## 2020-05-29 NOTE — PROGRESS NOTE ADULT - PROBLEM SELECTOR PLAN 1
Urine culture noted  Antibiotics OFF  Oxygen Supp  Monitor Oxygen sat  Taper Oxygen Supp as feasible  NGT in place with feeds.  Follow up CXR noted.   Repeat S/S eval.   Consider PEG.   ID follow up

## 2020-05-29 NOTE — PROGRESS NOTE ADULT - SUBJECTIVE AND OBJECTIVE BOX
64 year old male from [facility] with PMH of dementia and type 2 DM presented with seizure activity while sitting at AL. Endocrinology was consulted for management of uncontrolled type 2 DM with hyperglycemia.    Patient seen and examined at bedside. He remains on tube feeding. FS reviewed. BG overall well controlled, ranging .    MEDICATIONS  (STANDING):  ascorbic acid 500 milliGRAM(s) Oral two times a day  chlorhexidine 2% Cloths 1 Application(s) Topical daily  dextrose 50% Injectable 12.5 Gram(s) IV Push once  dextrose 50% Injectable 25 Gram(s) IV Push once  dextrose 50% Injectable 25 Gram(s) IV Push once  ergocalciferol 50213 Unit(s) Oral every week  heparin   Injectable 5000 Unit(s) SubCutaneous every 12 hours  insulin glargine Injectable (LANTUS) 8 Unit(s) SubCutaneous at bedtime  insulin lispro (HumaLOG) corrective regimen sliding scale   SubCutaneous every 6 hours  insulin lispro Injectable (HumaLOG) 5 Unit(s) SubCutaneous every 6 hours  mirtazapine 7.5 milliGRAM(s) Oral at bedtime  multivitamin/minerals 1 Tablet(s) Oral daily  OLANZapine 2.5 milliGRAM(s) Oral at bedtime  pantoprazole  Injectable 40 milliGRAM(s) IV Push daily  QUEtiapine 25 milliGRAM(s) Oral at bedtime  zinc sulfate 220 milliGRAM(s) Oral daily    MEDICATIONS  (PRN):  acetaminophen  Suppository .. 650 milliGRAM(s) Rectal every 6 hours PRN Temp greater or equal to 38C (100.4F)  ALBUTerol    90 MICROgram(s) HFA Inhaler 2 Puff(s) Inhalation every 6 hours PRN Shortness of Breath and/or Wheezing  dextrose 40% Gel 15 Gram(s) Oral once PRN Blood Glucose LESS THAN 70 milliGRAM(s)/deciliter  glucagon  Injectable 1 milliGRAM(s) IntraMuscular once PRN Glucose LESS THAN 70 milligrams/deciliter  guaiFENesin   Syrup  (Sugar-Free) 100 milliGRAM(s) Oral every 6 hours PRN Cough      REVIEW OF SYSTEMS:  unable to obtain due to medical condition    PHYSICAL EXAM:      VITAL SIGNS  T(C): 36.3 (05-29-20 @ 05:00), Max: 36.7 (05-28-20 @ 20:27)  HR: 57 (05-29-20 @ 05:00) (57 - 93)  BP: 120/71 (05-29-20 @ 05:00) (97/- - 120/71)  RR: 16 (05-29-20 @ 05:00) (16 - 16)  SpO2: 98% (05-29-20 @ 05:00) (97% - 100%      GENERAL: more awake tody, NAD, well-developed  HEAD:  Atraumatic, Normocephalic  EYES: EOMI, PERRLA, conjunctiva and sclera clear  ENMT: No tonsillar erythema, exudates, or enlargement; No lesions  NECK: Supple, No JVD, Normal thyroid  NERVOUS SYSTEM:  nonverbal,  CHEST/LUNG: decreased breath sounds, No rales, rhonchi, wheezing, or rubs  HEART: Regular rate and rhythm; No murmurs, rubs, or gallops  ABDOMEN: Soft, Nontender, Nondistended; Bowel sounds present  EXTREMITIES:  2+ Peripheral Pulses, No clubbing, cyanosis, or edema  SKIN: No rashes or lesions        LABS:                        12.1   7.23  )-----------( 356      ( 29 May 2020 06:58 )             37.1     05-29    140  |  103  |  11  ----------------------------<  86  3.5   |  27  |  0.89    Ca    9.5      29 May 2020 06:58          CAPILLARY BLOOD GLUCOSE      POCT Blood Glucose.: 180 mg/dL (29 May 2020 11:24)  POCT Blood Glucose.: 88 mg/dL (29 May 2020 05:26)  POCT Blood Glucose.: 131 mg/dL (28 May 2020 23:53)  POCT Blood Glucose.: 164 mg/dL (28 May 2020 16:55)

## 2020-05-29 NOTE — PROGRESS NOTE ADULT - ASSESSMENT
64 year old male from [facility] with PMH of dementia and type 2 DM presented with seizure activity while sitting at AL. Endocrinology was consulted for management of uncontrolled type 2 DM with hyperglycemia.    1. Uncontrolled Type 2 DM with hyperglycemia, A1c 9.9%  -BG overall well controlled   -tube feedings increased to 24hr/day  -continue Lantus 8 units at bedtime  -continue Humalog 5 units TID q 6 hrs   -continue low dose rapid acting insulin q 6 hrs as below  BG 70-100mg/dL 0 units  -150 mg/dL 0 units  -200 mg/dL 1 unit  -250 mg/dL 2 units  -300 mg/dL 3 units  -350 mg/dL 4 units  -400 mg/dL 5 units  BG > 400mg/dL 6 units  -FS AC HS  -ensure consistent carbohydrate   -will monitor FS and adjust accordingly     2.  Aspiration pneumonia  -oxygen supplementation as needed   -continue antibiotics as per primary team   -pulmonology follow up    3. Delirium  -patient with mitten restraints  -psychiatry follow up    Plan discussed with primary team]  Please  call  w/ any questions or concerns [phone]

## 2020-05-29 NOTE — PROGRESS NOTE ADULT - PROBLEM SELECTOR PLAN 1
Pt. is calmer as reported by nursing staff  -Cont Zyprexa at 2.5 mg HS  -Cont Seroquel at 25 mg HS  -Cont Mirtazepine at 7.5 mg HS  -Cont to hold day time doses of Mirtazepine, Zyprexa and Seroquel except as indicated for PRN agitation  -Safety precautions with mittens  -Scopolamine patch d/c'd as it may decrease mentation

## 2020-05-29 NOTE — PROGRESS NOTE ADULT - ASSESSMENT
64 year old male from [facility]  with PMH dementia and DM coming in with seizures like activity while sitting at AL. pt unable to provide further history (poor historian) . denies all complaints at this time. NKDA   Pt. seen and evaluated, noted laying comfortably in bed, does not respond to verbal stimuli.  Pt. is with NGT feeding, needs psych eval for capacity to make medical decisions, however since he is non verbal, will likely not be possible to assess, psych consulted.  SLP requested (pt. failed past consultation).  Palliative care consulted for GOC/peg placement.  5/28-Pt. is more awake today, able to say few words at a time.  Pt. will be reevaluated by SLP, currently still with NGT with Glucerna feeding.  Palliative following, awaiting s/s to plan further mode of feeding.     5/29-OK to start PO mechanical soft diet with nectar consistency fluid per SLP, strict aspiration precautions to be maintained as pt. is lethargic at times.  Per dietician, will also continue TF via NGT at this time.  Will assess nutritional status and determine ?need for PEG.

## 2020-05-29 NOTE — PROGRESS NOTE ADULT - PROBLEM SELECTOR PLAN 6
Statement Selected Advanced practice nurse note appreciated  -There is an Unstageable Pressure Injury to the Coccyx (3cm x 1cm x 0.2cm) with slough and scant drainage	  -Clean all wounds with normal saline and apply skin prep to the surrounding skin  -Apply Collagenase ointment to the slough areas of the Coccyx wound, apply saline moistened gauze to the wound bed, and cover with a Foam dressing Daily PRN

## 2020-05-29 NOTE — PROGRESS NOTE ADULT - PROBLEM SELECTOR PLAN 2
S/S 5/28->OK to feed orally with strict aspiration precautions  -Will cont with TF simultaneously for now  -Will evaluate adequacy and tolerance of PO intake  -Will determine need for PEG by ?Monday  -Palliative following

## 2020-05-29 NOTE — PROGRESS NOTE ADULT - PROBLEM SELECTOR PLAN 4
-COVID19 positive on 5/26  -COVID19 PCR repeated today 5/29  -f/u results  -Continue with Isolation  -Continue with O2 support

## 2020-05-29 NOTE — PROGRESS NOTE ADULT - SUBJECTIVE AND OBJECTIVE BOX
NP Note discussed with  Primary Attending    Patient is a 64y old  Male who presents with a chief complaint of seizures (28 May 2020 13:46)      INTERVAL HPI/OVERNIGHT EVENTS: no new complaints    MEDICATIONS  (STANDING):  ascorbic acid 500 milliGRAM(s) Oral two times a day  chlorhexidine 2% Cloths 1 Application(s) Topical daily  dextrose 50% Injectable 12.5 Gram(s) IV Push once  dextrose 50% Injectable 25 Gram(s) IV Push once  dextrose 50% Injectable 25 Gram(s) IV Push once  ergocalciferol 03764 Unit(s) Oral every week  heparin   Injectable 5000 Unit(s) SubCutaneous every 12 hours  insulin glargine Injectable (LANTUS) 8 Unit(s) SubCutaneous at bedtime  insulin lispro (HumaLOG) corrective regimen sliding scale   SubCutaneous every 6 hours  insulin lispro Injectable (HumaLOG) 5 Unit(s) SubCutaneous every 6 hours  mirtazapine 7.5 milliGRAM(s) Oral at bedtime  multivitamin/minerals 1 Tablet(s) Oral daily  OLANZapine 2.5 milliGRAM(s) Oral at bedtime  pantoprazole  Injectable 40 milliGRAM(s) IV Push daily  QUEtiapine 25 milliGRAM(s) Oral at bedtime  zinc sulfate 220 milliGRAM(s) Oral daily    MEDICATIONS  (PRN):  acetaminophen  Suppository .. 650 milliGRAM(s) Rectal every 6 hours PRN Temp greater or equal to 38C (100.4F)  ALBUTerol    90 MICROgram(s) HFA Inhaler 2 Puff(s) Inhalation every 6 hours PRN Shortness of Breath and/or Wheezing  dextrose 40% Gel 15 Gram(s) Oral once PRN Blood Glucose LESS THAN 70 milliGRAM(s)/deciliter  glucagon  Injectable 1 milliGRAM(s) IntraMuscular once PRN Glucose LESS THAN 70 milligrams/deciliter  guaiFENesin   Syrup  (Sugar-Free) 100 milliGRAM(s) Oral every 6 hours PRN Cough      __________________________________________________  REVIEW OF SYSTEMS:  Unable to assess.  Pt. is minimally verbal, lethargic    Vital Signs Last 24 Hrs  T(C): 36.3 (29 May 2020 05:00), Max: 36.7 (28 May 2020 20:27)  T(F): 97.4 (29 May 2020 05:00), Max: 98 (28 May 2020 20:27)  HR: 57 (29 May 2020 05:00) (57 - 93)  BP: 120/71 (29 May 2020 05:00) (97/- - 120/71)  BP(mean): --  RR: 16 (29 May 2020 05:00) (16 - 16)  SpO2: 98% (29 May 2020 05:00) (97% - 100%)    ________________________________________________  PHYSICAL EXAM:  well developed, well groomed  GENERAL: NAD  HEENT: Normocephalic;  conjunctivae and sclerae clear; moist mucous membranes;   NECK : supple  CHEST/LUNG: Clear to auscultation bilaterally with good air entry   HEART: S1 S2  regular; no murmurs, gallops or rubs  ABDOMEN: NGT in place with Glucerna feeding, Soft, Nontender, Nondistended; Bowel sounds present  EXTREMITIES: no cyanosis; no edema; no calf tenderness  SKIN: warm and dry; no rash  NERVOUS SYSTEM:  Awake and alert; minimally verbal, unable to assess LOC    _________________________________________________  LABS:                        12.1   7.23  )-----------( 356      ( 29 May 2020 06:58 )             37.1     05-29    140  |  103  |  11  ----------------------------<  86  3.5   |  27  |  0.89    Ca    9.5      29 May 2020 06:58          CAPILLARY BLOOD GLUCOSE      POCT Blood Glucose.: 180 mg/dL (29 May 2020 11:24)  POCT Blood Glucose.: 88 mg/dL (29 May 2020 05:26)  POCT Blood Glucose.: 131 mg/dL (28 May 2020 23:53)  POCT Blood Glucose.: 164 mg/dL (28 May 2020 16:55)  POCT Blood Glucose.: 184 mg/dL (28 May 2020 11:43)        RADIOLOGY & ADDITIONAL TESTS:    Imaging Personally Reviewed:  YES/NO    Consultant(s) Notes Reviewed:   YES/ No    Care Discussed with Consultants :     Plan of care was discussed with patient and /or primary care giver; all questions and concerns were addressed and care was aligned with patient's wishes.

## 2020-05-30 LAB
ANION GAP SERPL CALC-SCNC: 11 MMOL/L — SIGNIFICANT CHANGE UP (ref 5–17)
BUN SERPL-MCNC: 12 MG/DL — SIGNIFICANT CHANGE UP (ref 7–18)
CALCIUM SERPL-MCNC: 9.6 MG/DL — SIGNIFICANT CHANGE UP (ref 8.4–10.5)
CHLORIDE SERPL-SCNC: 103 MMOL/L — SIGNIFICANT CHANGE UP (ref 96–108)
CO2 SERPL-SCNC: 25 MMOL/L — SIGNIFICANT CHANGE UP (ref 22–31)
CREAT SERPL-MCNC: 0.84 MG/DL — SIGNIFICANT CHANGE UP (ref 0.5–1.3)
GLUCOSE BLDC GLUCOMTR-MCNC: 131 MG/DL — HIGH (ref 70–99)
GLUCOSE BLDC GLUCOMTR-MCNC: 178 MG/DL — HIGH (ref 70–99)
GLUCOSE BLDC GLUCOMTR-MCNC: 180 MG/DL — HIGH (ref 70–99)
GLUCOSE BLDC GLUCOMTR-MCNC: 89 MG/DL — SIGNIFICANT CHANGE UP (ref 70–99)
GLUCOSE SERPL-MCNC: 82 MG/DL — SIGNIFICANT CHANGE UP (ref 70–99)
HCT VFR BLD CALC: 37.1 % — LOW (ref 39–50)
HGB BLD-MCNC: 12.3 G/DL — LOW (ref 13–17)
MCHC RBC-ENTMCNC: 28.3 PG — SIGNIFICANT CHANGE UP (ref 27–34)
MCHC RBC-ENTMCNC: 33.2 GM/DL — SIGNIFICANT CHANGE UP (ref 32–36)
MCV RBC AUTO: 85.3 FL — SIGNIFICANT CHANGE UP (ref 80–100)
NRBC # BLD: 0 /100 WBCS — SIGNIFICANT CHANGE UP (ref 0–0)
PLATELET # BLD AUTO: 333 K/UL — SIGNIFICANT CHANGE UP (ref 150–400)
POTASSIUM SERPL-MCNC: 3.6 MMOL/L — SIGNIFICANT CHANGE UP (ref 3.5–5.3)
POTASSIUM SERPL-SCNC: 3.6 MMOL/L — SIGNIFICANT CHANGE UP (ref 3.5–5.3)
RBC # BLD: 4.35 M/UL — SIGNIFICANT CHANGE UP (ref 4.2–5.8)
RBC # FLD: 13.7 % — SIGNIFICANT CHANGE UP (ref 10.3–14.5)
SODIUM SERPL-SCNC: 139 MMOL/L — SIGNIFICANT CHANGE UP (ref 135–145)
WBC # BLD: 10.72 K/UL — HIGH (ref 3.8–10.5)
WBC # FLD AUTO: 10.72 K/UL — HIGH (ref 3.8–10.5)

## 2020-05-30 PROCEDURE — 99232 SBSQ HOSP IP/OBS MODERATE 35: CPT

## 2020-05-30 RX ORDER — OLANZAPINE 15 MG/1
2.5 TABLET, FILM COATED ORAL ONCE
Refills: 0 | Status: COMPLETED | OUTPATIENT
Start: 2020-05-30 | End: 2020-05-30

## 2020-05-30 RX ADMIN — Medication 500 MILLIGRAM(S): at 05:24

## 2020-05-30 RX ADMIN — Medication 5 UNIT(S): at 17:53

## 2020-05-30 RX ADMIN — HEPARIN SODIUM 5000 UNIT(S): 5000 INJECTION INTRAVENOUS; SUBCUTANEOUS at 17:52

## 2020-05-30 RX ADMIN — PANTOPRAZOLE SODIUM 40 MILLIGRAM(S): 20 TABLET, DELAYED RELEASE ORAL at 11:59

## 2020-05-30 RX ADMIN — Medication 500 MILLIGRAM(S): at 17:52

## 2020-05-30 RX ADMIN — CHLORHEXIDINE GLUCONATE 1 APPLICATION(S): 213 SOLUTION TOPICAL at 12:00

## 2020-05-30 RX ADMIN — Medication 5 UNIT(S): at 12:00

## 2020-05-30 RX ADMIN — Medication 1 TABLET(S): at 11:59

## 2020-05-30 RX ADMIN — OLANZAPINE 2.5 MILLIGRAM(S): 15 TABLET, FILM COATED ORAL at 11:59

## 2020-05-30 RX ADMIN — NYSTATIN CREAM 1 APPLICATION(S): 100000 CREAM TOPICAL at 05:24

## 2020-05-30 RX ADMIN — ERGOCALCIFEROL 50000 UNIT(S): 1.25 CAPSULE ORAL at 12:01

## 2020-05-30 RX ADMIN — OLANZAPINE 2.5 MILLIGRAM(S): 15 TABLET, FILM COATED ORAL at 21:35

## 2020-05-30 RX ADMIN — Medication 1: at 12:00

## 2020-05-30 RX ADMIN — MIRTAZAPINE 7.5 MILLIGRAM(S): 45 TABLET, ORALLY DISINTEGRATING ORAL at 21:35

## 2020-05-30 RX ADMIN — ZINC SULFATE TAB 220 MG (50 MG ZINC EQUIVALENT) 220 MILLIGRAM(S): 220 (50 ZN) TAB at 11:59

## 2020-05-30 RX ADMIN — NYSTATIN CREAM 1 APPLICATION(S): 100000 CREAM TOPICAL at 17:53

## 2020-05-30 RX ADMIN — HEPARIN SODIUM 5000 UNIT(S): 5000 INJECTION INTRAVENOUS; SUBCUTANEOUS at 05:24

## 2020-05-30 RX ADMIN — SCOPALAMINE 1 PATCH: 1 PATCH, EXTENDED RELEASE TRANSDERMAL at 08:43

## 2020-05-30 RX ADMIN — Medication 1: at 17:52

## 2020-05-30 RX ADMIN — SCOPALAMINE 1 PATCH: 1 PATCH, EXTENDED RELEASE TRANSDERMAL at 17:53

## 2020-05-30 RX ADMIN — Medication 5 UNIT(S): at 00:22

## 2020-05-30 RX ADMIN — INSULIN GLARGINE 8 UNIT(S): 100 INJECTION, SOLUTION SUBCUTANEOUS at 21:35

## 2020-05-30 RX ADMIN — QUETIAPINE FUMARATE 25 MILLIGRAM(S): 200 TABLET, FILM COATED ORAL at 21:35

## 2020-05-30 NOTE — PROGRESS NOTE ADULT - SUBJECTIVE AND OBJECTIVE BOX
Pt is alert, lying in bed in NAD. NGT in place. Mittens in place.     INTERVAL HPI/OVERNIGHT EVENTS:      VITAL SIGNS:  T(F): 97.6 (05-30-20 @ 06:04)  HR: 70 (05-30-20 @ 06:04)  BP: 129/68 (05-30-20 @ 06:04)  RR: 16 (05-30-20 @ 06:04)  SpO2: 98% (05-30-20 @ 06:04)  Wt(kg): --  I&O's Detail          REVIEW OF SYSTEMS:    CONSTITUTIONAL:  No fevers, chills, sweats    HEENT:  Eyes:  No diplopia or blurred vision. ENT:  No earache, sore throat or runny nose.    CARDIOVASCULAR:  No pressure, squeezing, tightness, or heaviness about the chest; no palpitations.    RESPIRATORY:  Per HPI    GASTROINTESTINAL:  No abdominal pain, nausea, vomiting or diarrhea.    GENITOURINARY:  No dysuria, frequency or urgency.    NEUROLOGIC:  No paresthesias, fasciculations, seizures or weakness.    PSYCHIATRIC:  No disorder of thought or mood.      PHYSICAL EXAM:    Constitutional: Well developed and nourished  Eyes:Perrla  ENMT: normal  Neck:supple  Respiratory: good air entry  Cardiovascular: S1 S2 regular  Gastrointestinal: Soft, Non tender, NGT   Extremities: No edema  Vascular:normal  Neurological:Awake, alert.   Musculoskeletal: weak. bedbound.       MEDICATIONS  (STANDING):  ascorbic acid 500 milliGRAM(s) Oral two times a day  chlorhexidine 2% Cloths 1 Application(s) Topical daily  dextrose 50% Injectable 12.5 Gram(s) IV Push once  dextrose 50% Injectable 25 Gram(s) IV Push once  dextrose 50% Injectable 25 Gram(s) IV Push once  ergocalciferol 20512 Unit(s) Oral every week  heparin   Injectable 5000 Unit(s) SubCutaneous every 12 hours  insulin glargine Injectable (LANTUS) 8 Unit(s) SubCutaneous at bedtime  insulin lispro (HumaLOG) corrective regimen sliding scale   SubCutaneous every 6 hours  insulin lispro Injectable (HumaLOG) 5 Unit(s) SubCutaneous every 6 hours  mirtazapine 7.5 milliGRAM(s) Oral at bedtime  multivitamin/minerals 1 Tablet(s) Oral daily  nystatin Powder 1 Application(s) Topical two times a day  OLANZapine 2.5 milliGRAM(s) Oral once  OLANZapine 2.5 milliGRAM(s) Oral at bedtime  pantoprazole  Injectable 40 milliGRAM(s) IV Push daily  QUEtiapine 25 milliGRAM(s) Oral at bedtime  zinc sulfate 220 milliGRAM(s) Oral daily    MEDICATIONS  (PRN):  acetaminophen  Suppository .. 650 milliGRAM(s) Rectal every 6 hours PRN Temp greater or equal to 38C (100.4F)  ALBUTerol    90 MICROgram(s) HFA Inhaler 2 Puff(s) Inhalation every 6 hours PRN Shortness of Breath and/or Wheezing  dextrose 40% Gel 15 Gram(s) Oral once PRN Blood Glucose LESS THAN 70 milliGRAM(s)/deciliter  glucagon  Injectable 1 milliGRAM(s) IntraMuscular once PRN Glucose LESS THAN 70 milligrams/deciliter  guaiFENesin   Syrup  (Sugar-Free) 100 milliGRAM(s) Oral every 6 hours PRN Cough      Allergies    No Known Allergies    Intolerances        LABS:                        12.3   10.72 )-----------( 333      ( 30 May 2020 07:05 )             37.1     05-30    139  |  103  |  12  ----------------------------<  82  3.6   |  25  |  0.84    Ca    9.6      30 May 2020 07:05                CAPILLARY BLOOD GLUCOSE      POCT Blood Glucose.: 89 mg/dL (30 May 2020 05:17)  POCT Blood Glucose.: 140 mg/dL (29 May 2020 23:42)  POCT Blood Glucose.: 120 mg/dL (29 May 2020 21:23)  POCT Blood Glucose.: 174 mg/dL (29 May 2020 17:18)  POCT Blood Glucose.: 180 mg/dL (29 May 2020 11:24)    pro-bnp -- 05-24 @ 08:16     d-dimer 1075  05-24 @ 08:16      RADIOLOGY & ADDITIONAL TESTS:    CXR:    Ct scan chest:    ekg;    echo: Pt is  awake, alert, lying in bed in NAD. NGT in place. Mittens in place.     INTERVAL HPI/OVERNIGHT EVENTS:      VITAL SIGNS:  T(F): 97.6 (05-30-20 @ 06:04)  HR: 70 (05-30-20 @ 06:04)  BP: 129/68 (05-30-20 @ 06:04)  RR: 16 (05-30-20 @ 06:04)  SpO2: 98% (05-30-20 @ 06:04)  Wt(kg): --  I&O's Detail          REVIEW OF SYSTEMS:    CONSTITUTIONAL:  No fevers, chills, sweats    HEENT:  Eyes:  No diplopia or blurred vision. ENT:  No earache, sore throat or runny nose.    CARDIOVASCULAR:  No pressure, squeezing, tightness, or heaviness about the chest; no palpitations.    RESPIRATORY:  Per HPI    GASTROINTESTINAL:  No abdominal pain, nausea, vomiting or diarrhea.    GENITOURINARY:  No dysuria, frequency or urgency.    NEUROLOGIC:  No paresthesias, fasciculations, seizures or weakness.    PSYCHIATRIC:  No disorder of thought or mood.      PHYSICAL EXAM:    Constitutional: Well developed and nourished  Eyes:Perrla  ENMT: normal  Neck:supple  Respiratory: good air entry  Cardiovascular: S1 S2 regular  Gastrointestinal: Soft, Non tender, NGT   Extremities: No edema  Vascular:normal  Neurological:Awake, alert.   Musculoskeletal: weak. bedbound.       MEDICATIONS  (STANDING):  ascorbic acid 500 milliGRAM(s) Oral two times a day  chlorhexidine 2% Cloths 1 Application(s) Topical daily  dextrose 50% Injectable 12.5 Gram(s) IV Push once  dextrose 50% Injectable 25 Gram(s) IV Push once  dextrose 50% Injectable 25 Gram(s) IV Push once  ergocalciferol 39036 Unit(s) Oral every week  heparin   Injectable 5000 Unit(s) SubCutaneous every 12 hours  insulin glargine Injectable (LANTUS) 8 Unit(s) SubCutaneous at bedtime  insulin lispro (HumaLOG) corrective regimen sliding scale   SubCutaneous every 6 hours  insulin lispro Injectable (HumaLOG) 5 Unit(s) SubCutaneous every 6 hours  mirtazapine 7.5 milliGRAM(s) Oral at bedtime  multivitamin/minerals 1 Tablet(s) Oral daily  nystatin Powder 1 Application(s) Topical two times a day  OLANZapine 2.5 milliGRAM(s) Oral once  OLANZapine 2.5 milliGRAM(s) Oral at bedtime  pantoprazole  Injectable 40 milliGRAM(s) IV Push daily  QUEtiapine 25 milliGRAM(s) Oral at bedtime  zinc sulfate 220 milliGRAM(s) Oral daily    MEDICATIONS  (PRN):  acetaminophen  Suppository .. 650 milliGRAM(s) Rectal every 6 hours PRN Temp greater or equal to 38C (100.4F)  ALBUTerol    90 MICROgram(s) HFA Inhaler 2 Puff(s) Inhalation every 6 hours PRN Shortness of Breath and/or Wheezing  dextrose 40% Gel 15 Gram(s) Oral once PRN Blood Glucose LESS THAN 70 milliGRAM(s)/deciliter  glucagon  Injectable 1 milliGRAM(s) IntraMuscular once PRN Glucose LESS THAN 70 milligrams/deciliter  guaiFENesin   Syrup  (Sugar-Free) 100 milliGRAM(s) Oral every 6 hours PRN Cough      Allergies    No Known Allergies    Intolerances        LABS:                        12.3   10.72 )-----------( 333      ( 30 May 2020 07:05 )             37.1     05-30    139  |  103  |  12  ----------------------------<  82  3.6   |  25  |  0.84    Ca    9.6      30 May 2020 07:05                CAPILLARY BLOOD GLUCOSE      POCT Blood Glucose.: 89 mg/dL (30 May 2020 05:17)  POCT Blood Glucose.: 140 mg/dL (29 May 2020 23:42)  POCT Blood Glucose.: 120 mg/dL (29 May 2020 21:23)  POCT Blood Glucose.: 174 mg/dL (29 May 2020 17:18)  POCT Blood Glucose.: 180 mg/dL (29 May 2020 11:24)    pro-bnp -- 05-24 @ 08:16     d-dimer 1075  05-24 @ 08:16      RADIOLOGY & ADDITIONAL TESTS:    CXR:    Ct scan chest:    ekg;    echo:

## 2020-05-30 NOTE — CHART NOTE - NSCHARTNOTEFT_GEN_A_CORE
pt was reported for increased agitation, attempting to get out of bed, pulling mittens. Pt is on NGT, risk for harm self.  Pt is currently on Seroquel, Zyprexa, and Remeron at bedtime. Ordered one dose Zyprexa x 1 for now. will monitor for behavior disturbances. pt was reported for increased agitation, attempting to get out of bed, pulling mittens. Pt is on NGT, risk for harm self.  Pt is currently on Seroquel, Zyprexa, and Remeron at bedtime. s/p one dose Zyprexa x 1. Observed improving behavior disturbances on re-assess.

## 2020-05-30 NOTE — PROGRESS NOTE ADULT - SUBJECTIVE AND OBJECTIVE BOX
64 year old male from [facility] with PMH of dementia and type 2 DM presented with seizure activity while sitting at AL. Endocrinology was consulted for management of uncontrolled type 2 DM with hyperglycemia.    Patient seen and examined at bedside. Patient now on dysphagia pureed diet with continuation of tube feedings. Team to attempt PO feeding again today as patient was previously spitting out the food provided. FS reviewed. BG at goal, ranging .  MEDICATIONS  (STANDING):  ascorbic acid 500 milliGRAM(s) Oral two times a day  chlorhexidine 2% Cloths 1 Application(s) Topical daily  dextrose 50% Injectable 12.5 Gram(s) IV Push once  dextrose 50% Injectable 25 Gram(s) IV Push once  dextrose 50% Injectable 25 Gram(s) IV Push once  ergocalciferol 97434 Unit(s) Oral every week  heparin   Injectable 5000 Unit(s) SubCutaneous every 12 hours  insulin glargine Injectable (LANTUS) 8 Unit(s) SubCutaneous at bedtime  insulin lispro (HumaLOG) corrective regimen sliding scale   SubCutaneous every 6 hours  insulin lispro Injectable (HumaLOG) 5 Unit(s) SubCutaneous every 6 hours  mirtazapine 7.5 milliGRAM(s) Oral at bedtime  multivitamin/minerals 1 Tablet(s) Oral daily  nystatin Powder 1 Application(s) Topical two times a day  OLANZapine 2.5 milliGRAM(s) Oral once  OLANZapine 2.5 milliGRAM(s) Oral at bedtime  pantoprazole  Injectable 40 milliGRAM(s) IV Push daily  QUEtiapine 25 milliGRAM(s) Oral at bedtime  zinc sulfate 220 milliGRAM(s) Oral daily    MEDICATIONS  (PRN):  acetaminophen  Suppository .. 650 milliGRAM(s) Rectal every 6 hours PRN Temp greater or equal to 38C (100.4F)  ALBUTerol    90 MICROgram(s) HFA Inhaler 2 Puff(s) Inhalation every 6 hours PRN Shortness of Breath and/or Wheezing  dextrose 40% Gel 15 Gram(s) Oral once PRN Blood Glucose LESS THAN 70 milliGRAM(s)/deciliter  glucagon  Injectable 1 milliGRAM(s) IntraMuscular once PRN Glucose LESS THAN 70 milligrams/deciliter  guaiFENesin   Syrup  (Sugar-Free) 100 milliGRAM(s) Oral every 6 hours PRN Cough      REVIEW OF SYSTEMS:  unable to obtain due to medical condition    PHYSICAL EXAM:      VITAL SIGNS  T(C): 36.4 (05-30-20 @ 06:04), Max: 36.5 (05-29-20 @ 21:31)  HR: 70 (05-30-20 @ 06:04) (70 - 81)  BP: 129/68 (05-30-20 @ 06:04) (106/65 - 129/68)  RR: 16 (05-30-20 @ 06:04) (16 - 18)  SpO2: 98% (05-30-20 @ 06:04) (96% - 98%)      GENERAL: more awake tody, NAD, well-developed  HEAD:  Atraumatic, Normocephalic  EYES: EOMI, PERRLA, conjunctiva and sclera clear  ENMT: No tonsillar erythema, exudates, or enlargement; No lesions  NECK: Supple, No JVD, Normal thyroid  NERVOUS SYSTEM:  nonverbal,  CHEST/LUNG: decreased breath sounds, No rales, rhonchi, wheezing, or rubs  HEART: Regular rate and rhythm; No murmurs, rubs, or gallops  ABDOMEN: Soft, Nontender, Nondistended; Bowel sounds present  EXTREMITIES:  2+ Peripheral Pulses, No clubbing, cyanosis, or edema  SKIN: No rashes or lesions      LABS:                        12.3   10.72 )-----------( 333      ( 30 May 2020 07:05 )             37.1     05-30    139  |  103  |  12  ----------------------------<  82  3.6   |  25  |  0.84    Ca    9.6      30 May 2020 07:05          CAPILLARY BLOOD GLUCOSE      POCT Blood Glucose.: 89 mg/dL (30 May 2020 05:17)  POCT Blood Glucose.: 140 mg/dL (29 May 2020 23:42)  POCT Blood Glucose.: 120 mg/dL (29 May 2020 21:23)  POCT Blood Glucose.: 174 mg/dL (29 May 2020 17:18)  POCT Blood Glucose.: 180 mg/dL (29 May 2020 11:24)

## 2020-05-30 NOTE — PROGRESS NOTE ADULT - SUBJECTIVE AND OBJECTIVE BOX
CARDIOLOGY/MEDICAL ATTENDING    CHIEF COMPLAINT:Patient is a 64y old  Male who presents with a chief complaint of seizures .Pt awake and alert, but combative as per nursing and not taking po feeds, still on TF via NGT.    	  REVIEW OF SYSTEMS:    [x ] Unable to obtain    PHYSICAL EXAM:  T(C): 36.4 (05-30-20 @ 06:04), Max: 36.5 (05-29-20 @ 21:31)  HR: 70 (05-30-20 @ 06:04) (70 - 81)  BP: 129/68 (05-30-20 @ 06:04) (106/65 - 129/68)  RR: 16 (05-30-20 @ 06:04) (16 - 18)  SpO2: 98% (05-30-20 @ 06:04) (96% - 98%)  Wt(kg): --  I&O's Summary      Appearance: Normal	  HEENT:   Normal oral mucosa, PERRL, EOMI	  Lymphatic: No lymphadenopathy  Cardiovascular: Normal S1 S2, No JVD, No murmurs, No edema  Respiratory: Lungs clear to auscultation	  Gastrointestinal:  Soft, Non-tender, + BS	  Skin: No rashes, No ecchymoses, No cyanosis	  Extremities: Normal range of motion, No clubbing, cyanosis or edema  Vascular: Peripheral pulses palpable 2+ bilaterally    MEDICATIONS  (STANDING):  ascorbic acid 500 milliGRAM(s) Oral two times a day  chlorhexidine 2% Cloths 1 Application(s) Topical daily  dextrose 50% Injectable 12.5 Gram(s) IV Push once  dextrose 50% Injectable 25 Gram(s) IV Push once  dextrose 50% Injectable 25 Gram(s) IV Push once  ergocalciferol 04571 Unit(s) Oral every week  heparin   Injectable 5000 Unit(s) SubCutaneous every 12 hours  insulin glargine Injectable (LANTUS) 8 Unit(s) SubCutaneous at bedtime  insulin lispro (HumaLOG) corrective regimen sliding scale   SubCutaneous every 6 hours  insulin lispro Injectable (HumaLOG) 5 Unit(s) SubCutaneous every 6 hours  mirtazapine 7.5 milliGRAM(s) Oral at bedtime  multivitamin/minerals 1 Tablet(s) Oral daily  nystatin Powder 1 Application(s) Topical two times a day  OLANZapine 2.5 milliGRAM(s) Oral once  OLANZapine 2.5 milliGRAM(s) Oral at bedtime  pantoprazole  Injectable 40 milliGRAM(s) IV Push daily  QUEtiapine 25 milliGRAM(s) Oral at bedtime  zinc sulfate 220 milliGRAM(s) Oral daily      	  	  LABS:	 	                         12.3   10.72 )-----------( 333      ( 30 May 2020 07:05 )             37.1     05-30    139  |  103  |  12  ----------------------------<  82  3.6   |  25  |  0.84    Ca    9.6      30 May 2020 07:05      proBNP: Serum Pro-Brain Natriuretic Peptide: 126 pg/mL (05-01 @ 09:36)    Lipid Profile: Cholesterol 161  LDL 95  HDL 35      TSH: Thyroid Stimulating Hormone, Serum: 2.39 uU/mL (05-01 @ 09:36)

## 2020-05-30 NOTE — PROGRESS NOTE ADULT - ASSESSMENT
64 year old male from [facility] with PMH of dementia and type 2 DM presented with seizure activity while sitting at AL. Endocrinology was consulted for management of uncontrolled type 2 DM with hyperglycemia.    1. Uncontrolled Type 2 DM with hyperglycemia, A1c 9.9%  -BG overall well controlled   -tube feedings increased to 24hr/day, will attempt PO feeding again today  -continue Lantus 8 units at bedtime  -continue Humalog 5 units TID q 6 hrs   -continue low dose rapid acting insulin q 6 hrs as below  BG 70-100mg/dL 0 units  -150 mg/dL 0 units  -200 mg/dL 1 unit  -250 mg/dL 2 units  -300 mg/dL 3 units  -350 mg/dL 4 units  -400 mg/dL 5 units  BG > 400mg/dL 6 units  -FS AC HS  -ensure consistent carbohydrate   -will monitor FS and adjust accordingly   -If patient unable to tolerate PO, will need to proceed with plan for PEG tube.     2.  Aspiration pneumonia  -oxygen supplementation as needed   -completed antibiotics   -pulmonology follow up    3. Delirium  -patient with mitten restraints  -psychiatry follow up    Plan discussed with primary team]  Please  call  w/ any questions or concerns [phone]

## 2020-05-30 NOTE — PROGRESS NOTE ADULT - ASSESSMENT
64 yr old male with PMHX of DM,dementia sent to ER from facility for seizure and now COVID+,Delirium,MAXIME,hypernatremia and s/p aspiration pneumonia.  1.Unable to transition from TF to oral feeds as per nursing due to pt spitting out oral feeds and being combative. Daughter aware that pt may need PEG if not taking adequate oral feeds.  2.DM-Insulin, Endo f/u.  3.Delirium-cont psych meds.  4.GI and DVT prophylaxis.

## 2020-05-31 LAB
GLUCOSE BLDC GLUCOMTR-MCNC: 122 MG/DL — HIGH (ref 70–99)
GLUCOSE BLDC GLUCOMTR-MCNC: 134 MG/DL — HIGH (ref 70–99)
GLUCOSE BLDC GLUCOMTR-MCNC: 174 MG/DL — HIGH (ref 70–99)
GLUCOSE BLDC GLUCOMTR-MCNC: 213 MG/DL — HIGH (ref 70–99)
GLUCOSE BLDC GLUCOMTR-MCNC: 86 MG/DL — SIGNIFICANT CHANGE UP (ref 70–99)
GLUCOSE BLDC GLUCOMTR-MCNC: 86 MG/DL — SIGNIFICANT CHANGE UP (ref 70–99)
SARS-COV-2 RNA SPEC QL NAA+PROBE: SIGNIFICANT CHANGE UP

## 2020-05-31 RX ORDER — SODIUM CHLORIDE 9 MG/ML
1000 INJECTION, SOLUTION INTRAVENOUS
Refills: 0 | Status: DISCONTINUED | OUTPATIENT
Start: 2020-05-31 | End: 2020-06-19

## 2020-05-31 RX ADMIN — INSULIN GLARGINE 8 UNIT(S): 100 INJECTION, SOLUTION SUBCUTANEOUS at 21:55

## 2020-05-31 RX ADMIN — HEPARIN SODIUM 5000 UNIT(S): 5000 INJECTION INTRAVENOUS; SUBCUTANEOUS at 05:17

## 2020-05-31 RX ADMIN — Medication 500 MILLIGRAM(S): at 05:16

## 2020-05-31 RX ADMIN — NYSTATIN CREAM 1 APPLICATION(S): 100000 CREAM TOPICAL at 05:18

## 2020-05-31 RX ADMIN — Medication 2: at 17:26

## 2020-05-31 RX ADMIN — MIRTAZAPINE 7.5 MILLIGRAM(S): 45 TABLET, ORALLY DISINTEGRATING ORAL at 21:55

## 2020-05-31 RX ADMIN — HEPARIN SODIUM 5000 UNIT(S): 5000 INJECTION INTRAVENOUS; SUBCUTANEOUS at 17:08

## 2020-05-31 RX ADMIN — QUETIAPINE FUMARATE 25 MILLIGRAM(S): 200 TABLET, FILM COATED ORAL at 21:55

## 2020-05-31 RX ADMIN — OLANZAPINE 2.5 MILLIGRAM(S): 15 TABLET, FILM COATED ORAL at 21:55

## 2020-05-31 RX ADMIN — PANTOPRAZOLE SODIUM 40 MILLIGRAM(S): 20 TABLET, DELAYED RELEASE ORAL at 11:41

## 2020-05-31 RX ADMIN — Medication 5 UNIT(S): at 11:39

## 2020-05-31 RX ADMIN — NYSTATIN CREAM 1 APPLICATION(S): 100000 CREAM TOPICAL at 17:09

## 2020-05-31 RX ADMIN — SODIUM CHLORIDE 50 MILLILITER(S): 9 INJECTION, SOLUTION INTRAVENOUS at 16:49

## 2020-05-31 RX ADMIN — Medication 1: at 11:39

## 2020-05-31 RX ADMIN — Medication 500 MILLIGRAM(S): at 17:08

## 2020-05-31 RX ADMIN — ZINC SULFATE TAB 220 MG (50 MG ZINC EQUIVALENT) 220 MILLIGRAM(S): 220 (50 ZN) TAB at 11:41

## 2020-05-31 RX ADMIN — Medication 1 TABLET(S): at 11:41

## 2020-05-31 RX ADMIN — CHLORHEXIDINE GLUCONATE 1 APPLICATION(S): 213 SOLUTION TOPICAL at 11:42

## 2020-05-31 RX ADMIN — Medication 5 UNIT(S): at 17:27

## 2020-05-31 NOTE — PROGRESS NOTE ADULT - ASSESSMENT
64 yr old male with PMHX of DM,dementia sent to ER from facility for seizure and now COVID+,Delirium,MAXIME,hypernatremia and s/p aspiration pneumonia.  1.Unable to transition from TF to oral feeds as per nursing due to pt spitting out oral feeds and being combative. Daughter aware that pt may need PEG if not taking adequate oral feeds.  2.DM-Insulin, Endo f/u.  3.Delirium-cont psych meds.  4.Check COVID status prior to PEG.  5.GI and DVT prophylaxis.

## 2020-05-31 NOTE — CHART NOTE - NSCHARTNOTEFT_GEN_A_CORE
NP Note discussed with  Primary Attending    Patient is a 64y old  Male who presents with a chief complaint of seizures (31 May 2020 12:07)    Pt. is lethargic, not conversant today, does not allow feeding.  Pt. will likely need PEG placement as his PO intake is minimal at best, mostly no PO intake at all.  COVID19 PCR will be sent today to determine PEG placement GI vs IR  Will discuss with family in am      INTERVAL HPI/OVERNIGHT EVENTS: no new complaints    MEDICATIONS  (STANDING):  asc    __________________________________________________  REVIEW OF SYSTEMS:  Unable to assess, pt. lethargic, not conversant today     Vital Signs Last 24 Hrs  T(C): 36.4 (31 May 2020 05:37), Max: 36.4 (30 May 2020 14:06)  T(F): 97.5 (31 May 2020 05:37), Max: 97.6 (30 May 2020 14:06)  HR: 68 (31 May 2020 09:44) (61 - 79)  BP: 129/79 (31 May 2020 05:37) (110/63 - 149/78)  BP(mean): --  RR: 18 (31 May 2020 05:37) (17 - 18)  SpO2: 98% (31 May 2020 09:44) (95% - 100%)    ________________________________________________  PHYSICAL EXAM: sleeping  GENERAL: NAD  HEENT: Normocephalic;  conjunctivae and sclerae clear; moist mucous membranes;   NECK : supple  CHEST/LUNG: Clear to auscultation bilaterally with good air entry   HEART: S1 S2  regular; no murmurs, gallops or rubs  ABDOMEN: Soft, Nontender, Nondistended; Bowel sounds present  EXTREMITIES: no cyanosis; no edema; no calf tenderness  SKIN: warm and dry; no rash  NERVOUS SYSTEM:      _________________________________________________  LABS:                        12.3   10.72 )-----------( 333      ( 30 May 2020 07:05 )             37.1     05-30    139  |  103  |  12  ----------------------------<  82  3.6   |  25  |  0.84    Ca    9.6      30 May 2020 07:05          CAPILLARY BLOOD GLUCOSE      POCT Blood Glucose.: 174 mg/dL (31 May 2020 11:30)  POCT Blood Glucose.: 122 mg/dL (31 May 2020 08:04)  POCT Blood Glucose.: 86 mg/dL (31 May 2020 06:09)  POCT Blood Glucose.: 86 mg/dL (31 May 2020 00:24)  POCT Blood Glucose.: 131 mg/dL (30 May 2020 21:03)  POCT Blood Glucose.: 180 mg/dL (30 May 2020 17:50)        RADIOLOGY & ADDITIONAL TESTS:    Imaging Personally Reviewed:  YES/NO    Consultant(s) Notes Reviewed:   YES/ No    Care Discussed with Consultants :     Plan of care was discussed with patient and /or primary care giver; all questions and concerns were addressed and care was aligned with patient's wishes.

## 2020-05-31 NOTE — PROGRESS NOTE ADULT - PROBLEM SELECTOR PLAN 7
Cont meds  enhanced supervision  Psych/Neurology follow up  Consider Peg placement for nutritional support

## 2020-05-31 NOTE — PROGRESS NOTE ADULT - SUBJECTIVE AND OBJECTIVE BOX
CARDIOLOGY/MEDICAL ATTENDING    CHIEF COMPLAINT:Patient is a 64y old  Male who presents with a chief complaint of seizures .Pt lethargic,NGT in place,mittens on.    	  REVIEW OF SYSTEMS:  [x ] Unable to obtain    PHYSICAL EXAM:  T(C): 36.4 (05-31-20 @ 05:37), Max: 36.4 (05-30-20 @ 14:06)  HR: 68 (05-31-20 @ 09:44) (61 - 79)  BP: 129/79 (05-31-20 @ 05:37) (110/63 - 149/78)  RR: 18 (05-31-20 @ 05:37) (17 - 18)  SpO2: 98% (05-31-20 @ 09:44) (95% - 100%)  Wt(kg): --  I&O's Summary      Appearance: Normal	  HEENT:   Normal oral mucosa, PERRL, EOMI	  Lymphatic: No lymphadenopathy  Cardiovascular: Normal S1 S2, No JVD, No murmurs, No edema  Respiratory: Lungs clear to auscultation	  Gastrointestinal:  Soft, Non-tender, + BS	  Skin: No rashes, No ecchymoses, No cyanosis	  Extremities: Normal range of motion, No clubbing, cyanosis or edema  Vascular: Peripheral pulses palpable 2+ bilaterally    MEDICATIONS  (STANDING):  ascorbic acid 500 milliGRAM(s) Oral two times a day  chlorhexidine 2% Cloths 1 Application(s) Topical daily  dextrose 50% Injectable 12.5 Gram(s) IV Push once  dextrose 50% Injectable 25 Gram(s) IV Push once  dextrose 50% Injectable 25 Gram(s) IV Push once  ergocalciferol 30813 Unit(s) Oral every week  heparin   Injectable 5000 Unit(s) SubCutaneous every 12 hours  insulin glargine Injectable (LANTUS) 8 Unit(s) SubCutaneous at bedtime  insulin lispro (HumaLOG) corrective regimen sliding scale   SubCutaneous every 6 hours  insulin lispro Injectable (HumaLOG) 5 Unit(s) SubCutaneous every 6 hours  mirtazapine 7.5 milliGRAM(s) Oral at bedtime  multivitamin/minerals 1 Tablet(s) Oral daily  nystatin Powder 1 Application(s) Topical two times a day  OLANZapine 2.5 milliGRAM(s) Oral at bedtime  pantoprazole  Injectable 40 milliGRAM(s) IV Push daily  QUEtiapine 25 milliGRAM(s) Oral at bedtime  zinc sulfate 220 milliGRAM(s) Oral daily      	  	  LABS:	 	                      12.3   10.72 )-----------( 333      ( 30 May 2020 07:05 )             37.1     05-30    139  |  103  |  12  ----------------------------<  82  3.6   |  25  |  0.84    Ca    9.6      30 May 2020 07:05

## 2020-05-31 NOTE — PHYSICAL THERAPY INITIAL EVALUATION ADULT - RANGE OF MOTION EXAMINATION, REHAB EVAL
PROM - Reduced at end range .
bilateral lower extremity ROM was WFL (within functional limits)/bilateral upper extremity ROM was WFL (within functional limits)

## 2020-05-31 NOTE — CHART NOTE - NSCHARTNOTEFT_GEN_A_CORE
Pt. did not respond to earlier Lasix 40mg IVP.  Bladder scan showed 0 urine, no urinary incontinence per nursing staff.  Hill placed with 15ml urine output obtained.  Pt. is likely intravascularly dry as she is NPO.  IVF D5NS started @50cc/hr x 12 hrs.  Will continue monitoring.

## 2020-05-31 NOTE — PROGRESS NOTE ADULT - SUBJECTIVE AND OBJECTIVE BOX
Patient is a 64y old  Male who presents with a chief complaint of seizures (30 May 2020 11:42)  Patient  is laying in bed in NAD. Asleep. Remains with NGT in place  INTERVAL HPI/OVERNIGHT EVENTS:      VITAL SIGNS:  T(F): 97.5 (05-31-20 @ 05:37)  HR: 68 (05-31-20 @ 09:44)  BP: 129/79 (05-31-20 @ 05:37)  RR: 18 (05-31-20 @ 05:37)  SpO2: 98% (05-31-20 @ 09:44)  Wt(kg): --  I&O's Detail          REVIEW OF SYSTEMS:    CONSTITUTIONAL:  No fevers, chills, sweats    HEENT:  Eyes:  No diplopia or blurred vision. ENT:  No earache, sore throat or runny nose.    CARDIOVASCULAR:  No pressure, squeezing, tightness, or heaviness about the chest; no palpitations.    RESPIRATORY:  Per HPI    GASTROINTESTINAL:  No abdominal pain, nausea, vomiting or diarrhea.    GENITOURINARY:  No dysuria, frequency or urgency.    NEUROLOGIC:  No paresthesias, fasciculations, seizures or weakness.    PSYCHIATRIC:  No disorder of thought or mood.      PHYSICAL EXAM:    Constitutional: Well developed and nourished  Eyes:Perrla  ENMT: normal  Neck:supple  Respiratory: good air entry  Cardiovascular: S1 S2 regular  Gastrointestinal: Soft, Non tender  Extremities: No edema  Vascular:normal  Neurological:Awake, alert,Ox3  Musculoskeletal:Normal      MEDICATIONS  (STANDING):  ascorbic acid 500 milliGRAM(s) Oral two times a day  chlorhexidine 2% Cloths 1 Application(s) Topical daily  dextrose 50% Injectable 12.5 Gram(s) IV Push once  dextrose 50% Injectable 25 Gram(s) IV Push once  dextrose 50% Injectable 25 Gram(s) IV Push once  ergocalciferol 66244 Unit(s) Oral every week  heparin   Injectable 5000 Unit(s) SubCutaneous every 12 hours  insulin glargine Injectable (LANTUS) 8 Unit(s) SubCutaneous at bedtime  insulin lispro (HumaLOG) corrective regimen sliding scale   SubCutaneous every 6 hours  insulin lispro Injectable (HumaLOG) 5 Unit(s) SubCutaneous every 6 hours  mirtazapine 7.5 milliGRAM(s) Oral at bedtime  multivitamin/minerals 1 Tablet(s) Oral daily  nystatin Powder 1 Application(s) Topical two times a day  OLANZapine 2.5 milliGRAM(s) Oral at bedtime  pantoprazole  Injectable 40 milliGRAM(s) IV Push daily  QUEtiapine 25 milliGRAM(s) Oral at bedtime  zinc sulfate 220 milliGRAM(s) Oral daily    MEDICATIONS  (PRN):  acetaminophen  Suppository .. 650 milliGRAM(s) Rectal every 6 hours PRN Temp greater or equal to 38C (100.4F)  ALBUTerol    90 MICROgram(s) HFA Inhaler 2 Puff(s) Inhalation every 6 hours PRN Shortness of Breath and/or Wheezing  dextrose 40% Gel 15 Gram(s) Oral once PRN Blood Glucose LESS THAN 70 milliGRAM(s)/deciliter  glucagon  Injectable 1 milliGRAM(s) IntraMuscular once PRN Glucose LESS THAN 70 milligrams/deciliter  guaiFENesin   Syrup  (Sugar-Free) 100 milliGRAM(s) Oral every 6 hours PRN Cough      Allergies    No Known Allergies    Intolerances        LABS:                        12.3   10.72 )-----------( 333      ( 30 May 2020 07:05 )             37.1     05-30    139  |  103  |  12  ----------------------------<  82  3.6   |  25  |  0.84    Ca    9.6      30 May 2020 07:05                CAPILLARY BLOOD GLUCOSE      POCT Blood Glucose.: 174 mg/dL (31 May 2020 11:30)  POCT Blood Glucose.: 122 mg/dL (31 May 2020 08:04)  POCT Blood Glucose.: 86 mg/dL (31 May 2020 06:09)  POCT Blood Glucose.: 86 mg/dL (31 May 2020 00:24)  POCT Blood Glucose.: 131 mg/dL (30 May 2020 21:03)  POCT Blood Glucose.: 180 mg/dL (30 May 2020 17:50)        RADIOLOGY & ADDITIONAL TESTS:    CXR:    Ct scan chest:    ekg;    echo:

## 2020-06-01 DIAGNOSIS — E43 UNSPECIFIED SEVERE PROTEIN-CALORIE MALNUTRITION: ICD-10-CM

## 2020-06-01 DIAGNOSIS — R53.81 OTHER MALAISE: ICD-10-CM

## 2020-06-01 DIAGNOSIS — Z51.5 ENCOUNTER FOR PALLIATIVE CARE: ICD-10-CM

## 2020-06-01 LAB
ANION GAP SERPL CALC-SCNC: 8 MMOL/L — SIGNIFICANT CHANGE UP (ref 5–17)
BUN SERPL-MCNC: 13 MG/DL — SIGNIFICANT CHANGE UP (ref 7–18)
CALCIUM SERPL-MCNC: 9.6 MG/DL — SIGNIFICANT CHANGE UP (ref 8.4–10.5)
CHLORIDE SERPL-SCNC: 104 MMOL/L — SIGNIFICANT CHANGE UP (ref 96–108)
CO2 SERPL-SCNC: 27 MMOL/L — SIGNIFICANT CHANGE UP (ref 22–31)
CREAT SERPL-MCNC: 0.81 MG/DL — SIGNIFICANT CHANGE UP (ref 0.5–1.3)
GLUCOSE BLDC GLUCOMTR-MCNC: 124 MG/DL — HIGH (ref 70–99)
GLUCOSE BLDC GLUCOMTR-MCNC: 127 MG/DL — HIGH (ref 70–99)
GLUCOSE BLDC GLUCOMTR-MCNC: 128 MG/DL — HIGH (ref 70–99)
GLUCOSE BLDC GLUCOMTR-MCNC: 174 MG/DL — HIGH (ref 70–99)
GLUCOSE BLDC GLUCOMTR-MCNC: 189 MG/DL — HIGH (ref 70–99)
GLUCOSE SERPL-MCNC: 102 MG/DL — HIGH (ref 70–99)
HCT VFR BLD CALC: 38.2 % — LOW (ref 39–50)
HGB BLD-MCNC: 12.8 G/DL — LOW (ref 13–17)
MAGNESIUM SERPL-MCNC: 1.8 MG/DL — SIGNIFICANT CHANGE UP (ref 1.6–2.6)
MCHC RBC-ENTMCNC: 28.9 PG — SIGNIFICANT CHANGE UP (ref 27–34)
MCHC RBC-ENTMCNC: 33.5 GM/DL — SIGNIFICANT CHANGE UP (ref 32–36)
MCV RBC AUTO: 86.2 FL — SIGNIFICANT CHANGE UP (ref 80–100)
NRBC # BLD: 0 /100 WBCS — SIGNIFICANT CHANGE UP (ref 0–0)
PHOSPHATE SERPL-MCNC: 3.2 MG/DL — SIGNIFICANT CHANGE UP (ref 2.5–4.5)
PLATELET # BLD AUTO: 286 K/UL — SIGNIFICANT CHANGE UP (ref 150–400)
POTASSIUM SERPL-MCNC: 3.7 MMOL/L — SIGNIFICANT CHANGE UP (ref 3.5–5.3)
POTASSIUM SERPL-SCNC: 3.7 MMOL/L — SIGNIFICANT CHANGE UP (ref 3.5–5.3)
RBC # BLD: 4.43 M/UL — SIGNIFICANT CHANGE UP (ref 4.2–5.8)
RBC # FLD: 13.6 % — SIGNIFICANT CHANGE UP (ref 10.3–14.5)
SODIUM SERPL-SCNC: 139 MMOL/L — SIGNIFICANT CHANGE UP (ref 135–145)
WBC # BLD: 8.49 K/UL — SIGNIFICANT CHANGE UP (ref 3.8–10.5)
WBC # FLD AUTO: 8.49 K/UL — SIGNIFICANT CHANGE UP (ref 3.8–10.5)

## 2020-06-01 PROCEDURE — 99232 SBSQ HOSP IP/OBS MODERATE 35: CPT

## 2020-06-01 PROCEDURE — 99223 1ST HOSP IP/OBS HIGH 75: CPT

## 2020-06-01 RX ORDER — ALBUTEROL 90 UG/1
2 AEROSOL, METERED ORAL EVERY 6 HOURS
Refills: 0 | Status: DISCONTINUED | OUTPATIENT
Start: 2020-06-01 | End: 2020-06-02

## 2020-06-01 RX ORDER — PANTOPRAZOLE SODIUM 20 MG/1
40 TABLET, DELAYED RELEASE ORAL DAILY
Refills: 0 | Status: DISCONTINUED | OUTPATIENT
Start: 2020-06-01 | End: 2020-06-02

## 2020-06-01 RX ORDER — INSULIN GLARGINE 100 [IU]/ML
4 INJECTION, SOLUTION SUBCUTANEOUS AT BEDTIME
Refills: 0 | Status: COMPLETED | OUTPATIENT
Start: 2020-06-01 | End: 2020-06-01

## 2020-06-01 RX ORDER — ACETAMINOPHEN 500 MG
650 TABLET ORAL EVERY 6 HOURS
Refills: 0 | Status: DISCONTINUED | OUTPATIENT
Start: 2020-06-01 | End: 2020-06-02

## 2020-06-01 RX ADMIN — NYSTATIN CREAM 1 APPLICATION(S): 100000 CREAM TOPICAL at 05:43

## 2020-06-01 RX ADMIN — QUETIAPINE FUMARATE 25 MILLIGRAM(S): 200 TABLET, FILM COATED ORAL at 21:17

## 2020-06-01 RX ADMIN — Medication 5 UNIT(S): at 01:28

## 2020-06-01 RX ADMIN — Medication 1: at 01:29

## 2020-06-01 RX ADMIN — HEPARIN SODIUM 5000 UNIT(S): 5000 INJECTION INTRAVENOUS; SUBCUTANEOUS at 17:27

## 2020-06-01 RX ADMIN — MIRTAZAPINE 7.5 MILLIGRAM(S): 45 TABLET, ORALLY DISINTEGRATING ORAL at 21:17

## 2020-06-01 RX ADMIN — Medication 1 TABLET(S): at 12:04

## 2020-06-01 RX ADMIN — Medication 500 MILLIGRAM(S): at 05:43

## 2020-06-01 RX ADMIN — NYSTATIN CREAM 1 APPLICATION(S): 100000 CREAM TOPICAL at 17:28

## 2020-06-01 RX ADMIN — HEPARIN SODIUM 5000 UNIT(S): 5000 INJECTION INTRAVENOUS; SUBCUTANEOUS at 05:43

## 2020-06-01 RX ADMIN — CHLORHEXIDINE GLUCONATE 1 APPLICATION(S): 213 SOLUTION TOPICAL at 12:03

## 2020-06-01 RX ADMIN — Medication 500 MILLIGRAM(S): at 17:27

## 2020-06-01 RX ADMIN — INSULIN GLARGINE 4 UNIT(S): 100 INJECTION, SOLUTION SUBCUTANEOUS at 22:39

## 2020-06-01 RX ADMIN — PANTOPRAZOLE SODIUM 40 MILLIGRAM(S): 20 TABLET, DELAYED RELEASE ORAL at 12:04

## 2020-06-01 RX ADMIN — Medication 1: at 12:04

## 2020-06-01 RX ADMIN — Medication 5 UNIT(S): at 12:04

## 2020-06-01 RX ADMIN — ZINC SULFATE TAB 220 MG (50 MG ZINC EQUIVALENT) 220 MILLIGRAM(S): 220 (50 ZN) TAB at 12:04

## 2020-06-01 RX ADMIN — OLANZAPINE 2.5 MILLIGRAM(S): 15 TABLET, FILM COATED ORAL at 21:17

## 2020-06-01 RX ADMIN — Medication 5 UNIT(S): at 06:14

## 2020-06-01 RX ADMIN — Medication 5 UNIT(S): at 17:28

## 2020-06-01 NOTE — PROGRESS NOTE ADULT - PROBLEM SELECTOR PLAN 1
·Dementia associated with other underlying disease with behavioral disturbance.    Pt. is calmer   -Cont Zyprexa at 2.5 mg HS  -Cont Seroquel at 25 mg HS  -Cont Mirtazepine at 7.5 mg HS  -Cont to hold day time doses of Mirtazepine, Zyprexa and Seroquel except as indicated for PRN agitation  -Safety precautions with mittens  -Scopolamine patch d/c'd as it may decrease mentation.

## 2020-06-01 NOTE — PROGRESS NOTE ADULT - PROBLEM SELECTOR PLAN 2
·S/S 5/28->OK to feed orally with strict aspiration precautions  -Will cont with TF simultaneously for now  -Pt. was able to eat portion of breakfast with no difficulty swallowing  -Will evaluate adequacy and tolerance of PO intake  -Family would like to evaluate PO intake for now  -Palliative following.

## 2020-06-01 NOTE — PROGRESS NOTE ADULT - PROBLEM SELECTOR PLAN 5
Urine culture noted  Antibiotics OFF  Oxygen Supp  Monitor Oxygen sat  Taper Oxygen Supp as feasible  NGT in place with feeds.  Follow up CXR noted.   Repeat S/S eval.   Consider PEG.   ID follow up Cont meds  Seizures precautions  Neuro follow up

## 2020-06-01 NOTE — CONSULT NOTE ADULT - PROBLEM SELECTOR RECOMMENDATION 3
2/2 comorbidities, acute illness/hospitalization. Prior ambulatory,now more debilitated. Will need RODNEY.

## 2020-06-01 NOTE — CONSULT NOTE ADULT - SUBJECTIVE AND OBJECTIVE BOX
[  ] STAT REQUEST              [ X ] ROUTINE REQUEST    Patient is a 64 year old with dysphagia, poor po intake and failure to thrive. GI consulted Peg tube placement.      HPI:  64 year old male from [facility] with multiple medical problems including dementia and DM admitted with seizure like activity. Patient is unfused unable to give history. Patient found to have poor po intake associated with failure to thrive. No abdominal pain, nausea, vomiting, hematemesis, hematochezia, melena, fever, chills, chest pain, SOB, cough, hematuria, dysuria or diarrhea reported.            PAIN MANAGEMENT:  Pain Scale:                0 /10  Pain Location:      Prior Colonoscopy:   Unknown    PAST MEDICAL HISTORY  Dementia  DM       PAST SURGICAL HISTORY  No significant past surgical history reported      Allergies    No Known Allergies    Intolerances  None         MEDICATIONS  (STANDING):  ascorbic acid 500 milliGRAM(s) Oral two times a day  chlorhexidine 2% Cloths 1 Application(s) Topical daily  dextrose 5% + sodium chloride 0.9%. 1000 milliLiter(s) (50 mL/Hr) IV Continuous <Continuous>  dextrose 50% Injectable 12.5 Gram(s) IV Push once  dextrose 50% Injectable 25 Gram(s) IV Push once  dextrose 50% Injectable 25 Gram(s) IV Push once  ergocalciferol 47528 Unit(s) Oral every week  heparin   Injectable 5000 Unit(s) SubCutaneous every 12 hours  insulin glargine Injectable (LANTUS) 8 Unit(s) SubCutaneous at bedtime  insulin lispro (HumaLOG) corrective regimen sliding scale   SubCutaneous every 6 hours  insulin lispro Injectable (HumaLOG) 5 Unit(s) SubCutaneous every 6 hours  mirtazapine 7.5 milliGRAM(s) Oral at bedtime  multivitamin/minerals 1 Tablet(s) Oral daily  nystatin Powder 1 Application(s) Topical two times a day  OLANZapine 2.5 milliGRAM(s) Oral at bedtime  pantoprazole   Suspension 40 milliGRAM(s) Enteral Tube daily  QUEtiapine 25 milliGRAM(s) Oral at bedtime  zinc sulfate 220 milliGRAM(s) Oral daily    MEDICATIONS  (PRN):  acetaminophen  Suppository .. 650 milliGRAM(s) Rectal every 6 hours PRN Temp greater or equal to 38C (100.4F)  ALBUTerol    90 MICROgram(s) HFA Inhaler 2 Puff(s) Inhalation every 6 hours PRN Shortness of Breath and/or Wheezing  dextrose 40% Gel 15 Gram(s) Oral once PRN Blood Glucose LESS THAN 70 milliGRAM(s)/deciliter  glucagon  Injectable 1 milliGRAM(s) IntraMuscular once PRN Glucose LESS THAN 70 milligrams/deciliter  guaiFENesin   Syrup  (Sugar-Free) 100 milliGRAM(s) Oral every 6 hours PRN Cough      SOCIAL HISTORY  Advanced Directives:       [ X ] Full Code       [  ] DNR  Marital Status:         [  ] M      [ X ] S      [  ] D       [  ] W  Children:       [ X ] Yes      [  ] No  Occupation:        [  ] Employed       [ X ] Unemployed       [  ] Retired  Diet:       [ X ] Regular       [  ] PEG feeding          [  ] NG tube feeding  Drug Use:           [ X ] No               [  ] Yes  Alcohol:           [X  ] No             [  ] Yes (socially)         [  ] Yes (chronic)  Tobacco:           [  ] Yes           [ X ] No    FAMILY HISTORY  [ x ] Heart Disease            [x  ] Diabetes             [x  ] HTN             [  ] Colon Cancer             [  ] Stomach Cancer              [  ] Pancreatic Cancer      VITAL SIGNS   Vital Signs Last 24 Hrs  T(C): 36.4 (01 Jun 2020 13:51), Max: 36.4 (01 Jun 2020 06:01)  T(F): 97.5 (01 Jun 2020 13:51), Max: 97.6 (01 Jun 2020 06:01)  HR: 90 (01 Jun 2020 13:51) (77 - 90)  BP: 146/98 (01 Jun 2020 13:51) (129/74 - 146/98)   RR: 19 (01 Jun 2020 13:51) (18 - 19)  SpO2: 93% (01 Jun 2020 13:51) (93% - 98%)        CBC Full  -  ( 01 Jun 2020 06:49 )  WBC Count : 8.49 K/uL  RBC Count : 4.43 M/uL  Hemoglobin : 12.8 g/dL  Hematocrit : 38.2 %  Platelet Count - Automated : 286 K/uL  Mean Cell Volume : 86.2 fl  Mean Cell Hemoglobin : 28.9 pg  Mean Cell Hemoglobin Concentration : 33.5 gm/dL  Auto Neutrophil # : x  Auto Lymphocyte # : x  Auto Monocyte # : x  Auto Eosinophil # : x  Auto Basophil # : x  Auto Neutrophil % : x  Auto Lymphocyte % : x  Auto Monocyte % : x  Auto Eosinophil % : x  Auto Basophil % : x      06-01    139  |  104  |  13  ----------------------------<  102<H>  3.7   |  27  |  0.81    Ca    9.6      01 Jun 2020 06:49  Phos  3.2     06-01  Mg     1.8     06-01      Urinalysis (05.14.20 @ 06:35)    Glucose Qualitative, Urine: 250    Blood, Urine: Negative    pH Urine: 5.0    Color: Yellow    Urine Appearance: Clear    Bilirubin: Negative    Ketone - Urine: Negative    Specific Gravity: 1.020    Protein, Urine: 30 mg/dL    Urobilinogen: 1    Nitrite: Negative    Leukocyte Esterase Concentration: Negative      ECG  Ventricular Rate 106 BPM    Atrial Rate 106 BPM    P-R Interval 114 ms    QRS Duration 66 ms    Q-T Interval 334 ms    QTC Calculation(Bezet) 443 ms    P Axis 83 degrees    R Axis -53 degrees    T Axis 97 degrees    Diagnosis Line Sinus tachycardia  Biatrial enlargement  Left axis deviation  Nonspecific ST and T wave abnormality  Abnormal ECG      RADIOLOGY/IMAGING    EXAM:  CT BRAIN                            PROCEDURE DATE:  05/14/2020          INTERPRETATION:  CLINICAL Indications:  AMS    COMPARISON: CT head dated 5/1/2020    TECHNIQUE: Noncontrast CT of the head. Multiplanar reformations are submitted.    FINDINGS:   There is no compelling evidence for an acute transcortical infarction. There is no evidence of mass, mass effect, midline shift or extra-axial fluid collection. The lateral ventricles and cortical sulci are age-appropriate in size and configuration. Moderate polypoid inflammatory mucosal changes are seen throughout the various portions of the paranasal sinuses. Air-fluid level and bubbly changes in the right frontal sinus. The orbits and mastoid air cells are unremarkable. The calvariumis intact.    IMPRESSION: No evidence of an acute transcortical infarction or hemorrhage. MR is a more sensitive imaging modality for the evaluation of an acute infarction. Right frontal sinus sinusitis.

## 2020-06-01 NOTE — PROGRESS NOTE ADULT - PROBLEM SELECTOR PLAN 3
A1c 9.9, endo Dr. Allison following  -Cont FS AC&HS  -Cont Lantus 8 units HS  -Cont Humalog 6units q6hrs  -Cont low dose HSS  -Cont NGT feeding ATC

## 2020-06-01 NOTE — PROGRESS NOTE ADULT - ASSESSMENT
64 yr old male with PMHX of DM,dementia sent to ER from facility for seizure and now COVID+,Delirium,MAXIME,hypernatremia and s/p aspiration pneumonia.  1.Unable to transition from TF to oral feeds as per nursing due to pt spitting out oral feeds and being combative. GI eval to place PEG.  2.DM-Insulin, Endo f/u.  3.Delirium-cont psych meds.  4.GI and DVT prophylaxis.

## 2020-06-01 NOTE — CONSULT NOTE ADULT - RS GEN PE MLT RESP DETAILS PC
breath sounds equal/no rales/no rhonchi/good air movement/clear to auscultation bilaterally
airway patent/breath sounds equal/good air movement/no wheezes/respirations non-labored/no rales
good air movement/clear to auscultation bilaterally/airway patent/breath sounds equal/respirations non-labored

## 2020-06-01 NOTE — CONSULT NOTE ADULT - ASSESSMENT
1. Dysphagia  2. Poor po intake  3. Weight loss  4. Failure to thrive  5. Patient can benefit from Peg placement for nutritional support    Suggestions:    1. Palliative evaluation  2. NPO  3. IVF hydration  4. EGD with Peg placement  5. Protonix daily  6. Avoid NSAID  7. DVT prophylaxis

## 2020-06-01 NOTE — PROGRESS NOTE ADULT - PROBLEM SELECTOR PLAN 9
Continue with SQ heparin   Continue with Protonix Still with NGT feeding, will need to either successfully transition to PO and d/c NGT or get PEG placed.  Pt. will also need second COVID negative prior to discharge.

## 2020-06-01 NOTE — PROGRESS NOTE ADULT - PROBLEM SELECTOR PLAN 1
Urine culture noted  Antibiotics OFF  Oxygen Supp  Monitor Oxygen sat  Taper Oxygen Supp as feasible  NGT in place with feeds.  Can eat with strict aspiration precautions.   Follow up CXR noted.   May need PEG.   ID follow up

## 2020-06-01 NOTE — CONSULT NOTE ADULT - PROBLEM SELECTOR RECOMMENDATION 2
2/2 comorbidities, acute illness/hospitalization. Remains w NGT in place, poor po intake. Daughter agreeable for PEG placement, aware of risks/benefits. Nutrition following.

## 2020-06-01 NOTE — CHART NOTE - NSCHARTNOTEFT_GEN_A_CORE
Pt had modesta restraints which was  on  around 7PM. Upon bedside assessment pt is still pulling on his lines/tubes. So his modesta restraints were renewed.

## 2020-06-01 NOTE — CONSULT NOTE ADULT - CONSULT REASON
seizure
COVID- 19 infection
Dysphagia
Hypoxemia
Pneumonia
Renal failure
dementia, delirium, poor po intake, goals of care
uncontrolled type 2 DM with hyperglycemia

## 2020-06-01 NOTE — PROGRESS NOTE ADULT - PROBLEM SELECTOR PLAN 10
Still with NGT feeding, will need to either successfully transition to PO and d/c NGT or get PEG placed.  Pt. will also need second COVID negative prior to discharge.

## 2020-06-01 NOTE — CONSULT NOTE ADULT - PROVIDER SPECIALTY LIST ADULT
Neurology
Infectious Disease
Nephrology
Endocrinology
Gastroenterology
Palliative Care
Pulmonology
Critical Care

## 2020-06-01 NOTE — CHART NOTE - NSCHARTNOTEFT_GEN_A_CORE
Assessment:   64yMalePatient is a 64y old  Male who presents with a chief complaint of seizures (01 Jun 2020 12:19)   Pt is on Dysphagia Mech soft Nectar Liquids + NG TF ( Glucerna 1.2 @ 60 ML/he x 24 Hr). Po intake is Poor. Pt Pocketing food Inside Mouth. Pt not eating. S/P Palliative care Consult Noted. Daughter agreeable for PEG. Pt with Pressure ulcers       Factors impacting intake: [ ] none [ ] nausea  [ ] vomiting [ ] diarrhea [ ] constipation  [ ]chewing problems [ ] swallowing issues  [ ] other:     Diet Presciption: Diet, Dysphagia 2 Mechanical Soft-Nectar Consistency Fluid:   Tube Feeding Modality: Orogastric  Glucerna 1.2 Mihir  Total Volume for 24 Hours (mL): 1440  Continuous  Starting Tube Feed Rate {mL per Hour}: 30  Increase Tube Feed Rate by (mL): 30     Every 6 hours  Until Goal Tube Feed Rate (mL per Hour): 60  Tube Feed Duration (in Hours): 24  Tube Feed Start Time: 11:00 (05-29-20 @ 10:45)    Intake: Poor PO intake     Current Weight:   % Weight Change    Pertinent Medications: MEDICATIONS  (STANDING):  ascorbic acid 500 milliGRAM(s) Oral two times a day  chlorhexidine 2% Cloths 1 Application(s) Topical daily  dextrose 5% + sodium chloride 0.9%. 1000 milliLiter(s) (50 mL/Hr) IV Continuous <Continuous>  dextrose 50% Injectable 12.5 Gram(s) IV Push once  dextrose 50% Injectable 25 Gram(s) IV Push once  dextrose 50% Injectable 25 Gram(s) IV Push once  ergocalciferol 95864 Unit(s) Oral every week  heparin   Injectable 5000 Unit(s) SubCutaneous every 12 hours  insulin glargine Injectable (LANTUS) 8 Unit(s) SubCutaneous at bedtime  insulin lispro (HumaLOG) corrective regimen sliding scale   SubCutaneous every 6 hours  insulin lispro Injectable (HumaLOG) 5 Unit(s) SubCutaneous every 6 hours  mirtazapine 7.5 milliGRAM(s) Oral at bedtime  multivitamin/minerals 1 Tablet(s) Oral daily  nystatin Powder 1 Application(s) Topical two times a day  OLANZapine 2.5 milliGRAM(s) Oral at bedtime  pantoprazole   Suspension 40 milliGRAM(s) Enteral Tube daily  QUEtiapine 25 milliGRAM(s) Oral at bedtime  zinc sulfate 220 milliGRAM(s) Oral daily    MEDICATIONS  (PRN):  acetaminophen  Suppository .. 650 milliGRAM(s) Rectal every 6 hours PRN Temp greater or equal to 38C (100.4F)  ALBUTerol    90 MICROgram(s) HFA Inhaler 2 Puff(s) Inhalation every 6 hours PRN Shortness of Breath and/or Wheezing  dextrose 40% Gel 15 Gram(s) Oral once PRN Blood Glucose LESS THAN 70 milliGRAM(s)/deciliter  glucagon  Injectable 1 milliGRAM(s) IntraMuscular once PRN Glucose LESS THAN 70 milligrams/deciliter  guaiFENesin   Syrup  (Sugar-Free) 100 milliGRAM(s) Oral every 6 hours PRN Cough    Pertinent Labs: 06-01 Na139 mmol/L Glu 102 mg/dL<H> K+ 3.7 mmol/L Cr  0.81 mg/dL BUN 13 mg/dL 06-01 Phos 3.2 mg/dL 05-27 Alb 2.5 g/dL<L>     CAPILLARY BLOOD GLUCOSE      POCT Blood Glucose.: 174 mg/dL (01 Jun 2020 12:01)  POCT Blood Glucose.: 128 mg/dL (01 Jun 2020 05:42)  POCT Blood Glucose.: 189 mg/dL (01 Jun 2020 00:41)  POCT Blood Glucose.: 134 mg/dL (31 May 2020 21:19)  POCT Blood Glucose.: 213 mg/dL (31 May 2020 17:17)    Skin: Unsteagble R Buttocks       DTI L Heel.    Estimated Needs:   [ ] no change since previous assessment  [ ] recalculated:     Previous Nutrition Diagnosis:   [ ] Inadequate Energy Intake [ ]Inadequate Oral Intake [ ] Excessive Energy Intake   [ ] Underweight [ ] Increased Nutrient Needs [ ] Overweight/Obesity   [ ] Altered GI Function [ ] Unintended Weight Loss [ ] Food & Nutrition Related Knowledge Deficit [ x] Malnutrition Severe     Nutrition Diagnosis is [ x] ongoing  [ ] resolved [ ] not applicable     New Nutrition Diagnosis: [ ] not applicable       Interventions:   Recommend  [ ] Change Diet To:  [ ] Nutrition Supplement  [ x] Nutrition Support Continue with current TF Glucerna 1.2 @ 60 ml/hr x 24 hr as tolerated  [x ] Other:  Plan is For PEG Per Palliative care Consult.    Monitoring and Evaluation:   [ ] PO intake [ x ] Tolerance to diet prescription [ x ] weights [ x ] labs[ x ] follow up per protocol  [ ] other:

## 2020-06-01 NOTE — PROGRESS NOTE ADULT - SUBJECTIVE AND OBJECTIVE BOX
CARDIOLOGY/MEDICAL ATTENDING    CHIEF COMPLAINT:Patient is a 64y old  Male who presents with a chief complaint of seizures .Pt still has NGT and mitten in place.    	  REVIEW OF SYSTEMS:  [x ] Unable to obtain    PHYSICAL EXAM:  T(C): 36.4 (06-01-20 @ 06:01), Max: 36.6 (05-31-20 @ 13:50)  HR: 77 (06-01-20 @ 06:01) (68 - 77)  BP: 129/74 (06-01-20 @ 06:01) (108/66 - 129/74)  RR: 18 (06-01-20 @ 06:01) (18 - 20)  SpO2: 98% (06-01-20 @ 06:01) (98% - 99%)  Wt(kg): --  I&O's Summary    31 May 2020 07:01  -  01 Jun 2020 07:00  --------------------------------------------------------  IN: 700 mL / OUT: 0 mL / NET: 700 mL        Appearance: Normal	  HEENT:   Normal oral mucosa, PERRL, EOMI	  Lymphatic: No lymphadenopathy  Cardiovascular: Normal S1 S2, No JVD, No murmurs, No edema  Respiratory: Lungs clear to auscultation	  Gastrointestinal:  Soft, Non-tender, + BS	  Skin: No rashes, No ecchymoses, No cyanosis	  Extremities: Normal range of motion, No clubbing, cyanosis or edema  Vascular: Peripheral pulses palpable 2+ bilaterally    MEDICATIONS  (STANDING):  ascorbic acid 500 milliGRAM(s) Oral two times a day  chlorhexidine 2% Cloths 1 Application(s) Topical daily  dextrose 5% + sodium chloride 0.9%. 1000 milliLiter(s) (50 mL/Hr) IV Continuous <Continuous>  dextrose 50% Injectable 12.5 Gram(s) IV Push once  dextrose 50% Injectable 25 Gram(s) IV Push once  dextrose 50% Injectable 25 Gram(s) IV Push once  ergocalciferol 14091 Unit(s) Oral every week  heparin   Injectable 5000 Unit(s) SubCutaneous every 12 hours  insulin glargine Injectable (LANTUS) 8 Unit(s) SubCutaneous at bedtime  insulin lispro (HumaLOG) corrective regimen sliding scale   SubCutaneous every 6 hours  insulin lispro Injectable (HumaLOG) 5 Unit(s) SubCutaneous every 6 hours  mirtazapine 7.5 milliGRAM(s) Oral at bedtime  multivitamin/minerals 1 Tablet(s) Oral daily  nystatin Powder 1 Application(s) Topical two times a day  OLANZapine 2.5 milliGRAM(s) Oral at bedtime  pantoprazole   Suspension 40 milliGRAM(s) Enteral Tube daily  QUEtiapine 25 milliGRAM(s) Oral at bedtime  zinc sulfate 220 milliGRAM(s) Oral daily    	  	  LABS:	 	                     12.8   8.49  )-----------( 286      ( 01 Jun 2020 06:49 )             38.2     06-01    139  |  104  |  13  ----------------------------<  102<H>  3.7   |  27  |  0.81    Ca    9.6      01 Jun 2020 06:49  Phos  3.2     06-01  Mg     1.8     06-01      COVID-19 PCR: NotDetec: This test has been validated by [employer] to be accurate;  though it has not been FDA cleared/approved by the usual pathway.  As with all laboratory tests, results should be correlated with clinical  findings.  https://www.fda.gov/media/175975/download  https://www.fda.gov/media/154568/download (05.31.20 @ 15:06)

## 2020-06-01 NOTE — PROGRESS NOTE ADULT - PROBLEM SELECTOR PLAN 8
·  Problem: Deep tissue injury.  Plan: -There is an intact DTI to the L. Heel (1cm x 1cm) without drainage  -Leave the L. Heel wound open to air  -Elevate/float the patients heels using heel protectors and reposition the patient Q 2hrs using wedges or pillows. Continue with SQ heparin   Continue with Protonix

## 2020-06-01 NOTE — PROGRESS NOTE ADULT - SUBJECTIVE AND OBJECTIVE BOX
NP Note discussed with  Primary Attending    Patient is a 64y old  Male who presents with a chief complaint of seizures (31 May 2020 12:07)      INTERVAL HPI/OVERNIGHT EVENTS: no new complaints    MEDICATIONS  (STANDING):  ascorbic acid 500 milliGRAM(s) Oral two times a day  chlorhexidine 2% Cloths 1 Application(s) Topical daily  dextrose 5% + sodium chloride 0.9%. 1000 milliLiter(s) (50 mL/Hr) IV Continuous <Continuous>  dextrose 50% Injectable 12.5 Gram(s) IV Push once  dextrose 50% Injectable 25 Gram(s) IV Push once  dextrose 50% Injectable 25 Gram(s) IV Push once  ergocalciferol 70536 Unit(s) Oral every week  heparin   Injectable 5000 Unit(s) SubCutaneous every 12 hours  insulin glargine Injectable (LANTUS) 8 Unit(s) SubCutaneous at bedtime  insulin lispro (HumaLOG) corrective regimen sliding scale   SubCutaneous every 6 hours  insulin lispro Injectable (HumaLOG) 5 Unit(s) SubCutaneous every 6 hours  mirtazapine 7.5 milliGRAM(s) Oral at bedtime  multivitamin/minerals 1 Tablet(s) Oral daily  nystatin Powder 1 Application(s) Topical two times a day  OLANZapine 2.5 milliGRAM(s) Oral at bedtime  pantoprazole   Suspension 40 milliGRAM(s) Enteral Tube daily  QUEtiapine 25 milliGRAM(s) Oral at bedtime  zinc sulfate 220 milliGRAM(s) Oral daily    MEDICATIONS  (PRN):  acetaminophen  Suppository .. 650 milliGRAM(s) Rectal every 6 hours PRN Temp greater or equal to 38C (100.4F)  ALBUTerol    90 MICROgram(s) HFA Inhaler 2 Puff(s) Inhalation every 6 hours PRN Shortness of Breath and/or Wheezing  dextrose 40% Gel 15 Gram(s) Oral once PRN Blood Glucose LESS THAN 70 milliGRAM(s)/deciliter  glucagon  Injectable 1 milliGRAM(s) IntraMuscular once PRN Glucose LESS THAN 70 milligrams/deciliter  guaiFENesin   Syrup  (Sugar-Free) 100 milliGRAM(s) Oral every 6 hours PRN Cough      __________________________________________________  REVIEW OF SYSTEMS:  Unable to assess; speech incoherent at this time    Vital Signs Last 24 Hrs  T(C): 36.4 (01 Jun 2020 06:01), Max: 36.6 (31 May 2020 13:50)  T(F): 97.6 (01 Jun 2020 06:01), Max: 97.9 (31 May 2020 13:50)  HR: 77 (01 Jun 2020 06:01) (68 - 77)  BP: 129/74 (01 Jun 2020 06:01) (108/66 - 129/74)  BP(mean): --  RR: 18 (01 Jun 2020 06:01) (18 - 20)  SpO2: 98% (01 Jun 2020 06:01) (98% - 99%)    ________________________________________________  PHYSICAL EXAM: More awake today, conversant but confused  GENERAL: NAD  HEENT: Normocephalic;  conjunctivae and sclerae clear; moist mucous membranes;   NECK : supple  CHEST/LUNG: Clear to auscultation bilaterally with good air entry   HEART: S1 S2  regular; no murmurs, gallops or rubs  ABDOMEN: NGT with Glucerna feeding;  Able to eat portion of breakfast: Soft, Nontender, Nondistended; Bowel sounds present  EXTREMITIES: no cyanosis; no edema; no calf tenderness  SKIN: warm and dry; no rash  NERVOUS SYSTEM:  Awake and alert; Speech not coherent, may be confused    _________________________________________________  LABS:                        12.8   8.49  )-----------( 286      ( 01 Jun 2020 06:49 )             38.2     06-01    139  |  104  |  13  ----------------------------<  102<H>  3.7   |  27  |  0.81    Ca    9.6      01 Jun 2020 06:49  Phos  3.2     06-01  Mg     1.8     06-01          CAPILLARY BLOOD GLUCOSE      POCT Blood Glucose.: 128 mg/dL (01 Jun 2020 05:42)  POCT Blood Glucose.: 189 mg/dL (01 Jun 2020 00:41)  POCT Blood Glucose.: 134 mg/dL (31 May 2020 21:19)  POCT Blood Glucose.: 213 mg/dL (31 May 2020 17:17)  POCT Blood Glucose.: 174 mg/dL (31 May 2020 11:30)    COVID-19 PCR . (05.31.20 @ 15:06)    COVID-19 PCR: NotDetec: This test has been validated by [employer] to be accurate;  though it has not been FDA cleared/approved by the usual pathway.  As with all laboratory tests, results should be correlated with clinical  findings.  https://www.fda.gov/media/632834/download  https://www.fda.gov/media/087840/download      RADIOLOGY & ADDITIONAL TESTS:      Imaging Personally Reviewed:  YES/NO    Consultant(s) Notes Reviewed:   YES/ No    Care Discussed with Consultants :     Plan of care was discussed with patient and /or primary care giver; all questions and concerns were addressed and care was aligned with patient's wishes.

## 2020-06-01 NOTE — PROGRESS NOTE ADULT - PROBLEM SELECTOR PLAN 6
Cont meds  Seizures precautions  Neuro follow up Problem: Pressure injury of coccygeal region, unstageable. Plan: Advanced practice nurse note appreciated  -There is an Unstageable Pressure Injury to the Coccyx (3cm x 1cm x 0.2cm) with slough and scant drainage	  -Clean all wounds with normal saline and apply skin prep to the surrounding skin  -Apply Collagenase ointment to the slough areas of the Coccyx wound, apply saline moistened gauze to the wound bed, and cover with a Foam dressing Daily PRN.

## 2020-06-01 NOTE — PROGRESS NOTE ADULT - SUBJECTIVE AND OBJECTIVE BOX
64 year old male from [facility] with PMH of dementia and type 2 DM presented with seizure activity while sitting at AL. Endocrinology was consulted for management of uncontrolled type 2 DM with hyperglycemia.    Patient seen and examined at bedside. He remains on mitten restraints. Patient was unable to participate with PO nutrition. Plan for PEG tube as per primary team. FS reviewed. BG well controlled, at goal <180.    MEDICATIONS  (STANDING):  ascorbic acid 500 milliGRAM(s) Oral two times a day  chlorhexidine 2% Cloths 1 Application(s) Topical daily  dextrose 5% + sodium chloride 0.9%. 1000 milliLiter(s) (50 mL/Hr) IV Continuous <Continuous>  dextrose 50% Injectable 12.5 Gram(s) IV Push once  dextrose 50% Injectable 25 Gram(s) IV Push once  dextrose 50% Injectable 25 Gram(s) IV Push once  ergocalciferol 13477 Unit(s) Oral every week  heparin   Injectable 5000 Unit(s) SubCutaneous every 12 hours  insulin glargine Injectable (LANTUS) 8 Unit(s) SubCutaneous at bedtime  insulin lispro (HumaLOG) corrective regimen sliding scale   SubCutaneous every 6 hours  insulin lispro Injectable (HumaLOG) 5 Unit(s) SubCutaneous every 6 hours  mirtazapine 7.5 milliGRAM(s) Oral at bedtime  multivitamin/minerals 1 Tablet(s) Oral daily  nystatin Powder 1 Application(s) Topical two times a day  OLANZapine 2.5 milliGRAM(s) Oral at bedtime  pantoprazole   Suspension 40 milliGRAM(s) Enteral Tube daily  QUEtiapine 25 milliGRAM(s) Oral at bedtime  zinc sulfate 220 milliGRAM(s) Oral daily    MEDICATIONS  (PRN):  acetaminophen  Suppository .. 650 milliGRAM(s) Rectal every 6 hours PRN Temp greater or equal to 38C (100.4F)  ALBUTerol    90 MICROgram(s) HFA Inhaler 2 Puff(s) Inhalation every 6 hours PRN Shortness of Breath and/or Wheezing  dextrose 40% Gel 15 Gram(s) Oral once PRN Blood Glucose LESS THAN 70 milliGRAM(s)/deciliter  glucagon  Injectable 1 milliGRAM(s) IntraMuscular once PRN Glucose LESS THAN 70 milligrams/deciliter  guaiFENesin   Syrup  (Sugar-Free) 100 milliGRAM(s) Oral every 6 hours PRN Cough      REVIEW OF SYSTEMS:  unable to obtain due to medical condition    PHYSICAL EXAM:      VITAL SIGNS  T(C): 36.4 (06-01-20 @ 06:01), Max: 36.6 (05-31-20 @ 13:50)  HR: 77 (06-01-20 @ 06:01) (68 - 77)  BP: 129/74 (06-01-20 @ 06:01) (108/66 - 129/74)  RR: 18 (06-01-20 @ 06:01) (18 - 20)  SpO2: 98% (06-01-20 @ 06:01) (98% - 99%)    GENERAL: NAD, well-developed  HEAD:  Atraumatic, Normocephalic  EYES: EOMI, PERRLA, conjunctiva and sclera clear  ENMT: No tonsillar erythema, exudates, or enlargement; No lesions  NECK: Supple, No JVD, Normal thyroid  NERVOUS SYSTEM:  nonverbal,  CHEST/LUNG: decreased breath sounds, No rales, rhonchi, wheezing, or rubs  HEART: Regular rate and rhythm; No murmurs, rubs, or gallops  ABDOMEN: Soft, Nontender, Nondistended; Bowel sounds present  EXTREMITIES:  2+ Peripheral Pulses, No clubbing, cyanosis, or edema  SKIN: No rashes or lesions    LABS:                        12.8   8.49  )-----------( 286      ( 01 Jun 2020 06:49 )             38.2     06-01    139  |  104  |  13  ----------------------------<  102<H>  3.7   |  27  |  0.81    Ca    9.6      01 Jun 2020 06:49  Phos  3.2     06-01  Mg     1.8     06-01          CAPILLARY BLOOD GLUCOSE      POCT Blood Glucose.: 174 mg/dL (01 Jun 2020 12:01)  POCT Blood Glucose.: 128 mg/dL (01 Jun 2020 05:42)  POCT Blood Glucose.: 189 mg/dL (01 Jun 2020 00:41)  POCT Blood Glucose.: 134 mg/dL (31 May 2020 21:19)  POCT Blood Glucose.: 213 mg/dL (31 May 2020 17:17)

## 2020-06-01 NOTE — CONSULT NOTE ADULT - PROBLEM SELECTOR RECOMMENDATION 9
Usually ambulatory, interactive at baseline, on meds for behavioral issues at [facility]. Now w ongoing AMS, likely superimposed delirium, psych and neuro input noted. Mental status waxing and waning. Meds adjusted per psych.

## 2020-06-01 NOTE — CONSULT NOTE ADULT - SUBJECTIVE AND OBJECTIVE BOX
HPI:  64 year old male from [facility]  with PMH dementia and DM coming in with seizures like activity while sitting at AL. pt unable to provide further history (poor historian) . denies all complaints at this time. NKDA     In ED :   EKG : Sinus rythm with PVCs   Afebrile, no WBC elevation  normal electrolytes and renal functions   Lactate 1.7  CT head : unremarkable CT study of the brain. No acute abnormality suggested.  CXR : Clear lungs   no Leukocytosis  lymphocyte; WNl   pt is afebrile  O2 Sat on RA 98%     ****Morning team to contact Assisted Living for home med rec and GOC ****   Full code for now (01 May 2020 03:13)    Interval history: Prolonged hospitalization due to AMS, COVID 19, treated for poss asp PNA, completed antibiotics, remains w NGT in place, poor po intake. Mental status waxes and wanes, intermittently agitated,  ate some breakfast. Full code on file.       PAST MEDICAL & SURGICAL HISTORY:  Dementia  No significant past surgical history      SOCIAL HISTORY:  , has 1 daughter  Admitted from:   assisted living         Surrogate/HCP/Guardian:     Ailin Sterling       Phone#: [phone]    FAMILY HISTORY: unable to obtain due to poor mental status    Baseline ADLs (prior to admission): ambulatory, interactive    Allergies    No Known Allergies    Intolerances      Present Symptoms:              Review of Systems:   Unable to obtain due to poor mentation     MEDICATIONS  (STANDING):  ascorbic acid 500 milliGRAM(s) Oral two times a day  chlorhexidine 2% Cloths 1 Application(s) Topical daily  dextrose 5% + sodium chloride 0.9%. 1000 milliLiter(s) (50 mL/Hr) IV Continuous <Continuous>  dextrose 50% Injectable 12.5 Gram(s) IV Push once  dextrose 50% Injectable 25 Gram(s) IV Push once  dextrose 50% Injectable 25 Gram(s) IV Push once  ergocalciferol 85558 Unit(s) Oral every week  heparin   Injectable 5000 Unit(s) SubCutaneous every 12 hours  insulin glargine Injectable (LANTUS) 8 Unit(s) SubCutaneous at bedtime  insulin lispro (HumaLOG) corrective regimen sliding scale   SubCutaneous every 6 hours  insulin lispro Injectable (HumaLOG) 5 Unit(s) SubCutaneous every 6 hours  mirtazapine 7.5 milliGRAM(s) Oral at bedtime  multivitamin/minerals 1 Tablet(s) Oral daily  nystatin Powder 1 Application(s) Topical two times a day  OLANZapine 2.5 milliGRAM(s) Oral at bedtime  pantoprazole   Suspension 40 milliGRAM(s) Enteral Tube daily  QUEtiapine 25 milliGRAM(s) Oral at bedtime  zinc sulfate 220 milliGRAM(s) Oral daily    MEDICATIONS  (PRN):  acetaminophen  Suppository .. 650 milliGRAM(s) Rectal every 6 hours PRN Temp greater or equal to 38C (100.4F)  ALBUTerol    90 MICROgram(s) HFA Inhaler 2 Puff(s) Inhalation every 6 hours PRN Shortness of Breath and/or Wheezing  dextrose 40% Gel 15 Gram(s) Oral once PRN Blood Glucose LESS THAN 70 milliGRAM(s)/deciliter  glucagon  Injectable 1 milliGRAM(s) IntraMuscular once PRN Glucose LESS THAN 70 milligrams/deciliter  guaiFENesin   Syrup  (Sugar-Free) 100 milliGRAM(s) Oral every 6 hours PRN Cough      PHYSICAL EXAM:    Vital Signs Last 24 Hrs  T(C): 36.4 (01 Jun 2020 06:01), Max: 36.6 (31 May 2020 13:50)  T(F): 97.6 (01 Jun 2020 06:01), Max: 97.9 (31 May 2020 13:50)  HR: 77 (01 Jun 2020 06:01) (68 - 77)  BP: 129/74 (01 Jun 2020 06:01) (108/66 - 129/74)  BP(mean): --  RR: 18 (01 Jun 2020 06:01) (18 - 20)  SpO2: 98% (01 Jun 2020 06:01) (98% - 99%)     Karnofsky Performance Score/Palliative Performance Status Version2:  30   %     Not examined due to COVID status    LABS:                        12.8   8.49  )-----------( 286      ( 01 Jun 2020 06:49 )             38.2     06-01    139  |  104  |  13  ----------------------------<  102<H>  3.7   |  27  |  0.81    Ca    9.6      01 Jun 2020 06:49  Phos  3.2     06-01  Mg     1.8     06-01          RADIOLOGY & ADDITIONAL STUDIES:    ADVANCE DIRECTIVES:

## 2020-06-01 NOTE — CONSULT NOTE ADULT - CONSULT REQUESTED DATE/TIME
01-Jun-2020 12:19
01-Jun-2020 14:16
03-May-2020 15:50
14-May-2020
14-May-2020 11:01
14-May-2020 14:01
16-May-2020 09:42
01-May-2020 18:52

## 2020-06-01 NOTE — CONSULT NOTE ADULT - PROBLEM SELECTOR RECOMMENDATION 4
Spoke with patient's daughter Ailin, she is an only child, her mother is . Discussed current condition, overall goals of care. Discussed risks/benefits of artificial nutrition/PEG vs comfort feeds. She wishes to proceed with PEG to provide him opportunity to continue to recover. Regarding advance directives, discussed risks/benefits of CPR/intubation in the context of advancing dementia. She is undecided, will think about it. Remains full code for now. Support provided.

## 2020-06-01 NOTE — PROGRESS NOTE ADULT - PROBLEM SELECTOR PLAN 7
Problem: Pressure injury of coccygeal region, unstageable. Plan: Advanced practice nurse note appreciated  -There is an Unstageable Pressure Injury to the Coccyx (3cm x 1cm x 0.2cm) with slough and scant drainage	  -Clean all wounds with normal saline and apply skin prep to the surrounding skin  -Apply Collagenase ointment to the slough areas of the Coccyx wound, apply saline moistened gauze to the wound bed, and cover with a Foam dressing Daily PRN. ·  Problem: Deep tissue injury.  Plan: -There is an intact DTI to the L. Heel (1cm x 1cm) without drainage  -Leave the L. Heel wound open to air  -Elevate/float the patients heels using heel protectors and reposition the patient Q 2hrs using wedges or pillows.

## 2020-06-01 NOTE — CONSULT NOTE ADULT - GASTROINTESTINAL DETAILS
no rigidity/no distention/no guarding/soft/no masses palpable/bowel sounds normal
no distention/no guarding/soft/bowel sounds normal/no bruit/no rebound tenderness/no rigidity/nontender
no distention/soft/nontender/bowel sounds normal

## 2020-06-01 NOTE — PROGRESS NOTE ADULT - ASSESSMENT
64 year old male from [facility] with PMH of dementia and type 2 DM presented with seizure activity while sitting at AL. Endocrinology was consulted for management of uncontrolled type 2 DM with hyperglycemia.    1. Uncontrolled Type 2 DM with hyperglycemia, A1c 9.9%  -BG overall well controlled, mostly at goal <180  -tube feedings 24hr/day, will attempt PO feeding again today  -continue Lantus 8 units at bedtime  -continue Humalog 5 units TID q 6 hrs   -continue low dose rapid acting insulin q 6 hrs as below  BG 70-100mg/dL 0 units  -150 mg/dL 0 units  -200 mg/dL 1 unit  -250 mg/dL 2 units  -300 mg/dL 3 units  -350 mg/dL 4 units  -400 mg/dL 5 units  BG > 400mg/dL 6 units  -FS AC HS  -ensure consistent carbohydrate   -will monitor FS and adjust accordingly   -Plan for PEG tube as patient unable to participate in PO nutrition    2. Delirium  -patient with mitten restraints  -continue Seroquel, Zyprexa, Remeron    Plan discussed with primary team]  Please  call  w/ any questions or concerns [phone]

## 2020-06-01 NOTE — PROGRESS NOTE ADULT - SUBJECTIVE AND OBJECTIVE BOX
Pt is awake, alert, lying in bed in NAD. Still with tube feed. Pt had S/S eval 5/28, OK to feed with strict aspiration precautions. Ate for NP this morning without difficulty swallowing.     INTERVAL HPI/OVERNIGHT EVENTS:      VITAL SIGNS:  T(F): 97.6 (06-01-20 @ 06:01)  HR: 77 (06-01-20 @ 06:01)  BP: 129/74 (06-01-20 @ 06:01)  RR: 18 (06-01-20 @ 06:01)  SpO2: 98% (06-01-20 @ 06:01)  Wt(kg): --  I&O's Detail    31 May 2020 07:01  -  01 Jun 2020 07:00  --------------------------------------------------------  IN:    dextrose 5% + sodium chloride 0.9%.: 100 mL    Free Water: 600 mL  Total IN: 700 mL    OUT:  Total OUT: 0 mL    Total NET: 700 mL              REVIEW OF SYSTEMS:    CONSTITUTIONAL:  No fevers, chills, sweats    HEENT:  Eyes:  No diplopia or blurred vision. ENT:  No earache, sore throat or runny nose.    CARDIOVASCULAR:  No pressure, squeezing, tightness, or heaviness about the chest; no palpitations.    RESPIRATORY:  Per HPI    GASTROINTESTINAL:  No abdominal pain, nausea, vomiting or diarrhea.    GENITOURINARY:  No dysuria, frequency or urgency.    NEUROLOGIC:  No paresthesias, fasciculations, seizures or weakness.    PSYCHIATRIC:  No disorder of thought or mood.      PHYSICAL EXAM:    Constitutional: Well developed and nourished  Eyes:Perrla  ENMT: normal  Neck:supple  Respiratory: good air entry  Cardiovascular: S1 S2 regular  Gastrointestinal: Soft, Non tender; tube feeds.   Extremities: No edema  Vascular:normal  Neurological:Awake, alert, confused.   Musculoskeletal:Normal      MEDICATIONS  (STANDING):  ascorbic acid 500 milliGRAM(s) Oral two times a day  chlorhexidine 2% Cloths 1 Application(s) Topical daily  dextrose 5% + sodium chloride 0.9%. 1000 milliLiter(s) (50 mL/Hr) IV Continuous <Continuous>  dextrose 50% Injectable 12.5 Gram(s) IV Push once  dextrose 50% Injectable 25 Gram(s) IV Push once  dextrose 50% Injectable 25 Gram(s) IV Push once  ergocalciferol 79537 Unit(s) Oral every week  heparin   Injectable 5000 Unit(s) SubCutaneous every 12 hours  insulin glargine Injectable (LANTUS) 8 Unit(s) SubCutaneous at bedtime  insulin lispro (HumaLOG) corrective regimen sliding scale   SubCutaneous every 6 hours  insulin lispro Injectable (HumaLOG) 5 Unit(s) SubCutaneous every 6 hours  mirtazapine 7.5 milliGRAM(s) Oral at bedtime  multivitamin/minerals 1 Tablet(s) Oral daily  nystatin Powder 1 Application(s) Topical two times a day  OLANZapine 2.5 milliGRAM(s) Oral at bedtime  pantoprazole   Suspension 40 milliGRAM(s) Enteral Tube daily  QUEtiapine 25 milliGRAM(s) Oral at bedtime  zinc sulfate 220 milliGRAM(s) Oral daily    MEDICATIONS  (PRN):  acetaminophen  Suppository .. 650 milliGRAM(s) Rectal every 6 hours PRN Temp greater or equal to 38C (100.4F)  ALBUTerol    90 MICROgram(s) HFA Inhaler 2 Puff(s) Inhalation every 6 hours PRN Shortness of Breath and/or Wheezing  dextrose 40% Gel 15 Gram(s) Oral once PRN Blood Glucose LESS THAN 70 milliGRAM(s)/deciliter  glucagon  Injectable 1 milliGRAM(s) IntraMuscular once PRN Glucose LESS THAN 70 milligrams/deciliter  guaiFENesin   Syrup  (Sugar-Free) 100 milliGRAM(s) Oral every 6 hours PRN Cough      Allergies    No Known Allergies    Intolerances        LABS:                        12.8   8.49  )-----------( 286      ( 01 Jun 2020 06:49 )             38.2     06-01    139  |  104  |  13  ----------------------------<  102<H>  3.7   |  27  |  0.81    Ca    9.6      01 Jun 2020 06:49  Phos  3.2     06-01  Mg     1.8     06-01                CAPILLARY BLOOD GLUCOSE      POCT Blood Glucose.: 174 mg/dL (01 Jun 2020 12:01)  POCT Blood Glucose.: 128 mg/dL (01 Jun 2020 05:42)  POCT Blood Glucose.: 189 mg/dL (01 Jun 2020 00:41)  POCT Blood Glucose.: 134 mg/dL (31 May 2020 21:19)  POCT Blood Glucose.: 213 mg/dL (31 May 2020 17:17)        RADIOLOGY & ADDITIONAL TESTS:    CXR:    Ct scan chest:    ekg;    echo:

## 2020-06-02 DIAGNOSIS — U07.1 COVID-19: ICD-10-CM

## 2020-06-02 LAB
ANION GAP SERPL CALC-SCNC: 8 MMOL/L — SIGNIFICANT CHANGE UP (ref 5–17)
BUN SERPL-MCNC: 11 MG/DL — SIGNIFICANT CHANGE UP (ref 7–18)
CALCIUM SERPL-MCNC: 9.6 MG/DL — SIGNIFICANT CHANGE UP (ref 8.4–10.5)
CHLORIDE SERPL-SCNC: 101 MMOL/L — SIGNIFICANT CHANGE UP (ref 96–108)
CO2 SERPL-SCNC: 29 MMOL/L — SIGNIFICANT CHANGE UP (ref 22–31)
CREAT SERPL-MCNC: 0.82 MG/DL — SIGNIFICANT CHANGE UP (ref 0.5–1.3)
GLUCOSE BLDC GLUCOMTR-MCNC: 131 MG/DL — HIGH (ref 70–99)
GLUCOSE BLDC GLUCOMTR-MCNC: 155 MG/DL — HIGH (ref 70–99)
GLUCOSE BLDC GLUCOMTR-MCNC: 174 MG/DL — HIGH (ref 70–99)
GLUCOSE BLDC GLUCOMTR-MCNC: 89 MG/DL — SIGNIFICANT CHANGE UP (ref 70–99)
GLUCOSE SERPL-MCNC: 92 MG/DL — SIGNIFICANT CHANGE UP (ref 70–99)
HCT VFR BLD CALC: 37.1 % — LOW (ref 39–50)
HGB BLD-MCNC: 12.6 G/DL — LOW (ref 13–17)
INR BLD: 1.11 RATIO — SIGNIFICANT CHANGE UP (ref 0.88–1.16)
MCHC RBC-ENTMCNC: 29.2 PG — SIGNIFICANT CHANGE UP (ref 27–34)
MCHC RBC-ENTMCNC: 34 GM/DL — SIGNIFICANT CHANGE UP (ref 32–36)
MCV RBC AUTO: 85.9 FL — SIGNIFICANT CHANGE UP (ref 80–100)
NRBC # BLD: 0 /100 WBCS — SIGNIFICANT CHANGE UP (ref 0–0)
PLATELET # BLD AUTO: 262 K/UL — SIGNIFICANT CHANGE UP (ref 150–400)
POTASSIUM SERPL-MCNC: 3.8 MMOL/L — SIGNIFICANT CHANGE UP (ref 3.5–5.3)
POTASSIUM SERPL-SCNC: 3.8 MMOL/L — SIGNIFICANT CHANGE UP (ref 3.5–5.3)
PROTHROM AB SERPL-ACNC: 12.6 SEC — SIGNIFICANT CHANGE UP (ref 10–12.9)
RBC # BLD: 4.32 M/UL — SIGNIFICANT CHANGE UP (ref 4.2–5.8)
RBC # FLD: 14 % — SIGNIFICANT CHANGE UP (ref 10.3–14.5)
SODIUM SERPL-SCNC: 138 MMOL/L — SIGNIFICANT CHANGE UP (ref 135–145)
WBC # BLD: 7.76 K/UL — SIGNIFICANT CHANGE UP (ref 3.8–10.5)
WBC # FLD AUTO: 7.76 K/UL — SIGNIFICANT CHANGE UP (ref 3.8–10.5)

## 2020-06-02 PROCEDURE — 99232 SBSQ HOSP IP/OBS MODERATE 35: CPT

## 2020-06-02 RX ORDER — SODIUM CHLORIDE 9 MG/ML
1000 INJECTION, SOLUTION INTRAVENOUS
Refills: 0 | Status: DISCONTINUED | OUTPATIENT
Start: 2020-06-02 | End: 2020-06-03

## 2020-06-02 RX ORDER — INSULIN LISPRO 100/ML
VIAL (ML) SUBCUTANEOUS EVERY 6 HOURS
Refills: 0 | Status: DISCONTINUED | OUTPATIENT
Start: 2020-06-02 | End: 2020-06-04

## 2020-06-02 RX ORDER — CHLORHEXIDINE GLUCONATE 213 G/1000ML
1 SOLUTION TOPICAL ONCE
Refills: 0 | Status: COMPLETED | OUTPATIENT
Start: 2020-06-02 | End: 2020-06-02

## 2020-06-02 RX ORDER — SODIUM CHLORIDE 9 MG/ML
1000 INJECTION, SOLUTION INTRAVENOUS
Refills: 0 | Status: DISCONTINUED | OUTPATIENT
Start: 2020-06-02 | End: 2020-06-04

## 2020-06-02 RX ORDER — DEXTROSE 50 % IN WATER 50 %
25 SYRINGE (ML) INTRAVENOUS ONCE
Refills: 0 | Status: DISCONTINUED | OUTPATIENT
Start: 2020-06-02 | End: 2020-06-19

## 2020-06-02 RX ORDER — SODIUM CHLORIDE 9 MG/ML
1000 INJECTION, SOLUTION INTRAVENOUS
Refills: 0 | Status: DISCONTINUED | OUTPATIENT
Start: 2020-06-02 | End: 2020-06-19

## 2020-06-02 RX ORDER — DEXTROSE 50 % IN WATER 50 %
12.5 SYRINGE (ML) INTRAVENOUS ONCE
Refills: 0 | Status: DISCONTINUED | OUTPATIENT
Start: 2020-06-02 | End: 2020-06-19

## 2020-06-02 RX ORDER — NYSTATIN CREAM 100000 [USP'U]/G
1 CREAM TOPICAL
Refills: 0 | Status: DISCONTINUED | OUTPATIENT
Start: 2020-06-02 | End: 2020-06-11

## 2020-06-02 RX ORDER — DEXTROSE 50 % IN WATER 50 %
15 SYRINGE (ML) INTRAVENOUS ONCE
Refills: 0 | Status: DISCONTINUED | OUTPATIENT
Start: 2020-06-02 | End: 2020-06-19

## 2020-06-02 RX ORDER — HEPARIN SODIUM 5000 [USP'U]/ML
5000 INJECTION INTRAVENOUS; SUBCUTANEOUS EVERY 12 HOURS
Refills: 0 | Status: DISCONTINUED | OUTPATIENT
Start: 2020-06-02 | End: 2020-06-11

## 2020-06-02 RX ORDER — INSULIN GLARGINE 100 [IU]/ML
4 INJECTION, SOLUTION SUBCUTANEOUS AT BEDTIME
Refills: 0 | Status: DISCONTINUED | OUTPATIENT
Start: 2020-06-02 | End: 2020-06-04

## 2020-06-02 RX ADMIN — CHLORHEXIDINE GLUCONATE 1 APPLICATION(S): 213 SOLUTION TOPICAL at 11:38

## 2020-06-02 RX ADMIN — SODIUM CHLORIDE 50 MILLILITER(S): 9 INJECTION, SOLUTION INTRAVENOUS at 06:16

## 2020-06-02 RX ADMIN — CHLORHEXIDINE GLUCONATE 1 APPLICATION(S): 213 SOLUTION TOPICAL at 05:58

## 2020-06-02 RX ADMIN — Medication 5 UNIT(S): at 00:33

## 2020-06-02 RX ADMIN — NYSTATIN CREAM 1 APPLICATION(S): 100000 CREAM TOPICAL at 05:59

## 2020-06-02 RX ADMIN — HEPARIN SODIUM 5000 UNIT(S): 5000 INJECTION INTRAVENOUS; SUBCUTANEOUS at 17:51

## 2020-06-02 RX ADMIN — NYSTATIN CREAM 1 APPLICATION(S): 100000 CREAM TOPICAL at 22:27

## 2020-06-02 RX ADMIN — SODIUM CHLORIDE 50 MILLILITER(S): 9 INJECTION, SOLUTION INTRAVENOUS at 22:28

## 2020-06-02 RX ADMIN — SODIUM CHLORIDE 75 MILLILITER(S): 9 INJECTION, SOLUTION INTRAVENOUS at 17:56

## 2020-06-02 RX ADMIN — Medication 1: at 00:32

## 2020-06-02 RX ADMIN — Medication 500 MILLIGRAM(S): at 05:58

## 2020-06-02 NOTE — PROGRESS NOTE ADULT - PROBLEM SELECTOR PLAN 10
Discharge to [facility] pending peg placement Discharge to [facility] COVID test on 6.3.2020   negative COVID 72 hours prior to placement as per CM   pending peg placement

## 2020-06-02 NOTE — PROGRESS NOTE ADULT - PROBLEM SELECTOR PLAN 1
No seizure activities noted at present time  No indication for antiepileptic medications as per   Continue seizure precautions with dementia   NPO for peg placement  Aspiration precaution  Peg placement today 06/02/2020 oropharyngeal dysphagia due to  dementia   NPO for peg placement  Aspiration precaution  Peg placement today 06/02/2020

## 2020-06-02 NOTE — PROGRESS NOTE ADULT - PROBLEM SELECTOR PROBLEM 3
Dementia with behavioral disturbance, unspecified dementia type MAXIME (acute kidney injury) Severe protein-calorie malnutrition

## 2020-06-02 NOTE — PROGRESS NOTE ADULT - PROBLEM SELECTOR PLAN 6
Continue strict aspiration precaution  CHXray noted for opacities on 05/23/2020   Pt is for Peg placement today  Pulmonary on board Continue Collagenase ointment with foam dressing  Turn and position

## 2020-06-02 NOTE — PROGRESS NOTE ADULT - SUBJECTIVE AND OBJECTIVE BOX
CHIEF COMPLAINT:Patient is a 64y old  Male who presents with a chief complaint of seizures .Pt appears comfortable.    	  REVIEW OF SYSTEMS:  [ x] Unable to obtain    PHYSICAL EXAM:  T(C): 36.2 (06-02-20 @ 06:36), Max: 36.4 (06-01-20 @ 13:51)  HR: 91 (06-02-20 @ 06:36) (84 - 91)  BP: 136/69 (06-02-20 @ 06:36) (117/64 - 146/98)  RR: 16 (06-02-20 @ 06:36) (16 - 19)  SpO2: 96% (06-02-20 @ 06:36) (93% - 98%)  Wt(kg): --  I&O's Summary      Appearance: Normal	  HEENT:   Normal oral mucosa, PERRL, EOMI	  Lymphatic: No lymphadenopathy  Cardiovascular: Normal S1 S2, No JVD, No murmurs, No edema  Respiratory: B/L ronchi  Gastrointestinal:  Soft, Non-tender, + BS	  Skin: No rashes, No ecchymoses, No cyanosis	  Extremities: Normal range of motion, No clubbing, cyanosis or edema  Vascular: Peripheral pulses palpable 2+ bilaterally    MEDICATIONS  (STANDING):  ascorbic acid 500 milliGRAM(s) Oral two times a day  chlorhexidine 2% Cloths 1 Application(s) Topical daily  dextrose 5% + sodium chloride 0.9%. 1000 milliLiter(s) (50 mL/Hr) IV Continuous <Continuous>  dextrose 5% + sodium chloride 0.9%. 1000 milliLiter(s) (50 mL/Hr) IV Continuous <Continuous>  dextrose 50% Injectable 12.5 Gram(s) IV Push once  dextrose 50% Injectable 25 Gram(s) IV Push once  dextrose 50% Injectable 25 Gram(s) IV Push once  ergocalciferol 71053 Unit(s) Oral every week  heparin   Injectable 5000 Unit(s) SubCutaneous every 12 hours  insulin lispro (HumaLOG) corrective regimen sliding scale   SubCutaneous every 6 hours  mirtazapine 7.5 milliGRAM(s) Oral at bedtime  multivitamin/minerals 1 Tablet(s) Oral daily  nystatin Powder 1 Application(s) Topical two times a day  OLANZapine 2.5 milliGRAM(s) Oral at bedtime  pantoprazole   Suspension 40 milliGRAM(s) Enteral Tube daily  QUEtiapine 25 milliGRAM(s) Oral at bedtime  zinc sulfate 220 milliGRAM(s) Oral daily      	  	  LABS:	 	                         12.6   7.76  )-----------( 262      ( 02 Jun 2020 07:44 )             37.1     06-02    138  |  101  |  11  ----------------------------<  92  3.8   |  29  |  0.82    Ca    9.6      02 Jun 2020 07:44  Phos  3.2     06-01  Mg     1.8     06-01      COVID-19 PCR: NotDetec: This test has been validated by [employer] to be accurate;  though it has not been FDA cleared/approved by the usual pathway.  As with all laboratory tests, results should be correlated with clinical  findings.  https://www.fda.gov/media/421052/download  https://www.fda.gov/media/918833/download (05.31.20 @ 15:06)

## 2020-06-02 NOTE — PROGRESS NOTE ADULT - SUBJECTIVE AND OBJECTIVE BOX
Patient is a 64y old  Male who presents with a chief complaint of seizures (02 Jun 2020 11:56)      INTERVAL HPI/OVERNIGHT EVENTS: no new complaints    MEDICATIONS  (STANDING):  ascorbic acid 500 milliGRAM(s) Oral two times a day  chlorhexidine 2% Cloths 1 Application(s) Topical daily  dextrose 5% + sodium chloride 0.9%. 1000 milliLiter(s) (50 mL/Hr) IV Continuous <Continuous>  dextrose 5% + sodium chloride 0.9%. 1000 milliLiter(s) (50 mL/Hr) IV Continuous <Continuous>  dextrose 50% Injectable 12.5 Gram(s) IV Push once  dextrose 50% Injectable 25 Gram(s) IV Push once  dextrose 50% Injectable 25 Gram(s) IV Push once  ergocalciferol 80303 Unit(s) Oral every week  heparin   Injectable 5000 Unit(s) SubCutaneous every 12 hours  insulin lispro (HumaLOG) corrective regimen sliding scale   SubCutaneous every 6 hours  mirtazapine 7.5 milliGRAM(s) Oral at bedtime  multivitamin/minerals 1 Tablet(s) Oral daily  nystatin Powder 1 Application(s) Topical two times a day  OLANZapine 2.5 milliGRAM(s) Oral at bedtime  pantoprazole   Suspension 40 milliGRAM(s) Enteral Tube daily  QUEtiapine 25 milliGRAM(s) Oral at bedtime  zinc sulfate 220 milliGRAM(s) Oral daily    MEDICATIONS  (PRN):  acetaminophen  Suppository .. 650 milliGRAM(s) Rectal every 6 hours PRN Temp greater or equal to 38C (100.4F)  ALBUTerol    90 MICROgram(s) HFA Inhaler 2 Puff(s) Inhalation every 6 hours PRN Shortness of Breath and/or Wheezing  dextrose 40% Gel 15 Gram(s) Oral once PRN Blood Glucose LESS THAN 70 milliGRAM(s)/deciliter  glucagon  Injectable 1 milliGRAM(s) IntraMuscular once PRN Glucose LESS THAN 70 milligrams/deciliter  guaiFENesin   Syrup  (Sugar-Free) 100 milliGRAM(s) Oral every 6 hours PRN Cough    Unable to assess due to pt's mental status__________________________________________________  REVIEW OF SYSTEMS:    Vital Signs Last 24 Hrs  T(C): 36.2 (02 Jun 2020 06:36), Max: 36.4 (01 Jun 2020 13:51)  T(F): 97.1 (02 Jun 2020 06:36), Max: 97.5 (01 Jun 2020 13:51)  HR: 91 (02 Jun 2020 06:36) (84 - 91)  BP: 136/69 (02 Jun 2020 06:36) (117/64 - 146/98)  BP(mean): --  RR: 16 (02 Jun 2020 06:36) (16 - 19)  SpO2: 96% (02 Jun 2020 06:36) (93% - 98%)    ________________________________________________  PHYSICAL EXAM:  GENERAL: NAD  HEENT: Normocephalic;  conjunctivae and sclerae clear; moist mucous membranes;   NECK : supple  CHEST/LUNG: Clear to auscultation bilaterally with good air entry   HEART: S1 S2  regular; no murmurs, gallops or rubs  ABDOMEN: Soft, Nontender, Nondistended; Bowel sounds present  EXTREMITIES: no cyanosis; no edema; no calf tenderness  SKIN: warm and dry; no rash  NERVOUS SYSTEM:  confused  _________________________________________________  LABS:                        12.6   7.76  )-----------( 262      ( 02 Jun 2020 07:44 )             37.1     06-02    138  |  101  |  11  ----------------------------<  92  3.8   |  29  |  0.82    Ca    9.6      02 Jun 2020 07:44  Phos  3.2     06-01  Mg     1.8     06-01      PT/INR - ( 02 Jun 2020 07:44 )   PT: 12.6 sec;   INR: 1.11 ratio             CAPILLARY BLOOD GLUCOSE      POCT Blood Glucose.: 131 mg/dL (02 Jun 2020 11:20)  POCT Blood Glucose.: 89 mg/dL (02 Jun 2020 06:27)  POCT Blood Glucose.: 174 mg/dL (02 Jun 2020 00:08)  POCT Blood Glucose.: 127 mg/dL (01 Jun 2020 21:55)  POCT Blood Glucose.: 124 mg/dL (01 Jun 2020 17:21)        RADIOLOGY & ADDITIONAL TESTS:    Imaging  Reviewed:  YES  Consultant(s) Notes Reviewed:   YES    Plan of care was discussed with patient and /or primary care giver; all questions and concerns were addressed Patient is a 64y old  Male who presents with a chief complaint of seizures (02 Jun 2020 11:56)    HPI - 64 year old male from [facility]  with PMH dementia and DM coming in with seizures like activity while sitting at AL. pt unable to provide further history (poor historian) . denies all complaints at this time. NKDA   Pt. seen and evaluated, noted laying comfortably in bed, does not respond to verbal stimuli.  Pt. is with NGT feeding, needs psych eval for capacity to make medical decisions, however since he is non verbal, will likely not be possible to assess, psych consulted.  SLP requested (pt. failed past consultation).  Palliative care consulted for GOC/peg placement.  5/28-Pt. is more awake today, able to say few words at a time.  Pt. will be reevaluated by SLP, currently still with NGT with Glucerna feeding.  Palliative following, awaiting s/s to plan further mode of feeding.     5/29-OK to start PO mechanical soft diet with nectar consistency fluid per SLP, strict aspiration precautions to be maintained as pt. is lethargic at times.  Per dietician, will also continue TF via NGT at this time.  Will assess nutritional status and determine ?need for PEG.       6/1-Pt. is more awake today, speaks with incoherent speech at this time, able to eat portion of breakfast with no difficulty swallowing.  Pt. will be assisted OOB to chair today, further assessment of PO intake and possible transition to PO in progress.    6.2 - plan for PEG tube placement today   INTERVAL HPI/OVERNIGHT EVENTS:  incoherent speech       MEDICATIONS  (STANDING):  ascorbic acid 500 milliGRAM(s) Oral two times a day  chlorhexidine 2% Cloths 1 Application(s) Topical daily  dextrose 5% + sodium chloride 0.9%. 1000 milliLiter(s) (50 mL/Hr) IV Continuous <Continuous>  dextrose 5% + sodium chloride 0.9%. 1000 milliLiter(s) (50 mL/Hr) IV Continuous <Continuous>  dextrose 50% Injectable 12.5 Gram(s) IV Push once  dextrose 50% Injectable 25 Gram(s) IV Push once  dextrose 50% Injectable 25 Gram(s) IV Push once  ergocalciferol 65609 Unit(s) Oral every week  heparin   Injectable 5000 Unit(s) SubCutaneous every 12 hours  insulin lispro (HumaLOG) corrective regimen sliding scale   SubCutaneous every 6 hours  mirtazapine 7.5 milliGRAM(s) Oral at bedtime  multivitamin/minerals 1 Tablet(s) Oral daily  nystatin Powder 1 Application(s) Topical two times a day  OLANZapine 2.5 milliGRAM(s) Oral at bedtime  pantoprazole   Suspension 40 milliGRAM(s) Enteral Tube daily  QUEtiapine 25 milliGRAM(s) Oral at bedtime  zinc sulfate 220 milliGRAM(s) Oral daily    MEDICATIONS  (PRN):  acetaminophen  Suppository .. 650 milliGRAM(s) Rectal every 6 hours PRN Temp greater or equal to 38C (100.4F)  ALBUTerol    90 MICROgram(s) HFA Inhaler 2 Puff(s) Inhalation every 6 hours PRN Shortness of Breath and/or Wheezing  dextrose 40% Gel 15 Gram(s) Oral once PRN Blood Glucose LESS THAN 70 milliGRAM(s)/deciliter  glucagon  Injectable 1 milliGRAM(s) IntraMuscular once PRN Glucose LESS THAN 70 milligrams/deciliter  guaiFENesin   Syrup  (Sugar-Free) 100 milliGRAM(s) Oral every 6 hours PRN Cough    __________________________________________________  REVIEW OF SYSTEMS:  Unable to assess due to pt's mental status    Vital Signs Last 24 Hrs  T(C): 36.2 (02 Jun 2020 06:36), Max: 36.4 (01 Jun 2020 13:51)  T(F): 97.1 (02 Jun 2020 06:36), Max: 97.5 (01 Jun 2020 13:51)  HR: 91 (02 Jun 2020 06:36) (84 - 91)  BP: 136/69 (02 Jun 2020 06:36) (117/64 - 146/98)  BP(mean): --  RR: 16 (02 Jun 2020 06:36) (16 - 19)  SpO2: 96% (02 Jun 2020 06:36) (93% - 98%)    ________________________________________________  PHYSICAL EXAM:  GENERAL: NAD  HEENT: Normocephalic;  conjunctivae and sclerae clear; moist mucous membranes;   NECK : supple  CHEST/LUNG: Clear to auscultation bilaterally with good air entry   HEART: S1 S2  regular; no murmurs, gallops or rubs  ABDOMEN: Soft, Nontender, Nondistended; Bowel sounds present  EXTREMITIES: no cyanosis; no edema; no calf tenderness  SKIN: warm and dry; no rash  NERVOUS SYSTEM:  confused  _________________________________________________  LABS:                        12.6   7.76  )-----------( 262      ( 02 Jun 2020 07:44 )             37.1     06-02    138  |  101  |  11  ----------------------------<  92  3.8   |  29  |  0.82    Ca    9.6      02 Jun 2020 07:44  Phos  3.2     06-01  Mg     1.8     06-01      PT/INR - ( 02 Jun 2020 07:44 )   PT: 12.6 sec;   INR: 1.11 ratio             CAPILLARY BLOOD GLUCOSE      POCT Blood Glucose.: 131 mg/dL (02 Jun 2020 11:20)  POCT Blood Glucose.: 89 mg/dL (02 Jun 2020 06:27)  POCT Blood Glucose.: 174 mg/dL (02 Jun 2020 00:08)  POCT Blood Glucose.: 127 mg/dL (01 Jun 2020 21:55)  POCT Blood Glucose.: 124 mg/dL (01 Jun 2020 17:21)        RADIOLOGY & ADDITIONAL TESTS:    Imaging  Reviewed:  YES  Consultant(s) Notes Reviewed:   YES    Plan of care was discussed with patient and /or primary care giver; all questions and concerns were addressed

## 2020-06-02 NOTE — PROGRESS NOTE ADULT - ASSESSMENT
64 year old male from [facility] with PMH of dementia and type 2 DM presented with seizure activity while sitting at AL. Endocrinology was consulted for management of uncontrolled type 2 DM with hyperglycemia.    1. Uncontrolled Type 2 DM with hyperglycemia, A1c 9.9%  -BG overall well controlled, mostly at goal <180  -tube feedings held for PEG tube placement today  -continue Lantus 4 units at bedtime  -standing Humalog held while patient NPO.   -continue low dose rapid acting insulin q 6 hrs while patient NPO and while patient on trickle feedings via PEG  BG 70-100mg/dL 0 units  -150 mg/dL 0 units  -200 mg/dL 1 unit  -250 mg/dL 2 units  -300 mg/dL 3 units  -350 mg/dL 4 units  -400 mg/dL 5 units  BG > 400mg/dL 6 units  -FS AC HS  -ensure consistent carbohydrate   -will monitor FS and adjust accordingly     2. Delirium  -patient with mitten restraints  -continue Seroquel, Zyprexa, Remeron    Plan discussed with primary team]  Please  call  w/ any questions or concerns [phone]

## 2020-06-02 NOTE — PROGRESS NOTE ADULT - PROBLEM SELECTOR PLAN 4
Continue Collagenase ointment with foam dressing  Turn and position Continue strict aspiration precaution  CHXray noted for opacities on 05/23/2020   Pt is for Peg placement today  Pulmonary on board

## 2020-06-02 NOTE — PROGRESS NOTE ADULT - PROBLEM SELECTOR PLAN 8
Likely related to dehydration and poor eating  Resolved  Peg feeding when peg in place  Continue monitoring renal function  Supplement electrolytes if needed No seizure activities noted at present time  No indication for antiepileptic medications as per   Continue seizure precautions

## 2020-06-02 NOTE — PROGRESS NOTE ADULT - PROBLEM SELECTOR PLAN 7
Continue monitor blood glucose   Continue current insulin treatment  Endocrinology on board as evidenced by HgA1C of 9.9% on admission  pt is NPO for PEG placement today, so d/gianna premeals and will continue lantus 4units QHS with SS as per karl Allison  monitor FS QID

## 2020-06-02 NOTE — PROGRESS NOTE ADULT - SUBJECTIVE AND OBJECTIVE BOX
64 year old male from [facility] with PMH of dementia and type 2 DM presented with seizure activity while sitting at AL. Endocrinology was consulted for management of uncontrolled type 2 DM with hyperglycemia.    Patient seen and examined at bedside. FS reviewed. BG mostly at goal <180. Patient is NPO for PEG tube placement today.     MEDICATIONS  (STANDING):  ascorbic acid 500 milliGRAM(s) Oral two times a day  chlorhexidine 2% Cloths 1 Application(s) Topical daily  dextrose 5% + sodium chloride 0.9%. 1000 milliLiter(s) (50 mL/Hr) IV Continuous <Continuous>  dextrose 5% + sodium chloride 0.9%. 1000 milliLiter(s) (50 mL/Hr) IV Continuous <Continuous>  dextrose 50% Injectable 12.5 Gram(s) IV Push once  dextrose 50% Injectable 25 Gram(s) IV Push once  dextrose 50% Injectable 25 Gram(s) IV Push once  ergocalciferol 87846 Unit(s) Oral every week  heparin   Injectable 5000 Unit(s) SubCutaneous every 12 hours  insulin lispro (HumaLOG) corrective regimen sliding scale   SubCutaneous every 6 hours  mirtazapine 7.5 milliGRAM(s) Oral at bedtime  multivitamin/minerals 1 Tablet(s) Oral daily  nystatin Powder 1 Application(s) Topical two times a day  OLANZapine 2.5 milliGRAM(s) Oral at bedtime  pantoprazole   Suspension 40 milliGRAM(s) Enteral Tube daily  QUEtiapine 25 milliGRAM(s) Oral at bedtime  zinc sulfate 220 milliGRAM(s) Oral daily    MEDICATIONS  (PRN):  acetaminophen  Suppository .. 650 milliGRAM(s) Rectal every 6 hours PRN Temp greater or equal to 38C (100.4F)  ALBUTerol    90 MICROgram(s) HFA Inhaler 2 Puff(s) Inhalation every 6 hours PRN Shortness of Breath and/or Wheezing  dextrose 40% Gel 15 Gram(s) Oral once PRN Blood Glucose LESS THAN 70 milliGRAM(s)/deciliter  glucagon  Injectable 1 milliGRAM(s) IntraMuscular once PRN Glucose LESS THAN 70 milligrams/deciliter  guaiFENesin   Syrup  (Sugar-Free) 100 milliGRAM(s) Oral every 6 hours PRN Cough      REVIEW OF SYSTEMS:  unable to obtain due to medical condition    PHYSICAL EXAM:    VITAL SIGNS  T(C): 36.2 (06-02-20 @ 06:36), Max: 36.4 (06-01-20 @ 13:51)  HR: 91 (06-02-20 @ 06:36) (84 - 91)  BP: 136/69 (06-02-20 @ 06:36) (117/64 - 146/98)  RR: 16 (06-02-20 @ 06:36) (16 - 19)  SpO2: 96% (06-02-20 @ 06:36) (93% - 98%)    GENERAL: NAD, well-developed  HEAD:  Atraumatic, Normocephalic  EYES: EOMI, PERRLA, conjunctiva and sclera clear  ENMT: No tonsillar erythema, exudates, or enlargement; No lesions  NECK: Supple, No JVD, Normal thyroid  NERVOUS SYSTEM:  nonverbal,  CHEST/LUNG: decreased breath sounds, No rales, rhonchi, wheezing, or rubs  HEART: Regular rate and rhythm; No murmurs, rubs, or gallops  ABDOMEN: Soft, Nontender, Nondistended; Bowel sounds present  EXTREMITIES:  2+ Peripheral Pulses, No clubbing, cyanosis, or edema  SKIN: No rashes or lesions    LABS:                        12.6   7.76  )-----------( 262      ( 02 Jun 2020 07:44 )             37.1     06-02    138  |  101  |  11  ----------------------------<  92  3.8   |  29  |  0.82    Ca    9.6      02 Jun 2020 07:44  Phos  3.2     06-01  Mg     1.8     06-01      PT/INR - ( 02 Jun 2020 07:44 )   PT: 12.6 sec;   INR: 1.11 ratio             CAPILLARY BLOOD GLUCOSE      POCT Blood Glucose.: 89 mg/dL (02 Jun 2020 06:27)  POCT Blood Glucose.: 174 mg/dL (02 Jun 2020 00:08)  POCT Blood Glucose.: 127 mg/dL (01 Jun 2020 21:55)  POCT Blood Glucose.: 124 mg/dL (01 Jun 2020 17:21)  POCT Blood Glucose.: 174 mg/dL (01 Jun 2020 12:01)

## 2020-06-02 NOTE — PROGRESS NOTE ADULT - PROBLEM SELECTOR PLAN 2
Covid-19 not detected on 5/31/2020 -  prior to Peg placement on 6/2/2020  Repeat 6/3/2020 for RODNEY placement Continue present meds  Continue mittens  f/up with psychiatry

## 2020-06-02 NOTE — PROGRESS NOTE ADULT - PROBLEM SELECTOR PLAN 3
Continue present meds  Continue mittens  f/up with psychiatry Likely related to dehydration and poor eating  Resolved  Peg feeding when peg in place  Continue monitoring renal function  Supplement electrolytes if needed due to dementia with dysphagia  poor po intake during this hospital course  pt can take soft / nectar thick liquids as per speech and swallow but due to dementia pts po intake is poor.  Palliative consulted - pt will get new PEG tube placement today   re-weight pt today - due to dementia with dysphagia  poor po intake during this hospital course  pt can take soft / nectar thick liquids as per speech and swallow but due to dementia pts po intake is poor.  Palliative consulted - pt will get new PEG tube placement today   will re-weight pt today

## 2020-06-02 NOTE — PROGRESS NOTE ADULT - ASSESSMENT
64 yr old male with PMHX of DM,dementia sent to ER from facility for seizure and now COVID+,Delirium,MAXIME,hypernatremia and s/p aspiration pneumonia.  1.GI eval noted,  PEG today.  2.DM-Insulin, Endo f/u.  3.Delirium-cont psych meds.  4.Palliative eval appreciated, family agree to proceed with PEG.  5.GI and DVT prophylaxis.

## 2020-06-02 NOTE — PROGRESS NOTE ADULT - PROBLEM SELECTOR PLAN 5
NPO for peg placement  Aspiration precaution  Peg placement today 06/02/2020 latest COVID 19 PCR confirmed negative on 5.31.2020 -- will go for PEG placment today  then pt needs f/u COVID 19 PCR prior to d.c RODNEY  - rpt test tomorrow

## 2020-06-02 NOTE — CHART NOTE - NSCHARTNOTEFT_GEN_A_CORE
EVENT:     64 year old male from [facility]  with PMH dementia and DM coming in with seizures like activity while sitting at AL. Patient unable to provide further history (poor historian).   6/2/20, Patient's on NPO after midnight for EGD for PEG placement.   OBJECTIVE:  Vital Signs Last 24 Hrs  T(C): 36.4 (01 Jun 2020 20:00), Max: 36.4 (01 Jun 2020 06:01)  T(F): 97.5 (01 Jun 2020 20:00), Max: 97.6 (01 Jun 2020 06:01)  HR: 84 (01 Jun 2020 20:00) (77 - 90)  BP: 117/64 (01 Jun 2020 20:00) (117/64 - 146/98)  BP(mean): --  RR: 18 (01 Jun 2020 20:00) (18 - 19)  SpO2: 98% (01 Jun 2020 20:00) (93% - 98%)    FOCUSED PHYSICAL EXAM:    LABS:                        12.8   8.49  )-----------( 286      ( 01 Jun 2020 06:49 )             38.2     06-01    139  |  104  |  13  ----------------------------<  102<H>  3.7   |  27  |  0.81    Ca    9.6      01 Jun 2020 06:49  Phos  3.2     06-01  Mg     1.8     06-01    PLAN: 1. IV Fluid - D5NS @ 50 ml/hrs (10 hrs),  2. 2% chlorhexidine wash pre op .     FOLLOW UP / RESULT: Maintain patient's safety and comfort level.

## 2020-06-02 NOTE — PROGRESS NOTE ADULT - PROBLEM SELECTOR PROBLEM 2
2019 novel coronavirus disease (COVID-19) Dementia with behavioral disturbance, unspecified dementia type

## 2020-06-02 NOTE — PROGRESS NOTE ADULT - SUBJECTIVE AND OBJECTIVE BOX
Pt is awake, alert, lying in bed in NAD. For PEG placement.     INTERVAL HPI/OVERNIGHT EVENTS:      VITAL SIGNS:  T(F): 97.1 (06-02-20 @ 06:36)  HR: 91 (06-02-20 @ 06:36)  BP: 136/69 (06-02-20 @ 06:36)  RR: 16 (06-02-20 @ 06:36)  SpO2: 96% (06-02-20 @ 06:36)  Wt(kg): --  I&O's Detail          REVIEW OF SYSTEMS:    CONSTITUTIONAL:  No fevers, chills, sweats    HEENT:  Eyes:  No diplopia or blurred vision. ENT:  No earache, sore throat or runny nose.    CARDIOVASCULAR:  No pressure, squeezing, tightness, or heaviness about the chest; no palpitations.    RESPIRATORY:  Per HPI    GASTROINTESTINAL:  No abdominal pain, nausea, vomiting or diarrhea.    GENITOURINARY:  No dysuria, frequency or urgency.    NEUROLOGIC:  No paresthesias, fasciculations, seizures or weakness.    PSYCHIATRIC:  No disorder of thought or mood.      PHYSICAL EXAM:    Constitutional: Well developed and nourished  Eyes:Perrla  ENMT: normal  Neck:supple  Respiratory: good air entry  Cardiovascular: S1 S2 regular  Gastrointestinal: Soft, Non tender  Extremities: No edema  Vascular:normal  Neurological:Awake, alert   Musculoskeletal:Normal      MEDICATIONS  (STANDING):  ascorbic acid 500 milliGRAM(s) Oral two times a day  chlorhexidine 2% Cloths 1 Application(s) Topical daily  dextrose 5% + sodium chloride 0.9%. 1000 milliLiter(s) (50 mL/Hr) IV Continuous <Continuous>  dextrose 5% + sodium chloride 0.9%. 1000 milliLiter(s) (50 mL/Hr) IV Continuous <Continuous>  dextrose 50% Injectable 12.5 Gram(s) IV Push once  dextrose 50% Injectable 25 Gram(s) IV Push once  dextrose 50% Injectable 25 Gram(s) IV Push once  ergocalciferol 23724 Unit(s) Oral every week  heparin   Injectable 5000 Unit(s) SubCutaneous every 12 hours  insulin lispro (HumaLOG) corrective regimen sliding scale   SubCutaneous every 6 hours  mirtazapine 7.5 milliGRAM(s) Oral at bedtime  multivitamin/minerals 1 Tablet(s) Oral daily  nystatin Powder 1 Application(s) Topical two times a day  OLANZapine 2.5 milliGRAM(s) Oral at bedtime  pantoprazole   Suspension 40 milliGRAM(s) Enteral Tube daily  QUEtiapine 25 milliGRAM(s) Oral at bedtime  zinc sulfate 220 milliGRAM(s) Oral daily    MEDICATIONS  (PRN):  acetaminophen  Suppository .. 650 milliGRAM(s) Rectal every 6 hours PRN Temp greater or equal to 38C (100.4F)  ALBUTerol    90 MICROgram(s) HFA Inhaler 2 Puff(s) Inhalation every 6 hours PRN Shortness of Breath and/or Wheezing  dextrose 40% Gel 15 Gram(s) Oral once PRN Blood Glucose LESS THAN 70 milliGRAM(s)/deciliter  glucagon  Injectable 1 milliGRAM(s) IntraMuscular once PRN Glucose LESS THAN 70 milligrams/deciliter  guaiFENesin   Syrup  (Sugar-Free) 100 milliGRAM(s) Oral every 6 hours PRN Cough      Allergies    No Known Allergies    Intolerances        LABS:                        12.6   7.76  )-----------( 262      ( 02 Jun 2020 07:44 )             37.1     06-02    138  |  101  |  11  ----------------------------<  92  3.8   |  29  |  0.82    Ca    9.6      02 Jun 2020 07:44  Phos  3.2     06-01  Mg     1.8     06-01      PT/INR - ( 02 Jun 2020 07:44 )   PT: 12.6 sec;   INR: 1.11 ratio                   CAPILLARY BLOOD GLUCOSE      POCT Blood Glucose.: 131 mg/dL (02 Jun 2020 11:20)  POCT Blood Glucose.: 89 mg/dL (02 Jun 2020 06:27)  POCT Blood Glucose.: 174 mg/dL (02 Jun 2020 00:08)  POCT Blood Glucose.: 127 mg/dL (01 Jun 2020 21:55)  POCT Blood Glucose.: 124 mg/dL (01 Jun 2020 17:21)        RADIOLOGY & ADDITIONAL TESTS:    CXR:    Ct scan chest:    ekg;    echo:

## 2020-06-03 DIAGNOSIS — Z93.1 GASTROSTOMY STATUS: ICD-10-CM

## 2020-06-03 LAB
ANION GAP SERPL CALC-SCNC: 8 MMOL/L — SIGNIFICANT CHANGE UP (ref 5–17)
BUN SERPL-MCNC: 15 MG/DL — SIGNIFICANT CHANGE UP (ref 7–18)
CALCIUM SERPL-MCNC: 9.6 MG/DL — SIGNIFICANT CHANGE UP (ref 8.4–10.5)
CHLORIDE SERPL-SCNC: 103 MMOL/L — SIGNIFICANT CHANGE UP (ref 96–108)
CO2 SERPL-SCNC: 27 MMOL/L — SIGNIFICANT CHANGE UP (ref 22–31)
CREAT SERPL-MCNC: 0.94 MG/DL — SIGNIFICANT CHANGE UP (ref 0.5–1.3)
GLUCOSE BLDC GLUCOMTR-MCNC: 164 MG/DL — HIGH (ref 70–99)
GLUCOSE BLDC GLUCOMTR-MCNC: 171 MG/DL — HIGH (ref 70–99)
GLUCOSE BLDC GLUCOMTR-MCNC: 178 MG/DL — HIGH (ref 70–99)
GLUCOSE BLDC GLUCOMTR-MCNC: 196 MG/DL — HIGH (ref 70–99)
GLUCOSE BLDC GLUCOMTR-MCNC: 235 MG/DL — HIGH (ref 70–99)
GLUCOSE SERPL-MCNC: 194 MG/DL — HIGH (ref 70–99)
HCT VFR BLD CALC: 37.5 % — LOW (ref 39–50)
HGB BLD-MCNC: 12.6 G/DL — LOW (ref 13–17)
MCHC RBC-ENTMCNC: 28.8 PG — SIGNIFICANT CHANGE UP (ref 27–34)
MCHC RBC-ENTMCNC: 33.6 GM/DL — SIGNIFICANT CHANGE UP (ref 32–36)
MCV RBC AUTO: 85.6 FL — SIGNIFICANT CHANGE UP (ref 80–100)
NRBC # BLD: 0 /100 WBCS — SIGNIFICANT CHANGE UP (ref 0–0)
PLATELET # BLD AUTO: 241 K/UL — SIGNIFICANT CHANGE UP (ref 150–400)
POTASSIUM SERPL-MCNC: 4.2 MMOL/L — SIGNIFICANT CHANGE UP (ref 3.5–5.3)
POTASSIUM SERPL-SCNC: 4.2 MMOL/L — SIGNIFICANT CHANGE UP (ref 3.5–5.3)
RBC # BLD: 4.38 M/UL — SIGNIFICANT CHANGE UP (ref 4.2–5.8)
RBC # FLD: 13.9 % — SIGNIFICANT CHANGE UP (ref 10.3–14.5)
SARS-COV-2 RNA SPEC QL NAA+PROBE: DETECTED
SODIUM SERPL-SCNC: 138 MMOL/L — SIGNIFICANT CHANGE UP (ref 135–145)
WBC # BLD: 13.01 K/UL — HIGH (ref 3.8–10.5)
WBC # FLD AUTO: 13.01 K/UL — HIGH (ref 3.8–10.5)

## 2020-06-03 PROCEDURE — 99231 SBSQ HOSP IP/OBS SF/LOW 25: CPT

## 2020-06-03 RX ORDER — ZINC SULFATE TAB 220 MG (50 MG ZINC EQUIVALENT) 220 (50 ZN) MG
220 TAB ORAL DAILY
Refills: 0 | Status: DISCONTINUED | OUTPATIENT
Start: 2020-06-03 | End: 2020-06-19

## 2020-06-03 RX ORDER — ACETAMINOPHEN 500 MG
650 TABLET ORAL EVERY 6 HOURS
Refills: 0 | Status: DISCONTINUED | OUTPATIENT
Start: 2020-06-03 | End: 2020-06-03

## 2020-06-03 RX ORDER — QUETIAPINE FUMARATE 200 MG/1
25 TABLET, FILM COATED ORAL AT BEDTIME
Refills: 0 | Status: DISCONTINUED | OUTPATIENT
Start: 2020-06-03 | End: 2020-06-19

## 2020-06-03 RX ORDER — MULTIVIT-MIN/FERROUS GLUCONATE 9 MG/15 ML
1 LIQUID (ML) ORAL DAILY
Refills: 0 | Status: DISCONTINUED | OUTPATIENT
Start: 2020-06-03 | End: 2020-06-19

## 2020-06-03 RX ORDER — COLLAGENASE CLOSTRIDIUM HIST. 250 UNIT/G
1 OINTMENT (GRAM) TOPICAL DAILY
Refills: 0 | Status: DISCONTINUED | OUTPATIENT
Start: 2020-06-03 | End: 2020-06-19

## 2020-06-03 RX ORDER — ALBUTEROL 90 UG/1
2 AEROSOL, METERED ORAL EVERY 6 HOURS
Refills: 0 | Status: DISCONTINUED | OUTPATIENT
Start: 2020-06-03 | End: 2020-06-19

## 2020-06-03 RX ORDER — ASCORBIC ACID 60 MG
500 TABLET,CHEWABLE ORAL
Refills: 0 | Status: DISCONTINUED | OUTPATIENT
Start: 2020-06-03 | End: 2020-06-19

## 2020-06-03 RX ORDER — ACETAMINOPHEN 500 MG
650 TABLET ORAL EVERY 6 HOURS
Refills: 0 | Status: DISCONTINUED | OUTPATIENT
Start: 2020-06-03 | End: 2020-06-19

## 2020-06-03 RX ORDER — ERGOCALCIFEROL 1.25 MG/1
50000 CAPSULE ORAL
Refills: 0 | Status: DISCONTINUED | OUTPATIENT
Start: 2020-06-03 | End: 2020-06-13

## 2020-06-03 RX ORDER — MIRTAZAPINE 45 MG/1
7.5 TABLET, ORALLY DISINTEGRATING ORAL AT BEDTIME
Refills: 0 | Status: DISCONTINUED | OUTPATIENT
Start: 2020-06-03 | End: 2020-06-19

## 2020-06-03 RX ORDER — PANTOPRAZOLE SODIUM 20 MG/1
40 TABLET, DELAYED RELEASE ORAL DAILY
Refills: 0 | Status: DISCONTINUED | OUTPATIENT
Start: 2020-06-03 | End: 2020-06-19

## 2020-06-03 RX ORDER — PANTOPRAZOLE SODIUM 20 MG/1
40 TABLET, DELAYED RELEASE ORAL ONCE
Refills: 0 | Status: DISCONTINUED | OUTPATIENT
Start: 2020-06-03 | End: 2020-06-03

## 2020-06-03 RX ORDER — OLANZAPINE 15 MG/1
2.5 TABLET, FILM COATED ORAL AT BEDTIME
Refills: 0 | Status: DISCONTINUED | OUTPATIENT
Start: 2020-06-03 | End: 2020-06-19

## 2020-06-03 RX ADMIN — Medication 2: at 12:17

## 2020-06-03 RX ADMIN — Medication 1: at 06:18

## 2020-06-03 RX ADMIN — QUETIAPINE FUMARATE 25 MILLIGRAM(S): 200 TABLET, FILM COATED ORAL at 21:53

## 2020-06-03 RX ADMIN — Medication 1 APPLICATION(S): at 12:18

## 2020-06-03 RX ADMIN — HEPARIN SODIUM 5000 UNIT(S): 5000 INJECTION INTRAVENOUS; SUBCUTANEOUS at 06:18

## 2020-06-03 RX ADMIN — INSULIN GLARGINE 4 UNIT(S): 100 INJECTION, SOLUTION SUBCUTANEOUS at 21:53

## 2020-06-03 RX ADMIN — Medication 1: at 00:47

## 2020-06-03 RX ADMIN — Medication 1 TABLET(S): at 12:18

## 2020-06-03 RX ADMIN — NYSTATIN CREAM 1 APPLICATION(S): 100000 CREAM TOPICAL at 06:19

## 2020-06-03 RX ADMIN — OLANZAPINE 2.5 MILLIGRAM(S): 15 TABLET, FILM COATED ORAL at 21:53

## 2020-06-03 RX ADMIN — Medication 1: at 17:48

## 2020-06-03 RX ADMIN — SODIUM CHLORIDE 50 MILLILITER(S): 9 INJECTION, SOLUTION INTRAVENOUS at 06:19

## 2020-06-03 RX ADMIN — Medication 500 MILLIGRAM(S): at 17:48

## 2020-06-03 RX ADMIN — HEPARIN SODIUM 5000 UNIT(S): 5000 INJECTION INTRAVENOUS; SUBCUTANEOUS at 17:47

## 2020-06-03 RX ADMIN — INSULIN GLARGINE 4 UNIT(S): 100 INJECTION, SOLUTION SUBCUTANEOUS at 00:45

## 2020-06-03 RX ADMIN — ZINC SULFATE TAB 220 MG (50 MG ZINC EQUIVALENT) 220 MILLIGRAM(S): 220 (50 ZN) TAB at 13:28

## 2020-06-03 RX ADMIN — PANTOPRAZOLE SODIUM 40 MILLIGRAM(S): 20 TABLET, DELAYED RELEASE ORAL at 14:03

## 2020-06-03 RX ADMIN — MIRTAZAPINE 7.5 MILLIGRAM(S): 45 TABLET, ORALLY DISINTEGRATING ORAL at 21:53

## 2020-06-03 RX ADMIN — NYSTATIN CREAM 1 APPLICATION(S): 100000 CREAM TOPICAL at 17:47

## 2020-06-03 NOTE — PROGRESS NOTE ADULT - SUBJECTIVE AND OBJECTIVE BOX
Pt is awake, alert, lying in bed in NAD. S/P PEG placement.     INTERVAL HPI/OVERNIGHT EVENTS:    VITAL SIGNS:  T(F): 98.1 (06-03-20 @ 05:20)  HR: 102 (06-03-20 @ 05:20)  BP: 121/74 (06-03-20 @ 05:20)  RR: 18 (06-03-20 @ 05:20)  SpO2: 100% (06-03-20 @ 05:20)  Wt(kg): --  I&O's Detail    02 Jun 2020 07:01  -  03 Jun 2020 07:00  --------------------------------------------------------  IN:    Lactated Ringers IV Bolus: 300 mL    lactated ringers.: 75 mL  Total IN: 375 mL    OUT:  Total OUT: 0 mL    Total NET: 375 mL    REVIEW OF SYSTEMS:    CONSTITUTIONAL:  No fevers, chills, sweats    HEENT:  Eyes:  No diplopia or blurred vision. ENT:  No earache, sore throat or runny nose.    CARDIOVASCULAR:  No pressure, squeezing, tightness, or heaviness about the chest; no palpitations.    RESPIRATORY:  Per HPI    GASTROINTESTINAL:  No abdominal pain, nausea, vomiting or diarrhea.    GENITOURINARY:  No dysuria, frequency or urgency.    NEUROLOGIC:  No paresthesias, fasciculations, seizures or weakness.    PSYCHIATRIC:  No disorder of thought or mood.      PHYSICAL EXAM:    Constitutional: Well developed and nourished  Eyes:Perrla  ENMT: normal  Neck: supple  Respiratory: good air entry  Cardiovascular: S1 S2 regular  Gastrointestinal: Soft, Non tender; PEG tube.   Extremities: No edema  Vascular:normal  Neurological:Awake, alert   Musculoskeletal:Normal      MEDICATIONS  (STANDING):  ascorbic acid 500 milliGRAM(s) Oral two times a day  collagenase Ointment 1 Application(s) Topical daily  dextrose 5% + sodium chloride 0.9%. 1000 milliLiter(s) (50 mL/Hr) IV Continuous <Continuous>  dextrose 5% + sodium chloride 0.9%. 1000 milliLiter(s) (50 mL/Hr) IV Continuous <Continuous>  dextrose 5% + sodium chloride 0.9%. 1000 milliLiter(s) (50 mL/Hr) IV Continuous <Continuous>  dextrose 50% Injectable 12.5 Gram(s) IV Push once  dextrose 50% Injectable 25 Gram(s) IV Push once  dextrose 50% Injectable 25 Gram(s) IV Push once  ergocalciferol 73442 Unit(s) Oral <User Schedule>  heparin   Injectable 5000 Unit(s) SubCutaneous every 12 hours  insulin glargine Injectable (LANTUS) 4 Unit(s) SubCutaneous at bedtime  insulin lispro (HumaLOG) corrective regimen sliding scale   SubCutaneous every 6 hours  mirtazapine 7.5 milliGRAM(s) Oral at bedtime  multivitamin/minerals 1 Tablet(s) Oral daily  nystatin Powder 1 Application(s) Topical two times a day  OLANZapine 2.5 milliGRAM(s) Oral at bedtime  pantoprazole   Suspension 40 milliGRAM(s) Enteral Tube daily  pantoprazole  Injectable 40 milliGRAM(s) IV Push once  QUEtiapine 25 milliGRAM(s) Oral at bedtime  zinc sulfate 220 milliGRAM(s) Oral daily    MEDICATIONS  (PRN):  acetaminophen    Suspension .. 650 milliGRAM(s) Enteral Tube every 6 hours PRN Temp greater or equal to 38C (100.4F)  ALBUTerol    90 MICROgram(s) HFA Inhaler 2 Puff(s) Inhalation every 6 hours PRN Shortness of Breath and/or Wheezing  dextrose 40% Gel 15 Gram(s) Oral once PRN Blood Glucose LESS THAN 70 milliGRAM(s)/deciliter  guaiFENesin   Syrup  (Sugar-Free) 100 milliGRAM(s) Oral every 6 hours PRN Cough      Allergies    No Known Allergies    Intolerances        LABS:                        12.6   13.01 )-----------( 241      ( 03 Jun 2020 07:47 )             37.5     06-03    138  |  103  |  15  ----------------------------<  194<H>  4.2   |  27  |  0.94    Ca    9.6      03 Jun 2020 07:47      PT/INR - ( 02 Jun 2020 07:44 )   PT: 12.6 sec;   INR: 1.11 ratio                   CAPILLARY BLOOD GLUCOSE      POCT Blood Glucose.: 196 mg/dL (03 Jun 2020 05:47)  POCT Blood Glucose.: 171 mg/dL (03 Jun 2020 00:37)  POCT Blood Glucose.: 155 mg/dL (02 Jun 2020 17:19)  POCT Blood Glucose.: 131 mg/dL (02 Jun 2020 11:20)        RADIOLOGY & ADDITIONAL TESTS:    CXR:    Ct scan chest:    ekg;    echo:

## 2020-06-03 NOTE — PROGRESS NOTE ADULT - PROBLEM SELECTOR PLAN 6
Pressure injury of coccygeal region, unstageable. Plan: Advanced practice nurse note appreciated  There is an Unstageable Pressure Injury to the Coccyx (3cm x 1cm x 0.2cm) with slough and scant drainage	  Clean all wounds with normal saline and apply skin prep to the surrounding skin  Apply Collagenase ointment to the slough areas of the Coccyx wound, apply saline moistened gauze to the wound bed, and cover with a Foam dressing Daily PRN.

## 2020-06-03 NOTE — PROGRESS NOTE ADULT - PROBLEM SELECTOR PLAN 10
s/p PEG placement on 6.2 -- will start feeding today and if pt tolerates feeding formula well then will d/c pt to [facility]   Pt. will also need second COVID negative prior to discharge -- will send 2nd COVID 19 PCR today

## 2020-06-03 NOTE — PROGRESS NOTE ADULT - SUBJECTIVE AND OBJECTIVE BOX
CARDIOLOGY/MEDICAL ATTENDING    CHIEF COMPLAINT:Patient is a 64y old  Male who presents with a chief complaint of seizures .Pt s/p PEG.    	  REVIEW OF SYSTEMS:  [x ] Unable to obtain    PHYSICAL EXAM:  T(C): 36.7 (06-03-20 @ 05:20), Max: 36.8 (06-02-20 @ 17:15)  HR: 102 (06-03-20 @ 05:20) (64 - 102)  BP: 121/74 (06-03-20 @ 05:20) (110/76 - 125/69)  RR: 18 (06-03-20 @ 05:20) (14 - 20)  SpO2: 100% (06-03-20 @ 05:20) (97% - 100%)  Wt(kg): --  I&O's Summary    02 Jun 2020 07:01  -  03 Jun 2020 07:00  --------------------------------------------------------  IN: 375 mL / OUT: 0 mL / NET: 375 mL        Appearance: Normal	  HEENT:   Normal oral mucosa, PERRL, EOMI	  Lymphatic: No lymphadenopathy  Cardiovascular: Normal S1 S2, No JVD, No murmurs, No edema  Respiratory: Lungs clear to auscultation	  Gastrointestinal:  Soft, Non-tender, + BS	  Skin: No rashes, No ecchymoses, No cyanosis	  Extremities: Normal range of motion, No clubbing, cyanosis or edema  Vascular: Peripheral pulses palpable 2+ bilaterally    MEDICATIONS  (STANDING):  ascorbic acid 500 milliGRAM(s) Oral two times a day  collagenase Ointment 1 Application(s) Topical daily  dextrose 5% + sodium chloride 0.9%. 1000 milliLiter(s) (50 mL/Hr) IV Continuous <Continuous>  dextrose 5% + sodium chloride 0.9%. 1000 milliLiter(s) (50 mL/Hr) IV Continuous <Continuous>  dextrose 5% + sodium chloride 0.9%. 1000 milliLiter(s) (50 mL/Hr) IV Continuous <Continuous>  dextrose 50% Injectable 12.5 Gram(s) IV Push once  dextrose 50% Injectable 25 Gram(s) IV Push once  dextrose 50% Injectable 25 Gram(s) IV Push once  ergocalciferol 94386 Unit(s) Oral <User Schedule>  heparin   Injectable 5000 Unit(s) SubCutaneous every 12 hours  insulin glargine Injectable (LANTUS) 4 Unit(s) SubCutaneous at bedtime  insulin lispro (HumaLOG) corrective regimen sliding scale   SubCutaneous every 6 hours  mirtazapine 7.5 milliGRAM(s) Oral at bedtime  multivitamin/minerals 1 Tablet(s) Oral daily  nystatin Powder 1 Application(s) Topical two times a day  OLANZapine 2.5 milliGRAM(s) Oral at bedtime  pantoprazole   Suspension 40 milliGRAM(s) Enteral Tube daily  QUEtiapine 25 milliGRAM(s) Oral at bedtime  zinc sulfate 220 milliGRAM(s) Oral daily        LABS:	 	                       12.6   13.01 )-----------( 241      ( 03 Jun 2020 07:47 )             37.5     06-03    138  |  103  |  15  ----------------------------<  194<H>  4.2   |  27  |  0.94    Ca    9.6      03 Jun 2020 07:47

## 2020-06-03 NOTE — PROGRESS NOTE ADULT - PROBLEM SELECTOR PLAN 1
s/p new PEG tube placement on 6/2  can use PEG tube for medication and feeding  now as per DR Andujra   dietitian consulted for new feeding   c/w dressing to site and restraint   aspiration precaution

## 2020-06-03 NOTE — PROGRESS NOTE ADULT - PROBLEM SELECTOR PLAN 3
incoherent speech  due to agitation, restraint continued   psych cleared him for discharge  continue current meds

## 2020-06-03 NOTE — CHART NOTE - NSCHARTNOTEFT_GEN_A_CORE
Assessment:   64yMalePatient is a 64y old  Male who presents with a chief complaint of seizures (03 Jun 2020 12:10)  Pt On airborne isolation. Pt w Covid + Nutrition Consult verbally  ordered by NP for TF  Recommendation. Pt  S/P PEG 6/02. labs Noted . Pt awaiting to start Tf.      Factors impacting intake: [ ] none [ ] nausea  [ ] vomiting [ ] diarrhea [ ] constipation  [ ]chewing problems [ x] swallowing issues  [ ] other:     Diet Presciption: Diet, NPO with Tube Feed:   Tube Feeding Modality: Gastrostomy  Glucerna 1.2 Mihir  Total Volume for 24 Hours (mL): 960  Continuous  Starting Tube Feed Rate {mL per Hour}: 10  Increase Tube Feed Rate by (mL): 10     Every 6 hours  Until Goal Tube Feed Rate (mL per Hour): 60  Tube Feed Duration (in Hours): 16  Tube Feed Start Time: 15:00 (06-03-20 @ 14:42)    Intake: NPO    Current Weight:   % Weight Change    Pertinent Medications: MEDICATIONS  (STANDING):  ascorbic acid 500 milliGRAM(s) Oral two times a day  collagenase Ointment 1 Application(s) Topical daily  dextrose 5% + sodium chloride 0.9%. 1000 milliLiter(s) (50 mL/Hr) IV Continuous <Continuous>  dextrose 5% + sodium chloride 0.9%. 1000 milliLiter(s) (50 mL/Hr) IV Continuous <Continuous>  dextrose 5% + sodium chloride 0.9%. 1000 milliLiter(s) (50 mL/Hr) IV Continuous <Continuous>  dextrose 50% Injectable 12.5 Gram(s) IV Push once  dextrose 50% Injectable 25 Gram(s) IV Push once  dextrose 50% Injectable 25 Gram(s) IV Push once  ergocalciferol 93575 Unit(s) Oral <User Schedule>  heparin   Injectable 5000 Unit(s) SubCutaneous every 12 hours  insulin glargine Injectable (LANTUS) 4 Unit(s) SubCutaneous at bedtime  insulin lispro (HumaLOG) corrective regimen sliding scale   SubCutaneous every 6 hours  mirtazapine 7.5 milliGRAM(s) Oral at bedtime  multivitamin/minerals 1 Tablet(s) Oral daily  nystatin Powder 1 Application(s) Topical two times a day  OLANZapine 2.5 milliGRAM(s) Oral at bedtime  pantoprazole   Suspension 40 milliGRAM(s) Enteral Tube daily  QUEtiapine 25 milliGRAM(s) Oral at bedtime  zinc sulfate 220 milliGRAM(s) Oral daily    MEDICATIONS  (PRN):  acetaminophen    Suspension .. 650 milliGRAM(s) Enteral Tube every 6 hours PRN Temp greater or equal to 38C (100.4F)  ALBUTerol    90 MICROgram(s) HFA Inhaler 2 Puff(s) Inhalation every 6 hours PRN Shortness of Breath and/or Wheezing  dextrose 40% Gel 15 Gram(s) Oral once PRN Blood Glucose LESS THAN 70 milliGRAM(s)/deciliter  guaiFENesin   Syrup  (Sugar-Free) 100 milliGRAM(s) Oral every 6 hours PRN Cough    Pertinent Labs: 06-03 Na138 mmol/L Glu 194 mg/dL<H> K+ 4.2 mmol/L Cr  0.94 mg/dL BUN 15 mg/dL 06-01 Phos 3.2 mg/dL     CAPILLARY BLOOD GLUCOSE      POCT Blood Glucose.: 235 mg/dL (03 Jun 2020 11:55)  POCT Blood Glucose.: 196 mg/dL (03 Jun 2020 05:47)  POCT Blood Glucose.: 171 mg/dL (03 Jun 2020 00:37)  POCT Blood Glucose.: 155 mg/dL (02 Jun 2020 17:19)    Skin: L heel DTI, R Buttocks Unstageable    Estimated Needs:   [ ] no change since previous assessment  [ ] recalculated:     Previous Nutrition Diagnosis:   [ ] Inadequate Energy Intake [ ]Inadequate Oral Intake [ ] Excessive Energy Intake   [ ] Underweight [ ] Increased Nutrient Needs [ ] Overweight/Obesity   [ ] Altered GI Function [ ] Unintended Weight Loss [ ] Food & Nutrition Related Knowledge Deficit [ x] Malnutrition Severe     Nutrition Diagnosis is [x ] ongoing  [ ] resolved [ ] not applicable     New Nutrition Diagnosis: [ ] not applicable       Interventions:   Recommend  [ ] Change Diet To:  [ ] Nutrition Supplement  [x ] Nutrition Support Glucerna 1.2 start @ 30 Ml/hr increase by 30 ml q 6 hrs to initial Goal rate @ 90 ml/hr x 16 hr as tolerated to Provide 1440 ml,1728 calories, protein 86 gm's, free water 1159 ml/day  [x ] Other: Diet verification Order Placed.   (x) D/W NP, RN.    Monitoring and Evaluation:   [ ] PO intake [ x ] Tolerance to diet prescription [ x ] weights [ x ] labs[ x ] follow up per protocol  [ ] other:

## 2020-06-03 NOTE — PROGRESS NOTE ADULT - PROBLEM SELECTOR PLAN 4
as evidenced by HgA1C of 9.9% on admission  pt was NPO  yesterday for PEG placement,  so d/gianna premeals and will continue lantus 4units QHS with SS as per karl Allison  monitor FS QID.   will adjust insulin accordingly

## 2020-06-03 NOTE — PROGRESS NOTE ADULT - SUBJECTIVE AND OBJECTIVE BOX
[   ] ICU                                          [   ] CCU                                      [  X ] Medical Floor      Patient is comfortable. No new complaints reported, No abdominal pain, N/V, hematemesis, hematochezia, melena, fever, chills, chest pain, SOB, cough or diarrhea reported.      VITALS  Vital Signs Last 24 Hrs  T(C): 36.7 (03 Jun 2020 05:20), Max: 36.8 (02 Jun 2020 17:15)  T(F): 98.1 (03 Jun 2020 05:20), Max: 98.2 (02 Jun 2020 17:15)  HR: 102 (03 Jun 2020 05:20) (64 - 102)  BP: 121/74 (03 Jun 2020 05:20) (110/76 - 125/69)  BP(mean): 83 (02 Jun 2020 17:45) (80 - 89)  RR: 18 (03 Jun 2020 05:20) (14 - 20)  SpO2: 100% (03 Jun 2020 05:20) (97% - 100%)         MEDICATIONS  (STANDING):  ascorbic acid 500 milliGRAM(s) Oral two times a day  dextrose 5% + sodium chloride 0.9%. 1000 milliLiter(s) (50 mL/Hr) IV Continuous <Continuous>  dextrose 5% + sodium chloride 0.9%. 1000 milliLiter(s) (50 mL/Hr) IV Continuous <Continuous>  dextrose 5% + sodium chloride 0.9%. 1000 milliLiter(s) (50 mL/Hr) IV Continuous <Continuous>  dextrose 50% Injectable 12.5 Gram(s) IV Push once  dextrose 50% Injectable 25 Gram(s) IV Push once  dextrose 50% Injectable 25 Gram(s) IV Push once  ergocalciferol 91308 Unit(s) Oral every week  heparin   Injectable 5000 Unit(s) SubCutaneous every 12 hours  insulin glargine Injectable (LANTUS) 4 Unit(s) SubCutaneous at bedtime  insulin lispro (HumaLOG) corrective regimen sliding scale   SubCutaneous every 6 hours  mirtazapine 7.5 milliGRAM(s) Oral at bedtime  multivitamin/minerals 1 Tablet(s) Oral daily  nystatin Powder 1 Application(s) Topical two times a day  OLANZapine 2.5 milliGRAM(s) Oral at bedtime  pantoprazole   Suspension 40 milliGRAM(s) Enteral Tube daily  pantoprazole  Injectable 40 milliGRAM(s) IV Push once  QUEtiapine 25 milliGRAM(s) Oral at bedtime  zinc sulfate 220 milliGRAM(s) Oral daily    MEDICATIONS  (PRN):  acetaminophen  Suppository .. 650 milliGRAM(s) Rectal every 6 hours PRN Temp greater or equal to 38C (100.4F)  ALBUTerol    90 MICROgram(s) HFA Inhaler 2 Puff(s) Inhalation every 6 hours PRN Shortness of Breath and/or Wheezing  dextrose 40% Gel 15 Gram(s) Oral once PRN Blood Glucose LESS THAN 70 milliGRAM(s)/deciliter  guaiFENesin   Syrup  (Sugar-Free) 100 milliGRAM(s) Oral every 6 hours PRN Cough                            12.6   13.01 )-----------( 241      ( 03 Jun 2020 07:47 )             37.5       06-03    138  |  103  |  15  ----------------------------<  194<H>  4.2   |  27  |  0.94    Ca    9.6      03 Jun 2020 07:47        PT/INR - ( 02 Jun 2020 07:44 )   PT: 12.6 sec;   INR: 1.11 ratio

## 2020-06-03 NOTE — PROGRESS NOTE ADULT - PROBLEM SELECTOR PLAN 5
latest COVID 19 PCR confirmed negative on 5.31.2020 -- will go for PEG placment today  then pt needs f/u COVID 19 PCR prior to d.c [facility]  - rpt test  today 6.3

## 2020-06-03 NOTE — PROGRESS NOTE ADULT - ASSESSMENT
64 yr old male with PMHX of DM,dementia sent to ER from facility for seizure and now COVID+,Delirium,MAXIME,hypernatremia and s/p aspiration pneumonia.  1.GI f/u,s/p PEG ,start TF.  2.DM-Insulin, Endo f/u.  3.Delirium-cont psych meds.  4.D/W daughter, need to pick facility.  5.GI and DVT prophylaxis.

## 2020-06-03 NOTE — PROGRESS NOTE ADULT - PROBLEM SELECTOR PLAN 7
There is an intact DTI to the L. Heel (1cm x 1cm) without drainage  Leave the L. Heel wound open to air  Elevate/float the patients heels using heel protectors and reposition the patient Q 2hrs using wedges or pillows.

## 2020-06-03 NOTE — PROGRESS NOTE ADULT - ASSESSMENT
64 year old male from [facility] with PMH of dementia and type 2 DM presented with seizure activity while sitting at AL. Endocrinology was consulted for management of uncontrolled type 2 DM with hyperglycemia.    1. Uncontrolled Type 2 DM with hyperglycemia, A1c 9.9%  -BG overall well controlled, mostly at goal <180  -tube feedings have not yet been resumed   -continue Lantus 4 units at bedtime  -standing Humalog held while feeding held  -continue low dose rapid acting insulin q 6 hrs while patient NPO and while patient on trickle feedings via PEG  BG 70-100mg/dL 0 units  -150 mg/dL 0 units  -200 mg/dL 1 unit  -250 mg/dL 2 units  -300 mg/dL 3 units  -350 mg/dL 4 units  -400 mg/dL 5 units  BG > 400mg/dL 6 units  -FS AC HS  -ensure consistent carbohydrate   -will monitor FS and adjust accordingly     2. Delirium  -patient with mitten restraints  -continue Seroquel, Zyprexa, Remeron    Plan discussed with primary team]  Please  call  w/ any questions or concerns [phone]

## 2020-06-03 NOTE — PROGRESS NOTE ADULT - ASSESSMENT
1. Dysphagia  2. Poor po intake  3. Weight loss  4. Failure to thrive  5. S/p EGD with Peg tube placement  6. Gastritis  7. Esophagitis    Suggestions:    1. Protonix 40mg daily  2. Peg tube can be used for feeding and medication  3. Aspiration precaution  4. Monitor electrolytes  5. Avoid NSAID  6. DVT prophylaxis

## 2020-06-03 NOTE — PROGRESS NOTE ADULT - PROBLEM SELECTOR PLAN 2
due to dysphagia and dementia   lost 1,2 kg x 1 month   s/p PEG tube placement on 6,2  c/w supplement via PEG

## 2020-06-03 NOTE — PROGRESS NOTE ADULT - SUBJECTIVE AND OBJECTIVE BOX
64 year old male from [facility] with PMH of dementia and type 2 DM presented with seizure activity while sitting at AL. Endocrinology was consulted for management of uncontrolled type 2 DM with hyperglycemia.    Patient seen and examined at bedside. Patient is s/p PEG tube placement on 6/2/2020. Patient is not yet back on tube feedings. FS reviewed. BG overall close to goal <180.    MEDICATIONS  (STANDING):  ascorbic acid 500 milliGRAM(s) Oral two times a day  collagenase Ointment 1 Application(s) Topical daily  dextrose 5% + sodium chloride 0.9%. 1000 milliLiter(s) (50 mL/Hr) IV Continuous <Continuous>  dextrose 5% + sodium chloride 0.9%. 1000 milliLiter(s) (50 mL/Hr) IV Continuous <Continuous>  dextrose 5% + sodium chloride 0.9%. 1000 milliLiter(s) (50 mL/Hr) IV Continuous <Continuous>  dextrose 50% Injectable 12.5 Gram(s) IV Push once  dextrose 50% Injectable 25 Gram(s) IV Push once  dextrose 50% Injectable 25 Gram(s) IV Push once  ergocalciferol 04663 Unit(s) Oral <User Schedule>  heparin   Injectable 5000 Unit(s) SubCutaneous every 12 hours  insulin glargine Injectable (LANTUS) 4 Unit(s) SubCutaneous at bedtime  insulin lispro (HumaLOG) corrective regimen sliding scale   SubCutaneous every 6 hours  mirtazapine 7.5 milliGRAM(s) Oral at bedtime  multivitamin/minerals 1 Tablet(s) Oral daily  nystatin Powder 1 Application(s) Topical two times a day  OLANZapine 2.5 milliGRAM(s) Oral at bedtime  pantoprazole   Suspension 40 milliGRAM(s) Enteral Tube daily  QUEtiapine 25 milliGRAM(s) Oral at bedtime  zinc sulfate 220 milliGRAM(s) Oral daily    MEDICATIONS  (PRN):  acetaminophen    Suspension .. 650 milliGRAM(s) Enteral Tube every 6 hours PRN Temp greater or equal to 38C (100.4F)  ALBUTerol    90 MICROgram(s) HFA Inhaler 2 Puff(s) Inhalation every 6 hours PRN Shortness of Breath and/or Wheezing  dextrose 40% Gel 15 Gram(s) Oral once PRN Blood Glucose LESS THAN 70 milliGRAM(s)/deciliter  guaiFENesin   Syrup  (Sugar-Free) 100 milliGRAM(s) Oral every 6 hours PRN Cough      REVIEW OF SYSTEMS:  unable to obtain due to medical condition    PHYSICAL EXAM:    VITAL SIGNS  T(C): 36.7 (06-03-20 @ 05:20), Max: 36.8 (06-02-20 @ 17:15)  HR: 102 (06-03-20 @ 11:43) (64 - 102)  BP: 121/74 (06-03-20 @ 05:20) (110/76 - 125/69)  RR: 18 (06-03-20 @ 05:20) (14 - 20)  SpO2: 97% (06-03-20 @ 11:43) (97% - 100%)      GENERAL: NAD, well-developed  HEAD:  Atraumatic, Normocephalic  EYES: EOMI, PERRLA, conjunctiva and sclera clear  ENMT: No tonsillar erythema, exudates, or enlargement; No lesions  NECK: Supple, No JVD, Normal thyroid  NERVOUS SYSTEM:  nonverbal,  CHEST/LUNG: CTA bilaterally  No rales, rhonchi, wheezing, or rubs  HEART: Regular rate and rhythm; No murmurs, rubs, or gallops  ABDOMEN: s/p PEG, Soft, Nontender, Nondistended; Bowel sounds present  EXTREMITIES:  No edema  SKIN: No rashes or lesions      LABS:                        12.6   13.01 )-----------( 241      ( 03 Jun 2020 07:47 )             37.5     06-03    138  |  103  |  15  ----------------------------<  194<H>  4.2   |  27  |  0.94    Ca    9.6      03 Jun 2020 07:47      PT/INR - ( 02 Jun 2020 07:44 )   PT: 12.6 sec;   INR: 1.11 ratio             CAPILLARY BLOOD GLUCOSE      POCT Blood Glucose.: 235 mg/dL (03 Jun 2020 11:55)  POCT Blood Glucose.: 196 mg/dL (03 Jun 2020 05:47)  POCT Blood Glucose.: 171 mg/dL (03 Jun 2020 00:37)  POCT Blood Glucose.: 155 mg/dL (02 Jun 2020 17:19)

## 2020-06-03 NOTE — PROGRESS NOTE ADULT - PROBLEM SELECTOR PLAN 1
Urine culture noted  Antibiotics OFF  Oxygen Supp  Monitor Oxygen sat  Taper Oxygen Supp as feasible  Follow up CXR noted.   May need PEG.   ID follow up

## 2020-06-04 LAB
ANION GAP SERPL CALC-SCNC: 8 MMOL/L — SIGNIFICANT CHANGE UP (ref 5–17)
BUN SERPL-MCNC: 15 MG/DL — SIGNIFICANT CHANGE UP (ref 7–18)
CALCIUM SERPL-MCNC: 9.6 MG/DL — SIGNIFICANT CHANGE UP (ref 8.4–10.5)
CHLORIDE SERPL-SCNC: 103 MMOL/L — SIGNIFICANT CHANGE UP (ref 96–108)
CO2 SERPL-SCNC: 27 MMOL/L — SIGNIFICANT CHANGE UP (ref 22–31)
CREAT SERPL-MCNC: 0.95 MG/DL — SIGNIFICANT CHANGE UP (ref 0.5–1.3)
GLUCOSE BLDC GLUCOMTR-MCNC: 176 MG/DL — HIGH (ref 70–99)
GLUCOSE BLDC GLUCOMTR-MCNC: 183 MG/DL — HIGH (ref 70–99)
GLUCOSE BLDC GLUCOMTR-MCNC: 183 MG/DL — HIGH (ref 70–99)
GLUCOSE BLDC GLUCOMTR-MCNC: 198 MG/DL — HIGH (ref 70–99)
GLUCOSE SERPL-MCNC: 141 MG/DL — HIGH (ref 70–99)
HCT VFR BLD CALC: 37.6 % — LOW (ref 39–50)
HGB BLD-MCNC: 12.4 G/DL — LOW (ref 13–17)
MCHC RBC-ENTMCNC: 28.7 PG — SIGNIFICANT CHANGE UP (ref 27–34)
MCHC RBC-ENTMCNC: 33 GM/DL — SIGNIFICANT CHANGE UP (ref 32–36)
MCV RBC AUTO: 87 FL — SIGNIFICANT CHANGE UP (ref 80–100)
NRBC # BLD: 0 /100 WBCS — SIGNIFICANT CHANGE UP (ref 0–0)
PLATELET # BLD AUTO: 194 K/UL — SIGNIFICANT CHANGE UP (ref 150–400)
POTASSIUM SERPL-MCNC: 3.8 MMOL/L — SIGNIFICANT CHANGE UP (ref 3.5–5.3)
POTASSIUM SERPL-SCNC: 3.8 MMOL/L — SIGNIFICANT CHANGE UP (ref 3.5–5.3)
RBC # BLD: 4.32 M/UL — SIGNIFICANT CHANGE UP (ref 4.2–5.8)
RBC # FLD: 13.8 % — SIGNIFICANT CHANGE UP (ref 10.3–14.5)
SODIUM SERPL-SCNC: 138 MMOL/L — SIGNIFICANT CHANGE UP (ref 135–145)
WBC # BLD: 12.74 K/UL — HIGH (ref 3.8–10.5)
WBC # FLD AUTO: 12.74 K/UL — HIGH (ref 3.8–10.5)

## 2020-06-04 PROCEDURE — 99232 SBSQ HOSP IP/OBS MODERATE 35: CPT

## 2020-06-04 RX ORDER — INSULIN GLARGINE 100 [IU]/ML
6 INJECTION, SOLUTION SUBCUTANEOUS AT BEDTIME
Refills: 0 | Status: DISCONTINUED | OUTPATIENT
Start: 2020-06-04 | End: 2020-06-05

## 2020-06-04 RX ORDER — INSULIN LISPRO 100/ML
VIAL (ML) SUBCUTANEOUS
Refills: 0 | Status: DISCONTINUED | OUTPATIENT
Start: 2020-06-04 | End: 2020-06-07

## 2020-06-04 RX ORDER — GLUCAGON INJECTION, SOLUTION 0.5 MG/.1ML
1 INJECTION, SOLUTION SUBCUTANEOUS ONCE
Refills: 0 | Status: DISCONTINUED | OUTPATIENT
Start: 2020-06-04 | End: 2020-06-07

## 2020-06-04 RX ORDER — SODIUM CHLORIDE 9 MG/ML
1000 INJECTION, SOLUTION INTRAVENOUS
Refills: 0 | Status: DISCONTINUED | OUTPATIENT
Start: 2020-06-04 | End: 2020-06-07

## 2020-06-04 RX ADMIN — Medication 1: at 06:24

## 2020-06-04 RX ADMIN — NYSTATIN CREAM 1 APPLICATION(S): 100000 CREAM TOPICAL at 04:59

## 2020-06-04 RX ADMIN — OLANZAPINE 2.5 MILLIGRAM(S): 15 TABLET, FILM COATED ORAL at 21:04

## 2020-06-04 RX ADMIN — Medication 1: at 00:25

## 2020-06-04 RX ADMIN — Medication 500 MILLIGRAM(S): at 04:59

## 2020-06-04 RX ADMIN — QUETIAPINE FUMARATE 25 MILLIGRAM(S): 200 TABLET, FILM COATED ORAL at 21:04

## 2020-06-04 RX ADMIN — INSULIN GLARGINE 6 UNIT(S): 100 INJECTION, SOLUTION SUBCUTANEOUS at 21:04

## 2020-06-04 RX ADMIN — HEPARIN SODIUM 5000 UNIT(S): 5000 INJECTION INTRAVENOUS; SUBCUTANEOUS at 04:59

## 2020-06-04 RX ADMIN — MIRTAZAPINE 7.5 MILLIGRAM(S): 45 TABLET, ORALLY DISINTEGRATING ORAL at 21:04

## 2020-06-04 RX ADMIN — Medication 1 APPLICATION(S): at 11:42

## 2020-06-04 RX ADMIN — NYSTATIN CREAM 1 APPLICATION(S): 100000 CREAM TOPICAL at 18:03

## 2020-06-04 RX ADMIN — HEPARIN SODIUM 5000 UNIT(S): 5000 INJECTION INTRAVENOUS; SUBCUTANEOUS at 18:03

## 2020-06-04 RX ADMIN — Medication 2: at 18:02

## 2020-06-04 RX ADMIN — Medication 500 MILLIGRAM(S): at 18:03

## 2020-06-04 RX ADMIN — ZINC SULFATE TAB 220 MG (50 MG ZINC EQUIVALENT) 220 MILLIGRAM(S): 220 (50 ZN) TAB at 11:42

## 2020-06-04 RX ADMIN — Medication 1 TABLET(S): at 11:42

## 2020-06-04 RX ADMIN — PANTOPRAZOLE SODIUM 40 MILLIGRAM(S): 20 TABLET, DELAYED RELEASE ORAL at 11:42

## 2020-06-04 RX ADMIN — Medication 2: at 11:42

## 2020-06-04 NOTE — PROGRESS NOTE ADULT - PROBLEM SELECTOR PLAN 10
Discharge to [facility]  after 2 consecutive negative testings for Covid-19 as required by [facility]. 06/03 testing was positive. Discharge planning Monday 06/08 with swabs on 06/06 and 06/07. Discharge to [facility]  after 2 consecutive negative testings for Covid-19 as required by [facility]. 06/03 testing was positive. Discharge planning Monday 06/08 with swabs on 06/06 and 06/07, If 06/06 neg.

## 2020-06-04 NOTE — PROGRESS NOTE ADULT - PROBLEM SELECTOR PLAN 1
Patient: Bridget Cortes Date: 2020   : 1950 Attending: Bill Chapman MD   70 year old female Room: /A     Chief Complaint: Shortness of breath   Post-op Day #: 3  Surgical Procedure: OPCAB x 3  LIMA -> OM1, SVG -> LAD and SVG -> distal circumflex    Subjective: Converted to Sinus early this a.m. Denies complaints.      Vital Last Value 24 Hour Range   Temperature 98.6 °F (37 °C) (20 0900) Temp  Min: 97.9 °F (36.6 °C)  Max: 98.8 °F (37.1 °C)   Pulse 81 (20 0814) Pulse  Min: 75  Max: 119   Respiratory Rate 16 (20 0814) Resp  Min: 16  Max: 20   Non-Invasive   Blood Pressure 103/63 (20 0814) BP  Min: 100/72  Max: 119/74   Arterial  Blood Pressure (!) 79/64 (20 2200) No data recorded   Pulse Oximetry 95 % (20 0814) SpO2  Min: 95 %  Max: 98 %     Oxygen: RA       Weight over the past 48 Hours:   Patient Vitals for the past 48 hrs:   Weight   20 0500 85.7 kg   20 0500 84.1 kg      Admit Weight:   Weight: 82 kg (20)  BMI:   BMI (Calculated): 29.18 (20)    Intake/Output:    Last Stool Occurrence: 1 (20 1037)    I/O this shift:  In: 320 [P.O.:320]  Out: -     I/O last 3 completed shifts:  In: 1255.7 [P.O.:950; I.V.:305.7]  Out: 3800 [Urine:3800]      Intake/Output Summary (Last 24 hours) at 2020 1037  Last data filed at 2020 0900  Gross per 24 hour   Intake 1225.72 ml   Output 3100 ml   Net -1874.28 ml       Rhythm: Sinus rhythm    Physical Exam:   Heart: Regular rate and rhythm  Lungs: Diminished breath sounds  anterior - bilateral  Abdomen: soft, non-tender, hypoactive bowel sounds. +BM  Skin: Skin color, texture, turgor normal. No rashes or lesions  Incision(s): Sternal CDI and Left leg CDI and Right leg CDI  Neurologic: Grossly normal  Extremities: edema Bilateral lower extremity edema  and pulses +2 bilateral DP. Extremities warm     Laboratory Results:    Recent Labs   Lab 20  0530 20  0532 20  1315  04/05/20  0312  04/04/20  1105  04/03/20  2331 04/03/20  1548 04/03/20  0953  04/03/20  0538   SODIUM 135 133*  --   --   --   --   --   --   --   --   --  133*   POTASSIUM 3.5 3.7 3.9 4.3   < > 4.3  --   --   --   --   --  4.1   CHLORIDE 101 99  --   --   --   --   --   --   --   --   --  101   CO2 30 26  --   --   --   --   --   --   --   --   --  25   BUN 7 7  --   --   --   --   --   --   --   --   --  10   CREATININE 0.47* 0.44*  --   --   --   --   --   --   --   --   --  0.45*   GLUCOSE 100* 121*  --   --   --   --   --   --   --   --   --  108*   MG 2.0 1.9 1.9 2.2  --  1.8   < >  --   --   --   --   --    WBC 14.5* 16.4*  --  16.3*  --  23.6*   < >  --   --   --   --   --    HGB 9.2* 9.2*  --  9.8*  --  10.4*   < >  --   --   --   --  12.7   HCT 26.7* 27.2*  --  29.3*  --  31.3*   < >  --   --   --   --  36.6    213  --  208  --  217   < >  --   --   --   --   --    PT  --   --   --  11.9*  --  11.7  --   --   --  10.6  --   --    INR  --   --   --  1.1  --  1.1  --   --   --  1.0  --   --    PTT  --   --   --   --   --  25  --  56* 34* 26   < >  --    BILIRUBIN  --   --   --   --   --   --   --   --   --   --   --  0.6   ALKPT  --   --   --   --   --   --   --   --   --   --   --  84   AST  --   --   --   --   --   --   --   --   --   --   --  10   GPT  --   --   --   --   --   --   --   --   --   --   --  18    < > = values in this interval not displayed.       AB    Recent Labs   Lab 04/04/20  1411   APH 7.31*   APO2 105   AHCO3 22   ASAT 98         Cultures: Reviewed    Chest X-Ray: Reviewed    Medications/Infusions:  Scheduled:   • metoPROLOL tartrate  25 mg Oral 2 times per day   • AMIODarone  400 mg Oral 2 times per day   • sodium chloride (PF)  2 mL Intracatheter 2 times per day   • insulin lispro   Subcutaneous TID AC   • aspirin  81 mg Oral Daily   • pantoprazole  40 mg Oral Daily   • metoCLOPramide (REGLAN) injection  5 mg Intravenous TID AC   • furosemide  40 mg Oral Daily   • docusate  sodium-sennosides  2 tablet Oral Daily   • sodium biphosphate  1 enema Rectal Once   • [START ON 4/8/2020] polyethylene glycol  17 g Oral BID   • atorvastatin  80 mg Oral Daily   • tolterodine  4 mg Oral Nightly   • loratadine  10 mg Oral Nightly   • montelukast  10 mg Oral Nightly       Continuous Infusions:   • dextrose 5 % infusion         CMS Criteria:  ASA: Yes    BB: Yes    Statin:Yes    ACE: Not Applicable    Surgical Care Improvement Project:  Gates in Place: No    DVT/VTE Prophylaxis:  VTE Pharmacologic Prophylaxis: No pharmacologic Venous Thromboembolism prophylaxis due to Active Bleeding / Risk of Bleeding  VTE Mechanical Prophylaxis: Yes    Antibiotics D/C'd within 24 hours of surgery end: Yes    Beta Blocker: Yes     Surgical Central Line: Yes, plan to D/C in a.m.     Cardiologist: Obed   EF: 65%  Lorenzo MRSA:- MSSA: -  Hemoglobin A1c: 5.5  Preoperative Creatinine: 0.45     STS Risk of Mortality: 0.668%    Assessment/Plan:  S/P OPCAB x 3  LIMA -> OM1, SVG -> LAD and SVG -> distal circumflex  Hypoxemia/Hx of Asthma: RA. Resume home inhalers   Hypervolemia: Diurese Daily   PAF: Converted to sinus this a.m. Amio converted to PO.     DC teaching completed. Home tomorrow afternoon.      Pathways:  Wires Out: No  Ambulating: Yes    Discussed with or notes reviewed:  Patient, RN and MD    Discharge Disposition: Home    oropharyngeal dysphagia due to  dementia   Peg placed on 06/02  tolerates feeding  continue po meds through peg tube  Continue TF

## 2020-06-04 NOTE — CHART NOTE - NSCHARTNOTEFT_GEN_A_CORE
Assessment:   64yMalePatient is a 64y old  Male who presents with a chief complaint of seizures (04 Jun 2020 12:14)  Pt On Airborne isolation. TF Glucerna 1.2 Infusing @ 60 ml/hr VIa PEG. Tf Tolerated. D/W Endo. Endo prefers Pt to be in 24 hr TF for better BG LEvel. Labs Noted  .        Factors impacting intake: [ ] none [ ] nausea  [ ] vomiting [ ] diarrhea [ ] constipation  [ ]chewing problems [ ] swallowing issues  [ ] other:     Diet Presciption: Diet, NPO with Tube Feed:   Tube Feeding Modality: Gastrostomy  Glucerna 1.2 Mihir  Total Volume for 24 Hours (mL): 1440  Continuous  Starting Tube Feed Rate {mL per Hour}: 30  Increase Tube Feed Rate by (mL): 30     Every 6 hours  Until Goal Tube Feed Rate (mL per Hour): 90  Tube Feed Duration (in Hours): 16  Tube Feed Start Time: 15:00 (06-03-20 @ 14:51)    Intake: NPo W TF     Current Weight:   % Weight Change    Pertinent Medications: MEDICATIONS  (STANDING):  ascorbic acid 500 milliGRAM(s) Oral two times a day  collagenase Ointment 1 Application(s) Topical daily  dextrose 5% + sodium chloride 0.9%. 1000 milliLiter(s) (50 mL/Hr) IV Continuous <Continuous>  dextrose 5% + sodium chloride 0.9%. 1000 milliLiter(s) (50 mL/Hr) IV Continuous <Continuous>  dextrose 5%. 1000 milliLiter(s) (50 mL/Hr) IV Continuous <Continuous>  dextrose 50% Injectable 12.5 Gram(s) IV Push once  dextrose 50% Injectable 25 Gram(s) IV Push once  dextrose 50% Injectable 25 Gram(s) IV Push once  ergocalciferol 34895 Unit(s) Oral <User Schedule>  heparin   Injectable 5000 Unit(s) SubCutaneous every 12 hours  insulin glargine Injectable (LANTUS) 6 Unit(s) SubCutaneous at bedtime  insulin lispro (HumaLOG) corrective regimen sliding scale   SubCutaneous three times a day before meals  mirtazapine 7.5 milliGRAM(s) Oral at bedtime  multivitamin/minerals 1 Tablet(s) Oral daily  nystatin Powder 1 Application(s) Topical two times a day  OLANZapine 2.5 milliGRAM(s) Oral at bedtime  pantoprazole   Suspension 40 milliGRAM(s) Enteral Tube daily  QUEtiapine 25 milliGRAM(s) Oral at bedtime  zinc sulfate 220 milliGRAM(s) Oral daily    MEDICATIONS  (PRN):  acetaminophen    Suspension .. 650 milliGRAM(s) Enteral Tube every 6 hours PRN Temp greater or equal to 38C (100.4F)  ALBUTerol    90 MICROgram(s) HFA Inhaler 2 Puff(s) Inhalation every 6 hours PRN Shortness of Breath and/or Wheezing  dextrose 40% Gel 15 Gram(s) Oral once PRN Blood Glucose LESS THAN 70 milliGRAM(s)/deciliter  glucagon  Injectable 1 milliGRAM(s) IntraMuscular once PRN Glucose LESS THAN 70 milligrams/deciliter  guaiFENesin   Syrup  (Sugar-Free) 100 milliGRAM(s) Oral every 6 hours PRN Cough    Pertinent Labs: 06-04 Na138 mmol/L Glu 141 mg/dL<H> K+ 3.8 mmol/L Cr  0.95 mg/dL BUN 15 mg/dL 06-01 Phos 3.2 mg/dL     CAPILLARY BLOOD GLUCOSE      POCT Blood Glucose.: 176 mg/dL (04 Jun 2020 11:39)  POCT Blood Glucose.: 183 mg/dL (04 Jun 2020 06:17)  POCT Blood Glucose.: 183 mg/dL (04 Jun 2020 00:08)  POCT Blood Glucose.: 164 mg/dL (03 Jun 2020 21:35)  POCT Blood Glucose.: 178 mg/dL (03 Jun 2020 17:32)    Skin: L heel DTI, R Buttocks Unstageable R Buttocks, L heel DTI     Estimated Needs:   [ ] no change since previous assessment  [ ] recalculated:     Previous Nutrition Diagnosis:   [ ] Inadequate Energy Intake [ ]Inadequate Oral Intake [ ] Excessive Energy Intake   [ ] Underweight [ ] Increased Nutrient Needs [ ] Overweight/Obesity   [ ] Altered GI Function [ ] Unintended Weight Loss [ ] Food & Nutrition Related Knowledge Deficit [ x] Malnutrition Severe     Nutrition Diagnosis is [x ] ongoing  [ ] resolved [ ] not applicable     New Nutrition Diagnosis: [ ] not applicable       Interventions:   Recommend  [ ] Change Diet To:  [ ] Nutrition Supplement  [x ] Nutrition Support Glucerna 1.2 Initial Goal Rate @ 60 ML/hr x 24 hr as tolerated 1440 ml,1728 calories, Protein 86 gms, free water 1159 ml/day )  [x ] Other: D/W RN  (x) Diet verifiaction order Placed    Monitoring and Evaluation:   [ ] PO intake [ x ] Tolerance to diet prescription [ x ] weights [ x ] labs[ x ] follow up per protocol  [ ] other:

## 2020-06-04 NOTE — PROGRESS NOTE ADULT - SUBJECTIVE AND OBJECTIVE BOX
CARDIOLOGY/MEDICAL ATTENDING      CHIEF COMPLAINT:Patient is a 64y old  Male who presents with a chief complaint of seizures. Pt appears comfortable.    	  REVIEW OF SYSTEMS:    [x ] Unable to obtain    PHYSICAL EXAM:  T(C): 36.7 (06-04-20 @ 04:50), Max: 36.7 (06-04-20 @ 04:50)  HR: 108 (06-04-20 @ 04:50) (95 - 108)  BP: 137/81 (06-04-20 @ 04:50) (125/71 - 137/81)  RR: 19 (06-04-20 @ 04:50) (19 - 20)  SpO2: 98% (06-04-20 @ 04:50) (98% - 98%)        Appearance: Normal	  HEENT:   Normal oral mucosa, PERRL, EOMI	  Lymphatic: No lymphadenopathy  Cardiovascular: Normal S1 S2, No JVD, No murmurs, No edema  Respiratory: Lungs clear to auscultation	  Gastrointestinal:  Soft, Non-tender, + BS	  Skin: No rashes, No ecchymoses, No cyanosis	  Extremities: Normal range of motion, No clubbing, cyanosis or edema  Vascular: Peripheral pulses palpable 2+ bilaterally    MEDICATIONS  (STANDING):  ascorbic acid 500 milliGRAM(s) Oral two times a day  collagenase Ointment 1 Application(s) Topical daily  dextrose 5% + sodium chloride 0.9%. 1000 milliLiter(s) (50 mL/Hr) IV Continuous <Continuous>  dextrose 5% + sodium chloride 0.9%. 1000 milliLiter(s) (50 mL/Hr) IV Continuous <Continuous>  dextrose 5%. 1000 milliLiter(s) (50 mL/Hr) IV Continuous <Continuous>  dextrose 50% Injectable 12.5 Gram(s) IV Push once  dextrose 50% Injectable 25 Gram(s) IV Push once  dextrose 50% Injectable 25 Gram(s) IV Push once  ergocalciferol 63565 Unit(s) Oral <User Schedule>  heparin   Injectable 5000 Unit(s) SubCutaneous every 12 hours  insulin glargine Injectable (LANTUS) 6 Unit(s) SubCutaneous at bedtime  insulin lispro (HumaLOG) corrective regimen sliding scale   SubCutaneous three times a day before meals  mirtazapine 7.5 milliGRAM(s) Oral at bedtime  multivitamin/minerals 1 Tablet(s) Oral daily  nystatin Powder 1 Application(s) Topical two times a day  OLANZapine 2.5 milliGRAM(s) Oral at bedtime  pantoprazole   Suspension 40 milliGRAM(s) Enteral Tube daily  QUEtiapine 25 milliGRAM(s) Oral at bedtime  zinc sulfate 220 milliGRAM(s) Oral daily      	  	  LABS:	 	                       12.4   12.74 )-----------( 194      ( 04 Jun 2020 07:41 )             37.6     06-04    138  |  103  |  15  ----------------------------<  141<H>  3.8   |  27  |  0.95    Ca    9.6      04 Jun 2020 07:41    COVID-19 PCR . (06.03.20 @ 13:48)    COVID-19 PCR: Detected: This test has been validated by [employer] to be accurate;  though it has not been FDA cleared/approved by the usual pathway.  As with all laboratory tests, results should be correlated with clinical  findings.  https://www.fda.gov/media/754566/download  https://www.fda.gov/media/461676/download

## 2020-06-04 NOTE — PROGRESS NOTE ADULT - ASSESSMENT
64 year old male from [facility] with PMH of dementia and type 2 DM presented with seizure activity while sitting at AL. Endocrinology was consulted for management of uncontrolled type 2 DM with hyperglycemia.    1. Uncontrolled Type 2 DM with hyperglycemia, A1c 9.9%  -patient now on tube feedings via PEG at 60cc/hr  -Increase to Lantus 6 units at bedtime  -standing Humalog held while feeding held  -increase to moderate dose rapid acting insulin q 6 hrs while patient is on tube feedings  BG 70-100mg/dL 0 units  -150 mg/dL 0 units  -200 mg/dL 2 unit  -250 mg/dL 4 units  -300 mg/dL 6 units  -350 mg/dL 8 units  -400 mg/dL 10 units  BG > 400mg/dL 12 units  -FS AC HS  -ensure consistent carbohydrate   -will monitor FS and adjust accordingly     2. Delirium  -patient with mitten restraints  -continue Seroquel, Zyprexa, Remeron    Plan discussed with primary team]  Please  call  w/ any questions or concerns [phone]

## 2020-06-04 NOTE — PROGRESS NOTE ADULT - PROBLEM SELECTOR PLAN 2
Continue present meds through Peg  david dc/d -pt is calm, no behavioral issues at present time   Supportive care  Psychiatry following

## 2020-06-04 NOTE — PROGRESS NOTE ADULT - PROBLEM SELECTOR PLAN 5
latest COVID 19 PCR confirmed negative on 5.31.2020 --went  for PEG placement   6/3 -positive prior to d.c. to [facility]  Needs 2 more negatives per [facility] requirement.  Will repeat on 06/06, 06/07 to d.c on 06/08 (Monday).

## 2020-06-04 NOTE — PROGRESS NOTE ADULT - SUBJECTIVE AND OBJECTIVE BOX
Pt is awake, alert, lying in bed in NAD. S/P PEG placement yesterday.     INTERVAL HPI/OVERNIGHT EVENTS:      VITAL SIGNS:  T(F): 98 (06-04-20 @ 04:50)  HR: 108 (06-04-20 @ 04:50)  BP: 137/81 (06-04-20 @ 04:50)  RR: 19 (06-04-20 @ 04:50)  SpO2: 98% (06-04-20 @ 04:50)  Wt(kg): --  I&O's Detail          REVIEW OF SYSTEMS:    CONSTITUTIONAL:  No fevers, chills, sweats    HEENT:  Eyes:  No diplopia or blurred vision. ENT:  No earache, sore throat or runny nose.    CARDIOVASCULAR:  No pressure, squeezing, tightness, or heaviness about the chest; no palpitations.    RESPIRATORY:  Per HPI    GASTROINTESTINAL:  No abdominal pain, nausea, vomiting or diarrhea.    GENITOURINARY:  No dysuria, frequency or urgency.    NEUROLOGIC:  No paresthesias, fasciculations, seizures or weakness.    PSYCHIATRIC:  No disorder of thought or mood.      PHYSICAL EXAM:    Constitutional: Well developed and nourished  Eyes:Perrla  ENMT: normal  Neck:supple  Respiratory: good air entry  Cardiovascular: S1 S2 regular  Gastrointestinal: Soft, Non tender; PEG tube.   Extremities: No edema  Vascular:normal  Neurological:Awake, alert   Musculoskeletal:Normal      MEDICATIONS  (STANDING):  ascorbic acid 500 milliGRAM(s) Oral two times a day  collagenase Ointment 1 Application(s) Topical daily  dextrose 5% + sodium chloride 0.9%. 1000 milliLiter(s) (50 mL/Hr) IV Continuous <Continuous>  dextrose 5% + sodium chloride 0.9%. 1000 milliLiter(s) (50 mL/Hr) IV Continuous <Continuous>  dextrose 50% Injectable 12.5 Gram(s) IV Push once  dextrose 50% Injectable 25 Gram(s) IV Push once  dextrose 50% Injectable 25 Gram(s) IV Push once  ergocalciferol 84332 Unit(s) Oral <User Schedule>  heparin   Injectable 5000 Unit(s) SubCutaneous every 12 hours  insulin glargine Injectable (LANTUS) 4 Unit(s) SubCutaneous at bedtime  insulin lispro (HumaLOG) corrective regimen sliding scale   SubCutaneous every 6 hours  mirtazapine 7.5 milliGRAM(s) Oral at bedtime  multivitamin/minerals 1 Tablet(s) Oral daily  nystatin Powder 1 Application(s) Topical two times a day  OLANZapine 2.5 milliGRAM(s) Oral at bedtime  pantoprazole   Suspension 40 milliGRAM(s) Enteral Tube daily  QUEtiapine 25 milliGRAM(s) Oral at bedtime  zinc sulfate 220 milliGRAM(s) Oral daily    MEDICATIONS  (PRN):  acetaminophen    Suspension .. 650 milliGRAM(s) Enteral Tube every 6 hours PRN Temp greater or equal to 38C (100.4F)  ALBUTerol    90 MICROgram(s) HFA Inhaler 2 Puff(s) Inhalation every 6 hours PRN Shortness of Breath and/or Wheezing  dextrose 40% Gel 15 Gram(s) Oral once PRN Blood Glucose LESS THAN 70 milliGRAM(s)/deciliter  guaiFENesin   Syrup  (Sugar-Free) 100 milliGRAM(s) Oral every 6 hours PRN Cough      Allergies    No Known Allergies    Intolerances        LABS:                        12.4   12.74 )-----------( 194      ( 04 Jun 2020 07:41 )             37.6     06-04    138  |  103  |  15  ----------------------------<  141<H>  3.8   |  27  |  0.95    Ca    9.6      04 Jun 2020 07:41                CAPILLARY BLOOD GLUCOSE      POCT Blood Glucose.: 183 mg/dL (04 Jun 2020 06:17)  POCT Blood Glucose.: 183 mg/dL (04 Jun 2020 00:08)  POCT Blood Glucose.: 164 mg/dL (03 Jun 2020 21:35)  POCT Blood Glucose.: 178 mg/dL (03 Jun 2020 17:32)  POCT Blood Glucose.: 235 mg/dL (03 Jun 2020 11:55)        RADIOLOGY & ADDITIONAL TESTS:    CXR:    Ct scan chest:    ekg;    echo:

## 2020-06-04 NOTE — PROGRESS NOTE ADULT - ASSESSMENT
64 yr old male with PMHX of DM,dementia sent to ER from facility for seizure and now COVID+,Delirium,MAXIME,hypernatremia and s/p aspiration pneumonia.  1.GI f/u,s/p PEG ,tolerating TF.  2.DM-Insulin, Endo f/u.  3.Delirium-cont psych meds.  4.Await COVID negative before d/c to facility.  5.GI and DVT prophylaxis.

## 2020-06-04 NOTE — PROGRESS NOTE ADULT - SUBJECTIVE AND OBJECTIVE BOX
[   ] ICU                                          [   ] CCU                                      [  X ] Medical Floor      Patient is comfortable. No new complaints reported, No abdominal pain, N/V, hematemesis, hematochezia, melena, fever, chills, chest pain, SOB, cough or diarrhea reported.    VITALS  Vital Signs Last 24 Hrs  T(C): 36.7 (04 Jun 2020 04:50), Max: 36.7 (04 Jun 2020 04:50)  T(F): 98 (04 Jun 2020 04:50), Max: 98 (04 Jun 2020 04:50)  HR: 108 (04 Jun 2020 04:50) (95 - 108)  BP: 137/81 (04 Jun 2020 04:50) (125/71 - 137/81)   RR: 19 (04 Jun 2020 04:50) (19 - 20)  SpO2: 98% (04 Jun 2020 04:50) (98% - 98%)       MEDICATIONS  (STANDING):  ascorbic acid 500 milliGRAM(s) Oral two times a day  collagenase Ointment 1 Application(s) Topical daily  dextrose 5% + sodium chloride 0.9%. 1000 milliLiter(s) (50 mL/Hr) IV Continuous <Continuous>  dextrose 5% + sodium chloride 0.9%. 1000 milliLiter(s) (50 mL/Hr) IV Continuous <Continuous>  dextrose 5%. 1000 milliLiter(s) (50 mL/Hr) IV Continuous <Continuous>  dextrose 50% Injectable 12.5 Gram(s) IV Push once  dextrose 50% Injectable 25 Gram(s) IV Push once  dextrose 50% Injectable 25 Gram(s) IV Push once  ergocalciferol 68713 Unit(s) Oral <User Schedule>  heparin   Injectable 5000 Unit(s) SubCutaneous every 12 hours  insulin glargine Injectable (LANTUS) 6 Unit(s) SubCutaneous at bedtime  insulin lispro (HumaLOG) corrective regimen sliding scale   SubCutaneous three times a day before meals  mirtazapine 7.5 milliGRAM(s) Oral at bedtime  multivitamin/minerals 1 Tablet(s) Oral daily  nystatin Powder 1 Application(s) Topical two times a day  OLANZapine 2.5 milliGRAM(s) Oral at bedtime  pantoprazole   Suspension 40 milliGRAM(s) Enteral Tube daily  QUEtiapine 25 milliGRAM(s) Oral at bedtime  zinc sulfate 220 milliGRAM(s) Oral daily    MEDICATIONS  (PRN):  acetaminophen    Suspension .. 650 milliGRAM(s) Enteral Tube every 6 hours PRN Temp greater or equal to 38C (100.4F)  ALBUTerol    90 MICROgram(s) HFA Inhaler 2 Puff(s) Inhalation every 6 hours PRN Shortness of Breath and/or Wheezing  dextrose 40% Gel 15 Gram(s) Oral once PRN Blood Glucose LESS THAN 70 milliGRAM(s)/deciliter  glucagon  Injectable 1 milliGRAM(s) IntraMuscular once PRN Glucose LESS THAN 70 milligrams/deciliter  guaiFENesin   Syrup  (Sugar-Free) 100 milliGRAM(s) Oral every 6 hours PRN Cough                            12.4   12.74 )-----------( 194      ( 04 Jun 2020 07:41 )             37.6       06-04    138  |  103  |  15  ----------------------------<  141<H>  3.8   |  27  |  0.95    Ca    9.6      04 Jun 2020 07:41

## 2020-06-04 NOTE — PROGRESS NOTE ADULT - ASSESSMENT
1. Gastritis  2. Esophagitis  3. Failure to thrive  4. S/p EGD with Peg tube placement  5. Patient tolerating Peg feeding       Suggestions:    1. Protonix 40mg daily  2. Continue Peg tube feeding  3. Aspiration precaution  4. Monitor electrolytes  5. Avoid NSAID  6. DVT prophylaxis

## 2020-06-04 NOTE — PROGRESS NOTE ADULT - PROBLEM SELECTOR PLAN 7
as evidenced by HgA1C of 9.9% on admission  Insulin treatment adjusted to current Peg feeding and pt's FS  monitor FS Q6  Endocrinology Dr. Allison following.

## 2020-06-04 NOTE — PROGRESS NOTE ADULT - PROBLEM SELECTOR PLAN 4
Continue strict aspiration precaution  CHXray noted for opacities on 05/23/2020   Pt got Peg tube placement-  Continue TF and  po med via Peg  No sob noted  WBC elevated day after Peg placement, downtrending  afebrile  off antibiotics  Pulmonary on board Continue strict aspiration precaution  CHXray noted for opacities on 05/23/2020   Pt got Peg tube placement-  Continue TF and  po med via Peg  No sob noted  WBC elevated, downtrending  afebrile  off antibiotics  Pulmonary on board

## 2020-06-04 NOTE — PROGRESS NOTE ADULT - PROBLEM SELECTOR PLAN 8
No seizure activities noted at present time  No indication for antiepileptic medications as per   Continue seizure precautions

## 2020-06-04 NOTE — PROGRESS NOTE ADULT - SUBJECTIVE AND OBJECTIVE BOX
64 year old male from [facility] with PMH of dementia and type 2 DM presented with seizure activity while sitting at AL. Endocrinology was consulted for management of uncontrolled type 2 DM with hyperglycemia.    Patient seen and examined at bedside. He is now on PEG tube feedings at 60cc/hr. He is resting comfortably. FS reviewed. BG now uptrending slightly above goal >180 since tube feedings resumed.    MEDICATIONS  (STANDING):  ascorbic acid 500 milliGRAM(s) Oral two times a day  collagenase Ointment 1 Application(s) Topical daily  dextrose 5% + sodium chloride 0.9%. 1000 milliLiter(s) (50 mL/Hr) IV Continuous <Continuous>  dextrose 5% + sodium chloride 0.9%. 1000 milliLiter(s) (50 mL/Hr) IV Continuous <Continuous>  dextrose 50% Injectable 12.5 Gram(s) IV Push once  dextrose 50% Injectable 25 Gram(s) IV Push once  dextrose 50% Injectable 25 Gram(s) IV Push once  ergocalciferol 62805 Unit(s) Oral <User Schedule>  heparin   Injectable 5000 Unit(s) SubCutaneous every 12 hours  insulin glargine Injectable (LANTUS) 4 Unit(s) SubCutaneous at bedtime  insulin lispro (HumaLOG) corrective regimen sliding scale   SubCutaneous every 6 hours  mirtazapine 7.5 milliGRAM(s) Oral at bedtime  multivitamin/minerals 1 Tablet(s) Oral daily  nystatin Powder 1 Application(s) Topical two times a day  OLANZapine 2.5 milliGRAM(s) Oral at bedtime  pantoprazole   Suspension 40 milliGRAM(s) Enteral Tube daily  QUEtiapine 25 milliGRAM(s) Oral at bedtime  zinc sulfate 220 milliGRAM(s) Oral daily    MEDICATIONS  (PRN):  acetaminophen    Suspension .. 650 milliGRAM(s) Enteral Tube every 6 hours PRN Temp greater or equal to 38C (100.4F)  ALBUTerol    90 MICROgram(s) HFA Inhaler 2 Puff(s) Inhalation every 6 hours PRN Shortness of Breath and/or Wheezing  dextrose 40% Gel 15 Gram(s) Oral once PRN Blood Glucose LESS THAN 70 milliGRAM(s)/deciliter  guaiFENesin   Syrup  (Sugar-Free) 100 milliGRAM(s) Oral every 6 hours PRN Cough      REVIEW OF SYSTEMS:  unable to obtain due to medical condition    PHYSICAL EXAM:      VITAL SIGNS  T(C): 36.7 (06-04-20 @ 04:50), Max: 36.7 (06-04-20 @ 04:50)  HR: 108 (06-04-20 @ 04:50) (95 - 108)  BP: 137/81 (06-04-20 @ 04:50) (125/71 - 137/81)  RR: 19 (06-04-20 @ 04:50) (19 - 20)  SpO2: 98% (06-04-20 @ 04:50) (97% - 98%)      GENERAL: NAD, well-developed  HEAD:  Atraumatic, Normocephalic  EYES: EOMI, PERRLA, conjunctiva and sclera clear  ENMT: No tonsillar erythema, exudates, or enlargement; No lesions  NECK: Supple, No JVD, Normal thyroid  NERVOUS SYSTEM:  nonverbal,  CHEST/LUNG: CTA bilaterally  No rales, rhonchi, wheezing, or rubs  HEART: Regular rate and rhythm; No murmurs, rubs, or gallops  ABDOMEN: s/p PEG, Soft, Nontender, Nondistended; Bowel sounds present  EXTREMITIES:  No edema  SKIN: No rashes or lesions      LABS:                        12.4   12.74 )-----------( 194      ( 04 Jun 2020 07:41 )             37.6     06-04    138  |  103  |  15  ----------------------------<  141<H>  3.8   |  27  |  0.95    Ca    9.6      04 Jun 2020 07:41          CAPILLARY BLOOD GLUCOSE      POCT Blood Glucose.: 183 mg/dL (04 Jun 2020 06:17)  POCT Blood Glucose.: 183 mg/dL (04 Jun 2020 00:08)  POCT Blood Glucose.: 164 mg/dL (03 Jun 2020 21:35)  POCT Blood Glucose.: 178 mg/dL (03 Jun 2020 17:32)  POCT Blood Glucose.: 235 mg/dL (03 Jun 2020 11:55)

## 2020-06-04 NOTE — PROGRESS NOTE ADULT - SUBJECTIVE AND OBJECTIVE BOX
Patient is a 64y old  Male who presents with a chief complaint of seizures (04 Jun 2020 10:07)    HPI - 64 year old male from [facility]  with PMH dementia and DM coming in with seizures like activity while sitting at AL.  Pt had peg placement on 06/02/2020 for FTT 2/2 to dysphagia and poor oral intake related to changes in mental status.     06/04/2020 Pt tolerates Peg feeding, medically cleared to be discharged, pending covid-19  2 negative results required by [facility]. Pt tested Covid positive on 06/03.  Plan to retest Saturday and Sunday (06/06, 06/07) for discharge plan on Monday (06/08.)          INTERVAL HPI/OVERNIGHT EVENTS: no new complaints    MEDICATIONS  (STANDING):  ascorbic acid 500 milliGRAM(s) Oral two times a day  collagenase Ointment 1 Application(s) Topical daily  dextrose 5% + sodium chloride 0.9%. 1000 milliLiter(s) (50 mL/Hr) IV Continuous <Continuous>  dextrose 5% + sodium chloride 0.9%. 1000 milliLiter(s) (50 mL/Hr) IV Continuous <Continuous>  dextrose 5%. 1000 milliLiter(s) (50 mL/Hr) IV Continuous <Continuous>  dextrose 50% Injectable 12.5 Gram(s) IV Push once  dextrose 50% Injectable 25 Gram(s) IV Push once  dextrose 50% Injectable 25 Gram(s) IV Push once  ergocalciferol 43234 Unit(s) Oral <User Schedule>  heparin   Injectable 5000 Unit(s) SubCutaneous every 12 hours  insulin glargine Injectable (LANTUS) 6 Unit(s) SubCutaneous at bedtime  insulin lispro (HumaLOG) corrective regimen sliding scale   SubCutaneous three times a day before meals  mirtazapine 7.5 milliGRAM(s) Oral at bedtime  multivitamin/minerals 1 Tablet(s) Oral daily  nystatin Powder 1 Application(s) Topical two times a day  OLANZapine 2.5 milliGRAM(s) Oral at bedtime  pantoprazole   Suspension 40 milliGRAM(s) Enteral Tube daily  QUEtiapine 25 milliGRAM(s) Oral at bedtime  zinc sulfate 220 milliGRAM(s) Oral daily    MEDICATIONS  (PRN):  acetaminophen    Suspension .. 650 milliGRAM(s) Enteral Tube every 6 hours PRN Temp greater or equal to 38C (100.4F)  ALBUTerol    90 MICROgram(s) HFA Inhaler 2 Puff(s) Inhalation every 6 hours PRN Shortness of Breath and/or Wheezing  dextrose 40% Gel 15 Gram(s) Oral once PRN Blood Glucose LESS THAN 70 milliGRAM(s)/deciliter  glucagon  Injectable 1 milliGRAM(s) IntraMuscular once PRN Glucose LESS THAN 70 milligrams/deciliter  guaiFENesin   Syrup  (Sugar-Free) 100 milliGRAM(s) Oral every 6 hours PRN Cough    Unable to assess due to pt's mental status  __________________________________________________  REVIEW OF SYSTEMS:      Vital Signs Last 24 Hrs  T(C): 36.7 (04 Jun 2020 04:50), Max: 36.7 (04 Jun 2020 04:50)  T(F): 98 (04 Jun 2020 04:50), Max: 98 (04 Jun 2020 04:50)  HR: 108 (04 Jun 2020 04:50) (95 - 108)  BP: 137/81 (04 Jun 2020 04:50) (125/71 - 137/81)  BP(mean): --  RR: 19 (04 Jun 2020 04:50) (19 - 20)  SpO2: 98% (04 Jun 2020 04:50) (98% - 98%)    ________________________________________________  PHYSICAL EXAM:  GENERAL: NAD  HEENT: Normocephalic;  conjunctivae and sclerae clear; moist mucous membranes;   NECK : supple  CHEST/LUNG: Clear to auscultation bilaterally with good air entry   HEART: S1 S2  regular; no murmurs, gallops or rubs  ABDOMEN: Soft, Nontender, Nondistended; Bowel sounds present, peg tube  EXTREMITIES: no cyanosis; no edema; no calf tenderness  SKIN: warm and dry; no rash  NERVOUS SYSTEM: Awake, Confused  _________________________________________________  LABS:                        12.4   12.74 )-----------( 194      ( 04 Jun 2020 07:41 )             37.6     06-04    138  |  103  |  15  ----------------------------<  141<H>  3.8   |  27  |  0.95    Ca    9.6      04 Jun 2020 07:41          CAPILLARY BLOOD GLUCOSE      POCT Blood Glucose.: 176 mg/dL (04 Jun 2020 11:39)  POCT Blood Glucose.: 183 mg/dL (04 Jun 2020 06:17)  POCT Blood Glucose.: 183 mg/dL (04 Jun 2020 00:08)  POCT Blood Glucose.: 164 mg/dL (03 Jun 2020 21:35)  POCT Blood Glucose.: 178 mg/dL (03 Jun 2020 17:32)        RADIOLOGY & ADDITIONAL TESTS:    Imaging  Reviewed:  YES/NO    Consultant(s) Notes Reviewed:   YES/ No      Plan of care was discussed with patient and /or primary care giver; all questions and concerns were addressed Patient is a 64y old  Male who presents with a chief complaint of seizures (04 Jun 2020 10:07)    HPI - 64 year old male from [facility]  with PMH dementia and DM coming in with seizures like activity while sitting at AL.  Pt had peg placement on 06/02/2020 for FTT 2/2 to dysphagia and poor oral intake related to changes in mental status.     06/04/2020 Pt tolerates Peg feeding, medically cleared to be discharged, pending covid-19  2 negative results required by [facility]. Pt tested Covid positive on 06/03.  Plan to retest Saturday and Sunday (06/06, 06/07)  for discharge plan on Monday (06/08.)          INTERVAL HPI/OVERNIGHT EVENTS: no new complaints    MEDICATIONS  (STANDING):  ascorbic acid 500 milliGRAM(s) Oral two times a day  collagenase Ointment 1 Application(s) Topical daily  dextrose 5% + sodium chloride 0.9%. 1000 milliLiter(s) (50 mL/Hr) IV Continuous <Continuous>  dextrose 5% + sodium chloride 0.9%. 1000 milliLiter(s) (50 mL/Hr) IV Continuous <Continuous>  dextrose 5%. 1000 milliLiter(s) (50 mL/Hr) IV Continuous <Continuous>  dextrose 50% Injectable 12.5 Gram(s) IV Push once  dextrose 50% Injectable 25 Gram(s) IV Push once  dextrose 50% Injectable 25 Gram(s) IV Push once  ergocalciferol [id number] Unit(s) Oral <User Schedule>  heparin   Injectable 5000 Unit(s) SubCutaneous every 12 hours  insulin glargine Injectable (LANTUS) 6 Unit(s) SubCutaneous at bedtime  insulin lispro (HumaLOG) corrective regimen sliding scale   SubCutaneous three times a day before meals  mirtazapine 7.5 milliGRAM(s) Oral at bedtime  multivitamin/minerals 1 Tablet(s) Oral daily  nystatin Powder 1 Application(s) Topical two times a day  OLANZapine 2.5 milliGRAM(s) Oral at bedtime  pantoprazole   Suspension 40 milliGRAM(s) Enteral Tube daily  QUEtiapine 25 milliGRAM(s) Oral at bedtime  zinc sulfate 220 milliGRAM(s) Oral daily    MEDICATIONS  (PRN):  acetaminophen    Suspension .. 650 milliGRAM(s) Enteral Tube every 6 hours PRN Temp greater or equal to 38C (100.4F)  ALBUTerol    90 MICROgram(s) HFA Inhaler 2 Puff(s) Inhalation every 6 hours PRN Shortness of Breath and/or Wheezing  dextrose 40% Gel 15 Gram(s) Oral once PRN Blood Glucose LESS THAN 70 milliGRAM(s)/deciliter  glucagon  Injectable 1 milliGRAM(s) IntraMuscular once PRN Glucose LESS THAN 70 milligrams/deciliter  guaiFENesin   Syrup  (Sugar-Free) 100 milliGRAM(s) Oral every 6 hours PRN Cough    Unable to assess due to pt's mental status  __________________________________________________  REVIEW OF SYSTEMS:      Vital Signs Last 24 Hrs  T(C): 36.7 (04 Jun 2020 04:50), Max: 36.7 (04 Jun 2020 04:50)  T(F): 98 (04 Jun 2020 04:50), Max: 98 (04 Jun 2020 04:50)  HR: 108 (04 Jun 2020 04:50) (95 - 108)  BP: 137/81 (04 Jun 2020 04:50) (125/71 - 137/81)  BP(mean): --  RR: 19 (04 Jun 2020 04:50) (19 - 20)  SpO2: 98% (04 Jun 2020 04:50) (98% - 98%)    ________________________________________________  PHYSICAL EXAM:  GENERAL: NAD  HEENT: Normocephalic;  conjunctivae and sclerae clear; moist mucous membranes;   NECK : supple  CHEST/LUNG: Clear to auscultation bilaterally with good air entry   HEART: S1 S2  regular; no murmurs, gallops or rubs  ABDOMEN: Soft, Nontender, Nondistended; Bowel sounds present, peg tube  EXTREMITIES: no cyanosis; no edema; no calf tenderness  SKIN: warm and dry; no rash  NERVOUS SYSTEM: Awake, Confused  _________________________________________________  LABS:                        12.4   12.74 )-----------( 194      ( 04 Jun 2020 07:41 )             37.6     06-04    138  |  103  |  15  ----------------------------<  141<H>  3.8   |  27  |  0.95    Ca    9.6      04 Jun 2020 07:41          CAPILLARY BLOOD GLUCOSE      POCT Blood Glucose.: 176 mg/dL (04 Jun 2020 11:39)  POCT Blood Glucose.: 183 mg/dL (04 Jun 2020 06:17)  POCT Blood Glucose.: 183 mg/dL (04 Jun 2020 00:08)  POCT Blood Glucose.: 164 mg/dL (03 Jun 2020 21:35)  POCT Blood Glucose.: 178 mg/dL (03 Jun 2020 17:32)        RADIOLOGY & ADDITIONAL TESTS:    Imaging  Reviewed:  YES/NO    Consultant(s) Notes Reviewed:   YES/ No      Plan of care was discussed with patient and /or primary care giver; all questions and concerns were addressed

## 2020-06-05 LAB
ANION GAP SERPL CALC-SCNC: 8 MMOL/L — SIGNIFICANT CHANGE UP (ref 5–17)
BUN SERPL-MCNC: 7 MG/DL — SIGNIFICANT CHANGE UP (ref 7–18)
CALCIUM SERPL-MCNC: 9 MG/DL — SIGNIFICANT CHANGE UP (ref 8.4–10.5)
CHLORIDE SERPL-SCNC: 100 MMOL/L — SIGNIFICANT CHANGE UP (ref 96–108)
CO2 SERPL-SCNC: 26 MMOL/L — SIGNIFICANT CHANGE UP (ref 22–31)
CREAT SERPL-MCNC: 0.78 MG/DL — SIGNIFICANT CHANGE UP (ref 0.5–1.3)
GLUCOSE BLDC GLUCOMTR-MCNC: 175 MG/DL — HIGH (ref 70–99)
GLUCOSE BLDC GLUCOMTR-MCNC: 193 MG/DL — HIGH (ref 70–99)
GLUCOSE BLDC GLUCOMTR-MCNC: 240 MG/DL — HIGH (ref 70–99)
GLUCOSE BLDC GLUCOMTR-MCNC: 260 MG/DL — HIGH (ref 70–99)
GLUCOSE BLDC GLUCOMTR-MCNC: 292 MG/DL — HIGH (ref 70–99)
GLUCOSE SERPL-MCNC: 230 MG/DL — HIGH (ref 70–99)
HCT VFR BLD CALC: 34.3 % — LOW (ref 39–50)
HGB BLD-MCNC: 11.5 G/DL — LOW (ref 13–17)
MCHC RBC-ENTMCNC: 28.7 PG — SIGNIFICANT CHANGE UP (ref 27–34)
MCHC RBC-ENTMCNC: 33.5 GM/DL — SIGNIFICANT CHANGE UP (ref 32–36)
MCV RBC AUTO: 85.5 FL — SIGNIFICANT CHANGE UP (ref 80–100)
NRBC # BLD: 0 /100 WBCS — SIGNIFICANT CHANGE UP (ref 0–0)
PLATELET # BLD AUTO: 181 K/UL — SIGNIFICANT CHANGE UP (ref 150–400)
POTASSIUM SERPL-MCNC: 3.9 MMOL/L — SIGNIFICANT CHANGE UP (ref 3.5–5.3)
POTASSIUM SERPL-SCNC: 3.9 MMOL/L — SIGNIFICANT CHANGE UP (ref 3.5–5.3)
RBC # BLD: 4.01 M/UL — LOW (ref 4.2–5.8)
RBC # FLD: 13.8 % — SIGNIFICANT CHANGE UP (ref 10.3–14.5)
SODIUM SERPL-SCNC: 134 MMOL/L — LOW (ref 135–145)
WBC # BLD: 12.4 K/UL — HIGH (ref 3.8–10.5)
WBC # FLD AUTO: 12.4 K/UL — HIGH (ref 3.8–10.5)

## 2020-06-05 PROCEDURE — 99232 SBSQ HOSP IP/OBS MODERATE 35: CPT

## 2020-06-05 RX ORDER — INSULIN LISPRO 100/ML
2 VIAL (ML) SUBCUTANEOUS EVERY 6 HOURS
Refills: 0 | Status: DISCONTINUED | OUTPATIENT
Start: 2020-06-05 | End: 2020-06-06

## 2020-06-05 RX ORDER — INSULIN GLARGINE 100 [IU]/ML
8 INJECTION, SOLUTION SUBCUTANEOUS AT BEDTIME
Refills: 0 | Status: DISCONTINUED | OUTPATIENT
Start: 2020-06-05 | End: 2020-06-06

## 2020-06-05 RX ADMIN — Medication 500 MILLIGRAM(S): at 17:43

## 2020-06-05 RX ADMIN — Medication 2 UNIT(S): at 23:52

## 2020-06-05 RX ADMIN — HEPARIN SODIUM 5000 UNIT(S): 5000 INJECTION INTRAVENOUS; SUBCUTANEOUS at 17:43

## 2020-06-05 RX ADMIN — Medication 1 APPLICATION(S): at 11:25

## 2020-06-05 RX ADMIN — Medication 2: at 11:25

## 2020-06-05 RX ADMIN — INSULIN GLARGINE 8 UNIT(S): 100 INJECTION, SOLUTION SUBCUTANEOUS at 23:52

## 2020-06-05 RX ADMIN — Medication 1 TABLET(S): at 11:25

## 2020-06-05 RX ADMIN — NYSTATIN CREAM 1 APPLICATION(S): 100000 CREAM TOPICAL at 17:44

## 2020-06-05 RX ADMIN — NYSTATIN CREAM 1 APPLICATION(S): 100000 CREAM TOPICAL at 07:37

## 2020-06-05 RX ADMIN — Medication 4: at 08:25

## 2020-06-05 RX ADMIN — Medication 6: at 17:44

## 2020-06-05 RX ADMIN — QUETIAPINE FUMARATE 25 MILLIGRAM(S): 200 TABLET, FILM COATED ORAL at 21:23

## 2020-06-05 RX ADMIN — HEPARIN SODIUM 5000 UNIT(S): 5000 INJECTION INTRAVENOUS; SUBCUTANEOUS at 07:37

## 2020-06-05 RX ADMIN — PANTOPRAZOLE SODIUM 40 MILLIGRAM(S): 20 TABLET, DELAYED RELEASE ORAL at 11:25

## 2020-06-05 RX ADMIN — ZINC SULFATE TAB 220 MG (50 MG ZINC EQUIVALENT) 220 MILLIGRAM(S): 220 (50 ZN) TAB at 11:25

## 2020-06-05 RX ADMIN — OLANZAPINE 2.5 MILLIGRAM(S): 15 TABLET, FILM COATED ORAL at 21:23

## 2020-06-05 RX ADMIN — Medication 500 MILLIGRAM(S): at 07:37

## 2020-06-05 RX ADMIN — MIRTAZAPINE 7.5 MILLIGRAM(S): 45 TABLET, ORALLY DISINTEGRATING ORAL at 21:23

## 2020-06-05 NOTE — PROGRESS NOTE ADULT - SUBJECTIVE AND OBJECTIVE BOX
Patient is a 64y old  Male who presents with a chief complaint of seizures (05 Jun 2020 12:38)    HPI - 64 year old male from [facility]  with PMH dementia and DM coming in with seizures like activity while sitting at AL.  Pt had peg placement on 06/02/2020 for FTT 2/2 to dysphagia and poor oral intake related to changes in mental status.     Pt tolerates Peg feeding, medically cleared to be discharged, pending covid-19  2 negative results required by [facility]. Pt tested Covid positive on 06/03.  Plan to retest Saturday and Sunday (06/06, 06/07)  for discharge plan on Monday (06/08.)     Pt seen and examined, awake, noncommunicating, not in distress. OOB to chair recommended.  INTERVAL HPI/OVERNIGHT EVENTS: no new complaints    MEDICATIONS  (STANDING):  ascorbic acid 500 milliGRAM(s) Oral two times a day  collagenase Ointment 1 Application(s) Topical daily  dextrose 5% + sodium chloride 0.9%. 1000 milliLiter(s) (50 mL/Hr) IV Continuous <Continuous>  dextrose 5% + sodium chloride 0.9%. 1000 milliLiter(s) (50 mL/Hr) IV Continuous <Continuous>  dextrose 5%. 1000 milliLiter(s) (50 mL/Hr) IV Continuous <Continuous>  dextrose 50% Injectable 12.5 Gram(s) IV Push once  dextrose 50% Injectable 25 Gram(s) IV Push once  dextrose 50% Injectable 25 Gram(s) IV Push once  ergocalciferol 65608 Unit(s) Oral <User Schedule>  heparin   Injectable 5000 Unit(s) SubCutaneous every 12 hours  insulin glargine Injectable (LANTUS) 8 Unit(s) SubCutaneous at bedtime  insulin lispro (HumaLOG) corrective regimen sliding scale   SubCutaneous three times a day before meals  mirtazapine 7.5 milliGRAM(s) Oral at bedtime  multivitamin/minerals 1 Tablet(s) Oral daily  nystatin Powder 1 Application(s) Topical two times a day  OLANZapine 2.5 milliGRAM(s) Oral at bedtime  pantoprazole   Suspension 40 milliGRAM(s) Enteral Tube daily  QUEtiapine 25 milliGRAM(s) Oral at bedtime  zinc sulfate 220 milliGRAM(s) Oral daily    MEDICATIONS  (PRN):  acetaminophen    Suspension .. 650 milliGRAM(s) Enteral Tube every 6 hours PRN Temp greater or equal to 38C (100.4F)  ALBUTerol    90 MICROgram(s) HFA Inhaler 2 Puff(s) Inhalation every 6 hours PRN Shortness of Breath and/or Wheezing  dextrose 40% Gel 15 Gram(s) Oral once PRN Blood Glucose LESS THAN 70 milliGRAM(s)/deciliter  glucagon  Injectable 1 milliGRAM(s) IntraMuscular once PRN Glucose LESS THAN 70 milligrams/deciliter  guaiFENesin   Syrup  (Sugar-Free) 100 milliGRAM(s) Oral every 6 hours PRN Cough      _Unable to assess due to pt's mental status_________________________________________________  REVIEW OF SYSTEMS:      Vital Signs Last 24 Hrs  T(C): 36.7 (05 Jun 2020 04:50), Max: 37.3 (04 Jun 2020 13:50)  T(F): 98.1 (05 Jun 2020 04:50), Max: 99.2 (04 Jun 2020 13:50)  HR: 91 (05 Jun 2020 04:50) (91 - 104)  BP: 125/71 (05 Jun 2020 04:50) (114/82 - 125/71)  BP(mean): --  RR: 18 (05 Jun 2020 04:50) (16 - 18)  SpO2: 99% (05 Jun 2020 04:50) (96% - 100%)    ________________________________________________  PHYSICAL EXAM:  GENERAL: NAD  HEENT: Normocephalic;  conjunctivae and sclerae clear; moist mucous membranes;   NECK : supple  CHEST/LUNG: Clear to auscultation bilaterally with good air entry   HEART: S1 S2  regular; no murmurs, gallops or rubs  ABDOMEN: Soft, Nontender, Nondistended; Bowel sounds present, peg tube  EXTREMITIES: no cyanosis; no edema; no calf tenderness  SKIN: warm and dry; no rash  NERVOUS SYSTEM:  Awake, no eye contact_________________________________________________  LABS:                        11.5   12.40 )-----------( 181      ( 05 Jun 2020 05:47 )             34.3     06-05    134<L>  |  100  |  7   ----------------------------<  230<H>  3.9   |  26  |  0.78    Ca    9.0      05 Jun 2020 05:47          CAPILLARY BLOOD GLUCOSE      POCT Blood Glucose.: 193 mg/dL (05 Jun 2020 11:18)  POCT Blood Glucose.: 240 mg/dL (05 Jun 2020 07:59)  POCT Blood Glucose.: 175 mg/dL (05 Jun 2020 00:23)  POCT Blood Glucose.: 198 mg/dL (04 Jun 2020 17:19)        RADIOLOGY & ADDITIONAL TESTS:    Imaging  Reviewed:  YES  Consultant(s) Notes Reviewed:   YES    Plan of care was discussed with patient and /or primary care giver; all questions and concerns were addressed

## 2020-06-05 NOTE — PROGRESS NOTE ADULT - SUBJECTIVE AND OBJECTIVE BOX
[   ] ICU                                          [   ] CCU                                      [ X  ] Medical Floor      Patient is comfortable. No new complaints reported, No abdominal pain, N/V, hematemesis, hematochezia, melena, fever, chills, chest pain, SOB, cough or diarrhea reported.    VITALS  Vital Signs Last 24 Hrs  T(C): 36.5 (05 Jun 2020 13:37), Max: 36.8 (04 Jun 2020 20:48)  T(F): 97.7 (05 Jun 2020 13:37), Max: 98.3 (04 Jun 2020 20:48)  HR: 98 (05 Jun 2020 15:06) (91 - 100)  BP: 148/71 (05 Jun 2020 13:37) (116/69 - 148/71)   RR: 16 (05 Jun 2020 13:37) (16 - 18)  SpO2: 96% (05 Jun 2020 15:06) (96% - 99%)       MEDICATIONS  (STANDING):  ascorbic acid 500 milliGRAM(s) Oral two times a day  collagenase Ointment 1 Application(s) Topical daily  dextrose 5% + sodium chloride 0.9%. 1000 milliLiter(s) (50 mL/Hr) IV Continuous <Continuous>  dextrose 5% + sodium chloride 0.9%. 1000 milliLiter(s) (50 mL/Hr) IV Continuous <Continuous>  dextrose 5%. 1000 milliLiter(s) (50 mL/Hr) IV Continuous <Continuous>  dextrose 50% Injectable 12.5 Gram(s) IV Push once  dextrose 50% Injectable 25 Gram(s) IV Push once  dextrose 50% Injectable 25 Gram(s) IV Push once  ergocalciferol 60986 Unit(s) Oral <User Schedule>  heparin   Injectable 5000 Unit(s) SubCutaneous every 12 hours  insulin glargine Injectable (LANTUS) 8 Unit(s) SubCutaneous at bedtime  insulin lispro (HumaLOG) corrective regimen sliding scale   SubCutaneous three times a day before meals  mirtazapine 7.5 milliGRAM(s) Oral at bedtime  multivitamin/minerals 1 Tablet(s) Oral daily  nystatin Powder 1 Application(s) Topical two times a day  OLANZapine 2.5 milliGRAM(s) Oral at bedtime  pantoprazole   Suspension 40 milliGRAM(s) Enteral Tube daily  QUEtiapine 25 milliGRAM(s) Oral at bedtime  zinc sulfate 220 milliGRAM(s) Oral daily    MEDICATIONS  (PRN):  acetaminophen    Suspension .. 650 milliGRAM(s) Enteral Tube every 6 hours PRN Temp greater or equal to 38C (100.4F)  ALBUTerol    90 MICROgram(s) HFA Inhaler 2 Puff(s) Inhalation every 6 hours PRN Shortness of Breath and/or Wheezing  dextrose 40% Gel 15 Gram(s) Oral once PRN Blood Glucose LESS THAN 70 milliGRAM(s)/deciliter  glucagon  Injectable 1 milliGRAM(s) IntraMuscular once PRN Glucose LESS THAN 70 milligrams/deciliter  guaiFENesin   Syrup  (Sugar-Free) 100 milliGRAM(s) Oral every 6 hours PRN Cough                            11.5   12.40 )-----------( 181      ( 05 Jun 2020 05:47 )             34.3       06-05    134<L>  |  100  |  7   ----------------------------<  230<H>  3.9   |  26  |  0.78    Ca    9.0      05 Jun 2020 05:47

## 2020-06-05 NOTE — PROGRESS NOTE ADULT - ASSESSMENT
64 year old male from [facility] with PMH of dementia and type 2 DM presented with seizure activity while sitting at AL. Endocrinology was consulted for management of uncontrolled type 2 DM with hyperglycemia.    1. Uncontrolled Type 2 DM with hyperglycemia, A1c 9.9%  -patient tolerating tube feedings via PEG   -Increase to Lantus 8 units at bedtime  -continue moderate dose rapid acting insulin q 6 hrs   BG 70-100mg/dL 0 units  -150 mg/dL 0 units  -200 mg/dL 2 unit  -250 mg/dL 4 units  -300 mg/dL 6 units  -350 mg/dL 8 units  -400 mg/dL 10 units  BG > 400mg/dL 12 units  -FS AC HS  -ensure consistent carbohydrate   -will monitor FS and adjust accordingly     2. Delirium  -patient with mitten restraints  -continue Seroquel, Zyprexa, Remeron    Plan discussed with primary team]  Please  call  w/ any questions or concerns [phone]

## 2020-06-05 NOTE — PROGRESS NOTE ADULT - PROBLEM SELECTOR PLAN 1
oropharyngeal dysphagia due to  dementia   Peg placed on 06/02  tolerates feeding  continue po meds through peg tube  Continue TF

## 2020-06-05 NOTE — PROGRESS NOTE ADULT - SUBJECTIVE AND OBJECTIVE BOX
Patient is a 64y old  Male who presents with a chief complaint of seizures (04 Jun 2020 10:07)    HPI - 64 year old male from [facility]  with PMH dementia and DM coming in with seizures like activity while sitting at AL.  Pt had peg placement on 06/02/2020 for FTT 2/2 to dysphagia and poor oral intake related to changes in mental status.    6/5 Pt seen and examined, awake, noncommunicating, nad.   Pt tolerates Peg feeding, medically cleared to be discharged, pending covid-19  2 negative results required by [facility]. Pt tested Covid positive on 06/03.  Plan to retest Saturday and Sunday (06/06, 06/07)  for discharge plan on Monday (06/08.)

## 2020-06-05 NOTE — PROGRESS NOTE ADULT - SUBJECTIVE AND OBJECTIVE BOX
64 year old male from [facility] with PMH of dementia and type 2 DM presented with seizure activity while sitting at AL. Endocrinology was consulted for management of uncontrolled type 2 DM with hyperglycemia.    Patient seen and examined at bedside. Patient is resting comfortably. FS reviewed. Fasting BG above goal >200 despite Lantus 6 units at bedtime. Patient tolerating PEG tube feedings.    MEDICATIONS  (STANDING):  ascorbic acid 500 milliGRAM(s) Oral two times a day  collagenase Ointment 1 Application(s) Topical daily  dextrose 5% + sodium chloride 0.9%. 1000 milliLiter(s) (50 mL/Hr) IV Continuous <Continuous>  dextrose 5% + sodium chloride 0.9%. 1000 milliLiter(s) (50 mL/Hr) IV Continuous <Continuous>  dextrose 5%. 1000 milliLiter(s) (50 mL/Hr) IV Continuous <Continuous>  dextrose 50% Injectable 12.5 Gram(s) IV Push once  dextrose 50% Injectable 25 Gram(s) IV Push once  dextrose 50% Injectable 25 Gram(s) IV Push once  ergocalciferol 38436 Unit(s) Oral <User Schedule>  heparin   Injectable 5000 Unit(s) SubCutaneous every 12 hours  insulin glargine Injectable (LANTUS) 8 Unit(s) SubCutaneous at bedtime  insulin lispro (HumaLOG) corrective regimen sliding scale   SubCutaneous three times a day before meals  mirtazapine 7.5 milliGRAM(s) Oral at bedtime  multivitamin/minerals 1 Tablet(s) Oral daily  nystatin Powder 1 Application(s) Topical two times a day  OLANZapine 2.5 milliGRAM(s) Oral at bedtime  pantoprazole   Suspension 40 milliGRAM(s) Enteral Tube daily  QUEtiapine 25 milliGRAM(s) Oral at bedtime  zinc sulfate 220 milliGRAM(s) Oral daily    MEDICATIONS  (PRN):  acetaminophen    Suspension .. 650 milliGRAM(s) Enteral Tube every 6 hours PRN Temp greater or equal to 38C (100.4F)  ALBUTerol    90 MICROgram(s) HFA Inhaler 2 Puff(s) Inhalation every 6 hours PRN Shortness of Breath and/or Wheezing  dextrose 40% Gel 15 Gram(s) Oral once PRN Blood Glucose LESS THAN 70 milliGRAM(s)/deciliter  glucagon  Injectable 1 milliGRAM(s) IntraMuscular once PRN Glucose LESS THAN 70 milligrams/deciliter  guaiFENesin   Syrup  (Sugar-Free) 100 milliGRAM(s) Oral every 6 hours PRN Cough      REVIEW OF SYSTEMS:  unable to obtain due to medical condition    PHYSICAL EXAM:    VITAL SIGNS  T(C): 36.7 (06-05-20 @ 04:50), Max: 37.3 (06-04-20 @ 13:50)  HR: 91 (06-05-20 @ 04:50) (91 - 104)  BP: 125/71 (06-05-20 @ 04:50) (114/82 - 125/71)  RR: 18 (06-05-20 @ 04:50) (16 - 18)  SpO2: 99% (06-05-20 @ 04:50) (96% - 100%)      GENERAL: NAD, well-developed  HEAD:  Atraumatic, Normocephalic  EYES: EOMI, PERRLA, conjunctiva and sclera clear  ENMT: No tonsillar erythema, exudates, or enlargement; No lesions  NECK: Supple, No JVD, Normal thyroid  NERVOUS SYSTEM:  nonverbal,  CHEST/LUNG: CTA bilaterally  No rales, rhonchi, wheezing, or rubs  HEART: Regular rate and rhythm; No murmurs, rubs, or gallops  ABDOMEN: s/p PEG, Soft, Nontender, Nondistended; Bowel sounds present  EXTREMITIES:  No edema  SKIN: No rashes or lesions    LABS:                        11.5   12.40 )-----------( 181      ( 05 Jun 2020 05:47 )             34.3     06-05    134<L>  |  100  |  7   ----------------------------<  230<H>  3.9   |  26  |  0.78    Ca    9.0      05 Jun 2020 05:47          CAPILLARY BLOOD GLUCOSE      POCT Blood Glucose.: 193 mg/dL (05 Jun 2020 11:18)  POCT Blood Glucose.: 240 mg/dL (05 Jun 2020 07:59)  POCT Blood Glucose.: 175 mg/dL (05 Jun 2020 00:23)  POCT Blood Glucose.: 198 mg/dL (04 Jun 2020 17:19)

## 2020-06-05 NOTE — PROGRESS NOTE ADULT - SUBJECTIVE AND OBJECTIVE BOX
Pt is awake, alert, lying in bed in NAD. Peg tube feeds tolerated.     INTERVAL HPI/OVERNIGHT EVENTS:      VITAL SIGNS:  T(F): 98.1 (06-05-20 @ 04:50)  HR: 91 (06-05-20 @ 04:50)  BP: 125/71 (06-05-20 @ 04:50)  RR: 18 (06-05-20 @ 04:50)  SpO2: 99% (06-05-20 @ 04:50)  Wt(kg): --  I&O's Detail          REVIEW OF SYSTEMS:    CONSTITUTIONAL:  No fevers, chills, sweats    HEENT:  Eyes:  No diplopia or blurred vision. ENT:  No earache, sore throat or runny nose.    CARDIOVASCULAR:  No pressure, squeezing, tightness, or heaviness about the chest; no palpitations.    RESPIRATORY:  Per HPI    GASTROINTESTINAL:  No abdominal pain, nausea, vomiting or diarrhea.    GENITOURINARY:  No dysuria, frequency or urgency.    NEUROLOGIC:  No paresthesias, fasciculations, seizures or weakness.    PSYCHIATRIC:  No disorder of thought or mood.      PHYSICAL EXAM:    Constitutional: Well developed and nourished  Eyes: Perrla  ENMT: normal  Neck: supple  Respiratory: good air entry  Cardiovascular: S1 S2 regular  Gastrointestinal: Soft, Non tender  Extremities: No edema  Vascular: normal  Neurological: Awake, alert  Musculoskeletal: Normal      MEDICATIONS  (STANDING):  ascorbic acid 500 milliGRAM(s) Oral two times a day  collagenase Ointment 1 Application(s) Topical daily  dextrose 5% + sodium chloride 0.9%. 1000 milliLiter(s) (50 mL/Hr) IV Continuous <Continuous>  dextrose 5% + sodium chloride 0.9%. 1000 milliLiter(s) (50 mL/Hr) IV Continuous <Continuous>  dextrose 5%. 1000 milliLiter(s) (50 mL/Hr) IV Continuous <Continuous>  dextrose 50% Injectable 12.5 Gram(s) IV Push once  dextrose 50% Injectable 25 Gram(s) IV Push once  dextrose 50% Injectable 25 Gram(s) IV Push once  ergocalciferol 17590 Unit(s) Oral <User Schedule>  heparin   Injectable 5000 Unit(s) SubCutaneous every 12 hours  insulin glargine Injectable (LANTUS) 6 Unit(s) SubCutaneous at bedtime  insulin lispro (HumaLOG) corrective regimen sliding scale   SubCutaneous three times a day before meals  mirtazapine 7.5 milliGRAM(s) Oral at bedtime  multivitamin/minerals 1 Tablet(s) Oral daily  nystatin Powder 1 Application(s) Topical two times a day  OLANZapine 2.5 milliGRAM(s) Oral at bedtime  pantoprazole   Suspension 40 milliGRAM(s) Enteral Tube daily  QUEtiapine 25 milliGRAM(s) Oral at bedtime  zinc sulfate 220 milliGRAM(s) Oral daily    MEDICATIONS  (PRN):  acetaminophen    Suspension .. 650 milliGRAM(s) Enteral Tube every 6 hours PRN Temp greater or equal to 38C (100.4F)  ALBUTerol    90 MICROgram(s) HFA Inhaler 2 Puff(s) Inhalation every 6 hours PRN Shortness of Breath and/or Wheezing  dextrose 40% Gel 15 Gram(s) Oral once PRN Blood Glucose LESS THAN 70 milliGRAM(s)/deciliter  glucagon  Injectable 1 milliGRAM(s) IntraMuscular once PRN Glucose LESS THAN 70 milligrams/deciliter  guaiFENesin   Syrup  (Sugar-Free) 100 milliGRAM(s) Oral every 6 hours PRN Cough      Allergies    No Known Allergies    Intolerances        LABS:                        11.5   12.40 )-----------( 181      ( 05 Jun 2020 05:47 )             34.3     06-05    134<L>  |  100  |  7   ----------------------------<  230<H>  3.9   |  26  |  0.78    Ca    9.0      05 Jun 2020 05:47                CAPILLARY BLOOD GLUCOSE      POCT Blood Glucose.: 240 mg/dL (05 Jun 2020 07:59)  POCT Blood Glucose.: 175 mg/dL (05 Jun 2020 00:23)  POCT Blood Glucose.: 198 mg/dL (04 Jun 2020 17:19)  POCT Blood Glucose.: 176 mg/dL (04 Jun 2020 11:39)        RADIOLOGY & ADDITIONAL TESTS:    CXR:    Ct scan chest:    ekg;    echo: Pt is awake,  not alert, lying in bed in NAD. Peg tube feeds tolerated.     INTERVAL HPI/OVERNIGHT EVENTS:      VITAL SIGNS:  T(F): 98.1 (06-05-20 @ 04:50)  HR: 91 (06-05-20 @ 04:50)  BP: 125/71 (06-05-20 @ 04:50)  RR: 18 (06-05-20 @ 04:50)  SpO2: 99% (06-05-20 @ 04:50)  Wt(kg): --  I&O's Detail          REVIEW OF SYSTEMS:    CONSTITUTIONAL:  No fevers, chills, sweats    HEENT:  Eyes:  No diplopia or blurred vision. ENT:  No earache, sore throat or runny nose.    CARDIOVASCULAR:  No pressure, squeezing, tightness, or heaviness about the chest; no palpitations.    RESPIRATORY:  Per HPI    GASTROINTESTINAL:  No abdominal pain, nausea, vomiting or diarrhea.    GENITOURINARY:  No dysuria, frequency or urgency.    NEUROLOGIC:  No paresthesias, fasciculations, seizures or weakness.    PSYCHIATRIC:  No disorder of thought or mood.      PHYSICAL EXAM:    Constitutional: Well developed and nourished  Eyes: Perrla  ENMT: normal  Neck: supple  Respiratory: good air entry  Cardiovascular: S1 S2 regular  Gastrointestinal: Soft, Non tender  Extremities: No edema  Vascular: normal  Neurological: Awake, alert  Musculoskeletal: Normal      MEDICATIONS  (STANDING):  ascorbic acid 500 milliGRAM(s) Oral two times a day  collagenase Ointment 1 Application(s) Topical daily  dextrose 5% + sodium chloride 0.9%. 1000 milliLiter(s) (50 mL/Hr) IV Continuous <Continuous>  dextrose 5% + sodium chloride 0.9%. 1000 milliLiter(s) (50 mL/Hr) IV Continuous <Continuous>  dextrose 5%. 1000 milliLiter(s) (50 mL/Hr) IV Continuous <Continuous>  dextrose 50% Injectable 12.5 Gram(s) IV Push once  dextrose 50% Injectable 25 Gram(s) IV Push once  dextrose 50% Injectable 25 Gram(s) IV Push once  ergocalciferol 55564 Unit(s) Oral <User Schedule>  heparin   Injectable 5000 Unit(s) SubCutaneous every 12 hours  insulin glargine Injectable (LANTUS) 6 Unit(s) SubCutaneous at bedtime  insulin lispro (HumaLOG) corrective regimen sliding scale   SubCutaneous three times a day before meals  mirtazapine 7.5 milliGRAM(s) Oral at bedtime  multivitamin/minerals 1 Tablet(s) Oral daily  nystatin Powder 1 Application(s) Topical two times a day  OLANZapine 2.5 milliGRAM(s) Oral at bedtime  pantoprazole   Suspension 40 milliGRAM(s) Enteral Tube daily  QUEtiapine 25 milliGRAM(s) Oral at bedtime  zinc sulfate 220 milliGRAM(s) Oral daily    MEDICATIONS  (PRN):  acetaminophen    Suspension .. 650 milliGRAM(s) Enteral Tube every 6 hours PRN Temp greater or equal to 38C (100.4F)  ALBUTerol    90 MICROgram(s) HFA Inhaler 2 Puff(s) Inhalation every 6 hours PRN Shortness of Breath and/or Wheezing  dextrose 40% Gel 15 Gram(s) Oral once PRN Blood Glucose LESS THAN 70 milliGRAM(s)/deciliter  glucagon  Injectable 1 milliGRAM(s) IntraMuscular once PRN Glucose LESS THAN 70 milligrams/deciliter  guaiFENesin   Syrup  (Sugar-Free) 100 milliGRAM(s) Oral every 6 hours PRN Cough      Allergies    No Known Allergies    Intolerances        LABS:                        11.5   12.40 )-----------( 181      ( 05 Jun 2020 05:47 )             34.3     06-05    134<L>  |  100  |  7   ----------------------------<  230<H>  3.9   |  26  |  0.78    Ca    9.0      05 Jun 2020 05:47                CAPILLARY BLOOD GLUCOSE      POCT Blood Glucose.: 240 mg/dL (05 Jun 2020 07:59)  POCT Blood Glucose.: 175 mg/dL (05 Jun 2020 00:23)  POCT Blood Glucose.: 198 mg/dL (04 Jun 2020 17:19)  POCT Blood Glucose.: 176 mg/dL (04 Jun 2020 11:39)        RADIOLOGY & ADDITIONAL TESTS:    CXR:    Ct scan chest:    ekg;    echo:

## 2020-06-05 NOTE — PROGRESS NOTE ADULT - PROBLEM SELECTOR PLAN 10
Discharge to [facility]  after 2 consecutive negative testings for Covid-19 as required by [facility]. 06/03 testing was positive. Discharge planning Monday 06/08 with swabs on 06/06 and 06/07, If 06/06 neg.

## 2020-06-05 NOTE — PROGRESS NOTE ADULT - PROBLEM SELECTOR PLAN 3
due to dementia with dysphagia  poor po intake during this hospital course  Palliative consulted - pt got PEG tube placement   Dietician consulted  Tolerates peg tube feeding

## 2020-06-05 NOTE — PROGRESS NOTE ADULT - PROBLEM SELECTOR PLAN 4
Continue strict aspiration precaution  CHXray noted for opacities on 05/23/2020   Pt got Peg tube placement-  Continue TF and  po med via Peg  No sob noted  WBC elevated day after Peg placement, downtrending  afebrile  off antibiotics  Pulmonary on board

## 2020-06-05 NOTE — PROGRESS NOTE ADULT - PROBLEM SELECTOR PLAN 3
Continue meds.  Supportive care.  Off O2.   Covid swab - will require negative x 2 for d/c planning.   Isolation precautions

## 2020-06-05 NOTE — PROGRESS NOTE ADULT - PROBLEM SELECTOR PLAN 10
Discharge to [facility]  after 2 consecutive negative testings for Covid-19 as required by [facility]. 06/03 testing was positive. Discharge planning Monday 06/08 with swabs on 06/06 and 06/07, If 06/06 neg.

## 2020-06-06 LAB
ANION GAP SERPL CALC-SCNC: 10 MMOL/L — SIGNIFICANT CHANGE UP (ref 5–17)
BUN SERPL-MCNC: 16 MG/DL — SIGNIFICANT CHANGE UP (ref 7–18)
CALCIUM SERPL-MCNC: 8.9 MG/DL — SIGNIFICANT CHANGE UP (ref 8.4–10.5)
CHLORIDE SERPL-SCNC: 97 MMOL/L — SIGNIFICANT CHANGE UP (ref 96–108)
CO2 SERPL-SCNC: 27 MMOL/L — SIGNIFICANT CHANGE UP (ref 22–31)
CREAT SERPL-MCNC: 0.9 MG/DL — SIGNIFICANT CHANGE UP (ref 0.5–1.3)
GLUCOSE BLDC GLUCOMTR-MCNC: 224 MG/DL — HIGH (ref 70–99)
GLUCOSE BLDC GLUCOMTR-MCNC: 227 MG/DL — HIGH (ref 70–99)
GLUCOSE BLDC GLUCOMTR-MCNC: 233 MG/DL — HIGH (ref 70–99)
GLUCOSE BLDC GLUCOMTR-MCNC: 282 MG/DL — HIGH (ref 70–99)
GLUCOSE BLDC GLUCOMTR-MCNC: 286 MG/DL — HIGH (ref 70–99)
GLUCOSE SERPL-MCNC: 265 MG/DL — HIGH (ref 70–99)
HCT VFR BLD CALC: 32.9 % — LOW (ref 39–50)
HGB BLD-MCNC: 11 G/DL — LOW (ref 13–17)
MCHC RBC-ENTMCNC: 28.6 PG — SIGNIFICANT CHANGE UP (ref 27–34)
MCHC RBC-ENTMCNC: 33.4 GM/DL — SIGNIFICANT CHANGE UP (ref 32–36)
MCV RBC AUTO: 85.7 FL — SIGNIFICANT CHANGE UP (ref 80–100)
NRBC # BLD: 0 /100 WBCS — SIGNIFICANT CHANGE UP (ref 0–0)
PLATELET # BLD AUTO: 170 K/UL — SIGNIFICANT CHANGE UP (ref 150–400)
POTASSIUM SERPL-MCNC: 3.9 MMOL/L — SIGNIFICANT CHANGE UP (ref 3.5–5.3)
POTASSIUM SERPL-SCNC: 3.9 MMOL/L — SIGNIFICANT CHANGE UP (ref 3.5–5.3)
RBC # BLD: 3.84 M/UL — LOW (ref 4.2–5.8)
RBC # FLD: 13.4 % — SIGNIFICANT CHANGE UP (ref 10.3–14.5)
SARS-COV-2 RNA SPEC QL NAA+PROBE: DETECTED
SODIUM SERPL-SCNC: 134 MMOL/L — LOW (ref 135–145)
WBC # BLD: 9.56 K/UL — SIGNIFICANT CHANGE UP (ref 3.8–10.5)
WBC # FLD AUTO: 9.56 K/UL — SIGNIFICANT CHANGE UP (ref 3.8–10.5)

## 2020-06-06 PROCEDURE — 99232 SBSQ HOSP IP/OBS MODERATE 35: CPT

## 2020-06-06 RX ORDER — INSULIN GLARGINE 100 [IU]/ML
10 INJECTION, SOLUTION SUBCUTANEOUS AT BEDTIME
Refills: 0 | Status: DISCONTINUED | OUTPATIENT
Start: 2020-06-06 | End: 2020-06-07

## 2020-06-06 RX ORDER — INSULIN LISPRO 100/ML
5 VIAL (ML) SUBCUTANEOUS EVERY 6 HOURS
Refills: 0 | Status: DISCONTINUED | OUTPATIENT
Start: 2020-06-06 | End: 2020-06-07

## 2020-06-06 RX ADMIN — HEPARIN SODIUM 5000 UNIT(S): 5000 INJECTION INTRAVENOUS; SUBCUTANEOUS at 17:04

## 2020-06-06 RX ADMIN — Medication 5 UNIT(S): at 17:03

## 2020-06-06 RX ADMIN — Medication 5 UNIT(S): at 11:42

## 2020-06-06 RX ADMIN — ERGOCALCIFEROL 50000 UNIT(S): 1.25 CAPSULE ORAL at 11:19

## 2020-06-06 RX ADMIN — Medication 1 APPLICATION(S): at 11:19

## 2020-06-06 RX ADMIN — Medication 2 UNIT(S): at 05:53

## 2020-06-06 RX ADMIN — ZINC SULFATE TAB 220 MG (50 MG ZINC EQUIVALENT) 220 MILLIGRAM(S): 220 (50 ZN) TAB at 11:19

## 2020-06-06 RX ADMIN — INSULIN GLARGINE 10 UNIT(S): 100 INJECTION, SOLUTION SUBCUTANEOUS at 21:39

## 2020-06-06 RX ADMIN — NYSTATIN CREAM 1 APPLICATION(S): 100000 CREAM TOPICAL at 17:05

## 2020-06-06 RX ADMIN — QUETIAPINE FUMARATE 25 MILLIGRAM(S): 200 TABLET, FILM COATED ORAL at 21:39

## 2020-06-06 RX ADMIN — Medication 4: at 11:42

## 2020-06-06 RX ADMIN — OLANZAPINE 2.5 MILLIGRAM(S): 15 TABLET, FILM COATED ORAL at 21:39

## 2020-06-06 RX ADMIN — PANTOPRAZOLE SODIUM 40 MILLIGRAM(S): 20 TABLET, DELAYED RELEASE ORAL at 11:20

## 2020-06-06 RX ADMIN — NYSTATIN CREAM 1 APPLICATION(S): 100000 CREAM TOPICAL at 05:20

## 2020-06-06 RX ADMIN — Medication 500 MILLIGRAM(S): at 17:04

## 2020-06-06 RX ADMIN — Medication 500 MILLIGRAM(S): at 05:16

## 2020-06-06 RX ADMIN — Medication 1 TABLET(S): at 11:19

## 2020-06-06 RX ADMIN — MIRTAZAPINE 7.5 MILLIGRAM(S): 45 TABLET, ORALLY DISINTEGRATING ORAL at 21:39

## 2020-06-06 RX ADMIN — HEPARIN SODIUM 5000 UNIT(S): 5000 INJECTION INTRAVENOUS; SUBCUTANEOUS at 05:16

## 2020-06-06 RX ADMIN — Medication 6: at 07:35

## 2020-06-06 RX ADMIN — Medication 4: at 17:04

## 2020-06-06 NOTE — PROGRESS NOTE ADULT - ASSESSMENT
64 year old male from [facility] with PMH of dementia and type 2 DM presented with seizure activity while sitting at AL. Endocrinology was consulted for management of uncontrolled type 2 DM with hyperglycemia.    1. Uncontrolled Type 2 DM with hyperglycemia, A1c 9.9%  -patient tolerating tube feedings via PEG   -BG above goal >200  -Increase to Lantus 10 units at bedtime  -Increase to Humalog 5 units q 6 hrs (hold if tube feedings are held)  -continue moderate dose rapid acting insulin q 6 hrs   BG 70-100mg/dL 0 units  -150 mg/dL 0 units  -200 mg/dL 2 unit  -250 mg/dL 4 units  -300 mg/dL 6 units  -350 mg/dL 8 units  -400 mg/dL 10 units  BG > 400mg/dL 12 units  -FS AC HS  -ensure consistent carbohydrate   -will monitor FS and adjust accordingly     2. Delirium  -patient with mitten restraints  -continue Seroquel, Zyprexa, Remeron    Plan discussed with primary team]  Please  call  w/ any questions or concerns [phone]

## 2020-06-06 NOTE — PROGRESS NOTE ADULT - SUBJECTIVE AND OBJECTIVE BOX
64 year old male from [facility] with PMH of dementia and type 2 DM presented with seizure activity while sitting at AL. Endocrinology was consulted for management of uncontrolled type 2 DM with hyperglycemia.    Patient seen and examined at bedside. Patient is resting comfortably, more awake today. He remains on tube feedings, now at goal rate. FS reviewed. Fasting BG above goal >200 despite Lantus 8 units at bedtime and Humalog 2 units q 6 hrs.       MEDICATIONS  (STANDING):  ascorbic acid 500 milliGRAM(s) Oral two times a day  collagenase Ointment 1 Application(s) Topical daily  dextrose 5% + sodium chloride 0.9%. 1000 milliLiter(s) (50 mL/Hr) IV Continuous <Continuous>  dextrose 5% + sodium chloride 0.9%. 1000 milliLiter(s) (50 mL/Hr) IV Continuous <Continuous>  dextrose 5%. 1000 milliLiter(s) (50 mL/Hr) IV Continuous <Continuous>  dextrose 50% Injectable 12.5 Gram(s) IV Push once  dextrose 50% Injectable 25 Gram(s) IV Push once  dextrose 50% Injectable 25 Gram(s) IV Push once  ergocalciferol 87233 Unit(s) Oral <User Schedule>  heparin   Injectable 5000 Unit(s) SubCutaneous every 12 hours  insulin glargine Injectable (LANTUS) 10 Unit(s) SubCutaneous at bedtime  insulin lispro (HumaLOG) corrective regimen sliding scale   SubCutaneous three times a day before meals  insulin lispro Injectable (HumaLOG) 5 Unit(s) SubCutaneous every 6 hours  mirtazapine 7.5 milliGRAM(s) Oral at bedtime  multivitamin/minerals 1 Tablet(s) Oral daily  nystatin Powder 1 Application(s) Topical two times a day  OLANZapine 2.5 milliGRAM(s) Oral at bedtime  pantoprazole   Suspension 40 milliGRAM(s) Enteral Tube daily  QUEtiapine 25 milliGRAM(s) Oral at bedtime  zinc sulfate 220 milliGRAM(s) Oral daily    MEDICATIONS  (PRN):  acetaminophen    Suspension .. 650 milliGRAM(s) Enteral Tube every 6 hours PRN Temp greater or equal to 38C (100.4F)  ALBUTerol    90 MICROgram(s) HFA Inhaler 2 Puff(s) Inhalation every 6 hours PRN Shortness of Breath and/or Wheezing  dextrose 40% Gel 15 Gram(s) Oral once PRN Blood Glucose LESS THAN 70 milliGRAM(s)/deciliter  glucagon  Injectable 1 milliGRAM(s) IntraMuscular once PRN Glucose LESS THAN 70 milligrams/deciliter  guaiFENesin   Syrup  (Sugar-Free) 100 milliGRAM(s) Oral every 6 hours PRN Cough      REVIEW OF SYSTEMS:  unable to obtain due to medical condition    PHYSICAL EXAM:  VITAL SIGNS  T(C): 36.6 (06-06-20 @ 04:50), Max: 36.6 (06-06-20 @ 04:50)  HR: 99 (06-06-20 @ 04:50) (98 - 99)  BP: 131/70 (06-06-20 @ 04:50) (116/64 - 148/71)  RR: 17 (06-06-20 @ 04:50) (16 - 17)  SpO2: 99% (06-06-20 @ 04:50) (96% - 99%)      GENERAL: NAD, well-developed  HEAD:  Atraumatic, Normocephalic  EYES: EOMI, PERRLA, conjunctiva and sclera clear  ENMT: No tonsillar erythema, exudates, or enlargement; No lesions  NECK: Supple, No JVD, Normal thyroid  NERVOUS SYSTEM:  nonverbal,  CHEST/LUNG: CTA bilaterally  No rales, rhonchi, wheezing, or rubs  HEART: Regular rate and rhythm; No murmurs, rubs, or gallops  ABDOMEN: s/p PEG, Soft, Nontender, Nondistended; Bowel sounds present  EXTREMITIES:  No edema  SKIN: No rashes or lesions    LABS:                        11.0   9.56  )-----------( 170      ( 06 Jun 2020 06:47 )             32.9     06-06    134<L>  |  97  |  16  ----------------------------<  265<H>  3.9   |  27  |  0.90    Ca    8.9      06 Jun 2020 06:47          CAPILLARY BLOOD GLUCOSE      POCT Blood Glucose.: 286 mg/dL (06 Jun 2020 07:28)  POCT Blood Glucose.: 282 mg/dL (06 Jun 2020 05:51)  POCT Blood Glucose.: 260 mg/dL (05 Jun 2020 23:48)  POCT Blood Glucose.: 292 mg/dL (05 Jun 2020 17:18)  POCT Blood Glucose.: 193 mg/dL (05 Jun 2020 11:18)

## 2020-06-06 NOTE — PROGRESS NOTE ADULT - SUBJECTIVE AND OBJECTIVE BOX
CARDIOLOGY/MEDICAL ATTENDING    CHIEF COMPLAINT:Patient is a 64y old  Male who presents with a chief complaint of seizures.Pt appears comfortable.    	  REVIEW OF SYSTEMS:  [x ] Unable to obtain    PHYSICAL EXAM:  T(C): 36.6 (06-06-20 @ 04:50), Max: 36.6 (06-06-20 @ 04:50)  HR: 99 (06-06-20 @ 04:50) (98 - 99)  BP: 131/70 (06-06-20 @ 04:50) (116/64 - 148/71)  RR: 17 (06-06-20 @ 04:50) (16 - 17)  SpO2: 99% (06-06-20 @ 04:50) (96% - 99%)  Wt(kg): --  I&O's Summary      Appearance: Normal	  HEENT:   Normal oral mucosa, PERRL, EOMI	  Lymphatic: No lymphadenopathy  Cardiovascular: Normal S1 S2, No JVD, No murmurs, No edema  Respiratory: Lungs clear to auscultation	  Gastrointestinal:  Soft, Non-tender, + BS	  Skin: No rashes, No ecchymoses, No cyanosis	  Extremities: Normal range of motion, No clubbing, cyanosis or edema  Vascular: Peripheral pulses palpable 2+ bilaterally    MEDICATIONS  (STANDING):  ascorbic acid 500 milliGRAM(s) Oral two times a day  collagenase Ointment 1 Application(s) Topical daily  dextrose 5% + sodium chloride 0.9%. 1000 milliLiter(s) (50 mL/Hr) IV Continuous <Continuous>  dextrose 5% + sodium chloride 0.9%. 1000 milliLiter(s) (50 mL/Hr) IV Continuous <Continuous>  dextrose 5%. 1000 milliLiter(s) (50 mL/Hr) IV Continuous <Continuous>  dextrose 50% Injectable 12.5 Gram(s) IV Push once  dextrose 50% Injectable 25 Gram(s) IV Push once  dextrose 50% Injectable 25 Gram(s) IV Push once  ergocalciferol 66013 Unit(s) Oral <User Schedule>  heparin   Injectable 5000 Unit(s) SubCutaneous every 12 hours  insulin glargine Injectable (LANTUS) 10 Unit(s) SubCutaneous at bedtime  insulin lispro (HumaLOG) corrective regimen sliding scale   SubCutaneous three times a day before meals  insulin lispro Injectable (HumaLOG) 5 Unit(s) SubCutaneous every 6 hours  mirtazapine 7.5 milliGRAM(s) Oral at bedtime  multivitamin/minerals 1 Tablet(s) Oral daily  nystatin Powder 1 Application(s) Topical two times a day  OLANZapine 2.5 milliGRAM(s) Oral at bedtime  pantoprazole   Suspension 40 milliGRAM(s) Enteral Tube daily  QUEtiapine 25 milliGRAM(s) Oral at bedtime  zinc sulfate 220 milliGRAM(s) Oral daily      	  	  LABS:	 	                      11.0   9.56  )-----------( 170      ( 06 Jun 2020 06:47 )             32.9     06-06    134<L>  |  97  |  16  ----------------------------<  265<H>  3.9   |  27  |  0.90    Ca    8.9      06 Jun 2020 06:47

## 2020-06-06 NOTE — PROGRESS NOTE ADULT - SUBJECTIVE AND OBJECTIVE BOX
Patient is a 64y old  Male who presents with a chief complaint of seizures (06 Jun 2020 10:20)  Awake,  not alert, comfortable laying in bed in NAD    INTERVAL HPI/OVERNIGHT EVENTS:      VITAL SIGNS:  T(F): 97.8 (06-06-20 @ 04:50)  HR: 99 (06-06-20 @ 04:50)  BP: 131/70 (06-06-20 @ 04:50)  RR: 17 (06-06-20 @ 04:50)  SpO2: 99% (06-06-20 @ 04:50)  Wt(kg): --  I&O's Detail          REVIEW OF SYSTEMS:    CONSTITUTIONAL:  No fevers, chills, sweats    HEENT:  Eyes:  No diplopia or blurred vision. ENT:  No earache, sore throat or runny nose.    CARDIOVASCULAR:  No pressure, squeezing, tightness, or heaviness about the chest; no palpitations.    RESPIRATORY:  Per HPI    GASTROINTESTINAL:  No abdominal pain, nausea, vomiting or diarrhea.    GENITOURINARY:  No dysuria, frequency or urgency.    NEUROLOGIC:  No paresthesias, fasciculations, seizures or weakness.    PSYCHIATRIC:  No disorder of thought or mood.      PHYSICAL EXAM:    Constitutional: Well developed and nourished  Eyes:Perrla  ENMT: normal  Neck:supple  Respiratory: good air entry  Cardiovascular: S1 S2 regular  Gastrointestinal: Soft, Non tender  Extremities: No edema  Vascular:normal  Neurological:Awake, alert,Ox3  Musculoskeletal:Normal      MEDICATIONS  (STANDING):  ascorbic acid 500 milliGRAM(s) Oral two times a day  collagenase Ointment 1 Application(s) Topical daily  dextrose 5% + sodium chloride 0.9%. 1000 milliLiter(s) (50 mL/Hr) IV Continuous <Continuous>  dextrose 5% + sodium chloride 0.9%. 1000 milliLiter(s) (50 mL/Hr) IV Continuous <Continuous>  dextrose 5%. 1000 milliLiter(s) (50 mL/Hr) IV Continuous <Continuous>  dextrose 50% Injectable 12.5 Gram(s) IV Push once  dextrose 50% Injectable 25 Gram(s) IV Push once  dextrose 50% Injectable 25 Gram(s) IV Push once  ergocalciferol 73539 Unit(s) Oral <User Schedule>  heparin   Injectable 5000 Unit(s) SubCutaneous every 12 hours  insulin glargine Injectable (LANTUS) 10 Unit(s) SubCutaneous at bedtime  insulin lispro (HumaLOG) corrective regimen sliding scale   SubCutaneous three times a day before meals  insulin lispro Injectable (HumaLOG) 5 Unit(s) SubCutaneous every 6 hours  mirtazapine 7.5 milliGRAM(s) Oral at bedtime  multivitamin/minerals 1 Tablet(s) Oral daily  nystatin Powder 1 Application(s) Topical two times a day  OLANZapine 2.5 milliGRAM(s) Oral at bedtime  pantoprazole   Suspension 40 milliGRAM(s) Enteral Tube daily  QUEtiapine 25 milliGRAM(s) Oral at bedtime  zinc sulfate 220 milliGRAM(s) Oral daily    MEDICATIONS  (PRN):  acetaminophen    Suspension .. 650 milliGRAM(s) Enteral Tube every 6 hours PRN Temp greater or equal to 38C (100.4F)  ALBUTerol    90 MICROgram(s) HFA Inhaler 2 Puff(s) Inhalation every 6 hours PRN Shortness of Breath and/or Wheezing  dextrose 40% Gel 15 Gram(s) Oral once PRN Blood Glucose LESS THAN 70 milliGRAM(s)/deciliter  glucagon  Injectable 1 milliGRAM(s) IntraMuscular once PRN Glucose LESS THAN 70 milligrams/deciliter  guaiFENesin   Syrup  (Sugar-Free) 100 milliGRAM(s) Oral every 6 hours PRN Cough      Allergies    No Known Allergies    Intolerances        LABS:                        11.0   9.56  )-----------( 170      ( 06 Jun 2020 06:47 )             32.9     06-06    134<L>  |  97  |  16  ----------------------------<  265<H>  3.9   |  27  |  0.90    Ca    8.9      06 Jun 2020 06:47                CAPILLARY BLOOD GLUCOSE      POCT Blood Glucose.: 233 mg/dL (06 Jun 2020 11:39)  POCT Blood Glucose.: 286 mg/dL (06 Jun 2020 07:28)  POCT Blood Glucose.: 282 mg/dL (06 Jun 2020 05:51)  POCT Blood Glucose.: 260 mg/dL (05 Jun 2020 23:48)  POCT Blood Glucose.: 292 mg/dL (05 Jun 2020 17:18)        RADIOLOGY & ADDITIONAL TESTS:    CXR:    Ct scan chest:    ekg;    echo:

## 2020-06-07 DIAGNOSIS — E87.1 HYPO-OSMOLALITY AND HYPONATREMIA: ICD-10-CM

## 2020-06-07 LAB
ANION GAP SERPL CALC-SCNC: 10 MMOL/L — SIGNIFICANT CHANGE UP (ref 5–17)
BUN SERPL-MCNC: 18 MG/DL — SIGNIFICANT CHANGE UP (ref 7–18)
CALCIUM SERPL-MCNC: 9.1 MG/DL — SIGNIFICANT CHANGE UP (ref 8.4–10.5)
CHLORIDE SERPL-SCNC: 97 MMOL/L — SIGNIFICANT CHANGE UP (ref 96–108)
CO2 SERPL-SCNC: 25 MMOL/L — SIGNIFICANT CHANGE UP (ref 22–31)
CREAT SERPL-MCNC: 0.78 MG/DL — SIGNIFICANT CHANGE UP (ref 0.5–1.3)
GLUCOSE BLDC GLUCOMTR-MCNC: 208 MG/DL — HIGH (ref 70–99)
GLUCOSE BLDC GLUCOMTR-MCNC: 212 MG/DL — HIGH (ref 70–99)
GLUCOSE BLDC GLUCOMTR-MCNC: 224 MG/DL — HIGH (ref 70–99)
GLUCOSE BLDC GLUCOMTR-MCNC: 244 MG/DL — HIGH (ref 70–99)
GLUCOSE BLDC GLUCOMTR-MCNC: 258 MG/DL — HIGH (ref 70–99)
GLUCOSE BLDC GLUCOMTR-MCNC: 275 MG/DL — HIGH (ref 70–99)
GLUCOSE SERPL-MCNC: 212 MG/DL — HIGH (ref 70–99)
HCT VFR BLD CALC: 36 % — LOW (ref 39–50)
HGB BLD-MCNC: 12.3 G/DL — LOW (ref 13–17)
MCHC RBC-ENTMCNC: 29 PG — SIGNIFICANT CHANGE UP (ref 27–34)
MCHC RBC-ENTMCNC: 34.2 GM/DL — SIGNIFICANT CHANGE UP (ref 32–36)
MCV RBC AUTO: 84.9 FL — SIGNIFICANT CHANGE UP (ref 80–100)
NRBC # BLD: 0 /100 WBCS — SIGNIFICANT CHANGE UP (ref 0–0)
PLATELET # BLD AUTO: 208 K/UL — SIGNIFICANT CHANGE UP (ref 150–400)
POTASSIUM SERPL-MCNC: 4.4 MMOL/L — SIGNIFICANT CHANGE UP (ref 3.5–5.3)
POTASSIUM SERPL-SCNC: 4.4 MMOL/L — SIGNIFICANT CHANGE UP (ref 3.5–5.3)
RBC # BLD: 4.24 M/UL — SIGNIFICANT CHANGE UP (ref 4.2–5.8)
RBC # FLD: 13.3 % — SIGNIFICANT CHANGE UP (ref 10.3–14.5)
SODIUM SERPL-SCNC: 132 MMOL/L — LOW (ref 135–145)
WBC # BLD: 11.22 K/UL — HIGH (ref 3.8–10.5)
WBC # FLD AUTO: 11.22 K/UL — HIGH (ref 3.8–10.5)

## 2020-06-07 PROCEDURE — 99232 SBSQ HOSP IP/OBS MODERATE 35: CPT

## 2020-06-07 RX ORDER — SODIUM CHLORIDE 9 MG/ML
1 INJECTION INTRAMUSCULAR; INTRAVENOUS; SUBCUTANEOUS EVERY 8 HOURS
Refills: 0 | Status: DISCONTINUED | OUTPATIENT
Start: 2020-06-07 | End: 2020-06-07

## 2020-06-07 RX ORDER — INSULIN LISPRO 100/ML
VIAL (ML) SUBCUTANEOUS EVERY 6 HOURS
Refills: 0 | Status: DISCONTINUED | OUTPATIENT
Start: 2020-06-07 | End: 2020-06-19

## 2020-06-07 RX ORDER — INSULIN GLARGINE 100 [IU]/ML
13 INJECTION, SOLUTION SUBCUTANEOUS AT BEDTIME
Refills: 0 | Status: DISCONTINUED | OUTPATIENT
Start: 2020-06-07 | End: 2020-06-08

## 2020-06-07 RX ORDER — SODIUM CHLORIDE 9 MG/ML
1000 INJECTION, SOLUTION INTRAVENOUS
Refills: 0 | Status: DISCONTINUED | OUTPATIENT
Start: 2020-06-07 | End: 2020-06-19

## 2020-06-07 RX ORDER — SODIUM CHLORIDE 9 MG/ML
1 INJECTION INTRAMUSCULAR; INTRAVENOUS; SUBCUTANEOUS EVERY 8 HOURS
Refills: 0 | Status: COMPLETED | OUTPATIENT
Start: 2020-06-07 | End: 2020-06-08

## 2020-06-07 RX ORDER — GLUCAGON INJECTION, SOLUTION 0.5 MG/.1ML
1 INJECTION, SOLUTION SUBCUTANEOUS ONCE
Refills: 0 | Status: DISCONTINUED | OUTPATIENT
Start: 2020-06-07 | End: 2020-06-19

## 2020-06-07 RX ORDER — INSULIN LISPRO 100/ML
8 VIAL (ML) SUBCUTANEOUS EVERY 6 HOURS
Refills: 0 | Status: DISCONTINUED | OUTPATIENT
Start: 2020-06-07 | End: 2020-06-08

## 2020-06-07 RX ADMIN — Medication 5 UNIT(S): at 05:41

## 2020-06-07 RX ADMIN — NYSTATIN CREAM 1 APPLICATION(S): 100000 CREAM TOPICAL at 05:28

## 2020-06-07 RX ADMIN — PANTOPRAZOLE SODIUM 40 MILLIGRAM(S): 20 TABLET, DELAYED RELEASE ORAL at 11:32

## 2020-06-07 RX ADMIN — NYSTATIN CREAM 1 APPLICATION(S): 100000 CREAM TOPICAL at 18:31

## 2020-06-07 RX ADMIN — QUETIAPINE FUMARATE 25 MILLIGRAM(S): 200 TABLET, FILM COATED ORAL at 21:06

## 2020-06-07 RX ADMIN — INSULIN GLARGINE 13 UNIT(S): 100 INJECTION, SOLUTION SUBCUTANEOUS at 22:04

## 2020-06-07 RX ADMIN — Medication 8 UNIT(S): at 11:32

## 2020-06-07 RX ADMIN — SODIUM CHLORIDE 1 GRAM(S): 9 INJECTION INTRAMUSCULAR; INTRAVENOUS; SUBCUTANEOUS at 14:24

## 2020-06-07 RX ADMIN — Medication 8 UNIT(S): at 18:30

## 2020-06-07 RX ADMIN — Medication 4: at 08:20

## 2020-06-07 RX ADMIN — Medication 4: at 18:31

## 2020-06-07 RX ADMIN — ZINC SULFATE TAB 220 MG (50 MG ZINC EQUIVALENT) 220 MILLIGRAM(S): 220 (50 ZN) TAB at 11:32

## 2020-06-07 RX ADMIN — Medication 500 MILLIGRAM(S): at 05:28

## 2020-06-07 RX ADMIN — Medication 1 APPLICATION(S): at 14:24

## 2020-06-07 RX ADMIN — OLANZAPINE 2.5 MILLIGRAM(S): 15 TABLET, FILM COATED ORAL at 21:06

## 2020-06-07 RX ADMIN — Medication 1 TABLET(S): at 11:32

## 2020-06-07 RX ADMIN — Medication 5 UNIT(S): at 00:32

## 2020-06-07 RX ADMIN — HEPARIN SODIUM 5000 UNIT(S): 5000 INJECTION INTRAVENOUS; SUBCUTANEOUS at 05:28

## 2020-06-07 RX ADMIN — Medication 500 MILLIGRAM(S): at 18:32

## 2020-06-07 RX ADMIN — SODIUM CHLORIDE 1 GRAM(S): 9 INJECTION INTRAMUSCULAR; INTRAVENOUS; SUBCUTANEOUS at 21:06

## 2020-06-07 RX ADMIN — HEPARIN SODIUM 5000 UNIT(S): 5000 INJECTION INTRAVENOUS; SUBCUTANEOUS at 18:30

## 2020-06-07 RX ADMIN — MIRTAZAPINE 7.5 MILLIGRAM(S): 45 TABLET, ORALLY DISINTEGRATING ORAL at 21:06

## 2020-06-07 RX ADMIN — Medication 6: at 11:32

## 2020-06-07 NOTE — PROGRESS NOTE ADULT - ASSESSMENT
64 year old male from [facility] with PMH of dementia and type 2 DM presented with seizure activity while sitting at AL. Endocrinology was consulted for management of uncontrolled type 2 DM with hyperglycemia.    1. Uncontrolled Type 2 DM with hyperglycemia, A1c 9.9%  -patient tolerating tube feedings via PEG   -BG above goal >200  -Increase to Lantus 13 units at bedtime  -Increase to Humalog 8 units q 6 hrs (hold if tube feedings are held)  -continue moderate dose rapid acting insulin q 6 hrs   BG 70-100mg/dL 0 units  -150 mg/dL 0 units  -200 mg/dL 2 unit  -250 mg/dL 4 units  -300 mg/dL 6 units  -350 mg/dL 8 units  -400 mg/dL 10 units  BG > 400mg/dL 12 units  -FS AC HS  -ensure consistent carbohydrate   -will monitor FS and adjust accordingly     2. Delirium  -patient with mitten restraints  -continue Seroquel, Zyprexa, Remeron    Plan discussed with primary team]  Please  call  w/ any questions or concerns [phone]

## 2020-06-07 NOTE — PROGRESS NOTE ADULT - SUBJECTIVE AND OBJECTIVE BOX
CHIEF COMPLAINT:Patient is a 64y old  Male who presents with a chief complaint of seizures.Pt appears comfortable.    	  REVIEW OF SYSTEMS:    [x ] Unable to obtain    PHYSICAL EXAM:  T(C): 36.4 (06-07-20 @ 06:04), Max: 36.7 (06-06-20 @ 20:12)  HR: 90 (06-07-20 @ 06:04) (87 - 92)  BP: 142/72 (06-07-20 @ 06:04) (131/72 - 142/72)  RR: 14 (06-07-20 @ 06:04) (14 - 16)  SpO2: 97% (06-07-20 @ 06:04) (97% - 98%)  Wt(kg): --  I&O's Summary      Appearance: Normal	  HEENT:   Normal oral mucosa, PERRL, EOMI	  Lymphatic: No lymphadenopathy  Cardiovascular: Normal S1 S2, No JVD, No murmurs, No edema  Respiratory: Lungs clear to auscultation	  Gastrointestinal:  Soft, Non-tender, + BS	  Skin: No rashes, No ecchymoses, No cyanosis	  Extremities: Normal range of motion, No clubbing, cyanosis or edema  Vascular: Peripheral pulses palpable 2+ bilaterally    MEDICATIONS  (STANDING):  ascorbic acid 500 milliGRAM(s) Oral two times a day  collagenase Ointment 1 Application(s) Topical daily  dextrose 5% + sodium chloride 0.9%. 1000 milliLiter(s) (50 mL/Hr) IV Continuous <Continuous>  dextrose 5% + sodium chloride 0.9%. 1000 milliLiter(s) (50 mL/Hr) IV Continuous <Continuous>  dextrose 5%. 1000 milliLiter(s) (50 mL/Hr) IV Continuous <Continuous>  dextrose 50% Injectable 12.5 Gram(s) IV Push once  dextrose 50% Injectable 25 Gram(s) IV Push once  dextrose 50% Injectable 25 Gram(s) IV Push once  ergocalciferol 62504 Unit(s) Oral <User Schedule>  heparin   Injectable 5000 Unit(s) SubCutaneous every 12 hours  insulin glargine Injectable (LANTUS) 13 Unit(s) SubCutaneous at bedtime  insulin lispro (HumaLOG) corrective regimen sliding scale   SubCutaneous every 6 hours  insulin lispro Injectable (HumaLOG) 8 Unit(s) SubCutaneous every 6 hours  mirtazapine 7.5 milliGRAM(s) Oral at bedtime  multivitamin/minerals 1 Tablet(s) Oral daily  nystatin Powder 1 Application(s) Topical two times a day  OLANZapine 2.5 milliGRAM(s) Oral at bedtime  pantoprazole   Suspension 40 milliGRAM(s) Enteral Tube daily  QUEtiapine 25 milliGRAM(s) Oral at bedtime  sodium chloride 1 Gram(s) Oral every 8 hours  zinc sulfate 220 milliGRAM(s) Oral daily      	  	  LABS:	 	                        12.3   11.22 )-----------( 208      ( 07 Jun 2020 09:08 )             36.0     06-07    132<L>  |  97  |  18  ----------------------------<  212<H>  4.4   |  25  |  0.78    Ca    9.1      07 Jun 2020 09:08        	  COVID-19 PCR: Detected: This test has been validated by [employer] to be accurate;  though it has not been FDA cleared/approved by the usual pathway.  As with all laboratory tests, results should be correlated with clinical  findings.  https://www.fda.gov/media/398112/download  https://www.fda.gov/media/473313/download (06.06.20 @ 06:58)

## 2020-06-07 NOTE — PROGRESS NOTE ADULT - SUBJECTIVE AND OBJECTIVE BOX
Patient is a 64y old  Male who presents with a chief complaint of seizures (07 Jun 2020 11:16)  Awake,  more alert today, comfortable in bed in NAD    INTERVAL HPI/OVERNIGHT EVENTS:      VITAL SIGNS:  T(F): 97.6 (06-07-20 @ 06:04)  HR: 90 (06-07-20 @ 06:04)  BP: 142/72 (06-07-20 @ 06:04)  RR: 14 (06-07-20 @ 06:04)  SpO2: 97% (06-07-20 @ 06:04)  Wt(kg): --  I&O's Detail          REVIEW OF SYSTEMS:    CONSTITUTIONAL:  No fevers, chills, sweats    HEENT:  Eyes:  No diplopia or blurred vision. ENT:  No earache, sore throat or runny nose.    CARDIOVASCULAR:  No pressure, squeezing, tightness, or heaviness about the chest; no palpitations.    RESPIRATORY:  Per HPI    GASTROINTESTINAL:  No abdominal pain, nausea, vomiting or diarrhea.    GENITOURINARY:  No dysuria, frequency or urgency.    NEUROLOGIC:  No paresthesias, fasciculations, seizures or weakness.    PSYCHIATRIC:  No disorder of thought or mood.      PHYSICAL EXAM:    Constitutional: Well developed and nourished  Eyes:Perrla  ENMT: normal  Neck:supple  Respiratory: good air entry  Cardiovascular: S1 S2 regular  Gastrointestinal: Soft, Non tender  Extremities: No edema  Vascular:normal  Neurological:Awake, alert,Ox3  Musculoskeletal:Normal      MEDICATIONS  (STANDING):  ascorbic acid 500 milliGRAM(s) Oral two times a day  collagenase Ointment 1 Application(s) Topical daily  dextrose 5% + sodium chloride 0.9%. 1000 milliLiter(s) (50 mL/Hr) IV Continuous <Continuous>  dextrose 5% + sodium chloride 0.9%. 1000 milliLiter(s) (50 mL/Hr) IV Continuous <Continuous>  dextrose 5%. 1000 milliLiter(s) (50 mL/Hr) IV Continuous <Continuous>  dextrose 50% Injectable 12.5 Gram(s) IV Push once  dextrose 50% Injectable 25 Gram(s) IV Push once  dextrose 50% Injectable 25 Gram(s) IV Push once  ergocalciferol 04646 Unit(s) Oral <User Schedule>  heparin   Injectable 5000 Unit(s) SubCutaneous every 12 hours  insulin glargine Injectable (LANTUS) 13 Unit(s) SubCutaneous at bedtime  insulin lispro (HumaLOG) corrective regimen sliding scale   SubCutaneous every 6 hours  insulin lispro Injectable (HumaLOG) 8 Unit(s) SubCutaneous every 6 hours  mirtazapine 7.5 milliGRAM(s) Oral at bedtime  multivitamin/minerals 1 Tablet(s) Oral daily  nystatin Powder 1 Application(s) Topical two times a day  OLANZapine 2.5 milliGRAM(s) Oral at bedtime  pantoprazole   Suspension 40 milliGRAM(s) Enteral Tube daily  QUEtiapine 25 milliGRAM(s) Oral at bedtime  sodium chloride 1 Gram(s) Oral every 8 hours  zinc sulfate 220 milliGRAM(s) Oral daily    MEDICATIONS  (PRN):  acetaminophen    Suspension .. 650 milliGRAM(s) Enteral Tube every 6 hours PRN Temp greater or equal to 38C (100.4F)  ALBUTerol    90 MICROgram(s) HFA Inhaler 2 Puff(s) Inhalation every 6 hours PRN Shortness of Breath and/or Wheezing  dextrose 40% Gel 15 Gram(s) Oral once PRN Blood Glucose LESS THAN 70 milliGRAM(s)/deciliter  glucagon  Injectable 1 milliGRAM(s) IntraMuscular once PRN Glucose LESS THAN 70 milligrams/deciliter  guaiFENesin   Syrup  (Sugar-Free) 100 milliGRAM(s) Oral every 6 hours PRN Cough      Allergies    No Known Allergies    Intolerances        LABS:                        12.3   11.22 )-----------( 208      ( 07 Jun 2020 09:08 )             36.0     06-07    132<L>  |  97  |  18  ----------------------------<  212<H>  4.4   |  25  |  0.78    Ca    9.1      07 Jun 2020 09:08                CAPILLARY BLOOD GLUCOSE      POCT Blood Glucose.: 258 mg/dL (07 Jun 2020 11:19)  POCT Blood Glucose.: 212 mg/dL (07 Jun 2020 07:36)  POCT Blood Glucose.: 275 mg/dL (07 Jun 2020 05:36)  POCT Blood Glucose.: 224 mg/dL (06 Jun 2020 21:38)  POCT Blood Glucose.: 227 mg/dL (06 Jun 2020 16:36)  POCT Blood Glucose.: 233 mg/dL (06 Jun 2020 11:39)        RADIOLOGY & ADDITIONAL TESTS:    CXR:  < from: Xray Chest 1 View- PORTABLE-Routine (05.23.20 @ 17:40) >  Impression: Stable NG tube.    Possible mild left pleural effusion.    No evidence for pneumothorax.    Opacification in the left mid to lower lung zone, slightly progressed.    The trachea is midline.    Stable cardiac silhouette.    < end of copied text >    Ct scan chest:    ekg;    echo:

## 2020-06-07 NOTE — PROGRESS NOTE ADULT - SUBJECTIVE AND OBJECTIVE BOX
64 year old male from [facility] with PMH of dementia and type 2 DM presented with seizure activity while sitting at AL. Endocrinology was consulted for management of uncontrolled type 2 DM with hyperglycemia.    Patient seen and examined at bedside. Patient on tube feedings at goal rate. FS reviewed. BG remains above goal >200 despite Lantus 10 units at bedtime and Humalog 5 units q 6 hrs.    MEDICATIONS  (STANDING):  ascorbic acid 500 milliGRAM(s) Oral two times a day  collagenase Ointment 1 Application(s) Topical daily  dextrose 5% + sodium chloride 0.9%. 1000 milliLiter(s) (50 mL/Hr) IV Continuous <Continuous>  dextrose 5% + sodium chloride 0.9%. 1000 milliLiter(s) (50 mL/Hr) IV Continuous <Continuous>  dextrose 5%. 1000 milliLiter(s) (50 mL/Hr) IV Continuous <Continuous>  dextrose 50% Injectable 12.5 Gram(s) IV Push once  dextrose 50% Injectable 25 Gram(s) IV Push once  dextrose 50% Injectable 25 Gram(s) IV Push once  ergocalciferol 30935 Unit(s) Oral <User Schedule>  heparin   Injectable 5000 Unit(s) SubCutaneous every 12 hours  insulin glargine Injectable (LANTUS) 13 Unit(s) SubCutaneous at bedtime  insulin lispro (HumaLOG) corrective regimen sliding scale   SubCutaneous every 6 hours  insulin lispro Injectable (HumaLOG) 8 Unit(s) SubCutaneous every 6 hours  mirtazapine 7.5 milliGRAM(s) Oral at bedtime  multivitamin/minerals 1 Tablet(s) Oral daily  nystatin Powder 1 Application(s) Topical two times a day  OLANZapine 2.5 milliGRAM(s) Oral at bedtime  pantoprazole   Suspension 40 milliGRAM(s) Enteral Tube daily  QUEtiapine 25 milliGRAM(s) Oral at bedtime  zinc sulfate 220 milliGRAM(s) Oral daily    MEDICATIONS  (PRN):  acetaminophen    Suspension .. 650 milliGRAM(s) Enteral Tube every 6 hours PRN Temp greater or equal to 38C (100.4F)  ALBUTerol    90 MICROgram(s) HFA Inhaler 2 Puff(s) Inhalation every 6 hours PRN Shortness of Breath and/or Wheezing  dextrose 40% Gel 15 Gram(s) Oral once PRN Blood Glucose LESS THAN 70 milliGRAM(s)/deciliter  glucagon  Injectable 1 milliGRAM(s) IntraMuscular once PRN Glucose LESS THAN 70 milligrams/deciliter  guaiFENesin   Syrup  (Sugar-Free) 100 milliGRAM(s) Oral every 6 hours PRN Cough      REVIEW OF SYSTEMS:  unable to obtain due to medical condition    PHYSICAL EXAM:  VITAL SIGNS  T(C): 36.4 (06-07-20 @ 06:04), Max: 36.7 (06-06-20 @ 20:12)  HR: 90 (06-07-20 @ 06:04) (87 - 92)  BP: 142/72 (06-07-20 @ 06:04) (131/72 - 142/72)  RR: 14 (06-07-20 @ 06:04) (14 - 16)  SpO2: 97% (06-07-20 @ 06:04) (97% - 98%)          GENERAL: NAD, well-developed  HEAD:  Atraumatic, Normocephalic  EYES: EOMI, PERRLA, conjunctiva and sclera clear  ENMT: No tonsillar erythema, exudates, or enlargement; No lesions  NECK: Supple, No JVD, Normal thyroid  NERVOUS SYSTEM:  nonverbal,  CHEST/LUNG: CTA bilaterally  No rales, rhonchi, wheezing, or rubs  HEART: Regular rate and rhythm; No murmurs, rubs, or gallops  ABDOMEN: s/p PEG, Soft, Nontender, Nondistended; Bowel sounds present  EXTREMITIES:  No edema  SKIN: No rashes or lesions    LABS:                        12.3   11.22 )-----------( 208      ( 07 Jun 2020 09:08 )             36.0     06-06    134<L>  |  97  |  16  ----------------------------<  265<H>  3.9   |  27  |  0.90    Ca    8.9      06 Jun 2020 06:47          CAPILLARY BLOOD GLUCOSE      POCT Blood Glucose.: 212 mg/dL (07 Jun 2020 07:36)  POCT Blood Glucose.: 275 mg/dL (07 Jun 2020 05:36)  POCT Blood Glucose.: 224 mg/dL (06 Jun 2020 21:38)  POCT Blood Glucose.: 227 mg/dL (06 Jun 2020 16:36)  POCT Blood Glucose.: 233 mg/dL (06 Jun 2020 11:39)

## 2020-06-08 LAB
ANION GAP SERPL CALC-SCNC: 9 MMOL/L — SIGNIFICANT CHANGE UP (ref 5–17)
BUN SERPL-MCNC: 22 MG/DL — HIGH (ref 7–18)
CALCIUM SERPL-MCNC: 9.4 MG/DL — SIGNIFICANT CHANGE UP (ref 8.4–10.5)
CHLORIDE SERPL-SCNC: 98 MMOL/L — SIGNIFICANT CHANGE UP (ref 96–108)
CO2 SERPL-SCNC: 27 MMOL/L — SIGNIFICANT CHANGE UP (ref 22–31)
CREAT SERPL-MCNC: 0.89 MG/DL — SIGNIFICANT CHANGE UP (ref 0.5–1.3)
GLUCOSE BLDC GLUCOMTR-MCNC: 153 MG/DL — HIGH (ref 70–99)
GLUCOSE BLDC GLUCOMTR-MCNC: 164 MG/DL — HIGH (ref 70–99)
GLUCOSE BLDC GLUCOMTR-MCNC: 222 MG/DL — HIGH (ref 70–99)
GLUCOSE BLDC GLUCOMTR-MCNC: 235 MG/DL — HIGH (ref 70–99)
GLUCOSE SERPL-MCNC: 248 MG/DL — HIGH (ref 70–99)
HCT VFR BLD CALC: 34.2 % — LOW (ref 39–50)
HGB BLD-MCNC: 11.5 G/DL — LOW (ref 13–17)
MCHC RBC-ENTMCNC: 28.8 PG — SIGNIFICANT CHANGE UP (ref 27–34)
MCHC RBC-ENTMCNC: 33.6 GM/DL — SIGNIFICANT CHANGE UP (ref 32–36)
MCV RBC AUTO: 85.7 FL — SIGNIFICANT CHANGE UP (ref 80–100)
NRBC # BLD: 0 /100 WBCS — SIGNIFICANT CHANGE UP (ref 0–0)
PLATELET # BLD AUTO: 222 K/UL — SIGNIFICANT CHANGE UP (ref 150–400)
POTASSIUM SERPL-MCNC: 4.3 MMOL/L — SIGNIFICANT CHANGE UP (ref 3.5–5.3)
POTASSIUM SERPL-SCNC: 4.3 MMOL/L — SIGNIFICANT CHANGE UP (ref 3.5–5.3)
RBC # BLD: 3.99 M/UL — LOW (ref 4.2–5.8)
RBC # FLD: 13.2 % — SIGNIFICANT CHANGE UP (ref 10.3–14.5)
SODIUM SERPL-SCNC: 134 MMOL/L — LOW (ref 135–145)
WBC # BLD: 11.53 K/UL — HIGH (ref 3.8–10.5)
WBC # FLD AUTO: 11.53 K/UL — HIGH (ref 3.8–10.5)

## 2020-06-08 PROCEDURE — 99232 SBSQ HOSP IP/OBS MODERATE 35: CPT

## 2020-06-08 RX ORDER — INSULIN GLARGINE 100 [IU]/ML
18 INJECTION, SOLUTION SUBCUTANEOUS AT BEDTIME
Refills: 0 | Status: DISCONTINUED | OUTPATIENT
Start: 2020-06-08 | End: 2020-06-19

## 2020-06-08 RX ORDER — INSULIN LISPRO 100/ML
12 VIAL (ML) SUBCUTANEOUS EVERY 6 HOURS
Refills: 0 | Status: DISCONTINUED | OUTPATIENT
Start: 2020-06-08 | End: 2020-06-11

## 2020-06-08 RX ADMIN — Medication 12 UNIT(S): at 17:14

## 2020-06-08 RX ADMIN — Medication 500 MILLIGRAM(S): at 05:38

## 2020-06-08 RX ADMIN — HEPARIN SODIUM 5000 UNIT(S): 5000 INJECTION INTRAVENOUS; SUBCUTANEOUS at 05:39

## 2020-06-08 RX ADMIN — NYSTATIN CREAM 1 APPLICATION(S): 100000 CREAM TOPICAL at 05:38

## 2020-06-08 RX ADMIN — Medication 8 UNIT(S): at 05:45

## 2020-06-08 RX ADMIN — ZINC SULFATE TAB 220 MG (50 MG ZINC EQUIVALENT) 220 MILLIGRAM(S): 220 (50 ZN) TAB at 11:28

## 2020-06-08 RX ADMIN — PANTOPRAZOLE SODIUM 40 MILLIGRAM(S): 20 TABLET, DELAYED RELEASE ORAL at 11:28

## 2020-06-08 RX ADMIN — QUETIAPINE FUMARATE 25 MILLIGRAM(S): 200 TABLET, FILM COATED ORAL at 21:26

## 2020-06-08 RX ADMIN — Medication 500 MILLIGRAM(S): at 17:10

## 2020-06-08 RX ADMIN — Medication 1 TABLET(S): at 11:33

## 2020-06-08 RX ADMIN — MIRTAZAPINE 7.5 MILLIGRAM(S): 45 TABLET, ORALLY DISINTEGRATING ORAL at 21:26

## 2020-06-08 RX ADMIN — Medication 1 APPLICATION(S): at 11:33

## 2020-06-08 RX ADMIN — Medication 4: at 00:20

## 2020-06-08 RX ADMIN — Medication 4: at 05:45

## 2020-06-08 RX ADMIN — NYSTATIN CREAM 1 APPLICATION(S): 100000 CREAM TOPICAL at 17:10

## 2020-06-08 RX ADMIN — INSULIN GLARGINE 18 UNIT(S): 100 INJECTION, SOLUTION SUBCUTANEOUS at 21:26

## 2020-06-08 RX ADMIN — HEPARIN SODIUM 5000 UNIT(S): 5000 INJECTION INTRAVENOUS; SUBCUTANEOUS at 17:10

## 2020-06-08 RX ADMIN — Medication 4: at 11:32

## 2020-06-08 RX ADMIN — Medication 2: at 17:14

## 2020-06-08 RX ADMIN — OLANZAPINE 2.5 MILLIGRAM(S): 15 TABLET, FILM COATED ORAL at 21:26

## 2020-06-08 RX ADMIN — Medication 8 UNIT(S): at 00:21

## 2020-06-08 RX ADMIN — SODIUM CHLORIDE 1 GRAM(S): 9 INJECTION INTRAMUSCULAR; INTRAVENOUS; SUBCUTANEOUS at 05:38

## 2020-06-08 RX ADMIN — Medication 12 UNIT(S): at 11:32

## 2020-06-08 NOTE — PROGRESS NOTE ADULT - SUBJECTIVE AND OBJECTIVE BOX
CARDIOLOGY/MEDICAL ATTENDING    CHIEF COMPLAINT:Patient is a 64y old  Male who presents with a chief complaint of seizures .Pt appears comfortable.    	  REVIEW OF SYSTEMS:  [x ] Unable to obtain    PHYSICAL EXAM:  T(C): 36.3 (06-08-20 @ 06:44), Max: 36.6 (06-07-20 @ 14:26)  HR: 89 (06-08-20 @ 06:44) (89 - 100)  BP: 149/78 (06-08-20 @ 06:44) (129/68 - 149/78)  RR: 16 (06-08-20 @ 06:44) (16 - 20)  SpO2: 100% (06-08-20 @ 06:44) (97% - 100%)  Wt(kg): --  I&O's Summary    07 Jun 2020 07:01  -  08 Jun 2020 07:00  --------------------------------------------------------  IN: 720 mL / OUT: 0 mL / NET: 720 mL        Appearance: Normal	  HEENT:   Normal oral mucosa, PERRL, EOMI	  Lymphatic: No lymphadenopathy  Cardiovascular: Normal S1 S2, No JVD, No murmurs, No edema  Respiratory: Lungs clear to auscultation	  Gastrointestinal:  Soft, Non-tender, + BS	  Skin: No rashes, No ecchymoses, No cyanosis	  Extremities: Normal range of motion, No clubbing, cyanosis or edema  Vascular: Peripheral pulses palpable 2+ bilaterally    MEDICATIONS  (STANDING):  ascorbic acid 500 milliGRAM(s) Oral two times a day  collagenase Ointment 1 Application(s) Topical daily  dextrose 5% + sodium chloride 0.9%. 1000 milliLiter(s) (50 mL/Hr) IV Continuous <Continuous>  dextrose 5% + sodium chloride 0.9%. 1000 milliLiter(s) (50 mL/Hr) IV Continuous <Continuous>  dextrose 5%. 1000 milliLiter(s) (50 mL/Hr) IV Continuous <Continuous>  dextrose 50% Injectable 12.5 Gram(s) IV Push once  dextrose 50% Injectable 25 Gram(s) IV Push once  dextrose 50% Injectable 25 Gram(s) IV Push once  ergocalciferol 77300 Unit(s) Oral <User Schedule>  heparin   Injectable 5000 Unit(s) SubCutaneous every 12 hours  insulin glargine Injectable (LANTUS) 18 Unit(s) SubCutaneous at bedtime  insulin lispro (HumaLOG) corrective regimen sliding scale   SubCutaneous every 6 hours  insulin lispro Injectable (HumaLOG) 12 Unit(s) SubCutaneous every 6 hours  mirtazapine 7.5 milliGRAM(s) Oral at bedtime  multivitamin/minerals 1 Tablet(s) Oral daily  nystatin Powder 1 Application(s) Topical two times a day  OLANZapine 2.5 milliGRAM(s) Oral at bedtime  pantoprazole   Suspension 40 milliGRAM(s) Enteral Tube daily  QUEtiapine 25 milliGRAM(s) Oral at bedtime  zinc sulfate 220 milliGRAM(s) Oral daily      	  	  LABS:	 	                      11.5   11.53 )-----------( 222      ( 08 Jun 2020 08:17 )             34.2     06-08    134<L>  |  98  |  22<H>  ----------------------------<  248<H>  4.3   |  27  |  0.89    Ca    9.4      08 Jun 2020 08:17

## 2020-06-08 NOTE — PROGRESS NOTE ADULT - ASSESSMENT
64 year old male from [facility] with PMH of dementia and type 2 DM presented with seizure activity while sitting at AL. Endocrinology was consulted for management of uncontrolled type 2 DM with hyperglycemia.    1. Uncontrolled Type 2 DM with hyperglycemia, A1c 9.9%  -patient tolerating tube feedings via PEG   -BG above goal >200  -Increase to Lantus 18 units at bedtime  -Increase to Humalog 12 units q 6 hrs (hold if tube feedings are held)  -continue moderate dose rapid acting insulin q 6 hrs   BG 70-100mg/dL 0 units  -150 mg/dL 0 units  -200 mg/dL 2 unit  -250 mg/dL 4 units  -300 mg/dL 6 units  -350 mg/dL 8 units  -400 mg/dL 10 units  BG > 400mg/dL 12 units  -FS AC HS  -ensure consistent carbohydrate   -will monitor FS and adjust accordingly     2. Delirium  -continue Seroquel, Zyprexa, Remeron    Plan discussed with primary team]  Please  call  w/ any questions or concerns [phone]

## 2020-06-08 NOTE — PROGRESS NOTE ADULT - PROBLEM SELECTOR PLAN 5
latest COVID 19 PCR confirmed negative on 5.31.2020 --went  for PEG placement   6/3 -positive prior to d.c. to [facility]  6/6 positive  will repeat on 06/09  Needs 2 negatives per [facility] requirement.

## 2020-06-08 NOTE — PROGRESS NOTE ADULT - SUBJECTIVE AND OBJECTIVE BOX
Patient is a 64y old  Male who presents with a chief complaint of seizures (08 Jun 2020 11:36)    HPI - 64 year old male from [facility]  with PMH dementia and DM coming in with seizures like activity while sitting at AL.  Pt had peg placement on 06/02/2020 for FTT 2/2 to dysphagia and poor oral intake related to changes in mental status.     Pt tolerates Peg feeding, medically cleared to be discharged, pending covid-19  2 negative results required by [facility]. Pt tested Covid positive on 06/03.    Pt needs 2 negative tests to be d/c to [facility]  Pt tested positive on 06/06, will be retested on 06/09 6/8 Pt seen and examined. Pt is making eye contact, looks more alert.     MEDICATIONS  (STANDING):  ascorbic acid 500 milliGRAM(s) Oral two times a day  collagenase Ointment 1 Application(s) Topical daily  dextrose 5% + sodium chloride 0.9%. 1000 milliLiter(s) (50 mL/Hr) IV Continuous <Continuous>  dextrose 5% + sodium chloride 0.9%. 1000 milliLiter(s) (50 mL/Hr) IV Continuous <Continuous>  dextrose 5%. 1000 milliLiter(s) (50 mL/Hr) IV Continuous <Continuous>  dextrose 50% Injectable 12.5 Gram(s) IV Push once  dextrose 50% Injectable 25 Gram(s) IV Push once  dextrose 50% Injectable 25 Gram(s) IV Push once  ergocalciferol 41951 Unit(s) Oral <User Schedule>  heparin   Injectable 5000 Unit(s) SubCutaneous every 12 hours  insulin glargine Injectable (LANTUS) 18 Unit(s) SubCutaneous at bedtime  insulin lispro (HumaLOG) corrective regimen sliding scale   SubCutaneous every 6 hours  insulin lispro Injectable (HumaLOG) 12 Unit(s) SubCutaneous every 6 hours  mirtazapine 7.5 milliGRAM(s) Oral at bedtime  multivitamin/minerals 1 Tablet(s) Oral daily  nystatin Powder 1 Application(s) Topical two times a day  OLANZapine 2.5 milliGRAM(s) Oral at bedtime  pantoprazole   Suspension 40 milliGRAM(s) Enteral Tube daily  QUEtiapine 25 milliGRAM(s) Oral at bedtime  zinc sulfate 220 milliGRAM(s) Oral daily    MEDICATIONS  (PRN):  acetaminophen    Suspension .. 650 milliGRAM(s) Enteral Tube every 6 hours PRN Temp greater or equal to 38C (100.4F)  ALBUTerol    90 MICROgram(s) HFA Inhaler 2 Puff(s) Inhalation every 6 hours PRN Shortness of Breath and/or Wheezing  dextrose 40% Gel 15 Gram(s) Oral once PRN Blood Glucose LESS THAN 70 milliGRAM(s)/deciliter  glucagon  Injectable 1 milliGRAM(s) IntraMuscular once PRN Glucose LESS THAN 70 milligrams/deciliter  guaiFENesin   Syrup  (Sugar-Free) 100 milliGRAM(s) Oral every 6 hours PRN Cough      __Unable to assess due to pt's mental status________________________________________________  REVIEW OF SYSTEMS:    Vital Signs Last 24 Hrs  T(C): 36.3 (08 Jun 2020 06:44), Max: 36.6 (07 Jun 2020 14:26)  T(F): 97.4 (08 Jun 2020 06:44), Max: 97.9 (07 Jun 2020 20:43)  HR: 89 (08 Jun 2020 06:44) (89 - 100)  BP: 149/78 (08 Jun 2020 06:44) (129/68 - 149/78)  BP(mean): --  RR: 16 (08 Jun 2020 06:44) (16 - 20)  SpO2: 100% (08 Jun 2020 06:44) (97% - 100%)    ________________________________________________  PHYSICAL EXAM:  GENERAL: NAD  HEENT: Normocephalic;  conjunctivae and sclerae clear; moist mucous membranes;   NECK : supple  CHEST/LUNG: Clear to auscultation bilaterally with good air entry   HEART: S1 S2  regular; no murmurs, gallops or rubs  ABDOMEN: Soft, Nontender, Nondistended; Bowel sounds present, peg tube  EXTREMITIES: no cyanosis; no edema; no calf tenderness  SKIN: warm and dry; no rash  NERVOUS SYSTEM:  Awake, non-communicating   _________________________________________________  LABS:                        11.5   11.53 )-----------( 222      ( 08 Jun 2020 08:17 )             34.2     06-08    134<L>  |  98  |  22<H>  ----------------------------<  248<H>  4.3   |  27  |  0.89    Ca    9.4      08 Jun 2020 08:17          CAPILLARY BLOOD GLUCOSE      POCT Blood Glucose.: 222 mg/dL (08 Jun 2020 11:27)  POCT Blood Glucose.: 235 mg/dL (08 Jun 2020 05:30)  POCT Blood Glucose.: 208 mg/dL (07 Jun 2020 23:23)  POCT Blood Glucose.: 224 mg/dL (07 Jun 2020 21:14)  POCT Blood Glucose.: 244 mg/dL (07 Jun 2020 17:36)        RADIOLOGY & ADDITIONAL TESTS:    Imaging  Reviewed:  YES    Consultant(s) Notes Reviewed:   YES    Plan of care was discussed with patient and /or primary care giver; all questions and concerns were addressed

## 2020-06-08 NOTE — PROGRESS NOTE ADULT - PROBLEM SELECTOR PLAN 2
Continue present meds through Peg  Pt is calm, making eye contact   no behavioral issues at present time   Supportive care  Psychiatry following

## 2020-06-08 NOTE — PROGRESS NOTE ADULT - PROBLEM SELECTOR PLAN 4
Continue strict aspiration precaution  CHXray noted for opacities on 05/23/2020   Pt got Peg tube placement-  Continue TF and  po med via Peg  No sob noted  WBC remains elevated    afebrile  off antibiotics  Pulmonary on board

## 2020-06-08 NOTE — PROGRESS NOTE ADULT - PROBLEM SELECTOR PLAN 10
Discharge to [facility]  after 2 consecutive negative testings for Covid-19 as required by [facility]. 06/03, 06/06 testing was positive. Will repeat on 06/09

## 2020-06-08 NOTE — CHART NOTE - NSCHARTNOTEFT_GEN_A_CORE
Assessment:   64yMalePatient is a 64y old  Male who presents with a chief complaint of seizures (08 Jun 2020 11:36)      Factors impacting intake: [ ] none [ ] nausea  [ ] vomiting [ ] diarrhea [ ] constipation  [ ]chewing problems [ ] swallowing issues  [x ] other: still unable to interview patient face to face as has covid-19. per RN, patient is receiving glucerna 1.2 at 60ml/hx24 and tolerating it. TF provides: (1440ml,1728kcal, 86g protein,1159ml free water) which meets needs. continue with tube feeding as ordered. MD to monitor/assess fluid status as needed. spoke with RN     Diet Presciption: Diet, NPO with Tube Feed:   Tube Feeding Modality: Gastrostomy  Glucerna 1.2 Mihir  Total Volume for 24 Hours (mL): 1440  Continuous  Starting Tube Feed Rate {mL per Hour}: 30  Increase Tube Feed Rate by (mL): 30     Every 6 hours  Until Goal Tube Feed Rate (mL per Hour): 60  Tube Feed Duration (in Hours): 24  Tube Feed Start Time: 15:00 (06-04-20 @ 15:10)    Intake: meeting needs with tube feedings     Current Weight: none  % Weight Change    Pertinent Medications: MEDICATIONS  (STANDING):  ascorbic acid 500 milliGRAM(s) Oral two times a day  collagenase Ointment 1 Application(s) Topical daily  dextrose 5% + sodium chloride 0.9%. 1000 milliLiter(s) (50 mL/Hr) IV Continuous <Continuous>  dextrose 5% + sodium chloride 0.9%. 1000 milliLiter(s) (50 mL/Hr) IV Continuous <Continuous>  dextrose 5%. 1000 milliLiter(s) (50 mL/Hr) IV Continuous <Continuous>  dextrose 50% Injectable 12.5 Gram(s) IV Push once  dextrose 50% Injectable 25 Gram(s) IV Push once  dextrose 50% Injectable 25 Gram(s) IV Push once  ergocalciferol 26077 Unit(s) Oral <User Schedule>  heparin   Injectable 5000 Unit(s) SubCutaneous every 12 hours  insulin glargine Injectable (LANTUS) 18 Unit(s) SubCutaneous at bedtime  insulin lispro (HumaLOG) corrective regimen sliding scale   SubCutaneous every 6 hours  insulin lispro Injectable (HumaLOG) 12 Unit(s) SubCutaneous every 6 hours  mirtazapine 7.5 milliGRAM(s) Oral at bedtime  multivitamin/minerals 1 Tablet(s) Oral daily  nystatin Powder 1 Application(s) Topical two times a day  OLANZapine 2.5 milliGRAM(s) Oral at bedtime  pantoprazole   Suspension 40 milliGRAM(s) Enteral Tube daily  QUEtiapine 25 milliGRAM(s) Oral at bedtime  zinc sulfate 220 milliGRAM(s) Oral daily    MEDICATIONS  (PRN):  acetaminophen    Suspension .. 650 milliGRAM(s) Enteral Tube every 6 hours PRN Temp greater or equal to 38C (100.4F)  ALBUTerol    90 MICROgram(s) HFA Inhaler 2 Puff(s) Inhalation every 6 hours PRN Shortness of Breath and/or Wheezing  dextrose 40% Gel 15 Gram(s) Oral once PRN Blood Glucose LESS THAN 70 milliGRAM(s)/deciliter  glucagon  Injectable 1 milliGRAM(s) IntraMuscular once PRN Glucose LESS THAN 70 milligrams/deciliter  guaiFENesin   Syrup  (Sugar-Free) 100 milliGRAM(s) Oral every 6 hours PRN Cough    Pertinent Labs: 06-08 Na134 mmol/L<L> Glu 248 mg/dL<H> K+ 4.3 mmol/L Cr  0.89 mg/dL BUN 22 mg/dL<H>     CAPILLARY BLOOD GLUCOSE      POCT Blood Glucose.: 222 mg/dL (08 Jun 2020 11:27)  POCT Blood Glucose.: 235 mg/dL (08 Jun 2020 05:30)  POCT Blood Glucose.: 208 mg/dL (07 Jun 2020 23:23)  POCT Blood Glucose.: 224 mg/dL (07 Jun 2020 21:14)  POCT Blood Glucose.: 244 mg/dL (07 Jun 2020 17:36)    Skin: R buttocks, unstageable, L heel, DTI    Estimated Needs:   [x ] no change since previous assessment  [ ] recalculated:     Previous Nutrition Diagnosis:   [ ] Inadequate Energy Intake [ ]Inadequate Oral Intake [ ] Excessive Energy Intake   [ ] Underweight [ ] Increased Nutrient Needs [ ] Overweight/Obesity   [ ] Altered GI Function [ ] Unintended Weight Loss [ ] Food & Nutrition Related Knowledge Deficit [x ] Malnutrition , severe     Nutrition Diagnosis is [x ] ongoing  [ ] resolved [ ] not applicable     New Nutrition Diagnosis: [ ] not applicable       Interventions:   Recommend  [ ] Change Diet To:  [ ] Nutrition Supplement  [ ] Nutrition Support  [ ] Other: continue with tube feeding of glucerna 1.2 at 60ml/hx24 (1440ml,1728kcal, 86g protein,1159ml free water). MD to monitor/assess fluid status as needed. spoke with RN     Monitoring and Evaluation:   [ ] PO intake [ x ] Tolerance to diet prescription [ x ] weights [ x ] labs[ x ] follow up per protocol  [ ] other:

## 2020-06-08 NOTE — PROGRESS NOTE ADULT - SUBJECTIVE AND OBJECTIVE BOX
Pt is awake, alert, lying in bed in NAD. Appears comfortable.     INTERVAL HPI/OVERNIGHT EVENTS:      VITAL SIGNS:  T(F): 97.4 (06-08-20 @ 06:44)  HR: 89 (06-08-20 @ 06:44)  BP: 149/78 (06-08-20 @ 06:44)  RR: 16 (06-08-20 @ 06:44)  SpO2: 100% (06-08-20 @ 06:44)  Wt(kg): --  I&O's Detail    07 Jun 2020 07:01  -  08 Jun 2020 07:00  --------------------------------------------------------  IN:    Glucerna: 720 mL  Total IN: 720 mL    OUT:  Total OUT: 0 mL    Total NET: 720 mL    REVIEW OF SYSTEMS:    CONSTITUTIONAL:  No fevers, chills, sweats    HEENT:  Eyes:  No diplopia or blurred vision. ENT:  No earache, sore throat or runny nose.    CARDIOVASCULAR:  No pressure, squeezing, tightness, or heaviness about the chest; no palpitations.    RESPIRATORY:  Per HPI    GASTROINTESTINAL:  No abdominal pain, nausea, vomiting or diarrhea.    GENITOURINARY:  No dysuria, frequency or urgency.    NEUROLOGIC:  No paresthesias, fasciculations, seizures or weakness.    PSYCHIATRIC:  No disorder of thought or mood.      PHYSICAL EXAM:    Constitutional: Well developed and nourished  Eyes: Perrla  ENMT: normal  Neck: supple  Respiratory: good air entry  Cardiovascular: S1 S2 regular  Gastrointestinal: Soft, Non tender  Extremities: No edema  Vascular:normal  Neurological:Awake, alert   Musculoskeletal:Normal      MEDICATIONS  (STANDING):  ascorbic acid 500 milliGRAM(s) Oral two times a day  collagenase Ointment 1 Application(s) Topical daily  dextrose 5% + sodium chloride 0.9%. 1000 milliLiter(s) (50 mL/Hr) IV Continuous <Continuous>  dextrose 5% + sodium chloride 0.9%. 1000 milliLiter(s) (50 mL/Hr) IV Continuous <Continuous>  dextrose 5%. 1000 milliLiter(s) (50 mL/Hr) IV Continuous <Continuous>  dextrose 50% Injectable 12.5 Gram(s) IV Push once  dextrose 50% Injectable 25 Gram(s) IV Push once  dextrose 50% Injectable 25 Gram(s) IV Push once  ergocalciferol 51136 Unit(s) Oral <User Schedule>  heparin   Injectable 5000 Unit(s) SubCutaneous every 12 hours  insulin glargine Injectable (LANTUS) 18 Unit(s) SubCutaneous at bedtime  insulin lispro (HumaLOG) corrective regimen sliding scale   SubCutaneous every 6 hours  insulin lispro Injectable (HumaLOG) 12 Unit(s) SubCutaneous every 6 hours  mirtazapine 7.5 milliGRAM(s) Oral at bedtime  multivitamin/minerals 1 Tablet(s) Oral daily  nystatin Powder 1 Application(s) Topical two times a day  OLANZapine 2.5 milliGRAM(s) Oral at bedtime  pantoprazole   Suspension 40 milliGRAM(s) Enteral Tube daily  QUEtiapine 25 milliGRAM(s) Oral at bedtime  zinc sulfate 220 milliGRAM(s) Oral daily    MEDICATIONS  (PRN):  acetaminophen    Suspension .. 650 milliGRAM(s) Enteral Tube every 6 hours PRN Temp greater or equal to 38C (100.4F)  ALBUTerol    90 MICROgram(s) HFA Inhaler 2 Puff(s) Inhalation every 6 hours PRN Shortness of Breath and/or Wheezing  dextrose 40% Gel 15 Gram(s) Oral once PRN Blood Glucose LESS THAN 70 milliGRAM(s)/deciliter  glucagon  Injectable 1 milliGRAM(s) IntraMuscular once PRN Glucose LESS THAN 70 milligrams/deciliter  guaiFENesin   Syrup  (Sugar-Free) 100 milliGRAM(s) Oral every 6 hours PRN Cough      Allergies    No Known Allergies    Intolerances        LABS:                        11.5   11.53 )-----------( 222      ( 08 Jun 2020 08:17 )             34.2     06-08    134<L>  |  98  |  22<H>  ----------------------------<  248<H>  4.3   |  27  |  0.89    Ca    9.4      08 Jun 2020 08:17      CAPILLARY BLOOD GLUCOSE      POCT Blood Glucose.: 222 mg/dL (08 Jun 2020 11:27)  POCT Blood Glucose.: 235 mg/dL (08 Jun 2020 05:30)  POCT Blood Glucose.: 208 mg/dL (07 Jun 2020 23:23)  POCT Blood Glucose.: 224 mg/dL (07 Jun 2020 21:14)  POCT Blood Glucose.: 244 mg/dL (07 Jun 2020 17:36)      RADIOLOGY & ADDITIONAL TESTS:    CXR:    Ct scan chest:    ekg;    echo:

## 2020-06-08 NOTE — PROGRESS NOTE ADULT - PROBLEM SELECTOR PLAN 6
Problem: Self Care Deficits Care Plan (Adult) Goal: *Acute Goals and Plan of Care (Insert Text) 1. Patient will self-groom with minimal assistance and as needed adaptive equipment. 2. Patient will self-feed with minimal assistance and as needed adaptive equipment, if cleared by MD. 
3. Patient will complete rolling with minimal assistance to decrease risk of skin breakdown. 4. Patient will tolerate sitting at EOB for 10 minutes with fair balance for ADLs. Timeframe: 6 visits or 2 week trial  
 
 
Comments: OCCUPATIONAL THERAPY: Initial Assessment and PM 9/23/2018 INPATIENT: Hospital Day: 2 Payor: Luis Fernando Hess / Plan: [facility] MEDICARE COMPLETE / Product Type: Managed Care Medicare /  
  
NAME/AGE/GENDER: Aidan Gaitan is a 78 y.o. female PRIMARY DIAGNOSIS:  Respiratory failure, acute-on-chronic (HCC) <principal problem not specified> <principal problem not specified> 
  
  
ICD-10: Treatment Diagnosis:  
 · Generalized Muscle Weakness (M62.81) Precautions/Allergies: 
   Review of patient's allergies indicates no known allergies. ASSESSMENT:  
 
Ms. Maximus Ellis presents to hospital for above. PMHx includes thalamic CVA in 07/18 with residual L sided weakness and dysphagia. Pt reports that she resides at [facility] as a long-term care patient in which she requires assistance with all ADLs and requires sharee lift for transfers OOB. Today, she is found supine in bed upon arrival, AOX4, and agreeable to OT. Pt with grossly decreased AROM, strength, coordination in LUE. She requires max A x2 for sit to supine and total A for scooting forward towards EOB. Once seated EOB, sitting balance is poor, requiring constant support to maintain upright posture. Standing not attempted secondary to safety concerns. Pt left with PT for assessment.  Ms. Maximus Ellis presents with functional limitations listed below and appears to be functioning slightly below baseline. They will benefit from continued skilled OT services to maximize safety and independence with ADLs. Will follow during acute stay. This section established at most recent assessment PROBLEM LIST (Impairments causing functional limitations): 1. Decreased Strength 2. Decreased ADL/Functional Activities 3. Decreased Transfer Abilities 4. Decreased Balance 5. Decreased Activity Tolerance 6. Increased Shortness of Breath 7. Decreased Flexibility/Joint Mobility INTERVENTIONS PLANNED: (Benefits and precautions of occupational therapy have been discussed with the patient.) 1. Activities of daily living training 2. Adaptive equipment training 3. Balance training 4. Neuromuscular re-eduation 5. Therapeutic activity 6. Therapeutic exercise TREATMENT PLAN: Frequency/Duration: Follow patient 3x/week for 2 week trial to address above goals. Rehabilitation Potential For Stated Goals: Fair RECOMMENDED REHABILITATION/EQUIPMENT: (at time of discharge pending progress): Due to the probability of continued deficits (see above) this patient will likely need continued skilled occupational therapy after discharge. Equipment:  
? None at this time OCCUPATIONAL PROFILE AND HISTORY:  
History of Present Injury/Illness (Reason for Referral): 
See H&P Past Medical History/Comorbidities: Ms. Chaitanya Judd  has a past medical history of Chronic pain; Chronic sinusitis (1/19/2015); Diverticulosis (2017); Edema (1/19/2015); Former smoker; GERD (gastroesophageal reflux disease); Headache (1/19/2015); Hearing loss (1/19/2015); Heart murmur (1990); Hip osteoarthritis (1/19/2015); colonic polyps (2017); migraines; Hypercholesterolemia (1/19/2015); Hypertension; Impaired fasting glucose (1/19/2015); Joint disorder (1/19/2015); Morbid obesity ([facility] [address] Neuralgia (1/19/2015); Phlebitis and thrombophlebitis of superficial vessels of lower extremities (01/19/2015);  Psychiatric disorder; Restless leg syndrome (1/19/2015); Rheumatic fever; Seizures ([address] SOB (shortness of breath) on exertion; Stroke ([address] Tobacco abuse (1/19/2015); Vascular anomalies, congenital; and Vitamin D deficiency (1/19/2015). She also has no past medical history of Adverse effect of anesthesia; Aneurysm ([address] Arrhythmia; Asthma; Autoimmune disease ([address] CAD (coronary artery disease); Cancer ([address] Chronic kidney disease; Chronic obstructive pulmonary disease ([address] Coagulation disorder ([address] Diabetes ([address] Difficult intubation; Endocarditis; Heart failure ([address] Liver disease; Malignant hyperthermia due to anesthesia; Nausea & vomiting; Pseudocholinesterase deficiency; PUD (peptic ulcer disease); Sleep apnea; Thromboembolus ([address] or Thyroid disease. Ms. Leona Tierney  has a past surgical history that includes hx mohs procedure (Right, 1995); hx cholecystectomy (1982); colonoscopy (N/A, 2/20/2017); pr total hip arthroplasty (Right, 2005); hx rotator cuff repair (Right, 10/31/2016); hx cataract removal (Bilateral, 3/2016); hx tubal ligation (1982); and hx appendectomy (03/15/2017). Social History/Living Environment:  
Home Environment: Skilled nursing facility Care [facility] Name: [facility] One[facility] Story Residence: One story Living Alone: No 
Support Systems: Skilled nursing facility Patient Expects to be Discharged to[de-identified] Skilled nursing facility Current DME Used/Available at Home: Wheelchair Prior Level of Function/Work/Activity: 
Total assistance since CVA with ADLs Personal Factors:   
      Sex:  female Age:  78 y.o. Number of Personal Factors/Comorbidities that affect the Plan of Care: Extensive review of physical, cognitive, and psychosocial performance (3+):  HIGH COMPLEXITY ASSESSMENT OF OCCUPATIONAL PERFORMANCE[de-identified]  
Activities of Daily Living:  
Basic ADLs (From Assessment) Complex ADLs (From Assessment) Feeding: Moderate assistance Oral Facial Hygiene/Grooming: Moderate assistance Bathing: Maximum assistance Upper Body Dressing: Maximum assistance Lower Body Dressing: Total assistance Toileting: Total assistance Grooming/Bathing/Dressing Activities of Daily Living Cognitive Retraining Safety/Judgement: Awareness of environment; Fall prevention Bed/Mat Mobility Supine to Sit: Maximum assistance;Assist x2 Sit to Supine: Maximum assistance; Total assistance;Assist x2 Scooting: Total assistance Most Recent Physical Functioning:  
Gross Assessment: 
AROM: Grossly decreased, non-functional 
Strength: Grossly decreased, non-functional 
Coordination: Generally decreased, functional 
Tone: Abnormal 
         
  
Posture: 
  
Balance: 
Sitting: Impaired Sitting - Static: Poor (constant support) Sitting - Dynamic: Poor (constant support) Bed Mobility: 
Supine to Sit: Maximum assistance;Assist x2 Sit to Supine: Maximum assistance; Total assistance;Assist x2 Scooting: Total assistance Wheelchair Mobility: 
  
Transfers: 
   
 
    
 
Patient Vitals for the past 6 hrs: 
 BP SpO2 O2 Flow Rate (L/min) Pulse  
09/23/18 1100 108/49 98 % - 61  
09/23/18 1433 - - 5 l/min - Mental Status Neurologic State: Alert Orientation Level: Oriented X4 Cognition: Follows commands, Decreased attention/concentration Perception: Appears intact Perseveration: No perseveration noted Safety/Judgement: Awareness of environment, Fall prevention Physical Skills Involved: 1. Range of Motion 2. Balance 3. Strength 4. Activity Tolerance 5. Fine Motor Control 6. Gross Motor Control Cognitive Skills Affected (resulting in the inability to perform in a timely and safe manner): 1. Executive Function 2. Expression Psychosocial Skills Affected: 
1. n/a Number of elements that affect the Plan of Care: 5+:  HIGH COMPLEXITY CLINICAL DECISION MAKING:  
[employer] 6 Clicks Daily Activity Inpatient Short Form How much help from another person does the patient currently need. .. Total A Lot A Little None 1. Putting on and taking off regular lower body clothing? [x] 1   [] 2   [] 3   [] 4  
2. Bathing (including washing, rinsing, drying)? [x] 1   [] 2   [] 3   [] 4  
3. Toileting, which includes using toilet, bedpan or urinal?   [x] 1   [] 2   [] 3   [] 4  
4. Putting on and taking off regular upper body clothing? [] 1   [x] 2   [] 3   [] 4  
5. Taking care of personal grooming such as brushing teeth? [] 1   [x] 2   [] 3   [] 4  
6. Eating meals? [] 1   [x] 2   [] 3   [] 4  
© 2007, Trustees of [address] [place] [address] [address] [id number], under license to [employer]. All rights reserved Score:  Initial: 9 Most Recent: X (Date: -- ) Interpretation of Tool:  Represents activities that are increasingly more difficult (i.e. Bed mobility, Transfers, Gait). Score 24 23 22-20 19-15 14-10 9-7 6 Modifier CH CI CJ CK CL CM CN   
 
? Self Care:  
  - CURRENT STATUS: CM - 80%-99% impaired, limited or restricted  - GOAL STATUS: CL - 60%-79% impaired, limited or restricted  - D/C STATUS:  ---------------To be determined--------------- Payor: Chalino Olvera / Plan: [facility] [employer] MEDICARE COMPLETE / Product Type: Managed Care Medicare /   
 
Medical Necessity:     
· Patient is expected to demonstrate progress in strength, balance and functional technique to decrease assistance required with ADLs. Reason for Services/Other Comments: 
· Patient continues to require skilled intervention due to medical complications and patient unable to attend/participate in therapy as expected. Use of outcome tool(s) and clinical judgement create a POC that gives a: LOW COMPLEXITY  
 
 
 
TREATMENT:  
(In addition to Assessment/Re-Assessment sessions the following treatments were rendered) Pre-treatment Symptoms/Complaints:   
Pain: Initial:  
Pain Intensity 1: 0  Post Session:  same Assessment/Reassessment only, no treatment provided today Braces/Orthotics/Lines/Etc:  
· wu catheter · O2 Device: Nasal cannula Treatment/Session Assessment:   
· Response to Treatment:  eval only · Interdisciplinary Collaboration:  
o Physical Therapist 
o Occupational Therapist 
o Registered Nurse · After treatment position/precautions:  
o with PT  
· Compliance with Program/Exercises: compliant all of the time. · Recommendations/Intent for next treatment session: \"Next visit will focus on reduction in assistance provided\". Total Treatment Duration: OT Patient Time In/Time Out Time In: 1300 Time Out: [facility]  
    
 
 Continue Collagenase ointment with foam dressing  Turn and position

## 2020-06-08 NOTE — PROGRESS NOTE ADULT - SUBJECTIVE AND OBJECTIVE BOX
64 year old male from [facility] with PMH of dementia and type 2 DM presented with seizure activity while sitting at AL. Endocrinology was consulted for management of uncontrolled type 2 DM with hyperglycemia.    Patient seen and examined at bedside. Patient on tube feedings at goal rate. FS reviewed. BG remains above goal >200 despite Lantus 13 units at bedtime and Humalog 8 units q 6 hrs.      MEDICATIONS  (STANDING):  ascorbic acid 500 milliGRAM(s) Oral two times a day  collagenase Ointment 1 Application(s) Topical daily  dextrose 5% + sodium chloride 0.9%. 1000 milliLiter(s) (50 mL/Hr) IV Continuous <Continuous>  dextrose 5% + sodium chloride 0.9%. 1000 milliLiter(s) (50 mL/Hr) IV Continuous <Continuous>  dextrose 5%. 1000 milliLiter(s) (50 mL/Hr) IV Continuous <Continuous>  dextrose 50% Injectable 12.5 Gram(s) IV Push once  dextrose 50% Injectable 25 Gram(s) IV Push once  dextrose 50% Injectable 25 Gram(s) IV Push once  ergocalciferol 74523 Unit(s) Oral <User Schedule>  heparin   Injectable 5000 Unit(s) SubCutaneous every 12 hours  insulin glargine Injectable (LANTUS) 18 Unit(s) SubCutaneous at bedtime  insulin lispro (HumaLOG) corrective regimen sliding scale   SubCutaneous every 6 hours  insulin lispro Injectable (HumaLOG) 12 Unit(s) SubCutaneous every 6 hours  mirtazapine 7.5 milliGRAM(s) Oral at bedtime  multivitamin/minerals 1 Tablet(s) Oral daily  nystatin Powder 1 Application(s) Topical two times a day  OLANZapine 2.5 milliGRAM(s) Oral at bedtime  pantoprazole   Suspension 40 milliGRAM(s) Enteral Tube daily  QUEtiapine 25 milliGRAM(s) Oral at bedtime  zinc sulfate 220 milliGRAM(s) Oral daily    MEDICATIONS  (PRN):  acetaminophen    Suspension .. 650 milliGRAM(s) Enteral Tube every 6 hours PRN Temp greater or equal to 38C (100.4F)  ALBUTerol    90 MICROgram(s) HFA Inhaler 2 Puff(s) Inhalation every 6 hours PRN Shortness of Breath and/or Wheezing  dextrose 40% Gel 15 Gram(s) Oral once PRN Blood Glucose LESS THAN 70 milliGRAM(s)/deciliter  glucagon  Injectable 1 milliGRAM(s) IntraMuscular once PRN Glucose LESS THAN 70 milligrams/deciliter  guaiFENesin   Syrup  (Sugar-Free) 100 milliGRAM(s) Oral every 6 hours PRN Cough      REVIEW OF SYSTEMS:  unable to obtain due to medical condition    PHYSICAL EXAM:    VITAL SIGNS  T(C): 36.3 (06-08-20 @ 06:44), Max: 36.6 (06-07-20 @ 14:26)  HR: 89 (06-08-20 @ 06:44) (89 - 100)  BP: 149/78 (06-08-20 @ 06:44) (129/68 - 149/78)  RR: 16 (06-08-20 @ 06:44) (16 - 20)  SpO2: 100% (06-08-20 @ 06:44) (97% - 100%)    GENERAL: NAD, well-developed  HEAD:  Atraumatic, Normocephalic  EYES: EOMI, PERRLA, conjunctiva and sclera clear  ENMT: No tonsillar erythema, exudates, or enlargement; No lesions  NECK: Supple, No JVD, Normal thyroid  NERVOUS SYSTEM:  nonverbal,  CHEST/LUNG: CTA bilaterally  No rales, rhonchi, wheezing, or rubs  HEART: Regular rate and rhythm; No murmurs, rubs, or gallops  ABDOMEN: s/p PEG, Soft, Nontender, Nondistended; Bowel sounds present  EXTREMITIES:  No edema  SKIN: No rashes or lesions  LABS:                        11.5   11.53 )-----------( 222      ( 08 Jun 2020 08:17 )             34.2     06-08    134<L>  |  98  |  22<H>  ----------------------------<  248<H>  4.3   |  27  |  0.89    Ca    9.4      08 Jun 2020 08:17          CAPILLARY BLOOD GLUCOSE      POCT Blood Glucose.: 222 mg/dL (08 Jun 2020 11:27)  POCT Blood Glucose.: 235 mg/dL (08 Jun 2020 05:30)  POCT Blood Glucose.: 208 mg/dL (07 Jun 2020 23:23)  POCT Blood Glucose.: 224 mg/dL (07 Jun 2020 21:14)  POCT Blood Glucose.: 244 mg/dL (07 Jun 2020 17:36)

## 2020-06-08 NOTE — PROGRESS NOTE ADULT - PROBLEM SELECTOR PLAN 7
as evidenced by HgA1C of 9.9% on admission  BG above goal >200  Increase to Lantus 18 units at bedtime  Increase to Humalog 12 units q 6 hrs (hold if tube feedings are held)  continue moderate dose rapid acting insulin q 6 hrs   monitor FS Q6  Endocrinology Dr. Allison following.

## 2020-06-09 LAB
ANION GAP SERPL CALC-SCNC: 12 MMOL/L — SIGNIFICANT CHANGE UP (ref 5–17)
BUN SERPL-MCNC: 28 MG/DL — HIGH (ref 7–18)
CALCIUM SERPL-MCNC: 9.6 MG/DL — SIGNIFICANT CHANGE UP (ref 8.4–10.5)
CHLORIDE SERPL-SCNC: 97 MMOL/L — SIGNIFICANT CHANGE UP (ref 96–108)
CO2 SERPL-SCNC: 26 MMOL/L — SIGNIFICANT CHANGE UP (ref 22–31)
CREAT SERPL-MCNC: 0.9 MG/DL — SIGNIFICANT CHANGE UP (ref 0.5–1.3)
GLUCOSE BLDC GLUCOMTR-MCNC: 151 MG/DL — HIGH (ref 70–99)
GLUCOSE BLDC GLUCOMTR-MCNC: 175 MG/DL — HIGH (ref 70–99)
GLUCOSE BLDC GLUCOMTR-MCNC: 179 MG/DL — HIGH (ref 70–99)
GLUCOSE BLDC GLUCOMTR-MCNC: 214 MG/DL — HIGH (ref 70–99)
GLUCOSE SERPL-MCNC: 222 MG/DL — HIGH (ref 70–99)
HCT VFR BLD CALC: 35.3 % — LOW (ref 39–50)
HGB BLD-MCNC: 11.8 G/DL — LOW (ref 13–17)
MCHC RBC-ENTMCNC: 28.7 PG — SIGNIFICANT CHANGE UP (ref 27–34)
MCHC RBC-ENTMCNC: 33.4 GM/DL — SIGNIFICANT CHANGE UP (ref 32–36)
MCV RBC AUTO: 85.9 FL — SIGNIFICANT CHANGE UP (ref 80–100)
NRBC # BLD: 0 /100 WBCS — SIGNIFICANT CHANGE UP (ref 0–0)
PLATELET # BLD AUTO: 256 K/UL — SIGNIFICANT CHANGE UP (ref 150–400)
POTASSIUM SERPL-MCNC: 4.2 MMOL/L — SIGNIFICANT CHANGE UP (ref 3.5–5.3)
POTASSIUM SERPL-SCNC: 4.2 MMOL/L — SIGNIFICANT CHANGE UP (ref 3.5–5.3)
RBC # BLD: 4.11 M/UL — LOW (ref 4.2–5.8)
RBC # FLD: 13.2 % — SIGNIFICANT CHANGE UP (ref 10.3–14.5)
SARS-COV-2 RNA SPEC QL NAA+PROBE: DETECTED
SODIUM SERPL-SCNC: 135 MMOL/L — SIGNIFICANT CHANGE UP (ref 135–145)
WBC # BLD: 11.95 K/UL — HIGH (ref 3.8–10.5)
WBC # FLD AUTO: 11.95 K/UL — HIGH (ref 3.8–10.5)

## 2020-06-09 PROCEDURE — 99232 SBSQ HOSP IP/OBS MODERATE 35: CPT

## 2020-06-09 RX ORDER — SODIUM CHLORIDE 9 MG/ML
1000 INJECTION INTRAMUSCULAR; INTRAVENOUS; SUBCUTANEOUS
Refills: 0 | Status: DISCONTINUED | OUTPATIENT
Start: 2020-06-09 | End: 2020-06-09

## 2020-06-09 RX ADMIN — Medication 12 UNIT(S): at 11:57

## 2020-06-09 RX ADMIN — PANTOPRAZOLE SODIUM 40 MILLIGRAM(S): 20 TABLET, DELAYED RELEASE ORAL at 12:53

## 2020-06-09 RX ADMIN — HEPARIN SODIUM 5000 UNIT(S): 5000 INJECTION INTRAVENOUS; SUBCUTANEOUS at 17:16

## 2020-06-09 RX ADMIN — Medication 12 UNIT(S): at 17:13

## 2020-06-09 RX ADMIN — OLANZAPINE 2.5 MILLIGRAM(S): 15 TABLET, FILM COATED ORAL at 21:34

## 2020-06-09 RX ADMIN — HEPARIN SODIUM 5000 UNIT(S): 5000 INJECTION INTRAVENOUS; SUBCUTANEOUS at 05:17

## 2020-06-09 RX ADMIN — Medication 500 MILLIGRAM(S): at 17:16

## 2020-06-09 RX ADMIN — Medication 500 MILLIGRAM(S): at 05:17

## 2020-06-09 RX ADMIN — Medication 1 APPLICATION(S): at 12:53

## 2020-06-09 RX ADMIN — Medication 4: at 00:48

## 2020-06-09 RX ADMIN — ZINC SULFATE TAB 220 MG (50 MG ZINC EQUIVALENT) 220 MILLIGRAM(S): 220 (50 ZN) TAB at 12:53

## 2020-06-09 RX ADMIN — Medication 1 TABLET(S): at 12:53

## 2020-06-09 RX ADMIN — QUETIAPINE FUMARATE 25 MILLIGRAM(S): 200 TABLET, FILM COATED ORAL at 21:34

## 2020-06-09 RX ADMIN — NYSTATIN CREAM 1 APPLICATION(S): 100000 CREAM TOPICAL at 05:18

## 2020-06-09 RX ADMIN — Medication 2: at 06:38

## 2020-06-09 RX ADMIN — Medication 2: at 17:13

## 2020-06-09 RX ADMIN — MIRTAZAPINE 7.5 MILLIGRAM(S): 45 TABLET, ORALLY DISINTEGRATING ORAL at 21:34

## 2020-06-09 RX ADMIN — NYSTATIN CREAM 1 APPLICATION(S): 100000 CREAM TOPICAL at 17:16

## 2020-06-09 RX ADMIN — Medication 2: at 11:57

## 2020-06-09 RX ADMIN — Medication 12 UNIT(S): at 00:48

## 2020-06-09 RX ADMIN — Medication 12 UNIT(S): at 06:37

## 2020-06-09 NOTE — PROGRESS NOTE ADULT - PROBLEM SELECTOR PLAN 8
HgA1C of 9.9% on admission  BG above goal >200  c/w Lantus 18 units at bedtime  C/w  Humalog 12 units q 6 hrs (hold if tube feedings are held)  continue moderate dose rapid acting insulin q 6 hrs   monitor FS Q6  Endocrinology Dr. Allison following.

## 2020-06-09 NOTE — PROGRESS NOTE ADULT - PROBLEM SELECTOR PLAN 3
no behavioral disturbances  c/w zyprexa, seroquel, remeron at bedtime as per dr. Packer (psych).   Supportive care

## 2020-06-09 NOTE — PROGRESS NOTE ADULT - PROBLEM SELECTOR PLAN 6
COVID 19 PCR negative on 5/31.    positive COVID on 6/3, 6/6, 6/9    requires two negative result  Needs 2 negatives per RODNEY requirement.

## 2020-06-09 NOTE — PROGRESS NOTE ADULT - SUBJECTIVE AND OBJECTIVE BOX
Pt is awake, alert, lying in bed in NAD. Tolerating PEG feeds well. Pending 2 negative Covid swabs for D/C planning.     INTERVAL HPI/OVERNIGHT EVENTS:      VITAL SIGNS:  T(F): 98.2 (06-09-20 @ 05:07)  HR: 107 (06-09-20 @ 05:07)  BP: 129/70 (06-09-20 @ 05:07)  RR: 16 (06-09-20 @ 05:07)  SpO2: 100% (06-09-20 @ 05:07)  Wt(kg): --  I&O's Detail          REVIEW OF SYSTEMS:    CONSTITUTIONAL:  No fevers, chills, sweats    HEENT:  Eyes:  No diplopia or blurred vision. ENT:  No earache, sore throat or runny nose.    CARDIOVASCULAR:  No pressure, squeezing, tightness, or heaviness about the chest; no palpitations.    RESPIRATORY:  Per HPI    GASTROINTESTINAL:  No abdominal pain, nausea, vomiting or diarrhea.    GENITOURINARY:  No dysuria, frequency or urgency.    NEUROLOGIC:  No paresthesias, fasciculations, seizures or weakness.    PSYCHIATRIC:  No disorder of thought or mood.      PHYSICAL EXAM:    Constitutional: Well developed and nourished  Eyes:Perrla  ENMT: normal  Neck:supple  Respiratory: good air entry  Cardiovascular: S1 S2 regular  Gastrointestinal: Soft, Non tender; PEG tube.   Extremities: No edema  Vascular:normal  Neurological: Awake, alert   Musculoskeletal: weak.       MEDICATIONS  (STANDING):  ascorbic acid 500 milliGRAM(s) Oral two times a day  collagenase Ointment 1 Application(s) Topical daily  dextrose 5% + sodium chloride 0.9%. 1000 milliLiter(s) (50 mL/Hr) IV Continuous <Continuous>  dextrose 5% + sodium chloride 0.9%. 1000 milliLiter(s) (50 mL/Hr) IV Continuous <Continuous>  dextrose 5%. 1000 milliLiter(s) (50 mL/Hr) IV Continuous <Continuous>  dextrose 50% Injectable 12.5 Gram(s) IV Push once  dextrose 50% Injectable 25 Gram(s) IV Push once  dextrose 50% Injectable 25 Gram(s) IV Push once  ergocalciferol 30405 Unit(s) Oral <User Schedule>  heparin   Injectable 5000 Unit(s) SubCutaneous every 12 hours  insulin glargine Injectable (LANTUS) 18 Unit(s) SubCutaneous at bedtime  insulin lispro (HumaLOG) corrective regimen sliding scale   SubCutaneous every 6 hours  insulin lispro Injectable (HumaLOG) 12 Unit(s) SubCutaneous every 6 hours  mirtazapine 7.5 milliGRAM(s) Oral at bedtime  multivitamin/minerals 1 Tablet(s) Oral daily  nystatin Powder 1 Application(s) Topical two times a day  OLANZapine 2.5 milliGRAM(s) Oral at bedtime  pantoprazole   Suspension 40 milliGRAM(s) Enteral Tube daily  QUEtiapine 25 milliGRAM(s) Oral at bedtime  zinc sulfate 220 milliGRAM(s) Oral daily    MEDICATIONS  (PRN):  acetaminophen    Suspension .. 650 milliGRAM(s) Enteral Tube every 6 hours PRN Temp greater or equal to 38C (100.4F)  ALBUTerol    90 MICROgram(s) HFA Inhaler 2 Puff(s) Inhalation every 6 hours PRN Shortness of Breath and/or Wheezing  dextrose 40% Gel 15 Gram(s) Oral once PRN Blood Glucose LESS THAN 70 milliGRAM(s)/deciliter  glucagon  Injectable 1 milliGRAM(s) IntraMuscular once PRN Glucose LESS THAN 70 milligrams/deciliter  guaiFENesin   Syrup  (Sugar-Free) 100 milliGRAM(s) Oral every 6 hours PRN Cough      Allergies    No Known Allergies    Intolerances        LABS:                        11.8   11.95 )-----------( 256      ( 09 Jun 2020 08:31 )             35.3     06-09    135  |  97  |  28<H>  ----------------------------<  222<H>  4.2   |  26  |  0.90    Ca    9.6      09 Jun 2020 08:31                CAPILLARY BLOOD GLUCOSE      POCT Blood Glucose.: 179 mg/dL (09 Jun 2020 06:06)  POCT Blood Glucose.: 214 mg/dL (09 Jun 2020 00:26)  POCT Blood Glucose.: 164 mg/dL (08 Jun 2020 21:04)  POCT Blood Glucose.: 153 mg/dL (08 Jun 2020 17:09)  POCT Blood Glucose.: 222 mg/dL (08 Jun 2020 11:27)        RADIOLOGY & ADDITIONAL TESTS:    CXR:    Ct scan chest:    ekg;    echo:

## 2020-06-09 NOTE — PROGRESS NOTE ADULT - SUBJECTIVE AND OBJECTIVE BOX
[   ] ICU                                          [   ] CCU                                      [ X  ] Medical Floor      Patient is comfortable. No new complaints reported, No abdominal pain, N/V, hematemesis, hematochezia, melena, fever, chills, chest pain, SOB, cough or diarrhea reported.    VITALS  Vital Signs Last 24 Hrs  T(C): 36.7 (09 Jun 2020 14:13), Max: 36.8 (09 Jun 2020 05:07)  T(F): 98.1 (09 Jun 2020 14:13), Max: 98.2 (09 Jun 2020 05:07)  HR: 109 (09 Jun 2020 14:13) (102 - 109)  BP: 91/72 (09 Jun 2020 14:13) (91/72 - 130/73)   RR: 17 (09 Jun 2020 14:13) (15 - 17)  SpO2: 99% (09 Jun 2020 14:13) (98% - 100%)       MEDICATIONS  (STANDING):  ascorbic acid 500 milliGRAM(s) Oral two times a day  collagenase Ointment 1 Application(s) Topical daily  dextrose 5% + sodium chloride 0.9%. 1000 milliLiter(s) (50 mL/Hr) IV Continuous <Continuous>  dextrose 5% + sodium chloride 0.9%. 1000 milliLiter(s) (50 mL/Hr) IV Continuous <Continuous>  dextrose 5%. 1000 milliLiter(s) (50 mL/Hr) IV Continuous <Continuous>  dextrose 50% Injectable 12.5 Gram(s) IV Push once  dextrose 50% Injectable 25 Gram(s) IV Push once  dextrose 50% Injectable 25 Gram(s) IV Push once  ergocalciferol 69992 Unit(s) Oral <User Schedule>  heparin   Injectable 5000 Unit(s) SubCutaneous every 12 hours  insulin glargine Injectable (LANTUS) 18 Unit(s) SubCutaneous at bedtime  insulin lispro (HumaLOG) corrective regimen sliding scale   SubCutaneous every 6 hours  insulin lispro Injectable (HumaLOG) 12 Unit(s) SubCutaneous every 6 hours  mirtazapine 7.5 milliGRAM(s) Oral at bedtime  multivitamin/minerals 1 Tablet(s) Oral daily  nystatin Powder 1 Application(s) Topical two times a day  OLANZapine 2.5 milliGRAM(s) Oral at bedtime  pantoprazole   Suspension 40 milliGRAM(s) Enteral Tube daily  QUEtiapine 25 milliGRAM(s) Oral at bedtime  zinc sulfate 220 milliGRAM(s) Oral daily    MEDICATIONS  (PRN):  acetaminophen    Suspension .. 650 milliGRAM(s) Enteral Tube every 6 hours PRN Temp greater or equal to 38C (100.4F)  ALBUTerol    90 MICROgram(s) HFA Inhaler 2 Puff(s) Inhalation every 6 hours PRN Shortness of Breath and/or Wheezing  dextrose 40% Gel 15 Gram(s) Oral once PRN Blood Glucose LESS THAN 70 milliGRAM(s)/deciliter  glucagon  Injectable 1 milliGRAM(s) IntraMuscular once PRN Glucose LESS THAN 70 milligrams/deciliter  guaiFENesin   Syrup  (Sugar-Free) 100 milliGRAM(s) Oral every 6 hours PRN Cough                            11.8   11.95 )-----------( 256      ( 09 Jun 2020 08:31 )             35.3       06-09    135  |  97  |  28<H>  ----------------------------<  222<H>  4.2   |  26  |  0.90    Ca    9.6      09 Jun 2020 08:31

## 2020-06-09 NOTE — PROGRESS NOTE ADULT - SUBJECTIVE AND OBJECTIVE BOX
64 year old male from [facility] with PMH of dementia and type 2 DM presented with seizure activity while sitting at AL. Endocrinology was consulted for management of uncontrolled type 2 DM with hyperglycemia.    Patient seen and examined at bedside. Patient on tube feedings at goal rate. FS reviewed. BG now at goal <180 on current regimen of  Lantus 18 units at bedtime and Humalog 12 units q 6 hrs.    MEDICATIONS  (STANDING):  ascorbic acid 500 milliGRAM(s) Oral two times a day  collagenase Ointment 1 Application(s) Topical daily  dextrose 5% + sodium chloride 0.9%. 1000 milliLiter(s) (50 mL/Hr) IV Continuous <Continuous>  dextrose 5% + sodium chloride 0.9%. 1000 milliLiter(s) (50 mL/Hr) IV Continuous <Continuous>  dextrose 5%. 1000 milliLiter(s) (50 mL/Hr) IV Continuous <Continuous>  dextrose 50% Injectable 12.5 Gram(s) IV Push once  dextrose 50% Injectable 25 Gram(s) IV Push once  dextrose 50% Injectable 25 Gram(s) IV Push once  ergocalciferol 86015 Unit(s) Oral <User Schedule>  heparin   Injectable 5000 Unit(s) SubCutaneous every 12 hours  insulin glargine Injectable (LANTUS) 18 Unit(s) SubCutaneous at bedtime  insulin lispro (HumaLOG) corrective regimen sliding scale   SubCutaneous every 6 hours  insulin lispro Injectable (HumaLOG) 12 Unit(s) SubCutaneous every 6 hours  mirtazapine 7.5 milliGRAM(s) Oral at bedtime  multivitamin/minerals 1 Tablet(s) Oral daily  nystatin Powder 1 Application(s) Topical two times a day  OLANZapine 2.5 milliGRAM(s) Oral at bedtime  pantoprazole   Suspension 40 milliGRAM(s) Enteral Tube daily  QUEtiapine 25 milliGRAM(s) Oral at bedtime  zinc sulfate 220 milliGRAM(s) Oral daily    MEDICATIONS  (PRN):  acetaminophen    Suspension .. 650 milliGRAM(s) Enteral Tube every 6 hours PRN Temp greater or equal to 38C (100.4F)  ALBUTerol    90 MICROgram(s) HFA Inhaler 2 Puff(s) Inhalation every 6 hours PRN Shortness of Breath and/or Wheezing  dextrose 40% Gel 15 Gram(s) Oral once PRN Blood Glucose LESS THAN 70 milliGRAM(s)/deciliter  glucagon  Injectable 1 milliGRAM(s) IntraMuscular once PRN Glucose LESS THAN 70 milligrams/deciliter  guaiFENesin   Syrup  (Sugar-Free) 100 milliGRAM(s) Oral every 6 hours PRN Cough      REVIEW OF SYSTEMS:  unable to obtain due to medical condition    PHYSICAL EXAM:    VITAL SIGNS  T(C): 36.8 (06-09-20 @ 05:07), Max: 36.8 (06-09-20 @ 05:07)  HR: 107 (06-09-20 @ 05:07) (97 - 107)  BP: 129/70 (06-09-20 @ 05:07) (129/70 - 130/73)  RR: 16 (06-09-20 @ 05:07) (15 - 16)  SpO2: 100% (06-09-20 @ 05:07) (98% - 100%)      GENERAL: NAD, well-developed  HEAD:  Atraumatic, Normocephalic  EYES: EOMI, PERRLA, conjunctiva and sclera clear  ENMT: No tonsillar erythema, exudates, or enlargement; No lesions  NECK: Supple, No JVD, Normal thyroid  NERVOUS SYSTEM:  nonverbal,  CHEST/LUNG: CTA bilaterally  No rales, rhonchi, wheezing, or rubs  HEART: Regular rate and rhythm; No murmurs, rubs, or gallops  ABDOMEN: s/p PEG, Soft, Nontender, Nondistended; Bowel sounds present  EXTREMITIES:  No edema  SKIN: No rashes or lesions        LABS:                        11.8   11.95 )-----------( 256      ( 09 Jun 2020 08:31 )             35.3     06-09    135  |  97  |  28<H>  ----------------------------<  222<H>  4.2   |  26  |  0.90    Ca    9.6      09 Jun 2020 08:31          CAPILLARY BLOOD GLUCOSE      POCT Blood Glucose.: 175 mg/dL (09 Jun 2020 11:28)  POCT Blood Glucose.: 179 mg/dL (09 Jun 2020 06:06)  POCT Blood Glucose.: 214 mg/dL (09 Jun 2020 00:26)  POCT Blood Glucose.: 164 mg/dL (08 Jun 2020 21:04)  POCT Blood Glucose.: 153 mg/dL (08 Jun 2020 17:09)

## 2020-06-09 NOTE — PROGRESS NOTE ADULT - PROBLEM SELECTOR PLAN 5
CXR as above  s/p Peg tube placement,    keep NPO. c/w tube feeding. med adm thru Peg tube.   +leukocytosis. afebrile.     off antibiotics  Pulmonary dr. Noriega following

## 2020-06-09 NOTE — PROGRESS NOTE ADULT - SUBJECTIVE AND OBJECTIVE BOX
CARDIOLOGY/MEDICAL ATTENDING    CHIEF COMPLAINT:Patient is a 64y old  Male who presents with a chief complaint of seizures.Pt appears comfortable.    	  REVIEW OF SYSTEMS:  unable to obtain    PHYSICAL EXAM:  T(C): 36.8 (06-09-20 @ 05:07), Max: 36.8 (06-09-20 @ 05:07)  HR: 107 (06-09-20 @ 05:07) (97 - 107)  BP: 129/70 (06-09-20 @ 05:07) (129/70 - 130/73)  RR: 16 (06-09-20 @ 05:07) (15 - 16)  SpO2: 100% (06-09-20 @ 05:07) (98% - 100%)        Appearance: Normal	  HEENT:   Normal oral mucosa, PERRL, EOMI	  Lymphatic: No lymphadenopathy  Cardiovascular: Normal S1 S2, No JVD, No murmurs, No edema  Respiratory: Lungs clear to auscultation	  Gastrointestinal:  Soft, Non-tender, + BS	  Skin: No rashes, No ecchymoses, No cyanosis	  Extremities: Normal range of motion, No clubbing, cyanosis or edema  Vascular: Peripheral pulses palpable 2+ bilaterally    MEDICATIONS  (STANDING):  ascorbic acid 500 milliGRAM(s) Oral two times a day  collagenase Ointment 1 Application(s) Topical daily  dextrose 5% + sodium chloride 0.9%. 1000 milliLiter(s) (50 mL/Hr) IV Continuous <Continuous>  dextrose 5% + sodium chloride 0.9%. 1000 milliLiter(s) (50 mL/Hr) IV Continuous <Continuous>  dextrose 5%. 1000 milliLiter(s) (50 mL/Hr) IV Continuous <Continuous>  dextrose 50% Injectable 12.5 Gram(s) IV Push once  dextrose 50% Injectable 25 Gram(s) IV Push once  dextrose 50% Injectable 25 Gram(s) IV Push once  ergocalciferol 76107 Unit(s) Oral <User Schedule>  heparin   Injectable 5000 Unit(s) SubCutaneous every 12 hours  insulin glargine Injectable (LANTUS) 18 Unit(s) SubCutaneous at bedtime  insulin lispro (HumaLOG) corrective regimen sliding scale   SubCutaneous every 6 hours  insulin lispro Injectable (HumaLOG) 12 Unit(s) SubCutaneous every 6 hours  mirtazapine 7.5 milliGRAM(s) Oral at bedtime  multivitamin/minerals 1 Tablet(s) Oral daily  nystatin Powder 1 Application(s) Topical two times a day  OLANZapine 2.5 milliGRAM(s) Oral at bedtime  pantoprazole   Suspension 40 milliGRAM(s) Enteral Tube daily  QUEtiapine 25 milliGRAM(s) Oral at bedtime  zinc sulfate 220 milliGRAM(s) Oral daily      	  	  LABS:	 	                        11.8   11.95 )-----------( 256      ( 09 Jun 2020 08:31 )             35.3     06-09    135  |  97  |  28<H>  ----------------------------<  222<H>  4.2   |  26  |  0.90    Ca    9.6      09 Jun 2020 08:31          	        COVID-19 PCR . (06.06.20 @ 06:58)    COVID-19 PCR: Detected: This test has been validated by [employer] to be accurate;  though it has not been FDA cleared/approved by the usual pathway.  As with all laboratory tests, results should be correlated with clinical  findings.  https://www.fda.gov/media/631488/download  https://www.fda.gov/media/608606/download

## 2020-06-09 NOTE — PROGRESS NOTE ADULT - PROBLEM SELECTOR PLAN 2
oropharyngeal dysphagia due to  dementia   Peg placed on 06/02  tolerates feeding  NPO. continue meds through peg tube  Continue TF

## 2020-06-09 NOTE — PROGRESS NOTE ADULT - PROBLEM SELECTOR PLAN 10
Discharge to [facility]  after 2 consecutive negative testings for Covid-19 as required by [facility]. 06/03, 06/06, 6/9 testing was positive. repeat in 3 days.

## 2020-06-09 NOTE — PROGRESS NOTE ADULT - PROBLEM SELECTOR PLAN 1
sent from AL for seizure like activity  no further seizure activity at present.   No indication for antiepileptic medications as per   Continue seizure precautions

## 2020-06-09 NOTE — PROGRESS NOTE ADULT - ASSESSMENT
64 year old male from [facility] with PMH of dementia and type 2 DM presented with seizure activity while sitting at AL. Endocrinology was consulted for management of uncontrolled type 2 DM with hyperglycemia.    1. Uncontrolled Type 2 DM with hyperglycemia, A1c 9.9%  -patient tolerating tube feedings via PEG   -BG at goal <180  -Continue Lantus 18 units at bedtime  -Continue Humalog 12 units q 6 hrs (hold if tube feedings are held)  -continue moderate dose rapid acting insulin q 6 hrs   BG 70-100mg/dL 0 units  -150 mg/dL 0 units  -200 mg/dL 2 unit  -250 mg/dL 4 units  -300 mg/dL 6 units  -350 mg/dL 8 units  -400 mg/dL 10 units  BG > 400mg/dL 12 units  -FS AC HS  -ensure consistent carbohydrate   -will monitor FS and adjust accordingly     2. Delirium  -continue Seroquel, Zyprexa, Remeron    Plan discussed with primary team]  Please  call  w/ any questions or concerns [phone]

## 2020-06-09 NOTE — PROGRESS NOTE ADULT - SUBJECTIVE AND OBJECTIVE BOX
NP Note discussed with  Primary Attending    Patient is a 64y old  Male who presents with a chief complaint of seizures (09 Jun 2020 13:09)    HPI-  64 year old male from [facility]  with PMH dementia and DM coming in with seizures like activity while sitting at AL.  Pt had peg placement on 06/02/2020 for FTT 2/2 to dysphagia and poor oral intake related to changes in mental status.     Pt tolerates Peg tube feeding, medically optimized for discharge,  pending covid-19  2 negative results for d/c to [facility]. Repeated COVID Positive today.       INTERVAL HPI/OVERNIGHT EVENTS: seen at bedside. unable to communicate due to confusion.     MEDICATIONS  (STANDING):  ascorbic acid 500 milliGRAM(s) Oral two times a day  collagenase Ointment 1 Application(s) Topical daily  dextrose 5% + sodium chloride 0.9%. 1000 milliLiter(s) (50 mL/Hr) IV Continuous <Continuous>  dextrose 5% + sodium chloride 0.9%. 1000 milliLiter(s) (50 mL/Hr) IV Continuous <Continuous>  dextrose 5%. 1000 milliLiter(s) (50 mL/Hr) IV Continuous <Continuous>  dextrose 50% Injectable 12.5 Gram(s) IV Push once  dextrose 50% Injectable 25 Gram(s) IV Push once  dextrose 50% Injectable 25 Gram(s) IV Push once  ergocalciferol 23625 Unit(s) Oral <User Schedule>  heparin   Injectable 5000 Unit(s) SubCutaneous every 12 hours  insulin glargine Injectable (LANTUS) 18 Unit(s) SubCutaneous at bedtime  insulin lispro (HumaLOG) corrective regimen sliding scale   SubCutaneous every 6 hours  insulin lispro Injectable (HumaLOG) 12 Unit(s) SubCutaneous every 6 hours  mirtazapine 7.5 milliGRAM(s) Oral at bedtime  multivitamin/minerals 1 Tablet(s) Oral daily  nystatin Powder 1 Application(s) Topical two times a day  OLANZapine 2.5 milliGRAM(s) Oral at bedtime  pantoprazole   Suspension 40 milliGRAM(s) Enteral Tube daily  QUEtiapine 25 milliGRAM(s) Oral at bedtime  zinc sulfate 220 milliGRAM(s) Oral daily    MEDICATIONS  (PRN):  acetaminophen    Suspension .. 650 milliGRAM(s) Enteral Tube every 6 hours PRN Temp greater or equal to 38C (100.4F)  ALBUTerol    90 MICROgram(s) HFA Inhaler 2 Puff(s) Inhalation every 6 hours PRN Shortness of Breath and/or Wheezing  dextrose 40% Gel 15 Gram(s) Oral once PRN Blood Glucose LESS THAN 70 milliGRAM(s)/deciliter  glucagon  Injectable 1 milliGRAM(s) IntraMuscular once PRN Glucose LESS THAN 70 milligrams/deciliter  guaiFENesin   Syrup  (Sugar-Free) 100 milliGRAM(s) Oral every 6 hours PRN Cough      __________________________________________________  REVIEW OF SYSTEMS:  Limited ROS due to impaired cognition.   Denies pain           Vital Signs Last 24 Hrs  T(C): 36.7 (09 Jun 2020 14:13), Max: 36.8 (09 Jun 2020 05:07)  T(F): 98.1 (09 Jun 2020 14:13), Max: 98.2 (09 Jun 2020 05:07)  HR: 109 (09 Jun 2020 14:13) (102 - 109)  BP: 91/72 (09 Jun 2020 14:13) (91/72 - 130/73)  BP(mean): --  RR: 17 (09 Jun 2020 14:13) (15 - 17)  SpO2: 99% (09 Jun 2020 14:13) (98% - 100%)    ________________________________________________  PHYSICAL EXAM:  GENERAL: NAD. conversant indifference. confused  HEENT: Normocephalic;  conjunctivae and sclerae clear; moist mucous membranes;   NECK : supple  CHEST/LUNG: Clear to auscultation bilaterally with good air entry   HEART: S1 S2  regular; no murmurs, gallops or rubs  ABDOMEN: Soft, Nontender, Nondistended; Bowel sounds present. + Peg tube.   EXTREMITIES: no cyanosis; no edema; no calf tenderness  SKIN: warm and dry; no rash  NERVOUS SYSTEM:  Awake and alert; Oriented  to self    _________________________________________________  LABS:                        11.8   11.95 )-----------( 256      ( 09 Jun 2020 08:31 )             35.3     06-09    135  |  97  |  28<H>  ----------------------------<  222<H>  4.2   |  26  |  0.90    Ca    9.6      09 Jun 2020 08:31          CAPILLARY BLOOD GLUCOSE      POCT Blood Glucose.: 175 mg/dL (09 Jun 2020 11:28)  POCT Blood Glucose.: 179 mg/dL (09 Jun 2020 06:06)  POCT Blood Glucose.: 214 mg/dL (09 Jun 2020 00:26)  POCT Blood Glucose.: 164 mg/dL (08 Jun 2020 21:04)  POCT Blood Glucose.: 153 mg/dL (08 Jun 2020 17:09)        RADIOLOGY & ADDITIONAL TESTS:    Imaging  Reviewed:  YES/No      Consultant(s) Notes Reviewed:   YES  Endocrinology. Pulmonary. GI

## 2020-06-09 NOTE — PROGRESS NOTE ADULT - PROBLEM SELECTOR PLAN 4
due to dementia with dysphagia  Palliative consulted - s/p PEG tube placement   Tolerates peg tube feeding  nutritional support.

## 2020-06-10 LAB
ALBUMIN SERPL ELPH-MCNC: 2.4 G/DL — LOW (ref 3.5–5)
ALP SERPL-CCNC: 168 U/L — HIGH (ref 40–120)
ALT FLD-CCNC: 27 U/L DA — SIGNIFICANT CHANGE UP (ref 10–60)
ANION GAP SERPL CALC-SCNC: 13 MMOL/L — SIGNIFICANT CHANGE UP (ref 5–17)
AST SERPL-CCNC: 20 U/L — SIGNIFICANT CHANGE UP (ref 10–40)
BASOPHILS # BLD AUTO: 0.02 K/UL — SIGNIFICANT CHANGE UP (ref 0–0.2)
BASOPHILS NFR BLD AUTO: 0.2 % — SIGNIFICANT CHANGE UP (ref 0–2)
BILIRUB SERPL-MCNC: 0.5 MG/DL — SIGNIFICANT CHANGE UP (ref 0.2–1.2)
BUN SERPL-MCNC: 34 MG/DL — HIGH (ref 7–18)
CALCIUM SERPL-MCNC: 10 MG/DL — SIGNIFICANT CHANGE UP (ref 8.4–10.5)
CHLORIDE SERPL-SCNC: 96 MMOL/L — SIGNIFICANT CHANGE UP (ref 96–108)
CO2 SERPL-SCNC: 26 MMOL/L — SIGNIFICANT CHANGE UP (ref 22–31)
CREAT SERPL-MCNC: 1 MG/DL — SIGNIFICANT CHANGE UP (ref 0.5–1.3)
EOSINOPHIL # BLD AUTO: 0.04 K/UL — SIGNIFICANT CHANGE UP (ref 0–0.5)
EOSINOPHIL NFR BLD AUTO: 0.4 % — SIGNIFICANT CHANGE UP (ref 0–6)
GLUCOSE BLDC GLUCOMTR-MCNC: 106 MG/DL — HIGH (ref 70–99)
GLUCOSE BLDC GLUCOMTR-MCNC: 180 MG/DL — HIGH (ref 70–99)
GLUCOSE BLDC GLUCOMTR-MCNC: 246 MG/DL — HIGH (ref 70–99)
GLUCOSE BLDC GLUCOMTR-MCNC: 247 MG/DL — HIGH (ref 70–99)
GLUCOSE BLDC GLUCOMTR-MCNC: 269 MG/DL — HIGH (ref 70–99)
GLUCOSE SERPL-MCNC: 233 MG/DL — HIGH (ref 70–99)
HCT VFR BLD CALC: 35.1 % — LOW (ref 39–50)
HGB BLD-MCNC: 11.5 G/DL — LOW (ref 13–17)
IMM GRANULOCYTES NFR BLD AUTO: 0.8 % — SIGNIFICANT CHANGE UP (ref 0–1.5)
LYMPHOCYTES # BLD AUTO: 0.81 K/UL — LOW (ref 1–3.3)
LYMPHOCYTES # BLD AUTO: 7.8 % — LOW (ref 13–44)
MCHC RBC-ENTMCNC: 28.4 PG — SIGNIFICANT CHANGE UP (ref 27–34)
MCHC RBC-ENTMCNC: 32.8 GM/DL — SIGNIFICANT CHANGE UP (ref 32–36)
MCV RBC AUTO: 86.7 FL — SIGNIFICANT CHANGE UP (ref 80–100)
MONOCYTES # BLD AUTO: 0.84 K/UL — SIGNIFICANT CHANGE UP (ref 0–0.9)
MONOCYTES NFR BLD AUTO: 8.1 % — SIGNIFICANT CHANGE UP (ref 2–14)
NEUTROPHILS # BLD AUTO: 8.6 K/UL — HIGH (ref 1.8–7.4)
NEUTROPHILS NFR BLD AUTO: 82.7 % — HIGH (ref 43–77)
NRBC # BLD: 0 /100 WBCS — SIGNIFICANT CHANGE UP (ref 0–0)
PLATELET # BLD AUTO: 301 K/UL — SIGNIFICANT CHANGE UP (ref 150–400)
POTASSIUM SERPL-MCNC: 4.4 MMOL/L — SIGNIFICANT CHANGE UP (ref 3.5–5.3)
POTASSIUM SERPL-SCNC: 4.4 MMOL/L — SIGNIFICANT CHANGE UP (ref 3.5–5.3)
PROT SERPL-MCNC: 8 G/DL — SIGNIFICANT CHANGE UP (ref 6–8.3)
RBC # BLD: 4.05 M/UL — LOW (ref 4.2–5.8)
RBC # FLD: 13.2 % — SIGNIFICANT CHANGE UP (ref 10.3–14.5)
SODIUM SERPL-SCNC: 135 MMOL/L — SIGNIFICANT CHANGE UP (ref 135–145)
WBC # BLD: 10.39 K/UL — SIGNIFICANT CHANGE UP (ref 3.8–10.5)
WBC # FLD AUTO: 10.39 K/UL — SIGNIFICANT CHANGE UP (ref 3.8–10.5)

## 2020-06-10 PROCEDURE — 99231 SBSQ HOSP IP/OBS SF/LOW 25: CPT

## 2020-06-10 RX ORDER — INSULIN LISPRO 100/ML
6 VIAL (ML) SUBCUTANEOUS ONCE
Refills: 0 | Status: COMPLETED | OUTPATIENT
Start: 2020-06-10 | End: 2020-06-10

## 2020-06-10 RX ORDER — CHLORHEXIDINE GLUCONATE 213 G/1000ML
1 SOLUTION TOPICAL DAILY
Refills: 0 | Status: COMPLETED | OUTPATIENT
Start: 2020-06-10 | End: 2020-06-14

## 2020-06-10 RX ADMIN — PANTOPRAZOLE SODIUM 40 MILLIGRAM(S): 20 TABLET, DELAYED RELEASE ORAL at 11:33

## 2020-06-10 RX ADMIN — MIRTAZAPINE 7.5 MILLIGRAM(S): 45 TABLET, ORALLY DISINTEGRATING ORAL at 21:31

## 2020-06-10 RX ADMIN — Medication 6 UNIT(S): at 11:33

## 2020-06-10 RX ADMIN — Medication 1 APPLICATION(S): at 11:33

## 2020-06-10 RX ADMIN — Medication 12 UNIT(S): at 06:59

## 2020-06-10 RX ADMIN — OLANZAPINE 2.5 MILLIGRAM(S): 15 TABLET, FILM COATED ORAL at 21:32

## 2020-06-10 RX ADMIN — Medication 2: at 17:27

## 2020-06-10 RX ADMIN — Medication 4: at 23:32

## 2020-06-10 RX ADMIN — Medication 500 MILLIGRAM(S): at 17:27

## 2020-06-10 RX ADMIN — Medication 6: at 00:33

## 2020-06-10 RX ADMIN — Medication 12 UNIT(S): at 23:47

## 2020-06-10 RX ADMIN — Medication 1 TABLET(S): at 11:33

## 2020-06-10 RX ADMIN — CHLORHEXIDINE GLUCONATE 1 APPLICATION(S): 213 SOLUTION TOPICAL at 11:34

## 2020-06-10 RX ADMIN — Medication 500 MILLIGRAM(S): at 05:01

## 2020-06-10 RX ADMIN — NYSTATIN CREAM 1 APPLICATION(S): 100000 CREAM TOPICAL at 17:27

## 2020-06-10 RX ADMIN — INSULIN GLARGINE 18 UNIT(S): 100 INJECTION, SOLUTION SUBCUTANEOUS at 00:33

## 2020-06-10 RX ADMIN — INSULIN GLARGINE 18 UNIT(S): 100 INJECTION, SOLUTION SUBCUTANEOUS at 23:31

## 2020-06-10 RX ADMIN — HEPARIN SODIUM 5000 UNIT(S): 5000 INJECTION INTRAVENOUS; SUBCUTANEOUS at 17:27

## 2020-06-10 RX ADMIN — NYSTATIN CREAM 1 APPLICATION(S): 100000 CREAM TOPICAL at 05:01

## 2020-06-10 RX ADMIN — Medication 4: at 06:59

## 2020-06-10 RX ADMIN — Medication 12 UNIT(S): at 17:27

## 2020-06-10 RX ADMIN — Medication 12 UNIT(S): at 00:33

## 2020-06-10 RX ADMIN — ZINC SULFATE TAB 220 MG (50 MG ZINC EQUIVALENT) 220 MILLIGRAM(S): 220 (50 ZN) TAB at 11:33

## 2020-06-10 RX ADMIN — HEPARIN SODIUM 5000 UNIT(S): 5000 INJECTION INTRAVENOUS; SUBCUTANEOUS at 05:01

## 2020-06-10 RX ADMIN — QUETIAPINE FUMARATE 25 MILLIGRAM(S): 200 TABLET, FILM COATED ORAL at 21:31

## 2020-06-10 NOTE — PROGRESS NOTE ADULT - SUBJECTIVE AND OBJECTIVE BOX
CARDIOLOGY/MEDICAL ATTENDING    CHIEF COMPLAINT:Patient is a 64y old  Male who presents with a chief complaint of seizures.Pt appears comfortable.    	  REVIEW OF SYSTEMS:  [x ] Unable to obtain    PHYSICAL EXAM:  T(C): 36.9 (06-10-20 @ 04:50), Max: 36.9 (06-10-20 @ 04:50)  HR: 112 (06-10-20 @ 04:50) (71 - 112)  BP: 138/74 (06-10-20 @ 04:50) (85/67 - 138/74)  RR: 16 (06-10-20 @ 04:50) (16 - 17)  SpO2: 99% (06-10-20 @ 04:50) (99% - 100%)  Wt(kg): --  I&O's Summary      Appearance: Normal	  HEENT:   Normal oral mucosa, PERRL, EOMI	  Lymphatic: No lymphadenopathy  Cardiovascular: Normal S1 S2, No JVD, No murmurs, No edema  Respiratory: Lungs clear to auscultation	  Gastrointestinal:  Soft, Non-tender, + BS	  Skin: No rashes, No ecchymoses, No cyanosis	  Extremities: Normal range of motion, No clubbing, cyanosis or edema  Vascular: Peripheral pulses palpable 2+ bilaterally    MEDICATIONS  (STANDING):  ascorbic acid 500 milliGRAM(s) Oral two times a day  chlorhexidine 2% Cloths 1 Application(s) Topical daily  collagenase Ointment 1 Application(s) Topical daily  dextrose 5% + sodium chloride 0.9%. 1000 milliLiter(s) (50 mL/Hr) IV Continuous <Continuous>  dextrose 5% + sodium chloride 0.9%. 1000 milliLiter(s) (50 mL/Hr) IV Continuous <Continuous>  dextrose 5%. 1000 milliLiter(s) (50 mL/Hr) IV Continuous <Continuous>  dextrose 50% Injectable 12.5 Gram(s) IV Push once  dextrose 50% Injectable 25 Gram(s) IV Push once  dextrose 50% Injectable 25 Gram(s) IV Push once  ergocalciferol 69560 Unit(s) Oral <User Schedule>  heparin   Injectable 5000 Unit(s) SubCutaneous every 12 hours  insulin glargine Injectable (LANTUS) 18 Unit(s) SubCutaneous at bedtime  insulin lispro (HumaLOG) corrective regimen sliding scale   SubCutaneous every 6 hours  insulin lispro Injectable (HumaLOG) 12 Unit(s) SubCutaneous every 6 hours  mirtazapine 7.5 milliGRAM(s) Oral at bedtime  multivitamin/minerals 1 Tablet(s) Oral daily  nystatin Powder 1 Application(s) Topical two times a day  OLANZapine 2.5 milliGRAM(s) Oral at bedtime  pantoprazole   Suspension 40 milliGRAM(s) Enteral Tube daily  QUEtiapine 25 milliGRAM(s) Oral at bedtime  zinc sulfate 220 milliGRAM(s) Oral daily      	  	  LABS:	 	                        11.5   10.39 )-----------( 301      ( 10 Jonathan 2020 07:14 )             35.1     06-10    135  |  96  |  34<H>  ----------------------------<  233<H>  4.4   |  26  |  1.00    Ca    10.0      10 Jonathan 2020 07:14    TPro  8.0  /  Alb  2.4<L>  /  TBili  0.5  /  DBili  x   /  AST  20  /  ALT  27  /  AlkPhos  168<H>  06-10    COVID-19 PCR . (06.09.20 @ 06:35)    COVID-19 PCR: Detected: This test has been validated by [email or web address] to be accurate;  though it has not been FDA cleared/approved by the usual pathway.  As with all laboratory tests, results should be correlated with clinical  findings.  https://www.fda.gov/media/552037/download  https://www.fda.gov/media/768062/download

## 2020-06-10 NOTE — PROGRESS NOTE ADULT - SUBJECTIVE AND OBJECTIVE BOX
NP Note discussed with  Primary Attending    Patient is a 64y old  Male who presents with a chief complaint of seizures (10 Jonathan 2020 12:36)     64 year old male from [facility]  with PMH dementia and DM coming in with seizures like activity while sitting at AL.  Pt had peg placement on 06/02/2020 for FTT 2/2 to dysphagia and poor oral intake related to changes in mental status.     Pt tolerates Peg tube feeding, medically optimized for discharge,  pending covid-19  2 negative results for d/c to  NH. Repeated COVID Positive 6/9.       INTERVAL HPI/OVERNIGHT EVENTS:  seen at bedside. resting comfortable in bed. BS at 11am noted 102. s/p 6 units humalog instead of full dose.     MEDICATIONS  (STANDING):  ascorbic acid 500 milliGRAM(s) Oral two times a day  chlorhexidine 2% Cloths 1 Application(s) Topical daily  collagenase Ointment 1 Application(s) Topical daily  dextrose 5% + sodium chloride 0.9%. 1000 milliLiter(s) (50 mL/Hr) IV Continuous <Continuous>  dextrose 5% + sodium chloride 0.9%. 1000 milliLiter(s) (50 mL/Hr) IV Continuous <Continuous>  dextrose 5%. 1000 milliLiter(s) (50 mL/Hr) IV Continuous <Continuous>  dextrose 50% Injectable 12.5 Gram(s) IV Push once  dextrose 50% Injectable 25 Gram(s) IV Push once  dextrose 50% Injectable 25 Gram(s) IV Push once  ergocalciferol 99226 Unit(s) Oral <User Schedule>  heparin   Injectable 5000 Unit(s) SubCutaneous every 12 hours  insulin glargine Injectable (LANTUS) 18 Unit(s) SubCutaneous at bedtime  insulin lispro (HumaLOG) corrective regimen sliding scale   SubCutaneous every 6 hours  insulin lispro Injectable (HumaLOG) 12 Unit(s) SubCutaneous every 6 hours  mirtazapine 7.5 milliGRAM(s) Oral at bedtime  multivitamin/minerals 1 Tablet(s) Oral daily  nystatin Powder 1 Application(s) Topical two times a day  OLANZapine 2.5 milliGRAM(s) Oral at bedtime  pantoprazole   Suspension 40 milliGRAM(s) Enteral Tube daily  QUEtiapine 25 milliGRAM(s) Oral at bedtime  zinc sulfate 220 milliGRAM(s) Oral daily    MEDICATIONS  (PRN):  acetaminophen    Suspension .. 650 milliGRAM(s) Enteral Tube every 6 hours PRN Temp greater or equal to 38C (100.4F)  ALBUTerol    90 MICROgram(s) HFA Inhaler 2 Puff(s) Inhalation every 6 hours PRN Shortness of Breath and/or Wheezing  dextrose 40% Gel 15 Gram(s) Oral once PRN Blood Glucose LESS THAN 70 milliGRAM(s)/deciliter  glucagon  Injectable 1 milliGRAM(s) IntraMuscular once PRN Glucose LESS THAN 70 milligrams/deciliter  guaiFENesin   Syrup  (Sugar-Free) 100 milliGRAM(s) Oral every 6 hours PRN Cough      __________________________________________________  REVIEW OF SYSTEMS:  unable to assess due to impaired cognition. incomprehensive         Vital Signs Last 24 Hrs  T(C): 37.7 (10 Jonathan 2020 12:41), Max: 37.7 (10 Jonathan 2020 12:41)  T(F): 99.9 (10 Jonathan 2020 12:41), Max: 99.9 (10 Jonathan 2020 12:41)  HR: 118 (10 Jonathan 2020 12:41) (71 - 118)  BP: 136/88 (10 Jonathan 2020 12:41) (85/67 - 138/74)  BP(mean): --  RR: 22 (10 Jonathan 2020 12:41) (16 - 22)  SpO2: 96% (10 Jonathan 2020 12:41) (96% - 100%)    ________________________________________________  PHYSICAL EXAM:  GENERAL: NAD. conversant but incomprehensive. well groomed.   HEENT: Normocephalic;  conjunctivae and sclerae clear; moist mucous membranes;   NECK : supple  CHEST/LUNG: Clear to auscultation bilaterally with good air entry   HEART: S1 S2  regular; no murmurs, gallops or rubs  ABDOMEN: Soft, Nontender, Nondistended; Bowel sounds present. Peg tube in place.   EXTREMITIES: no cyanosis; no edema; no calf tenderness  SKIN: warm and dry; no rash  NERVOUS SYSTEM:  Awake and alert; Oriented  to self    _________________________________________________  LABS:                        11.5   10.39 )-----------( 301      ( 10 Jonathan 2020 07:14 )             35.1     06-10    135  |  96  |  34<H>  ----------------------------<  233<H>  4.4   |  26  |  1.00    Ca    10.0      10 Jonathan 2020 07:14    TPro  8.0  /  Alb  2.4<L>  /  TBili  0.5  /  DBili  x   /  AST  20  /  ALT  27  /  AlkPhos  168<H>  06-10        CAPILLARY BLOOD GLUCOSE      POCT Blood Glucose.: 106 mg/dL (10 Jonathan 2020 11:06)  POCT Blood Glucose.: 247 mg/dL (10 Jonathan 2020 06:55)  POCT Blood Glucose.: 269 mg/dL (10 Jonathan 2020 00:23)  POCT Blood Glucose.: 151 mg/dL (09 Jun 2020 16:34)        RADIOLOGY & ADDITIONAL TESTS:    Imaging  Reviewed:  YES/No    Consultant(s) Notes Reviewed:   YES  endocrinology, nephrology

## 2020-06-10 NOTE — CHART NOTE - NSCHARTNOTEFT_GEN_A_CORE
pt is seen for tachycardia  with low grade temp 99.9F.   /88. RR 20. O2 sat 98% on room air.   Pt is on Tube feeding. lung sounds clear to auscultate. breathing non labored. previous ECG reviewed Sinus tachy.   s/p Free water 200ml via Peg tube. HR decreased. Temp downtrended.   A/P  Sinus Tachycardia  -monitor HR.  Temp.   -repeat ECG if HR continues > 110s.   -f/u with nutritionist for free water intake between tube feeding.   -f/u cardiology.

## 2020-06-10 NOTE — PROGRESS NOTE ADULT - SUBJECTIVE AND OBJECTIVE BOX
64 year old male from [facility] with PMH of dementia and type 2 DM presented with seizure activity while sitting at AL. Endocrinology was consulted for management of uncontrolled type 2 DM with hyperglycemia.    Patient seen and examined at bedside. Patient tolerating tube feedings. FS reviewed. BG mostly at goal <180 on current regimen of  Lantus 18 units at bedtime and Humalog 12 units q 6 hrs.      MEDICATIONS  (STANDING):  ascorbic acid 500 milliGRAM(s) Oral two times a day  chlorhexidine 2% Cloths 1 Application(s) Topical daily  collagenase Ointment 1 Application(s) Topical daily  dextrose 5% + sodium chloride 0.9%. 1000 milliLiter(s) (50 mL/Hr) IV Continuous <Continuous>  dextrose 5% + sodium chloride 0.9%. 1000 milliLiter(s) (50 mL/Hr) IV Continuous <Continuous>  dextrose 5%. 1000 milliLiter(s) (50 mL/Hr) IV Continuous <Continuous>  dextrose 50% Injectable 12.5 Gram(s) IV Push once  dextrose 50% Injectable 25 Gram(s) IV Push once  dextrose 50% Injectable 25 Gram(s) IV Push once  ergocalciferol 50904 Unit(s) Oral <User Schedule>  heparin   Injectable 5000 Unit(s) SubCutaneous every 12 hours  insulin glargine Injectable (LANTUS) 18 Unit(s) SubCutaneous at bedtime  insulin lispro (HumaLOG) corrective regimen sliding scale   SubCutaneous every 6 hours  insulin lispro Injectable (HumaLOG) 12 Unit(s) SubCutaneous every 6 hours  mirtazapine 7.5 milliGRAM(s) Oral at bedtime  multivitamin/minerals 1 Tablet(s) Oral daily  nystatin Powder 1 Application(s) Topical two times a day  OLANZapine 2.5 milliGRAM(s) Oral at bedtime  pantoprazole   Suspension 40 milliGRAM(s) Enteral Tube daily  QUEtiapine 25 milliGRAM(s) Oral at bedtime  zinc sulfate 220 milliGRAM(s) Oral daily    MEDICATIONS  (PRN):  acetaminophen    Suspension .. 650 milliGRAM(s) Enteral Tube every 6 hours PRN Temp greater or equal to 38C (100.4F)  ALBUTerol    90 MICROgram(s) HFA Inhaler 2 Puff(s) Inhalation every 6 hours PRN Shortness of Breath and/or Wheezing  dextrose 40% Gel 15 Gram(s) Oral once PRN Blood Glucose LESS THAN 70 milliGRAM(s)/deciliter  glucagon  Injectable 1 milliGRAM(s) IntraMuscular once PRN Glucose LESS THAN 70 milligrams/deciliter  guaiFENesin   Syrup  (Sugar-Free) 100 milliGRAM(s) Oral every 6 hours PRN Cough      REVIEW OF SYSTEMS:  unable to obtain due to medical condition    PHYSICAL EXAM:      VITAL SIGNS  T(C): 37.7 (06-10-20 @ 12:41), Max: 37.7 (06-10-20 @ 12:41)  HR: 118 (06-10-20 @ 12:41) (71 - 118)  BP: 136/88 (06-10-20 @ 12:41) (85/67 - 138/74)  RR: 22 (06-10-20 @ 12:41) (16 - 22)  SpO2: 96% (06-10-20 @ 12:41) (96% - 100%)    GENERAL: NAD, well-developed  HEAD:  Atraumatic, Normocephalic  EYES: EOMI, PERRLA, conjunctiva and sclera clear  ENMT: No tonsillar erythema, exudates, or enlargement; No lesions  NECK: Supple, No JVD, Normal thyroid  NERVOUS SYSTEM:  nonverbal,  CHEST/LUNG: CTA bilaterally  No rales, rhonchi, wheezing, or rubs  HEART: Regular rate and rhythm; No murmurs, rubs, or gallops  ABDOMEN: s/p PEG, Soft, Nontender, Nondistended; Bowel sounds present  EXTREMITIES:  No edema  SKIN: No rashes or lesions        LABS:                        11.5   10.39 )-----------( 301      ( 10 Jonathan 2020 07:14 )             35.1     06-10    135  |  96  |  34<H>  ----------------------------<  233<H>  4.4   |  26  |  1.00    Ca    10.0      10 Jonathan 2020 07:14    TPro  8.0  /  Alb  2.4<L>  /  TBili  0.5  /  DBili  x   /  AST  20  /  ALT  27  /  AlkPhos  168<H>  06-10        CAPILLARY BLOOD GLUCOSE      POCT Blood Glucose.: 106 mg/dL (10 Jonathan 2020 11:06)  POCT Blood Glucose.: 247 mg/dL (10 Jonathan 2020 06:55)  POCT Blood Glucose.: 269 mg/dL (10 Jonathan 2020 00:23)  POCT Blood Glucose.: 151 mg/dL (09 Jun 2020 16:34)

## 2020-06-10 NOTE — PROGRESS NOTE ADULT - PROBLEM SELECTOR PLAN 5
CXR as above  s/p Peg tube placement,    keep NPO. c/w tube feeding. med adm thru Peg tube.   +mild leukocytosis. afebrile.     off antibiotics  Pulmonary dr. Noriega following

## 2020-06-10 NOTE — PROGRESS NOTE ADULT - PROBLEM SELECTOR PLAN 6
COVID 19 PCR negative on 5/31.    positive COVID on 6/3, 6/6, 6/9    requires two negative result  Needs 2 negatives per [facility] requirement.  repeat COVID 6/12

## 2020-06-10 NOTE — PROGRESS NOTE ADULT - PROBLEM SELECTOR PLAN 8
HgA1C of 9.9% on admission  BG above goal >200  c/w Lantus 18 units at bedtime  C/w  Humalog 12 units q 6 hrs (hold if tube feedings are held)  continue moderate dose rapid acting insulin q 6 hrs   monitor FS Q6  Endocrinology Dr. Keegan schmidt.  BS at 11am 102. s/p half dose humalog instead of full dose.

## 2020-06-10 NOTE — PROGRESS NOTE ADULT - SUBJECTIVE AND OBJECTIVE BOX
[   ] ICU                                          [   ] CCU                                      [ X  ] Medical Floor      Patient is comfortable. No new complaints reported, No abdominal pain, N/V, hematemesis, hematochezia, melena, fever, chills, chest pain, SOB, cough or diarrhea reported.    VITALS  Vital Signs Last 24 Hrs  T(C): 37.7 (10 Jonathan 2020 12:41), Max: 37.7 (10 Jonathan 2020 12:41)  T(F): 99.9 (10 Jonathan 2020 12:41), Max: 99.9 (10 Jonathan 2020 12:41)  HR: 118 (10 Jonathan 2020 12:41) (71 - 118)  BP: 136/88 (10 Jonathan 2020 12:41) (85/67 - 138/74)   RR: 22 (10 Jonathan 2020 12:41) (16 - 22)  SpO2: 96% (10 Jonathan 2020 12:41) (96% - 100%)       MEDICATIONS  (STANDING):  ascorbic acid 500 milliGRAM(s) Oral two times a day  chlorhexidine 2% Cloths 1 Application(s) Topical daily  collagenase Ointment 1 Application(s) Topical daily  dextrose 5% + sodium chloride 0.9%. 1000 milliLiter(s) (50 mL/Hr) IV Continuous <Continuous>  dextrose 5% + sodium chloride 0.9%. 1000 milliLiter(s) (50 mL/Hr) IV Continuous <Continuous>  dextrose 5%. 1000 milliLiter(s) (50 mL/Hr) IV Continuous <Continuous>  dextrose 50% Injectable 12.5 Gram(s) IV Push once  dextrose 50% Injectable 25 Gram(s) IV Push once  dextrose 50% Injectable 25 Gram(s) IV Push once  ergocalciferol 39243 Unit(s) Oral <User Schedule>  heparin   Injectable 5000 Unit(s) SubCutaneous every 12 hours  insulin glargine Injectable (LANTUS) 18 Unit(s) SubCutaneous at bedtime  insulin lispro (HumaLOG) corrective regimen sliding scale   SubCutaneous every 6 hours  insulin lispro Injectable (HumaLOG) 12 Unit(s) SubCutaneous every 6 hours  mirtazapine 7.5 milliGRAM(s) Oral at bedtime  multivitamin/minerals 1 Tablet(s) Oral daily  nystatin Powder 1 Application(s) Topical two times a day  OLANZapine 2.5 milliGRAM(s) Oral at bedtime  pantoprazole   Suspension 40 milliGRAM(s) Enteral Tube daily  QUEtiapine 25 milliGRAM(s) Oral at bedtime  zinc sulfate 220 milliGRAM(s) Oral daily    MEDICATIONS  (PRN):  acetaminophen    Suspension .. 650 milliGRAM(s) Enteral Tube every 6 hours PRN Temp greater or equal to 38C (100.4F)  ALBUTerol    90 MICROgram(s) HFA Inhaler 2 Puff(s) Inhalation every 6 hours PRN Shortness of Breath and/or Wheezing  dextrose 40% Gel 15 Gram(s) Oral once PRN Blood Glucose LESS THAN 70 milliGRAM(s)/deciliter  glucagon  Injectable 1 milliGRAM(s) IntraMuscular once PRN Glucose LESS THAN 70 milligrams/deciliter  guaiFENesin   Syrup  (Sugar-Free) 100 milliGRAM(s) Oral every 6 hours PRN Cough                            11.5   10.39 )-----------( 301      ( 10 Jonathan 2020 07:14 )             35.1       06-10    135  |  96  |  34<H>  ----------------------------<  233<H>  4.4   |  26  |  1.00    Ca    10.0      10 Jonathan 2020 07:14    TPro  8.0  /  Alb  2.4<L>  /  TBili  0.5  /  DBili  x   /  AST  20  /  ALT  27  /  AlkPhos  168<H>  06-10

## 2020-06-10 NOTE — PROGRESS NOTE ADULT - PROBLEM SELECTOR PLAN 10
Discharge to [facility]  after 2 consecutive negative testings for Covid-19 as required by [facility]. 06/03, 06/06, 6/9 testing was positive. repeat on 6/12.

## 2020-06-10 NOTE — PROGRESS NOTE ADULT - ASSESSMENT
64 year old male from [facility] with PMH of dementia and type 2 DM presented with seizure activity while sitting at AL. Endocrinology was consulted for management of uncontrolled type 2 DM with hyperglycemia.    1. Uncontrolled Type 2 DM with hyperglycemia, A1c 9.9%  -patient tolerating tube feedings via PEG   -BG mostly at goal <180  -Continue Lantus 18 units at bedtime  -Continue Humalog 12 units q 6 hrs (hold if tube feedings are held)  -continue moderate dose rapid acting insulin q 6 hrs   BG 70-100mg/dL 0 units  -150 mg/dL 0 units  -200 mg/dL 2 unit  -250 mg/dL 4 units  -300 mg/dL 6 units  -350 mg/dL 8 units  -400 mg/dL 10 units  BG > 400mg/dL 12 units  -FS AC HS  -ensure consistent carbohydrate   -will monitor FS and adjust accordingly     2. Delirium  -continue Seroquel, Zyprexa, Remeron    Plan discussed with primary team]  Please  call  w/ any questions or concerns [phone]

## 2020-06-10 NOTE — PROGRESS NOTE ADULT - SUBJECTIVE AND OBJECTIVE BOX
Patient is a 64y old  Male who presents with a chief complaint of seizures (10 Jonathan 2020 12:57)  Awake, alert, comfortable in bed in NAD    INTERVAL HPI/OVERNIGHT EVENTS:      VITAL SIGNS:  T(F): 99.9 (06-10-20 @ 12:41)  HR: 118 (06-10-20 @ 12:41)  BP: 136/88 (06-10-20 @ 12:41)  RR: 22 (06-10-20 @ 12:41)  SpO2: 96% (06-10-20 @ 12:41)  Wt(kg): --  I&O's Detail          REVIEW OF SYSTEMS:    CONSTITUTIONAL:  No fevers, chills, sweats    HEENT:  Eyes:  No diplopia or blurred vision. ENT:  No earache, sore throat or runny nose.    CARDIOVASCULAR:  No pressure, squeezing, tightness, or heaviness about the chest; no palpitations.    RESPIRATORY:  Per HPI    GASTROINTESTINAL:  No abdominal pain, nausea, vomiting or diarrhea.    GENITOURINARY:  No dysuria, frequency or urgency.    NEUROLOGIC:  No paresthesias, fasciculations, seizures or weakness.    PSYCHIATRIC:  No disorder of thought or mood.      PHYSICAL EXAM:    Constitutional: Well developed and nourished  Eyes:Perrla  ENMT: normal  Neck:supple  Respiratory: good air entry  Cardiovascular: S1 S2 regular  Gastrointestinal: Soft, Non tender  Extremities: No edema  Vascular:normal  Neurological:Awake, alert,Ox3  Musculoskeletal:Normal      MEDICATIONS  (STANDING):  ascorbic acid 500 milliGRAM(s) Oral two times a day  chlorhexidine 2% Cloths 1 Application(s) Topical daily  collagenase Ointment 1 Application(s) Topical daily  dextrose 5% + sodium chloride 0.9%. 1000 milliLiter(s) (50 mL/Hr) IV Continuous <Continuous>  dextrose 5% + sodium chloride 0.9%. 1000 milliLiter(s) (50 mL/Hr) IV Continuous <Continuous>  dextrose 5%. 1000 milliLiter(s) (50 mL/Hr) IV Continuous <Continuous>  dextrose 50% Injectable 12.5 Gram(s) IV Push once  dextrose 50% Injectable 25 Gram(s) IV Push once  dextrose 50% Injectable 25 Gram(s) IV Push once  ergocalciferol 29961 Unit(s) Oral <User Schedule>  heparin   Injectable 5000 Unit(s) SubCutaneous every 12 hours  insulin glargine Injectable (LANTUS) 18 Unit(s) SubCutaneous at bedtime  insulin lispro (HumaLOG) corrective regimen sliding scale   SubCutaneous every 6 hours  insulin lispro Injectable (HumaLOG) 12 Unit(s) SubCutaneous every 6 hours  mirtazapine 7.5 milliGRAM(s) Oral at bedtime  multivitamin/minerals 1 Tablet(s) Oral daily  nystatin Powder 1 Application(s) Topical two times a day  OLANZapine 2.5 milliGRAM(s) Oral at bedtime  pantoprazole   Suspension 40 milliGRAM(s) Enteral Tube daily  QUEtiapine 25 milliGRAM(s) Oral at bedtime  zinc sulfate 220 milliGRAM(s) Oral daily    MEDICATIONS  (PRN):  acetaminophen    Suspension .. 650 milliGRAM(s) Enteral Tube every 6 hours PRN Temp greater or equal to 38C (100.4F)  ALBUTerol    90 MICROgram(s) HFA Inhaler 2 Puff(s) Inhalation every 6 hours PRN Shortness of Breath and/or Wheezing  dextrose 40% Gel 15 Gram(s) Oral once PRN Blood Glucose LESS THAN 70 milliGRAM(s)/deciliter  glucagon  Injectable 1 milliGRAM(s) IntraMuscular once PRN Glucose LESS THAN 70 milligrams/deciliter  guaiFENesin   Syrup  (Sugar-Free) 100 milliGRAM(s) Oral every 6 hours PRN Cough      Allergies    No Known Allergies    Intolerances        LABS:                        11.5   10.39 )-----------( 301      ( 10 Jonathan 2020 07:14 )             35.1     06-10    135  |  96  |  34<H>  ----------------------------<  233<H>  4.4   |  26  |  1.00    Ca    10.0      10 Jonathan 2020 07:14    TPro  8.0  /  Alb  2.4<L>  /  TBili  0.5  /  DBili  x   /  AST  20  /  ALT  27  /  AlkPhos  168<H>  06-10              CAPILLARY BLOOD GLUCOSE      POCT Blood Glucose.: 106 mg/dL (10 Jonathan 2020 11:06)  POCT Blood Glucose.: 247 mg/dL (10 Jonathan 2020 06:55)  POCT Blood Glucose.: 269 mg/dL (10 Jonathan 2020 00:23)  POCT Blood Glucose.: 151 mg/dL (09 Jun 2020 16:34)        RADIOLOGY & ADDITIONAL TESTS:    CXR:    Ct scan chest:    ekg;    echo:

## 2020-06-11 DIAGNOSIS — R50.9 FEVER, UNSPECIFIED: ICD-10-CM

## 2020-06-11 LAB
ANION GAP SERPL CALC-SCNC: 12 MMOL/L — SIGNIFICANT CHANGE UP (ref 5–17)
APPEARANCE UR: CLEAR — SIGNIFICANT CHANGE UP
BACTERIA # UR AUTO: ABNORMAL /HPF
BASOPHILS # BLD AUTO: 0.03 K/UL — SIGNIFICANT CHANGE UP (ref 0–0.2)
BASOPHILS NFR BLD AUTO: 0.3 % — SIGNIFICANT CHANGE UP (ref 0–2)
BILIRUB UR-MCNC: NEGATIVE — SIGNIFICANT CHANGE UP
BUN SERPL-MCNC: 35 MG/DL — HIGH (ref 7–18)
CALCIUM SERPL-MCNC: 9.6 MG/DL — SIGNIFICANT CHANGE UP (ref 8.4–10.5)
CHLORIDE SERPL-SCNC: 96 MMOL/L — SIGNIFICANT CHANGE UP (ref 96–108)
CO2 SERPL-SCNC: 25 MMOL/L — SIGNIFICANT CHANGE UP (ref 22–31)
COLOR SPEC: YELLOW — SIGNIFICANT CHANGE UP
CREAT SERPL-MCNC: 0.93 MG/DL — SIGNIFICANT CHANGE UP (ref 0.5–1.3)
DIFF PNL FLD: NEGATIVE — SIGNIFICANT CHANGE UP
EOSINOPHIL # BLD AUTO: 0.06 K/UL — SIGNIFICANT CHANGE UP (ref 0–0.5)
EOSINOPHIL NFR BLD AUTO: 0.5 % — SIGNIFICANT CHANGE UP (ref 0–6)
EPI CELLS # UR: ABNORMAL /HPF
GLUCOSE BLDC GLUCOMTR-MCNC: 161 MG/DL — HIGH (ref 70–99)
GLUCOSE BLDC GLUCOMTR-MCNC: 168 MG/DL — HIGH (ref 70–99)
GLUCOSE BLDC GLUCOMTR-MCNC: 193 MG/DL — HIGH (ref 70–99)
GLUCOSE BLDC GLUCOMTR-MCNC: 234 MG/DL — HIGH (ref 70–99)
GLUCOSE BLDC GLUCOMTR-MCNC: 252 MG/DL — HIGH (ref 70–99)
GLUCOSE SERPL-MCNC: 207 MG/DL — HIGH (ref 70–99)
GLUCOSE UR QL: NEGATIVE — SIGNIFICANT CHANGE UP
HCT VFR BLD CALC: 31.4 % — LOW (ref 39–50)
HGB BLD-MCNC: 10.5 G/DL — LOW (ref 13–17)
IMM GRANULOCYTES NFR BLD AUTO: 1 % — SIGNIFICANT CHANGE UP (ref 0–1.5)
KETONES UR-MCNC: NEGATIVE — SIGNIFICANT CHANGE UP
LEUKOCYTE ESTERASE UR-ACNC: NEGATIVE — SIGNIFICANT CHANGE UP
LYMPHOCYTES # BLD AUTO: 1.07 K/UL — SIGNIFICANT CHANGE UP (ref 1–3.3)
LYMPHOCYTES # BLD AUTO: 9 % — LOW (ref 13–44)
MCHC RBC-ENTMCNC: 28.5 PG — SIGNIFICANT CHANGE UP (ref 27–34)
MCHC RBC-ENTMCNC: 33.4 GM/DL — SIGNIFICANT CHANGE UP (ref 32–36)
MCV RBC AUTO: 85.1 FL — SIGNIFICANT CHANGE UP (ref 80–100)
MONOCYTES # BLD AUTO: 1.13 K/UL — HIGH (ref 0–0.9)
MONOCYTES NFR BLD AUTO: 9.5 % — SIGNIFICANT CHANGE UP (ref 2–14)
NEUTROPHILS # BLD AUTO: 9.52 K/UL — HIGH (ref 1.8–7.4)
NEUTROPHILS NFR BLD AUTO: 79.7 % — HIGH (ref 43–77)
NITRITE UR-MCNC: NEGATIVE — SIGNIFICANT CHANGE UP
NRBC # BLD: 0 /100 WBCS — SIGNIFICANT CHANGE UP (ref 0–0)
PH UR: 6 — SIGNIFICANT CHANGE UP (ref 5–8)
PLATELET # BLD AUTO: 312 K/UL — SIGNIFICANT CHANGE UP (ref 150–400)
POTASSIUM SERPL-MCNC: 4.3 MMOL/L — SIGNIFICANT CHANGE UP (ref 3.5–5.3)
POTASSIUM SERPL-SCNC: 4.3 MMOL/L — SIGNIFICANT CHANGE UP (ref 3.5–5.3)
PROT UR-MCNC: 30 MG/DL
RBC # BLD: 3.69 M/UL — LOW (ref 4.2–5.8)
RBC # FLD: 12.9 % — SIGNIFICANT CHANGE UP (ref 10.3–14.5)
RBC CASTS # UR COMP ASSIST: SIGNIFICANT CHANGE UP /HPF (ref 0–2)
SODIUM SERPL-SCNC: 133 MMOL/L — LOW (ref 135–145)
SP GR SPEC: 1.02 — SIGNIFICANT CHANGE UP (ref 1.01–1.02)
UROBILINOGEN FLD QL: NEGATIVE — SIGNIFICANT CHANGE UP
WBC # BLD: 11.93 K/UL — HIGH (ref 3.8–10.5)
WBC # FLD AUTO: 11.93 K/UL — HIGH (ref 3.8–10.5)
WBC UR QL: SIGNIFICANT CHANGE UP /HPF (ref 0–5)

## 2020-06-11 PROCEDURE — 99232 SBSQ HOSP IP/OBS MODERATE 35: CPT

## 2020-06-11 PROCEDURE — 71045 X-RAY EXAM CHEST 1 VIEW: CPT | Mod: 26

## 2020-06-11 RX ORDER — INSULIN LISPRO 100/ML
14 VIAL (ML) SUBCUTANEOUS
Refills: 0 | Status: DISCONTINUED | OUTPATIENT
Start: 2020-06-11 | End: 2020-06-12

## 2020-06-11 RX ORDER — HEPARIN SODIUM 5000 [USP'U]/ML
5000 INJECTION INTRAVENOUS; SUBCUTANEOUS EVERY 12 HOURS
Refills: 0 | Status: DISCONTINUED | OUTPATIENT
Start: 2020-06-11 | End: 2020-06-19

## 2020-06-11 RX ADMIN — Medication 650 MILLIGRAM(S): at 16:40

## 2020-06-11 RX ADMIN — Medication 4: at 17:13

## 2020-06-11 RX ADMIN — OLANZAPINE 2.5 MILLIGRAM(S): 15 TABLET, FILM COATED ORAL at 21:50

## 2020-06-11 RX ADMIN — Medication 500 MILLIGRAM(S): at 17:15

## 2020-06-11 RX ADMIN — Medication 6: at 06:42

## 2020-06-11 RX ADMIN — Medication 12 UNIT(S): at 06:43

## 2020-06-11 RX ADMIN — MIRTAZAPINE 7.5 MILLIGRAM(S): 45 TABLET, ORALLY DISINTEGRATING ORAL at 21:50

## 2020-06-11 RX ADMIN — Medication 2: at 13:37

## 2020-06-11 RX ADMIN — QUETIAPINE FUMARATE 25 MILLIGRAM(S): 200 TABLET, FILM COATED ORAL at 21:50

## 2020-06-11 RX ADMIN — ZINC SULFATE TAB 220 MG (50 MG ZINC EQUIVALENT) 220 MILLIGRAM(S): 220 (50 ZN) TAB at 11:13

## 2020-06-11 RX ADMIN — Medication 1 TABLET(S): at 11:13

## 2020-06-11 RX ADMIN — PANTOPRAZOLE SODIUM 40 MILLIGRAM(S): 20 TABLET, DELAYED RELEASE ORAL at 11:13

## 2020-06-11 RX ADMIN — NYSTATIN CREAM 1 APPLICATION(S): 100000 CREAM TOPICAL at 06:23

## 2020-06-11 RX ADMIN — CHLORHEXIDINE GLUCONATE 1 APPLICATION(S): 213 SOLUTION TOPICAL at 11:14

## 2020-06-11 RX ADMIN — HEPARIN SODIUM 5000 UNIT(S): 5000 INJECTION INTRAVENOUS; SUBCUTANEOUS at 17:17

## 2020-06-11 RX ADMIN — Medication 650 MILLIGRAM(S): at 21:50

## 2020-06-11 RX ADMIN — HEPARIN SODIUM 5000 UNIT(S): 5000 INJECTION INTRAVENOUS; SUBCUTANEOUS at 06:23

## 2020-06-11 RX ADMIN — Medication 14 UNIT(S): at 17:14

## 2020-06-11 RX ADMIN — Medication 500 MILLIGRAM(S): at 06:23

## 2020-06-11 RX ADMIN — Medication 1 APPLICATION(S): at 11:13

## 2020-06-11 NOTE — PROGRESS NOTE ADULT - PROBLEM SELECTOR PLAN 2
no behavioral disturbances, awake, does not follow commands  -Cont Zyprexa, Seroquel, Remeron at bedtime as per dr. Packer (psych).   -Supportive care

## 2020-06-11 NOTE — PROGRESS NOTE ADULT - ASSESSMENT
64 year old male from [facility]  with PMH dementia and DM coming in with seizures like activity while sitting at AL.  Pt had peg placement on 06/02/2020 for FTT 2/2 to dysphagia and poor oral intake related to changes in mental status.     Pt tolerates Peg tube feeding, medically optimized for discharge,  pending covid-19  2 negative results for d/c to  [facility]. Repeated COVID Positive 6/9.     6/11-Peg insertion site noted oozing small amounts drng, followed by GI.  Pt. with low grade fever, T max 100.1 associated with tachycardia, CXR ordered

## 2020-06-11 NOTE — PROGRESS NOTE ADULT - SUBJECTIVE AND OBJECTIVE BOX
64 year old male from [facility] with PMH of dementia and type 2 DM presented with seizure activity while sitting at AL. Endocrinology was consulted for management of uncontrolled type 2 DM with hyperglycemia.    Patient seen and examined at bedside. Patient tolerating tube feedings. FS reviewed. BG slightly above goal >200 on current regimen of  Lantus 18 units at bedtime and Humalog 12 units q 6 hrs.      MEDICATIONS  (STANDING):  ascorbic acid 500 milliGRAM(s) Oral two times a day  chlorhexidine 2% Cloths 1 Application(s) Topical daily  collagenase Ointment 1 Application(s) Topical daily  dextrose 5% + sodium chloride 0.9%. 1000 milliLiter(s) (50 mL/Hr) IV Continuous <Continuous>  dextrose 5% + sodium chloride 0.9%. 1000 milliLiter(s) (50 mL/Hr) IV Continuous <Continuous>  dextrose 5%. 1000 milliLiter(s) (50 mL/Hr) IV Continuous <Continuous>  dextrose 50% Injectable 12.5 Gram(s) IV Push once  dextrose 50% Injectable 25 Gram(s) IV Push once  dextrose 50% Injectable 25 Gram(s) IV Push once  ergocalciferol 36648 Unit(s) Oral <User Schedule>  heparin   Injectable 5000 Unit(s) SubCutaneous every 12 hours  insulin glargine Injectable (LANTUS) 18 Unit(s) SubCutaneous at bedtime  insulin lispro (HumaLOG) corrective regimen sliding scale   SubCutaneous every 6 hours  insulin lispro Injectable (HumaLOG) 12 Unit(s) SubCutaneous every 6 hours  mirtazapine 7.5 milliGRAM(s) Oral at bedtime  multivitamin/minerals 1 Tablet(s) Oral daily  OLANZapine 2.5 milliGRAM(s) Oral at bedtime  pantoprazole   Suspension 40 milliGRAM(s) Enteral Tube daily  QUEtiapine 25 milliGRAM(s) Oral at bedtime  zinc sulfate 220 milliGRAM(s) Oral daily    MEDICATIONS  (PRN):  acetaminophen    Suspension .. 650 milliGRAM(s) Enteral Tube every 6 hours PRN Temp greater or equal to 38C (100.4F)  ALBUTerol    90 MICROgram(s) HFA Inhaler 2 Puff(s) Inhalation every 6 hours PRN Shortness of Breath and/or Wheezing  dextrose 40% Gel 15 Gram(s) Oral once PRN Blood Glucose LESS THAN 70 milliGRAM(s)/deciliter  glucagon  Injectable 1 milliGRAM(s) IntraMuscular once PRN Glucose LESS THAN 70 milligrams/deciliter  guaiFENesin   Syrup  (Sugar-Free) 100 milliGRAM(s) Oral every 6 hours PRN Cough      REVIEW OF SYSTEMS:  unable to obtain due to medical condition    PHYSICAL EXAM:  VITAL SIGNS  T(C): 36.8 (06-11-20 @ 07:30), Max: 37.8 (06-11-20 @ 06:35)  HR: 111 (06-11-20 @ 06:35) (72 - 118)  BP: 136/82 (06-11-20 @ 06:35) (121/59 - 142/61)  RR: 18 (06-11-20 @ 06:35) (16 - 22)  SpO2: 97% (06-11-20 @ 06:35) (96% - 97%)    GENERAL: NAD, well-developed  HEAD:  Atraumatic, Normocephalic  EYES: EOMI, PERRLA, conjunctiva and sclera clear  ENMT: No tonsillar erythema, exudates, or enlargement; No lesions  NECK: Supple, No JVD, Normal thyroid  NERVOUS SYSTEM:  nonverbal,  CHEST/LUNG: CTA bilaterally  No rales, rhonchi, wheezing, or rubs  HEART: Regular rate and rhythm; No murmurs, rubs, or gallops  ABDOMEN: s/p PEG, Soft, Nontender, Nondistended; Bowel sounds present  EXTREMITIES:  No edema  SKIN: No rashes or lesions      LABS:                        10.5 11.93 )-----------( 312      ( 11 Jun 2020 06:59 )             31.4     06-11    133<L>  |  96  |  35<H>  ----------------------------<  207<H>  4.3   |  25  |  0.93    Ca    9.6      11 Jun 2020 06:59    TPro  8.0  /  Alb  2.4<L>  /  TBili  0.5  /  DBili  x   /  AST  20  /  ALT  27  /  AlkPhos  168<H>  06-10        CAPILLARY BLOOD GLUCOSE      POCT Blood Glucose.: 193 mg/dL (11 Jun 2020 08:49)  POCT Blood Glucose.: 252 mg/dL (11 Jun 2020 06:31)  POCT Blood Glucose.: 246 mg/dL (10 Jonathan 2020 23:29)  POCT Blood Glucose.: 180 mg/dL (10 Jonathan 2020 16:52)  POCT Blood Glucose.: 106 mg/dL (10 Jonathan 2020 11:06)

## 2020-06-11 NOTE — PROGRESS NOTE ADULT - ASSESSMENT
64 year old male from [facility] with PMH of dementia and type 2 DM presented with seizure activity while sitting at AL. Endocrinology was consulted for management of uncontrolled type 2 DM with hyperglycemia.    1. Uncontrolled Type 2 DM with hyperglycemia, A1c 9.9%  -patient tolerating tube feedings via PEG   -BG slightly above goal >200  -Continue Lantus 18 units at bedtime  -Increase to Humalog 14 units q 6 hrs (hold if tube feedings are held)  -continue moderate dose rapid acting insulin q 6 hrs   BG 70-100mg/dL 0 units  -150 mg/dL 0 units  -200 mg/dL 2 unit  -250 mg/dL 4 units  -300 mg/dL 6 units  -350 mg/dL 8 units  -400 mg/dL 10 units  BG > 400mg/dL 12 units  -FS AC HS  -ensure consistent carbohydrate   -will monitor FS and adjust accordingly     2. Delirium  -continue Seroquel, Zyprexa, Remeron    Plan discussed with primary team]  Please  call  w/ any questions or concerns [phone]

## 2020-06-11 NOTE — PROGRESS NOTE ADULT - SUBJECTIVE AND OBJECTIVE BOX
[   ] ICU                                          [   ] CCU                                      [ X  ] Medical Floor      Patient is comfortable. GI asked to evaluate the Peg tube for oozing feeding from the side of the tube. No new complaints reported, No abdominal pain, N/V, hematemesis, hematochezia, melena, fever, chills, chest pain, SOB, cough or diarrhea reported.    VITALS  Vital Signs Last 24 Hrs  T(C): 38.6 (11 Jun 2020 16:35), Max: 38.6 (11 Jun 2020 16:35)  T(F): 101.4 (11 Jun 2020 16:35), Max: 101.4 (11 Jun 2020 16:35)  HR: 115 (11 Jun 2020 12:05) (72 - 115)  BP: 152/105 (11 Jun 2020 12:05) (121/59 - 152/105)   RR: 22 (11 Jun 2020 12:00) (16 - 22)  SpO2: 100% (11 Jun 2020 12:05) (97% - 100%)         MEDICATIONS  (STANDING):  ascorbic acid 500 milliGRAM(s) Oral two times a day  chlorhexidine 2% Cloths 1 Application(s) Topical daily  collagenase Ointment 1 Application(s) Topical daily  dextrose 5% + sodium chloride 0.9%. 1000 milliLiter(s) (50 mL/Hr) IV Continuous <Continuous>  dextrose 5% + sodium chloride 0.9%. 1000 milliLiter(s) (50 mL/Hr) IV Continuous <Continuous>  dextrose 5%. 1000 milliLiter(s) (50 mL/Hr) IV Continuous <Continuous>  dextrose 50% Injectable 12.5 Gram(s) IV Push once  dextrose 50% Injectable 25 Gram(s) IV Push once  dextrose 50% Injectable 25 Gram(s) IV Push once  ergocalciferol [id number] Unit(s) Oral <User Schedule>  heparin   Injectable 5000 Unit(s) SubCutaneous every 12 hours  insulin glargine Injectable (LANTUS) 18 Unit(s) SubCutaneous at bedtime  insulin lispro (HumaLOG) corrective regimen sliding scale   SubCutaneous every 6 hours  insulin lispro Injectable (HumaLOG) 14 Unit(s) SubCutaneous three times a day before meals  mirtazapine 7.5 milliGRAM(s) Oral at bedtime  multivitamin/minerals 1 Tablet(s) Oral daily  OLANZapine 2.5 milliGRAM(s) Oral at bedtime  pantoprazole   Suspension 40 milliGRAM(s) Enteral Tube daily  QUEtiapine 25 milliGRAM(s) Oral at bedtime  zinc sulfate 220 milliGRAM(s) Oral daily    MEDICATIONS  (PRN):  acetaminophen    Suspension .. 650 milliGRAM(s) Enteral Tube every 6 hours PRN Temp greater or equal to 38C (100.4F)  ALBUTerol    90 MICROgram(s) HFA Inhaler 2 Puff(s) Inhalation every 6 hours PRN Shortness of Breath and/or Wheezing  dextrose 40% Gel 15 Gram(s) Oral once PRN Blood Glucose LESS THAN 70 milliGRAM(s)/deciliter  glucagon  Injectable 1 milliGRAM(s) IntraMuscular once PRN Glucose LESS THAN 70 milligrams/deciliter  guaiFENesin   Syrup  (Sugar-Free) 100 milliGRAM(s) Oral every 6 hours PRN Cough                            10.5   11.93 )-----------( 312      ( 11 Jun 2020 06:59 )             31.4       06-11    133<L>  |  96  |  35<H>  ----------------------------<  207<H>  4.3   |  25  |  0.93    Ca    9.6      11 Jun 2020 06:59    TPro  8.0  /  Alb  2.4<L>  /  TBili  0.5  /  DBili  x   /  AST  20  /  ALT  27  /  AlkPhos  168<H>  06-10

## 2020-06-11 NOTE — PROGRESS NOTE ADULT - SUBJECTIVE AND OBJECTIVE BOX
NP Note discussed with  Primary Attending    Patient is a 64y old  Male who presents with a chief complaint of seizures (11 Jun 2020 12:04)      INTERVAL HPI/OVERNIGHT EVENTS: no new complaints    MEDICATIONS  (STANDING):  ascorbic acid 500 milliGRAM(s) Oral two times a day  chlorhexidine 2% Cloths 1 Application(s) Topical daily  collagenase Ointment 1 Application(s) Topical daily  dextrose 5% + sodium chloride 0.9%. 1000 milliLiter(s) (50 mL/Hr) IV Continuous <Continuous>  dextrose 5% + sodium chloride 0.9%. 1000 milliLiter(s) (50 mL/Hr) IV Continuous <Continuous>  dextrose 5%. 1000 milliLiter(s) (50 mL/Hr) IV Continuous <Continuous>  dextrose 50% Injectable 12.5 Gram(s) IV Push once  dextrose 50% Injectable 25 Gram(s) IV Push once  dextrose 50% Injectable 25 Gram(s) IV Push once  ergocalciferol 01992 Unit(s) Oral <User Schedule>  heparin   Injectable 5000 Unit(s) SubCutaneous every 12 hours  insulin glargine Injectable (LANTUS) 18 Unit(s) SubCutaneous at bedtime  insulin lispro (HumaLOG) corrective regimen sliding scale   SubCutaneous every 6 hours  insulin lispro Injectable (HumaLOG) 14 Unit(s) SubCutaneous three times a day before meals  mirtazapine 7.5 milliGRAM(s) Oral at bedtime  multivitamin/minerals 1 Tablet(s) Oral daily  OLANZapine 2.5 milliGRAM(s) Oral at bedtime  pantoprazole   Suspension 40 milliGRAM(s) Enteral Tube daily  QUEtiapine 25 milliGRAM(s) Oral at bedtime  zinc sulfate 220 milliGRAM(s) Oral daily    MEDICATIONS  (PRN):  acetaminophen    Suspension .. 650 milliGRAM(s) Enteral Tube every 6 hours PRN Temp greater or equal to 38C (100.4F)  ALBUTerol    90 MICROgram(s) HFA Inhaler 2 Puff(s) Inhalation every 6 hours PRN Shortness of Breath and/or Wheezing  dextrose 40% Gel 15 Gram(s) Oral once PRN Blood Glucose LESS THAN 70 milliGRAM(s)/deciliter  glucagon  Injectable 1 milliGRAM(s) IntraMuscular once PRN Glucose LESS THAN 70 milligrams/deciliter  guaiFENesin   Syrup  (Sugar-Free) 100 milliGRAM(s) Oral every 6 hours PRN Cough      __________________________________________________  REVIEW OF SYSTEMS: Unable to assess, pt. is minimally verbal      Vital Signs Last 24 Hrs  T(C): 36.8 (11 Jun 2020 07:30), Max: 37.8 (11 Jun 2020 06:35)  T(F): 98.2 (11 Jun 2020 07:30), Max: 100.1 (11 Jun 2020 06:35)  HR: 115 (11 Jun 2020 12:05) (72 - 115)  BP: 152/105 (11 Jun 2020 12:05) (121/59 - 152/105)  BP(mean): --  RR: 22 (11 Jun 2020 12:00) (16 - 22)  SpO2: 100% (11 Jun 2020 12:05) (97% - 100%)    ________________________________________________  PHYSICAL EXAM: Minimally verbal  GENERAL: NAD  HEENT: Normocephalic;  conjunctivae and sclerae clear; moist mucous membranes;   NECK : supple  CHEST/LUNG: Clear to auscultation bilaterally with good air entry   HEART: S1 S2  regular; no murmurs, gallops or rubs  ABDOMEN: PEG in place, stoma oozing small amounts purulent drng, noted foul smelling   EXTREMITIES: no cyanosis; no edema; no calf tenderness  SKIN: warm and dry; no rash  NERVOUS SYSTEM:  Awake and alert; Minimally verbal, does not follow commands today  _________________________________________________  LABS:                        10.5   11.93 )-----------( 312      ( 11 Jun 2020 06:59 )             31.4     06-11    133<L>  |  96  |  35<H>  ----------------------------<  207<H>  4.3   |  25  |  0.93    Ca    9.6      11 Jun 2020 06:59    TPro  8.0  /  Alb  2.4<L>  /  TBili  0.5  /  DBili  x   /  AST  20  /  ALT  27  /  AlkPhos  168<H>  06-10        CAPILLARY BLOOD GLUCOSE      POCT Blood Glucose.: 168 mg/dL (11 Jun 2020 13:16)  POCT Blood Glucose.: 161 mg/dL (11 Jun 2020 11:29)  POCT Blood Glucose.: 193 mg/dL (11 Jun 2020 08:49)  POCT Blood Glucose.: 252 mg/dL (11 Jun 2020 06:31)  POCT Blood Glucose.: 246 mg/dL (10 Jonathan 2020 23:29)  POCT Blood Glucose.: 180 mg/dL (10 Jonathan 2020 16:52)        RADIOLOGY & ADDITIONAL TESTS:    Xray Chest 1 View- PORTABLE-Routine (05.23.20 @ 17:40) >  EXAM:  XR CHEST PORTABLE ROUTINE 1V                            PROCEDURE DATE:  05/23/2020          INTERPRETATION:  Portable chest x-ray    Indication: pneumonia.    Portable chest x-ray is compared to a previous examination dated 5/17/2020.    Impression: Stable NG tube.    Possible mild left pleural effusion.    No evidence for pneumothorax.    Opacification in the left mid to lower lung zone, slightly progressed.    The trachea is midline.    Stable cardiac silhouette.    < end of copied text >            Imaging Personally Reviewed:  YES/NO    Consultant(s) Notes Reviewed:   YES/ No    Care Discussed with Consultants :     Plan of care was discussed with patient and /or primary care giver; all questions and concerns were addressed and care was aligned with patient's wishes.

## 2020-06-11 NOTE — PROGRESS NOTE ADULT - SUBJECTIVE AND OBJECTIVE BOX
Pt is awake, alert, lying in bed in NAD. Having temps overnight. PEG site with drainage noted.     INTERVAL HPI/OVERNIGHT EVENTS:      VITAL SIGNS:  T(F): 98.2 (06-11-20 @ 07:30)  HR: 111 (06-11-20 @ 06:35)  BP: 136/82 (06-11-20 @ 06:35)  RR: 18 (06-11-20 @ 06:35)  SpO2: 97% (06-11-20 @ 06:35)  Wt(kg): --  I&O's Detail          REVIEW OF SYSTEMS:    CONSTITUTIONAL:  No fevers, chills, sweats    HEENT:  Eyes:  No diplopia or blurred vision. ENT:  No earache, sore throat or runny nose.    CARDIOVASCULAR:  No pressure, squeezing, tightness, or heaviness about the chest; no palpitations.    RESPIRATORY:  Per HPI    GASTROINTESTINAL:  No abdominal pain, nausea, vomiting or diarrhea.    GENITOURINARY:  No dysuria, frequency or urgency.    NEUROLOGIC:  No paresthesias, fasciculations, seizures or weakness.    PSYCHIATRIC:  No disorder of thought or mood.      PHYSICAL EXAM:    Constitutional: Well developed and nourished  Eyes:Perrla  ENMT: normal  Neck:supple  Respiratory: good air entry  Cardiovascular: S1 S2 regular  Gastrointestinal: Soft, Non tender; PEG with drainage at site.   Extremities: No edema  Vascular:normal  Neurological:Awake, alert   Musculoskeletal: weak.       MEDICATIONS  (STANDING):  ascorbic acid 500 milliGRAM(s) Oral two times a day  chlorhexidine 2% Cloths 1 Application(s) Topical daily  collagenase Ointment 1 Application(s) Topical daily  dextrose 5% + sodium chloride 0.9%. 1000 milliLiter(s) (50 mL/Hr) IV Continuous <Continuous>  dextrose 5% + sodium chloride 0.9%. 1000 milliLiter(s) (50 mL/Hr) IV Continuous <Continuous>  dextrose 5%. 1000 milliLiter(s) (50 mL/Hr) IV Continuous <Continuous>  dextrose 50% Injectable 12.5 Gram(s) IV Push once  dextrose 50% Injectable 25 Gram(s) IV Push once  dextrose 50% Injectable 25 Gram(s) IV Push once  ergocalciferol 81246 Unit(s) Oral <User Schedule>  heparin   Injectable 5000 Unit(s) SubCutaneous every 12 hours  insulin glargine Injectable (LANTUS) 18 Unit(s) SubCutaneous at bedtime  insulin lispro (HumaLOG) corrective regimen sliding scale   SubCutaneous every 6 hours  insulin lispro Injectable (HumaLOG) 14 Unit(s) SubCutaneous three times a day before meals  mirtazapine 7.5 milliGRAM(s) Oral at bedtime  multivitamin/minerals 1 Tablet(s) Oral daily  OLANZapine 2.5 milliGRAM(s) Oral at bedtime  pantoprazole   Suspension 40 milliGRAM(s) Enteral Tube daily  QUEtiapine 25 milliGRAM(s) Oral at bedtime  zinc sulfate 220 milliGRAM(s) Oral daily    MEDICATIONS  (PRN):  acetaminophen    Suspension .. 650 milliGRAM(s) Enteral Tube every 6 hours PRN Temp greater or equal to 38C (100.4F)  ALBUTerol    90 MICROgram(s) HFA Inhaler 2 Puff(s) Inhalation every 6 hours PRN Shortness of Breath and/or Wheezing  dextrose 40% Gel 15 Gram(s) Oral once PRN Blood Glucose LESS THAN 70 milliGRAM(s)/deciliter  glucagon  Injectable 1 milliGRAM(s) IntraMuscular once PRN Glucose LESS THAN 70 milligrams/deciliter  guaiFENesin   Syrup  (Sugar-Free) 100 milliGRAM(s) Oral every 6 hours PRN Cough      Allergies    No Known Allergies    Intolerances        LABS:                        10.5   11.93 )-----------( 312      ( 11 Jun 2020 06:59 )             31.4     06-11    133<L>  |  96  |  35<H>  ----------------------------<  207<H>  4.3   |  25  |  0.93    Ca    9.6      11 Jun 2020 06:59    TPro  8.0  /  Alb  2.4<L>  /  TBili  0.5  /  DBili  x   /  AST  20  /  ALT  27  /  AlkPhos  168<H>  06-10      CAPILLARY BLOOD GLUCOSE    POCT Blood Glucose.: 161 mg/dL (11 Jun 2020 11:29)  POCT Blood Glucose.: 193 mg/dL (11 Jun 2020 08:49)  POCT Blood Glucose.: 252 mg/dL (11 Jun 2020 06:31)  POCT Blood Glucose.: 246 mg/dL (10 Jonathan 2020 23:29)  POCT Blood Glucose.: 180 mg/dL (10 Jonathan 2020 16:52)        RADIOLOGY & ADDITIONAL TESTS:    CXR:    Ct scan chest:    ekg;    echo:

## 2020-06-11 NOTE — PROGRESS NOTE ADULT - PROBLEM SELECTOR PLAN 9
Discharge to [facility]  after 2 consecutive negative testings for Covid-19 as required by [facility]. 06/03, 06/06, 6/9 testing was positive. repeat on 6/12.

## 2020-06-11 NOTE — PROGRESS NOTE ADULT - ASSESSMENT
1. Gastritis  2. Esophagitis  3. Failure to thrive  4. S/p EGD with Peg tube placement  5. Leakage from the peg site  6. Patient tolerating Peg feeding       Suggestions:    1. The peg tube repositioned the bumper to prevent the leakage    2. Continue Peg tube feeding  3. Aspiration precaution  4. Protonix daily   5. Avoid NSAID  6. Keep peg site clean by washing with warm water  7. DVT prophylaxis

## 2020-06-11 NOTE — PROGRESS NOTE ADULT - PROBLEM SELECTOR PLAN 3
oropharyngeal dysphagia due to  dementia   -Peg placed on 06/02  -Tolerates feeding  -NPO. continue meds through peg tube

## 2020-06-11 NOTE — PROGRESS NOTE ADULT - PROBLEM SELECTOR PLAN 1
T-Max 101.4 associated with tachycardia and mild leukocytosis  -CXR ordered  -Blood and urine Cx ordered  -ID Dr. Barry consulted

## 2020-06-11 NOTE — PROGRESS NOTE ADULT - SUBJECTIVE AND OBJECTIVE BOX
CARDIOLOGY/MEDICAL ATTENDING    CHIEF COMPLAINT:Patient is a 64y old  Male who presents with a chief complaint of seizures.Pt appears comfortable.    	  REVIEW OF SYSTEMS:    [x ] Unable to obtain    PHYSICAL EXAM:  T(C): 36.8 (06-11-20 @ 07:30), Max: 37.8 (06-11-20 @ 06:35)  HR: 111 (06-11-20 @ 06:35) (72 - 118)  BP: 136/82 (06-11-20 @ 06:35) (121/59 - 142/61)  RR: 18 (06-11-20 @ 06:35) (16 - 22)  SpO2: 97% (06-11-20 @ 06:35) (96% - 97%)  Wt(kg): --  I&O's Summary      Appearance: Normal	  HEENT:   Normal oral mucosa, PERRL, EOMI	  Lymphatic: No lymphadenopathy  Cardiovascular: Normal S1 S2, No JVD, No murmurs, No edema  Respiratory: Lungs clear to auscultation	  Gastrointestinal:  Soft, Non-tender, + BS	  Skin: No rashes, No ecchymoses, No cyanosis	  Extremities: Normal range of motion, No clubbing, cyanosis or edema  Vascular: Peripheral pulses palpable 2+ bilaterally    MEDICATIONS  (STANDING):  ascorbic acid 500 milliGRAM(s) Oral two times a day  chlorhexidine 2% Cloths 1 Application(s) Topical daily  collagenase Ointment 1 Application(s) Topical daily  dextrose 5% + sodium chloride 0.9%. 1000 milliLiter(s) (50 mL/Hr) IV Continuous <Continuous>  dextrose 5% + sodium chloride 0.9%. 1000 milliLiter(s) (50 mL/Hr) IV Continuous <Continuous>  dextrose 5%. 1000 milliLiter(s) (50 mL/Hr) IV Continuous <Continuous>  dextrose 50% Injectable 12.5 Gram(s) IV Push once  dextrose 50% Injectable 25 Gram(s) IV Push once  dextrose 50% Injectable 25 Gram(s) IV Push once  ergocalciferol 06405 Unit(s) Oral <User Schedule>  heparin   Injectable 5000 Unit(s) SubCutaneous every 12 hours  insulin glargine Injectable (LANTUS) 18 Unit(s) SubCutaneous at bedtime  insulin lispro (HumaLOG) corrective regimen sliding scale   SubCutaneous every 6 hours  insulin lispro Injectable (HumaLOG) 14 Unit(s) SubCutaneous three times a day before meals  mirtazapine 7.5 milliGRAM(s) Oral at bedtime  multivitamin/minerals 1 Tablet(s) Oral daily  OLANZapine 2.5 milliGRAM(s) Oral at bedtime  pantoprazole   Suspension 40 milliGRAM(s) Enteral Tube daily  QUEtiapine 25 milliGRAM(s) Oral at bedtime  zinc sulfate 220 milliGRAM(s) Oral daily      	  	  LABS:	 	                      10.5   11.93 )-----------( 312      ( 11 Jun 2020 06:59 )             31.4     06-11    133<L>  |  96  |  35<H>  ----------------------------<  207<H>  4.3   |  25  |  0.93    Ca    9.6      11 Jun 2020 06:59    TPro  8.0  /  Alb  2.4<L>  /  TBili  0.5  /  DBili  x   /  AST  20  /  ALT  27  /  AlkPhos  168<H>  06-10

## 2020-06-11 NOTE — PROGRESS NOTE ADULT - PROBLEM SELECTOR PLAN 1
Now with temps.   Will need GI to eval PEG site.   Tylenol PRN  Monitor labs  Receiving free water via PEG.

## 2020-06-12 LAB
ANION GAP SERPL CALC-SCNC: 11 MMOL/L — SIGNIFICANT CHANGE UP (ref 5–17)
BUN SERPL-MCNC: 37 MG/DL — HIGH (ref 7–18)
CALCIUM SERPL-MCNC: 9.1 MG/DL — SIGNIFICANT CHANGE UP (ref 8.4–10.5)
CHLORIDE SERPL-SCNC: 98 MMOL/L — SIGNIFICANT CHANGE UP (ref 96–108)
CO2 SERPL-SCNC: 26 MMOL/L — SIGNIFICANT CHANGE UP (ref 22–31)
CREAT SERPL-MCNC: 0.83 MG/DL — SIGNIFICANT CHANGE UP (ref 0.5–1.3)
GLUCOSE BLDC GLUCOMTR-MCNC: 109 MG/DL — HIGH (ref 70–99)
GLUCOSE BLDC GLUCOMTR-MCNC: 135 MG/DL — HIGH (ref 70–99)
GLUCOSE BLDC GLUCOMTR-MCNC: 197 MG/DL — HIGH (ref 70–99)
GLUCOSE BLDC GLUCOMTR-MCNC: 242 MG/DL — HIGH (ref 70–99)
GLUCOSE BLDC GLUCOMTR-MCNC: 253 MG/DL — HIGH (ref 70–99)
GLUCOSE BLDC GLUCOMTR-MCNC: 291 MG/DL — HIGH (ref 70–99)
GLUCOSE SERPL-MCNC: 231 MG/DL — HIGH (ref 70–99)
HCT VFR BLD CALC: 33 % — LOW (ref 39–50)
HGB BLD-MCNC: 11.2 G/DL — LOW (ref 13–17)
MCHC RBC-ENTMCNC: 28.8 PG — SIGNIFICANT CHANGE UP (ref 27–34)
MCHC RBC-ENTMCNC: 33.9 GM/DL — SIGNIFICANT CHANGE UP (ref 32–36)
MCV RBC AUTO: 84.8 FL — SIGNIFICANT CHANGE UP (ref 80–100)
NRBC # BLD: 0 /100 WBCS — SIGNIFICANT CHANGE UP (ref 0–0)
PLATELET # BLD AUTO: 298 K/UL — SIGNIFICANT CHANGE UP (ref 150–400)
POTASSIUM SERPL-MCNC: 4 MMOL/L — SIGNIFICANT CHANGE UP (ref 3.5–5.3)
POTASSIUM SERPL-SCNC: 4 MMOL/L — SIGNIFICANT CHANGE UP (ref 3.5–5.3)
RBC # BLD: 3.89 M/UL — LOW (ref 4.2–5.8)
RBC # FLD: 12.9 % — SIGNIFICANT CHANGE UP (ref 10.3–14.5)
SARS-COV-2 RNA SPEC QL NAA+PROBE: DETECTED
SODIUM SERPL-SCNC: 135 MMOL/L — SIGNIFICANT CHANGE UP (ref 135–145)
WBC # BLD: 10.24 K/UL — SIGNIFICANT CHANGE UP (ref 3.8–10.5)
WBC # FLD AUTO: 10.24 K/UL — SIGNIFICANT CHANGE UP (ref 3.8–10.5)

## 2020-06-12 PROCEDURE — 71275 CT ANGIOGRAPHY CHEST: CPT | Mod: 26

## 2020-06-12 PROCEDURE — 99232 SBSQ HOSP IP/OBS MODERATE 35: CPT

## 2020-06-12 PROCEDURE — 93970 EXTREMITY STUDY: CPT | Mod: 26

## 2020-06-12 RX ORDER — INSULIN LISPRO 100/ML
14 VIAL (ML) SUBCUTANEOUS EVERY 6 HOURS
Refills: 0 | Status: DISCONTINUED | OUTPATIENT
Start: 2020-06-12 | End: 2020-06-14

## 2020-06-12 RX ORDER — INSULIN LISPRO 100/ML
14 VIAL (ML) SUBCUTANEOUS EVERY 6 HOURS
Refills: 0 | Status: DISCONTINUED | OUTPATIENT
Start: 2020-06-12 | End: 2020-06-12

## 2020-06-12 RX ADMIN — HEPARIN SODIUM 5000 UNIT(S): 5000 INJECTION INTRAVENOUS; SUBCUTANEOUS at 18:02

## 2020-06-12 RX ADMIN — QUETIAPINE FUMARATE 25 MILLIGRAM(S): 200 TABLET, FILM COATED ORAL at 22:10

## 2020-06-12 RX ADMIN — Medication 6: at 06:31

## 2020-06-12 RX ADMIN — OLANZAPINE 2.5 MILLIGRAM(S): 15 TABLET, FILM COATED ORAL at 22:11

## 2020-06-12 RX ADMIN — Medication 14 UNIT(S): at 06:30

## 2020-06-12 RX ADMIN — CHLORHEXIDINE GLUCONATE 1 APPLICATION(S): 213 SOLUTION TOPICAL at 11:47

## 2020-06-12 RX ADMIN — Medication 500 MILLIGRAM(S): at 06:30

## 2020-06-12 RX ADMIN — Medication 14 UNIT(S): at 23:57

## 2020-06-12 RX ADMIN — Medication 4: at 23:57

## 2020-06-12 RX ADMIN — Medication 1 TABLET(S): at 11:47

## 2020-06-12 RX ADMIN — INSULIN GLARGINE 18 UNIT(S): 100 INJECTION, SOLUTION SUBCUTANEOUS at 22:10

## 2020-06-12 RX ADMIN — INSULIN GLARGINE 18 UNIT(S): 100 INJECTION, SOLUTION SUBCUTANEOUS at 00:06

## 2020-06-12 RX ADMIN — PANTOPRAZOLE SODIUM 40 MILLIGRAM(S): 20 TABLET, DELAYED RELEASE ORAL at 11:47

## 2020-06-12 RX ADMIN — Medication 1 APPLICATION(S): at 11:46

## 2020-06-12 RX ADMIN — ZINC SULFATE TAB 220 MG (50 MG ZINC EQUIVALENT) 220 MILLIGRAM(S): 220 (50 ZN) TAB at 11:47

## 2020-06-12 RX ADMIN — Medication 500 MILLIGRAM(S): at 18:01

## 2020-06-12 RX ADMIN — MIRTAZAPINE 7.5 MILLIGRAM(S): 45 TABLET, ORALLY DISINTEGRATING ORAL at 22:11

## 2020-06-12 RX ADMIN — HEPARIN SODIUM 5000 UNIT(S): 5000 INJECTION INTRAVENOUS; SUBCUTANEOUS at 06:30

## 2020-06-12 RX ADMIN — Medication 6: at 00:06

## 2020-06-12 NOTE — PROGRESS NOTE ADULT - PROBLEM SELECTOR PLAN 1
Afebrile today  T-Max on 6/.4 associated with tachycardia and mild leukocytosis  -CXR shows Opacification of the left lower lung field suggestive of atelectasis, effusion, and/or pneumonia  -f/u chest CT results  -UA negative  -Blood Cx testing  -ID Dr. Barry consulted

## 2020-06-12 NOTE — CHART NOTE - NSCHARTNOTEFT_GEN_A_CORE
Assessment:   64yMalePatient is a 64y old  Male who presents with a chief complaint of seizures (12 Jun 2020 12:30)  Pt is on Airborne isolation. Pt w COVID +.  PEG On hold at present Since pt going for CT scan. Peg site oozing per RN. Otherwise Tf tolerated. Endo consult noted Pt is on Glucerna 1.2 @ 60 ml/hr x 24 hr. Meds noted ON vitc, MVI w Min, Zn.       Factors impacting intake: [ ] none [ ] nausea  [ ] vomiting [ ] diarrhea [ ] constipation  [ ]chewing problems [ x] swallowing issues  [ ] other:     Diet Prescription: Diet, NPO with Tube Feed:   Tube Feeding Modality: Gastrostomy  Glucerna 1.2 Mihir  Total Volume for 24 Hours (mL): 1440  Continuous  Starting Tube Feed Rate {mL per Hour}: 30  Increase Tube Feed Rate by (mL): 30     Every 6 hours  Until Goal Tube Feed Rate (mL per Hour): 60  Tube Feed Duration (in Hours): 24  Tube Feed Start Time: 15:00 (06-04-20 @ 15:10)    Intake: NPO W PEG     Current Weight:   % Weight Change    Pertinent Medications: MEDICATIONS  (STANDING):  ascorbic acid 500 milliGRAM(s) Oral two times a day  chlorhexidine 2% Cloths 1 Application(s) Topical daily  collagenase Ointment 1 Application(s) Topical daily  dextrose 5% + sodium chloride 0.9%. 1000 milliLiter(s) (50 mL/Hr) IV Continuous <Continuous>  dextrose 5% + sodium chloride 0.9%. 1000 milliLiter(s) (50 mL/Hr) IV Continuous <Continuous>  dextrose 5%. 1000 milliLiter(s) (50 mL/Hr) IV Continuous <Continuous>  dextrose 50% Injectable 12.5 Gram(s) IV Push once  dextrose 50% Injectable 25 Gram(s) IV Push once  dextrose 50% Injectable 25 Gram(s) IV Push once  ergocalciferol 51733 Unit(s) Oral <User Schedule>  heparin   Injectable 5000 Unit(s) SubCutaneous every 12 hours  insulin glargine Injectable (LANTUS) 18 Unit(s) SubCutaneous at bedtime  insulin lispro (HumaLOG) corrective regimen sliding scale   SubCutaneous every 6 hours  insulin lispro Injectable (HumaLOG) 14 Unit(s) SubCutaneous every 6 hours  mirtazapine 7.5 milliGRAM(s) Oral at bedtime  multivitamin/minerals 1 Tablet(s) Oral daily  OLANZapine 2.5 milliGRAM(s) Oral at bedtime  pantoprazole   Suspension 40 milliGRAM(s) Enteral Tube daily  QUEtiapine 25 milliGRAM(s) Oral at bedtime  zinc sulfate 220 milliGRAM(s) Oral daily    MEDICATIONS  (PRN):  acetaminophen    Suspension .. 650 milliGRAM(s) Enteral Tube every 6 hours PRN Temp greater or equal to 38C (100.4F)  ALBUTerol    90 MICROgram(s) HFA Inhaler 2 Puff(s) Inhalation every 6 hours PRN Shortness of Breath and/or Wheezing  dextrose 40% Gel 15 Gram(s) Oral once PRN Blood Glucose LESS THAN 70 milliGRAM(s)/deciliter  glucagon  Injectable 1 milliGRAM(s) IntraMuscular once PRN Glucose LESS THAN 70 milligrams/deciliter  guaiFENesin   Syrup  (Sugar-Free) 100 milliGRAM(s) Oral every 6 hours PRN Cough    Pertinent Labs: 06-12 Na135 mmol/L Glu 231 mg/dL<H> K+ 4.0 mmol/L Cr  0.83 mg/dL BUN 37 mg/dL<H> 06-10 Alb 2.4 g/dL<L>     CAPILLARY BLOOD GLUCOSE      POCT Blood Glucose.: 109 mg/dL (12 Jun 2020 11:59)  POCT Blood Glucose.: 253 mg/dL (12 Jun 2020 05:54)  POCT Blood Glucose.: 291 mg/dL (12 Jun 2020 00:00)  POCT Blood Glucose.: 234 mg/dL (11 Jun 2020 17:10)  POCT Blood Glucose.: 168 mg/dL (11 Jun 2020 13:16)    Skin: L heel DTI, R buttocks Unstageable.     Estimated Needs:   [ ] no change since previous assessment  [ ] recalculated:     Previous Nutrition Diagnosis:   [ ] Inadequate Energy Intake [ ]Inadequate Oral Intake [ ] Excessive Energy Intake   [ ] Underweight [ ] Increased Nutrient Needs [ ] Overweight/Obesity   [ ] Altered GI Function [ ] Unintended Weight Loss [ ] Food & Nutrition Related Knowledge Deficit [x ] Malnutrition  Severe     Nutrition Diagnosis is [x ] ongoing  [ ] resolved [ ] not applicable     New Nutrition Diagnosis: [ ] not applicable       Interventions:   Recommend  [ ] Change Diet To:  [ ] Nutrition Supplement  [x] Nutrition Support Continue with Glucerna 1.2  initial Goal rate @ 60 ml/hr x 24 hr as tolerated   [ ] Other:     Monitoring and Evaluation:   [ ] PO intake [ x ] Tolerance to diet prescription [ x ] weights [ x ] labs[ x ] follow up per protocol  [ ] other:

## 2020-06-12 NOTE — PROGRESS NOTE ADULT - ASSESSMENT
64 year old male from [facility]  with PMH dementia and DM coming in with seizures like activity while sitting at AL.  Pt had peg placement on 06/02/2020 for FTT 2/2 to dysphagia and poor oral intake related to changes in mental status.     Pt tolerates Peg tube feeding, medically optimized for discharge,  pending covid-19  2 negative results for d/c to  [facility]. Repeated COVID Positive 6/9.     6/11-Peg insertion site noted oozing small amounts drng, followed by GI.  Pt. with low grade fever, T max 100.1 associated with tachycardia, CXR ordered  6/12-Afebrile today, UA negative, blood Cx testing, CXR shows Opacification of the left lower lung field suggestive of atelectasis, effusion, and/or pneumonia, will f/u with CT chest with contrast for further investigation.  (consent for IV contrast obtained from daughter [phone]).  Peg adjusted by GI, no further leakage noted at this time, pt. tolerates feeding well.  Pt. is afebrile today, awake with minimal verbalization.

## 2020-06-12 NOTE — PROGRESS NOTE ADULT - SUBJECTIVE AND OBJECTIVE BOX
CARDIOLOGY/MEDICAL ATTENDING    CHIEF COMPLAINT:Patient is a 64y old  Male who presents with a chief complaint of seizures.Pt appears comfortable, temp spike yesterday..    	  REVIEW OF SYSTEMS:  unable to obtain    PHYSICAL EXAM:  T(C): 37.6 (06-12-20 @ 10:38), Max: 38.6 (06-11-20 @ 16:35)  HR: 94 (06-12-20 @ 06:02) (70 - 94)  BP: 127/64 (06-12-20 @ 06:02) (107/63 - 127/64)  RR: 16 (06-12-20 @ 06:02) (16 - 16)  SpO2: 97% (06-12-20 @ 06:02) (94% - 97%)  Wt(kg): --  I&O's Summary      Appearance: Normal	  HEENT:   Normal oral mucosa, PERRL, EOMI	  Lymphatic: No lymphadenopathy  Cardiovascular: Normal S1 S2, No JVD, No murmurs, No edema  Respiratory: Lungs clear to auscultation	  Gastrointestinal:  Soft, Non-tender, + BS	  Skin: No rashes, No ecchymoses, No cyanosis	  Extremities: Normal range of motion, No clubbing, cyanosis or edema  Vascular: Peripheral pulses palpable 2+ bilaterally    MEDICATIONS  (STANDING):  ascorbic acid 500 milliGRAM(s) Oral two times a day  chlorhexidine 2% Cloths 1 Application(s) Topical daily  collagenase Ointment 1 Application(s) Topical daily  dextrose 5% + sodium chloride 0.9%. 1000 milliLiter(s) (50 mL/Hr) IV Continuous <Continuous>  dextrose 5% + sodium chloride 0.9%. 1000 milliLiter(s) (50 mL/Hr) IV Continuous <Continuous>  dextrose 5%. 1000 milliLiter(s) (50 mL/Hr) IV Continuous <Continuous>  dextrose 50% Injectable 12.5 Gram(s) IV Push once  dextrose 50% Injectable 25 Gram(s) IV Push once  dextrose 50% Injectable 25 Gram(s) IV Push once  ergocalciferol 57729 Unit(s) Oral <User Schedule>  heparin   Injectable 5000 Unit(s) SubCutaneous every 12 hours  insulin glargine Injectable (LANTUS) 18 Unit(s) SubCutaneous at bedtime  insulin lispro (HumaLOG) corrective regimen sliding scale   SubCutaneous every 6 hours  insulin lispro Injectable (HumaLOG) 14 Unit(s) SubCutaneous every 6 hours  mirtazapine 7.5 milliGRAM(s) Oral at bedtime  multivitamin/minerals 1 Tablet(s) Oral daily  OLANZapine 2.5 milliGRAM(s) Oral at bedtime  pantoprazole   Suspension 40 milliGRAM(s) Enteral Tube daily  QUEtiapine 25 milliGRAM(s) Oral at bedtime  zinc sulfate 220 milliGRAM(s) Oral daily      	  	  LABS:	 	                        11.2   10.24 )-----------( 298      ( 12 Jun 2020 06:58 )             33.0     06-12    135  |  98  |  37<H>  ----------------------------<  231<H>  4.0   |  26  |  0.83    Ca    9.1      12 Jun 2020 06:58        EXAM:  XR CHEST PORTABLE ROUTINE 1V                            PROCEDURE DATE:  06/11/2020          INTERPRETATION:  XR CHEST    Single AP view    HISTORY:  fever, tachycardia    Comparison: Chest x-ray 5/23/2020      The cardiac silhouette is within normal limits. Left basilar opacification. Small left pleural effusion and/or atelectasis.    IMPRESSION: Opacification of the left lower lung field suggestive of atelectasis, effusion, and/or pneumonia

## 2020-06-12 NOTE — PROGRESS NOTE ADULT - SUBJECTIVE AND OBJECTIVE BOX
NP Note discussed with  Primary Attending    Patient is a 64y old  Male who presents with a chief complaint of seizures (2020 12:30)      INTERVAL HPI/OVERNIGHT EVENTS: no new complaints    MEDICATIONS  (STANDING):  ascorbic acid 500 milliGRAM(s) Oral two times a day  chlorhexidine 2% Cloths 1 Application(s) Topical daily  collagenase Ointment 1 Application(s) Topical daily  dextrose 5% + sodium chloride 0.9%. 1000 milliLiter(s) (50 mL/Hr) IV Continuous <Continuous>  dextrose 5% + sodium chloride 0.9%. 1000 milliLiter(s) (50 mL/Hr) IV Continuous <Continuous>  dextrose 5%. 1000 milliLiter(s) (50 mL/Hr) IV Continuous <Continuous>  dextrose 50% Injectable 12.5 Gram(s) IV Push once  dextrose 50% Injectable 25 Gram(s) IV Push once  dextrose 50% Injectable 25 Gram(s) IV Push once  ergocalciferol 30935 Unit(s) Oral <User Schedule>  heparin   Injectable 5000 Unit(s) SubCutaneous every 12 hours  insulin glargine Injectable (LANTUS) 18 Unit(s) SubCutaneous at bedtime  insulin lispro (HumaLOG) corrective regimen sliding scale   SubCutaneous every 6 hours  insulin lispro Injectable (HumaLOG) 14 Unit(s) SubCutaneous every 6 hours  mirtazapine 7.5 milliGRAM(s) Oral at bedtime  multivitamin/minerals 1 Tablet(s) Oral daily  OLANZapine 2.5 milliGRAM(s) Oral at bedtime  pantoprazole   Suspension 40 milliGRAM(s) Enteral Tube daily  QUEtiapine 25 milliGRAM(s) Oral at bedtime  zinc sulfate 220 milliGRAM(s) Oral daily    MEDICATIONS  (PRN):  acetaminophen    Suspension .. 650 milliGRAM(s) Enteral Tube every 6 hours PRN Temp greater or equal to 38C (100.4F)  ALBUTerol    90 MICROgram(s) HFA Inhaler 2 Puff(s) Inhalation every 6 hours PRN Shortness of Breath and/or Wheezing  dextrose 40% Gel 15 Gram(s) Oral once PRN Blood Glucose LESS THAN 70 milliGRAM(s)/deciliter  glucagon  Injectable 1 milliGRAM(s) IntraMuscular once PRN Glucose LESS THAN 70 milligrams/deciliter  guaiFENesin   Syrup  (Sugar-Free) 100 milliGRAM(s) Oral every 6 hours PRN Cough      __________________________________________________  REVIEW OF SYSTEMS:  Unable to assess, pt. with very few words, unable to respond to questions        Vital Signs Last 24 Hrs  T(C): 37.6 (2020 10:38), Max: 38.6 (2020 16:35)  T(F): 99.6 (2020 10:38), Max: 101.4 (2020 16:35)  HR: 94 (2020 06:02) (70 - 94)  BP: 127/64 (2020 06:02) (107/63 - 127/64)  BP(mean): --  RR: 16 (2020 06:02) (16 - 16)  SpO2: 97% (2020 06:02) (94% - 97%)    ________________________________________________  PHYSICAL EXAM: well developed, well groomed  GENERAL: NAD  HEENT: Normocephalic;  conjunctivae and sclerae clear; moist mucous membranes;   NECK : supple  CHEST/LUNG: Clear to auscultation bilaterally with good air entry   HEART: S1 S2  regular; no murmurs, gallops or rubs  ABDOMEN: PEG tube in place, insertion site C/D/I, Soft, Nontender, Nondistended; Bowel sounds present  EXTREMITIES: no cyanosis; no edema; no calf tenderness  SKIN: warm and dry; no rash  NERVOUS SYSTEM:  Awake and alert, minimal verbalization, does not answer questions    _________________________________________________  LABS:                        11.2   1024 )-----------( 298      ( 2020 06:58 )             33.0     06-12    135  |  98  |  37<H>  ----------------------------<  231<H>  4.0   |  26  |  0.83    Ca    9.1      2020 06:58        Urinalysis Basic - ( 2020 21:34 )    Color: Yellow / Appearance: Clear / S.020 / pH: x  Gluc: x / Ketone: Negative  / Bili: Negative / Urobili: Negative   Blood: x / Protein: 30 mg/dL / Nitrite: Negative   Leuk Esterase: Negative / RBC: 0-2 /HPF / WBC 3-5 /HPF   Sq Epi: x / Non Sq Epi: Occasional /HPF / Bacteria: Trace /HPF      CAPILLARY BLOOD GLUCOSE      POCT Blood Glucose.: 109 mg/dL (2020 11:59)  POCT Blood Glucose.: 253 mg/dL (2020 05:54)  POCT Blood Glucose.: 291 mg/dL (2020 00:00)  POCT Blood Glucose.: 234 mg/dL (2020 17:10)  POCT Blood Glucose.: 168 mg/dL (2020 13:16)        RADIOLOGY & ADDITIONAL TESTS:    Xray Chest 1 View- PORTABLE-Routine (20 @ 17:02) >  EXAM:  XR CHEST PORTABLE ROUTINE 1V                            PROCEDURE DATE:  2020          INTERPRETATION:  XR CHEST    Single AP view    HISTORY:  fever, tachycardia    Comparison: Chest x-ray 2020      The cardiac silhouette is within normal limits. Left basilar opacification. Small left pleural effusion and/or atelectasis.    IMPRESSION: Opacification of the left lower lung field suggestive of atelectasis, effusion, and/or pneumonia    < end of copied text >        Imaging Personally Reviewed:  YES/NO    Consultant(s) Notes Reviewed:   YES/ No    Care Discussed with Consultants :     Plan of care was discussed with patient and /or primary care giver; all questions and concerns were addressed and care was aligned with patient's wishes.

## 2020-06-12 NOTE — PROGRESS NOTE ADULT - SUBJECTIVE AND OBJECTIVE BOX
64 year old male from [facility] with PMH of dementia and type 2 DM presented with seizure activity while sitting at AL. Endocrinology was consulted for management of uncontrolled type 2 DM with hyperglycemia.    Patient seen and examined at bedside. Patient with fever yesterday to 101.4 deg F, pending CT angio chest and dopplers. FS reviewed. BG above goal >200. Standing Humalog 14 units was changed from TID to q 6 hrs today.      MEDICATIONS  (STANDING):  ascorbic acid 500 milliGRAM(s) Oral two times a day  chlorhexidine 2% Cloths 1 Application(s) Topical daily  collagenase Ointment 1 Application(s) Topical daily  dextrose 5% + sodium chloride 0.9%. 1000 milliLiter(s) (50 mL/Hr) IV Continuous <Continuous>  dextrose 5% + sodium chloride 0.9%. 1000 milliLiter(s) (50 mL/Hr) IV Continuous <Continuous>  dextrose 5%. 1000 milliLiter(s) (50 mL/Hr) IV Continuous <Continuous>  dextrose 50% Injectable 12.5 Gram(s) IV Push once  dextrose 50% Injectable 25 Gram(s) IV Push once  dextrose 50% Injectable 25 Gram(s) IV Push once  ergocalciferol 39582 Unit(s) Oral <User Schedule>  heparin   Injectable 5000 Unit(s) SubCutaneous every 12 hours  insulin glargine Injectable (LANTUS) 18 Unit(s) SubCutaneous at bedtime  insulin lispro (HumaLOG) corrective regimen sliding scale   SubCutaneous every 6 hours  insulin lispro Injectable (HumaLOG) 14 Unit(s) SubCutaneous every 6 hours  mirtazapine 7.5 milliGRAM(s) Oral at bedtime  multivitamin/minerals 1 Tablet(s) Oral daily  OLANZapine 2.5 milliGRAM(s) Oral at bedtime  pantoprazole   Suspension 40 milliGRAM(s) Enteral Tube daily  QUEtiapine 25 milliGRAM(s) Oral at bedtime  zinc sulfate 220 milliGRAM(s) Oral daily    MEDICATIONS  (PRN):  acetaminophen    Suspension .. 650 milliGRAM(s) Enteral Tube every 6 hours PRN Temp greater or equal to 38C (100.4F)  ALBUTerol    90 MICROgram(s) HFA Inhaler 2 Puff(s) Inhalation every 6 hours PRN Shortness of Breath and/or Wheezing  dextrose 40% Gel 15 Gram(s) Oral once PRN Blood Glucose LESS THAN 70 milliGRAM(s)/deciliter  glucagon  Injectable 1 milliGRAM(s) IntraMuscular once PRN Glucose LESS THAN 70 milligrams/deciliter  guaiFENesin   Syrup  (Sugar-Free) 100 milliGRAM(s) Oral every 6 hours PRN Cough      REVIEW OF SYSTEMS:  unable to obtain due to medical condition    PHYSICAL EXAM:  VITAL SIGNS  T(C): 37.6 (20 @ 10:38), Max: 38.6 (20 @ 16:35)  HR: 94 (20 @ 06:02) (70 - 94)  BP: 127/64 (20 @ 06:02) (107/63 - 127/64)  RR: 16 (20 @ 06:02) (16 - 16)  SpO2: 97% (20 @ 06:02) (94% - 97%)    GENERAL: NAD, well-developed  HEAD:  Atraumatic, Normocephalic  EYES: EOMI, PERRLA, conjunctiva and sclera clear  ENMT: No tonsillar erythema, exudates, or enlargement; No lesions  NECK: Supple, No JVD, Normal thyroid  NERVOUS SYSTEM:  nonverbal,  CHEST/LUNG: CTA bilaterally  No rales, rhonchi, wheezing, or rubs  HEART: Regular rate and rhythm; No murmurs, rubs, or gallops  ABDOMEN: s/p PEG, Soft, Nontender, Nondistended; Bowel sounds present  EXTREMITIES:  No edema  SKIN: No rashes or lesions      LABS:                        11.2   10.24 )-----------( 298      ( 2020 06:58 )             33.0     -    135  |  98  |  37<H>  ----------------------------<  231<H>  4.0   |  26  |  0.83    Ca    9.1      2020 06:58        Urinalysis Basic - ( 2020 21:34 )    Color: Yellow / Appearance: Clear / S.020 / pH: x  Gluc: x / Ketone: Negative  / Bili: Negative / Urobili: Negative   Blood: x / Protein: 30 mg/dL / Nitrite: Negative   Leuk Esterase: Negative / RBC: 0-2 /HPF / WBC 3-5 /HPF   Sq Epi: x / Non Sq Epi: Occasional /HPF / Bacteria: Trace /HPF      CAPILLARY BLOOD GLUCOSE      POCT Blood Glucose.: 109 mg/dL (2020 11:59)  POCT Blood Glucose.: 253 mg/dL (2020 05:54)  POCT Blood Glucose.: 291 mg/dL (2020 00:00)  POCT Blood Glucose.: 234 mg/dL (2020 17:10)  POCT Blood Glucose.: 168 mg/dL (2020 13:16)        Urinalysis Basic - ( 2020 21:34 )    Color: Yellow / Appearance: Clear / S.020 / pH: x  Gluc: x / Ketone: Negative  / Bili: Negative / Urobili: Negative   Blood: x / Protein: 30 mg/dL / Nitrite: Negative   Leuk Esterase: Negative / RBC: 0-2 /HPF / WBC 3-5 /HPF   Sq Epi: x / Non Sq Epi: Occasional /HPF / Bacteria: Trace /HPF

## 2020-06-12 NOTE — PROGRESS NOTE ADULT - PROBLEM SELECTOR PLAN 5
COVID 19 PCR negative on 5/31.    positive COVID on 6/3, 6/6, 6/9    requires two negative results  Needs 2 negatives per RODNEY requirement.  repeat COVID 6/12 Pink/Normal for race

## 2020-06-12 NOTE — PROGRESS NOTE ADULT - ASSESSMENT
64 year old male from [facility] with PMH of dementia and type 2 DM presented with seizure activity while sitting at AL. Endocrinology was consulted for management of uncontrolled type 2 DM with hyperglycemia.    1. Uncontrolled Type 2 DM with hyperglycemia, A1c 9.9%  -patient tolerating tube feedings via PEG   -BG slightly above goal >200  -Continue Lantus 18 units at bedtime  -agree to increase to Humalog 14 units q 6 hrs (hold if tube feedings are held)  -continue moderate dose rapid acting insulin q 6 hrs   BG 70-100mg/dL 0 units  -150 mg/dL 0 units  -200 mg/dL 2 unit  -250 mg/dL 4 units  -300 mg/dL 6 units  -350 mg/dL 8 units  -400 mg/dL 10 units  BG > 400mg/dL 12 units  -FS AC HS  -ensure consistent carbohydrate   -will monitor FS and adjust accordingly     2. Delirium  -continue Seroquel, Zyprexa, Remeron    3. Fever  -pending CT angio chest and LE dopplers    Plan discussed with primary team]  Please  call  w/ any questions or concerns [phone]

## 2020-06-12 NOTE — PROGRESS NOTE ADULT - PROBLEM SELECTOR PLAN 1
PEG repositioned by GI.   F/U panculture.   CXR shows opacification LLL - ? Effusion, Atelectasis, PNA. Similar to prior CXR.   Tylenol PRN  Monitor labs  ID Consult. PEG repositioned by GI.   F/U panculture.   CXR shows opacification LLL - ? Effusion, Atelectasis, PNA. Similar to prior CXR.   CT angio chest pending.   US duplex pending.   Tylenol PRN  Monitor labs  ID Consult.

## 2020-06-12 NOTE — PROGRESS NOTE ADULT - SUBJECTIVE AND OBJECTIVE BOX
[   ] ICU                                          [   ] CCU                                      [  x ] Medical Floor      Patient is comfortable. No new complaints reported, No abdominal pain, N/V, hematemesis, hematochezia, melena, fever, chills, chest pain, SOB, cough or diarrhea reported.    VITALS  Vital Signs Last 24 Hrs  T(C): 37.6 (12 Jun 2020 10:38), Max: 38.6 (11 Jun 2020 16:35)  T(F): 99.6 (12 Jun 2020 10:38), Max: 101.4 (11 Jun 2020 16:35)  HR: 94 (12 Jun 2020 06:02) (70 - 94)  BP: 127/64 (12 Jun 2020 06:02) (107/63 - 127/64)   RR: 16 (12 Jun 2020 06:02) (16 - 16)  SpO2: 97% (12 Jun 2020 06:02) (94% - 97%)       MEDICATIONS  (STANDING):  ascorbic acid 500 milliGRAM(s) Oral two times a day  chlorhexidine 2% Cloths 1 Application(s) Topical daily  collagenase Ointment 1 Application(s) Topical daily  dextrose 5% + sodium chloride 0.9%. 1000 milliLiter(s) (50 mL/Hr) IV Continuous <Continuous>  dextrose 5% + sodium chloride 0.9%. 1000 milliLiter(s) (50 mL/Hr) IV Continuous <Continuous>  dextrose 5%. 1000 milliLiter(s) (50 mL/Hr) IV Continuous <Continuous>  dextrose 50% Injectable 12.5 Gram(s) IV Push once  dextrose 50% Injectable 25 Gram(s) IV Push once  dextrose 50% Injectable 25 Gram(s) IV Push once  ergocalciferol 46194 Unit(s) Oral <User Schedule>  heparin   Injectable 5000 Unit(s) SubCutaneous every 12 hours  insulin glargine Injectable (LANTUS) 18 Unit(s) SubCutaneous at bedtime  insulin lispro (HumaLOG) corrective regimen sliding scale   SubCutaneous every 6 hours  insulin lispro Injectable (HumaLOG) 14 Unit(s) SubCutaneous every 6 hours  mirtazapine 7.5 milliGRAM(s) Oral at bedtime  multivitamin/minerals 1 Tablet(s) Oral daily  OLANZapine 2.5 milliGRAM(s) Oral at bedtime  pantoprazole   Suspension 40 milliGRAM(s) Enteral Tube daily  QUEtiapine 25 milliGRAM(s) Oral at bedtime  zinc sulfate 220 milliGRAM(s) Oral daily    MEDICATIONS  (PRN):  acetaminophen    Suspension .. 650 milliGRAM(s) Enteral Tube every 6 hours PRN Temp greater or equal to 38C (100.4F)  ALBUTerol    90 MICROgram(s) HFA Inhaler 2 Puff(s) Inhalation every 6 hours PRN Shortness of Breath and/or Wheezing  dextrose 40% Gel 15 Gram(s) Oral once PRN Blood Glucose LESS THAN 70 milliGRAM(s)/deciliter  glucagon  Injectable 1 milliGRAM(s) IntraMuscular once PRN Glucose LESS THAN 70 milligrams/deciliter  guaiFENesin   Syrup  (Sugar-Free) 100 milliGRAM(s) Oral every 6 hours PRN Cough                            11.2   10.24 )-----------( 298      ( 12 Jun 2020 06:58 )             33.0       06-12    135  |  98  |  37<H>  ----------------------------<  231<H>  4.0   |  26  |  0.83    Ca    9.1      12 Jun 2020 06:58

## 2020-06-12 NOTE — PROGRESS NOTE ADULT - ASSESSMENT
1. Gastritis  2. Esophagitis  3. Failure to thrive  4. S/p EGD with Peg tube placement  5. Patient tolerating Peg feeding       Suggestions:    1. Keep peg site clean    2. Continue Peg tube feeding  3. Aspiration precaution  4. Protonix daily   5. Avoid NSAID  6. DVT prophylaxis

## 2020-06-12 NOTE — PROGRESS NOTE ADULT - ASSESSMENT
64 yr old male with PMHX of DM,dementia sent to ER from facility for seizure and now COVID+,Delirium,MAXIME,hypernatremia and s/p aspiration pneumonia.  1.GI f/u,s/p PEG ,tolerating TF.  2.DM-Insulin, Endo f/u.  3.Delirium-cont psych meds.  4.Fever-pan cx, ID re-eval, CTA-R/O PE.  5.GI and DVT prophylaxis.

## 2020-06-13 LAB
ANION GAP SERPL CALC-SCNC: 9 MMOL/L — SIGNIFICANT CHANGE UP (ref 5–17)
BUN SERPL-MCNC: 35 MG/DL — HIGH (ref 7–18)
CALCIUM SERPL-MCNC: 9.2 MG/DL — SIGNIFICANT CHANGE UP (ref 8.4–10.5)
CHLORIDE SERPL-SCNC: 98 MMOL/L — SIGNIFICANT CHANGE UP (ref 96–108)
CO2 SERPL-SCNC: 28 MMOL/L — SIGNIFICANT CHANGE UP (ref 22–31)
CREAT SERPL-MCNC: 0.78 MG/DL — SIGNIFICANT CHANGE UP (ref 0.5–1.3)
GLUCOSE BLDC GLUCOMTR-MCNC: 135 MG/DL — HIGH (ref 70–99)
GLUCOSE BLDC GLUCOMTR-MCNC: 153 MG/DL — HIGH (ref 70–99)
GLUCOSE BLDC GLUCOMTR-MCNC: 169 MG/DL — HIGH (ref 70–99)
GLUCOSE BLDC GLUCOMTR-MCNC: 176 MG/DL — HIGH (ref 70–99)
GLUCOSE BLDC GLUCOMTR-MCNC: 208 MG/DL — HIGH (ref 70–99)
GLUCOSE SERPL-MCNC: 197 MG/DL — HIGH (ref 70–99)
HCT VFR BLD CALC: 30.9 % — LOW (ref 39–50)
HGB BLD-MCNC: 10.3 G/DL — LOW (ref 13–17)
MCHC RBC-ENTMCNC: 28.1 PG — SIGNIFICANT CHANGE UP (ref 27–34)
MCHC RBC-ENTMCNC: 33.3 GM/DL — SIGNIFICANT CHANGE UP (ref 32–36)
MCV RBC AUTO: 84.4 FL — SIGNIFICANT CHANGE UP (ref 80–100)
NRBC # BLD: 0 /100 WBCS — SIGNIFICANT CHANGE UP (ref 0–0)
PLATELET # BLD AUTO: 372 K/UL — SIGNIFICANT CHANGE UP (ref 150–400)
POTASSIUM SERPL-MCNC: 4 MMOL/L — SIGNIFICANT CHANGE UP (ref 3.5–5.3)
POTASSIUM SERPL-SCNC: 4 MMOL/L — SIGNIFICANT CHANGE UP (ref 3.5–5.3)
RBC # BLD: 3.66 M/UL — LOW (ref 4.2–5.8)
RBC # FLD: 12.7 % — SIGNIFICANT CHANGE UP (ref 10.3–14.5)
SODIUM SERPL-SCNC: 135 MMOL/L — SIGNIFICANT CHANGE UP (ref 135–145)
WBC # BLD: 8.56 K/UL — SIGNIFICANT CHANGE UP (ref 3.8–10.5)
WBC # FLD AUTO: 8.56 K/UL — SIGNIFICANT CHANGE UP (ref 3.8–10.5)

## 2020-06-13 RX ORDER — INSULIN LISPRO 100/ML
7 VIAL (ML) SUBCUTANEOUS ONCE
Refills: 0 | Status: COMPLETED | OUTPATIENT
Start: 2020-06-13 | End: 2020-06-13

## 2020-06-13 RX ORDER — PIPERACILLIN AND TAZOBACTAM 4; .5 G/20ML; G/20ML
3.38 INJECTION, POWDER, LYOPHILIZED, FOR SOLUTION INTRAVENOUS ONCE
Refills: 0 | Status: COMPLETED | OUTPATIENT
Start: 2020-06-13 | End: 2020-06-13

## 2020-06-13 RX ORDER — CHOLECALCIFEROL (VITAMIN D3) 125 MCG
1000 CAPSULE ORAL DAILY
Refills: 0 | Status: DISCONTINUED | OUTPATIENT
Start: 2020-06-13 | End: 2020-06-19

## 2020-06-13 RX ORDER — PIPERACILLIN AND TAZOBACTAM 4; .5 G/20ML; G/20ML
3.38 INJECTION, POWDER, LYOPHILIZED, FOR SOLUTION INTRAVENOUS EVERY 8 HOURS
Refills: 0 | Status: DISCONTINUED | OUTPATIENT
Start: 2020-06-13 | End: 2020-06-19

## 2020-06-13 RX ORDER — BACITRACIN ZINC 500 UNIT/G
1 OINTMENT IN PACKET (EA) TOPICAL DAILY
Refills: 0 | Status: COMPLETED | OUTPATIENT
Start: 2020-06-13 | End: 2020-06-18

## 2020-06-13 RX ADMIN — INSULIN GLARGINE 18 UNIT(S): 100 INJECTION, SOLUTION SUBCUTANEOUS at 21:10

## 2020-06-13 RX ADMIN — PIPERACILLIN AND TAZOBACTAM 25 GRAM(S): 4; .5 INJECTION, POWDER, LYOPHILIZED, FOR SOLUTION INTRAVENOUS at 15:00

## 2020-06-13 RX ADMIN — PANTOPRAZOLE SODIUM 40 MILLIGRAM(S): 20 TABLET, DELAYED RELEASE ORAL at 11:54

## 2020-06-13 RX ADMIN — Medication 1 TABLET(S): at 11:54

## 2020-06-13 RX ADMIN — ZINC SULFATE TAB 220 MG (50 MG ZINC EQUIVALENT) 220 MILLIGRAM(S): 220 (50 ZN) TAB at 11:54

## 2020-06-13 RX ADMIN — Medication 14 UNIT(S): at 11:55

## 2020-06-13 RX ADMIN — HEPARIN SODIUM 5000 UNIT(S): 5000 INJECTION INTRAVENOUS; SUBCUTANEOUS at 17:35

## 2020-06-13 RX ADMIN — Medication 7 UNIT(S): at 17:46

## 2020-06-13 RX ADMIN — Medication 500 MILLIGRAM(S): at 17:38

## 2020-06-13 RX ADMIN — PIPERACILLIN AND TAZOBACTAM 200 GRAM(S): 4; .5 INJECTION, POWDER, LYOPHILIZED, FOR SOLUTION INTRAVENOUS at 11:55

## 2020-06-13 RX ADMIN — Medication 14 UNIT(S): at 23:56

## 2020-06-13 RX ADMIN — Medication 500 MILLIGRAM(S): at 05:18

## 2020-06-13 RX ADMIN — Medication 1 APPLICATION(S): at 17:45

## 2020-06-13 RX ADMIN — QUETIAPINE FUMARATE 25 MILLIGRAM(S): 200 TABLET, FILM COATED ORAL at 21:10

## 2020-06-13 RX ADMIN — Medication 2: at 11:55

## 2020-06-13 RX ADMIN — HEPARIN SODIUM 5000 UNIT(S): 5000 INJECTION INTRAVENOUS; SUBCUTANEOUS at 05:18

## 2020-06-13 RX ADMIN — Medication 1 APPLICATION(S): at 11:54

## 2020-06-13 RX ADMIN — PIPERACILLIN AND TAZOBACTAM 25 GRAM(S): 4; .5 INJECTION, POWDER, LYOPHILIZED, FOR SOLUTION INTRAVENOUS at 21:12

## 2020-06-13 RX ADMIN — OLANZAPINE 2.5 MILLIGRAM(S): 15 TABLET, FILM COATED ORAL at 21:10

## 2020-06-13 RX ADMIN — Medication 2: at 23:55

## 2020-06-13 RX ADMIN — MIRTAZAPINE 7.5 MILLIGRAM(S): 45 TABLET, ORALLY DISINTEGRATING ORAL at 21:10

## 2020-06-13 RX ADMIN — CHLORHEXIDINE GLUCONATE 1 APPLICATION(S): 213 SOLUTION TOPICAL at 11:54

## 2020-06-13 RX ADMIN — Medication 14 UNIT(S): at 05:57

## 2020-06-13 RX ADMIN — Medication 4: at 05:58

## 2020-06-13 NOTE — PROGRESS NOTE ADULT - PROBLEM SELECTOR PLAN 1
PEG repositioned by GI.   F/U panculture.   CXR shows opacification LLL - ? Effusion, Atelectasis, PNA. Similar to prior CXR.   CT angio chest pending.   US duplex pending.   Tylenol PRN  Monitor labs  ID Consult. PEG repositioned by GI.   F/U panculture.   CXR shows opacification LLL - ? Effusion, Atelectasis, PNA. Similar to prior CXR.   CT angio chest pending.   US duplex pending.   Tylenol PRN  Monitor labs  ID follow up

## 2020-06-13 NOTE — CHART NOTE - NSCHARTNOTEFT_GEN_A_CORE
EVENT: Notified by RN of redness around PEG site .    Patient is a 64y old  Male who presents with a chief complaint of seizures (13 Jun 2020 11:43)    HPI  64 year old male from [facility]  with PMH dementia and DM coming in with seizures like activity while sitting at AL.  Pt had PEG  placement on 06/02/2020 for FTT 2/2 to dysphagia and poor oral intake related to changes in mental status. Now with redness at PEG site noted 1 cm around insertion site ; light yellowish/whitish  superficial tissue at inserton site. No tube feeding leakage noted. Nontender.         MEDICATIONS  (STANDING):  ascorbic acid 500 milliGRAM(s) Oral two times a day  BACItracin   Ointment 1 Application(s) Topical daily  chlorhexidine 2% Cloths 1 Application(s) Topical daily  cholecalciferol 1000 Unit(s) Oral daily  collagenase Ointment 1 Application(s) Topical daily  dextrose 5% + sodium chloride 0.9%. 1000 milliLiter(s) (50 mL/Hr) IV Continuous <Continuous>  dextrose 5% + sodium chloride 0.9%. 1000 milliLiter(s) (50 mL/Hr) IV Continuous <Continuous>  dextrose 5%. 1000 milliLiter(s) (50 mL/Hr) IV Continuous <Continuous>  dextrose 50% Injectable 12.5 Gram(s) IV Push once  dextrose 50% Injectable 25 Gram(s) IV Push once  dextrose 50% Injectable 25 Gram(s) IV Push once  heparin   Injectable 5000 Unit(s) SubCutaneous every 12 hours  insulin glargine Injectable (LANTUS) 18 Unit(s) SubCutaneous at bedtime  insulin lispro (HumaLOG) corrective regimen sliding scale   SubCutaneous every 6 hours  insulin lispro Injectable (HumaLOG) 14 Unit(s) SubCutaneous every 6 hours  mirtazapine 7.5 milliGRAM(s) Oral at bedtime  multivitamin/minerals 1 Tablet(s) Oral daily  OLANZapine 2.5 milliGRAM(s) Oral at bedtime  pantoprazole   Suspension 40 milliGRAM(s) Enteral Tube daily  piperacillin/tazobactam IVPB.. 3.375 Gram(s) IV Intermittent every 8 hours  QUEtiapine 25 milliGRAM(s) Oral at bedtime  zinc sulfate 220 milliGRAM(s) Oral daily    MEDICATIONS  (PRN):  acetaminophen    Suspension .. 650 milliGRAM(s) Enteral Tube every 6 hours PRN Temp greater or equal to 38C (100.4F)  ALBUTerol    90 MICROgram(s) HFA Inhaler 2 Puff(s) Inhalation every 6 hours PRN Shortness of Breath and/or Wheezing  dextrose 40% Gel 15 Gram(s) Oral once PRN Blood Glucose LESS THAN 70 milliGRAM(s)/deciliter  glucagon  Injectable 1 milliGRAM(s) IntraMuscular once PRN Glucose LESS THAN 70 milligrams/deciliter  guaiFENesin   Syrup  (Sugar-Free) 100 milliGRAM(s) Oral every 6 hours PRN Cough          Vital Signs Last 24 Hrs  T(C): 36.6 (13 Jun 2020 06:10), Max: 37.3 (12 Jun 2020 20:29)  T(F): 97.9 (13 Jun 2020 06:10), Max: 99.1 (12 Jun 2020 20:29)  HR: 94 (13 Jun 2020 06:10) (72 - 94)  BP: 120/70 (13 Jun 2020 06:10) (100/68 - 121/75)  BP(mean): --  RR: 14 (13 Jun 2020 06:10) (14 - 16)  SpO2: 98% (13 Jun 2020 06:10) (96% - 98%)    ________________________________________________  PHYSICAL EXAM:  GENERAL: NAD  HEENT: Normocephalic;  conjunctivae and sclerae clear; moist mucous membranes;   NECK : supple  CHEST/LUNG: Clear to auscultation bilaterally with good air entry   HEART: S1 S2  regular; no murmurs, gallops or rubs  ABDOMEN: Soft, Nontender, Nondistended; Bowel sounds present  EXTREMITIES: no cyanosis; no edema; no calf tenderness  SKIN: warm and dry; no rash  NERVOUS SYSTEM:  Awake and alert; Oriented  to place, person and time ; no new deficits    _________________________________________________  LABS:                        10.3   8.56  )-----------( 372      ( 13 Jun 2020 06:38 )             30.9     06-13    135  |  98  |  35<H>  ----------------------------<  197<H>  4.0   |  28  |  0.78    Ca    9.2      13 Jun 2020 06:38          CAPILLARY BLOOD GLUCOSE      POCT Blood Glucose.: 153 mg/dL (13 Jun 2020 11:40)  POCT Blood Glucose.: 208 mg/dL (13 Jun 2020 05:32)  POCT Blood Glucose.: 242 mg/dL (12 Jun 2020 23:47)  POCT Blood Glucose.: 197 mg/dL (12 Jun 2020 22:05)  POCT Blood Glucose.: 135 mg/dL (12 Jun 2020 17:13)      --------------  ASSESSMENT/PLAN    Inflammed PEG site concerning for infection/cellulitis    -Bacitracin oint to site daily .  -c/w Zosyn  -f/u with NEDA Valencia  -f/u with attending.  -D/w pt and RN    Garima Stallings, NP Medicine EVENT: Notified by RN of redness around PEG site .    Patient is a 64y old  Male who presents with a chief complaint of seizures (13 Jun 2020 11:43)    HPI  64 year old male from [facility]  with PMH dementia and DM coming in with seizures like activity while sitting at AL.  Pt had PEG  placement on 06/02/2020 for FTT 2/2 to dysphagia and poor oral intake related to changes in mental status. Now with redness at PEG site noted 1 cm around insertion site ; light yellowish/whitish  superficial tissue at inserton site. No tube feeding leakage noted. Nontender.         MEDICATIONS  (STANDING):  ascorbic acid 500 milliGRAM(s) Oral two times a day  BACItracin   Ointment 1 Application(s) Topical daily  chlorhexidine 2% Cloths 1 Application(s) Topical daily  cholecalciferol 1000 Unit(s) Oral daily  collagenase Ointment 1 Application(s) Topical daily  dextrose 5% + sodium chloride 0.9%. 1000 milliLiter(s) (50 mL/Hr) IV Continuous <Continuous>  dextrose 5% + sodium chloride 0.9%. 1000 milliLiter(s) (50 mL/Hr) IV Continuous <Continuous>  dextrose 5%. 1000 milliLiter(s) (50 mL/Hr) IV Continuous <Continuous>  dextrose 50% Injectable 12.5 Gram(s) IV Push once  dextrose 50% Injectable 25 Gram(s) IV Push once  dextrose 50% Injectable 25 Gram(s) IV Push once  heparin   Injectable 5000 Unit(s) SubCutaneous every 12 hours  insulin glargine Injectable (LANTUS) 18 Unit(s) SubCutaneous at bedtime  insulin lispro (HumaLOG) corrective regimen sliding scale   SubCutaneous every 6 hours  insulin lispro Injectable (HumaLOG) 14 Unit(s) SubCutaneous every 6 hours  mirtazapine 7.5 milliGRAM(s) Oral at bedtime  multivitamin/minerals 1 Tablet(s) Oral daily  OLANZapine 2.5 milliGRAM(s) Oral at bedtime  pantoprazole   Suspension 40 milliGRAM(s) Enteral Tube daily  piperacillin/tazobactam IVPB.. 3.375 Gram(s) IV Intermittent every 8 hours  QUEtiapine 25 milliGRAM(s) Oral at bedtime  zinc sulfate 220 milliGRAM(s) Oral daily    MEDICATIONS  (PRN):  acetaminophen    Suspension .. 650 milliGRAM(s) Enteral Tube every 6 hours PRN Temp greater or equal to 38C (100.4F)  ALBUTerol    90 MICROgram(s) HFA Inhaler 2 Puff(s) Inhalation every 6 hours PRN Shortness of Breath and/or Wheezing  dextrose 40% Gel 15 Gram(s) Oral once PRN Blood Glucose LESS THAN 70 milliGRAM(s)/deciliter  glucagon  Injectable 1 milliGRAM(s) IntraMuscular once PRN Glucose LESS THAN 70 milligrams/deciliter  guaiFENesin   Syrup  (Sugar-Free) 100 milliGRAM(s) Oral every 6 hours PRN Cough          Vital Signs Last 24 Hrs  T(C): 36.6 (13 Jun 2020 06:10), Max: 37.3 (12 Jun 2020 20:29)  T(F): 97.9 (13 Jun 2020 06:10), Max: 99.1 (12 Jun 2020 20:29)  HR: 94 (13 Jun 2020 06:10) (72 - 94)  BP: 120/70 (13 Jun 2020 06:10) (100/68 - 121/75)  BP(mean): --  RR: 14 (13 Jun 2020 06:10) (14 - 16)  SpO2: 98% (13 Jun 2020 06:10) (96% - 98%)    ________________________________________________  PHYSICAL EXAM:  GENERAL: NAD  HEENT: Normocephalic;  conjunctivae and sclerae clear; moist mucous membranes;   NECK : supple  CHEST/LUNG: Clear to auscultation bilaterally with good air entry   HEART: S1 S2  regular; no murmurs, gallops or rubs  ABDOMEN: Soft, Nontender, Nondistended; Bowel sounds present  EXTREMITIES: no cyanosis; no edema; no calf tenderness  SKIN: warm and dry; no rash  NERVOUS SYSTEM:  Awake and alert; Oriented  to  person  ; no new deficits    _________________________________________________  LABS:                        10.3   8.56  )-----------( 372      ( 13 Jun 2020 06:38 )             30.9     06-13    135  |  98  |  35<H>  ----------------------------<  197<H>  4.0   |  28  |  0.78    Ca    9.2      13 Jun 2020 06:38          CAPILLARY BLOOD GLUCOSE      POCT Blood Glucose.: 153 mg/dL (13 Jun 2020 11:40)  POCT Blood Glucose.: 208 mg/dL (13 Jun 2020 05:32)  POCT Blood Glucose.: 242 mg/dL (12 Jun 2020 23:47)  POCT Blood Glucose.: 197 mg/dL (12 Jun 2020 22:05)  POCT Blood Glucose.: 135 mg/dL (12 Jun 2020 17:13)      --------------  ASSESSMENT/PLAN    Inflammed PEG site concerning for infection/cellulitis    -Bacitracin oint to site daily .  -c/w Zosyn  -f/u with GI Dr. Valencia  -f/u with attending.  -D/w pt and RN    Garima Stallings, NP Medicine

## 2020-06-13 NOTE — PROGRESS NOTE ADULT - SUBJECTIVE AND OBJECTIVE BOX
[   ] ICU                                          [   ] CCU                                      [ X  ] Medical Floor      Patient is comfortable. No new complaints reported, No abdominal pain, N/V, hematemesis, hematochezia, melena, fever, chills, chest pain, SOB, cough or diarrhea reported.    VITALS  Vital Signs Last 24 Hrs  T(C): 36.6 (13 Jun 2020 06:10), Max: 37.6 (12 Jun 2020 10:38)  T(F): 97.9 (13 Jun 2020 06:10), Max: 99.6 (12 Jun 2020 10:38)  HR: 94 (13 Jun 2020 06:10) (72 - 100)  BP: 120/70 (13 Jun 2020 06:10) (100/68 - 121/75)  BP(mean): 80 (12 Jun 2020 14:00) (80 - 80)  RR: 14 (13 Jun 2020 06:10) (14 - 20)  SpO2: 98% (13 Jun 2020 06:10) (96% - 99%)         MEDICATIONS  (STANDING):  ascorbic acid 500 milliGRAM(s) Oral two times a day  chlorhexidine 2% Cloths 1 Application(s) Topical daily  collagenase Ointment 1 Application(s) Topical daily  dextrose 5% + sodium chloride 0.9%. 1000 milliLiter(s) (50 mL/Hr) IV Continuous <Continuous>  dextrose 5% + sodium chloride 0.9%. 1000 milliLiter(s) (50 mL/Hr) IV Continuous <Continuous>  dextrose 5%. 1000 milliLiter(s) (50 mL/Hr) IV Continuous <Continuous>  dextrose 50% Injectable 12.5 Gram(s) IV Push once  dextrose 50% Injectable 25 Gram(s) IV Push once  dextrose 50% Injectable 25 Gram(s) IV Push once  ergocalciferol 82940 Unit(s) Oral <User Schedule>  heparin   Injectable 5000 Unit(s) SubCutaneous every 12 hours  insulin glargine Injectable (LANTUS) 18 Unit(s) SubCutaneous at bedtime  insulin lispro (HumaLOG) corrective regimen sliding scale   SubCutaneous every 6 hours  insulin lispro Injectable (HumaLOG) 14 Unit(s) SubCutaneous every 6 hours  mirtazapine 7.5 milliGRAM(s) Oral at bedtime  multivitamin/minerals 1 Tablet(s) Oral daily  OLANZapine 2.5 milliGRAM(s) Oral at bedtime  pantoprazole   Suspension 40 milliGRAM(s) Enteral Tube daily  QUEtiapine 25 milliGRAM(s) Oral at bedtime  zinc sulfate 220 milliGRAM(s) Oral daily    MEDICATIONS  (PRN):  acetaminophen    Suspension .. 650 milliGRAM(s) Enteral Tube every 6 hours PRN Temp greater or equal to 38C (100.4F)  ALBUTerol    90 MICROgram(s) HFA Inhaler 2 Puff(s) Inhalation every 6 hours PRN Shortness of Breath and/or Wheezing  dextrose 40% Gel 15 Gram(s) Oral once PRN Blood Glucose LESS THAN 70 milliGRAM(s)/deciliter  glucagon  Injectable 1 milliGRAM(s) IntraMuscular once PRN Glucose LESS THAN 70 milligrams/deciliter  guaiFENesin   Syrup  (Sugar-Free) 100 milliGRAM(s) Oral every 6 hours PRN Cough                            10.3   8.56  )-----------( 372      ( 13 Jun 2020 06:38 )             30.9       06-13    135  |  98  |  35<H>  ----------------------------<  197<H>  4.0   |  28  |  0.78    Ca    9.2      13 Jun 2020 06:38

## 2020-06-13 NOTE — PROGRESS NOTE ADULT - SUBJECTIVE AND OBJECTIVE BOX
64y Male    Meds:  piperacillin/tazobactam IVPB.. 3.375 Gram(s) IV Intermittent every 8 hours    Allergies    No Known Allergies    Intolerances        VITALS:  Vital Signs Last 24 Hrs  T(C): 36.6 (13 Jun 2020 14:57), Max: 37.3 (12 Jun 2020 20:29)  T(F): 97.8 (13 Jun 2020 14:57), Max: 99.1 (12 Jun 2020 20:29)  HR: 95 (13 Jun 2020 14:57) (72 - 95)  BP: 160/76 (13 Jun 2020 14:57) (100/68 - 160/76)  BP(mean): --  RR: 16 (13 Jun 2020 14:57) (14 - 16)  SpO2: 100% (13 Jun 2020 14:57) (97% - 100%)    LABS/DIAGNOSTIC TESTS:                          10.3   8.56  )-----------( 372      ( 13 Jun 2020 06:38 )             30.9         06-13    135  |  98  |  35<H>  ----------------------------<  197<H>  4.0   |  28  |  0.78    Ca    9.2      13 Jun 2020 06:38            CULTURES: .Blood Blood  06-12 @ 01:02   No growth to date.  --  --      .Urine Clean Catch (Midstream)  05-14 @ 09:59   >=3 organisms. Probable collection contamination.  --  --            RADIOLOGY:< from: US Duplex Venous Lower Ext Complete, Bilateral (06.12.20 @ 15:48) >  EXAM:  US DPLX LWR EXT VEINS COMPL BI                            PROCEDURE DATE:  06/12/2020          INTERPRETATION:  CLINICAL INFORMATION: Convulsions. Clinical suspicion for deep vein thrombosis.    TECHNIQUE: Duplex sonography of the BILATERAL LOWER extremity veins with color and spectral Doppler, with and without compression.      FINDINGS:  There is normal compressibility of the bilateral common femoral, femoral and popliteal veins.   Doppler examination shows normal spontaneous and phasic flow.    No calf vein thrombosis is detected.    IMPRESSION:   No evidence of deep venous thrombosis in either lower extremity.        KENDRA HAINES M.D., ATTENDING RADIOLOGIST  This document has been electronically signed. Jun 12 2020  3:56PM      -----------------------------------------------------------------------------------------------------------------------------------------    < from: CT Angio Chest w/ IV Cont (06.12.20 @ 15:14) >  EXAM:  CT ANGIO CHEST (W)AW IC                            PROCEDURE DATE:  06/12/2020          INTERPRETATION:  CLINICAL INFORMATION: Evaluate for pulmonary embolism    COMPARISON: None.    PROCEDURE:   CT Angiography of the Chest.  90 ml of Omnipaque 350 was injected intravenously. 10 ml were discarded.  Sagittal and coronal reformats were performed as well as 3D (MIP) reconstructions.    FINDINGS:    LUNGS AND AIRWAYS: Patent central airways.  Segmental consolidation left lower lobe with additional interstitial opacities consistent with pneumonia  PLEURA: No pleural effusion.  MEDIASTINUM AND LYUBOV: No lymphadenopathy.  VESSELS: Satisfactory contrast bolus. No pulmonary emboli. Nonaneurysmal thoracic aorta.  HEART: Heart size is normal. Nopericardial effusion.  CHEST WALL AND LOWER NECK: Bilateral gynecomastia  VISUALIZED UPPER ABDOMEN: Within normal limits.  BONES: Degenerative change thoracic spine    IMPRESSION:   Negative for pulmonary emboli.  Left lower lobe pneumonia                  EDD SUMMERS M.D., ATTENDING RADIOLOGIST  This document has been electronically signed. Jun 12 2020  3:35PM                < end of copied text >          ROS:  [  ] UNABLE TO ELICIT 64y Male whom I was asked to re- evaluate as he had a temp of 101.4 on 6/11/20, he is awake and looks comfortable , he is confused and so unable to give any history but is talking. He had a CT chest which showed a left lower lobe infiltrate but is old from several previous CXR's. He has a persistently positive COVID test hence will order a COVID antibody test.    Meds:  piperacillin/tazobactam IVPB.. 3.375 Gram(s) IV Intermittent every 8 hours    Allergies    No Known Allergies    Intolerances        VITALS:  Vital Signs Last 24 Hrs  T(C): 36.6 (13 Jun 2020 14:57), Max: 37.3 (12 Jun 2020 20:29)  T(F): 97.8 (13 Jun 2020 14:57), Max: 99.1 (12 Jun 2020 20:29)  HR: 95 (13 Jun 2020 14:57) (72 - 95)  BP: 160/76 (13 Jun 2020 14:57) (100/68 - 160/76)  BP(mean): --  RR: 16 (13 Jun 2020 14:57) (14 - 16)  SpO2: 100% (13 Jun 2020 14:57) (97% - 100%)    LABS/DIAGNOSTIC TESTS:                          10.3   8.56  )-----------( 372      ( 13 Jun 2020 06:38 )             30.9         06-13    135  |  98  |  35<H>  ----------------------------<  197<H>  4.0   |  28  |  0.78    Ca    9.2      13 Jun 2020 06:38            CULTURES: .Blood Blood  06-12 @ 01:02   No growth to date.  --  --      .Urine Clean Catch (Midstream)  05-14 @ 09:59   >=3 organisms. Probable collection contamination.  --  --            RADIOLOGY:< from: US Duplex Venous Lower Ext Complete, Bilateral (06.12.20 @ 15:48) >  EXAM:  US DPLX LWR EXT VEINS COMPL BI                            PROCEDURE DATE:  06/12/2020          INTERPRETATION:  CLINICAL INFORMATION: Convulsions. Clinical suspicion for deep vein thrombosis.    TECHNIQUE: Duplex sonography of the BILATERAL LOWER extremity veins with color and spectral Doppler, with and without compression.      FINDINGS:  There is normal compressibility of the bilateral common femoral, femoral and popliteal veins.   Doppler examination shows normal spontaneous and phasic flow.    No calf vein thrombosis is detected.    IMPRESSION:   No evidence of deep venous thrombosis in either lower extremity.        KENDRA HAINES M.D., ATTENDING RADIOLOGIST  This document has been electronically signed. Jun 12 2020  3:56PM      -----------------------------------------------------------------------------------------------------------------------------------------    < from: CT Angio Chest w/ IV Cont (06.12.20 @ 15:14) >  EXAM:  CT ANGIO CHEST (W)AW IC                            PROCEDURE DATE:  06/12/2020          INTERPRETATION:  CLINICAL INFORMATION: Evaluate for pulmonary embolism    COMPARISON: None.    PROCEDURE:   CT Angiography of the Chest.  90 ml of Omnipaque 350 was injected intravenously. 10 ml were discarded.  Sagittal and coronal reformats were performed as well as 3D (MIP) reconstructions.    FINDINGS:    LUNGS AND AIRWAYS: Patent central airways.  Segmental consolidation left lower lobe with additional interstitial opacities consistent with pneumonia  PLEURA: No pleural effusion.  MEDIASTINUM AND LYUBOV: No lymphadenopathy.  VESSELS: Satisfactory contrast bolus. No pulmonary emboli. Nonaneurysmal thoracic aorta.  HEART: Heart size is normal. Nopericardial effusion.  CHEST WALL AND LOWER NECK: Bilateral gynecomastia  VISUALIZED UPPER ABDOMEN: Within normal limits.  BONES: Degenerative change thoracic spine    IMPRESSION:   Negative for pulmonary emboli.  Left lower lobe pneumonia                  EDD SUMMERS M.D., ATTENDING RADIOLOGIST  This document has been electronically signed. Jun 12 2020  3:35PM                < end of copied text >          ROS:  [ x ] UNABLE TO ELICIT

## 2020-06-13 NOTE — PROGRESS NOTE ADULT - ASSESSMENT
64 yr old male with PMHX of DM,dementia sent to ER from facility for seizure and now COVID+,Delirium,MAXIME,hypernatremia and now LLL aspiration pneumonia.  1.GI f/u,s/p PEG ,tolerating TF.  2.DM-Insulin, Endo f/u.  3.Delirium-cont psych meds.  4.LLL pneumonia- ID re-eval, ABX.  5.GI and DVT prophylaxis.

## 2020-06-13 NOTE — PROGRESS NOTE ADULT - SUBJECTIVE AND OBJECTIVE BOX
Patient is awake, alert, lying in bed in NAD. Afebrile today.     INTERVAL HPI/OVERNIGHT EVENTS:      VITAL SIGNS:  T(F): 97.9 (20 @ 06:10)  HR: 94 (20 @ 06:10)  BP: 120/70 (20 @ 06:10)  RR: 14 (20 @ 06:10)  SpO2: 98% (20 @ 06:10)  Wt(kg): --  I&O's Detail          REVIEW OF SYSTEMS:    CONSTITUTIONAL:  No fevers, chills, sweats    HEENT:  Eyes:  No diplopia or blurred vision. ENT:  No earache, sore throat or runny nose.    CARDIOVASCULAR:  No pressure, squeezing, tightness, or heaviness about the chest; no palpitations.    RESPIRATORY:  Per HPI    GASTROINTESTINAL:  No abdominal pain, nausea, vomiting or diarrhea.    GENITOURINARY:  No dysuria, frequency or urgency.    NEUROLOGIC:  No paresthesias, fasciculations, seizures or weakness.    PSYCHIATRIC:  No disorder of thought or mood.      PHYSICAL EXAM:    Constitutional: Well developed and nourished  Eyes:Perrla  ENMT: normal  Neck:supple  Respiratory: good air entry  Cardiovascular: S1 S2 regular  Gastrointestinal: Soft, Non tender; PEG  Extremities: No edema  Vascular:normal  Neurological:Awake, alert   Musculoskeletal: weak, bedbound.       MEDICATIONS  (STANDING):  ascorbic acid 500 milliGRAM(s) Oral two times a day  chlorhexidine 2% Cloths 1 Application(s) Topical daily  collagenase Ointment 1 Application(s) Topical daily  dextrose 5% + sodium chloride 0.9%. 1000 milliLiter(s) (50 mL/Hr) IV Continuous <Continuous>  dextrose 5% + sodium chloride 0.9%. 1000 milliLiter(s) (50 mL/Hr) IV Continuous <Continuous>  dextrose 5%. 1000 milliLiter(s) (50 mL/Hr) IV Continuous <Continuous>  dextrose 50% Injectable 12.5 Gram(s) IV Push once  dextrose 50% Injectable 25 Gram(s) IV Push once  dextrose 50% Injectable 25 Gram(s) IV Push once  ergocalciferol 65471 Unit(s) Oral <User Schedule>  heparin   Injectable 5000 Unit(s) SubCutaneous every 12 hours  insulin glargine Injectable (LANTUS) 18 Unit(s) SubCutaneous at bedtime  insulin lispro (HumaLOG) corrective regimen sliding scale   SubCutaneous every 6 hours  insulin lispro Injectable (HumaLOG) 14 Unit(s) SubCutaneous every 6 hours  mirtazapine 7.5 milliGRAM(s) Oral at bedtime  multivitamin/minerals 1 Tablet(s) Oral daily  OLANZapine 2.5 milliGRAM(s) Oral at bedtime  pantoprazole   Suspension 40 milliGRAM(s) Enteral Tube daily  piperacillin/tazobactam IVPB. 3.375 Gram(s) IV Intermittent once  piperacillin/tazobactam IVPB.. 3.375 Gram(s) IV Intermittent every 8 hours  QUEtiapine 25 milliGRAM(s) Oral at bedtime  zinc sulfate 220 milliGRAM(s) Oral daily    MEDICATIONS  (PRN):  acetaminophen    Suspension .. 650 milliGRAM(s) Enteral Tube every 6 hours PRN Temp greater or equal to 38C (100.4F)  ALBUTerol    90 MICROgram(s) HFA Inhaler 2 Puff(s) Inhalation every 6 hours PRN Shortness of Breath and/or Wheezing  dextrose 40% Gel 15 Gram(s) Oral once PRN Blood Glucose LESS THAN 70 milliGRAM(s)/deciliter  glucagon  Injectable 1 milliGRAM(s) IntraMuscular once PRN Glucose LESS THAN 70 milligrams/deciliter  guaiFENesin   Syrup  (Sugar-Free) 100 milliGRAM(s) Oral every 6 hours PRN Cough      Allergies    No Known Allergies    Intolerances        LABS:                        10.3   8.56  )-----------( 372      ( 2020 06:38 )             30.9     06-13    135  |  98  |  35<H>  ----------------------------<  197<H>  4.0   |  28  |  0.78    Ca    9.2      2020 06:38        Urinalysis Basic - ( 2020 21:34 )    Color: Yellow / Appearance: Clear / S.020 / pH: x  Gluc: x / Ketone: Negative  / Bili: Negative / Urobili: Negative   Blood: x / Protein: 30 mg/dL / Nitrite: Negative   Leuk Esterase: Negative / RBC: 0-2 /HPF / WBC 3-5 /HPF   Sq Epi: x / Non Sq Epi: Occasional /HPF / Bacteria: Trace /HPF    Culture - Blood (20 @ 01:02)    Specimen Source: .Blood Blood-Arterial    Culture Results:   No growth to date.    Culture - Blood (20 @ 01:02)    Specimen Source: .Blood Blood    Culture Results:   No growth to date.            CAPILLARY BLOOD GLUCOSE      POCT Blood Glucose.: 153 mg/dL (2020 11:40)  POCT Blood Glucose.: 208 mg/dL (2020 05:32)  POCT Blood Glucose.: 242 mg/dL (2020 23:47)  POCT Blood Glucose.: 197 mg/dL (2020 22:05)  POCT Blood Glucose.: 135 mg/dL (2020 17:13)  POCT Blood Glucose.: 109 mg/dL (2020 11:59)        RADIOLOGY & ADDITIONAL TESTS:    CXR:    Ct scan chest:  < from: CT Angio Chest w/ IV Cont (20 @ 15:14) >  IMPRESSION:   Negative for pulmonary emboli.  Left lower lobe pneumonia    < end of copied text >    ekg;    echo:

## 2020-06-13 NOTE — PROGRESS NOTE ADULT - SUBJECTIVE AND OBJECTIVE BOX
CARDIOLOGY/MEDICAl ATTENDING    CHIEF COMPLAINT:Patient is a 64y old  Male who presents with a chief complaint of seizures.Pt appears comfortable.    	  REVIEW OF SYSTEMS:    [ x] Unable to obtain    PHYSICAL EXAM:  T(C): 36.6 (06-13-20 @ 06:10), Max: 37.3 (06-12-20 @ 20:29)  HR: 94 (06-13-20 @ 06:10) (72 - 100)  BP: 120/70 (06-13-20 @ 06:10) (100/68 - 121/75)  RR: 14 (06-13-20 @ 06:10) (14 - 20)  SpO2: 98% (06-13-20 @ 06:10) (96% - 99%)  Wt(kg): --  I&O's Summary      Appearance: Normal	  HEENT:   Normal oral mucosa, PERRL, EOMI	  Lymphatic: No lymphadenopathy  Cardiovascular: Normal S1 S2, No JVD, No murmurs, No edema  Respiratory: Dec BS Lt base  Gastrointestinal:  Soft, Non-tender, + BS	  Skin: No rashes, No ecchymoses, No cyanosis	  Extremities: Normal range of motion, No clubbing, cyanosis or edema  Vascular: Peripheral pulses palpable 2+ bilaterally    MEDICATIONS  (STANDING):  ascorbic acid 500 milliGRAM(s) Oral two times a day  chlorhexidine 2% Cloths 1 Application(s) Topical daily  collagenase Ointment 1 Application(s) Topical daily  dextrose 5% + sodium chloride 0.9%. 1000 milliLiter(s) (50 mL/Hr) IV Continuous <Continuous>  dextrose 5% + sodium chloride 0.9%. 1000 milliLiter(s) (50 mL/Hr) IV Continuous <Continuous>  dextrose 5%. 1000 milliLiter(s) (50 mL/Hr) IV Continuous <Continuous>  dextrose 50% Injectable 12.5 Gram(s) IV Push once  dextrose 50% Injectable 25 Gram(s) IV Push once  dextrose 50% Injectable 25 Gram(s) IV Push once  ergocalciferol 47130 Unit(s) Oral <User Schedule>  heparin   Injectable 5000 Unit(s) SubCutaneous every 12 hours  insulin glargine Injectable (LANTUS) 18 Unit(s) SubCutaneous at bedtime  insulin lispro (HumaLOG) corrective regimen sliding scale   SubCutaneous every 6 hours  insulin lispro Injectable (HumaLOG) 14 Unit(s) SubCutaneous every 6 hours  mirtazapine 7.5 milliGRAM(s) Oral at bedtime  multivitamin/minerals 1 Tablet(s) Oral daily  OLANZapine 2.5 milliGRAM(s) Oral at bedtime  pantoprazole   Suspension 40 milliGRAM(s) Enteral Tube daily  piperacillin/tazobactam IVPB. 3.375 Gram(s) IV Intermittent once  piperacillin/tazobactam IVPB.. 3.375 Gram(s) IV Intermittent every 8 hours  QUEtiapine 25 milliGRAM(s) Oral at bedtime  zinc sulfate 220 milliGRAM(s) Oral daily      	  	  LABS:	 	                      10.3   8.56  )-----------( 372      ( 13 Jun 2020 06:38 )             30.9     06-13    135  |  98  |  35<H>  ----------------------------<  197<H>  4.0   |  28  |  0.78    Ca    9.2      13 Jun 2020 06:38      COVID-19 PCR: Detected: This test has been validated by [employer] to be accurate;  though it has not been FDA cleared/approved by the usual pathway.  As with all laboratory tests, results should be correlated with clinical  findings.  https://www.fda.gov/media/522146/download  https://www.fda.gov/media/869455/download (06.12.20 @ 14:35)    	    EXAM:  CT ANGIO CHEST (W)AW IC                            PROCEDURE DATE:  06/12/2020          INTERPRETATION:  CLINICAL INFORMATION: Evaluate for pulmonary embolism    COMPARISON: None.    PROCEDURE:   CT Angiography of the Chest.  90 ml of Omnipaque 350 was injected intravenously. 10 ml were discarded.  Sagittal and coronal reformats were performed as well as 3D (MIP) reconstructions.    FINDINGS:    LUNGS AND AIRWAYS: Patent central airways.  Segmental consolidation left lower lobe with additional interstitial opacities consistent with pneumonia  PLEURA: No pleural effusion.  MEDIASTINUM AND LYUBOV: No lymphadenopathy.  VESSELS: Satisfactory contrast bolus. No pulmonary emboli. Nonaneurysmal thoracic aorta.  HEART: Heart size is normal. Nopericardial effusion.  CHEST WALL AND LOWER NECK: Bilateral gynecomastia  VISUALIZED UPPER ABDOMEN: Within normal limits.  BONES: Degenerative change thoracic spine    IMPRESSION:   Negative for pulmonary emboli.  Left lower lobe pneumonia              Doppler-no DVT.

## 2020-06-13 NOTE — PROGRESS NOTE ADULT - ASSESSMENT
Fevers - appears to be secondary to peg site infection/abdominal wall cellulitis  Pneumonia - unlikely given the infiltrate has not changed from previous CXR's  COVID - 19 swab - persistently positive - likely dead RNA    Plan - Cont Zosyn 3.375gms iv q8hrs for now  will get COVID antibody test to see if immune.

## 2020-06-13 NOTE — PROGRESS NOTE ADULT - PROBLEM SELECTOR PLAN 2
CT shows LLL PNA on 6/12.   Abx.   Oxygen Supp PRN  Monitor Oxygen sat  CXR noted 6/11.   ID Consult. CT shows LLL PNA on 6/12.   Abx.   Oxygen Supp PRN  Monitor Oxygen sat  CXR noted 6/11.   ID follow up

## 2020-06-14 LAB
GLUCOSE BLDC GLUCOMTR-MCNC: 120 MG/DL — HIGH (ref 70–99)
GLUCOSE BLDC GLUCOMTR-MCNC: 143 MG/DL — HIGH (ref 70–99)
GLUCOSE BLDC GLUCOMTR-MCNC: 156 MG/DL — HIGH (ref 70–99)
GLUCOSE BLDC GLUCOMTR-MCNC: 176 MG/DL — HIGH (ref 70–99)
SARS-COV-2 IGG SERPL QL IA: POSITIVE
SARS-COV-2 IGM SERPL IA-ACNC: 156 AU/ML — HIGH

## 2020-06-14 RX ORDER — INSULIN LISPRO 100/ML
10 VIAL (ML) SUBCUTANEOUS EVERY 6 HOURS
Refills: 0 | Status: DISCONTINUED | OUTPATIENT
Start: 2020-06-14 | End: 2020-06-16

## 2020-06-14 RX ORDER — INSULIN LISPRO 100/ML
7 VIAL (ML) SUBCUTANEOUS ONCE
Refills: 0 | Status: COMPLETED | OUTPATIENT
Start: 2020-06-14 | End: 2020-06-14

## 2020-06-14 RX ADMIN — OLANZAPINE 2.5 MILLIGRAM(S): 15 TABLET, FILM COATED ORAL at 21:28

## 2020-06-14 RX ADMIN — Medication 2: at 13:38

## 2020-06-14 RX ADMIN — Medication 1 TABLET(S): at 11:54

## 2020-06-14 RX ADMIN — Medication 1 APPLICATION(S): at 11:54

## 2020-06-14 RX ADMIN — MIRTAZAPINE 7.5 MILLIGRAM(S): 45 TABLET, ORALLY DISINTEGRATING ORAL at 21:28

## 2020-06-14 RX ADMIN — PIPERACILLIN AND TAZOBACTAM 25 GRAM(S): 4; .5 INJECTION, POWDER, LYOPHILIZED, FOR SOLUTION INTRAVENOUS at 21:29

## 2020-06-14 RX ADMIN — ZINC SULFATE TAB 220 MG (50 MG ZINC EQUIVALENT) 220 MILLIGRAM(S): 220 (50 ZN) TAB at 11:54

## 2020-06-14 RX ADMIN — INSULIN GLARGINE 18 UNIT(S): 100 INJECTION, SOLUTION SUBCUTANEOUS at 21:28

## 2020-06-14 RX ADMIN — Medication 500 MILLIGRAM(S): at 05:10

## 2020-06-14 RX ADMIN — HEPARIN SODIUM 5000 UNIT(S): 5000 INJECTION INTRAVENOUS; SUBCUTANEOUS at 17:34

## 2020-06-14 RX ADMIN — PIPERACILLIN AND TAZOBACTAM 25 GRAM(S): 4; .5 INJECTION, POWDER, LYOPHILIZED, FOR SOLUTION INTRAVENOUS at 13:37

## 2020-06-14 RX ADMIN — Medication 14 UNIT(S): at 06:23

## 2020-06-14 RX ADMIN — Medication 1000 UNIT(S): at 11:54

## 2020-06-14 RX ADMIN — PIPERACILLIN AND TAZOBACTAM 25 GRAM(S): 4; .5 INJECTION, POWDER, LYOPHILIZED, FOR SOLUTION INTRAVENOUS at 05:10

## 2020-06-14 RX ADMIN — CHLORHEXIDINE GLUCONATE 1 APPLICATION(S): 213 SOLUTION TOPICAL at 11:54

## 2020-06-14 RX ADMIN — PANTOPRAZOLE SODIUM 40 MILLIGRAM(S): 20 TABLET, DELAYED RELEASE ORAL at 11:54

## 2020-06-14 RX ADMIN — Medication 14 UNIT(S): at 13:38

## 2020-06-14 RX ADMIN — HEPARIN SODIUM 5000 UNIT(S): 5000 INJECTION INTRAVENOUS; SUBCUTANEOUS at 05:11

## 2020-06-14 RX ADMIN — QUETIAPINE FUMARATE 25 MILLIGRAM(S): 200 TABLET, FILM COATED ORAL at 21:28

## 2020-06-14 RX ADMIN — Medication 500 MILLIGRAM(S): at 17:34

## 2020-06-14 RX ADMIN — Medication 1 APPLICATION(S): at 13:37

## 2020-06-14 RX ADMIN — Medication 7 UNIT(S): at 17:33

## 2020-06-14 NOTE — PROGRESS NOTE ADULT - SUBJECTIVE AND OBJECTIVE BOX
Patient is a 64y old  Male who presents with a chief complaint of seizures (13 Jun 2020 16:48)  Awake, alert, comfortable in bed in NAD. Clinically unchanged. Afebrile    INTERVAL HPI/OVERNIGHT EVENTS:      VITAL SIGNS:  T(F): 97.7 (06-14-20 @ 05:30)  HR: 95 (06-14-20 @ 05:30)  BP: 119/66 (06-14-20 @ 05:30)  RR: 16 (06-14-20 @ 05:30)  SpO2: 99% (06-14-20 @ 05:30)  Wt(kg): --  I&O's Detail          REVIEW OF SYSTEMS:    CONSTITUTIONAL:  No fevers, chills, sweats    HEENT:  Eyes:  No diplopia or blurred vision. ENT:  No earache, sore throat or runny nose.    CARDIOVASCULAR:  No pressure, squeezing, tightness, or heaviness about the chest; no palpitations.    RESPIRATORY:  Per HPI    GASTROINTESTINAL:  No abdominal pain, nausea, vomiting or diarrhea.    GENITOURINARY:  No dysuria, frequency or urgency.    NEUROLOGIC:  No paresthesias, fasciculations, seizures or weakness.    PSYCHIATRIC:  No disorder of thought or mood.      PHYSICAL EXAM:    Constitutional: Well developed and nourished  Eyes:Perrla  ENMT: normal  Neck:supple  Respiratory: good air entry  Cardiovascular: S1 S2 regular  Gastrointestinal: Soft, Non tender  Extremities: No edema  Vascular:normal  Neurological:Awake, alert,Ox3  Musculoskeletal:Normal      MEDICATIONS  (STANDING):  ascorbic acid 500 milliGRAM(s) Oral two times a day  BACItracin   Ointment 1 Application(s) Topical daily  cholecalciferol 1000 Unit(s) Oral daily  collagenase Ointment 1 Application(s) Topical daily  dextrose 5% + sodium chloride 0.9%. 1000 milliLiter(s) (50 mL/Hr) IV Continuous <Continuous>  dextrose 5% + sodium chloride 0.9%. 1000 milliLiter(s) (50 mL/Hr) IV Continuous <Continuous>  dextrose 5%. 1000 milliLiter(s) (50 mL/Hr) IV Continuous <Continuous>  dextrose 50% Injectable 12.5 Gram(s) IV Push once  dextrose 50% Injectable 25 Gram(s) IV Push once  dextrose 50% Injectable 25 Gram(s) IV Push once  heparin   Injectable 5000 Unit(s) SubCutaneous every 12 hours  insulin glargine Injectable (LANTUS) 18 Unit(s) SubCutaneous at bedtime  insulin lispro (HumaLOG) corrective regimen sliding scale   SubCutaneous every 6 hours  insulin lispro Injectable (HumaLOG) 14 Unit(s) SubCutaneous every 6 hours  mirtazapine 7.5 milliGRAM(s) Oral at bedtime  multivitamin/minerals 1 Tablet(s) Oral daily  OLANZapine 2.5 milliGRAM(s) Oral at bedtime  pantoprazole   Suspension 40 milliGRAM(s) Enteral Tube daily  piperacillin/tazobactam IVPB.. 3.375 Gram(s) IV Intermittent every 8 hours  QUEtiapine 25 milliGRAM(s) Oral at bedtime  zinc sulfate 220 milliGRAM(s) Oral daily    MEDICATIONS  (PRN):  acetaminophen    Suspension .. 650 milliGRAM(s) Enteral Tube every 6 hours PRN Temp greater or equal to 38C (100.4F)  ALBUTerol    90 MICROgram(s) HFA Inhaler 2 Puff(s) Inhalation every 6 hours PRN Shortness of Breath and/or Wheezing  dextrose 40% Gel 15 Gram(s) Oral once PRN Blood Glucose LESS THAN 70 milliGRAM(s)/deciliter  glucagon  Injectable 1 milliGRAM(s) IntraMuscular once PRN Glucose LESS THAN 70 milligrams/deciliter  guaiFENesin   Syrup  (Sugar-Free) 100 milliGRAM(s) Oral every 6 hours PRN Cough      Allergies    No Known Allergies    Intolerances        LABS:                        10.3   8.56  )-----------( 372      ( 13 Jun 2020 06:38 )             30.9     06-13    135  |  98  |  35<H>  ----------------------------<  197<H>  4.0   |  28  |  0.78    Ca    9.2      13 Jun 2020 06:38                CAPILLARY BLOOD GLUCOSE      POCT Blood Glucose.: 143 mg/dL (14 Jun 2020 05:46)  POCT Blood Glucose.: 176 mg/dL (13 Jun 2020 23:29)  POCT Blood Glucose.: 169 mg/dL (13 Jun 2020 21:02)  POCT Blood Glucose.: 135 mg/dL (13 Jun 2020 17:08)        RADIOLOGY & ADDITIONAL TESTS:  Culture - Blood (06.12.20 @ 01:02)    Specimen Source: .Blood Blood-Arterial    Culture Results:   No growth to date.      CXR:    Ct scan chest:    ekg;    echo:

## 2020-06-14 NOTE — CHART NOTE - NSCHARTNOTEFT_GEN_A_CORE
EVENT: Notified by RN of  and pt is scheduled 14 units of Humalog .      Patient is a 64y old  Male who presents with a chief complaint of seizures (14 Jun 2020 14:34)    HPI  64 year old male from [facility]  with PMH dementia and DM coming in with seizures like activity while sitting at AL.  Pt had PEG  placement on 06/02/2020 for FTT 2/2 to dysphagia and poor oral intake related to changes in mental status. Pt developed redness at PEG site, started Bacitracin . GI Dr. Valencia following.   During hospital course , pt developed uncontrolled T2DM for which Lantus and scheduled Humalog were added . Blood glucose now better controlled. BG  120-135 during evening hours, on contnuous tube feeds. D/w Endo Dr. Allison . Will reduce Humalog to 10 units q6h, hold if tube feeds on hold.          MEDICATIONS  (STANDING):  ascorbic acid 500 milliGRAM(s) Oral two times a day  BACItracin   Ointment 1 Application(s) Topical daily  cholecalciferol 1000 Unit(s) Oral daily  collagenase Ointment 1 Application(s) Topical daily  dextrose 5% + sodium chloride 0.9%. 1000 milliLiter(s) (50 mL/Hr) IV Continuous <Continuous>  dextrose 5% + sodium chloride 0.9%. 1000 milliLiter(s) (50 mL/Hr) IV Continuous <Continuous>  dextrose 5%. 1000 milliLiter(s) (50 mL/Hr) IV Continuous <Continuous>  dextrose 50% Injectable 12.5 Gram(s) IV Push once  dextrose 50% Injectable 25 Gram(s) IV Push once  dextrose 50% Injectable 25 Gram(s) IV Push once  heparin   Injectable 5000 Unit(s) SubCutaneous every 12 hours  insulin glargine Injectable (LANTUS) 18 Unit(s) SubCutaneous at bedtime  insulin lispro (HumaLOG) corrective regimen sliding scale   SubCutaneous every 6 hours  insulin lispro Injectable (HumaLOG) 10 Unit(s) SubCutaneous every 6 hours  mirtazapine 7.5 milliGRAM(s) Oral at bedtime  multivitamin/minerals 1 Tablet(s) Oral daily  OLANZapine 2.5 milliGRAM(s) Oral at bedtime  pantoprazole   Suspension 40 milliGRAM(s) Enteral Tube daily  piperacillin/tazobactam IVPB.. 3.375 Gram(s) IV Intermittent every 8 hours  QUEtiapine 25 milliGRAM(s) Oral at bedtime  zinc sulfate 220 milliGRAM(s) Oral daily    MEDICATIONS  (PRN):  acetaminophen    Suspension .. 650 milliGRAM(s) Enteral Tube every 6 hours PRN Temp greater or equal to 38C (100.4F)  ALBUTerol    90 MICROgram(s) HFA Inhaler 2 Puff(s) Inhalation every 6 hours PRN Shortness of Breath and/or Wheezing  dextrose 40% Gel 15 Gram(s) Oral once PRN Blood Glucose LESS THAN 70 milliGRAM(s)/deciliter  glucagon  Injectable 1 milliGRAM(s) IntraMuscular once PRN Glucose LESS THAN 70 milligrams/deciliter  guaiFENesin   Syrup  (Sugar-Free) 100 milliGRAM(s) Oral every 6 hours PRN Cough            Vital Signs Last 24 Hrs  T(C): 36.3 (14 Jun 2020 12:08), Max: 36.5 (14 Jun 2020 05:30)  T(F): 97.4 (14 Jun 2020 12:08), Max: 97.7 (14 Jun 2020 05:30)  HR: 96 (14 Jun 2020 12:08) (88 - 96)  BP: 130/74 (14 Jun 2020 12:08) (119/66 - 130/74)  BP(mean): --  RR: 14 (14 Jun 2020 12:08) (14 - 16)  SpO2: 98% (14 Jun 2020 12:08) (98% - 99%)    ________________________________________________  PHYSICAL EXAM:  GENERAL: NAD      _________________________________________________  LABS:                        10.3   8.56  )-----------( 372      ( 13 Jun 2020 06:38 )             30.9     06-13    135  |  98  |  35<H>  ----------------------------<  197<H>  4.0   |  28  |  0.78    Ca    9.2      13 Jun 2020 06:38          CAPILLARY BLOOD GLUCOSE      POCT Blood Glucose.: 120 mg/dL (14 Jun 2020 17:01)  POCT Blood Glucose.: 176 mg/dL (14 Jun 2020 13:30)  POCT Blood Glucose.: 143 mg/dL (14 Jun 2020 05:46)  POCT Blood Glucose.: 176 mg/dL (13 Jun 2020 23:29)  POCT Blood Glucose.: 169 mg/dL (13 Jun 2020 21:02)      ------------  ASSESSMENT/PLAN    T2DM, controlled  -Reduce Humalog to 10 units SC q 6h. Hold if tube feeds on hold.  -D/w Endo Dr. Allison  -D/w RN    Garima Stallings NP Medicine

## 2020-06-14 NOTE — PROGRESS NOTE ADULT - SUBJECTIVE AND OBJECTIVE BOX
CARDIOLOGY/MEDICAL ATTENDING    CHIEF COMPLAINT:Patient is a 64y old  Male who presents with a chief complaint of seizures.Pt appears comfortable.    	  REVIEW OF SYSTEMS:  [x ] Unable to obtain    PHYSICAL EXAM:  T(C): 36.3 (06-14-20 @ 12:08), Max: 36.6 (06-13-20 @ 14:57)  HR: 96 (06-14-20 @ 12:08) (88 - 96)  BP: 130/74 (06-14-20 @ 12:08) (119/66 - 160/76)  RR: 14 (06-14-20 @ 12:08) (14 - 16)  SpO2: 98% (06-14-20 @ 12:08) (98% - 100%)  Wt(kg): --  I&O's Summary      Appearance: Normal	  HEENT:   Normal oral mucosa, PERRL, EOMI	  Lymphatic: No lymphadenopathy  Cardiovascular: Normal S1 S2, No JVD, No murmurs, No edema  Respiratory: Lungs clear to auscultation	  Gastrointestinal:  Soft, Non-tender, + BS	  Skin: No rashes, No ecchymoses, No cyanosis	  Extremities: Normal range of motion, No clubbing, cyanosis or edema  Vascular: Peripheral pulses palpable 2+ bilaterally    MEDICATIONS  (STANDING):  ascorbic acid 500 milliGRAM(s) Oral two times a day  BACItracin   Ointment 1 Application(s) Topical daily  cholecalciferol 1000 Unit(s) Oral daily  collagenase Ointment 1 Application(s) Topical daily  dextrose 5% + sodium chloride 0.9%. 1000 milliLiter(s) (50 mL/Hr) IV Continuous <Continuous>  dextrose 5% + sodium chloride 0.9%. 1000 milliLiter(s) (50 mL/Hr) IV Continuous <Continuous>  dextrose 5%. 1000 milliLiter(s) (50 mL/Hr) IV Continuous <Continuous>  dextrose 50% Injectable 12.5 Gram(s) IV Push once  dextrose 50% Injectable 25 Gram(s) IV Push once  dextrose 50% Injectable 25 Gram(s) IV Push once  heparin   Injectable 5000 Unit(s) SubCutaneous every 12 hours  insulin glargine Injectable (LANTUS) 18 Unit(s) SubCutaneous at bedtime  insulin lispro (HumaLOG) corrective regimen sliding scale   SubCutaneous every 6 hours  insulin lispro Injectable (HumaLOG) 14 Unit(s) SubCutaneous every 6 hours  mirtazapine 7.5 milliGRAM(s) Oral at bedtime  multivitamin/minerals 1 Tablet(s) Oral daily  OLANZapine 2.5 milliGRAM(s) Oral at bedtime  pantoprazole   Suspension 40 milliGRAM(s) Enteral Tube daily  piperacillin/tazobactam IVPB.. 3.375 Gram(s) IV Intermittent every 8 hours  QUEtiapine 25 milliGRAM(s) Oral at bedtime  zinc sulfate 220 milliGRAM(s) Oral daily      	  LABS:	 	                        10.3   8.56  )-----------( 372      ( 13 Jun 2020 06:38 )             30.9     06-13    135  |  98  |  35<H>  ----------------------------<  197<H>  4.0   |  28  |  0.78    Ca    9.2      13 Jun 2020 06:38    COVID-19  Antibody - inpatient disease monitoring (06.13.20 @ 22:25)    COVID-19 IgG Antibody Index: 156.00: Diasorin CMIA  Negative    <= 14.99 AU/mL  Positive    >= 15.00 AU/mL AU/mL    COVID-19 IgG Antibody Interpretation: Positive: This test has not been reviewed by the FDA by the standard review  process. It has been authorized for emergency use by the FDA. [employer] has validated this test to be accurate.  Negative results do not rule out SARS-CoV-2 infection, particularly in  those who have been in recent contact with the virus. Follow-up testing  with a molecular diagnostic test should be considered to rule out  infection in these individuals.  Results from antibody testing should not be used as thesole basis to  diagnose or exclude SARS-CoV-2 infection, or to inform infection status.  Positive results may rarely be due to past or present infection with  non-SARS-CoV-2 coronavirus strains, such as coronavirus HKU1, NL63, OC43,  or 229E. [employer], through extensive validation  testing, has confirmed that this risk is minimal with this test.

## 2020-06-14 NOTE — PROGRESS NOTE ADULT - PROBLEM SELECTOR PLAN 1
PEG repositioned by GI.   BC negative   CXR shows opacification LLL - ? Effusion, Atelectasis, PNA. Similar to prior CXR.   CT angio chest pending.   US duplex pending.   Tylenol PRN  Monitor labs  ID follow up no rash/no itching

## 2020-06-14 NOTE — CHART NOTE - NSCHARTNOTEFT_GEN_A_CORE
EVENT: Notified by RN of yellow drainage noted on PEG drain-sponge.     64 year old male from [facility]  with PMH dementia and DM coming in with seizures like activity while sitting at AL.  Pt had PEG  placement on 06/02/2020 for FTT 2/2 to dysphagia and poor oral intake related to changes in mental status. Redness at PEG site noted 1 cm around insertion site ; light yellowish/whitish  superficial tissue at inserton site. No tube feeding leakage noted.    Vital Signs Last 24 Hrs  T(C): 36.3 (14 Jun 2020 12:08), Max: 36.6 (13 Jun 2020 14:57)  T(F): 97.4 (14 Jun 2020 12:08), Max: 97.8 (13 Jun 2020 14:57)  HR: 96 (14 Jun 2020 12:08) (88 - 96)  BP: 130/74 (14 Jun 2020 12:08) (119/66 - 160/76)  BP(mean): --  RR: 14 (14 Jun 2020 12:08) (14 - 16)  SpO2: 98% (14 Jun 2020 12:08) (98% - 100%)    MEDICATIONS  (STANDING):  ascorbic acid 500 milliGRAM(s) Oral two times a day  BACItracin   Ointment 1 Application(s) Topical daily  cholecalciferol 1000 Unit(s) Oral daily  collagenase Ointment 1 Application(s) Topical daily  dextrose 5% + sodium chloride 0.9%. 1000 milliLiter(s) (50 mL/Hr) IV Continuous <Continuous>  dextrose 5% + sodium chloride 0.9%. 1000 milliLiter(s) (50 mL/Hr) IV Continuous <Continuous>  dextrose 5%. 1000 milliLiter(s) (50 mL/Hr) IV Continuous <Continuous>  dextrose 50% Injectable 12.5 Gram(s) IV Push once  dextrose 50% Injectable 25 Gram(s) IV Push once  dextrose 50% Injectable 25 Gram(s) IV Push once  heparin   Injectable 5000 Unit(s) SubCutaneous every 12 hours  insulin glargine Injectable (LANTUS) 18 Unit(s) SubCutaneous at bedtime  insulin lispro (HumaLOG) corrective regimen sliding scale   SubCutaneous every 6 hours  insulin lispro Injectable (HumaLOG) 14 Unit(s) SubCutaneous every 6 hours  mirtazapine 7.5 milliGRAM(s) Oral at bedtime  multivitamin/minerals 1 Tablet(s) Oral daily  OLANZapine 2.5 milliGRAM(s) Oral at bedtime  pantoprazole   Suspension 40 milliGRAM(s) Enteral Tube daily  piperacillin/tazobactam IVPB.. 3.375 Gram(s) IV Intermittent every 8 hours  QUEtiapine 25 milliGRAM(s) Oral at bedtime  zinc sulfate 220 milliGRAM(s) Oral daily    MEDICATIONS  (PRN):  acetaminophen    Suspension .. 650 milliGRAM(s) Enteral Tube every 6 hours PRN Temp greater or equal to 38C (100.4F)  ALBUTerol    90 MICROgram(s) HFA Inhaler 2 Puff(s) Inhalation every 6 hours PRN Shortness of Breath and/or Wheezing  dextrose 40% Gel 15 Gram(s) Oral once PRN Blood Glucose LESS THAN 70 milliGRAM(s)/deciliter  glucagon  Injectable 1 milliGRAM(s) IntraMuscular once PRN Glucose LESS THAN 70 milligrams/deciliter  guaiFENesin   Syrup  (Sugar-Free) 100 milliGRAM(s) Oral every 6 hours PRN Cough    ------------  ASSESSMENT/PLAN     Inflamed PEG site concerning for cellulitis. D/w GI Dr. Valencia who recommends following.     -Keep site clean dry. Clean with soap and water. Apply Bacitracin ointment to site daily and cover with drain sponge.  -Maintain PEG bumper  close to skin.  -c/w Zosyn  -GI Dr. Valencia following  -D/w AB Stallings, NP Medicine

## 2020-06-14 NOTE — PROGRESS NOTE ADULT - SUBJECTIVE AND OBJECTIVE BOX
[   ] ICU                                          [   ] CCU                                      [ X  ] Medical Floor      Patient is comfortable. No new complaints reported, No abdominal pain, N/V, hematemesis, hematochezia, melena, fever, chills, chest pain, SOB, cough or diarrhea reported.    VITALS  Vital Signs Last 24 Hrs  T(C): 36.3 (14 Jun 2020 12:08), Max: 36.6 (13 Jun 2020 14:57)  T(F): 97.4 (14 Jun 2020 12:08), Max: 97.8 (13 Jun 2020 14:57)  HR: 96 (14 Jun 2020 12:08) (88 - 96)  BP: 130/74 (14 Jun 2020 12:08) (119/66 - 160/76)   RR: 14 (14 Jun 2020 12:08) (14 - 16)  SpO2: 98% (14 Jun 2020 12:08) (98% - 100%)       MEDICATIONS  (STANDING):  ascorbic acid 500 milliGRAM(s) Oral two times a day  BACItracin   Ointment 1 Application(s) Topical daily  cholecalciferol 1000 Unit(s) Oral daily  collagenase Ointment 1 Application(s) Topical daily  dextrose 5% + sodium chloride 0.9%. 1000 milliLiter(s) (50 mL/Hr) IV Continuous <Continuous>  dextrose 5% + sodium chloride 0.9%. 1000 milliLiter(s) (50 mL/Hr) IV Continuous <Continuous>  dextrose 5%. 1000 milliLiter(s) (50 mL/Hr) IV Continuous <Continuous>  dextrose 50% Injectable 12.5 Gram(s) IV Push once  dextrose 50% Injectable 25 Gram(s) IV Push once  dextrose 50% Injectable 25 Gram(s) IV Push once  heparin   Injectable 5000 Unit(s) SubCutaneous every 12 hours  insulin glargine Injectable (LANTUS) 18 Unit(s) SubCutaneous at bedtime  insulin lispro (HumaLOG) corrective regimen sliding scale   SubCutaneous every 6 hours  insulin lispro Injectable (HumaLOG) 14 Unit(s) SubCutaneous every 6 hours  mirtazapine 7.5 milliGRAM(s) Oral at bedtime  multivitamin/minerals 1 Tablet(s) Oral daily  OLANZapine 2.5 milliGRAM(s) Oral at bedtime  pantoprazole   Suspension 40 milliGRAM(s) Enteral Tube daily  piperacillin/tazobactam IVPB.. 3.375 Gram(s) IV Intermittent every 8 hours  QUEtiapine 25 milliGRAM(s) Oral at bedtime  zinc sulfate 220 milliGRAM(s) Oral daily    MEDICATIONS  (PRN):  acetaminophen    Suspension .. 650 milliGRAM(s) Enteral Tube every 6 hours PRN Temp greater or equal to 38C (100.4F)  ALBUTerol    90 MICROgram(s) HFA Inhaler 2 Puff(s) Inhalation every 6 hours PRN Shortness of Breath and/or Wheezing  dextrose 40% Gel 15 Gram(s) Oral once PRN Blood Glucose LESS THAN 70 milliGRAM(s)/deciliter  glucagon  Injectable 1 milliGRAM(s) IntraMuscular once PRN Glucose LESS THAN 70 milligrams/deciliter  guaiFENesin   Syrup  (Sugar-Free) 100 milliGRAM(s) Oral every 6 hours PRN Cough                            10.3   8.56  )-----------( 372      ( 13 Jun 2020 06:38 )             30.9       06-13    135  |  98  |  35<H>  ----------------------------<  197<H>  4.0   |  28  |  0.78    Ca    9.2      13 Jun 2020 06:38

## 2020-06-14 NOTE — PROGRESS NOTE ADULT - PROBLEM SELECTOR PLAN 2
CT shows LLL PNA on 6/12.   Abx.   Oxygen Supp PRN  Monitor Oxygen sat  CXR noted 6/11.   ID follow up

## 2020-06-15 DIAGNOSIS — J18.9 PNEUMONIA, UNSPECIFIED ORGANISM: ICD-10-CM

## 2020-06-15 DIAGNOSIS — L03.311 CELLULITIS OF ABDOMINAL WALL: ICD-10-CM

## 2020-06-15 LAB
ANION GAP SERPL CALC-SCNC: 7 MMOL/L — SIGNIFICANT CHANGE UP (ref 5–17)
BUN SERPL-MCNC: 24 MG/DL — HIGH (ref 7–18)
CALCIUM SERPL-MCNC: 9.3 MG/DL — SIGNIFICANT CHANGE UP (ref 8.4–10.5)
CHLORIDE SERPL-SCNC: 99 MMOL/L — SIGNIFICANT CHANGE UP (ref 96–108)
CO2 SERPL-SCNC: 29 MMOL/L — SIGNIFICANT CHANGE UP (ref 22–31)
CREAT SERPL-MCNC: 0.86 MG/DL — SIGNIFICANT CHANGE UP (ref 0.5–1.3)
GLUCOSE BLDC GLUCOMTR-MCNC: 183 MG/DL — HIGH (ref 70–99)
GLUCOSE BLDC GLUCOMTR-MCNC: 185 MG/DL — HIGH (ref 70–99)
GLUCOSE BLDC GLUCOMTR-MCNC: 196 MG/DL — HIGH (ref 70–99)
GLUCOSE BLDC GLUCOMTR-MCNC: 205 MG/DL — HIGH (ref 70–99)
GLUCOSE BLDC GLUCOMTR-MCNC: 260 MG/DL — HIGH (ref 70–99)
GLUCOSE SERPL-MCNC: 179 MG/DL — HIGH (ref 70–99)
HCT VFR BLD CALC: 34.7 % — LOW (ref 39–50)
HGB BLD-MCNC: 11.4 G/DL — LOW (ref 13–17)
MCHC RBC-ENTMCNC: 28 PG — SIGNIFICANT CHANGE UP (ref 27–34)
MCHC RBC-ENTMCNC: 32.9 GM/DL — SIGNIFICANT CHANGE UP (ref 32–36)
MCV RBC AUTO: 85.3 FL — SIGNIFICANT CHANGE UP (ref 80–100)
NRBC # BLD: 0 /100 WBCS — SIGNIFICANT CHANGE UP (ref 0–0)
PLATELET # BLD AUTO: 525 K/UL — HIGH (ref 150–400)
POTASSIUM SERPL-MCNC: 4.2 MMOL/L — SIGNIFICANT CHANGE UP (ref 3.5–5.3)
POTASSIUM SERPL-SCNC: 4.2 MMOL/L — SIGNIFICANT CHANGE UP (ref 3.5–5.3)
RBC # BLD: 4.07 M/UL — LOW (ref 4.2–5.8)
RBC # FLD: 12.7 % — SIGNIFICANT CHANGE UP (ref 10.3–14.5)
SODIUM SERPL-SCNC: 135 MMOL/L — SIGNIFICANT CHANGE UP (ref 135–145)
WBC # BLD: 11.51 K/UL — HIGH (ref 3.8–10.5)
WBC # FLD AUTO: 11.51 K/UL — HIGH (ref 3.8–10.5)

## 2020-06-15 PROCEDURE — 99232 SBSQ HOSP IP/OBS MODERATE 35: CPT

## 2020-06-15 RX ADMIN — Medication 10 UNIT(S): at 12:24

## 2020-06-15 RX ADMIN — PIPERACILLIN AND TAZOBACTAM 25 GRAM(S): 4; .5 INJECTION, POWDER, LYOPHILIZED, FOR SOLUTION INTRAVENOUS at 05:55

## 2020-06-15 RX ADMIN — Medication 500 MILLIGRAM(S): at 17:01

## 2020-06-15 RX ADMIN — PIPERACILLIN AND TAZOBACTAM 25 GRAM(S): 4; .5 INJECTION, POWDER, LYOPHILIZED, FOR SOLUTION INTRAVENOUS at 21:58

## 2020-06-15 RX ADMIN — Medication 1 APPLICATION(S): at 11:11

## 2020-06-15 RX ADMIN — Medication 10 UNIT(S): at 00:57

## 2020-06-15 RX ADMIN — PANTOPRAZOLE SODIUM 40 MILLIGRAM(S): 20 TABLET, DELAYED RELEASE ORAL at 11:10

## 2020-06-15 RX ADMIN — Medication 10 UNIT(S): at 06:14

## 2020-06-15 RX ADMIN — Medication 500 MILLIGRAM(S): at 05:56

## 2020-06-15 RX ADMIN — HEPARIN SODIUM 5000 UNIT(S): 5000 INJECTION INTRAVENOUS; SUBCUTANEOUS at 05:56

## 2020-06-15 RX ADMIN — Medication 1 APPLICATION(S): at 11:10

## 2020-06-15 RX ADMIN — ZINC SULFATE TAB 220 MG (50 MG ZINC EQUIVALENT) 220 MILLIGRAM(S): 220 (50 ZN) TAB at 11:10

## 2020-06-15 RX ADMIN — MIRTAZAPINE 7.5 MILLIGRAM(S): 45 TABLET, ORALLY DISINTEGRATING ORAL at 21:58

## 2020-06-15 RX ADMIN — Medication 1 TABLET(S): at 11:10

## 2020-06-15 RX ADMIN — QUETIAPINE FUMARATE 25 MILLIGRAM(S): 200 TABLET, FILM COATED ORAL at 21:58

## 2020-06-15 RX ADMIN — Medication 2: at 12:24

## 2020-06-15 RX ADMIN — Medication 2: at 06:13

## 2020-06-15 RX ADMIN — Medication 6: at 17:02

## 2020-06-15 RX ADMIN — OLANZAPINE 2.5 MILLIGRAM(S): 15 TABLET, FILM COATED ORAL at 21:58

## 2020-06-15 RX ADMIN — Medication 4: at 00:57

## 2020-06-15 RX ADMIN — Medication 1000 UNIT(S): at 11:10

## 2020-06-15 RX ADMIN — PIPERACILLIN AND TAZOBACTAM 25 GRAM(S): 4; .5 INJECTION, POWDER, LYOPHILIZED, FOR SOLUTION INTRAVENOUS at 13:04

## 2020-06-15 RX ADMIN — Medication 10 UNIT(S): at 17:02

## 2020-06-15 RX ADMIN — HEPARIN SODIUM 5000 UNIT(S): 5000 INJECTION INTRAVENOUS; SUBCUTANEOUS at 17:01

## 2020-06-15 NOTE — PROGRESS NOTE ADULT - PROBLEM SELECTOR PLAN 6
COVID 19 PCR negative on 5/31.    positive COVID on 6/3, 6/6, 6/9 , 6/12  requires two negative results  Needs 2 negatives per RODNEY requirement.  repeat COVID 6/15

## 2020-06-15 NOTE — PROGRESS NOTE ADULT - SUBJECTIVE AND OBJECTIVE BOX
Pt is awake, alert, lying in bed in NAD. Afebrile.     INTERVAL HPI/OVERNIGHT EVENTS:      VITAL SIGNS:  T(F): 97.9 (06-15-20 @ 05:51)  HR: 92 (06-15-20 @ 05:51)  BP: 120/73 (06-15-20 @ 05:51)  RR: 16 (06-15-20 @ 05:51)  SpO2: 99% (06-15-20 @ 05:51)  Wt(kg): --  I&O's Detail          REVIEW OF SYSTEMS:    CONSTITUTIONAL:  No fevers, chills, sweats    HEENT:  Eyes:  No diplopia or blurred vision. ENT:  No earache, sore throat or runny nose.    CARDIOVASCULAR:  No pressure, squeezing, tightness, or heaviness about the chest; no palpitations.    RESPIRATORY:  Per HPI    GASTROINTESTINAL:  No abdominal pain, nausea, vomiting or diarrhea.    GENITOURINARY:  No dysuria, frequency or urgency.    NEUROLOGIC:  No paresthesias, fasciculations, seizures or weakness.    PSYCHIATRIC:  No disorder of thought or mood.      PHYSICAL EXAM:    Constitutional: Well developed and nourished  Eyes:Perrla  ENMT: normal  Neck:supple  Respiratory: good air entry  Cardiovascular: S1 S2 regular  Gastrointestinal: Soft, Non tender; PEG tube.   Extremities: No edema  Vascular:normal  Neurological:Awake, alert   Musculoskeletal: bedbound.       MEDICATIONS  (STANDING):  ascorbic acid 500 milliGRAM(s) Oral two times a day  BACItracin   Ointment 1 Application(s) Topical daily  cholecalciferol 1000 Unit(s) Oral daily  collagenase Ointment 1 Application(s) Topical daily  dextrose 5% + sodium chloride 0.9%. 1000 milliLiter(s) (50 mL/Hr) IV Continuous <Continuous>  dextrose 5% + sodium chloride 0.9%. 1000 milliLiter(s) (50 mL/Hr) IV Continuous <Continuous>  dextrose 5%. 1000 milliLiter(s) (50 mL/Hr) IV Continuous <Continuous>  dextrose 50% Injectable 12.5 Gram(s) IV Push once  dextrose 50% Injectable 25 Gram(s) IV Push once  dextrose 50% Injectable 25 Gram(s) IV Push once  heparin   Injectable 5000 Unit(s) SubCutaneous every 12 hours  insulin glargine Injectable (LANTUS) 18 Unit(s) SubCutaneous at bedtime  insulin lispro (HumaLOG) corrective regimen sliding scale   SubCutaneous every 6 hours  insulin lispro Injectable (HumaLOG) 10 Unit(s) SubCutaneous every 6 hours  mirtazapine 7.5 milliGRAM(s) Oral at bedtime  multivitamin/minerals 1 Tablet(s) Oral daily  OLANZapine 2.5 milliGRAM(s) Oral at bedtime  pantoprazole   Suspension 40 milliGRAM(s) Enteral Tube daily  piperacillin/tazobactam IVPB.. 3.375 Gram(s) IV Intermittent every 8 hours  QUEtiapine 25 milliGRAM(s) Oral at bedtime  zinc sulfate 220 milliGRAM(s) Oral daily    MEDICATIONS  (PRN):  acetaminophen    Suspension .. 650 milliGRAM(s) Enteral Tube every 6 hours PRN Temp greater or equal to 38C (100.4F)  ALBUTerol    90 MICROgram(s) HFA Inhaler 2 Puff(s) Inhalation every 6 hours PRN Shortness of Breath and/or Wheezing  dextrose 40% Gel 15 Gram(s) Oral once PRN Blood Glucose LESS THAN 70 milliGRAM(s)/deciliter  glucagon  Injectable 1 milliGRAM(s) IntraMuscular once PRN Glucose LESS THAN 70 milligrams/deciliter  guaiFENesin   Syrup  (Sugar-Free) 100 milliGRAM(s) Oral every 6 hours PRN Cough      Allergies    No Known Allergies    Intolerances        LABS:                        11.4   11.51 )-----------( 525      ( 15 Jonathan 2020 06:17 )             34.7     06-15    135  |  99  |  24<H>  ----------------------------<  179<H>  4.2   |  29  |  0.86    Ca    9.3      15 Jonathan 2020 06:17                CAPILLARY BLOOD GLUCOSE      POCT Blood Glucose.: 183 mg/dL (15 Jonathan 2020 11:59)  POCT Blood Glucose.: 185 mg/dL (15 Jonathan 2020 06:03)  POCT Blood Glucose.: 196 mg/dL (15 Jonathan 2020 05:35)  POCT Blood Glucose.: 205 mg/dL (15 Jonathan 2020 00:52)  POCT Blood Glucose.: 156 mg/dL (14 Jun 2020 21:20)  POCT Blood Glucose.: 120 mg/dL (14 Jun 2020 17:01)  POCT Blood Glucose.: 176 mg/dL (14 Jun 2020 13:30)    COVID-19  Antibody - inpatient disease monitoring (06.13.20 @ 22:25)    COVID-19 IgG Antibody Index: 156.00: Diasorin CMIA  Negative    <= 14.99 AU/mL  Positive    >= 15.00 AU/mL AU/mL    COVID-19 IgG Antibody Interpretation: Positive: This test has not been reviewed by the FDA by the standard review  process. It has been authorized for emergency use by the FDA. [employer] has validated this test to be accurate.  Negative results do not rule out SARS-CoV-2 infection, particularly in  those who have been in recent contact with the virus. Follow-up testing  with a molecular diagnostic test should be considered to rule out  infection in these individuals.  Results from antibody testing should not be used as thesole basis to  diagnose or exclude SARS-CoV-2 infection, or to inform infection status.  Positive results may rarely be due to past or present infection with  non-SARS-CoV-2 coronavirus strains, such as coronavirus HKU1, NL63, OC43,  or 229E. [employer], through extensive validation  testing, has confirmed that this risk is minimal with this test    COVID-19 PCR . (06.12.20 @ 14:35)    COVID-19 PCR: Detected: This test has been validated by [employer] to be accurate;  though it has not been FDA cleared/approved by the usual pathway.  As with all laboratory tests, results should be correlated with clinical  findings.  https://www.fda.gov/media/140236/download  https://www.fda.gov/media/535043/download            RADIOLOGY & ADDITIONAL TESTS:    CXR:    Ct scan chest:    ekg;    echo:

## 2020-06-15 NOTE — PROGRESS NOTE ADULT - ASSESSMENT
64 year old male from [facility]  with PMH dementia and DM coming in with seizures like activity while sitting at AL.  Pt had peg placement on 06/02/2020 for FTT 2/2 to dysphagia and poor oral intake related to changes in mental status.     Pt tolerates Peg tube feeding, medically optimized for discharge,  pending covid-19  2 negative results for d/c to  [facility]. Repeated COVID Positive 6/9.     6/11-Peg insertion site noted oozing small amounts drng, followed by GI.  Pt. with low grade fever, T max 100.1 associated with tachycardia, CXR ordered  6/12-Afebrile today, UA negative, blood Cx testing, CXR shows Opacification of the left lower lung field suggestive of atelectasis, effusion, and/or pneumonia, will f/u with CT chest with contrast for further investigation.  (consent for IV contrast obtained from daughter [phone]).  Peg adjusted by GI, no further leakage noted at this time, pt. tolerates feeding well.  Pt. is afebrile today, awake with minimal verbalization.

## 2020-06-15 NOTE — PROGRESS NOTE ADULT - SUBJECTIVE AND OBJECTIVE BOX
NP Note discussed with  Primary Attending    Patient is a 64y old  Male who presents with a chief complaint of seizures (15 Jonathan 2020 12:47)      INTERVAL HPI/OVERNIGHT EVENTS: no new complaints    MEDICATIONS  (STANDING):  ascorbic acid 500 milliGRAM(s) Oral two times a day  BACItracin   Ointment 1 Application(s) Topical daily  cholecalciferol 1000 Unit(s) Oral daily  collagenase Ointment 1 Application(s) Topical daily  dextrose 5% + sodium chloride 0.9%. 1000 milliLiter(s) (50 mL/Hr) IV Continuous <Continuous>  dextrose 5% + sodium chloride 0.9%. 1000 milliLiter(s) (50 mL/Hr) IV Continuous <Continuous>  dextrose 5%. 1000 milliLiter(s) (50 mL/Hr) IV Continuous <Continuous>  dextrose 50% Injectable 12.5 Gram(s) IV Push once  dextrose 50% Injectable 25 Gram(s) IV Push once  dextrose 50% Injectable 25 Gram(s) IV Push once  heparin   Injectable 5000 Unit(s) SubCutaneous every 12 hours  insulin glargine Injectable (LANTUS) 18 Unit(s) SubCutaneous at bedtime  insulin lispro (HumaLOG) corrective regimen sliding scale   SubCutaneous every 6 hours  insulin lispro Injectable (HumaLOG) 10 Unit(s) SubCutaneous every 6 hours  mirtazapine 7.5 milliGRAM(s) Oral at bedtime  multivitamin/minerals 1 Tablet(s) Oral daily  OLANZapine 2.5 milliGRAM(s) Oral at bedtime  pantoprazole   Suspension 40 milliGRAM(s) Enteral Tube daily  piperacillin/tazobactam IVPB.. 3.375 Gram(s) IV Intermittent every 8 hours  QUEtiapine 25 milliGRAM(s) Oral at bedtime  zinc sulfate 220 milliGRAM(s) Oral daily    MEDICATIONS  (PRN):  acetaminophen    Suspension .. 650 milliGRAM(s) Enteral Tube every 6 hours PRN Temp greater or equal to 38C (100.4F)  ALBUTerol    90 MICROgram(s) HFA Inhaler 2 Puff(s) Inhalation every 6 hours PRN Shortness of Breath and/or Wheezing  dextrose 40% Gel 15 Gram(s) Oral once PRN Blood Glucose LESS THAN 70 milliGRAM(s)/deciliter  glucagon  Injectable 1 milliGRAM(s) IntraMuscular once PRN Glucose LESS THAN 70 milligrams/deciliter  guaiFENesin   Syrup  (Sugar-Free) 100 milliGRAM(s) Oral every 6 hours PRN Cough      __________________________________________________  REVIEW OF SYSTEMS:  minimally verbal, unable to assess        Vital Signs Last 24 Hrs  T(C): 36.6 (15 Jonathan 2020 12:45), Max: 36.6 (14 Jun 2020 20:46)  T(F): 97.9 (15 Jonathan 2020 12:45), Max: 97.9 (14 Jun 2020 20:46)  HR: 93 (15 Jonathan 2020 12:45) (79 - 93)  BP: 139/87 (15 Jonathan 2020 12:45) (114/66 - 139/87)  BP(mean): --  RR: 17 (15 Jonathan 2020 12:45) (16 - 17)  SpO2: 100% (15 Jonathan 2020 12:45) (99% - 100%)    ________________________________________________  PHYSICAL EXAM:  well developed, well groomed, minimally verbal  GENERAL: NAD  HEENT: Normocephalic;  conjunctivae and sclerae clear; moist mucous membranes;   NECK : supple  CHEST/LUNG: Clear to auscultation bilaterally with good air entry   HEART: S1 S2  regular; no murmurs, gallops or rubs  ABDOMEN: PEG tube in place, Soft, Nontender, Nondistended; Bowel sounds present  EXTREMITIES: no cyanosis; no edema; no calf tenderness  SKIN: Laura PEG stoma site red and excoriated, warm and dry; no rash  NERVOUS SYSTEM:  Awake and alert; unable to assess, pt. is minimally verbal    _________________________________________________  LABS:                        11.4   11.51 )-----------( 525      ( 15 Jonathan 2020 06:17 )             34.7     06-15    135  |  99  |  24<H>  ----------------------------<  179<H>  4.2   |  29  |  0.86    Ca    9.3      15 Jonathan 2020 06:17          CAPILLARY BLOOD GLUCOSE      POCT Blood Glucose.: 260 mg/dL (15 Jonathan 2020 16:43)  POCT Blood Glucose.: 183 mg/dL (15 Jonathan 2020 11:59)  POCT Blood Glucose.: 185 mg/dL (15 Jonathan 2020 06:03)  POCT Blood Glucose.: 196 mg/dL (15 Jonathan 2020 05:35)  POCT Blood Glucose.: 205 mg/dL (15 Jonathan 2020 00:52)  POCT Blood Glucose.: 156 mg/dL (14 Jun 2020 21:20)        RADIOLOGY & ADDITIONAL TESTS:      CT Angio Chest w/ IV Cont (06.12.20 @ 15:14) >  EXAM:  CT ANGIO CHEST (W)AW IC                            PROCEDURE DATE:  06/12/2020          INTERPRETATION:  CLINICAL INFORMATION: Evaluate for pulmonary embolism    COMPARISON: None.    PROCEDURE:   CT Angiography of the Chest.  90 ml of Omnipaque 350 was injected intravenously. 10 ml were discarded.  Sagittal and coronal reformats were performed as well as 3D (MIP) reconstructions.    FINDINGS:    LUNGS AND AIRWAYS: Patent central airways.  Segmental consolidation left lower lobe with additional interstitial opacities consistent with pneumonia  PLEURA: No pleural effusion.  MEDIASTINUM AND LYUBOV: No lymphadenopathy.  VESSELS: Satisfactory contrast bolus. No pulmonary emboli. Nonaneurysmal thoracic aorta.  HEART: Heart size is normal. Nopericardial effusion.  CHEST WALL AND LOWER NECK: Bilateral gynecomastia  VISUALIZED UPPER ABDOMEN: Within normal limits.  BONES: Degenerative change thoracic spine    IMPRESSION:   Negative for pulmonary emboli.  Left lower lobe pneumonia    < end of copied text >      Imaging Personally Reviewed:  YES/NO    Consultant(s) Notes Reviewed:   YES/ No    Care Discussed with Consultants :     Plan of care was discussed with patient and /or primary care giver; all questions and concerns were addressed and care was aligned with patient's wishes.

## 2020-06-15 NOTE — PROGRESS NOTE ADULT - PROBLEM SELECTOR PLAN 10
Discharge to [facility]  after 2 consecutive negative testings for Covid-19 as required by [facility]. 06/03, 06/06, 6/9 testing was positive. repeat on 6/12.

## 2020-06-15 NOTE — PROGRESS NOTE ADULT - PROBLEM SELECTOR PLAN 1
PEG repositioned by GI.   BC negative   CXR shows opacification LLL - ? Effusion, Atelectasis, PNA. Similar to prior CXR.   CT angio chest pending.   US duplex pending.   Tylenol PRN  Monitor labs  ID follow up

## 2020-06-15 NOTE — PROGRESS NOTE ADULT - SUBJECTIVE AND OBJECTIVE BOX
[   ] ICU                                          [   ] CCU                                      [ X  ] Medical Floor      Patient is comfortable. No new complaints reported, No abdominal pain, N/V, hematemesis, hematochezia, melena, fever, chills, chest pain, SOB, cough or diarrhea reported.    VITALS  Vital Signs Last 24 Hrs  T(C): 36.6 (15 Jonathan 2020 05:51), Max: 36.6 (14 Jun 2020 20:46)  T(F): 97.9 (15 Jonathan 2020 05:51), Max: 97.9 (14 Jun 2020 20:46)  HR: 92 (15 Jonathan 2020 05:51) (79 - 92)  BP: 120/73 (15 Jonathan 2020 05:51) (114/66 - 120/73)  RR: 16 (15 Jonathan 2020 05:51) (16 - 16)  SpO2: 99% (15 Jonathan 2020 05:51) (99% - 99%)       MEDICATIONS  (STANDING):  ascorbic acid 500 milliGRAM(s) Oral two times a day  BACItracin   Ointment 1 Application(s) Topical daily  cholecalciferol 1000 Unit(s) Oral daily  collagenase Ointment 1 Application(s) Topical daily  dextrose 5% + sodium chloride 0.9%. 1000 milliLiter(s) (50 mL/Hr) IV Continuous <Continuous>  dextrose 5% + sodium chloride 0.9%. 1000 milliLiter(s) (50 mL/Hr) IV Continuous <Continuous>  dextrose 5%. 1000 milliLiter(s) (50 mL/Hr) IV Continuous <Continuous>  dextrose 50% Injectable 12.5 Gram(s) IV Push once  dextrose 50% Injectable 25 Gram(s) IV Push once  dextrose 50% Injectable 25 Gram(s) IV Push once  heparin   Injectable 5000 Unit(s) SubCutaneous every 12 hours  insulin glargine Injectable (LANTUS) 18 Unit(s) SubCutaneous at bedtime  insulin lispro (HumaLOG) corrective regimen sliding scale   SubCutaneous every 6 hours  insulin lispro Injectable (HumaLOG) 10 Unit(s) SubCutaneous every 6 hours  mirtazapine 7.5 milliGRAM(s) Oral at bedtime  multivitamin/minerals 1 Tablet(s) Oral daily  OLANZapine 2.5 milliGRAM(s) Oral at bedtime  pantoprazole   Suspension 40 milliGRAM(s) Enteral Tube daily  piperacillin/tazobactam IVPB.. 3.375 Gram(s) IV Intermittent every 8 hours  QUEtiapine 25 milliGRAM(s) Oral at bedtime  zinc sulfate 220 milliGRAM(s) Oral daily    MEDICATIONS  (PRN):  acetaminophen    Suspension .. 650 milliGRAM(s) Enteral Tube every 6 hours PRN Temp greater or equal to 38C (100.4F)  ALBUTerol    90 MICROgram(s) HFA Inhaler 2 Puff(s) Inhalation every 6 hours PRN Shortness of Breath and/or Wheezing  dextrose 40% Gel 15 Gram(s) Oral once PRN Blood Glucose LESS THAN 70 milliGRAM(s)/deciliter  glucagon  Injectable 1 milliGRAM(s) IntraMuscular once PRN Glucose LESS THAN 70 milligrams/deciliter  guaiFENesin   Syrup  (Sugar-Free) 100 milliGRAM(s) Oral every 6 hours PRN Cough                            11.4   11.51 )-----------( 525      ( 15 Jonathan 2020 06:17 )             34.7       06-15    135  |  99  |  24<H>  ----------------------------<  179<H>  4.2   |  29  |  0.86    Ca    9.3      15 Jonathan 2020 06:17

## 2020-06-15 NOTE — PROGRESS NOTE ADULT - SUBJECTIVE AND OBJECTIVE BOX
64 year old male from [facility] with PMH of dementia and type 2 DM presented with seizure activity while sitting at AL. Endocrinology was consulted for management of uncontrolled type 2 DM with hyperglycemia.    Patient seen and examined at bedside. Patient is resting comfortably. Patient with BG of 120 yesterday evening so Humalog dose was decreased to 10 units q 6 hrs to prevent hypoglycemia.       MEDICATIONS  (STANDING):  ascorbic acid 500 milliGRAM(s) Oral two times a day  BACItracin   Ointment 1 Application(s) Topical daily  cholecalciferol 1000 Unit(s) Oral daily  collagenase Ointment 1 Application(s) Topical daily  dextrose 5% + sodium chloride 0.9%. 1000 milliLiter(s) (50 mL/Hr) IV Continuous <Continuous>  dextrose 5% + sodium chloride 0.9%. 1000 milliLiter(s) (50 mL/Hr) IV Continuous <Continuous>  dextrose 5%. 1000 milliLiter(s) (50 mL/Hr) IV Continuous <Continuous>  dextrose 50% Injectable 12.5 Gram(s) IV Push once  dextrose 50% Injectable 25 Gram(s) IV Push once  dextrose 50% Injectable 25 Gram(s) IV Push once  heparin   Injectable 5000 Unit(s) SubCutaneous every 12 hours  insulin glargine Injectable (LANTUS) 18 Unit(s) SubCutaneous at bedtime  insulin lispro (HumaLOG) corrective regimen sliding scale   SubCutaneous every 6 hours  insulin lispro Injectable (HumaLOG) 10 Unit(s) SubCutaneous every 6 hours  mirtazapine 7.5 milliGRAM(s) Oral at bedtime  multivitamin/minerals 1 Tablet(s) Oral daily  OLANZapine 2.5 milliGRAM(s) Oral at bedtime  pantoprazole   Suspension 40 milliGRAM(s) Enteral Tube daily  piperacillin/tazobactam IVPB.. 3.375 Gram(s) IV Intermittent every 8 hours  QUEtiapine 25 milliGRAM(s) Oral at bedtime  zinc sulfate 220 milliGRAM(s) Oral daily    MEDICATIONS  (PRN):  acetaminophen    Suspension .. 650 milliGRAM(s) Enteral Tube every 6 hours PRN Temp greater or equal to 38C (100.4F)  ALBUTerol    90 MICROgram(s) HFA Inhaler 2 Puff(s) Inhalation every 6 hours PRN Shortness of Breath and/or Wheezing  dextrose 40% Gel 15 Gram(s) Oral once PRN Blood Glucose LESS THAN 70 milliGRAM(s)/deciliter  glucagon  Injectable 1 milliGRAM(s) IntraMuscular once PRN Glucose LESS THAN 70 milligrams/deciliter  guaiFENesin   Syrup  (Sugar-Free) 100 milliGRAM(s) Oral every 6 hours PRN Cough      REVIEW OF SYSTEMS:  unable to obtain due to medical condition    PHYSICAL EXAM:  VITAL SIGNS  T(C): 36.6 (06-15-20 @ 05:51), Max: 36.6 (06-14-20 @ 20:46)  HR: 92 (06-15-20 @ 05:51) (79 - 96)  BP: 120/73 (06-15-20 @ 05:51) (114/66 - 130/74)  RR: 16 (06-15-20 @ 05:51) (14 - 16)  SpO2: 99% (06-15-20 @ 05:51) (98% - 99%)      GENERAL: NAD, well-developed  HEAD:  Atraumatic, Normocephalic  EYES: EOMI, PERRLA, conjunctiva and sclera clear  ENMT: No tonsillar erythema, exudates, or enlargement; No lesions  NECK: Supple, No JVD, Normal thyroid  NERVOUS SYSTEM:  nonverbal,  CHEST/LUNG: CTA bilaterally  No rales, rhonchi, wheezing, or rubs  HEART: Regular rate and rhythm; No murmurs, rubs, or gallops  ABDOMEN: s/p PEG, Soft, Nontender, Nondistended; Bowel sounds present  EXTREMITIES:  No edema  SKIN: No rashes or lesions    LABS:                        11.4   11.51 )-----------( 525      ( 15 Jonathan 2020 06:17 )             34.7     06-15    135  |  99  |  24<H>  ----------------------------<  179<H>  4.2   |  29  |  0.86    Ca    9.3      15 Jonathan 2020 06:17          CAPILLARY BLOOD GLUCOSE      POCT Blood Glucose.: 185 mg/dL (15 Jonathan 2020 06:03)  POCT Blood Glucose.: 196 mg/dL (15 Jonathan 2020 05:35)  POCT Blood Glucose.: 205 mg/dL (15 Jonathan 2020 00:52)  POCT Blood Glucose.: 156 mg/dL (14 Jun 2020 21:20)  POCT Blood Glucose.: 120 mg/dL (14 Jun 2020 17:01)  POCT Blood Glucose.: 176 mg/dL (14 Jun 2020 13:30)

## 2020-06-15 NOTE — PROGRESS NOTE ADULT - PROBLEM SELECTOR PLAN 1
-CXR shows Opacification of the left lower lung field suggestive of atelectasis, effusion, and/or pneumonia  -CT chest->Negative for pulmonary emboli.  -Cont Zosyn  -COVID19 6/12 positive  -f/u repeat COVID19  Left lower lobe pneumonia  -

## 2020-06-15 NOTE — PROGRESS NOTE ADULT - ASSESSMENT
64 yr old male with PMHX of DM,dementia sent to ER from facility for seizure and now COVID+,Delirium,MAXIME,hypernatremia and now LLL aspiration pneumonia.  1.GI f/u,s/p PEG ,tolerating TF.  2.DM-Insulin, Endo f/u.  3.Delirium-cont psych meds.  4.LLL pneumonia- ID f/u, ABX.  5.GI and DVT prophylaxis.

## 2020-06-15 NOTE — PROGRESS NOTE ADULT - PROBLEM SELECTOR PLAN 4
Continue meds.  Supportive care.  Off O2.   Covid swab - will require negative x 2 for d/c planning.   Antibody - positive.   Isolation precautions

## 2020-06-16 LAB
ANION GAP SERPL CALC-SCNC: 8 MMOL/L — SIGNIFICANT CHANGE UP (ref 5–17)
BUN SERPL-MCNC: 23 MG/DL — HIGH (ref 7–18)
CALCIUM SERPL-MCNC: 9.2 MG/DL — SIGNIFICANT CHANGE UP (ref 8.4–10.5)
CHLORIDE SERPL-SCNC: 99 MMOL/L — SIGNIFICANT CHANGE UP (ref 96–108)
CO2 SERPL-SCNC: 29 MMOL/L — SIGNIFICANT CHANGE UP (ref 22–31)
CREAT SERPL-MCNC: 0.91 MG/DL — SIGNIFICANT CHANGE UP (ref 0.5–1.3)
GLUCOSE BLDC GLUCOMTR-MCNC: 121 MG/DL — HIGH (ref 70–99)
GLUCOSE BLDC GLUCOMTR-MCNC: 123 MG/DL — HIGH (ref 70–99)
GLUCOSE BLDC GLUCOMTR-MCNC: 148 MG/DL — HIGH (ref 70–99)
GLUCOSE BLDC GLUCOMTR-MCNC: 190 MG/DL — HIGH (ref 70–99)
GLUCOSE BLDC GLUCOMTR-MCNC: 279 MG/DL — HIGH (ref 70–99)
GLUCOSE SERPL-MCNC: 247 MG/DL — HIGH (ref 70–99)
HCT VFR BLD CALC: 30.6 % — LOW (ref 39–50)
HGB BLD-MCNC: 10.2 G/DL — LOW (ref 13–17)
MCHC RBC-ENTMCNC: 28.6 PG — SIGNIFICANT CHANGE UP (ref 27–34)
MCHC RBC-ENTMCNC: 33.3 GM/DL — SIGNIFICANT CHANGE UP (ref 32–36)
MCV RBC AUTO: 85.7 FL — SIGNIFICANT CHANGE UP (ref 80–100)
NRBC # BLD: 0 /100 WBCS — SIGNIFICANT CHANGE UP (ref 0–0)
PLATELET # BLD AUTO: 479 K/UL — HIGH (ref 150–400)
POTASSIUM SERPL-MCNC: 4.1 MMOL/L — SIGNIFICANT CHANGE UP (ref 3.5–5.3)
POTASSIUM SERPL-SCNC: 4.1 MMOL/L — SIGNIFICANT CHANGE UP (ref 3.5–5.3)
RBC # BLD: 3.57 M/UL — LOW (ref 4.2–5.8)
RBC # FLD: 12.7 % — SIGNIFICANT CHANGE UP (ref 10.3–14.5)
SARS-COV-2 IGG SERPL IA-ACNC: 9.6 RATIO — HIGH
SARS-COV-2 IGG SERPL QL IA: POSITIVE
SODIUM SERPL-SCNC: 136 MMOL/L — SIGNIFICANT CHANGE UP (ref 135–145)
WBC # BLD: 10.24 K/UL — SIGNIFICANT CHANGE UP (ref 3.8–10.5)
WBC # FLD AUTO: 10.24 K/UL — SIGNIFICANT CHANGE UP (ref 3.8–10.5)

## 2020-06-16 PROCEDURE — 99232 SBSQ HOSP IP/OBS MODERATE 35: CPT

## 2020-06-16 RX ORDER — INSULIN LISPRO 100/ML
12 VIAL (ML) SUBCUTANEOUS EVERY 6 HOURS
Refills: 0 | Status: DISCONTINUED | OUTPATIENT
Start: 2020-06-16 | End: 2020-06-19

## 2020-06-16 RX ORDER — INSULIN LISPRO 100/ML
12 VIAL (ML) SUBCUTANEOUS ONCE
Refills: 0 | Status: COMPLETED | OUTPATIENT
Start: 2020-06-16 | End: 2020-06-16

## 2020-06-16 RX ADMIN — Medication 1 APPLICATION(S): at 12:29

## 2020-06-16 RX ADMIN — Medication 2: at 00:18

## 2020-06-16 RX ADMIN — Medication 500 MILLIGRAM(S): at 17:03

## 2020-06-16 RX ADMIN — Medication 10 UNIT(S): at 06:25

## 2020-06-16 RX ADMIN — OLANZAPINE 2.5 MILLIGRAM(S): 15 TABLET, FILM COATED ORAL at 22:14

## 2020-06-16 RX ADMIN — Medication 1000 UNIT(S): at 12:29

## 2020-06-16 RX ADMIN — Medication 500 MILLIGRAM(S): at 06:25

## 2020-06-16 RX ADMIN — QUETIAPINE FUMARATE 25 MILLIGRAM(S): 200 TABLET, FILM COATED ORAL at 22:15

## 2020-06-16 RX ADMIN — INSULIN GLARGINE 18 UNIT(S): 100 INJECTION, SOLUTION SUBCUTANEOUS at 00:17

## 2020-06-16 RX ADMIN — PIPERACILLIN AND TAZOBACTAM 25 GRAM(S): 4; .5 INJECTION, POWDER, LYOPHILIZED, FOR SOLUTION INTRAVENOUS at 13:18

## 2020-06-16 RX ADMIN — PIPERACILLIN AND TAZOBACTAM 25 GRAM(S): 4; .5 INJECTION, POWDER, LYOPHILIZED, FOR SOLUTION INTRAVENOUS at 06:25

## 2020-06-16 RX ADMIN — PIPERACILLIN AND TAZOBACTAM 25 GRAM(S): 4; .5 INJECTION, POWDER, LYOPHILIZED, FOR SOLUTION INTRAVENOUS at 22:14

## 2020-06-16 RX ADMIN — MIRTAZAPINE 7.5 MILLIGRAM(S): 45 TABLET, ORALLY DISINTEGRATING ORAL at 22:14

## 2020-06-16 RX ADMIN — INSULIN GLARGINE 18 UNIT(S): 100 INJECTION, SOLUTION SUBCUTANEOUS at 22:13

## 2020-06-16 RX ADMIN — Medication 10 UNIT(S): at 00:18

## 2020-06-16 RX ADMIN — Medication 12 UNIT(S): at 12:30

## 2020-06-16 RX ADMIN — Medication 1 TABLET(S): at 12:29

## 2020-06-16 RX ADMIN — Medication 12 UNIT(S): at 17:02

## 2020-06-16 RX ADMIN — HEPARIN SODIUM 5000 UNIT(S): 5000 INJECTION INTRAVENOUS; SUBCUTANEOUS at 06:25

## 2020-06-16 RX ADMIN — ZINC SULFATE TAB 220 MG (50 MG ZINC EQUIVALENT) 220 MILLIGRAM(S): 220 (50 ZN) TAB at 12:29

## 2020-06-16 RX ADMIN — Medication 1 APPLICATION(S): at 12:28

## 2020-06-16 RX ADMIN — HEPARIN SODIUM 5000 UNIT(S): 5000 INJECTION INTRAVENOUS; SUBCUTANEOUS at 17:03

## 2020-06-16 RX ADMIN — PANTOPRAZOLE SODIUM 40 MILLIGRAM(S): 20 TABLET, DELAYED RELEASE ORAL at 12:29

## 2020-06-16 RX ADMIN — Medication 6: at 06:26

## 2020-06-16 NOTE — PROGRESS NOTE ADULT - SUBJECTIVE AND OBJECTIVE BOX
NP Note discussed with  Primary Attending    Patient is a 64y old  Male who presents with a chief complaint of seizures (16 Jun 2020 14:05)      INTERVAL HPI/OVERNIGHT EVENTS: no new complaints    MEDICATIONS  (STANDING):  ascorbic acid 500 milliGRAM(s) Oral two times a day  BACItracin   Ointment 1 Application(s) Topical daily  cholecalciferol 1000 Unit(s) Oral daily  collagenase Ointment 1 Application(s) Topical daily  dextrose 5% + sodium chloride 0.9%. 1000 milliLiter(s) (50 mL/Hr) IV Continuous <Continuous>  dextrose 5% + sodium chloride 0.9%. 1000 milliLiter(s) (50 mL/Hr) IV Continuous <Continuous>  dextrose 5%. 1000 milliLiter(s) (50 mL/Hr) IV Continuous <Continuous>  dextrose 50% Injectable 12.5 Gram(s) IV Push once  dextrose 50% Injectable 25 Gram(s) IV Push once  dextrose 50% Injectable 25 Gram(s) IV Push once  heparin   Injectable 5000 Unit(s) SubCutaneous every 12 hours  insulin glargine Injectable (LANTUS) 18 Unit(s) SubCutaneous at bedtime  insulin lispro (HumaLOG) corrective regimen sliding scale   SubCutaneous every 6 hours  insulin lispro Injectable (HumaLOG) 12 Unit(s) SubCutaneous every 6 hours  mirtazapine 7.5 milliGRAM(s) Oral at bedtime  multivitamin/minerals 1 Tablet(s) Oral daily  OLANZapine 2.5 milliGRAM(s) Oral at bedtime  pantoprazole   Suspension 40 milliGRAM(s) Enteral Tube daily  piperacillin/tazobactam IVPB.. 3.375 Gram(s) IV Intermittent every 8 hours  QUEtiapine 25 milliGRAM(s) Oral at bedtime  zinc sulfate 220 milliGRAM(s) Oral daily    MEDICATIONS  (PRN):  acetaminophen    Suspension .. 650 milliGRAM(s) Enteral Tube every 6 hours PRN Temp greater or equal to 38C (100.4F)  ALBUTerol    90 MICROgram(s) HFA Inhaler 2 Puff(s) Inhalation every 6 hours PRN Shortness of Breath and/or Wheezing  dextrose 40% Gel 15 Gram(s) Oral once PRN Blood Glucose LESS THAN 70 milliGRAM(s)/deciliter  glucagon  Injectable 1 milliGRAM(s) IntraMuscular once PRN Glucose LESS THAN 70 milligrams/deciliter  guaiFENesin   Syrup  (Sugar-Free) 100 milliGRAM(s) Oral every 6 hours PRN Cough      __________________________________________________  REVIEW OF SYSTEMS:  Unable to assess, pt. is minimally verbal    Vital Signs Last 24 Hrs  T(C): 36.6 (16 Jun 2020 14:00), Max: 36.6 (16 Jun 2020 05:40)  T(F): 97.8 (16 Jun 2020 14:00), Max: 97.9 (16 Jun 2020 05:40)  HR: 96 (16 Jun 2020 14:00) (74 - 96)  BP: 122/60 (16 Jun 2020 14:00) (100/61 - 122/60)  BP(mean): --  RR: 17 (16 Jun 2020 14:00) (16 - 17)  SpO2: 100% (16 Jun 2020 14:00) (99% - 100%)    ________________________________________________  PHYSICAL EXAM:  well developed, well groomed, minimally verbal  GENERAL: NAD  HEENT: Normocephalic;  conjunctivae and sclerae clear; moist mucous membranes;   NECK : supple  CHEST/LUNG: Clear to auscultation bilaterally with good air entry   HEART: S1 S2  regular; no murmurs, gallops or rubs  ABDOMEN: PEG stoma site with redness and excoriation,  Soft, Nontender, Nondistended; Bowel sounds present  EXTREMITIES: no cyanosis; no edema; no calf tenderness  SKIN: warm and dry; no rash  NERVOUS SYSTEM:  Awake and alert; minimally verbal, unable to assess  _________________________________________________  LABS:                        10.2   10.24 )-----------( 479      ( 16 Jun 2020 06:27 )             30.6     06-16    136  |  99  |  23<H>  ----------------------------<  247<H>  4.1   |  29  |  0.91    Ca    9.2      16 Jun 2020 06:27          CAPILLARY BLOOD GLUCOSE      POCT Blood Glucose.: 148 mg/dL (16 Jun 2020 12:10)  POCT Blood Glucose.: 279 mg/dL (16 Jun 2020 06:13)  POCT Blood Glucose.: 190 mg/dL (16 Jun 2020 00:07)  POCT Blood Glucose.: 260 mg/dL (15 Jonathan 2020 16:43)        RADIOLOGY & ADDITIONAL TESTS:    Imaging Personally Reviewed:  YES/NO    Consultant(s) Notes Reviewed:   YES/ No    Care Discussed with Consultants :     Plan of care was discussed with patient and /or primary care giver; all questions and concerns were addressed and care was aligned with patient's wishes. NP Note discussed with  Primary Attending    Patient is a 64y old  Male who presents with a chief complaint of seizures (16 Jun 2020 14:05)      INTERVAL HPI/OVERNIGHT EVENTS: no new complaints    MEDICATIONS  (STANDING):  ascorbic acid 500 milliGRAM(s) Oral two times a day  BACItracin   Ointment 1 Application(s) Topical daily  cholecalciferol 1000 Unit(s) Oral daily  collagenase Ointment 1 Application(s) Topical daily  dextrose 5% + sodium chloride 0.9%. 1000 milliLiter(s) (50 mL/Hr) IV Continuous <Continuous>  dextrose 5% + sodium chloride 0.9%. 1000 milliLiter(s) (50 mL/Hr) IV Continuous <Continuous>  dextrose 5%. 1000 milliLiter(s) (50 mL/Hr) IV Continuous <Continuous>  dextrose 50% Injectable 12.5 Gram(s) IV Push once  dextrose 50% Injectable 25 Gram(s) IV Push once  dextrose 50% Injectable 25 Gram(s) IV Push once  heparin   Injectable 5000 Unit(s) SubCutaneous every 12 hours  insulin glargine Injectable (LANTUS) 18 Unit(s) SubCutaneous at bedtime  insulin lispro (HumaLOG) corrective regimen sliding scale   SubCutaneous every 6 hours  insulin lispro Injectable (HumaLOG) 12 Unit(s) SubCutaneous every 6 hours  mirtazapine 7.5 milliGRAM(s) Oral at bedtime  multivitamin/minerals 1 Tablet(s) Oral daily  OLANZapine 2.5 milliGRAM(s) Oral at bedtime  pantoprazole   Suspension 40 milliGRAM(s) Enteral Tube daily  piperacillin/tazobactam IVPB.. 3.375 Gram(s) IV Intermittent every 8 hours  QUEtiapine 25 milliGRAM(s) Oral at bedtime  zinc sulfate 220 milliGRAM(s) Oral daily    MEDICATIONS  (PRN):  acetaminophen    Suspension .. 650 milliGRAM(s) Enteral Tube every 6 hours PRN Temp greater or equal to 38C (100.4F)  ALBUTerol    90 MICROgram(s) HFA Inhaler 2 Puff(s) Inhalation every 6 hours PRN Shortness of Breath and/or Wheezing  dextrose 40% Gel 15 Gram(s) Oral once PRN Blood Glucose LESS THAN 70 milliGRAM(s)/deciliter  glucagon  Injectable 1 milliGRAM(s) IntraMuscular once PRN Glucose LESS THAN 70 milligrams/deciliter  guaiFENesin   Syrup  (Sugar-Free) 100 milliGRAM(s) Oral every 6 hours PRN Cough      __________________________________________________  REVIEW OF SYSTEMS:  Unable to assess, pt. is minimally verbal    Vital Signs Last 24 Hrs  T(C): 36.6 (16 Jun 2020 14:00), Max: 36.6 (16 Jun 2020 05:40)  T(F): 97.8 (16 Jun 2020 14:00), Max: 97.9 (16 Jun 2020 05:40)  HR: 96 (16 Jun 2020 14:00) (74 - 96)  BP: 122/60 (16 Jun 2020 14:00) (100/61 - 122/60)  BP(mean): --  RR: 17 (16 Jun 2020 14:00) (16 - 17)  SpO2: 100% (16 Jun 2020 14:00) (99% - 100%)    ________________________________________________  PHYSICAL EXAM:  well developed, well groomed, minimally verbal  GENERAL: NAD  HEENT: Normocephalic;  conjunctivae and sclerae clear; moist mucous membranes;   NECK : supple  CHEST/LUNG: Clear to auscultation bilaterally with good air entry   HEART: S1 S2  regular; no murmurs, gallops or rubs  ABDOMEN: PEG stoma site with redness and excoriation,  Soft, Nontender, Nondistended; Bowel sounds present  EXTREMITIES: no cyanosis; no edema; no calf tenderness  SKIN: warm and dry; no rash  NERVOUS SYSTEM:  Awake and alert; minimally verbal, unable to assess  _________________________________________________  LABS:                        10.2   10.24 )-----------( 479      ( 16 Jun 2020 06:27 )             30.6     06-16    136  |  99  |  23<H>  ----------------------------<  247<H>  4.1   |  29  |  0.91    Ca    9.2      16 Jun 2020 06:27          CAPILLARY BLOOD GLUCOSE      POCT Blood Glucose.: 148 mg/dL (16 Jun 2020 12:10)  POCT Blood Glucose.: 279 mg/dL (16 Jun 2020 06:13)  POCT Blood Glucose.: 190 mg/dL (16 Jun 2020 00:07)  POCT Blood Glucose.: 260 mg/dL (15 Jonathan 2020 16:43)    RADIOLOGY & ADDITIONAL TESTS:    Imaging Personally Reviewed:  YES/NO    Consultant(s) Notes Reviewed:   YES/ No    Care Discussed with Consultants :     Plan of care was discussed with patient and /or primary care giver; all questions and concerns were addressed and care was aligned with patient's wishes.

## 2020-06-16 NOTE — PHYSICAL THERAPY INITIAL EVALUATION ADULT - IMPAIRMENTS FOUND, PT EVAL
gait, locomotion, and balance/muscle strength/aerobic capacity/endurance/ROM
muscle strength/posture/aerobic capacity/endurance/fine motor/tone/gross motor/gait, locomotion, and balance
aerobic capacity/endurance/muscle strength/poor safety awareness/posture/gross motor/gait, locomotion, and balance/ROM

## 2020-06-16 NOTE — PROGRESS NOTE ADULT - ASSESSMENT
64 year old male from [facility]  with PMH dementia and DM coming in with seizures like activity while sitting at AL.  Pt had peg placement on 06/02/2020 for FTT 2/2 to dysphagia and poor oral intake related to changes in mental status.     Pt tolerates Peg tube feeding, medically optimized for discharge,  pending covid-19  2 negative results for d/c to  [facility]. Repeated COVID Positive 6/9.     6/11-Peg insertion site noted oozing small amounts drng, followed by GI.  Pt. with low grade fever, T max 100.1 associated with tachycardia, CXR ordered  6/12-Afebrile today, UA negative, blood Cx testing, CXR shows Opacification of the left lower lung field suggestive of atelectasis, effusion, and/or pneumonia, will f/u with CT chest with contrast for further investigation.  (consent for IV contrast obtained from daughter [phone]).  Peg adjusted by GI, no further leakage noted at this time, pt. tolerates feeding well.  Pt. is afebrile today, awake with minimal verbalization.    6/16-Overall clinical presentation unchanged.  Pt. is awake, minimally verbal with minimal voluntary muscle activity.  PT james appreciated, RODNEY recommended.  Spoke to daughter Ailin at [phone] updated her on pt.'s condition.  She is aware that pt. is no longer eligible for AL and will need RODNEY placement, choices were given to her by C/M.

## 2020-06-16 NOTE — PHYSICAL THERAPY INITIAL EVALUATION ADULT - PERTINENT HX OF CURRENT PROBLEM, REHAB EVAL
patient to ER from [facility] assisted living facility due to Seizure like activities
seizures
seizures

## 2020-06-16 NOTE — PROGRESS NOTE ADULT - ASSESSMENT
64 year old male from [facility] with PMH of dementia and type 2 DM presented with seizure activity while sitting at AL. Endocrinology was consulted for management of uncontrolled type 2 DM with hyperglycemia.    1. Uncontrolled Type 2 DM with hyperglycemia, A1c 9.9%  -tube feedings at goal rate 60cc/hr  -FS slightly above goal goal >180  -Continue Lantus 18 units at bedtime  -Increase to Humalog 12 units q 6 hrs (hold if tube feedings are held)  -continue moderate dose rapid acting insulin q 6 hrs   BG 70-100mg/dL 0 units  -150 mg/dL 0 units  -200 mg/dL 2 unit  -250 mg/dL 4 units  -300 mg/dL 6 units  -350 mg/dL 8 units  -400 mg/dL 10 units  BG > 400mg/dL 12 units  -FS AC HS  -ensure consistent carbohydrate   -will monitor FS and adjust accordingly     2. Delirium  -continue Seroquel, Zyprexa, Remeron    3. Left lower lobe pneumonia  -continue antibiotics as per ID  -ID follow up    Plan discussed with primary team]  Please  call  w/ any questions or concerns [phone]

## 2020-06-16 NOTE — PHYSICAL THERAPY INITIAL EVALUATION ADULT - REHAB POTENTIAL, PT EVAL
fair, will monitor progress closely
good, to achieve stated therapy goals
good, to achieve stated therapy goals

## 2020-06-16 NOTE — PROGRESS NOTE ADULT - SUBJECTIVE AND OBJECTIVE BOX
Pt is awake, alert, lying in bed in NAD. Afebrile.     INTERVAL HPI/OVERNIGHT EVENTS:      VITAL SIGNS:  T(F): 97.9 (06-16-20 @ 05:40)  HR: 77 (06-16-20 @ 05:40)  BP: 100/61 (06-16-20 @ 05:40)  RR: 16 (06-16-20 @ 05:40)  SpO2: 99% (06-16-20 @ 05:40)  Wt(kg): --  I&O's Detail    REVIEW OF SYSTEMS:    CONSTITUTIONAL:  No fevers, chills, sweats    HEENT:  Eyes:  No diplopia or blurred vision. ENT:  No earache, sore throat or runny nose.    CARDIOVASCULAR:  No pressure, squeezing, tightness, or heaviness about the chest; no palpitations.    RESPIRATORY:  Per HPI    GASTROINTESTINAL:  No abdominal pain, nausea, vomiting or diarrhea.    GENITOURINARY:  No dysuria, frequency or urgency.    NEUROLOGIC:  No paresthesias, fasciculations, seizures or weakness.    PSYCHIATRIC:  No disorder of thought or mood.      PHYSICAL EXAM:    Constitutional: Well developed and nourished  Eyes: Perrla  ENMT: normal  Neck: supple  Respiratory: good air entry  Cardiovascular: S1 S2 regular  Gastrointestinal: Soft, Non tender  Extremities: No edema  Vascular: normal  Neurological: Awake, alert,Ox3  Musculoskeletal: Normal      MEDICATIONS  (STANDING):  ascorbic acid 500 milliGRAM(s) Oral two times a day  BACItracin   Ointment 1 Application(s) Topical daily  cholecalciferol 1000 Unit(s) Oral daily  collagenase Ointment 1 Application(s) Topical daily  dextrose 5% + sodium chloride 0.9%. 1000 milliLiter(s) (50 mL/Hr) IV Continuous <Continuous>  dextrose 5% + sodium chloride 0.9%. 1000 milliLiter(s) (50 mL/Hr) IV Continuous <Continuous>  dextrose 5%. 1000 milliLiter(s) (50 mL/Hr) IV Continuous <Continuous>  dextrose 50% Injectable 12.5 Gram(s) IV Push once  dextrose 50% Injectable 25 Gram(s) IV Push once  dextrose 50% Injectable 25 Gram(s) IV Push once  heparin   Injectable 5000 Unit(s) SubCutaneous every 12 hours  insulin glargine Injectable (LANTUS) 18 Unit(s) SubCutaneous at bedtime  insulin lispro (HumaLOG) corrective regimen sliding scale   SubCutaneous every 6 hours  insulin lispro Injectable (HumaLOG) 10 Unit(s) SubCutaneous every 6 hours  mirtazapine 7.5 milliGRAM(s) Oral at bedtime  multivitamin/minerals 1 Tablet(s) Oral daily  OLANZapine 2.5 milliGRAM(s) Oral at bedtime  pantoprazole   Suspension 40 milliGRAM(s) Enteral Tube daily  piperacillin/tazobactam IVPB.. 3.375 Gram(s) IV Intermittent every 8 hours  QUEtiapine 25 milliGRAM(s) Oral at bedtime  zinc sulfate 220 milliGRAM(s) Oral daily    MEDICATIONS  (PRN):  acetaminophen    Suspension .. 650 milliGRAM(s) Enteral Tube every 6 hours PRN Temp greater or equal to 38C (100.4F)  ALBUTerol    90 MICROgram(s) HFA Inhaler 2 Puff(s) Inhalation every 6 hours PRN Shortness of Breath and/or Wheezing  dextrose 40% Gel 15 Gram(s) Oral once PRN Blood Glucose LESS THAN 70 milliGRAM(s)/deciliter  glucagon  Injectable 1 milliGRAM(s) IntraMuscular once PRN Glucose LESS THAN 70 milligrams/deciliter  guaiFENesin   Syrup  (Sugar-Free) 100 milliGRAM(s) Oral every 6 hours PRN Cough      Allergies    No Known Allergies    Intolerances        LABS:                        10.2   10.24 )-----------( 479      ( 16 Jun 2020 06:27 )             30.6     06-16    136  |  99  |  23<H>  ----------------------------<  247<H>  4.1   |  29  |  0.91    Ca    9.2      16 Jun 2020 06:27          CAPILLARY BLOOD GLUCOSE      POCT Blood Glucose.: 279 mg/dL (16 Jun 2020 06:13)  POCT Blood Glucose.: 190 mg/dL (16 Jun 2020 00:07)  POCT Blood Glucose.: 260 mg/dL (15 Jonathan 2020 16:43)  POCT Blood Glucose.: 183 mg/dL (15 Jonathan 2020 11:59)    COVID-19  Antibody - inpatient disease monitoring (06.13.20 @ 22:25)    COVID-19 IgG Antibody Index: 156.00: Diasorin CMIA  Negative    <= 14.99 AU/mL  Positive    >= 15.00 AU/mL AU/mL    COVID-19 IgG Antibody Interpretation: Positive: This test has not been reviewed by the FDA by the standard review  process. It has been authorized for emergency use by the FDA. [employer] has validated this test to be accurate.  Negative results do not rule out SARS-CoV-2 infection, particularly in  those who have been in recent contact with the virus. Follow-up testing  with a molecular diagnostic test should be considered to rule out  infection in these individuals.  Results from antibody testing should not be used as thesole basis to  diagnose or exclude SARS-CoV-2 infection, or to inform infection status.  Positive results may rarely be due to past or present infection with  non-SARS-CoV-2 coronavirus strains, such as coronavirus HKU1, NL63, OC43,  or 229E. [employer], through extensive validation  testing, has confirmed that this risk is minimal with this test.        RADIOLOGY & ADDITIONAL TESTS:    CXR:    Ct scan chest:    ekg;    echo:

## 2020-06-16 NOTE — PHYSICAL THERAPY INITIAL EVALUATION ADULT - DIAGNOSIS, PT EVAL
declined functional status
Aerobic Loss, Impaired Mobility, Unstable Balance, Weakness
Weakness, Impaired Mobility, Unstable Balance

## 2020-06-16 NOTE — PROGRESS NOTE ADULT - SUBJECTIVE AND OBJECTIVE BOX
64 year old male from [facility] with PMH of dementia and type 2 DM presented with seizure activity while sitting at AL. Endocrinology was consulted for management of uncontrolled type 2 DM with hyperglycemia.    Patient seen and examined at bedside. Patient is resting comfortably and states he feels "good" but can not provide further information. FS reviewed. BG slightly above goal >180.        MEDICATIONS  (STANDING):  ascorbic acid 500 milliGRAM(s) Oral two times a day  BACItracin   Ointment 1 Application(s) Topical daily  cholecalciferol 1000 Unit(s) Oral daily  collagenase Ointment 1 Application(s) Topical daily  dextrose 5% + sodium chloride 0.9%. 1000 milliLiter(s) (50 mL/Hr) IV Continuous <Continuous>  dextrose 5% + sodium chloride 0.9%. 1000 milliLiter(s) (50 mL/Hr) IV Continuous <Continuous>  dextrose 5%. 1000 milliLiter(s) (50 mL/Hr) IV Continuous <Continuous>  dextrose 50% Injectable 12.5 Gram(s) IV Push once  dextrose 50% Injectable 25 Gram(s) IV Push once  dextrose 50% Injectable 25 Gram(s) IV Push once  heparin   Injectable 5000 Unit(s) SubCutaneous every 12 hours  insulin glargine Injectable (LANTUS) 18 Unit(s) SubCutaneous at bedtime  insulin lispro (HumaLOG) corrective regimen sliding scale   SubCutaneous every 6 hours  insulin lispro Injectable (HumaLOG) 10 Unit(s) SubCutaneous every 6 hours  mirtazapine 7.5 milliGRAM(s) Oral at bedtime  multivitamin/minerals 1 Tablet(s) Oral daily  OLANZapine 2.5 milliGRAM(s) Oral at bedtime  pantoprazole   Suspension 40 milliGRAM(s) Enteral Tube daily  piperacillin/tazobactam IVPB.. 3.375 Gram(s) IV Intermittent every 8 hours  QUEtiapine 25 milliGRAM(s) Oral at bedtime  zinc sulfate 220 milliGRAM(s) Oral daily    MEDICATIONS  (PRN):  acetaminophen    Suspension .. 650 milliGRAM(s) Enteral Tube every 6 hours PRN Temp greater or equal to 38C (100.4F)  ALBUTerol    90 MICROgram(s) HFA Inhaler 2 Puff(s) Inhalation every 6 hours PRN Shortness of Breath and/or Wheezing  dextrose 40% Gel 15 Gram(s) Oral once PRN Blood Glucose LESS THAN 70 milliGRAM(s)/deciliter  glucagon  Injectable 1 milliGRAM(s) IntraMuscular once PRN Glucose LESS THAN 70 milligrams/deciliter  guaiFENesin   Syrup  (Sugar-Free) 100 milliGRAM(s) Oral every 6 hours PRN Cough      REVIEW OF SYSTEMS:  unable to obtain due to medical condition    PHYSICAL EXAM:  VITAL SIGNS  T(C): 36.6 (06-16-20 @ 05:40), Max: 36.6 (06-15-20 @ 12:45)  HR: 77 (06-16-20 @ 05:40) (74 - 93)  BP: 100/61 (06-16-20 @ 05:40) (100/61 - 139/87)  RR: 16 (06-16-20 @ 05:40) (16 - 17)  SpO2: 99% (06-16-20 @ 05:40) (99% - 100%)      GENERAL: NAD, well-developed  HEAD:  Atraumatic, Normocephalic  EYES: EOMI, PERRLA, conjunctiva and sclera clear  ENMT: No tonsillar erythema, exudates, or enlargement; No lesions  NECK: Supple, No JVD, Normal thyroid  NERVOUS SYSTEM:  nonverbal,  CHEST/LUNG: CTA bilaterally  No rales, rhonchi, wheezing, or rubs  HEART: Regular rate and rhythm; No murmurs, rubs, or gallops  ABDOMEN: s/p PEG, Soft, Nontender, Nondistended; Bowel sounds present  EXTREMITIES:  No edema  SKIN: No rashes or lesions      LABS:                        10.2   10.24 )-----------( 479      ( 16 Jun 2020 06:27 )             30.6     06-16    136  |  99  |  23<H>  ----------------------------<  247<H>  4.1   |  29  |  0.91    Ca    9.2      16 Jun 2020 06:27          CAPILLARY BLOOD GLUCOSE      POCT Blood Glucose.: 279 mg/dL (16 Jun 2020 06:13)  POCT Blood Glucose.: 190 mg/dL (16 Jun 2020 00:07)  POCT Blood Glucose.: 260 mg/dL (15 Jonathan 2020 16:43)  POCT Blood Glucose.: 183 mg/dL (15 Jonathan 2020 11:59)

## 2020-06-16 NOTE — PHYSICAL THERAPY INITIAL EVALUATION ADULT - CRITERIA FOR SKILLED THERAPEUTIC INTERVENTIONS
rehab potential/anticipated discharge recommendation/therapy frequency/predicted duration of therapy intervention/impairments found
risk reduction/prevention/functional limitations in following categories/impairments found
risk reduction/prevention/impairments found/functional limitations in following categories

## 2020-06-16 NOTE — PHYSICAL THERAPY INITIAL EVALUATION ADULT - GENERAL OBSERVATIONS, REHAB EVAL
patient receive din bed with NG tube, Both wrist restrain in place.
pt emr review, covid isolation, ra, seizures precautions, revisit floor referral, responsive to touch, assistance to bedmobility assessment and voluntary guarding
pt emr review, covid management, ra, peg, awake follows cues, assistance required in bedmobility activities

## 2020-06-16 NOTE — PHYSICAL THERAPY INITIAL EVALUATION ADULT - PLANNED THERAPY INTERVENTIONS, PT EVAL
gait training/bed mobility training/strengthening/balance training/transfer training
bed mobility training/transfer training/gait training/strengthening
bed mobility training/transfer training/gait training/strengthening

## 2020-06-16 NOTE — PROGRESS NOTE ADULT - PROBLEM SELECTOR PLAN 1
PEG repositioned by GI.   BC negative   CXR shows opacification LLL - ? Effusion, Atelectasis, PNA. Similar to prior CXR.  CT chest noted.    Tylenol PRN  Monitor labs  ID follow up

## 2020-06-16 NOTE — PROGRESS NOTE ADULT - PROBLEM SELECTOR PLAN 2
CT shows LLL PNA on 6/12.   Abx.   Oxygen Supp PRN  Monitor Oxygen sat  CXR noted 6/11.   CT chest noted.   ID follow up

## 2020-06-16 NOTE — PROGRESS NOTE ADULT - SUBJECTIVE AND OBJECTIVE BOX
[   ] ICU                                          [   ] CCU                                      [  X ] Medical Floor      Patient is comfortable. No new complaints reported, No abdominal pain, N/V, hematemesis, hematochezia, melena, fever, chills, chest pain, SOB, cough or diarrhea reported.    VITALS    Vital Signs Last 24 Hrs  T(C): 36.6 (16 Jun 2020 05:40), Max: 36.6 (16 Jun 2020 05:40)  T(F): 97.9 (16 Jun 2020 05:40), Max: 97.9 (16 Jun 2020 05:40)  HR: 77 (16 Jun 2020 05:40) (74 - 77)  BP: 100/61 (16 Jun 2020 05:40) (100/61 - 108/64)   RR: 16 (16 Jun 2020 05:40) (16 - 16)  SpO2: 99% (16 Jun 2020 05:40) (99% - 99%)BP:       MEDICATIONS  (STANDING):  ascorbic acid 500 milliGRAM(s) Oral two times a day  BACItracin   Ointment 1 Application(s) Topical daily  cholecalciferol 1000 Unit(s) Oral daily  collagenase Ointment 1 Application(s) Topical daily  dextrose 5% + sodium chloride 0.9%. 1000 milliLiter(s) (50 mL/Hr) IV Continuous <Continuous>  dextrose 5% + sodium chloride 0.9%. 1000 milliLiter(s) (50 mL/Hr) IV Continuous <Continuous>  dextrose 5%. 1000 milliLiter(s) (50 mL/Hr) IV Continuous <Continuous>  dextrose 50% Injectable 12.5 Gram(s) IV Push once  dextrose 50% Injectable 25 Gram(s) IV Push once  dextrose 50% Injectable 25 Gram(s) IV Push once  heparin   Injectable 5000 Unit(s) SubCutaneous every 12 hours  insulin glargine Injectable (LANTUS) 18 Unit(s) SubCutaneous at bedtime  insulin lispro (HumaLOG) corrective regimen sliding scale   SubCutaneous every 6 hours  insulin lispro Injectable (HumaLOG) 12 Unit(s) SubCutaneous every 6 hours  mirtazapine 7.5 milliGRAM(s) Oral at bedtime  multivitamin/minerals 1 Tablet(s) Oral daily  OLANZapine 2.5 milliGRAM(s) Oral at bedtime  pantoprazole   Suspension 40 milliGRAM(s) Enteral Tube daily  piperacillin/tazobactam IVPB.. 3.375 Gram(s) IV Intermittent every 8 hours  QUEtiapine 25 milliGRAM(s) Oral at bedtime  zinc sulfate 220 milliGRAM(s) Oral daily    MEDICATIONS  (PRN):  acetaminophen    Suspension .. 650 milliGRAM(s) Enteral Tube every 6 hours PRN Temp greater or equal to 38C (100.4F)  ALBUTerol    90 MICROgram(s) HFA Inhaler 2 Puff(s) Inhalation every 6 hours PRN Shortness of Breath and/or Wheezing  dextrose 40% Gel 15 Gram(s) Oral once PRN Blood Glucose LESS THAN 70 milliGRAM(s)/deciliter  glucagon  Injectable 1 milliGRAM(s) IntraMuscular once PRN Glucose LESS THAN 70 milligrams/deciliter  guaiFENesin   Syrup  (Sugar-Free) 100 milliGRAM(s) Oral every 6 hours PRN Cough                            10.2   10.24 )-----------( 479      ( 16 Jun 2020 06:27 )             30.6       06-16    136  |  99  |  23<H>  ----------------------------<  247<H>  4.1   |  29  |  0.91    Ca    9.2      16 Jun 2020 06:27

## 2020-06-16 NOTE — PHYSICAL THERAPY INITIAL EVALUATION ADULT - FOLLOWS COMMANDS/ANSWERS QUESTIONS, REHAB EVAL
unable to follow commands/unable to answer questions
able to follow single-step instructions
constant/able to follow single-step instructions

## 2020-06-16 NOTE — PROGRESS NOTE ADULT - PROBLEM SELECTOR PLAN 10
Discharge to [facility]  after 2 consecutive negative testings for Covid-19 as required by [facility]. 06/03, 06/06, 6/9, 6/12 testing was positive. 6/16 specimen testing

## 2020-06-16 NOTE — PHYSICAL THERAPY INITIAL EVALUATION ADULT - IMPAIRMENTS CONTRIBUTING IMPAIRED BED MOBILITY, REHAB EVAL
impaired motor control/impaired postural control/impaired balance/decreased strength
impaired balance/decreased flexibility/decreased strength/impaired postural control/impaired motor control

## 2020-06-17 LAB
ANION GAP SERPL CALC-SCNC: 6 MMOL/L — SIGNIFICANT CHANGE UP (ref 5–17)
BUN SERPL-MCNC: 24 MG/DL — HIGH (ref 7–18)
CALCIUM SERPL-MCNC: 9.2 MG/DL — SIGNIFICANT CHANGE UP (ref 8.4–10.5)
CHLORIDE SERPL-SCNC: 101 MMOL/L — SIGNIFICANT CHANGE UP (ref 96–108)
CO2 SERPL-SCNC: 29 MMOL/L — SIGNIFICANT CHANGE UP (ref 22–31)
CREAT SERPL-MCNC: 0.82 MG/DL — SIGNIFICANT CHANGE UP (ref 0.5–1.3)
CULTURE RESULTS: SIGNIFICANT CHANGE UP
CULTURE RESULTS: SIGNIFICANT CHANGE UP
GLUCOSE BLDC GLUCOMTR-MCNC: 154 MG/DL — HIGH (ref 70–99)
GLUCOSE BLDC GLUCOMTR-MCNC: 158 MG/DL — HIGH (ref 70–99)
GLUCOSE BLDC GLUCOMTR-MCNC: 220 MG/DL — HIGH (ref 70–99)
GLUCOSE SERPL-MCNC: 219 MG/DL — HIGH (ref 70–99)
HCT VFR BLD CALC: 32.6 % — LOW (ref 39–50)
HGB BLD-MCNC: 10.8 G/DL — LOW (ref 13–17)
MCHC RBC-ENTMCNC: 28.3 PG — SIGNIFICANT CHANGE UP (ref 27–34)
MCHC RBC-ENTMCNC: 33.1 GM/DL — SIGNIFICANT CHANGE UP (ref 32–36)
MCV RBC AUTO: 85.6 FL — SIGNIFICANT CHANGE UP (ref 80–100)
NRBC # BLD: 0 /100 WBCS — SIGNIFICANT CHANGE UP (ref 0–0)
PLATELET # BLD AUTO: 511 K/UL — HIGH (ref 150–400)
POTASSIUM SERPL-MCNC: 3.9 MMOL/L — SIGNIFICANT CHANGE UP (ref 3.5–5.3)
POTASSIUM SERPL-SCNC: 3.9 MMOL/L — SIGNIFICANT CHANGE UP (ref 3.5–5.3)
RBC # BLD: 3.81 M/UL — LOW (ref 4.2–5.8)
RBC # FLD: 12.9 % — SIGNIFICANT CHANGE UP (ref 10.3–14.5)
SARS-COV-2 RNA SPEC QL NAA+PROBE: DETECTED
SODIUM SERPL-SCNC: 136 MMOL/L — SIGNIFICANT CHANGE UP (ref 135–145)
SPECIMEN SOURCE: SIGNIFICANT CHANGE UP
SPECIMEN SOURCE: SIGNIFICANT CHANGE UP
WBC # BLD: 7.91 K/UL — SIGNIFICANT CHANGE UP (ref 3.8–10.5)
WBC # FLD AUTO: 7.91 K/UL — SIGNIFICANT CHANGE UP (ref 3.8–10.5)

## 2020-06-17 PROCEDURE — 99232 SBSQ HOSP IP/OBS MODERATE 35: CPT

## 2020-06-17 RX ADMIN — Medication 12 UNIT(S): at 05:56

## 2020-06-17 RX ADMIN — Medication 500 MILLIGRAM(S): at 05:54

## 2020-06-17 RX ADMIN — HEPARIN SODIUM 5000 UNIT(S): 5000 INJECTION INTRAVENOUS; SUBCUTANEOUS at 05:54

## 2020-06-17 RX ADMIN — ZINC SULFATE TAB 220 MG (50 MG ZINC EQUIVALENT) 220 MILLIGRAM(S): 220 (50 ZN) TAB at 11:34

## 2020-06-17 RX ADMIN — PIPERACILLIN AND TAZOBACTAM 25 GRAM(S): 4; .5 INJECTION, POWDER, LYOPHILIZED, FOR SOLUTION INTRAVENOUS at 05:54

## 2020-06-17 RX ADMIN — PIPERACILLIN AND TAZOBACTAM 25 GRAM(S): 4; .5 INJECTION, POWDER, LYOPHILIZED, FOR SOLUTION INTRAVENOUS at 13:58

## 2020-06-17 RX ADMIN — Medication 1 APPLICATION(S): at 11:34

## 2020-06-17 RX ADMIN — Medication 2: at 12:26

## 2020-06-17 RX ADMIN — Medication 1 APPLICATION(S): at 11:32

## 2020-06-17 RX ADMIN — Medication 4: at 05:55

## 2020-06-17 RX ADMIN — PANTOPRAZOLE SODIUM 40 MILLIGRAM(S): 20 TABLET, DELAYED RELEASE ORAL at 11:34

## 2020-06-17 RX ADMIN — Medication 500 MILLIGRAM(S): at 18:05

## 2020-06-17 RX ADMIN — Medication 1 TABLET(S): at 11:34

## 2020-06-17 RX ADMIN — Medication 2: at 00:13

## 2020-06-17 RX ADMIN — Medication 12 UNIT(S): at 17:29

## 2020-06-17 RX ADMIN — Medication 12 UNIT(S): at 12:27

## 2020-06-17 RX ADMIN — Medication 1000 UNIT(S): at 11:34

## 2020-06-17 RX ADMIN — Medication 2: at 17:28

## 2020-06-17 RX ADMIN — HEPARIN SODIUM 5000 UNIT(S): 5000 INJECTION INTRAVENOUS; SUBCUTANEOUS at 18:04

## 2020-06-17 NOTE — PROGRESS NOTE ADULT - SUBJECTIVE AND OBJECTIVE BOX
NP Note discussed with  Primary Attending    Patient is a 64y old  Male who presents with a chief complaint of seizures (17 Jun 2020 13:49)      INTERVAL HPI/OVERNIGHT EVENTS: no new complaints    MEDICATIONS  (STANDING):  ascorbic acid 500 milliGRAM(s) Oral two times a day  BACItracin   Ointment 1 Application(s) Topical daily  cholecalciferol 1000 Unit(s) Oral daily  collagenase Ointment 1 Application(s) Topical daily  dextrose 5% + sodium chloride 0.9%. 1000 milliLiter(s) (50 mL/Hr) IV Continuous <Continuous>  dextrose 5% + sodium chloride 0.9%. 1000 milliLiter(s) (50 mL/Hr) IV Continuous <Continuous>  dextrose 5%. 1000 milliLiter(s) (50 mL/Hr) IV Continuous <Continuous>  dextrose 50% Injectable 12.5 Gram(s) IV Push once  dextrose 50% Injectable 25 Gram(s) IV Push once  dextrose 50% Injectable 25 Gram(s) IV Push once  heparin   Injectable 5000 Unit(s) SubCutaneous every 12 hours  insulin glargine Injectable (LANTUS) 18 Unit(s) SubCutaneous at bedtime  insulin lispro (HumaLOG) corrective regimen sliding scale   SubCutaneous every 6 hours  insulin lispro Injectable (HumaLOG) 12 Unit(s) SubCutaneous every 6 hours  mirtazapine 7.5 milliGRAM(s) Oral at bedtime  multivitamin/minerals 1 Tablet(s) Oral daily  OLANZapine 2.5 milliGRAM(s) Oral at bedtime  pantoprazole   Suspension 40 milliGRAM(s) Enteral Tube daily  piperacillin/tazobactam IVPB.. 3.375 Gram(s) IV Intermittent every 8 hours  QUEtiapine 25 milliGRAM(s) Oral at bedtime  zinc sulfate 220 milliGRAM(s) Oral daily    MEDICATIONS  (PRN):  acetaminophen    Suspension .. 650 milliGRAM(s) Enteral Tube every 6 hours PRN Temp greater or equal to 38C (100.4F)  ALBUTerol    90 MICROgram(s) HFA Inhaler 2 Puff(s) Inhalation every 6 hours PRN Shortness of Breath and/or Wheezing  dextrose 40% Gel 15 Gram(s) Oral once PRN Blood Glucose LESS THAN 70 milliGRAM(s)/deciliter  glucagon  Injectable 1 milliGRAM(s) IntraMuscular once PRN Glucose LESS THAN 70 milligrams/deciliter  guaiFENesin   Syrup  (Sugar-Free) 100 milliGRAM(s) Oral every 6 hours PRN Cough      __________________________________________________  REVIEW OF SYSTEMS:    CONSTITUTIONAL: No fever,   EYES: no acute visual disturbances  NECK: No pain or stiffness  RESPIRATORY: No cough; No shortness of breath  CARDIOVASCULAR: No chest pain, no palpitations  GASTROINTESTINAL: No pain. No nausea or vomiting; No diarrhea   NEUROLOGICAL: No headache or numbness, no tremors  MUSCULOSKELETAL: No joint pain, no muscle pain  GENITOURINARY: no dysuria, no frequency, no hesitancy  PSYCHIATRY: no depression , no anxiety  ALL OTHER  ROS negative        Vital Signs Last 24 Hrs  T(C): 36.6 (17 Jun 2020 14:00), Max: 36.6 (17 Jun 2020 14:00)  T(F): 97.9 (17 Jun 2020 14:00), Max: 97.9 (17 Jun 2020 14:00)  HR: 89 (17 Jun 2020 14:00) (89 - 97)  BP: 126/82 (17 Jun 2020 14:00) (111/80 - 148/84)  BP(mean): --  RR: 16 (17 Jun 2020 14:00) (14 - 17)  SpO2: 99% (17 Jun 2020 14:00) (98% - 100%)    ________________________________________________  PHYSICAL EXAM:  GENERAL: NAD  HEENT: Normocephalic;  conjunctivae and sclerae clear; moist mucous membranes;   NECK : supple  CHEST/LUNG: Clear to auscultation bilaterally with good air entry   HEART: S1 S2  regular; no murmurs, gallops or rubs  ABDOMEN: Soft, Nontender, Nondistended; Bowel sounds present  EXTREMITIES: no cyanosis; no edema; no calf tenderness  SKIN: warm and dry; no rash  NERVOUS SYSTEM:  Awake and alert; Oriented  to place, person and time ; no new deficits    _________________________________________________  LABS:                        10.8   7.91  )-----------( 511      ( 17 Jun 2020 07:37 )             32.6     06-17    136  |  101  |  24<H>  ----------------------------<  219<H>  3.9   |  29  |  0.82    Ca    9.2      17 Jun 2020 07:37          CAPILLARY BLOOD GLUCOSE      POCT Blood Glucose.: 158 mg/dL (17 Jun 2020 17:01)  POCT Blood Glucose.: 154 mg/dL (17 Jun 2020 12:03)  POCT Blood Glucose.: 220 mg/dL (17 Jun 2020 05:39)  POCT Blood Glucose.: 123 mg/dL (16 Jun 2020 21:08)        RADIOLOGY & ADDITIONAL TESTS:    Imaging Personally Reviewed:  YES/NO    Consultant(s) Notes Reviewed:   YES/ No    Care Discussed with Consultants :     Plan of care was discussed with patient and /or primary care giver; all questions and concerns were addressed and care was aligned with patient's wishes.

## 2020-06-17 NOTE — PROGRESS NOTE ADULT - PROBLEM SELECTOR PLAN 10
Discharge to [facility]  after 2 consecutive negative testings for Covid-19 as required by [facility]. 06/03, 06/06, 6/9, 6/12 testing was positive. 6/16 specimen testing

## 2020-06-17 NOTE — PROGRESS NOTE ADULT - ASSESSMENT
64 yr old male with PMHX of DM,dementia sent to ER from facility for seizure and now COVID+,Delirium,MAXIME,hypernatremia and now LLL aspiration pneumonia.  1.GI f/u,s/p PEG-leaking-GI f/u ,tolerating TF.  2.DM-Insulin, Endo f/u.  3.Delirium-cont psych meds.  4.LLL pneumonia- ID f/u, ABX.  5.GI and DVT prophylaxis.

## 2020-06-17 NOTE — PROGRESS NOTE ADULT - SUBJECTIVE AND OBJECTIVE BOX
CHIEF COMPLAINT:Patient is a 64y old  Male who presents with a chief complaint of seizures.Pt appears comfortable.    	  REVIEW OF SYSTEMS:  [x ] Unable to obtain    PHYSICAL EXAM:  T(C): 36.4 (06-17-20 @ 06:33), Max: 36.6 (06-16-20 @ 14:00)  HR: 97 (06-17-20 @ 06:33) (96 - 97)  BP: 148/84 (06-17-20 @ 06:33) (111/80 - 148/84)  RR: 14 (06-17-20 @ 06:33) (14 - 17)  SpO2: 100% (06-17-20 @ 06:33) (98% - 100%)  Wt(kg): --  I&O's Summary      Appearance: Normal	  HEENT:   Normal oral mucosa, PERRL, EOMI	  Lymphatic: No lymphadenopathy  Cardiovascular: Normal S1 S2, No JVD, No murmurs, No edema  Respiratory: Dec BS at bases  Gastrointestinal:  Soft, Non-tender, + BS	  Skin: No rashes, No ecchymoses, No cyanosis	  Extremities: Normal range of motion, No clubbing, cyanosis or edema  Vascular: Peripheral pulses palpable 2+ bilaterally    MEDICATIONS  (STANDING):  ascorbic acid 500 milliGRAM(s) Oral two times a day  BACItracin   Ointment 1 Application(s) Topical daily  cholecalciferol 1000 Unit(s) Oral daily  collagenase Ointment 1 Application(s) Topical daily  dextrose 5% + sodium chloride 0.9%. 1000 milliLiter(s) (50 mL/Hr) IV Continuous <Continuous>  dextrose 5% + sodium chloride 0.9%. 1000 milliLiter(s) (50 mL/Hr) IV Continuous <Continuous>  dextrose 5%. 1000 milliLiter(s) (50 mL/Hr) IV Continuous <Continuous>  dextrose 50% Injectable 12.5 Gram(s) IV Push once  dextrose 50% Injectable 25 Gram(s) IV Push once  dextrose 50% Injectable 25 Gram(s) IV Push once  heparin   Injectable 5000 Unit(s) SubCutaneous every 12 hours  insulin glargine Injectable (LANTUS) 18 Unit(s) SubCutaneous at bedtime  insulin lispro (HumaLOG) corrective regimen sliding scale   SubCutaneous every 6 hours  insulin lispro Injectable (HumaLOG) 12 Unit(s) SubCutaneous every 6 hours  mirtazapine 7.5 milliGRAM(s) Oral at bedtime  multivitamin/minerals 1 Tablet(s) Oral daily  OLANZapine 2.5 milliGRAM(s) Oral at bedtime  pantoprazole   Suspension 40 milliGRAM(s) Enteral Tube daily  piperacillin/tazobactam IVPB.. 3.375 Gram(s) IV Intermittent every 8 hours  QUEtiapine 25 milliGRAM(s) Oral at bedtime  zinc sulfate 220 milliGRAM(s) Oral daily      	  	  LABS:	 	                       10.8   7.91  )-----------( 511      ( 17 Jun 2020 07:37 )             32.6     06-17    136  |  101  |  24<H>  ----------------------------<  219<H>  3.9   |  29  |  0.82    Ca    9.2      17 Jun 2020 07:37

## 2020-06-17 NOTE — PROGRESS NOTE ADULT - PROBLEM SELECTOR PLAN 6
COVID 19 PCR negative on 5/31.    positive COVID on 6/3, 6/6, 6/9 , 6/12, 6/16  requires two negative results  Needs 2 negatives per RODNEY requirement.  repeat COVID 6/15

## 2020-06-17 NOTE — PROGRESS NOTE ADULT - NEUROLOGICAL DETAILS
sensation intact/responds to verbal commands/responds to pain
responds to pain/responds to verbal commands/sensation intact/cranial nerves intact
responds to verbal commands/sensation intact/responds to pain/cranial nerves intact
sensation intact/cranial nerves intact/responds to pain/responds to verbal commands
sensation intact/responds to pain/cranial nerves intact
sensation intact/responds to pain/responds to verbal commands
sensation intact/responds to pain/responds to verbal commands/cranial nerves intact

## 2020-06-17 NOTE — PROGRESS NOTE ADULT - ASSESSMENT
64 year old male from [facility] with PMH of dementia and type 2 DM presented with seizure activity while sitting at AL. Endocrinology was consulted for management of uncontrolled type 2 DM with hyperglycemia.    1. Uncontrolled Type 2 DM with hyperglycemia, A1c 9.9%  -tube feedings at goal rate 60cc/hr  -FS slightly above goal >180 as standing Humalog was held overnight   -Continue Lantus 18 units at bedtime  -Continue Humalog 12 units q 6 hrs (Please administer as long as BG >100 and patient on tube feedings at goal rate, hold if tube feedings are held)  -continue moderate dose rapid acting insulin q 6 hrs   BG 70-100mg/dL 0 units  -150 mg/dL 0 units  -200 mg/dL 2 unit  -250 mg/dL 4 units  -300 mg/dL 6 units  -350 mg/dL 8 units  -400 mg/dL 10 units  BG > 400mg/dL 12 units  -FS AC HS  -ensure consistent carbohydrate   -will monitor FS and adjust accordingly     2. Delirium  -continue Seroquel, Zyprexa, Remeron    3. Left lower lobe pneumonia  -continue antibiotics as per ID  -ID follow up    Plan discussed with primary team]  Please  call  w/ any questions or concerns [phone]

## 2020-06-17 NOTE — PROGRESS NOTE ADULT - SUBJECTIVE AND OBJECTIVE BOX
[   ] ICU                                          [   ] CCU                                      [ X  ] Medical Floor      Patient is comfortable. No new complaints reported, No abdominal pain, N/V, hematemesis, hematochezia, melena, fever, chills, chest pain, SOB, cough or diarrhea reported.    VITALS  Vital Signs Last 24 Hrs  T(C): 36.4 (17 Jun 2020 06:33), Max: 36.6 (16 Jun 2020 14:00)  T(F): 97.6 (17 Jun 2020 06:33), Max: 97.8 (16 Jun 2020 14:00)  HR: 97 (17 Jun 2020 06:33) (96 - 97)  BP: 148/84 (17 Jun 2020 06:33) (111/80 - 148/84)   RR: 14 (17 Jun 2020 06:33) (14 - 17)  SpO2: 100% (17 Jun 2020 06:33) (98% - 100%)       MEDICATIONS  (STANDING):  ascorbic acid 500 milliGRAM(s) Oral two times a day  BACItracin   Ointment 1 Application(s) Topical daily  cholecalciferol 1000 Unit(s) Oral daily  collagenase Ointment 1 Application(s) Topical daily  dextrose 5% + sodium chloride 0.9%. 1000 milliLiter(s) (50 mL/Hr) IV Continuous <Continuous>  dextrose 5% + sodium chloride 0.9%. 1000 milliLiter(s) (50 mL/Hr) IV Continuous <Continuous>  dextrose 5%. 1000 milliLiter(s) (50 mL/Hr) IV Continuous <Continuous>  dextrose 50% Injectable 12.5 Gram(s) IV Push once  dextrose 50% Injectable 25 Gram(s) IV Push once  dextrose 50% Injectable 25 Gram(s) IV Push once  heparin   Injectable 5000 Unit(s) SubCutaneous every 12 hours  insulin glargine Injectable (LANTUS) 18 Unit(s) SubCutaneous at bedtime  insulin lispro (HumaLOG) corrective regimen sliding scale   SubCutaneous every 6 hours  insulin lispro Injectable (HumaLOG) 12 Unit(s) SubCutaneous every 6 hours  mirtazapine 7.5 milliGRAM(s) Oral at bedtime  multivitamin/minerals 1 Tablet(s) Oral daily  OLANZapine 2.5 milliGRAM(s) Oral at bedtime  pantoprazole   Suspension 40 milliGRAM(s) Enteral Tube daily  piperacillin/tazobactam IVPB.. 3.375 Gram(s) IV Intermittent every 8 hours  QUEtiapine 25 milliGRAM(s) Oral at bedtime  zinc sulfate 220 milliGRAM(s) Oral daily    MEDICATIONS  (PRN):  acetaminophen    Suspension .. 650 milliGRAM(s) Enteral Tube every 6 hours PRN Temp greater or equal to 38C (100.4F)  ALBUTerol    90 MICROgram(s) HFA Inhaler 2 Puff(s) Inhalation every 6 hours PRN Shortness of Breath and/or Wheezing  dextrose 40% Gel 15 Gram(s) Oral once PRN Blood Glucose LESS THAN 70 milliGRAM(s)/deciliter  glucagon  Injectable 1 milliGRAM(s) IntraMuscular once PRN Glucose LESS THAN 70 milligrams/deciliter  guaiFENesin   Syrup  (Sugar-Free) 100 milliGRAM(s) Oral every 6 hours PRN Cough                            10.8   7.91  )-----------( 511      ( 17 Jun 2020 07:37 )             32.6       06-17    136  |  101  |  24<H>  ----------------------------<  219<H>  3.9   |  29  |  0.82    Ca    9.2      17 Jun 2020 07:37

## 2020-06-17 NOTE — PROGRESS NOTE ADULT - SUBJECTIVE AND OBJECTIVE BOX
64 year old male from [facility] with PMH of dementia and type 2 DM presented with seizure activity while sitting at AL. Endocrinology was consulted for management of uncontrolled type 2 DM with hyperglycemia.    Patient seen and examined at bedside. Patient is resting comfortably. Patient is tolerating tube feeds at goal rate of 60cc/hr. FS reviewed. BG slightly above goal >180.  Standing Humalog dose was held overnight for BG of 123, resulting in AM hyperglycemia to 219.      MEDICATIONS  (STANDING):  ascorbic acid 500 milliGRAM(s) Oral two times a day  BACItracin   Ointment 1 Application(s) Topical daily  cholecalciferol 1000 Unit(s) Oral daily  collagenase Ointment 1 Application(s) Topical daily  dextrose 5% + sodium chloride 0.9%. 1000 milliLiter(s) (50 mL/Hr) IV Continuous <Continuous>  dextrose 5% + sodium chloride 0.9%. 1000 milliLiter(s) (50 mL/Hr) IV Continuous <Continuous>  dextrose 5%. 1000 milliLiter(s) (50 mL/Hr) IV Continuous <Continuous>  dextrose 50% Injectable 12.5 Gram(s) IV Push once  dextrose 50% Injectable 25 Gram(s) IV Push once  dextrose 50% Injectable 25 Gram(s) IV Push once  heparin   Injectable 5000 Unit(s) SubCutaneous every 12 hours  insulin glargine Injectable (LANTUS) 18 Unit(s) SubCutaneous at bedtime  insulin lispro (HumaLOG) corrective regimen sliding scale   SubCutaneous every 6 hours  insulin lispro Injectable (HumaLOG) 12 Unit(s) SubCutaneous every 6 hours  mirtazapine 7.5 milliGRAM(s) Oral at bedtime  multivitamin/minerals 1 Tablet(s) Oral daily  OLANZapine 2.5 milliGRAM(s) Oral at bedtime  pantoprazole   Suspension 40 milliGRAM(s) Enteral Tube daily  piperacillin/tazobactam IVPB.. 3.375 Gram(s) IV Intermittent every 8 hours  QUEtiapine 25 milliGRAM(s) Oral at bedtime  zinc sulfate 220 milliGRAM(s) Oral daily    MEDICATIONS  (PRN):  acetaminophen    Suspension .. 650 milliGRAM(s) Enteral Tube every 6 hours PRN Temp greater or equal to 38C (100.4F)  ALBUTerol    90 MICROgram(s) HFA Inhaler 2 Puff(s) Inhalation every 6 hours PRN Shortness of Breath and/or Wheezing  dextrose 40% Gel 15 Gram(s) Oral once PRN Blood Glucose LESS THAN 70 milliGRAM(s)/deciliter  glucagon  Injectable 1 milliGRAM(s) IntraMuscular once PRN Glucose LESS THAN 70 milligrams/deciliter  guaiFENesin   Syrup  (Sugar-Free) 100 milliGRAM(s) Oral every 6 hours PRN Cough      REVIEW OF SYSTEMS:  unable to obtain due to medical condition    PHYSICAL EXAM:  VITAL SIGNS  T(C): 36.4 (06-17-20 @ 06:33), Max: 36.6 (06-16-20 @ 14:00)  HR: 97 (06-17-20 @ 06:33) (96 - 97)  BP: 148/84 (06-17-20 @ 06:33) (111/80 - 148/84)  RR: 14 (06-17-20 @ 06:33) (14 - 17)  SpO2: 100% (06-17-20 @ 06:33) (98% - 100%)    GENERAL: NAD, well-developed  HEAD:  Atraumatic, Normocephalic  EYES: EOMI, PERRLA, conjunctiva and sclera clear  ENMT: No tonsillar erythema, exudates, or enlargement; No lesions  NECK: Supple, No JVD, Normal thyroid  NERVOUS SYSTEM:  nonverbal,  CHEST/LUNG: CTA bilaterally  No rales, rhonchi, wheezing, or rubs  HEART: Regular rate and rhythm; No murmurs, rubs, or gallops  ABDOMEN: s/p PEG, Soft, Nontender, Nondistended; Bowel sounds present  EXTREMITIES:  No edema  SKIN: No rashes or lesions      LABS:                        10.8   7.91  )-----------( 511      ( 17 Jun 2020 07:37 )             32.6     06-17    136  |  101  |  24<H>  ----------------------------<  219<H>  3.9   |  29  |  0.82    Ca    9.2      17 Jun 2020 07:37          CAPILLARY BLOOD GLUCOSE      POCT Blood Glucose.: 220 mg/dL (17 Jun 2020 05:39)  POCT Blood Glucose.: 123 mg/dL (16 Jun 2020 21:08)  POCT Blood Glucose.: 121 mg/dL (16 Jun 2020 16:48)  POCT Blood Glucose.: 148 mg/dL (16 Jun 2020 12:10)

## 2020-06-17 NOTE — PROGRESS NOTE ADULT - SUBJECTIVE AND OBJECTIVE BOX
Pt is awake, alert, lying in bed in NAD.     INTERVAL HPI/OVERNIGHT EVENTS:    VITAL SIGNS:  T(F): 97.6 (06-17-20 @ 06:33)  HR: 97 (06-17-20 @ 06:33)  BP: 148/84 (06-17-20 @ 06:33)  RR: 14 (06-17-20 @ 06:33)  SpO2: 100% (06-17-20 @ 06:33)  Wt(kg): --  I&O's Detail    REVIEW OF SYSTEMS:    CONSTITUTIONAL:  No fevers, chills, sweats    HEENT:  Eyes:  No diplopia or blurred vision. ENT:  No earache, sore throat or runny nose.    CARDIOVASCULAR:  No pressure, squeezing, tightness, or heaviness about the chest; no palpitations.    RESPIRATORY:  Per HPI    GASTROINTESTINAL:  No abdominal pain, nausea, vomiting or diarrhea.    GENITOURINARY:  No dysuria, frequency or urgency.    NEUROLOGIC:  No paresthesias, fasciculations, seizures or weakness.    PSYCHIATRIC:  No disorder of thought or mood.      PHYSICAL EXAM:    Constitutional: Well developed and nourished  Eyes:Perrla  ENMT: normal  Neck:supple  Respiratory: good air entry  Cardiovascular: S1 S2 regular  Gastrointestinal: Soft, Non tender  Extremities: No edema  Vascular:normal  Neurological:Awake, alert,Ox3  Musculoskeletal:Normal      MEDICATIONS  (STANDING):  ascorbic acid 500 milliGRAM(s) Oral two times a day  BACItracin   Ointment 1 Application(s) Topical daily  cholecalciferol 1000 Unit(s) Oral daily  collagenase Ointment 1 Application(s) Topical daily  dextrose 5% + sodium chloride 0.9%. 1000 milliLiter(s) (50 mL/Hr) IV Continuous <Continuous>  dextrose 5% + sodium chloride 0.9%. 1000 milliLiter(s) (50 mL/Hr) IV Continuous <Continuous>  dextrose 5%. 1000 milliLiter(s) (50 mL/Hr) IV Continuous <Continuous>  dextrose 50% Injectable 12.5 Gram(s) IV Push once  dextrose 50% Injectable 25 Gram(s) IV Push once  dextrose 50% Injectable 25 Gram(s) IV Push once  heparin   Injectable 5000 Unit(s) SubCutaneous every 12 hours  insulin glargine Injectable (LANTUS) 18 Unit(s) SubCutaneous at bedtime  insulin lispro (HumaLOG) corrective regimen sliding scale   SubCutaneous every 6 hours  insulin lispro Injectable (HumaLOG) 12 Unit(s) SubCutaneous every 6 hours  mirtazapine 7.5 milliGRAM(s) Oral at bedtime  multivitamin/minerals 1 Tablet(s) Oral daily  OLANZapine 2.5 milliGRAM(s) Oral at bedtime  pantoprazole   Suspension 40 milliGRAM(s) Enteral Tube daily  piperacillin/tazobactam IVPB.. 3.375 Gram(s) IV Intermittent every 8 hours  QUEtiapine 25 milliGRAM(s) Oral at bedtime  zinc sulfate 220 milliGRAM(s) Oral daily    MEDICATIONS  (PRN):  acetaminophen    Suspension .. 650 milliGRAM(s) Enteral Tube every 6 hours PRN Temp greater or equal to 38C (100.4F)  ALBUTerol    90 MICROgram(s) HFA Inhaler 2 Puff(s) Inhalation every 6 hours PRN Shortness of Breath and/or Wheezing  dextrose 40% Gel 15 Gram(s) Oral once PRN Blood Glucose LESS THAN 70 milliGRAM(s)/deciliter  glucagon  Injectable 1 milliGRAM(s) IntraMuscular once PRN Glucose LESS THAN 70 milligrams/deciliter  guaiFENesin   Syrup  (Sugar-Free) 100 milliGRAM(s) Oral every 6 hours PRN Cough      Allergies    No Known Allergies    Intolerances        LABS:                        10.8   7.91  )-----------( 511      ( 17 Jun 2020 07:37 )             32.6     06-17    136  |  101  |  24<H>  ----------------------------<  219<H>  3.9   |  29  |  0.82    Ca    9.2      17 Jun 2020 07:37      CAPILLARY BLOOD GLUCOSE      POCT Blood Glucose.: 220 mg/dL (17 Jun 2020 05:39)  POCT Blood Glucose.: 123 mg/dL (16 Jun 2020 21:08)  POCT Blood Glucose.: 121 mg/dL (16 Jun 2020 16:48)  POCT Blood Glucose.: 148 mg/dL (16 Jun 2020 12:10)        RADIOLOGY & ADDITIONAL TESTS:    CXR:    Ct scan chest:    ekg;    echo:

## 2020-06-17 NOTE — PROGRESS NOTE ADULT - ASSESSMENT
64 year old male from [facility]  with PMH dementia and DM coming in with seizures like activity while sitting at AL.  Pt had peg placement on 06/02/2020 for FTT 2/2 to dysphagia and poor oral intake related to changes in mental status.     Pt tolerates Peg tube feeding, medically optimized for discharge,  pending covid-19  2 negative results for d/c to  [facility]. Repeated COVID Positive 6/9.     6/11-Peg insertion site noted oozing small amounts drng, followed by GI.  Pt. with low grade fever, T max 100.1 associated with tachycardia, CXR ordered  6/12-Afebrile today, UA negative, blood Cx testing, CXR shows Opacification of the left lower lung field suggestive of atelectasis, effusion, and/or pneumonia, will f/u with CT chest with contrast for further investigation.  (consent for IV contrast obtained from daughter [phone]).  Peg adjusted by GI, no further leakage noted at this time, pt. tolerates feeding well.  Pt. is afebrile today, awake with minimal verbalization.    6/16-Overall clinical presentation unchanged.  Pt. is awake, minimally verbal with minimal voluntary muscle activity.  PT eval appreciated, RODNEY recommended.  Spoke to daughter Ailin at [phone] updated her on pt.'s condition.  She is aware that pt. is no longer eligible for AL and will need RODNEY placement, choices were given to her by C/M.      6/17-Remains afebrile with no leukocytosis.  COVID 6/16 positive, positive antibodies with high index of 9.6.  Will send repeat COVID PCR, needs negative for RODNEY placement.

## 2020-06-17 NOTE — CHART NOTE - NSCHARTNOTEFT_GEN_A_CORE
Assessment:   64yMalePatient is a 64y old  Male who presents with a chief complaint of seizures (17 Jun 2020 12:10).   Pt is On airborne isolation. Pt w COVID +. Pt is on Glucerna 1.2 @ 60 ml/hr x 24 hr(.1440 ml,1728 calories, Protein 86 gms, free water 1159 ml/day)   D/W RN Tf tolerated. No  Diarrhea Reported. C/O PEG site Pus draining. ENDO Consult Noted. Pt w  Pressure ulcers.       Factors impacting intake: [ ] none [ ] nausea  [ ] vomiting [ ] diarrhea [ ] constipation  [ ]chewing problems [ ] swallowing issues  [ ] other:     Diet Presciption: Diet, NPO with Tube Feed:   Tube Feeding Modality: Gastrostomy  Glucerna 1.2 Mihir  Total Volume for 24 Hours (mL): 1440  Continuous  Starting Tube Feed Rate {mL per Hour}: 30  Increase Tube Feed Rate by (mL): 30     Every 6 hours  Until Goal Tube Feed Rate (mL per Hour): 60  Tube Feed Duration (in Hours): 24  Tube Feed Start Time: 15:00 (06-04-20 @ 15:10)    Intake: NPO W TF     Current Weight:   % Weight Change    Pertinent Medications: MEDICATIONS  (STANDING):  ascorbic acid 500 milliGRAM(s) Oral two times a day  BACItracin   Ointment 1 Application(s) Topical daily  cholecalciferol 1000 Unit(s) Oral daily  collagenase Ointment 1 Application(s) Topical daily  dextrose 5% + sodium chloride 0.9%. 1000 milliLiter(s) (50 mL/Hr) IV Continuous <Continuous>  dextrose 5% + sodium chloride 0.9%. 1000 milliLiter(s) (50 mL/Hr) IV Continuous <Continuous>  dextrose 5%. 1000 milliLiter(s) (50 mL/Hr) IV Continuous <Continuous>  dextrose 50% Injectable 12.5 Gram(s) IV Push once  dextrose 50% Injectable 25 Gram(s) IV Push once  dextrose 50% Injectable 25 Gram(s) IV Push once  heparin   Injectable 5000 Unit(s) SubCutaneous every 12 hours  insulin glargine Injectable (LANTUS) 18 Unit(s) SubCutaneous at bedtime  insulin lispro (HumaLOG) corrective regimen sliding scale   SubCutaneous every 6 hours  insulin lispro Injectable (HumaLOG) 12 Unit(s) SubCutaneous every 6 hours  mirtazapine 7.5 milliGRAM(s) Oral at bedtime  multivitamin/minerals 1 Tablet(s) Oral daily  OLANZapine 2.5 milliGRAM(s) Oral at bedtime  pantoprazole   Suspension 40 milliGRAM(s) Enteral Tube daily  piperacillin/tazobactam IVPB.. 3.375 Gram(s) IV Intermittent every 8 hours  QUEtiapine 25 milliGRAM(s) Oral at bedtime  zinc sulfate 220 milliGRAM(s) Oral daily    MEDICATIONS  (PRN):  acetaminophen    Suspension .. 650 milliGRAM(s) Enteral Tube every 6 hours PRN Temp greater or equal to 38C (100.4F)  ALBUTerol    90 MICROgram(s) HFA Inhaler 2 Puff(s) Inhalation every 6 hours PRN Shortness of Breath and/or Wheezing  dextrose 40% Gel 15 Gram(s) Oral once PRN Blood Glucose LESS THAN 70 milliGRAM(s)/deciliter  glucagon  Injectable 1 milliGRAM(s) IntraMuscular once PRN Glucose LESS THAN 70 milligrams/deciliter  guaiFENesin   Syrup  (Sugar-Free) 100 milliGRAM(s) Oral every 6 hours PRN Cough    Pertinent Labs: 06-17 Na136 mmol/L Glu 219 mg/dL<H> K+ 3.9 mmol/L Cr  0.82 mg/dL BUN 24 mg/dL<H>     CAPILLARY BLOOD GLUCOSE      POCT Blood Glucose.: 154 mg/dL (17 Jun 2020 12:03)  POCT Blood Glucose.: 220 mg/dL (17 Jun 2020 05:39)  POCT Blood Glucose.: 123 mg/dL (16 Jun 2020 21:08)  POCT Blood Glucose.: 121 mg/dL (16 Jun 2020 16:48)    Skin: L Heel DTI, Unstageable @ R Upper  Buttocks, L heel DTI ,     Estimated Needs:   [ ] no change since previous assessment  [ ] recalculated:     Previous Nutrition Diagnosis:   [ ] Inadequate Energy Intake [ ]Inadequate Oral Intake [ ] Excessive Energy Intake   [ ] Underweight [ ] Increased Nutrient Needs [ ] Overweight/Obesity   [ ] Altered GI Function [ ] Unintended Weight Loss [ ] Food & Nutrition Related Knowledge Deficit [ x] Malnutrition  Severe     Nutrition Diagnosis is [x ] ongoing  [ ] resolved [ ] not applicable     New Nutrition Diagnosis: [ ] not applicable       Interventions:   Recommend  [ ] Change Diet To:  [ ] Nutrition Supplement  [x ] Nutrition Support  Continue with Glucerna 1.2 @ 60 Ml /hr x 24 hr as tolerated.  [x ] Other:     Monitoring and Evaluation:   [ ] PO intake [ x ] Tolerance to diet prescription [ x ] weights [ x ] labs[ x ] follow up per protocol  [ ] other:

## 2020-06-17 NOTE — PROGRESS NOTE ADULT - PROBLEM SELECTOR PLAN 1
-CXR shows Opacification of the left lower lung field suggestive of atelectasis, effusion, and/or pneumonia  -CT chest->Negative for pulmonary emboli.  -Cont Zosyn D5/7  -COVID19 6/12&6/16 positive, positive antibodies  -f/u repeat COVID19

## 2020-06-18 LAB
ANION GAP SERPL CALC-SCNC: 8 MMOL/L — SIGNIFICANT CHANGE UP (ref 5–17)
BUN SERPL-MCNC: 19 MG/DL — HIGH (ref 7–18)
CALCIUM SERPL-MCNC: 9.6 MG/DL — SIGNIFICANT CHANGE UP (ref 8.4–10.5)
CHLORIDE SERPL-SCNC: 101 MMOL/L — SIGNIFICANT CHANGE UP (ref 96–108)
CO2 SERPL-SCNC: 29 MMOL/L — SIGNIFICANT CHANGE UP (ref 22–31)
CREAT SERPL-MCNC: 0.82 MG/DL — SIGNIFICANT CHANGE UP (ref 0.5–1.3)
GLUCOSE BLDC GLUCOMTR-MCNC: 114 MG/DL — HIGH (ref 70–99)
GLUCOSE BLDC GLUCOMTR-MCNC: 124 MG/DL — HIGH (ref 70–99)
GLUCOSE BLDC GLUCOMTR-MCNC: 124 MG/DL — HIGH (ref 70–99)
GLUCOSE BLDC GLUCOMTR-MCNC: 144 MG/DL — HIGH (ref 70–99)
GLUCOSE BLDC GLUCOMTR-MCNC: 87 MG/DL — SIGNIFICANT CHANGE UP (ref 70–99)
GLUCOSE SERPL-MCNC: 95 MG/DL — SIGNIFICANT CHANGE UP (ref 70–99)
HCT VFR BLD CALC: 33 % — LOW (ref 39–50)
HGB BLD-MCNC: 10.8 G/DL — LOW (ref 13–17)
MCHC RBC-ENTMCNC: 28.1 PG — SIGNIFICANT CHANGE UP (ref 27–34)
MCHC RBC-ENTMCNC: 32.7 GM/DL — SIGNIFICANT CHANGE UP (ref 32–36)
MCV RBC AUTO: 85.7 FL — SIGNIFICANT CHANGE UP (ref 80–100)
NRBC # BLD: 0 /100 WBCS — SIGNIFICANT CHANGE UP (ref 0–0)
PLATELET # BLD AUTO: 577 K/UL — HIGH (ref 150–400)
POTASSIUM SERPL-MCNC: 4 MMOL/L — SIGNIFICANT CHANGE UP (ref 3.5–5.3)
POTASSIUM SERPL-SCNC: 4 MMOL/L — SIGNIFICANT CHANGE UP (ref 3.5–5.3)
RBC # BLD: 3.85 M/UL — LOW (ref 4.2–5.8)
RBC # FLD: 13 % — SIGNIFICANT CHANGE UP (ref 10.3–14.5)
SARS-COV-2 RNA SPEC QL NAA+PROBE: SIGNIFICANT CHANGE UP
SODIUM SERPL-SCNC: 138 MMOL/L — SIGNIFICANT CHANGE UP (ref 135–145)
WBC # BLD: 9.18 K/UL — SIGNIFICANT CHANGE UP (ref 3.8–10.5)
WBC # FLD AUTO: 9.18 K/UL — SIGNIFICANT CHANGE UP (ref 3.8–10.5)

## 2020-06-18 PROCEDURE — 99232 SBSQ HOSP IP/OBS MODERATE 35: CPT

## 2020-06-18 RX ADMIN — INSULIN GLARGINE 18 UNIT(S): 100 INJECTION, SOLUTION SUBCUTANEOUS at 22:36

## 2020-06-18 RX ADMIN — Medication 500 MILLIGRAM(S): at 06:15

## 2020-06-18 RX ADMIN — MIRTAZAPINE 7.5 MILLIGRAM(S): 45 TABLET, ORALLY DISINTEGRATING ORAL at 00:34

## 2020-06-18 RX ADMIN — PANTOPRAZOLE SODIUM 40 MILLIGRAM(S): 20 TABLET, DELAYED RELEASE ORAL at 11:59

## 2020-06-18 RX ADMIN — QUETIAPINE FUMARATE 25 MILLIGRAM(S): 200 TABLET, FILM COATED ORAL at 22:36

## 2020-06-18 RX ADMIN — INSULIN GLARGINE 18 UNIT(S): 100 INJECTION, SOLUTION SUBCUTANEOUS at 00:31

## 2020-06-18 RX ADMIN — OLANZAPINE 2.5 MILLIGRAM(S): 15 TABLET, FILM COATED ORAL at 22:36

## 2020-06-18 RX ADMIN — Medication 1 TABLET(S): at 11:58

## 2020-06-18 RX ADMIN — PIPERACILLIN AND TAZOBACTAM 25 GRAM(S): 4; .5 INJECTION, POWDER, LYOPHILIZED, FOR SOLUTION INTRAVENOUS at 13:25

## 2020-06-18 RX ADMIN — ZINC SULFATE TAB 220 MG (50 MG ZINC EQUIVALENT) 220 MILLIGRAM(S): 220 (50 ZN) TAB at 11:59

## 2020-06-18 RX ADMIN — QUETIAPINE FUMARATE 25 MILLIGRAM(S): 200 TABLET, FILM COATED ORAL at 00:34

## 2020-06-18 RX ADMIN — Medication 12 UNIT(S): at 06:16

## 2020-06-18 RX ADMIN — OLANZAPINE 2.5 MILLIGRAM(S): 15 TABLET, FILM COATED ORAL at 00:34

## 2020-06-18 RX ADMIN — Medication 1 APPLICATION(S): at 13:24

## 2020-06-18 RX ADMIN — Medication 500 MILLIGRAM(S): at 18:17

## 2020-06-18 RX ADMIN — MIRTAZAPINE 7.5 MILLIGRAM(S): 45 TABLET, ORALLY DISINTEGRATING ORAL at 22:36

## 2020-06-18 RX ADMIN — Medication 12 UNIT(S): at 00:38

## 2020-06-18 RX ADMIN — PIPERACILLIN AND TAZOBACTAM 25 GRAM(S): 4; .5 INJECTION, POWDER, LYOPHILIZED, FOR SOLUTION INTRAVENOUS at 22:36

## 2020-06-18 RX ADMIN — Medication 1000 UNIT(S): at 11:54

## 2020-06-18 RX ADMIN — PIPERACILLIN AND TAZOBACTAM 25 GRAM(S): 4; .5 INJECTION, POWDER, LYOPHILIZED, FOR SOLUTION INTRAVENOUS at 00:32

## 2020-06-18 RX ADMIN — HEPARIN SODIUM 5000 UNIT(S): 5000 INJECTION INTRAVENOUS; SUBCUTANEOUS at 18:17

## 2020-06-18 RX ADMIN — HEPARIN SODIUM 5000 UNIT(S): 5000 INJECTION INTRAVENOUS; SUBCUTANEOUS at 06:15

## 2020-06-18 RX ADMIN — PIPERACILLIN AND TAZOBACTAM 25 GRAM(S): 4; .5 INJECTION, POWDER, LYOPHILIZED, FOR SOLUTION INTRAVENOUS at 06:15

## 2020-06-18 RX ADMIN — Medication 1 APPLICATION(S): at 16:50

## 2020-06-18 NOTE — PROGRESS NOTE ADULT - ASSESSMENT
64 year old male from [facility] with PMH of dementia and type 2 DM presented with seizure activity while sitting at AL. Endocrinology was consulted for management of uncontrolled type 2 DM with hyperglycemia.    1. Uncontrolled Type 2 DM with hyperglycemia, A1c 9.9%  -tube feedings at goal rate 60cc/hr  -FS sat goal <180  -Continue Lantus 18 units at bedtime  -Continue Humalog 12 units q 6 hrs (Please administer as long as BG >100 and patient on tube feedings at goal rate, hold if tube feedings are held)  -continue moderate dose rapid acting insulin q 6 hrs   BG 70-100mg/dL 0 units  -150 mg/dL 0 units  -200 mg/dL 2 unit  -250 mg/dL 4 units  -300 mg/dL 6 units  -350 mg/dL 8 units  -400 mg/dL 10 units  BG > 400mg/dL 12 units  -FS AC HS  -ensure consistent carbohydrate   -will monitor FS and adjust accordingly     2. Delirium  -continue Seroquel, Zyprexa, Remeron    3. Left lower lobe pneumonia  -continue antibiotics as per ID  -ID follow up    Plan discussed with primary team]  Please  call  w/ any questions or concerns [phone]

## 2020-06-18 NOTE — PROGRESS NOTE ADULT - ASSESSMENT
Pneumonia( likely aspiration)  Fevers - resolved    Plan - Cont Zosyn 3.375gms iv q8 hrs till Saturday  then DC all abxs.  Reconsult prn.

## 2020-06-18 NOTE — PROGRESS NOTE ADULT - SUBJECTIVE AND OBJECTIVE BOX
CARDIOLOGY/MEDICAL ATTENDING    CHIEF COMPLAINT:Patient is a 64y old  Male who presents with a chief complaint of seizures.Pt appears comfortable.    	  REVIEW OF SYSTEMS:  [x ] Unable to obtain    PHYSICAL EXAM:  T(C): 36.5 (06-18-20 @ 05:56), Max: 36.6 (06-17-20 @ 14:00)  HR: 76 (06-18-20 @ 05:56) (76 - 96)  BP: 105/70 (06-18-20 @ 05:56) (105/70 - 126/82)  RR: 17 (06-18-20 @ 05:56) (16 - 17)  SpO2: 98% (06-18-20 @ 05:56) (96% - 99%)        Appearance: Normal	  HEENT:   Normal oral mucosa, PERRL, EOMI	  Lymphatic: No lymphadenopathy  Cardiovascular: Normal S1 S2, No JVD, No murmurs, No edema  Respiratory: Lungs clear to auscultation	  Gastrointestinal:  Soft, Non-tender, + BS	  Skin: No rashes, No ecchymoses, No cyanosis	  Extremities: Normal range of motion, No clubbing, cyanosis or edema  Vascular: Peripheral pulses palpable 2+ bilaterally    MEDICATIONS  (STANDING):  ascorbic acid 500 milliGRAM(s) Oral two times a day  BACItracin   Ointment 1 Application(s) Topical daily  cholecalciferol 1000 Unit(s) Oral daily  collagenase Ointment 1 Application(s) Topical daily  dextrose 5% + sodium chloride 0.9%. 1000 milliLiter(s) (50 mL/Hr) IV Continuous <Continuous>  dextrose 5% + sodium chloride 0.9%. 1000 milliLiter(s) (50 mL/Hr) IV Continuous <Continuous>  dextrose 5%. 1000 milliLiter(s) (50 mL/Hr) IV Continuous <Continuous>  dextrose 50% Injectable 12.5 Gram(s) IV Push once  dextrose 50% Injectable 25 Gram(s) IV Push once  dextrose 50% Injectable 25 Gram(s) IV Push once  heparin   Injectable 5000 Unit(s) SubCutaneous every 12 hours  insulin glargine Injectable (LANTUS) 18 Unit(s) SubCutaneous at bedtime  insulin lispro (HumaLOG) corrective regimen sliding scale   SubCutaneous every 6 hours  insulin lispro Injectable (HumaLOG) 12 Unit(s) SubCutaneous every 6 hours  mirtazapine 7.5 milliGRAM(s) Oral at bedtime  multivitamin/minerals 1 Tablet(s) Oral daily  OLANZapine 2.5 milliGRAM(s) Oral at bedtime  pantoprazole   Suspension 40 milliGRAM(s) Enteral Tube daily  piperacillin/tazobactam IVPB.. 3.375 Gram(s) IV Intermittent every 8 hours  QUEtiapine 25 milliGRAM(s) Oral at bedtime  zinc sulfate 220 milliGRAM(s) Oral daily      	  	  LABS:	 	                     10.8   9.18  )-----------( 577      ( 18 Jun 2020 07:10 )             33.0     06-18    138  |  101  |  19<H>  ----------------------------<  95  4.0   |  29  |  0.82    Ca    9.6      18 Jun 2020 07:10

## 2020-06-18 NOTE — PROGRESS NOTE ADULT - SUBJECTIVE AND OBJECTIVE BOX
This is a 65 y/o M from [facility]  with PMH dementia and DM coming in with seizures like activity while sitting at AL. s/p peg placement on 06/02/2020 for FTT 2/2 to dysphagia and poor oral intake related to changes in mental status. Pt tolerates Peg tube feeding.  6/11-Peg insertion site noted oozing small amounts drainage, followed by GI.  Pt with low grade fever, T max 100.1 associated with tachycardia, CXR ordered.  6/12-Afebrile today, UA negative, blood Cx testing, CXR shows Opacification of the left lower lung field suggestive of atelectasis, effusion, and/or pneumonia, will f/u with CT chest with contrast for further investigation. Peg adjusted by GI, no further leakage noted at this time, pt. tolerating feeding well. Pt is afebrile today, awake with minimal verbalization. 6/16-Overall clinical presentation unchanged.  Pt is awake, minimally verbal with minimal voluntary muscle activity.  PT eval appreciated, RODNEY recommended.  Pt is no longer eligible for AL and will need RODNEY placement, choices were given to daughter by C/M.  6/17-Remains afebrile with no leukocytosis.  COVID 6/16 positive, positive antibodies with high index of 9.6.  Will send repeat COVID PCR, needs negative for RODNEY placement. 6/18 with little leakage from peg tube. Remains afebrile with no leukocytosis.       INTERVAL HPI/OVERNIGHT EVENTS: no new complaints    MEDICATIONS  (STANDING):  ascorbic acid 500 milliGRAM(s) Oral two times a day  cholecalciferol 1000 Unit(s) Oral daily  collagenase Ointment 1 Application(s) Topical daily  dextrose 5% + sodium chloride 0.9%. 1000 milliLiter(s) (50 mL/Hr) IV Continuous <Continuous>  dextrose 5% + sodium chloride 0.9%. 1000 milliLiter(s) (50 mL/Hr) IV Continuous <Continuous>  dextrose 5%. 1000 milliLiter(s) (50 mL/Hr) IV Continuous <Continuous>  dextrose 50% Injectable 12.5 Gram(s) IV Push once  dextrose 50% Injectable 25 Gram(s) IV Push once  dextrose 50% Injectable 25 Gram(s) IV Push once  heparin   Injectable 5000 Unit(s) SubCutaneous every 12 hours  insulin glargine Injectable (LANTUS) 18 Unit(s) SubCutaneous at bedtime  insulin lispro (HumaLOG) corrective regimen sliding scale   SubCutaneous every 6 hours  insulin lispro Injectable (HumaLOG) 12 Unit(s) SubCutaneous every 6 hours  mirtazapine 7.5 milliGRAM(s) Oral at bedtime  multivitamin/minerals 1 Tablet(s) Oral daily  OLANZapine 2.5 milliGRAM(s) Oral at bedtime  pantoprazole   Suspension 40 milliGRAM(s) Enteral Tube daily  piperacillin/tazobactam IVPB.. 3.375 Gram(s) IV Intermittent every 8 hours  QUEtiapine 25 milliGRAM(s) Oral at bedtime  zinc sulfate 220 milliGRAM(s) Oral daily    MEDICATIONS  (PRN):  acetaminophen    Suspension .. 650 milliGRAM(s) Enteral Tube every 6 hours PRN Temp greater or equal to 38C (100.4F)  ALBUTerol    90 MICROgram(s) HFA Inhaler 2 Puff(s) Inhalation every 6 hours PRN Shortness of Breath and/or Wheezing  dextrose 40% Gel 15 Gram(s) Oral once PRN Blood Glucose LESS THAN 70 milliGRAM(s)/deciliter  glucagon  Injectable 1 milliGRAM(s) IntraMuscular once PRN Glucose LESS THAN 70 milligrams/deciliter  guaiFENesin   Syrup  (Sugar-Free) 100 milliGRAM(s) Oral every 6 hours PRN Cough      __________________________________________________  REVIEW OF SYSTEMS: unobtainable   Vital Signs Last 24 Hrs  T(C): 36.5 (18 Jun 2020 14:37), Max: 36.6 (17 Jun 2020 20:38)  T(F): 97.7 (18 Jun 2020 14:37), Max: 97.8 (17 Jun 2020 20:38)  HR: 90 (18 Jun 2020 14:37) (76 - 96)  BP: 109/60 (18 Jun 2020 14:37) (105/70 - 110/69)  BP(mean): --  RR: 17 (18 Jun 2020 14:37) (16 - 17)  SpO2: 98% (18 Jun 2020 14:37) (96% - 98%)    ________________________________________________  PHYSICAL EXAM:  GENERAL: NAD  HEENT: Normocephalic;  conjunctivae and sclerae clear; moist mucous membranes;   NECK : supple  CHEST/LUNG: Clear to auscultation bilaterally with good air entry   HEART: S1 S2  regular; no murmurs, gallops or rubs  ABDOMEN: peg tube in situ, BS presents. NT/ND  EXTREMITIES: no cyanosis; no edema; no calf tenderness  SKIN: warm and dry; sacral DU  NERVOUS SYSTEM:  Awake and alert    _________________________________________________  LABS:                        10.8   9.18  )-----------( 577      ( 18 Jun 2020 07:10 )             33.0     06-18    138  |  101  |  19<H>  ----------------------------<  95  4.0   |  29  |  0.82    Ca    9.6      18 Jun 2020 07:10          CAPILLARY BLOOD GLUCOSE      POCT Blood Glucose.: 124 mg/dL (18 Jun 2020 17:15)  POCT Blood Glucose.: 87 mg/dL (18 Jun 2020 12:20)  POCT Blood Glucose.: 114 mg/dL (18 Jun 2020 06:13)  POCT Blood Glucose.: 124 mg/dL (18 Jun 2020 00:13)

## 2020-06-18 NOTE — PROGRESS NOTE ADULT - SUBJECTIVE AND OBJECTIVE BOX
[   ] ICU                                          [   ] CCU                                      [  X ] Medical Floor      Patient is comfortable. Reported leakage at peg site again. No abdominal pain, N/V, hematemesis, hematochezia, melena, fever, chills, chest pain, SOB, cough or diarrhea reported.    VITALS  Vital Signs Last 24 Hrs  T(C): 36.8 (18 Jun 2020 18:30), Max: 36.8 (18 Jun 2020 18:30)  T(F): 98.3 (18 Jun 2020 18:30), Max: 98.3 (18 Jun 2020 18:30)  HR: 80 (18 Jun 2020 18:30) (76 - 96)  BP: 135/80 (18 Jun 2020 18:30) (105/70 - 135/80)   RR: 17 (18 Jun 2020 18:30) (16 - 17)  SpO2: 95% (18 Jun 2020 18:30) (95% - 98%)       MEDICATIONS  (STANDING):  ascorbic acid 500 milliGRAM(s) Oral two times a day  cholecalciferol 1000 Unit(s) Oral daily  collagenase Ointment 1 Application(s) Topical daily  dextrose 5% + sodium chloride 0.9%. 1000 milliLiter(s) (50 mL/Hr) IV Continuous <Continuous>  dextrose 5% + sodium chloride 0.9%. 1000 milliLiter(s) (50 mL/Hr) IV Continuous <Continuous>  dextrose 5%. 1000 milliLiter(s) (50 mL/Hr) IV Continuous <Continuous>  dextrose 50% Injectable 12.5 Gram(s) IV Push once  dextrose 50% Injectable 25 Gram(s) IV Push once  dextrose 50% Injectable 25 Gram(s) IV Push once  heparin   Injectable 5000 Unit(s) SubCutaneous every 12 hours  insulin glargine Injectable (LANTUS) 18 Unit(s) SubCutaneous at bedtime  insulin lispro (HumaLOG) corrective regimen sliding scale   SubCutaneous every 6 hours  insulin lispro Injectable (HumaLOG) 12 Unit(s) SubCutaneous every 6 hours  mirtazapine 7.5 milliGRAM(s) Oral at bedtime  multivitamin/minerals 1 Tablet(s) Oral daily  OLANZapine 2.5 milliGRAM(s) Oral at bedtime  pantoprazole   Suspension 40 milliGRAM(s) Enteral Tube daily  piperacillin/tazobactam IVPB.. 3.375 Gram(s) IV Intermittent every 8 hours  QUEtiapine 25 milliGRAM(s) Oral at bedtime  zinc sulfate 220 milliGRAM(s) Oral daily    MEDICATIONS  (PRN):  acetaminophen    Suspension .. 650 milliGRAM(s) Enteral Tube every 6 hours PRN Temp greater or equal to 38C (100.4F)  ALBUTerol    90 MICROgram(s) HFA Inhaler 2 Puff(s) Inhalation every 6 hours PRN Shortness of Breath and/or Wheezing  dextrose 40% Gel 15 Gram(s) Oral once PRN Blood Glucose LESS THAN 70 milliGRAM(s)/deciliter  glucagon  Injectable 1 milliGRAM(s) IntraMuscular once PRN Glucose LESS THAN 70 milligrams/deciliter  guaiFENesin   Syrup  (Sugar-Free) 100 milliGRAM(s) Oral every 6 hours PRN Cough                            10.8   9.18  )-----------( 577      ( 18 Jun 2020 07:10 )             33.0       06-18    138  |  101  |  19<H>  ----------------------------<  95  4.0   |  29  |  0.82    Ca    9.6      18 Jun 2020 07:10

## 2020-06-18 NOTE — PROGRESS NOTE ADULT - PROBLEM SELECTOR PLAN 1
1. CXR shows Opacification of the left lower lung field suggestive of atelectasis, effusion, and/or pneumonia  2. CT chest->Negative for PE  3. Cont Zosyn D6/7  4. COVID19 6/12&6/16 positive, positive antibodies  5. Pending repeat COVID19

## 2020-06-18 NOTE — PROGRESS NOTE ADULT - PROBLEM SELECTOR PLAN 4
Continue meds.  Supportive care.  Off O2.   Covid swab - pending 2 negatives before d/c   Negative 6/17.   Will need second negative before D/C   Antibody - positive.   Isolation precautions

## 2020-06-18 NOTE — PROGRESS NOTE ADULT - ASSESSMENT
1. Gastritis  2. Esophagitis  3. Failure to thrive  4. Leakage from Peg site  5. Patient tolerating Peg feeding       Suggestions:    1. Slow down the Peg feeding    2. Peg tube readjusted dumper against the skin  3. Aspiration precaution  4. Protonix daily   5. Avoid NSAID  6. DVT prophylaxis

## 2020-06-18 NOTE — PROGRESS NOTE ADULT - SUBJECTIVE AND OBJECTIVE BOX
Pt is awake, alert, lying in bed in NAD.     INTERVAL HPI/OVERNIGHT EVENTS:      VITAL SIGNS:  T(F): 97.7 (06-18-20 @ 05:56)  HR: 76 (06-18-20 @ 05:56)  BP: 105/70 (06-18-20 @ 05:56)  RR: 17 (06-18-20 @ 05:56)  SpO2: 98% (06-18-20 @ 05:56)  Wt(kg): --  I&O's Detail          REVIEW OF SYSTEMS:    CONSTITUTIONAL:  No fevers, chills, sweats    HEENT:  Eyes:  No diplopia or blurred vision. ENT:  No earache, sore throat or runny nose.    CARDIOVASCULAR:  No pressure, squeezing, tightness, or heaviness about the chest; no palpitations.    RESPIRATORY:  Per HPI    GASTROINTESTINAL:  No abdominal pain, nausea, vomiting or diarrhea.    GENITOURINARY:  No dysuria, frequency or urgency.    NEUROLOGIC:  No paresthesias, fasciculations, seizures or weakness.    PSYCHIATRIC:  No disorder of thought or mood.      PHYSICAL EXAM:    Constitutional: Well developed and nourished  Eyes: Perrla  ENMT: normal  Neck: supple  Respiratory: good air entry  Cardiovascular: S1 S2 regular  Gastrointestinal: Soft, Non tender  Extremities: No edema  Vascular: normal  Neurological: Awake, alert,Ox3  Musculoskeletal: Normal      MEDICATIONS  (STANDING):  ascorbic acid 500 milliGRAM(s) Oral two times a day  BACItracin   Ointment 1 Application(s) Topical daily  cholecalciferol 1000 Unit(s) Oral daily  collagenase Ointment 1 Application(s) Topical daily  dextrose 5% + sodium chloride 0.9%. 1000 milliLiter(s) (50 mL/Hr) IV Continuous <Continuous>  dextrose 5% + sodium chloride 0.9%. 1000 milliLiter(s) (50 mL/Hr) IV Continuous <Continuous>  dextrose 5%. 1000 milliLiter(s) (50 mL/Hr) IV Continuous <Continuous>  dextrose 50% Injectable 12.5 Gram(s) IV Push once  dextrose 50% Injectable 25 Gram(s) IV Push once  dextrose 50% Injectable 25 Gram(s) IV Push once  heparin   Injectable 5000 Unit(s) SubCutaneous every 12 hours  insulin glargine Injectable (LANTUS) 18 Unit(s) SubCutaneous at bedtime  insulin lispro (HumaLOG) corrective regimen sliding scale   SubCutaneous every 6 hours  insulin lispro Injectable (HumaLOG) 12 Unit(s) SubCutaneous every 6 hours  mirtazapine 7.5 milliGRAM(s) Oral at bedtime  multivitamin/minerals 1 Tablet(s) Oral daily  OLANZapine 2.5 milliGRAM(s) Oral at bedtime  pantoprazole   Suspension 40 milliGRAM(s) Enteral Tube daily  piperacillin/tazobactam IVPB.. 3.375 Gram(s) IV Intermittent every 8 hours  QUEtiapine 25 milliGRAM(s) Oral at bedtime  zinc sulfate 220 milliGRAM(s) Oral daily    MEDICATIONS  (PRN):  acetaminophen    Suspension .. 650 milliGRAM(s) Enteral Tube every 6 hours PRN Temp greater or equal to 38C (100.4F)  ALBUTerol    90 MICROgram(s) HFA Inhaler 2 Puff(s) Inhalation every 6 hours PRN Shortness of Breath and/or Wheezing  dextrose 40% Gel 15 Gram(s) Oral once PRN Blood Glucose LESS THAN 70 milliGRAM(s)/deciliter  glucagon  Injectable 1 milliGRAM(s) IntraMuscular once PRN Glucose LESS THAN 70 milligrams/deciliter  guaiFENesin   Syrup  (Sugar-Free) 100 milliGRAM(s) Oral every 6 hours PRN Cough      Allergies    No Known Allergies    Intolerances        LABS:                        10.8   9.18  )-----------( 577      ( 18 Jun 2020 07:10 )             33.0     06-18    138  |  101  |  19<H>  ----------------------------<  95  4.0   |  29  |  0.82    Ca    9.6      18 Jun 2020 07:10    CAPILLARY BLOOD GLUCOSE      POCT Blood Glucose.: 114 mg/dL (18 Jun 2020 06:13)  POCT Blood Glucose.: 124 mg/dL (18 Jun 2020 00:13)  POCT Blood Glucose.: 158 mg/dL (17 Jun 2020 17:01)  POCT Blood Glucose.: 154 mg/dL (17 Jun 2020 12:03)    RADIOLOGY & ADDITIONAL TESTS:    CXR:    Ct scan chest:    ekg;    echo:    COVID-19 PCR . (06.17.20 @ 18:36)    COVID-19 PCR: NotDetec: Testing is performed using polymerase chain reaction (PCR) or  transcription mediated amplification (TMA). This COVID-19 (SARS-CoV-2)  nucleic acid amplification test was validated by [employer] and is  in use under the FDA Emergency Use Authorization (EUA) for clinical labs  CLIA-certified to perform high complexity testing. Test results should be  correlated with clinical presentation, patient history, and epidemiology.    COVID-19 PCR . (06.16.20 @ 13:04)    COVID-19 PCR: Detected: This test has been validated by [facility] to be accurate;  though it has not been FDA cleared/approved by the usual pathway.  As with all laboratory tests, results should be correlated with clinical  findings.  https://www.fda.gov/media/408509/download  https://www.fda.gov/media/502900/download

## 2020-06-18 NOTE — PROGRESS NOTE ADULT - PROBLEM SELECTOR PROBLEM 10
Discharge planning issues
Prophylactic measure
Discharge planning issues
Prophylactic measure
Prophylactic measure
Discharge planning issues
Discharge planning issues
Prophylactic measure

## 2020-06-18 NOTE — PROGRESS NOTE ADULT - PROBLEM SELECTOR PLAN 6
1. COVID 19 PCR negative on 5/31.    2. positive COVID on 6/3, 6/6, 6/9 , 6/12, 6/16  3. Needs 2 negatives per RODNEY requirement.  4. Repeat COVID19 pending

## 2020-06-18 NOTE — PROGRESS NOTE ADULT - SUBJECTIVE AND OBJECTIVE BOX
64y Male    Meds:  piperacillin/tazobactam IVPB.. 3.375 Gram(s) IV Intermittent every 8 hours    Allergies    No Known Allergies    Intolerances        VITALS:  Vital Signs Last 24 Hrs  T(C): 36.5 (18 Jun 2020 14:37), Max: 36.6 (17 Jun 2020 20:38)  T(F): 97.7 (18 Jun 2020 14:37), Max: 97.8 (17 Jun 2020 20:38)  HR: 90 (18 Jun 2020 14:37) (76 - 96)  BP: 109/60 (18 Jun 2020 14:37) (105/70 - 110/69)  BP(mean): --  RR: 17 (18 Jun 2020 14:37) (16 - 17)  SpO2: 98% (18 Jun 2020 14:37) (96% - 98%)    LABS/DIAGNOSTIC TESTS:                          10.8   9.18  )-----------( 577      ( 18 Jun 2020 07:10 )             33.0         06-18    138  |  101  |  19<H>  ----------------------------<  95  4.0   |  29  |  0.82    Ca    9.6      18 Jun 2020 07:10            CULTURES: .Blood Blood  06-12 @ 01:02   No Growth Final  --  --      .Urine Clean Catch (Midstream)  05-14 @ 09:59   >=3 organisms. Probable collection contamination.  --  --            RADIOLOGY:      ROS:  [  ] UNABLE TO ELICIT 64y Male who is confused but his mental status has improved a lot , he is talking and looks comfortable, he is not coughing in front of me and does not look sob or in any distress, he has no fevers. He is on D #5 of Zosyn.    Meds:  piperacillin/tazobactam IVPB.. 3.375 Gram(s) IV Intermittent every 8 hours    Allergies    No Known Allergies    Intolerances        VITALS:  Vital Signs Last 24 Hrs  T(C): 36.5 (18 Jun 2020 14:37), Max: 36.6 (17 Jun 2020 20:38)  T(F): 97.7 (18 Jun 2020 14:37), Max: 97.8 (17 Jun 2020 20:38)  HR: 90 (18 Jun 2020 14:37) (76 - 96)  BP: 109/60 (18 Jun 2020 14:37) (105/70 - 110/69)  BP(mean): --  RR: 17 (18 Jun 2020 14:37) (16 - 17)  SpO2: 98% (18 Jun 2020 14:37) (96% - 98%)    LABS/DIAGNOSTIC TESTS:                          10.8   9.18  )-----------( 577      ( 18 Jun 2020 07:10 )             33.0         06-18    138  |  101  |  19<H>  ----------------------------<  95  4.0   |  29  |  0.82    Ca    9.6      18 Jun 2020 07:10            CULTURES: .Blood Blood  06-12 @ 01:02   No Growth Final  --  --      .Urine Clean Catch (Midstream)  05-14 @ 09:59   >=3 organisms. Probable collection contamination.  --  --            RADIOLOGY:      ROS:  [ x ] UNABLE TO ELICIT

## 2020-06-18 NOTE — PROGRESS NOTE ADULT - PROBLEM SELECTOR PLAN 3
1. no behavioral disturbances, awake, does not follow commands  2. c/w Zyprexa, Seroquel, and Remeron at bedtime as per Psych   3. Supportive care

## 2020-06-18 NOTE — PROGRESS NOTE ADULT - PROBLEM SELECTOR PLAN 8
1. BS monitoring on Humalog ISS  2. c/w Lantus 18 units at bedtime  3. C/w  Humalog 12 units q 6 hrs (hold if tube feedings are held)  4. BS monitor Q6  5. Endocrinology consult appreciated - Dr. Allison

## 2020-06-18 NOTE — PROGRESS NOTE ADULT - PROBLEM SELECTOR PLAN 10
1. Discharge to [facility]  after 2 consecutive negative testings for Covid-19 as required by [facility]. 06/03, 06/06, 6/9, 6/12 testing was positive. 6/16 specimen testing.

## 2020-06-18 NOTE — PROGRESS NOTE ADULT - SUBJECTIVE AND OBJECTIVE BOX
64 year old male from [facility] with PMH of dementia and type 2 DM presented with seizure activity while sitting at AL. Endocrinology was consulted for management of uncontrolled type 2 DM with hyperglycemia.    Patient seen and examined at bedside. Patient is resting comfortably. FS reviewed. BG at goal <180 without hypoglycemia. He remains on tube feedings at goal rate of 60cc/hr.      MEDICATIONS  (STANDING):  ascorbic acid 500 milliGRAM(s) Oral two times a day  BACItracin   Ointment 1 Application(s) Topical daily  cholecalciferol 1000 Unit(s) Oral daily  collagenase Ointment 1 Application(s) Topical daily  dextrose 5% + sodium chloride 0.9%. 1000 milliLiter(s) (50 mL/Hr) IV Continuous <Continuous>  dextrose 5% + sodium chloride 0.9%. 1000 milliLiter(s) (50 mL/Hr) IV Continuous <Continuous>  dextrose 5%. 1000 milliLiter(s) (50 mL/Hr) IV Continuous <Continuous>  dextrose 50% Injectable 12.5 Gram(s) IV Push once  dextrose 50% Injectable 25 Gram(s) IV Push once  dextrose 50% Injectable 25 Gram(s) IV Push once  heparin   Injectable 5000 Unit(s) SubCutaneous every 12 hours  insulin glargine Injectable (LANTUS) 18 Unit(s) SubCutaneous at bedtime  insulin lispro (HumaLOG) corrective regimen sliding scale   SubCutaneous every 6 hours  insulin lispro Injectable (HumaLOG) 12 Unit(s) SubCutaneous every 6 hours  mirtazapine 7.5 milliGRAM(s) Oral at bedtime  multivitamin/minerals 1 Tablet(s) Oral daily  OLANZapine 2.5 milliGRAM(s) Oral at bedtime  pantoprazole   Suspension 40 milliGRAM(s) Enteral Tube daily  piperacillin/tazobactam IVPB.. 3.375 Gram(s) IV Intermittent every 8 hours  QUEtiapine 25 milliGRAM(s) Oral at bedtime  zinc sulfate 220 milliGRAM(s) Oral daily    MEDICATIONS  (PRN):  acetaminophen    Suspension .. 650 milliGRAM(s) Enteral Tube every 6 hours PRN Temp greater or equal to 38C (100.4F)  ALBUTerol    90 MICROgram(s) HFA Inhaler 2 Puff(s) Inhalation every 6 hours PRN Shortness of Breath and/or Wheezing  dextrose 40% Gel 15 Gram(s) Oral once PRN Blood Glucose LESS THAN 70 milliGRAM(s)/deciliter  glucagon  Injectable 1 milliGRAM(s) IntraMuscular once PRN Glucose LESS THAN 70 milligrams/deciliter  guaiFENesin   Syrup  (Sugar-Free) 100 milliGRAM(s) Oral every 6 hours PRN Cough      REVIEW OF SYSTEMS:  unable to obtain due to medical condition    PHYSICAL EXAM:    VITAL SIGNS  T(C): 36.5 (06-18-20 @ 05:56), Max: 36.6 (06-17-20 @ 14:00)  HR: 76 (06-18-20 @ 05:56) (76 - 96)  BP: 105/70 (06-18-20 @ 05:56) (105/70 - 126/82)  RR: 17 (06-18-20 @ 05:56) (16 - 17)  SpO2: 98% (06-18-20 @ 05:56) (96% - 99%)      GENERAL: NAD, well-developed  HEAD:  Atraumatic, Normocephalic  EYES: EOMI, PERRLA, conjunctiva and sclera clear  ENMT: No tonsillar erythema, exudates, or enlargement; No lesions  NECK: Supple, No JVD, Normal thyroid  NERVOUS SYSTEM:  nonverbal,  CHEST/LUNG: CTA bilaterally  No rales, rhonchi, wheezing, or rubs  HEART: Regular rate and rhythm; No murmurs, rubs, or gallops  ABDOMEN: s/p PEG, Soft, Nontender, Nondistended; Bowel sounds present  EXTREMITIES:  No edema  SKIN: No rashes or lesions      LABS:                        10.8   9.18  )-----------( 577      ( 18 Jun 2020 07:10 )             33.0     06-18    138  |  101  |  19<H>  ----------------------------<  95  4.0   |  29  |  0.82    Ca    9.6      18 Jun 2020 07:10          CAPILLARY BLOOD GLUCOSE      POCT Blood Glucose.: 114 mg/dL (18 Jun 2020 06:13)  POCT Blood Glucose.: 124 mg/dL (18 Jun 2020 00:13)  POCT Blood Glucose.: 158 mg/dL (17 Jun 2020 17:01)  POCT Blood Glucose.: 154 mg/dL (17 Jun 2020 12:03)

## 2020-06-19 ENCOUNTER — TRANSCRIPTION ENCOUNTER (OUTPATIENT)
Age: 65
End: 2020-06-19

## 2020-06-19 VITALS — SYSTOLIC BLOOD PRESSURE: 120 MMHG | DIASTOLIC BLOOD PRESSURE: 52 MMHG | OXYGEN SATURATION: 100 % | HEART RATE: 93 BPM

## 2020-06-19 LAB
GLUCOSE BLDC GLUCOMTR-MCNC: 103 MG/DL — HIGH (ref 70–99)
GLUCOSE BLDC GLUCOMTR-MCNC: 114 MG/DL — HIGH (ref 70–99)
GLUCOSE BLDC GLUCOMTR-MCNC: 129 MG/DL — HIGH (ref 70–99)
SARS-COV-2 RNA SPEC QL NAA+PROBE: SIGNIFICANT CHANGE UP

## 2020-06-19 PROCEDURE — 93005 ELECTROCARDIOGRAM TRACING: CPT

## 2020-06-19 PROCEDURE — 84145 PROCALCITONIN (PCT): CPT

## 2020-06-19 PROCEDURE — 85027 COMPLETE CBC AUTOMATED: CPT

## 2020-06-19 PROCEDURE — 85610 PROTHROMBIN TIME: CPT

## 2020-06-19 PROCEDURE — 70450 CT HEAD/BRAIN W/O DYE: CPT

## 2020-06-19 PROCEDURE — 82570 ASSAY OF URINE CREATININE: CPT

## 2020-06-19 PROCEDURE — 85379 FIBRIN DEGRADATION QUANT: CPT

## 2020-06-19 PROCEDURE — 96374 THER/PROPH/DIAG INJ IV PUSH: CPT

## 2020-06-19 PROCEDURE — 87635 SARS-COV-2 COVID-19 AMP PRB: CPT

## 2020-06-19 PROCEDURE — 82803 BLOOD GASES ANY COMBINATION: CPT

## 2020-06-19 PROCEDURE — 84443 ASSAY THYROID STIM HORMONE: CPT

## 2020-06-19 PROCEDURE — 83615 LACTATE (LD) (LDH) ENZYME: CPT

## 2020-06-19 PROCEDURE — 82553 CREATINE MB FRACTION: CPT

## 2020-06-19 PROCEDURE — 97161 PT EVAL LOW COMPLEX 20 MIN: CPT

## 2020-06-19 PROCEDURE — 71275 CT ANGIOGRAPHY CHEST: CPT

## 2020-06-19 PROCEDURE — 99232 SBSQ HOSP IP/OBS MODERATE 35: CPT

## 2020-06-19 PROCEDURE — 82746 ASSAY OF FOLIC ACID SERUM: CPT

## 2020-06-19 PROCEDURE — 82607 VITAMIN B-12: CPT

## 2020-06-19 PROCEDURE — 93970 EXTREMITY STUDY: CPT

## 2020-06-19 PROCEDURE — 87086 URINE CULTURE/COLONY COUNT: CPT

## 2020-06-19 PROCEDURE — 82140 ASSAY OF AMMONIA: CPT

## 2020-06-19 PROCEDURE — 87640 STAPH A DNA AMP PROBE: CPT

## 2020-06-19 PROCEDURE — 86769 SARS-COV-2 COVID-19 ANTIBODY: CPT

## 2020-06-19 PROCEDURE — 96376 TX/PRO/DX INJ SAME DRUG ADON: CPT

## 2020-06-19 PROCEDURE — 85652 RBC SED RATE AUTOMATED: CPT

## 2020-06-19 PROCEDURE — 84100 ASSAY OF PHOSPHORUS: CPT

## 2020-06-19 PROCEDURE — 87641 MR-STAPH DNA AMP PROBE: CPT

## 2020-06-19 PROCEDURE — 83880 ASSAY OF NATRIURETIC PEPTIDE: CPT

## 2020-06-19 PROCEDURE — 85730 THROMBOPLASTIN TIME PARTIAL: CPT

## 2020-06-19 PROCEDURE — 82550 ASSAY OF CK (CPK): CPT

## 2020-06-19 PROCEDURE — 82306 VITAMIN D 25 HYDROXY: CPT

## 2020-06-19 PROCEDURE — 97110 THERAPEUTIC EXERCISES: CPT

## 2020-06-19 PROCEDURE — 83036 HEMOGLOBIN GLYCOSYLATED A1C: CPT

## 2020-06-19 PROCEDURE — 92526 ORAL FUNCTION THERAPY: CPT

## 2020-06-19 PROCEDURE — 92610 EVALUATE SWALLOWING FUNCTION: CPT

## 2020-06-19 PROCEDURE — 83935 ASSAY OF URINE OSMOLALITY: CPT

## 2020-06-19 PROCEDURE — 86140 C-REACTIVE PROTEIN: CPT

## 2020-06-19 PROCEDURE — 82728 ASSAY OF FERRITIN: CPT

## 2020-06-19 PROCEDURE — 80048 BASIC METABOLIC PNL TOTAL CA: CPT

## 2020-06-19 PROCEDURE — 99053 MED SERV 10PM-8AM 24 HR FAC: CPT

## 2020-06-19 PROCEDURE — 83605 ASSAY OF LACTIC ACID: CPT

## 2020-06-19 PROCEDURE — 80061 LIPID PANEL: CPT

## 2020-06-19 PROCEDURE — 84146 ASSAY OF PROLACTIN: CPT

## 2020-06-19 PROCEDURE — 86803 HEPATITIS C AB TEST: CPT

## 2020-06-19 PROCEDURE — U0003: CPT

## 2020-06-19 PROCEDURE — 96372 THER/PROPH/DIAG INJ SC/IM: CPT | Mod: XU

## 2020-06-19 PROCEDURE — 97164 PT RE-EVAL EST PLAN CARE: CPT

## 2020-06-19 PROCEDURE — 81001 URINALYSIS AUTO W/SCOPE: CPT

## 2020-06-19 PROCEDURE — 84133 ASSAY OF URINE POTASSIUM: CPT

## 2020-06-19 PROCEDURE — 80053 COMPREHEN METABOLIC PANEL: CPT

## 2020-06-19 PROCEDURE — 83735 ASSAY OF MAGNESIUM: CPT

## 2020-06-19 PROCEDURE — 86780 TREPONEMA PALLIDUM: CPT

## 2020-06-19 PROCEDURE — 84300 ASSAY OF URINE SODIUM: CPT

## 2020-06-19 PROCEDURE — C1889: CPT

## 2020-06-19 PROCEDURE — 71045 X-RAY EXAM CHEST 1 VIEW: CPT

## 2020-06-19 PROCEDURE — 87040 BLOOD CULTURE FOR BACTERIA: CPT

## 2020-06-19 PROCEDURE — 94640 AIRWAY INHALATION TREATMENT: CPT

## 2020-06-19 PROCEDURE — 84484 ASSAY OF TROPONIN QUANT: CPT

## 2020-06-19 PROCEDURE — 82962 GLUCOSE BLOOD TEST: CPT

## 2020-06-19 PROCEDURE — 99285 EMERGENCY DEPT VISIT HI MDM: CPT | Mod: 25

## 2020-06-19 PROCEDURE — 36415 COLL VENOUS BLD VENIPUNCTURE: CPT

## 2020-06-19 RX ORDER — OLANZAPINE 15 MG/1
1 TABLET, FILM COATED ORAL
Qty: 0 | Refills: 0 | DISCHARGE

## 2020-06-19 RX ORDER — MIRTAZAPINE 45 MG/1
0.5 TABLET, ORALLY DISINTEGRATING ORAL
Qty: 0 | Refills: 0 | DISCHARGE

## 2020-06-19 RX ORDER — ALBUTEROL 90 UG/1
2 AEROSOL, METERED ORAL
Qty: 0 | Refills: 0 | DISCHARGE
Start: 2020-06-19

## 2020-06-19 RX ORDER — INSULIN GLARGINE 100 [IU]/ML
18 INJECTION, SOLUTION SUBCUTANEOUS
Qty: 0 | Refills: 0 | DISCHARGE
Start: 2020-06-19

## 2020-06-19 RX ORDER — ACETAMINOPHEN 500 MG
20.31 TABLET ORAL
Qty: 0 | Refills: 0 | DISCHARGE
Start: 2020-06-19

## 2020-06-19 RX ORDER — MIRTAZAPINE 45 MG/1
1 TABLET, ORALLY DISINTEGRATING ORAL
Qty: 0 | Refills: 0 | DISCHARGE
Start: 2020-06-19

## 2020-06-19 RX ORDER — PANTOPRAZOLE SODIUM 20 MG/1
40 TABLET, DELAYED RELEASE ORAL
Qty: 0 | Refills: 0 | DISCHARGE
Start: 2020-06-19

## 2020-06-19 RX ORDER — CHOLECALCIFEROL (VITAMIN D3) 125 MCG
1000 CAPSULE ORAL
Qty: 0 | Refills: 0 | DISCHARGE
Start: 2020-06-19

## 2020-06-19 RX ORDER — QUETIAPINE FUMARATE 200 MG/1
1 TABLET, FILM COATED ORAL
Qty: 0 | Refills: 0 | DISCHARGE

## 2020-06-19 RX ORDER — METFORMIN HYDROCHLORIDE 850 MG/1
1 TABLET ORAL
Qty: 0 | Refills: 0 | DISCHARGE

## 2020-06-19 RX ORDER — DOCUSATE SODIUM 100 MG
3 CAPSULE ORAL
Qty: 0 | Refills: 0 | DISCHARGE

## 2020-06-19 RX ORDER — QUETIAPINE FUMARATE 200 MG/1
1 TABLET, FILM COATED ORAL
Qty: 0 | Refills: 0 | DISCHARGE
Start: 2020-06-19

## 2020-06-19 RX ORDER — MULTIVIT-MIN/FERROUS GLUCONATE 9 MG/15 ML
1 LIQUID (ML) ORAL
Qty: 0 | Refills: 0 | DISCHARGE
Start: 2020-06-19

## 2020-06-19 RX ORDER — OLANZAPINE 15 MG/1
1 TABLET, FILM COATED ORAL
Qty: 0 | Refills: 0 | DISCHARGE
Start: 2020-06-19

## 2020-06-19 RX ORDER — MIRTAZAPINE 45 MG/1
1 TABLET, ORALLY DISINTEGRATING ORAL
Qty: 0 | Refills: 0 | DISCHARGE

## 2020-06-19 RX ORDER — ZINC SULFATE TAB 220 MG (50 MG ZINC EQUIVALENT) 220 (50 ZN) MG
1 TAB ORAL
Qty: 0 | Refills: 0 | DISCHARGE
Start: 2020-06-19

## 2020-06-19 RX ORDER — COLLAGENASE CLOSTRIDIUM HIST. 250 UNIT/G
1 OINTMENT (GRAM) TOPICAL
Qty: 0 | Refills: 0 | DISCHARGE
Start: 2020-06-19

## 2020-06-19 RX ORDER — INSULIN LISPRO 100/ML
8 VIAL (ML) SUBCUTANEOUS EVERY 6 HOURS
Refills: 0 | Status: DISCONTINUED | OUTPATIENT
Start: 2020-06-19 | End: 2020-06-19

## 2020-06-19 RX ORDER — ASCORBIC ACID 60 MG
1 TABLET,CHEWABLE ORAL
Qty: 0 | Refills: 0 | DISCHARGE
Start: 2020-06-19

## 2020-06-19 RX ADMIN — PANTOPRAZOLE SODIUM 40 MILLIGRAM(S): 20 TABLET, DELAYED RELEASE ORAL at 12:50

## 2020-06-19 RX ADMIN — ZINC SULFATE TAB 220 MG (50 MG ZINC EQUIVALENT) 220 MILLIGRAM(S): 220 (50 ZN) TAB at 12:50

## 2020-06-19 RX ADMIN — Medication 1 TABLET(S): at 12:49

## 2020-06-19 RX ADMIN — Medication 1 APPLICATION(S): at 12:48

## 2020-06-19 RX ADMIN — Medication 1000 UNIT(S): at 12:47

## 2020-06-19 RX ADMIN — PIPERACILLIN AND TAZOBACTAM 25 GRAM(S): 4; .5 INJECTION, POWDER, LYOPHILIZED, FOR SOLUTION INTRAVENOUS at 12:57

## 2020-06-19 RX ADMIN — Medication 12 UNIT(S): at 06:35

## 2020-06-19 RX ADMIN — PIPERACILLIN AND TAZOBACTAM 25 GRAM(S): 4; .5 INJECTION, POWDER, LYOPHILIZED, FOR SOLUTION INTRAVENOUS at 06:34

## 2020-06-19 RX ADMIN — Medication 500 MILLIGRAM(S): at 06:35

## 2020-06-19 RX ADMIN — HEPARIN SODIUM 5000 UNIT(S): 5000 INJECTION INTRAVENOUS; SUBCUTANEOUS at 06:35

## 2020-06-19 NOTE — PROGRESS NOTE ADULT - PROBLEM SELECTOR PLAN 4
A1C 9.9   Continue Lantus 18 units at bedtime  Humalog 8 units q 6  hrs  Hold standing Humalog while tube feeds are held.   Continue sliding scale  Check fingersticks q6  Endo Dr. Allison.

## 2020-06-19 NOTE — PROGRESS NOTE ADULT - MS EXT PE MLT D E PC
no pedal edema/no cyanosis
no pedal edema/no cyanosis
no cyanosis/pedal edema
pedal edema/no cyanosis
no clubbing/no cyanosis
no clubbing/no cyanosis/no pedal edema
no cyanosis/no clubbing
no cyanosis/no pedal edema/no clubbing

## 2020-06-19 NOTE — PROGRESS NOTE ADULT - SUBJECTIVE AND OBJECTIVE BOX
Pt is awake, alert, lying in bed in NAD. Peg feeds.     INTERVAL HPI/OVERNIGHT EVENTS:      VITAL SIGNS:  T(F): 97.3 (06-19-20 @ 07:44)  HR: 94 (06-19-20 @ 07:44)  BP: 119/71 (06-19-20 @ 07:44)  RR: 173 (06-19-20 @ 07:44)  SpO2: 100% (06-19-20 @ 07:44)  Wt(kg): --  I&O's Detail    18 Jun 2020 07:01  -  19 Jun 2020 07:00  --------------------------------------------------------  IN:    Enteral Tube Flush: 675 mL    Glucerna: 440 mL  Total IN: 1115 mL    OUT:  Total OUT: 0 mL    Total NET: 1115 mL              REVIEW OF SYSTEMS:    CONSTITUTIONAL:  No fevers, chills, sweats    HEENT:  Eyes:  No diplopia or blurred vision. ENT:  No earache, sore throat or runny nose.    CARDIOVASCULAR:  No pressure, squeezing, tightness, or heaviness about the chest; no palpitations.    RESPIRATORY:  Per HPI    GASTROINTESTINAL:  No abdominal pain, nausea, vomiting or diarrhea.    GENITOURINARY:  No dysuria, frequency or urgency.    NEUROLOGIC:  No paresthesias, fasciculations, seizures or weakness.    PSYCHIATRIC:  No disorder of thought or mood.      PHYSICAL EXAM:    Constitutional: Well developed and nourished  Eyes:Perrla  ENMT: normal  Neck:supple  Respiratory: good air entry  Cardiovascular: S1 S2 regular  Gastrointestinal: Soft, Non tender  Extremities: No edema  Vascular:normal  Neurological:Awake, alert   Musculoskeletal: bedbound.       MEDICATIONS  (STANDING):  ascorbic acid 500 milliGRAM(s) Oral two times a day  cholecalciferol 1000 Unit(s) Oral daily  collagenase Ointment 1 Application(s) Topical daily  dextrose 5% + sodium chloride 0.9%. 1000 milliLiter(s) (50 mL/Hr) IV Continuous <Continuous>  dextrose 5% + sodium chloride 0.9%. 1000 milliLiter(s) (50 mL/Hr) IV Continuous <Continuous>  dextrose 5%. 1000 milliLiter(s) (50 mL/Hr) IV Continuous <Continuous>  dextrose 50% Injectable 12.5 Gram(s) IV Push once  dextrose 50% Injectable 25 Gram(s) IV Push once  dextrose 50% Injectable 25 Gram(s) IV Push once  heparin   Injectable 5000 Unit(s) SubCutaneous every 12 hours  insulin glargine Injectable (LANTUS) 18 Unit(s) SubCutaneous at bedtime  insulin lispro (HumaLOG) corrective regimen sliding scale   SubCutaneous every 6 hours  insulin lispro Injectable (HumaLOG) 12 Unit(s) SubCutaneous every 6 hours  mirtazapine 7.5 milliGRAM(s) Oral at bedtime  multivitamin/minerals 1 Tablet(s) Oral daily  OLANZapine 2.5 milliGRAM(s) Oral at bedtime  pantoprazole   Suspension 40 milliGRAM(s) Enteral Tube daily  piperacillin/tazobactam IVPB.. 3.375 Gram(s) IV Intermittent every 8 hours  QUEtiapine 25 milliGRAM(s) Oral at bedtime  zinc sulfate 220 milliGRAM(s) Oral daily    MEDICATIONS  (PRN):  acetaminophen    Suspension .. 650 milliGRAM(s) Enteral Tube every 6 hours PRN Temp greater or equal to 38C (100.4F)  ALBUTerol    90 MICROgram(s) HFA Inhaler 2 Puff(s) Inhalation every 6 hours PRN Shortness of Breath and/or Wheezing  dextrose 40% Gel 15 Gram(s) Oral once PRN Blood Glucose LESS THAN 70 milliGRAM(s)/deciliter  glucagon  Injectable 1 milliGRAM(s) IntraMuscular once PRN Glucose LESS THAN 70 milligrams/deciliter  guaiFENesin   Syrup  (Sugar-Free) 100 milliGRAM(s) Oral every 6 hours PRN Cough      Allergies    No Known Allergies    Intolerances        LABS:                        10.8   9.18  )-----------( 577      ( 18 Jun 2020 07:10 )             33.0     06-18    138  |  101  |  19<H>  ----------------------------<  95  4.0   |  29  |  0.82    Ca    9.6      18 Jun 2020 07:10    CAPILLARY BLOOD GLUCOSE    POCT Blood Glucose.: 129 mg/dL (19 Jun 2020 05:33)  POCT Blood Glucose.: 114 mg/dL (19 Jun 2020 00:31)  POCT Blood Glucose.: 144 mg/dL (18 Jun 2020 21:11)  POCT Blood Glucose.: 124 mg/dL (18 Jun 2020 17:15)  POCT Blood Glucose.: 87 mg/dL (18 Jun 2020 12:20)    RADIOLOGY & ADDITIONAL TESTS:    CXR:    Ct scan chest:    ekg;    echo:

## 2020-06-19 NOTE — PROGRESS NOTE ADULT - VASCULAR
Equal and normal pulses (carotid, femoral, dorsalis pedis)

## 2020-06-19 NOTE — PROGRESS NOTE ADULT - REASON FOR ADMISSION
seizures

## 2020-06-19 NOTE — PROGRESS NOTE ADULT - NECK DETAILS
supple/no JVD
no JVD/supple
supple/no JVD

## 2020-06-19 NOTE — PROGRESS NOTE ADULT - ASSESSMENT
1. Gastritis  2. Esophagitis  3. Failure to thrive  4. Leakage from Peg site  5. Patient tolerating Peg feeding       Suggestions:    1. Slow down the Peg feeding    2. Peg tube readjusted dumper against the skin  3. Aspiration precaution  4. Protonix daily   5. Avoid NSAID  6. DVT prophylaxis 1. Gastritis  2. Esophagitis  3. Failure to thrive  4. Leakage from Peg site stopped  5. Patient tolerating Peg feeding       Suggestions:    1. Slow down the Peg feeding    2. Keep Peg site clean  3. Aspiration precaution  4. Protonix daily   5. Avoid NSAID  6. DVT prophylaxis

## 2020-06-19 NOTE — PROGRESS NOTE ADULT - RS GEN PE MLT RESP DETAILS PC
clear to auscultation bilaterally/no rales/breath sounds equal/no rhonchi/no wheezes/good air movement
no wheezes/no rales/no rhonchi/clear to auscultation bilaterally/breath sounds equal/good air movement
no wheezes/rales/good air movement/no rhonchi
breath sounds equal/no wheezes/no rhonchi/rales
breath sounds equal/good air movement/no wheezes/airway patent/no rales/respirations non-labored
airway patent/breath sounds equal/no wheezes/no rhonchi/no rales/good air movement/respirations non-labored
airway patent/good air movement/no rhonchi/no rales/no wheezes
airway patent/good air movement/no rhonchi/no wheezes/no rales/breath sounds equal
breath sounds equal/airway patent/good air movement/no rales/no rhonchi/no wheezes
breath sounds equal/good air movement/no rales/no wheezes/airway patent/respirations non-labored/no rhonchi
breath sounds equal/no rales/no wheezes/no rhonchi/airway patent/good air movement
no rales/good air movement/no wheezes/airway patent/respirations non-labored/breath sounds equal
no rales/no wheezes/airway patent/clear to auscultation bilaterally/no rhonchi/good air movement/breath sounds equal/respirations non-labored
no rales/no wheezes/airway patent/respirations non-labored/breath sounds equal/good air movement/no rhonchi
no rhonchi/breath sounds equal/good air movement/airway patent/no rales
no wheezes/no rales/airway patent/no rhonchi/good air movement/breath sounds equal
no wheezes/no rales/clear to auscultation bilaterally/no rhonchi/respirations non-labored/good air movement/airway patent/breath sounds equal
respirations non-labored/no rhonchi/airway patent/no wheezes/good air movement/breath sounds equal

## 2020-06-19 NOTE — PROGRESS NOTE ADULT - ASSESSMENT
64 year old male from [facility] with PMH of dementia and type 2 DM presented with seizure activity while sitting at AL. Endocrinology was consulted for management of uncontrolled type 2 DM with hyperglycemia.    1. Uncontrolled Type 2 DM with hyperglycemia, A1c 9.9%  -tube feedings held due to abdominal distention  -Continue Lantus 18 units at bedtime  -Hold standing Humalog while tube feeds are held. When resumed, please restart Humalog 8 units q 6  hrs  -continue moderate dose rapid acting insulin q 6 hrs   BG 70-100mg/dL 0 units  -150 mg/dL 0 units  -200 mg/dL 2 unit  -250 mg/dL 4 units  -300 mg/dL 6 units  -350 mg/dL 8 units  -400 mg/dL 10 units  BG > 400mg/dL 12 units  -FS AC HS  -ensure consistent carbohydrate   -will monitor FS and adjust accordingly     2. Delirium  -continue Seroquel, Zyprexa, Remeron    3. Left lower lobe pneumonia  -continue antibiotics as per ID  -ID follow up    Plan discussed with primary team]  Please  call  w/ any questions or concerns [phone]

## 2020-06-19 NOTE — PROGRESS NOTE ADULT - NSTELEHEALTH_GEN_ALL_CORE
No

## 2020-06-19 NOTE — PROGRESS NOTE ADULT - SUBJECTIVE AND OBJECTIVE BOX
CARDIOLOGY/MEDICAL ATTENDING    CHIEF COMPLAINT:Patient is a 64y old  Male who presents with a chief complaint of seizures.Pt appears comfortable.    	  REVIEW OF SYSTEMS:    [ x] Unable to obtain    PHYSICAL EXAM:  T(C): 36.3 (06-19-20 @ 07:44), Max: 36.8 (06-18-20 @ 18:30)  HR: 94 (06-19-20 @ 07:44) (70 - 94)  BP: 119/71 (06-19-20 @ 07:44) (109/60 - 146/74)  RR: 173 (06-19-20 @ 07:44) (17 - 173)  SpO2: 100% (06-19-20 @ 07:44) (95% - 100%)  Wt(kg): --  I&O's Summary    18 Jun 2020 07:01  -  19 Jun 2020 07:00  --------------------------------------------------------  IN: 1115 mL / OUT: 0 mL / NET: 1115 mL        Appearance: Normal	  HEENT:   Normal oral mucosa, PERRL, EOMI	  Lymphatic: No lymphadenopathy  Cardiovascular: Normal S1 S2, No JVD, No murmurs, No edema  Respiratory: Lungs clear to auscultation	  Gastrointestinal:  Soft, Non-tender, + BS	  Skin: No rashes, No ecchymoses, No cyanosis	  Extremities: Normal range of motion, No clubbing, cyanosis or edema  Vascular: Peripheral pulses palpable 2+ bilaterally    MEDICATIONS  (STANDING):  ascorbic acid 500 milliGRAM(s) Oral two times a day  cholecalciferol 1000 Unit(s) Oral daily  collagenase Ointment 1 Application(s) Topical daily  dextrose 5% + sodium chloride 0.9%. 1000 milliLiter(s) (50 mL/Hr) IV Continuous <Continuous>  dextrose 5% + sodium chloride 0.9%. 1000 milliLiter(s) (50 mL/Hr) IV Continuous <Continuous>  dextrose 5%. 1000 milliLiter(s) (50 mL/Hr) IV Continuous <Continuous>  dextrose 50% Injectable 12.5 Gram(s) IV Push once  dextrose 50% Injectable 25 Gram(s) IV Push once  dextrose 50% Injectable 25 Gram(s) IV Push once  heparin   Injectable 5000 Unit(s) SubCutaneous every 12 hours  insulin glargine Injectable (LANTUS) 18 Unit(s) SubCutaneous at bedtime  insulin lispro (HumaLOG) corrective regimen sliding scale   SubCutaneous every 6 hours  insulin lispro Injectable (HumaLOG) 12 Unit(s) SubCutaneous every 6 hours  mirtazapine 7.5 milliGRAM(s) Oral at bedtime  multivitamin/minerals 1 Tablet(s) Oral daily  OLANZapine 2.5 milliGRAM(s) Oral at bedtime  pantoprazole   Suspension 40 milliGRAM(s) Enteral Tube daily  piperacillin/tazobactam IVPB.. 3.375 Gram(s) IV Intermittent every 8 hours  QUEtiapine 25 milliGRAM(s) Oral at bedtime  zinc sulfate 220 milliGRAM(s) Oral daily    	  	  LABS:	 	                        10.8   9.18  )-----------( 577      ( 18 Jun 2020 07:10 )             33.0     06-18    138  |  101  |  19<H>  ----------------------------<  95  4.0   |  29  |  0.82    Ca    9.6      18 Jun 2020 07:10

## 2020-06-19 NOTE — PROGRESS NOTE ADULT - NSHPATTENDINGPLANDISCUSS_GEN_ALL_CORE
medical team
the medical team
house staff covering

## 2020-06-19 NOTE — PROGRESS NOTE ADULT - ASSESSMENT
64 yr old male with PMHX of DM,dementia sent to ER from facility for seizure and now COVID+,Delirium,MAXIME,hypernatremia and now LLL aspiration pneumonia.  1.GI f/u,s/p PEG-leaking-GI f/u ,tolerating TF.  2.DM-Insulin, Endo f/u.  3.Delirium-cont psych meds.  4.LLL pneumonia- ID f/u, ABX.  5.GI and DVT prophylaxis.  6.D/C planning since COVID-.

## 2020-06-19 NOTE — PROGRESS NOTE ADULT - PROBLEM SELECTOR PLAN 8
Discharge to [facility]  after 2 consecutive negative testings for Covid-19 as required by [facility].  Covid 6/17 and 6/19 negative

## 2020-06-19 NOTE — PROGRESS NOTE ADULT - PROBLEM SELECTOR PLAN 3
Does not follow commands  Continue Zyprexa, Seroquel, and Remeron at bedtime as per Psych   Supportive care

## 2020-06-19 NOTE — PROGRESS NOTE ADULT - GASTROINTESTINAL DETAILS
no distention/nontender/no guarding/no organomegaly/bowel sounds normal/soft/no rigidity
soft/no rigidity/no distention/no masses palpable/bowel sounds normal/no guarding/no organomegaly/nontender
bowel sounds normal/no distention/no guarding/no rigidity/soft/nontender/no masses palpable/no organomegaly
no distention/no guarding/no rigidity/soft/no masses palpable/bowel sounds normal
no guarding/soft/bowel sounds normal/nontender/no distention/no bruit/no rebound tenderness/no rigidity
bowel sounds normal/no rebound tenderness/no distention/nontender/soft
bowel sounds normal/no rebound tenderness/soft/no distention/no guarding/no rigidity/nontender
bowel sounds normal/no rigidity/no rebound tenderness/no guarding/no distention/soft/nontender/no masses palpable
no bruit/no rebound tenderness/no masses palpable/bowel sounds normal/no rigidity/no distention/no guarding/soft/nontender/no organomegaly
no guarding/nontender/no rebound tenderness/bowel sounds normal/no rigidity/soft/no distention
no guarding/soft/bowel sounds normal/no bruit/nontender/no distention/no masses palpable/no organomegaly/no rebound tenderness/no rigidity
no rebound tenderness/no distention/no guarding/soft/nontender/bowel sounds normal/no rigidity
no rebound tenderness/no rigidity/soft/no distention/no guarding/bowel sounds normal/nontender/no masses palpable
nontender/no masses palpable/no distention/no guarding/no rebound tenderness/no rigidity/bowel sounds normal/soft
nontender/soft/bowel sounds normal/no bruit/no rebound tenderness/no rigidity/no distention/no guarding
nontender/soft/no rebound tenderness/bowel sounds normal/no distention/no guarding/no rigidity
soft/no masses palpable/bowel sounds normal/nontender/no rebound tenderness/no rigidity/no distention/no guarding
soft/no rebound tenderness/no rigidity/no guarding/bowel sounds normal/nontender/no distention

## 2020-06-19 NOTE — PROGRESS NOTE ADULT - SUBJECTIVE AND OBJECTIVE BOX
NP Note discussed with  Primary Attending    This is a 65 y/o M from [facility]  with PMH dementia and DM coming in with seizures like activity while sitting at AL. s/p peg placement on 06/02/2020 for FTT 2/2 to dysphagia and poor oral intake related to changes in mental status. Pt tolerates Peg tube feeding.  6/11-Peg insertion site noted oozing small amounts drainage, followed by GI.  Pt with low grade fever, T max 100.1 associated with tachycardia, CXR ordered.  6/12-Afebrile today, UA negative, blood Cx testing, CXR shows Opacification of the left lower lung field suggestive of atelectasis, effusion, and/or pneumonia, will f/u with CT chest with contrast for further investigation. Peg adjusted by GI, no further leakage noted at this time, pt. tolerating feeding well. Pt is afebrile today, awake with minimal verbalization. 6/16-Overall clinical presentation unchanged.  Pt is awake, minimally verbal with minimal voluntary muscle activity.  PT eval appreciated, RODNEY recommended.  Pt is no longer eligible for AL and will need RODNEY placement, choices were given to daughter by C/M.  6/17-Remains afebrile with no leukocytosis.  COVID 6/16 positive, positive antibodies with high index of 9.6.  Will send repeat COVID PCR, needs negative for RODNEY placement. 6/18 with little leakage from peg tube. Remains afebrile with no leukocytosis. This is a 65 y/o M from [facility]  with PMH dementia and DM coming in with seizures like activity while sitting at AL. s/p peg placement on 06/02/2020 for FTT 2/2 to dysphagia and poor oral intake related to changes in mental status. Pt tolerates Peg tube feeding.  6/11-Peg insertion site noted oozing small amounts drainage, followed by GI.  Pt with low grade fever, T max 100.1 associated with tachycardia, CXR ordered.  6/12-Afebrile today, UA negative, blood Cx testing, CXR shows Opacification of the left lower lung field suggestive of atelectasis, effusion, and/or pneumonia, will f/u with CT chest with contrast for further investigation. Peg adjusted by GI, no further leakage noted at this time, pt. tolerating feeding well. Pt is afebrile today, awake with minimal verbalization. 6/16-Overall clinical presentation unchanged.  Pt is awake, minimally verbal with minimal voluntary muscle activity.  PT eval appreciated, RODNEY recommended.  Pt is no longer eligible for AL and will need RODNEY placement, choices were given to daughter by C/M.  6/17-Remains afebrile with no leukocytosis.  COVID 6/16 positive, positive antibodies with high index of 9.6.  Will send repeat COVID PCR, needs negative for RODNEY placement. Covid negative 6/19. DC planning.       INTERVAL HPI/OVERNIGHT EVENTS: no new complaints    MEDICATIONS  (STANDING):  ascorbic acid 500 milliGRAM(s) Oral two times a day  cholecalciferol 1000 Unit(s) Oral daily  collagenase Ointment 1 Application(s) Topical daily  dextrose 5% + sodium chloride 0.9%. 1000 milliLiter(s) (50 mL/Hr) IV Continuous <Continuous>  dextrose 5% + sodium chloride 0.9%. 1000 milliLiter(s) (50 mL/Hr) IV Continuous <Continuous>  dextrose 5%. 1000 milliLiter(s) (50 mL/Hr) IV Continuous <Continuous>  dextrose 50% Injectable 12.5 Gram(s) IV Push once  dextrose 50% Injectable 25 Gram(s) IV Push once  dextrose 50% Injectable 25 Gram(s) IV Push once  heparin   Injectable 5000 Unit(s) SubCutaneous every 12 hours  insulin glargine Injectable (LANTUS) 18 Unit(s) SubCutaneous at bedtime  insulin lispro (HumaLOG) corrective regimen sliding scale   SubCutaneous every 6 hours  insulin lispro Injectable (HumaLOG) 12 Unit(s) SubCutaneous every 6 hours  mirtazapine 7.5 milliGRAM(s) Oral at bedtime  multivitamin/minerals 1 Tablet(s) Oral daily  OLANZapine 2.5 milliGRAM(s) Oral at bedtime  pantoprazole   Suspension 40 milliGRAM(s) Enteral Tube daily  piperacillin/tazobactam IVPB.. 3.375 Gram(s) IV Intermittent every 8 hours  QUEtiapine 25 milliGRAM(s) Oral at bedtime  zinc sulfate 220 milliGRAM(s) Oral daily    MEDICATIONS  (PRN):  acetaminophen    Suspension .. 650 milliGRAM(s) Enteral Tube every 6 hours PRN Temp greater or equal to 38C (100.4F)  ALBUTerol    90 MICROgram(s) HFA Inhaler 2 Puff(s) Inhalation every 6 hours PRN Shortness of Breath and/or Wheezing  dextrose 40% Gel 15 Gram(s) Oral once PRN Blood Glucose LESS THAN 70 milliGRAM(s)/deciliter  glucagon  Injectable 1 milliGRAM(s) IntraMuscular once PRN Glucose LESS THAN 70 milligrams/deciliter  guaiFENesin   Syrup  (Sugar-Free) 100 milliGRAM(s) Oral every 6 hours PRN Cough      __________________________________________________  REVIEW OF SYSTEMS:    CONSTITUTIONAL: No fever,   EYES: no acute visual disturbances  NECK: No pain or stiffness  RESPIRATORY: No cough; No shortness of breath  CARDIOVASCULAR: No chest pain, no palpitations  GASTROINTESTINAL: No pain. No nausea or vomiting; No diarrhea   NEUROLOGICAL: No headache or numbness, no tremors  MUSCULOSKELETAL: No joint pain, no muscle pain  GENITOURINARY: no dysuria, no frequency, no hesitancy  PSYCHIATRY: no depression , no anxiety  ALL OTHER  ROS negative        Vital Signs Last 24 Hrs  T(C): 36.3 (19 Jun 2020 07:44), Max: 36.8 (18 Jun 2020 18:30)  T(F): 97.3 (19 Jun 2020 07:44), Max: 98.3 (18 Jun 2020 18:30)  HR: 94 (19 Jun 2020 07:44) (70 - 94)  BP: 119/71 (19 Jun 2020 07:44) (109/60 - 146/74)  BP(mean): --  RR: 173 (19 Jun 2020 07:44) (17 - 173)  SpO2: 100% (19 Jun 2020 07:44) (95% - 100%)    ________________________________________________  PHYSICAL EXAM:  GENERAL: NAD  HEENT: Normocephalic;  conjunctivae and sclerae clear; moist mucous membranes;   NECK : supple  CHEST/LUNG: Clear to auscultation bilaterally with good air entry   HEART: S1 S2  regular; no murmurs, gallops or rubs  ABDOMEN: Soft, Nontender, Nondistended; Bowel sounds present  EXTREMITIES: no cyanosis; no edema; no calf tenderness  SKIN: warm and dry; no rash  NERVOUS SYSTEM:  Awake and alert; Oriented  to place, person and time ; no new deficits    _________________________________________________  LABS:                        10.8   9.18  )-----------( 577      ( 18 Jun 2020 07:10 )             33.0     06-18    138  |  101  |  19<H>  ----------------------------<  95  4.0   |  29  |  0.82    Ca    9.6      18 Jun 2020 07:10      CAPILLARY BLOOD GLUCOSE    POCT Blood Glucose.: 103 mg/dL (19 Jun 2020 12:54)  POCT Blood Glucose.: 129 mg/dL (19 Jun 2020 05:33)  POCT Blood Glucose.: 114 mg/dL (19 Jun 2020 00:31)  POCT Blood Glucose.: 144 mg/dL (18 Jun 2020 21:11)  POCT Blood Glucose.: 124 mg/dL (18 Jun 2020 17:15)    RADIOLOGY & ADDITIONAL TESTS:    Imaging  Reviewed:  YES  Consultant(s) Notes Reviewed:   YES      Plan of care was discussed with patient and /or primary care giver; all questions and concerns were addressed

## 2020-06-19 NOTE — PROGRESS NOTE ADULT - CONSTITUTIONAL DETAILS
no distress/well-developed/well-groomed
no distress/well-developed/well-nourished
no distress/well-groomed/well-developed
no distress/well-nourished/well-developed
no distress/well-nourished/well-groomed/well-developed
well-developed/cachectic/no distress
well-developed/well-groomed/no distress
well-developed/well-nourished/no distress/well-groomed
well-developed/well-nourished/no distress/well-groomed
well-groomed/no distress/well-developed
well-groomed/no distress/well-developed
well-groomed/no distress/well-developed/well-nourished
well-groomed/well-developed/no distress/well-nourished
well-nourished/well-developed/well-groomed/no distress
no distress
no distress
respiratory distress
well-groomed/respiratory distress/well-developed

## 2020-06-19 NOTE — PROGRESS NOTE ADULT - CARDIOVASCULAR DETAILS
tachycardia
positive S2/positive S1
positive S1/positive S2
positive S2/positive S1

## 2020-06-19 NOTE — PROGRESS NOTE ADULT - LYMPH NODES
No lymphadedenopathy

## 2020-06-19 NOTE — PROGRESS NOTE ADULT - SUBJECTIVE AND OBJECTIVE BOX
[   ] ICU                                          [   ] CCU                                      [ X] Medical Floor      Patient is comfortable. No new complaints reported, No abdominal pain, N/V, hematemesis, hematochezia, melena, fever, chills, chest pain, SOB, cough or diarrhea reported.    VITALS  Vital Signs Last 24 Hrs  T(C): 36.3 (19 Jun 2020 07:44), Max: 36.8 (18 Jun 2020 18:30)  T(F): 97.3 (19 Jun 2020 07:44), Max: 98.3 (18 Jun 2020 18:30)  HR: 94 (19 Jun 2020 07:44) (70 - 94)  BP: 119/71 (19 Jun 2020 07:44) (109/60 - 146/74)   RR: 173 (19 Jun 2020 07:44) (17 - 173)  SpO2: 100% (19 Jun 2020 07:44) (95% - 100%)       MEDICATIONS  (STANDING):  ascorbic acid 500 milliGRAM(s) Oral two times a day  cholecalciferol 1000 Unit(s) Oral daily  collagenase Ointment 1 Application(s) Topical daily  dextrose 5% + sodium chloride 0.9%. 1000 milliLiter(s) (50 mL/Hr) IV Continuous <Continuous>  dextrose 5% + sodium chloride 0.9%. 1000 milliLiter(s) (50 mL/Hr) IV Continuous <Continuous>  dextrose 5%. 1000 milliLiter(s) (50 mL/Hr) IV Continuous <Continuous>  dextrose 50% Injectable 12.5 Gram(s) IV Push once  dextrose 50% Injectable 25 Gram(s) IV Push once  dextrose 50% Injectable 25 Gram(s) IV Push once  heparin   Injectable 5000 Unit(s) SubCutaneous every 12 hours  insulin glargine Injectable (LANTUS) 18 Unit(s) SubCutaneous at bedtime  insulin lispro (HumaLOG) corrective regimen sliding scale   SubCutaneous every 6 hours  insulin lispro Injectable (HumaLOG) 8 Unit(s) SubCutaneous every 6 hours  mirtazapine 7.5 milliGRAM(s) Oral at bedtime  multivitamin/minerals 1 Tablet(s) Oral daily  OLANZapine 2.5 milliGRAM(s) Oral at bedtime  pantoprazole   Suspension 40 milliGRAM(s) Enteral Tube daily  piperacillin/tazobactam IVPB.. 3.375 Gram(s) IV Intermittent every 8 hours  QUEtiapine 25 milliGRAM(s) Oral at bedtime  zinc sulfate 220 milliGRAM(s) Oral daily    MEDICATIONS  (PRN):  acetaminophen    Suspension .. 650 milliGRAM(s) Enteral Tube every 6 hours PRN Temp greater or equal to 38C (100.4F)  ALBUTerol    90 MICROgram(s) HFA Inhaler 2 Puff(s) Inhalation every 6 hours PRN Shortness of Breath and/or Wheezing  dextrose 40% Gel 15 Gram(s) Oral once PRN Blood Glucose LESS THAN 70 milliGRAM(s)/deciliter  glucagon  Injectable 1 milliGRAM(s) IntraMuscular once PRN Glucose LESS THAN 70 milligrams/deciliter  guaiFENesin   Syrup  (Sugar-Free) 100 milliGRAM(s) Oral every 6 hours PRN Cough                            10.8   9.18  )-----------( 577      ( 18 Jun 2020 07:10 )             33.0       06-18    138  |  101  |  19<H>  ----------------------------<  95  4.0   |  29  |  0.82    Ca    9.6      18 Jun 2020 07:10

## 2020-06-19 NOTE — PROGRESS NOTE ADULT - CVS HE PE MLT D E PC
regular rate and rhythm
regular rate and rhythm/no rub
no rub/regular rate and rhythm
regular rate and rhythm
regular rate and rhythm/no rub

## 2020-06-19 NOTE — PROGRESS NOTE ADULT - SUBJECTIVE AND OBJECTIVE BOX
64 year old male from [facility] with PMH of dementia and type 2 DM presented with seizure activity while sitting at AL. Endocrinology was consulted for management of uncontrolled type 2 DM with hyperglycemia.    Patient seen and examined at bedside. Patient is resting comfortably. Tube feeds were held overnight and this AM due to abdominal bloating. Standing Humalog was held overnight but patient received Humalog 12 units this AM.    MEDICATIONS  (STANDING):  ascorbic acid 500 milliGRAM(s) Oral two times a day  cholecalciferol 1000 Unit(s) Oral daily  collagenase Ointment 1 Application(s) Topical daily  dextrose 5% + sodium chloride 0.9%. 1000 milliLiter(s) (50 mL/Hr) IV Continuous <Continuous>  dextrose 5% + sodium chloride 0.9%. 1000 milliLiter(s) (50 mL/Hr) IV Continuous <Continuous>  dextrose 5%. 1000 milliLiter(s) (50 mL/Hr) IV Continuous <Continuous>  dextrose 50% Injectable 12.5 Gram(s) IV Push once  dextrose 50% Injectable 25 Gram(s) IV Push once  dextrose 50% Injectable 25 Gram(s) IV Push once  heparin   Injectable 5000 Unit(s) SubCutaneous every 12 hours  insulin glargine Injectable (LANTUS) 18 Unit(s) SubCutaneous at bedtime  insulin lispro (HumaLOG) corrective regimen sliding scale   SubCutaneous every 6 hours  insulin lispro Injectable (HumaLOG) 12 Unit(s) SubCutaneous every 6 hours  mirtazapine 7.5 milliGRAM(s) Oral at bedtime  multivitamin/minerals 1 Tablet(s) Oral daily  OLANZapine 2.5 milliGRAM(s) Oral at bedtime  pantoprazole   Suspension 40 milliGRAM(s) Enteral Tube daily  piperacillin/tazobactam IVPB.. 3.375 Gram(s) IV Intermittent every 8 hours  QUEtiapine 25 milliGRAM(s) Oral at bedtime  zinc sulfate 220 milliGRAM(s) Oral daily    MEDICATIONS  (PRN):  acetaminophen    Suspension .. 650 milliGRAM(s) Enteral Tube every 6 hours PRN Temp greater or equal to 38C (100.4F)  ALBUTerol    90 MICROgram(s) HFA Inhaler 2 Puff(s) Inhalation every 6 hours PRN Shortness of Breath and/or Wheezing  dextrose 40% Gel 15 Gram(s) Oral once PRN Blood Glucose LESS THAN 70 milliGRAM(s)/deciliter  glucagon  Injectable 1 milliGRAM(s) IntraMuscular once PRN Glucose LESS THAN 70 milligrams/deciliter  guaiFENesin   Syrup  (Sugar-Free) 100 milliGRAM(s) Oral every 6 hours PRN Cough      REVIEW OF SYSTEMS:  unable to obtain due to medical condition    PHYSICAL EXAM:    VITAL SIGNS  T(C): 36.3 (06-19-20 @ 07:44), Max: 36.8 (06-18-20 @ 18:30)  HR: 94 (06-19-20 @ 07:44) (70 - 94)  BP: 119/71 (06-19-20 @ 07:44) (109/60 - 146/74)  RR: 173 (06-19-20 @ 07:44) (17 - 173)  SpO2: 100% (06-19-20 @ 07:44) (95% - 100%)      GENERAL: NAD, well-developed  HEAD:  Atraumatic, Normocephalic  EYES: EOMI, PERRLA, conjunctiva and sclera clear  ENMT: No tonsillar erythema, exudates, or enlargement; No lesions  NECK: Supple, No JVD, Normal thyroid  NERVOUS SYSTEM:  nonverbal,  CHEST/LUNG: CTA bilaterally  No rales, rhonchi, wheezing, or rubs  HEART: Regular rate and rhythm; No murmurs, rubs, or gallops  ABDOMEN: s/p PEG, Soft, Nontender, Nondistended; Bowel sounds present  EXTREMITIES:  No edema  SKIN: No rashes or lesions      LABS:                        10.8   9.18  )-----------( 577      ( 18 Jun 2020 07:10 )             33.0     06-18    138  |  101  |  19<H>  ----------------------------<  95  4.0   |  29  |  0.82    Ca    9.6      18 Jun 2020 07:10          CAPILLARY BLOOD GLUCOSE      POCT Blood Glucose.: 129 mg/dL (19 Jun 2020 05:33)  POCT Blood Glucose.: 114 mg/dL (19 Jun 2020 00:31)  POCT Blood Glucose.: 144 mg/dL (18 Jun 2020 21:11)  POCT Blood Glucose.: 124 mg/dL (18 Jun 2020 17:15)  POCT Blood Glucose.: 87 mg/dL (18 Jun 2020 12:20)

## 2020-06-19 NOTE — PROGRESS NOTE ADULT - NS NEC GEN PE MLT EXAM PC
detailed exam

## 2020-06-19 NOTE — DISCHARGE NOTE NURSING/CASE MANAGEMENT/SOCIAL WORK - PATIENT PORTAL LINK FT
You can access the FollowMyHealth Patient Portal offered by [facility] by registering at the following website: [email or web address]. By joining [employer]’s FollowMyHealth portal, you will also be able to view your health information using other applications (apps) compatible with our system.

## 2020-06-22 ENCOUNTER — EMERGENCY (EMERGENCY)
Facility: HOSPITAL | Age: 65
LOS: 1 days | Discharge: HOME | End: 2020-06-22
Attending: EMERGENCY MEDICINE
Payer: MEDICARE

## 2020-06-22 VITALS
HEART RATE: 112 BPM | TEMPERATURE: 98 F | RESPIRATION RATE: 14 BRPM | DIASTOLIC BLOOD PRESSURE: 82 MMHG | SYSTOLIC BLOOD PRESSURE: 124 MMHG | OXYGEN SATURATION: 97 %

## 2020-06-22 VITALS
OXYGEN SATURATION: 100 % | SYSTOLIC BLOOD PRESSURE: 130 MMHG | DIASTOLIC BLOOD PRESSURE: 78 MMHG | WEIGHT: 164.91 LBS | HEART RATE: 123 BPM | TEMPERATURE: 98 F | RESPIRATION RATE: 16 BRPM

## 2020-06-22 PROCEDURE — L8699: CPT

## 2020-06-22 PROCEDURE — 43762 RPLC GTUBE NO REVJ TRC: CPT

## 2020-06-22 PROCEDURE — 93005 ELECTROCARDIOGRAM TRACING: CPT

## 2020-06-22 PROCEDURE — 99284 EMERGENCY DEPT VISIT MOD MDM: CPT | Mod: 25

## 2020-06-22 PROCEDURE — 49465 FLUORO EXAM OF G/COLON TUBE: CPT

## 2020-06-22 NOTE — ED PROVIDER NOTE - PROGRESS NOTE DETAILS
PT signed out to Dr. Heard. Awaiting tube confirmation XR. If normal, will return to NH. Heard:  Pt received in signout.  X-ray showed G-tube appears in appropriate position.  COVID-19 negative three days ago, 6/19/20, as per EMR.  I informed PMD Dr. Raymundo that Pt will be sent back to [facility].

## 2020-06-22 NOTE — ED ADULT NURSE NOTE - NSIMPLEMENTINTERV_GEN_ALL_ED
Implemented All Fall with Harm Risk Interventions:  [place] to call system. Call bell, personal items and telephone within reach. Instruct patient to call for assistance. Room bathroom lighting operational. Non-slip footwear when patient is off stretcher. Physically safe environment: no spills, clutter or unnecessary equipment. Stretcher in lowest position, wheels locked, appropriate side rails in place. Provide visual cue, wrist band, yellow gown, etc. Monitor gait and stability. Monitor for mental status changes and reorient to person, place, and time. Review medications for side effects contributing to fall risk. Reinforce activity limits and safety measures with patient and family. Provide visual clues: red socks.

## 2020-06-22 NOTE — ED ADULT TRIAGE NOTE - LOCATION:
Provider: Dr. Pam Cardenas  Medication Name: Cyclobenzaprine  Dosage: 10 mg  Pharmacy Name: [facility] [place]: [place]  How many doses remain: completely out  Additional Comments:     PSR: Inform patient to allow 24 hours business hours for refills.     Left arm;

## 2020-06-22 NOTE — ED ADULT NURSE NOTE - OBJECTIVE STATEMENT
Pt awake, h/o cognitive impairment, BIBE from NH, c/o dislodged PEG tube. No sign of distress or bleeding noted.

## 2020-06-22 NOTE — ED PROVIDER NOTE - PATIENT PORTAL LINK FT
You can access the FollowMyHealth Patient Portal offered by [facility] by registering at the following website: [email or web address]. By joining [employer]’s FollowMyHealth portal, you will also be able to view your health information using other applications (apps) compatible with our system.

## 2020-06-23 ENCOUNTER — APPOINTMENT (OUTPATIENT)
Dept: OTHER | Facility: CLINIC | Age: 65
End: 2020-06-23

## 2020-06-23 ENCOUNTER — EMERGENCY (EMERGENCY)
Facility: HOSPITAL | Age: 65
LOS: 1 days | Discharge: HOME | End: 2020-06-23
Attending: EMERGENCY MEDICINE
Payer: MEDICARE

## 2020-06-23 VITALS
HEART RATE: 103 BPM | HEIGHT: 68 IN | OXYGEN SATURATION: 96 % | SYSTOLIC BLOOD PRESSURE: 126 MMHG | WEIGHT: 175.05 LBS | RESPIRATION RATE: 19 BRPM | DIASTOLIC BLOOD PRESSURE: 82 MMHG | TEMPERATURE: 98 F

## 2020-06-23 VITALS
SYSTOLIC BLOOD PRESSURE: 106 MMHG | DIASTOLIC BLOOD PRESSURE: 64 MMHG | RESPIRATION RATE: 18 BRPM | OXYGEN SATURATION: 98 % | HEART RATE: 83 BPM | TEMPERATURE: 98 F

## 2020-06-23 PROCEDURE — 99284 EMERGENCY DEPT VISIT MOD MDM: CPT | Mod: 25

## 2020-06-23 PROCEDURE — 49465 FLUORO EXAM OF G/COLON TUBE: CPT

## 2020-06-23 PROCEDURE — L8699: CPT

## 2020-06-23 PROCEDURE — 43762 RPLC GTUBE NO REVJ TRC: CPT

## 2020-06-23 NOTE — ED PROVIDER NOTE - NSFOLLOWUPINSTRUCTIONS_ED_ALL_ED_FT
Do not pull out feeding tube. Return if pain, fever, vomiting, bleeding, any concerns.   See your doctor as soon as possible (within 1-2 days).   If you need further assistance for appointments you can contact the [facility] Care Coordinator at [phone] or the  Patient Access Services helpline at [phone] to find names/contact #s for a practitioner to follow up with.  Bring your discharge papers / test results with you to any follow up appointments.   In addition our outpatient [facility] is located at [address] [place] [address], tel: [phone].

## 2020-06-23 NOTE — ED PROVIDER NOTE - PATIENT PORTAL LINK FT
You can access the FollowMyHealth Patient Portal offered by [facility] by registering at the following website: [email or web address]. By joining [employer]’s FollowMyHealth portal, you will also be able to view your health information using other applications (apps) compatible with our system.

## 2020-06-23 NOTE — ED ADULT NURSE NOTE - OBJECTIVE STATEMENT
The patient BIBEMS for peg tube replacement.  The patient has alzheimer's disease; is confused and combative.  He pulled his tube out as per EMS.   Dressing noted over peg tube stoma.  Slight serous drainage with periwound redness noted.

## 2020-06-23 NOTE — ED ADULT NURSE NOTE - NSIMPLEMENTINTERV_GEN_ALL_ED
Implemented All Fall Risk Interventions:  [place] to call system. Call bell, personal items and telephone within reach. Instruct patient to call for assistance. Room bathroom lighting operational. Non-slip footwear when patient is off stretcher. Physically safe environment: no spills, clutter or unnecessary equipment. Stretcher in lowest position, wheels locked, appropriate side rails in place. Provide visual cue, wrist band, yellow gown, etc. Monitor gait and stability. Monitor for mental status changes and reorient to person, place, and time. Review medications for side effects contributing to fall risk. Reinforce activity limits and safety measures with patient and family.

## 2020-06-23 NOTE — ED ADULT TRIAGE NOTE - CHIEF COMPLAINT QUOTE
The patient BIBEMS for peg tube replacement.  The patient has alzheimer's disease, is confused and combative.  He pulled his tube out as per EMS.   Dressing noted over peg tube stoma.  Slight serous drainage with periwound redness noted. The patient BIBEMS for peg tube replacement.  The patient has alzheimer's disease; is confused and combative.  He pulled his tube out as per EMS.   Dressing noted over peg tube stoma.  Slight serous drainage with periwound redness noted.

## 2020-06-23 NOTE — ED PROVIDER NOTE - CARE PROVIDER_API CALL
Scout Raymundo  [address] [address]  Phone: [phone]  Fax: [phone]  Follow Up Time:

## 2020-06-23 NOTE — ED PROVIDER NOTE - CLINICAL SUMMARY MEDICAL DECISION MAKING FREE TEXT BOX
20 F feeding tube placed, no complications. 20 F feeding tube placed, no complications.  No extravasation on xray.  Pt stable for transfer back to [facility].

## 2020-06-29 ENCOUNTER — EMERGENCY (EMERGENCY)
Facility: HOSPITAL | Age: 65
LOS: 1 days | Discharge: HOME | End: 2020-06-29
Attending: EMERGENCY MEDICINE
Payer: MEDICARE

## 2020-06-29 VITALS
HEART RATE: 81 BPM | RESPIRATION RATE: 20 BRPM | DIASTOLIC BLOOD PRESSURE: 74 MMHG | TEMPERATURE: 99 F | SYSTOLIC BLOOD PRESSURE: 141 MMHG | OXYGEN SATURATION: 98 %

## 2020-06-29 VITALS — HEIGHT: 68 IN | HEART RATE: 84 BPM | RESPIRATION RATE: 20 BRPM | OXYGEN SATURATION: 96 %

## 2020-06-29 PROCEDURE — L8699: CPT

## 2020-06-29 PROCEDURE — 43762 RPLC GTUBE NO REVJ TRC: CPT

## 2020-06-29 PROCEDURE — 49465 FLUORO EXAM OF G/COLON TUBE: CPT

## 2020-06-29 PROCEDURE — 99283 EMERGENCY DEPT VISIT LOW MDM: CPT | Mod: 25

## 2020-06-29 PROCEDURE — 99284 EMERGENCY DEPT VISIT MOD MDM: CPT | Mod: 25

## 2020-06-29 NOTE — ED PROVIDER NOTE - PATIENT PORTAL LINK FT
You can access the FollowMyHealth Patient Portal offered by [facility] by registering at the following website: [email or web address]. By joining [employer]’s FollowMyHealth portal, you will also be able to view your health information using other applications (apps) compatible with our system.

## 2020-06-29 NOTE — ED ADULT NURSE NOTE - CHPI ED NUR SYMPTOMS NEG
no dizziness/no pain/no fever/no weakness/no nausea/no tingling/no vomiting/no chills/no decreased eating/drinking

## 2020-06-29 NOTE — ED PROVIDER NOTE - OBJECTIVE STATEMENT
64 year old male with PMHx of dementia and no significant PSHx presents to the ED from his nursing home for a dislodged feeding tube. Patient is minimally verbal. In the ED, patient is not in any distress. NKDA.

## 2020-07-08 ENCOUNTER — INPATIENT (INPATIENT)
Facility: HOSPITAL | Age: 65
LOS: 12 days | Discharge: NURSING, REHAB OR LONG-TERM CARE | DRG: 640 | End: 2020-07-21
Attending: INTERNAL MEDICINE | Admitting: INTERNAL MEDICINE
Payer: MEDICARE

## 2020-07-08 VITALS
HEART RATE: 102 BPM | WEIGHT: 164.91 LBS | SYSTOLIC BLOOD PRESSURE: 133 MMHG | HEIGHT: 68 IN | OXYGEN SATURATION: 97 % | DIASTOLIC BLOOD PRESSURE: 76 MMHG | TEMPERATURE: 98 F | RESPIRATION RATE: 17 BRPM

## 2020-07-08 DIAGNOSIS — F03.90 UNSPECIFIED DEMENTIA, UNSPECIFIED SEVERITY, WITHOUT BEHAVIORAL DISTURBANCE, PSYCHOTIC DISTURBANCE, MOOD DISTURBANCE, AND ANXIETY: ICD-10-CM

## 2020-07-08 DIAGNOSIS — K21.9 GASTRO-ESOPHAGEAL REFLUX DISEASE WITHOUT ESOPHAGITIS: ICD-10-CM

## 2020-07-08 DIAGNOSIS — E87.0 HYPEROSMOLALITY AND HYPERNATREMIA: ICD-10-CM

## 2020-07-08 DIAGNOSIS — Z29.9 ENCOUNTER FOR PROPHYLACTIC MEASURES, UNSPECIFIED: ICD-10-CM

## 2020-07-08 DIAGNOSIS — E11.9 TYPE 2 DIABETES MELLITUS WITHOUT COMPLICATIONS: ICD-10-CM

## 2020-07-08 LAB
ALBUMIN SERPL ELPH-MCNC: 3.1 G/DL — LOW (ref 3.5–5)
ALBUMIN SERPL ELPH-MCNC: 3.3 G/DL — LOW (ref 3.5–5)
ALP SERPL-CCNC: 115 U/L — SIGNIFICANT CHANGE UP (ref 40–120)
ALP SERPL-CCNC: 131 U/L — HIGH (ref 40–120)
ALT FLD-CCNC: 32 U/L DA — SIGNIFICANT CHANGE UP (ref 10–60)
ALT FLD-CCNC: 35 U/L DA — SIGNIFICANT CHANGE UP (ref 10–60)
ANION GAP SERPL CALC-SCNC: 4 MMOL/L — LOW (ref 5–17)
ANION GAP SERPL CALC-SCNC: 5 MMOL/L — SIGNIFICANT CHANGE UP (ref 5–17)
AST SERPL-CCNC: 25 U/L — SIGNIFICANT CHANGE UP (ref 10–40)
AST SERPL-CCNC: 28 U/L — SIGNIFICANT CHANGE UP (ref 10–40)
BASOPHILS # BLD AUTO: 0.04 K/UL — SIGNIFICANT CHANGE UP (ref 0–0.2)
BASOPHILS NFR BLD AUTO: 0.4 % — SIGNIFICANT CHANGE UP (ref 0–2)
BILIRUB SERPL-MCNC: 0.3 MG/DL — SIGNIFICANT CHANGE UP (ref 0.2–1.2)
BILIRUB SERPL-MCNC: 0.4 MG/DL — SIGNIFICANT CHANGE UP (ref 0.2–1.2)
BUN SERPL-MCNC: 52 MG/DL — HIGH (ref 7–18)
BUN SERPL-MCNC: 53 MG/DL — HIGH (ref 7–18)
CALCIUM SERPL-MCNC: 10.3 MG/DL — SIGNIFICANT CHANGE UP (ref 8.4–10.5)
CALCIUM SERPL-MCNC: 9.5 MG/DL — SIGNIFICANT CHANGE UP (ref 8.4–10.5)
CHLORIDE SERPL-SCNC: 114 MMOL/L — HIGH (ref 96–108)
CHLORIDE SERPL-SCNC: 119 MMOL/L — HIGH (ref 96–108)
CO2 SERPL-SCNC: 33 MMOL/L — HIGH (ref 22–31)
CO2 SERPL-SCNC: 35 MMOL/L — HIGH (ref 22–31)
CREAT SERPL-MCNC: 1.03 MG/DL — SIGNIFICANT CHANGE UP (ref 0.5–1.3)
CREAT SERPL-MCNC: 1.13 MG/DL — SIGNIFICANT CHANGE UP (ref 0.5–1.3)
EOSINOPHIL # BLD AUTO: 0.09 K/UL — SIGNIFICANT CHANGE UP (ref 0–0.5)
EOSINOPHIL NFR BLD AUTO: 0.9 % — SIGNIFICANT CHANGE UP (ref 0–6)
GLUCOSE SERPL-MCNC: 133 MG/DL — HIGH (ref 70–99)
GLUCOSE SERPL-MCNC: 136 MG/DL — HIGH (ref 70–99)
HCT VFR BLD CALC: 43.1 % — SIGNIFICANT CHANGE UP (ref 39–50)
HGB BLD-MCNC: 13.6 G/DL — SIGNIFICANT CHANGE UP (ref 13–17)
IMM GRANULOCYTES NFR BLD AUTO: 0.3 % — SIGNIFICANT CHANGE UP (ref 0–1.5)
LACTATE SERPL-SCNC: 1.3 MMOL/L — SIGNIFICANT CHANGE UP (ref 0.7–2)
LACTATE SERPL-SCNC: 2.7 MMOL/L — HIGH (ref 0.7–2)
LYMPHOCYTES # BLD AUTO: 1.38 K/UL — SIGNIFICANT CHANGE UP (ref 1–3.3)
LYMPHOCYTES # BLD AUTO: 13.4 % — SIGNIFICANT CHANGE UP (ref 13–44)
MCHC RBC-ENTMCNC: 28.2 PG — SIGNIFICANT CHANGE UP (ref 27–34)
MCHC RBC-ENTMCNC: 31.6 GM/DL — LOW (ref 32–36)
MCV RBC AUTO: 89.2 FL — SIGNIFICANT CHANGE UP (ref 80–100)
MONOCYTES # BLD AUTO: 0.68 K/UL — SIGNIFICANT CHANGE UP (ref 0–0.9)
MONOCYTES NFR BLD AUTO: 6.6 % — SIGNIFICANT CHANGE UP (ref 2–14)
NEUTROPHILS # BLD AUTO: 8.11 K/UL — HIGH (ref 1.8–7.4)
NEUTROPHILS NFR BLD AUTO: 78.4 % — HIGH (ref 43–77)
NRBC # BLD: 0 /100 WBCS — SIGNIFICANT CHANGE UP (ref 0–0)
PLATELET # BLD AUTO: 311 K/UL — SIGNIFICANT CHANGE UP (ref 150–400)
POTASSIUM SERPL-MCNC: 4.1 MMOL/L — SIGNIFICANT CHANGE UP (ref 3.5–5.3)
POTASSIUM SERPL-MCNC: 4.3 MMOL/L — SIGNIFICANT CHANGE UP (ref 3.5–5.3)
POTASSIUM SERPL-SCNC: 4.1 MMOL/L — SIGNIFICANT CHANGE UP (ref 3.5–5.3)
POTASSIUM SERPL-SCNC: 4.3 MMOL/L — SIGNIFICANT CHANGE UP (ref 3.5–5.3)
PROT SERPL-MCNC: 8 G/DL — SIGNIFICANT CHANGE UP (ref 6–8.3)
PROT SERPL-MCNC: 8.8 G/DL — HIGH (ref 6–8.3)
RBC # BLD: 4.83 M/UL — SIGNIFICANT CHANGE UP (ref 4.2–5.8)
RBC # FLD: 13.7 % — SIGNIFICANT CHANGE UP (ref 10.3–14.5)
SODIUM SERPL-SCNC: 154 MMOL/L — HIGH (ref 135–145)
SODIUM SERPL-SCNC: 156 MMOL/L — HIGH (ref 135–145)
WBC # BLD: 10.33 K/UL — SIGNIFICANT CHANGE UP (ref 3.8–10.5)
WBC # FLD AUTO: 10.33 K/UL — SIGNIFICANT CHANGE UP (ref 3.8–10.5)

## 2020-07-08 PROCEDURE — 74177 CT ABD & PELVIS W/CONTRAST: CPT | Mod: 26

## 2020-07-08 PROCEDURE — 99285 EMERGENCY DEPT VISIT HI MDM: CPT

## 2020-07-08 RX ORDER — GLUCAGON INJECTION, SOLUTION 0.5 MG/.1ML
1 INJECTION, SOLUTION SUBCUTANEOUS ONCE
Refills: 0 | Status: DISCONTINUED | OUTPATIENT
Start: 2020-07-08 | End: 2020-07-21

## 2020-07-08 RX ORDER — MULTIVIT-MIN/FERROUS GLUCONATE 9 MG/15 ML
1 LIQUID (ML) ORAL DAILY
Refills: 0 | Status: DISCONTINUED | OUTPATIENT
Start: 2020-07-08 | End: 2020-07-21

## 2020-07-08 RX ORDER — SODIUM CHLORIDE 9 MG/ML
1000 INJECTION INTRAMUSCULAR; INTRAVENOUS; SUBCUTANEOUS ONCE
Refills: 0 | Status: COMPLETED | OUTPATIENT
Start: 2020-07-08 | End: 2020-07-08

## 2020-07-08 RX ORDER — INSULIN LISPRO 100/ML
2 VIAL (ML) SUBCUTANEOUS
Qty: 0 | Refills: 0 | DISCHARGE

## 2020-07-08 RX ORDER — HALOPERIDOL DECANOATE 100 MG/ML
5 INJECTION INTRAMUSCULAR ONCE
Refills: 0 | Status: COMPLETED | OUTPATIENT
Start: 2020-07-08 | End: 2020-07-08

## 2020-07-08 RX ORDER — IOHEXOL 300 MG/ML
30 INJECTION, SOLUTION INTRAVENOUS ONCE
Refills: 0 | Status: DISCONTINUED | OUTPATIENT
Start: 2020-07-08 | End: 2020-07-08

## 2020-07-08 RX ORDER — DEXTROSE 50 % IN WATER 50 %
12.5 SYRINGE (ML) INTRAVENOUS ONCE
Refills: 0 | Status: DISCONTINUED | OUTPATIENT
Start: 2020-07-08 | End: 2020-07-21

## 2020-07-08 RX ORDER — OLANZAPINE 15 MG/1
5 TABLET, FILM COATED ORAL DAILY
Refills: 0 | Status: DISCONTINUED | OUTPATIENT
Start: 2020-07-08 | End: 2020-07-21

## 2020-07-08 RX ORDER — DEXTROSE 50 % IN WATER 50 %
15 SYRINGE (ML) INTRAVENOUS ONCE
Refills: 0 | Status: DISCONTINUED | OUTPATIENT
Start: 2020-07-08 | End: 2020-07-21

## 2020-07-08 RX ORDER — FAMOTIDINE 10 MG/ML
20 INJECTION INTRAVENOUS
Refills: 0 | Status: DISCONTINUED | OUTPATIENT
Start: 2020-07-08 | End: 2020-07-21

## 2020-07-08 RX ORDER — DEXTROSE 50 % IN WATER 50 %
25 SYRINGE (ML) INTRAVENOUS ONCE
Refills: 0 | Status: DISCONTINUED | OUTPATIENT
Start: 2020-07-08 | End: 2020-07-21

## 2020-07-08 RX ORDER — CHOLECALCIFEROL (VITAMIN D3) 125 MCG
1000 CAPSULE ORAL DAILY
Refills: 0 | Status: DISCONTINUED | OUTPATIENT
Start: 2020-07-08 | End: 2020-07-21

## 2020-07-08 RX ORDER — INSULIN LISPRO 100/ML
VIAL (ML) SUBCUTANEOUS EVERY 6 HOURS
Refills: 0 | Status: DISCONTINUED | OUTPATIENT
Start: 2020-07-08 | End: 2020-07-21

## 2020-07-08 RX ORDER — ACETAMINOPHEN 500 MG
649.92 TABLET ORAL EVERY 6 HOURS
Refills: 0 | Status: DISCONTINUED | OUTPATIENT
Start: 2020-07-08 | End: 2020-07-21

## 2020-07-08 RX ORDER — QUETIAPINE FUMARATE 200 MG/1
25 TABLET, FILM COATED ORAL AT BEDTIME
Refills: 0 | Status: DISCONTINUED | OUTPATIENT
Start: 2020-07-08 | End: 2020-07-21

## 2020-07-08 RX ORDER — QUETIAPINE FUMARATE 200 MG/1
1 TABLET, FILM COATED ORAL
Qty: 0 | Refills: 0 | DISCHARGE

## 2020-07-08 RX ORDER — SODIUM CHLORIDE 9 MG/ML
1000 INJECTION, SOLUTION INTRAVENOUS
Refills: 0 | Status: DISCONTINUED | OUTPATIENT
Start: 2020-07-08 | End: 2020-07-21

## 2020-07-08 RX ORDER — MIRTAZAPINE 45 MG/1
7.5 TABLET, ORALLY DISINTEGRATING ORAL AT BEDTIME
Refills: 0 | Status: DISCONTINUED | OUTPATIENT
Start: 2020-07-08 | End: 2020-07-21

## 2020-07-08 RX ORDER — INSULIN GLARGINE 100 [IU]/ML
10 INJECTION, SOLUTION SUBCUTANEOUS EVERY MORNING
Refills: 0 | Status: DISCONTINUED | OUTPATIENT
Start: 2020-07-08 | End: 2020-07-21

## 2020-07-08 RX ADMIN — HALOPERIDOL DECANOATE 5 MILLIGRAM(S): 100 INJECTION INTRAMUSCULAR at 13:30

## 2020-07-08 RX ADMIN — SODIUM CHLORIDE 1000 MILLILITER(S): 9 INJECTION INTRAMUSCULAR; INTRAVENOUS; SUBCUTANEOUS at 14:20

## 2020-07-08 RX ADMIN — SODIUM CHLORIDE 1000 MILLILITER(S): 9 INJECTION INTRAMUSCULAR; INTRAVENOUS; SUBCUTANEOUS at 16:30

## 2020-07-08 RX ADMIN — Medication 2 MILLIGRAM(S): at 14:20

## 2020-07-08 RX ADMIN — SODIUM CHLORIDE 50 MILLILITER(S): 9 INJECTION, SOLUTION INTRAVENOUS at 22:04

## 2020-07-08 NOTE — H&P ADULT - HISTORY OF PRESENT ILLNESS
65 yo M from [facility] assisted living [facility] IDDM, dementia, Pressure ulcer stage 4, GERD, S/p peg placement on 06/02/2020 for FTT 2/2 to dysphagia and poor oral intake, related to changes in mental status. Patient was found to have redness around peg tube with slight serosanguineous discharge and was sent to hospital to evaluate for possible infection. Patient is AAOX0 , history is based on AL papers. On ED patient was found to have hypernatremia.     Ed course : Patient received abdominal CT scan which showed PEG tube in place without obstruction . Patient found to have Hypernatremia 154 and high lactate 2.8. lactate improved s/p 2 liters bolus NS.

## 2020-07-08 NOTE — ED PROVIDER NOTE - PROGRESS NOTE DETAILS
additional medication for sedation ordered, patient not following command, concern for safety. cardiac monitor ordered Patient signed out ot me by Dr. Abdullahi. Awaiting CTAP and repeat labs after bolus. Will follow results and d/w Emekati. Patient is resting comfortably, NAD. Spoke with Dr. Raymundo, who asked me to admit to Dr. Mirza. Patient endorsed to Dr. Mirza.

## 2020-07-08 NOTE — H&P ADULT - NSHPPHYSICALEXAM_GEN_ALL_CORE
Vital Signs Last 24 Hrs  T(C): 36.4 (30 Apr 2020 21:25), Max: 36.8 (30 Apr 2020 20:53)  T(F): 97.5 (30 Apr 2020 21:25), Max: 98.2 (30 Apr 2020 20:53)  HR: 69 (30 Apr 2020 20:53) (69 - 69)  BP: 112/71 (30 Apr 2020 20:53) (112/71 - 112/71)  BP(mean): --  RR: 18 (30 Apr 2020 20:53) (18 - 18)  SpO2: 98% (30 Apr 2020 20:53) (98% - 98%)      · Physical Examination:   moves all extremities spontaneously. AAOx0  · EYES: Clear bilaterally, pupils equal, round and reactive to light. EOMI  · CARDIAC: Normal rate, regular rhythm.  Heart sounds S1, S2.  No murmurs, rubs or gallops.  · RESPIRATORY: Breath sounds clear and equal bilaterally.  · GASTROINTESTINAL: Abdomen soft, non-tender, no guarding.  · SKIN: Skin normal color for race, warm, dry and intact. No evidence of rash. Physical Examination:   moves all extremities spontaneously. AAOx0  · EYES: Clear bilaterally, pupils equal, round and reactive to light. EOMI  · CARDIAC: Normal rate, regular rhythm.  Heart sounds S1, S2.  No murmurs, rubs or gallops.  · RESPIRATORY: Breath sounds clear and equal bilaterally.  · GASTROINTESTINAL :patient with PEG tube, redness with maceration on 6'o clock  · SKIN: non infected pressure ulcer on left heel 2.5X1cm

## 2020-07-08 NOTE — ED PROVIDER NOTE - CLINICAL SUMMARY MEDICAL DECISION MAKING FREE TEXT BOX
Patient presenting for concern for peg tube leakage. discussed with mayco, drainage noting by nh staff, concern for infection. will obtain blood work, ct with contrast. patient awake, alert, confused, baseline mental status. sedation given for patient safety, concern for fall. will monitor

## 2020-07-08 NOTE — ED PROVIDER NOTE - OBJECTIVE STATEMENT
64 y.o male with no significant PMHx and no significant PSHx presents to the ED sent in from nursing home for leakage around peg site. Patient denies any fever, pain, nausea, vomiting or any other acute complaints. NKDA

## 2020-07-08 NOTE — ED ADULT NURSE NOTE - OBJECTIVE STATEMENT
pt presents to ED via EMS from long-term. pt here for peg tube eval. pt follow commands but oriented to baseline. pt stable at this time

## 2020-07-08 NOTE — H&P ADULT - NSHPREVIEWOFSYSTEMS_GEN_ALL_CORE
63 yo M from [facility] assisted living PMH IDDM, dementia, Pressure ulcer stage 4, HLD, HTN, S/p peg placement on 06/02/2020 for FTT 2/2 to dysphagia and poor oral intake related to changes in mental status sent redness around peg tube with slight serosanguineous discharge to evaluate for possible infection. Patinet is AAOX0 , history is based on AL papers.     Ed course : Patient received abdominal CT scan which showed PEG tube in place without obstruction . Patient found to have Hypernatremia 154 and high lactate 2.8. lactate improved s/p 2 liters bolus NS       64 year old male from [facility]  with PMH dementia and DM who presented  with seizures like activity while sitting at AL. In ED , pt afebrile, SpO2 98 on RA, EKG showed SR with PVC's, Head CT unremarkable, CXR clear lungs, no leukocytosis. COVID 19 detected. Seen by Neuro. No indication for antiepileptic medications now. Pt with long h/o behavioral disturbance on multiple standing antipsychotics, for which pt was seen by Psyche and antipsychotic meds adjusted. Hospital course complicated by AMS, unable to take po, for which NG tube feeds initiated,  hypernatremia on D5W, MAXIME, uncontrolled DM with hyperglycemia , on basal/bolus Lantus/Humalog /HISS. Head CT shows right frontal sinus sinusitis, negative for hemorrhagic stroke/infarct.  Endo and Nephrology following. Pt developed fever, likely due to Aspiration PNA and sinusitis, treated with zosyn.  ID following. Pt less lethargic, agitated at times. Antipsychotic home regimen resumed. S/p peg placement on 06/02/2020 for FTT 2/2 to dysphagia and poor oral intake related to changes in mental status. Pt tolerates Peg tube feeding. Pt with low grade fever, T max 100.1 associated with tachycardia. CXR shows Opacification of the left lower lung field suggestive of atelectasis, effusion, and/or pneumonia, CT chest with left lower lobe pneumonia, no PE. Pt also with cellulitis of abdominal wall. Treated with IV antibiotics. Pt is no longer eligible for AL and will need RODNEY placement, choices were given to daughter by C/M.  COVID 6/16 positive, positive antibodies with high index of 9.6.  Repeated tests showed covid negative x 2.     Pt is stable for discharge to facility.     This patient had an extended hospital stay. This is a brief summary of hospital course. Please refer to medical record, including daily progress notes 63 yo M from [facility] assisted living PMH IDDM, dementia, Pressure ulcer stage 4, HLD, HTN, S/p peg placement on 06/02/2020 for FTT 2/2 to dysphagia and poor oral intake related to changes in mental status sent redness around peg tube with slight serosanguineous discharge to evaluate for possible infection. Patinet is AAOX0 , history is based on AL papers.     Ed course : Patient received abdominal CT scan which showed PEG tube in place without obstruction . Patient found to have Hypernatremia 154 and high lactate 2.8. lactate improved s/p 2 liters bolus NS

## 2020-07-08 NOTE — H&P ADULT - PROBLEM SELECTOR PLAN 1
presented with Na 154 on admission , likely due to dehydration, likely chronic hypernatremia   s/p NS X2 in ED , NA trended up to 156  serum osmolality and urine osmolality unreliable sp slain bolus X2L on ED   - Avoid sodium correction more than 0.5mmol/L/hr   - Avid over correction  more than 8-10 mmol in the  fisrt 24 hours     - free water deficit for first 24 hours 3.1 liters   - c/w free water [id number] via PEG tube   - c/w D5% 590cc/hour   - f/u BMP q6 hours

## 2020-07-08 NOTE — H&P ADULT - PROBLEM SELECTOR PLAN 5
Patient on Lantus 20unitas and HSS 2 times at home   c/w Lantus 10 unites at bedtime and HSS [id number]     f/u A1c   adjust insulin as needed

## 2020-07-08 NOTE — H&P ADULT - ASSESSMENT
65 yo M from [facility] IDDM, dementia, Pressure ulcer stage 4, HLD, GERD, HTN, S/p peg placement on 06/02/2020 for FTT 2/2 to dysphagia and poor oral intake, related to changes in mental status. Patient was found to have redness around peg tube with slight serosanguineous discharge and was sent to hospital to evaluate for possible infection. Patient is AAOX0 , history is based on AL papers. On ED patient was found to have hypernatremia.

## 2020-07-08 NOTE — ED ADULT NURSE NOTE - NSIMPLEMENTINTERV_GEN_ALL_ED
Implemented All Fall Risk Interventions:  [place] to call system. Call bell, personal items and telephone within reach. Instruct patient to call for assistance. Room bathroom lighting operational. Non-slip footwear when patient is off stretcher. Physically safe environment: no spills, clutter or unnecessary equipment. Stretcher in lowest position, wheels locked, appropriate side rails in place. Provide visual cue, wrist band, yellow gown, etc. Monitor gait and stability. Monitor for mental status changes and reorient to person, place, and time. Review medications for side effects contributing to fall risk. Reinforce activity limits and safety measures with patient and family.

## 2020-07-09 DIAGNOSIS — Z71.89 OTHER SPECIFIED COUNSELING: ICD-10-CM

## 2020-07-09 LAB
24R-OH-CALCIDIOL SERPL-MCNC: 24.8 NG/ML — LOW (ref 30–80)
A1C WITH ESTIMATED AVERAGE GLUCOSE RESULT: 6.9 % — HIGH (ref 4–5.6)
ANION GAP SERPL CALC-SCNC: 4 MMOL/L — LOW (ref 5–17)
ANION GAP SERPL CALC-SCNC: 5 MMOL/L — SIGNIFICANT CHANGE UP (ref 5–17)
ANION GAP SERPL CALC-SCNC: 6 MMOL/L — SIGNIFICANT CHANGE UP (ref 5–17)
ANION GAP SERPL CALC-SCNC: 6 MMOL/L — SIGNIFICANT CHANGE UP (ref 5–17)
BUN SERPL-MCNC: 40 MG/DL — HIGH (ref 7–18)
BUN SERPL-MCNC: 42 MG/DL — HIGH (ref 7–18)
BUN SERPL-MCNC: 46 MG/DL — HIGH (ref 7–18)
BUN SERPL-MCNC: 47 MG/DL — HIGH (ref 7–18)
CALCIUM SERPL-MCNC: 9.4 MG/DL — SIGNIFICANT CHANGE UP (ref 8.4–10.5)
CALCIUM SERPL-MCNC: 9.5 MG/DL — SIGNIFICANT CHANGE UP (ref 8.4–10.5)
CALCIUM SERPL-MCNC: 9.6 MG/DL — SIGNIFICANT CHANGE UP (ref 8.4–10.5)
CALCIUM SERPL-MCNC: 9.8 MG/DL — SIGNIFICANT CHANGE UP (ref 8.4–10.5)
CHLORIDE SERPL-SCNC: 115 MMOL/L — HIGH (ref 96–108)
CHLORIDE SERPL-SCNC: 117 MMOL/L — HIGH (ref 96–108)
CHLORIDE SERPL-SCNC: 117 MMOL/L — HIGH (ref 96–108)
CHLORIDE SERPL-SCNC: 118 MMOL/L — HIGH (ref 96–108)
CHOLEST SERPL-MCNC: 196 MG/DL — SIGNIFICANT CHANGE UP (ref 10–199)
CO2 SERPL-SCNC: 29 MMOL/L — SIGNIFICANT CHANGE UP (ref 22–31)
CO2 SERPL-SCNC: 31 MMOL/L — SIGNIFICANT CHANGE UP (ref 22–31)
CO2 SERPL-SCNC: 32 MMOL/L — HIGH (ref 22–31)
CO2 SERPL-SCNC: 34 MMOL/L — HIGH (ref 22–31)
CREAT SERPL-MCNC: 0.94 MG/DL — SIGNIFICANT CHANGE UP (ref 0.5–1.3)
CREAT SERPL-MCNC: 0.96 MG/DL — SIGNIFICANT CHANGE UP (ref 0.5–1.3)
CREAT SERPL-MCNC: 0.97 MG/DL — SIGNIFICANT CHANGE UP (ref 0.5–1.3)
CREAT SERPL-MCNC: 1.04 MG/DL — SIGNIFICANT CHANGE UP (ref 0.5–1.3)
ESTIMATED AVERAGE GLUCOSE: 151 MG/DL — HIGH (ref 68–114)
GLUCOSE BLDC GLUCOMTR-MCNC: 115 MG/DL — HIGH (ref 70–99)
GLUCOSE BLDC GLUCOMTR-MCNC: 130 MG/DL — HIGH (ref 70–99)
GLUCOSE BLDC GLUCOMTR-MCNC: 139 MG/DL — HIGH (ref 70–99)
GLUCOSE BLDC GLUCOMTR-MCNC: 206 MG/DL — HIGH (ref 70–99)
GLUCOSE SERPL-MCNC: 135 MG/DL — HIGH (ref 70–99)
GLUCOSE SERPL-MCNC: 142 MG/DL — HIGH (ref 70–99)
GLUCOSE SERPL-MCNC: 155 MG/DL — HIGH (ref 70–99)
GLUCOSE SERPL-MCNC: 170 MG/DL — HIGH (ref 70–99)
HCT VFR BLD CALC: 40.6 % — SIGNIFICANT CHANGE UP (ref 39–50)
HDLC SERPL-MCNC: 50 MG/DL — SIGNIFICANT CHANGE UP
HGB BLD-MCNC: 12.6 G/DL — LOW (ref 13–17)
LIPID PNL WITH DIRECT LDL SERPL: 117 MG/DL — SIGNIFICANT CHANGE UP
MAGNESIUM SERPL-MCNC: 2.2 MG/DL — SIGNIFICANT CHANGE UP (ref 1.6–2.6)
MCHC RBC-ENTMCNC: 28.5 PG — SIGNIFICANT CHANGE UP (ref 27–34)
MCHC RBC-ENTMCNC: 31 GM/DL — LOW (ref 32–36)
MCV RBC AUTO: 91.9 FL — SIGNIFICANT CHANGE UP (ref 80–100)
NRBC # BLD: 0 /100 WBCS — SIGNIFICANT CHANGE UP (ref 0–0)
PHOSPHATE SERPL-MCNC: 3.7 MG/DL — SIGNIFICANT CHANGE UP (ref 2.5–4.5)
PLATELET # BLD AUTO: 259 K/UL — SIGNIFICANT CHANGE UP (ref 150–400)
POTASSIUM SERPL-MCNC: 3.8 MMOL/L — SIGNIFICANT CHANGE UP (ref 3.5–5.3)
POTASSIUM SERPL-MCNC: 3.8 MMOL/L — SIGNIFICANT CHANGE UP (ref 3.5–5.3)
POTASSIUM SERPL-MCNC: 4 MMOL/L — SIGNIFICANT CHANGE UP (ref 3.5–5.3)
POTASSIUM SERPL-MCNC: 4 MMOL/L — SIGNIFICANT CHANGE UP (ref 3.5–5.3)
POTASSIUM SERPL-SCNC: 3.8 MMOL/L — SIGNIFICANT CHANGE UP (ref 3.5–5.3)
POTASSIUM SERPL-SCNC: 3.8 MMOL/L — SIGNIFICANT CHANGE UP (ref 3.5–5.3)
POTASSIUM SERPL-SCNC: 4 MMOL/L — SIGNIFICANT CHANGE UP (ref 3.5–5.3)
POTASSIUM SERPL-SCNC: 4 MMOL/L — SIGNIFICANT CHANGE UP (ref 3.5–5.3)
RBC # BLD: 4.42 M/UL — SIGNIFICANT CHANGE UP (ref 4.2–5.8)
RBC # FLD: 13.8 % — SIGNIFICANT CHANGE UP (ref 10.3–14.5)
SARS-COV-2 IGG SERPL QL IA: POSITIVE
SARS-COV-2 IGM SERPL IA-ACNC: 6.3 INDEX — HIGH
SARS-COV-2 RNA SPEC QL NAA+PROBE: SIGNIFICANT CHANGE UP
SODIUM SERPL-SCNC: 152 MMOL/L — HIGH (ref 135–145)
SODIUM SERPL-SCNC: 153 MMOL/L — HIGH (ref 135–145)
SODIUM SERPL-SCNC: 154 MMOL/L — HIGH (ref 135–145)
SODIUM SERPL-SCNC: 155 MMOL/L — HIGH (ref 135–145)
TOTAL CHOLESTEROL/HDL RATIO MEASUREMENT: 3.9 RATIO — SIGNIFICANT CHANGE UP (ref 3.4–9.6)
TRIGL SERPL-MCNC: 147 MG/DL — SIGNIFICANT CHANGE UP (ref 10–149)
TSH SERPL-MCNC: 2.63 UU/ML — SIGNIFICANT CHANGE UP (ref 0.34–4.82)
WBC # BLD: 7.7 K/UL — SIGNIFICANT CHANGE UP (ref 3.8–10.5)
WBC # FLD AUTO: 7.7 K/UL — SIGNIFICANT CHANGE UP (ref 3.8–10.5)

## 2020-07-09 RX ORDER — SODIUM CHLORIDE 9 MG/ML
1000 INJECTION, SOLUTION INTRAVENOUS
Refills: 0 | Status: DISCONTINUED | OUTPATIENT
Start: 2020-07-09 | End: 2020-07-11

## 2020-07-09 RX ORDER — SODIUM HYPOCHLORITE 0.125 %
1 SOLUTION, NON-ORAL MISCELLANEOUS DAILY
Refills: 0 | Status: DISCONTINUED | OUTPATIENT
Start: 2020-07-09 | End: 2020-07-17

## 2020-07-09 RX ADMIN — Medication 1000 UNIT(S): at 12:39

## 2020-07-09 RX ADMIN — OLANZAPINE 5 MILLIGRAM(S): 15 TABLET, FILM COATED ORAL at 12:38

## 2020-07-09 RX ADMIN — FAMOTIDINE 20 MILLIGRAM(S): 10 INJECTION INTRAVENOUS at 05:24

## 2020-07-09 RX ADMIN — Medication 1 TABLET(S): at 12:38

## 2020-07-09 RX ADMIN — INSULIN GLARGINE 10 UNIT(S): 100 INJECTION, SOLUTION SUBCUTANEOUS at 09:25

## 2020-07-09 RX ADMIN — Medication 2: at 11:51

## 2020-07-09 RX ADMIN — QUETIAPINE FUMARATE 25 MILLIGRAM(S): 200 TABLET, FILM COATED ORAL at 21:19

## 2020-07-09 RX ADMIN — MIRTAZAPINE 7.5 MILLIGRAM(S): 45 TABLET, ORALLY DISINTEGRATING ORAL at 21:19

## 2020-07-09 NOTE — PROGRESS NOTE ADULT - SUBJECTIVE AND OBJECTIVE BOX
CARDIOLOGY/MEDICAL ATTENDING    CHIEF COMPLAINT:Patient is a 64y old  Male who presents with a chief complaint of Hypernatremia.Pt appears comfortable.    	  REVIEW OF SYSTEMS:    [x ] Unable to obtain    PHYSICAL EXAM:  T(C): 36.5 (07-09-20 @ 08:12), Max: 36.7 (07-09-20 @ 00:00)  HR: 93 (07-09-20 @ 08:12) (92 - 102)  BP: 140/93 (07-09-20 @ 08:12) (95/62 - 175/58)  RR: 18 (07-09-20 @ 08:12) (17 - 18)  SpO2: 98% (07-09-20 @ 08:12) (97% - 100%)  Wt(kg): --  I&O's Summary      Appearance: Normal	  HEENT:   Normal oral mucosa, PERRL, EOMI	  Lymphatic: No lymphadenopathy  Cardiovascular: Normal S1 S2, No JVD, No murmurs, No edema  Respiratory: Lungs clear to auscultation	  Gastrointestinal:  Soft, Non-tender, + BS	  Skin: No rashes, No ecchymoses, No cyanosis	  Extremities: Normal range of motion, No clubbing, cyanosis or edema  Vascular: Peripheral pulses palpable 2+ bilaterally    MEDICATIONS  (STANDING):  cholecalciferol 1000 Unit(s) Oral daily  dextrose 5%. 1000 milliLiter(s) (50 mL/Hr) IV Continuous <Continuous>  dextrose 5%. 1000 milliLiter(s) (50 mL/Hr) IV Continuous <Continuous>  dextrose 50% Injectable 12.5 Gram(s) IV Push once  dextrose 50% Injectable 25 Gram(s) IV Push once  dextrose 50% Injectable 25 Gram(s) IV Push once  famotidine    Tablet 20 milliGRAM(s) Oral two times a day  insulin glargine Injectable (LANTUS) 10 Unit(s) SubCutaneous every morning  insulin lispro (HumaLOG) corrective regimen sliding scale   SubCutaneous every 6 hours  mirtazapine 7.5 milliGRAM(s) Oral at bedtime  multivitamin/minerals 1 Tablet(s) Oral daily  OLANZapine 5 milliGRAM(s) Oral daily  QUEtiapine 25 milliGRAM(s) Oral at bedtime      	  	  LABS:	 	                       12.6   7.70  )-----------( 259      ( 09 Jul 2020 07:02 )             40.6     07-09    155<H>  |  115<H>  |  47<H>  ----------------------------<  155<H>  4.0   |  34<H>  |  1.04    Ca    9.8      09 Jul 2020 07:02  Phos  3.7     07-09  Mg     2.2     07-09    TPro  8.0  /  Alb  3.1<L>  /  TBili  0.3  /  DBili  x   /  AST  25  /  ALT  32  /  AlkPhos  115  07-08      Lipid Profile: Cholesterol 196    HDL 50        TSH: Thyroid Stimulating Hormone, Serum: 2.63 uU/mL (07-09 @ 07:02)

## 2020-07-09 NOTE — PROGRESS NOTE ADULT - SUBJECTIVE AND OBJECTIVE BOX
NP Note discussed with  Primary Attending    65 yo M from [facility] IDDM, dementia, Pressure ulcer stage 4, GERD, S/p peg placement on 06/02/2020 for FTT 2/2 to dysphagia and poor oral intake, related to changes in mental status. Patient was found to have redness around peg tube with slight serosanguineous discharge and was sent to hospital to evaluate for possible infection. Patient is AAOX0 , history is based on AL papers. Abdominal CT scan which showed PEG tube in place without obstruction . Patient found to have Hypernatremia 154 and high lactate 2.8. lactate improved s/p 2 liters bolus NS. Admitted for hypernatremia.     INTERVAL HPI/OVERNIGHT EVENTS: no new complaints    MEDICATIONS  (STANDING):  cholecalciferol 1000 Unit(s) Oral daily  dextrose 5%. 1000 milliLiter(s) (50 mL/Hr) IV Continuous <Continuous>  dextrose 5%. 1000 milliLiter(s) (50 mL/Hr) IV Continuous <Continuous>  dextrose 50% Injectable 12.5 Gram(s) IV Push once  dextrose 50% Injectable 25 Gram(s) IV Push once  dextrose 50% Injectable 25 Gram(s) IV Push once  famotidine    Tablet 20 milliGRAM(s) Oral two times a day  insulin glargine Injectable (LANTUS) 10 Unit(s) SubCutaneous every morning  insulin lispro (HumaLOG) corrective regimen sliding scale   SubCutaneous every 6 hours  mirtazapine 7.5 milliGRAM(s) Oral at bedtime  multivitamin/minerals 1 Tablet(s) Oral daily  OLANZapine 5 milliGRAM(s) Oral daily  QUEtiapine 25 milliGRAM(s) Oral at bedtime    MEDICATIONS  (PRN):  acetaminophen    Suspension .. 649.92 milliGRAM(s) Oral every 6 hours PRN Mild Pain (1 - 3), Moderate Pain (4 - 6)  dextrose 40% Gel 15 Gram(s) Oral once PRN Blood Glucose LESS THAN 70 milliGRAM(s)/deciliter  glucagon  Injectable 1 milliGRAM(s) IntraMuscular once PRN Glucose LESS THAN 70 milligrams/deciliter      __________________________________________________  REVIEW OF SYSTEMS:  Limited due to mental status   CONSTITUTIONAL: No fever,   EYES: no acute visual disturbances  NECK: No pain or stiffness  RESPIRATORY: No cough; No shortness of breath  CARDIOVASCULAR: No chest pain, no palpitations  GASTROINTESTINAL: No pain. No nausea or vomiting; No diarrhea   NEUROLOGICAL: No headache or numbness, no tremors  MUSCULOSKELETAL: No joint pain, no muscle pain  GENITOURINARY: no dysuria, no frequency, no hesitancy  PSYCHIATRY: no depression , no anxiety  ALL OTHER  ROS negative        Vital Signs Last 24 Hrs  T(C): 36.5 (09 Jul 2020 11:19), Max: 36.7 (09 Jul 2020 00:00)  T(F): 97.7 (09 Jul 2020 11:19), Max: 98.1 (09 Jul 2020 00:00)  HR: 84 (09 Jul 2020 11:19) (84 - 102)  BP: 114/79 (09 Jul 2020 11:19) (95/62 - 175/58)  BP(mean): --  RR: 18 (09 Jul 2020 11:19) (18 - 18)  SpO2: 98% (09 Jul 2020 11:19) (98% - 100%)    ________________________________________________  PHYSICAL EXAM:  GENERAL: no acute distress  HEENT: Normocephalic;  conjunctivae and sclerae clear; moist mucous membranes;   NECK : supple  CHEST/LUNG: Clear to auscultation bilaterally with good air entry   HEART: S1 S2  regular; no murmurs, gallops or rubs  ABDOMEN: PEG in place, small redness around site, Soft, Nontender, Nondistended; Bowel sounds present  EXTREMITIES: no cyanosis; no edema; no calf tenderness  SKIN: warm and dry; no rash  NERVOUS SYSTEM:  Awake and alert x 0     _________________________________________________  LABS:                        12.6   7.70  )-----------( 259      ( 09 Jul 2020 07:02 )             40.6     07-09    155<H>  |  115<H>  |  47<H>  ----------------------------<  155<H>  4.0   |  34<H>  |  1.04    Ca    9.8      09 Jul 2020 07:02  Phos  3.7     07-09  Mg     2.2     07-09    TPro  8.0  /  Alb  3.1<L>  /  TBili  0.3  /  DBili  x   /  AST  25  /  ALT  32  /  AlkPhos  115  07-08        CAPILLARY BLOOD GLUCOSE      POCT Blood Glucose.: 206 mg/dL (09 Jul 2020 11:39)  POCT Blood Glucose.: 139 mg/dL (09 Jul 2020 05:09)  POCT Blood Glucose.: 115 mg/dL (09 Jul 2020 00:33)        RADIOLOGY & ADDITIONAL TESTS:    Imaging  Reviewed:  YES/NO    Consultant(s) Notes Reviewed:   YES/ No      Plan of care was discussed with patient and /or primary care giver; all questions and concerns were addressed NP Note discussed with  Primary Attending    65 yo M from [facility] IDDM, dementia, Pressure ulcer stage 4, GERD, S/p peg placement on 06/02/2020 for FTT 2/2 to dysphagia and poor oral intake, related to changes in mental status. Patient was found to have redness around peg tube with slight serosanguineous discharge and was sent to hospital to evaluate for possible infection. Patient is AAOX0 , history is based on AL papers. Abdominal CT scan which showed PEG tube in place without obstruction . Patient found to have Hypernatremia 154 and high lactate 2.8. lactate improved s/p 2 liters bolus NS. Admitted for hypernatremia.     INTERVAL HPI/OVERNIGHT EVENTS: no new complaints    MEDICATIONS  (STANDING):  cholecalciferol 1000 Unit(s) Oral daily  dextrose 5%. 1000 milliLiter(s) (50 mL/Hr) IV Continuous <Continuous>  dextrose 5%. 1000 milliLiter(s) (50 mL/Hr) IV Continuous <Continuous>  dextrose 50% Injectable 12.5 Gram(s) IV Push once  dextrose 50% Injectable 25 Gram(s) IV Push once  dextrose 50% Injectable 25 Gram(s) IV Push once  famotidine    Tablet 20 milliGRAM(s) Oral two times a day  insulin glargine Injectable (LANTUS) 10 Unit(s) SubCutaneous every morning  insulin lispro (HumaLOG) corrective regimen sliding scale   SubCutaneous every 6 hours  mirtazapine 7.5 milliGRAM(s) Oral at bedtime  multivitamin/minerals 1 Tablet(s) Oral daily  OLANZapine 5 milliGRAM(s) Oral daily  QUEtiapine 25 milliGRAM(s) Oral at bedtime    MEDICATIONS  (PRN):  acetaminophen    Suspension .. 649.92 milliGRAM(s) Oral every 6 hours PRN Mild Pain (1 - 3), Moderate Pain (4 - 6)  dextrose 40% Gel 15 Gram(s) Oral once PRN Blood Glucose LESS THAN 70 milliGRAM(s)/deciliter  glucagon  Injectable 1 milliGRAM(s) IntraMuscular once PRN Glucose LESS THAN 70 milligrams/deciliter      __________________________________________________  REVIEW OF SYSTEMS:  Limited due to mental status   CONSTITUTIONAL: No fever,   EYES: no acute visual disturbances  NECK: No pain or stiffness  RESPIRATORY: No cough; No shortness of breath  CARDIOVASCULAR: No chest pain, no palpitations  GASTROINTESTINAL: No pain. No nausea or vomiting; No diarrhea   NEUROLOGICAL: No headache or numbness, no tremors  MUSCULOSKELETAL: No joint pain, no muscle pain  GENITOURINARY: no dysuria, no frequency, no hesitancy  PSYCHIATRY: no depression , no anxiety  ALL OTHER  ROS negative        Vital Signs Last 24 Hrs  T(C): 36.5 (09 Jul 2020 11:19), Max: 36.7 (09 Jul 2020 00:00)  T(F): 97.7 (09 Jul 2020 11:19), Max: 98.1 (09 Jul 2020 00:00)  HR: 84 (09 Jul 2020 11:19) (84 - 102)  BP: 114/79 (09 Jul 2020 11:19) (95/62 - 175/58)  BP(mean): --  RR: 18 (09 Jul 2020 11:19) (18 - 18)  SpO2: 98% (09 Jul 2020 11:19) (98% - 100%)    ________________________________________________  PHYSICAL EXAM:  GENERAL: no acute distress  HEENT: Normocephalic;  conjunctivae and sclerae clear; moist mucous membranes;   NECK : supple  CHEST/LUNG: Clear to auscultation bilaterally with good air entry   HEART: S1 S2  regular; no murmurs, gallops or rubs  ABDOMEN: PEG in place, small redness around site, Soft, Nontender, Nondistended; Bowel sounds present  EXTREMITIES: no cyanosis; no edema; no calf tenderness  SKIN: Stage 4 Pressure Injury to the Sacrum (3.5cm x 2cm x 1cm) with bone, red tissue, purulent drainage, odor, and undermining (up to 3.5cm at 360 degrees)	warm and dry; no rash, Unstageable Pressure Injury to the L. Heel (1cm x 1cm) with stable eschar and no drainage, Stage 1 Pressure Injury to the R. Heel, as evident by non-blanchable erythema  NERVOUS SYSTEM:  Awake and alert x 0     _________________________________________________  LABS:                        12.6   7.70  )-----------( 259      ( 09 Jul 2020 07:02 )             40.6     07-09    155<H>  |  115<H>  |  47<H>  ----------------------------<  155<H>  4.0   |  34<H>  |  1.04    Ca    9.8      09 Jul 2020 07:02  Phos  3.7     07-09  Mg     2.2     07-09    TPro  8.0  /  Alb  3.1<L>  /  TBili  0.3  /  DBili  x   /  AST  25  /  ALT  32  /  AlkPhos  115  07-08        CAPILLARY BLOOD GLUCOSE      POCT Blood Glucose.: 206 mg/dL (09 Jul 2020 11:39)  POCT Blood Glucose.: 139 mg/dL (09 Jul 2020 05:09)  POCT Blood Glucose.: 115 mg/dL (09 Jul 2020 00:33)        RADIOLOGY & ADDITIONAL TESTS:    Imaging  Reviewed:  YES/NO    Consultant(s) Notes Reviewed:   YES/ No      Plan of care was discussed with patient and /or primary care giver; all questions and concerns were addressed

## 2020-07-09 NOTE — ADVANCED PRACTICE NURSE CONSULT - RECOMMEDATIONS
-Clean all wounds with normal saline and apply skin prep to the surrounding skin  -Pack the Sacral wound with 0.25% Dakins soaked gauze and cover with a Foam dressing Daily PRN  -Leave the L. heel wound open to air  -Elevate/float the patients heels using heel protectors and reposition the patient Q 2hrs using wedges or pillows

## 2020-07-09 NOTE — PROGRESS NOTE ADULT - PROBLEM SELECTOR PLAN 1
Likely secondary to dehydration   Was given normal saline in ED, Na trending up to 156  Continue free water 250ml q6 via PEG  Continue D5 @ 50  Avoid overcorrection Likely secondary to dehydration   Was given normal saline in ED, Na trending up to 156  Continue free water 250ml q6 via PEG  Continue D5 @ 50  Avoid overcorrection  Recheck BMP this afternoon   Daily BMPs

## 2020-07-09 NOTE — ADVANCED PRACTICE NURSE CONSULT - ASSESSMENT
This is a 64yr old male patient admitted for Hypernatremia, presenting with the following:  -There is a Stage 1 Pressure Injury to the R. Heel, as evident by non-blanchable erythema  -There is an Unstageable Pressure Injury to the L. Heel (1cm x 1cm) with stable eschar and no drainage  -There is a Stage 4 Pressure Injury to the Sacrum (3.5cm x 2cm x 1cm) with red tissue, purulent drainage, odor, and undermining (up to 3.5cm at 360 degrees) This is a 64yr old male patient admitted for Hypernatremia, presenting with the following:  -There is a Stage 1 Pressure Injury to the R. Heel, as evident by non-blanchable erythema  -There is an Unstageable Pressure Injury to the L. Heel (1cm x 1cm) with stable eschar and no drainage  -There is a Stage 4 Pressure Injury to the Sacrum (3.5cm x 2cm x 1cm) with bone, red tissue, purulent drainage, odor, and undermining (up to 3.5cm at 360 degrees)

## 2020-07-10 LAB
ANION GAP SERPL CALC-SCNC: 5 MMOL/L — SIGNIFICANT CHANGE UP (ref 5–17)
BUN SERPL-MCNC: 37 MG/DL — HIGH (ref 7–18)
CALCIUM SERPL-MCNC: 9.3 MG/DL — SIGNIFICANT CHANGE UP (ref 8.4–10.5)
CHLORIDE SERPL-SCNC: 114 MMOL/L — HIGH (ref 96–108)
CO2 SERPL-SCNC: 32 MMOL/L — HIGH (ref 22–31)
CREAT SERPL-MCNC: 0.97 MG/DL — SIGNIFICANT CHANGE UP (ref 0.5–1.3)
GLUCOSE BLDC GLUCOMTR-MCNC: 159 MG/DL — HIGH (ref 70–99)
GLUCOSE BLDC GLUCOMTR-MCNC: 198 MG/DL — HIGH (ref 70–99)
GLUCOSE BLDC GLUCOMTR-MCNC: 205 MG/DL — HIGH (ref 70–99)
GLUCOSE BLDC GLUCOMTR-MCNC: 220 MG/DL — HIGH (ref 70–99)
GLUCOSE SERPL-MCNC: 152 MG/DL — HIGH (ref 70–99)
HCT VFR BLD CALC: 36.4 % — LOW (ref 39–50)
HGB BLD-MCNC: 11.3 G/DL — LOW (ref 13–17)
MCHC RBC-ENTMCNC: 28.4 PG — SIGNIFICANT CHANGE UP (ref 27–34)
MCHC RBC-ENTMCNC: 31 GM/DL — LOW (ref 32–36)
MCV RBC AUTO: 91.5 FL — SIGNIFICANT CHANGE UP (ref 80–100)
NRBC # BLD: 0 /100 WBCS — SIGNIFICANT CHANGE UP (ref 0–0)
PLATELET # BLD AUTO: 214 K/UL — SIGNIFICANT CHANGE UP (ref 150–400)
POTASSIUM SERPL-MCNC: 3.3 MMOL/L — LOW (ref 3.5–5.3)
POTASSIUM SERPL-SCNC: 3.3 MMOL/L — LOW (ref 3.5–5.3)
RBC # BLD: 3.98 M/UL — LOW (ref 4.2–5.8)
RBC # FLD: 13.6 % — SIGNIFICANT CHANGE UP (ref 10.3–14.5)
SODIUM SERPL-SCNC: 151 MMOL/L — HIGH (ref 135–145)
WBC # BLD: 6.94 K/UL — SIGNIFICANT CHANGE UP (ref 3.8–10.5)
WBC # FLD AUTO: 6.94 K/UL — SIGNIFICANT CHANGE UP (ref 3.8–10.5)

## 2020-07-10 RX ORDER — POTASSIUM CHLORIDE 20 MEQ
40 PACKET (EA) ORAL ONCE
Refills: 0 | Status: COMPLETED | OUTPATIENT
Start: 2020-07-10 | End: 2020-07-10

## 2020-07-10 RX ORDER — HEPARIN SODIUM 5000 [USP'U]/ML
5000 INJECTION INTRAVENOUS; SUBCUTANEOUS EVERY 12 HOURS
Refills: 0 | Status: DISCONTINUED | OUTPATIENT
Start: 2020-07-10 | End: 2020-07-17

## 2020-07-10 RX ADMIN — Medication 2: at 00:17

## 2020-07-10 RX ADMIN — FAMOTIDINE 20 MILLIGRAM(S): 10 INJECTION INTRAVENOUS at 05:42

## 2020-07-10 RX ADMIN — Medication 1 TABLET(S): at 12:21

## 2020-07-10 RX ADMIN — QUETIAPINE FUMARATE 25 MILLIGRAM(S): 200 TABLET, FILM COATED ORAL at 21:08

## 2020-07-10 RX ADMIN — OLANZAPINE 5 MILLIGRAM(S): 15 TABLET, FILM COATED ORAL at 12:21

## 2020-07-10 RX ADMIN — Medication 40 MILLIEQUIVALENT(S): at 08:47

## 2020-07-10 RX ADMIN — HEPARIN SODIUM 5000 UNIT(S): 5000 INJECTION INTRAVENOUS; SUBCUTANEOUS at 18:16

## 2020-07-10 RX ADMIN — FAMOTIDINE 20 MILLIGRAM(S): 10 INJECTION INTRAVENOUS at 18:16

## 2020-07-10 RX ADMIN — Medication 1 APPLICATION(S): at 13:52

## 2020-07-10 RX ADMIN — INSULIN GLARGINE 10 UNIT(S): 100 INJECTION, SOLUTION SUBCUTANEOUS at 08:47

## 2020-07-10 RX ADMIN — Medication 1: at 05:46

## 2020-07-10 RX ADMIN — Medication 1000 UNIT(S): at 12:22

## 2020-07-10 RX ADMIN — MIRTAZAPINE 7.5 MILLIGRAM(S): 45 TABLET, ORALLY DISINTEGRATING ORAL at 21:08

## 2020-07-10 RX ADMIN — Medication 1: at 18:16

## 2020-07-10 RX ADMIN — Medication 2: at 12:20

## 2020-07-10 RX ADMIN — SODIUM CHLORIDE 50 MILLILITER(S): 9 INJECTION, SOLUTION INTRAVENOUS at 12:59

## 2020-07-10 NOTE — DIETITIAN INITIAL EVALUATION ADULT. - PERTINENT MEDS FT
MEDICATIONS  (STANDING):  cholecalciferol 1000 Unit(s) Oral daily  Dakins Solution - 1/4 Strength 1 Application(s) Topical daily  dextrose 5%. 1000 milliLiter(s) (50 mL/Hr) IV Continuous <Continuous>  dextrose 5%. 1000 milliLiter(s) (50 mL/Hr) IV Continuous <Continuous>  dextrose 5%. 1000 milliLiter(s) (50 mL/Hr) IV Continuous <Continuous>  dextrose 50% Injectable 12.5 Gram(s) IV Push once  dextrose 50% Injectable 25 Gram(s) IV Push once  dextrose 50% Injectable 25 Gram(s) IV Push once  famotidine    Tablet 20 milliGRAM(s) Oral two times a day  heparin   Injectable 5000 Unit(s) SubCutaneous every 12 hours  insulin glargine Injectable (LANTUS) 10 Unit(s) SubCutaneous every morning  insulin lispro (HumaLOG) corrective regimen sliding scale   SubCutaneous every 6 hours  mirtazapine 7.5 milliGRAM(s) Oral at bedtime  multivitamin/minerals 1 Tablet(s) Oral daily  OLANZapine 5 milliGRAM(s) Oral daily  QUEtiapine 25 milliGRAM(s) Oral at bedtime

## 2020-07-10 NOTE — DIETITIAN INITIAL EVALUATION ADULT. - OTHER INFO
Pt from skilled nursing facility, confused with dementia, recent admission 5/1/2020 to 6/19/2020 with Severe Malnutrition identified per RD, S/P peg placement on 06/02/2020 for FTT 2/2 to dysphagia and poor oral intake, related to changes in mental status, pt was on Glucerna 1.2 @ 60ml/h x 24h in hospital before discharged to NH; PEG feeding progressing and tolerated at present per RN; Discussed with NP/RN Pt from skilled nursing facility, confused with dementia, recent admission 5/1/2020 to 6/19/2020 with Severe Malnutrition identified per RD, S/P peg placement on 06/02/2020 for FTT 2/2 to dysphagia and poor oral intake, related to changes in mental status, pt was on Glucerna 1.2 @ 60ml/h x 24h in hospital before discharged to NH; PEG feeding progressing and tolerated at present per RN; on extra free water 250 ml x q6h ( 1000 ml/d) for hypernatremia; Discussed with NP/RN

## 2020-07-10 NOTE — DIETITIAN INITIAL EVALUATION ADULT. - ENTERAL
Goal: Glucerna 1.5 @ 60ml/h x 16h (960ml, 1440kcal, 79g protein, 729ml water at goal)   MD to adjust free water as needed

## 2020-07-10 NOTE — DIETITIAN INITIAL EVALUATION ADULT. - ADD RECOMMEND
Moderate Malnutrition identified; Rec: Vit C supplements as medically feasible, Monitor needs for Zn supplement; Continue MVI/minerals,

## 2020-07-10 NOTE — PROGRESS NOTE ADULT - SUBJECTIVE AND OBJECTIVE BOX
CARDIOLOGY/MEDICAL ATTENDING    CHIEF COMPLAINT:Patient is a 64y old  Male who presents with a chief complaint of Hypernatremia.Pt appears comfortable.    	  REVIEW OF SYSTEMS:    [x ] Unable to obtain    PHYSICAL EXAM:  T(C): 36.4 (07-10-20 @ 05:20), Max: 37.1 (07-09-20 @ 20:51)  HR: 77 (07-10-20 @ 05:20) (77 - 90)  BP: 118/76 (07-10-20 @ 05:20) (93/72 - 130/88)  RR: 17 (07-10-20 @ 05:20) (16 - 18)  SpO2: 97% (07-10-20 @ 05:20) (97% - 100%)  Wt(kg): --  I&O's Summary      Appearance: Normal	  HEENT:   Normal oral mucosa, PERRL, EOMI	  Lymphatic: No lymphadenopathy  Cardiovascular: Normal S1 S2, No JVD, No murmurs, No edema  Respiratory: Lungs clear to auscultation	  Gastrointestinal:  Soft, Non-tender, + BS	  Skin: No rashes, No ecchymoses, No cyanosis	  Extremities: Normal range of motion, No clubbing, cyanosis or edema  Vascular: Peripheral pulses palpable 2+ bilaterally    MEDICATIONS  (STANDING):  cholecalciferol 1000 Unit(s) Oral daily  Dakins Solution - 1/4 Strength 1 Application(s) Topical daily  dextrose 5%. 1000 milliLiter(s) (50 mL/Hr) IV Continuous <Continuous>  dextrose 5%. 1000 milliLiter(s) (50 mL/Hr) IV Continuous <Continuous>  dextrose 5%. 1000 milliLiter(s) (50 mL/Hr) IV Continuous <Continuous>  dextrose 50% Injectable 12.5 Gram(s) IV Push once  dextrose 50% Injectable 25 Gram(s) IV Push once  dextrose 50% Injectable 25 Gram(s) IV Push once  famotidine    Tablet 20 milliGRAM(s) Oral two times a day  heparin   Injectable 5000 Unit(s) SubCutaneous every 12 hours  insulin glargine Injectable (LANTUS) 10 Unit(s) SubCutaneous every morning  insulin lispro (HumaLOG) corrective regimen sliding scale   SubCutaneous every 6 hours  mirtazapine 7.5 milliGRAM(s) Oral at bedtime  multivitamin/minerals 1 Tablet(s) Oral daily  OLANZapine 5 milliGRAM(s) Oral daily  QUEtiapine 25 milliGRAM(s) Oral at bedtime        	  LABS:	 	                       11.3   6.94  )-----------( 214      ( 10 Jul 2020 06:45 )             36.4     07-10    151<H>  |  114<H>  |  37<H>  ----------------------------<  152<H>  3.3<L>   |  32<H>  |  0.97    Ca    9.3      10 Jul 2020 06:45  Phos  3.7     07-09  Mg     2.2     07-09    TPro  8.0  /  Alb  3.1<L>  /  TBili  0.3  /  DBili  x   /  AST  25  /  ALT  32  /  AlkPhos  115  07-08      Lipid Profile: Cholesterol 196    HDL 50        TSH: Thyroid Stimulating Hormone, Serum: 2.63 uU/mL (07-09 @ 07:02)

## 2020-07-10 NOTE — PROGRESS NOTE ADULT - PROBLEM SELECTOR PLAN 1
Likely secondary to dehydration-Improved, Na 151  -Continue free water 250ml q6 via PEG  -Continue D5 @ 50  -f/u am bmp

## 2020-07-10 NOTE — CHART NOTE - NSCHARTNOTEFT_GEN_A_CORE
EVENT:  65 yo M from [facility] assisted living [facility] IDDM, Dementia, Pressure ulcer stage 4, GERD, S/P peg placement on 06/02/2020 for FTT 2/2 to dysphagia and poor oral intake, related to changes in mental status. Patient was found to have redness around peg tube with slight serosanguineous discharge and was sent to hospital to evaluate for possible infection. Patient is AAOX0 , history is based on AL papers. On ED patient was found to have hypernatremia. Patient is admitted to monitor hypernatremia.      SUBJECTIVE: In NAD    OBJECTIVE:  Vital Signs Last 24 Hrs  T(C): 36.4 (10 Jul 2020 05:20), Max: 37.1 (09 Jul 2020 20:51)  T(F): 97.5 (10 Jul 2020 05:20), Max: 98.7 (09 Jul 2020 20:51)  HR: 77 (10 Jul 2020 05:20) (77 - 93)  BP: 118/76 (10 Jul 2020 05:20) (93/72 - 140/93)  BP(mean): 78 (09 Jul 2020 16:00) (78 - 78)  RR: 17 (10 Jul 2020 05:20) (16 - 18)  SpO2: 97% (10 Jul 2020 05:20) (97% - 100%)    LABS:                        11.3   6.94  )-----------( 214      ( 10 Jul 2020 06:45 )             36.4     07-09    152<H>  |  117<H>  |  40<H>  ----------------------------<  170<H>  4.0   |  29  |  0.94    Ca    9.4      09 Jul 2020 21:21  Phos  3.7     07-09  Mg     2.2     07-09    TPro  8.0  /  Alb  3.1<L>  /  TBili  0.3  /  DBili  x   /  AST  25  /  ALT  32  /  AlkPhos  115  07-08      ASSESSMENT:  Problem:  Hypernatremia      PLAN:  Likely secondary to dehydration   -Continue free water 250ml q6 via PEG  -Continue D5 @ 50  -Avoid overcorrection  -Daily BMPs.

## 2020-07-10 NOTE — DIETITIAN INITIAL EVALUATION ADULT. - PERTINENT LABORATORY DATA
07-10 Na151 mmol/L<H> Glu 152 mg/dL<H> K+ 3.3 mmol/L<L> Cr  0.97 mg/dL BUN 37 mg/dL<H>   07-09 Phos 3.7 mg/dL   07-08 Alb 3.1 g/dL<L>       07-09 Chol 196 mg/dL  mg/dL HDL 50 mg/dL Trig 147 mg/dL  07-09-20 @ 10:12 HgbA1C 6.9 [4.0 - 5.6]

## 2020-07-10 NOTE — DIETITIAN INITIAL EVALUATION ADULT. - NS FNS WEIGHT USED FOR CALC
Ht=5' 8"   HQQ=084 lb   Wt data from [facility] EMR: 160 lb  4/30/2020; 156.9 lb 6/2/2020;131.8 lb ?? 6/16/2020;  Admission ld=030 lb   BMI=25.1  Appears slight wt gain based to current admission wt/ideal

## 2020-07-10 NOTE — CHART NOTE - NSCHARTNOTEFT_GEN_A_CORE
Upon Nutritional Assessment by the Registered Dietitian your patient was determined to meet criteria / has evidence of the following diagnosis/diagnoses:          [ ]  Mild Protein Calorie Malnutrition        [ X]  Moderate Protein Calorie Malnutrition        [ ] Severe Protein Calorie Malnutrition        [ ] Unspecified Protein Calorie Malnutrition        [ ] Underweight / BMI <19        [ ] Morbid Obesity / BMI > 40      Findings as based on:  •  Comprehensive nutrition assessment and consultation  •  Calorie counts (nutrient intake analysis)  •  Food acceptance and intake status from observations by staff  •  Follow up  •  Patient education  •  Intervention secondary to interdisciplinary rounds  •   concerns      Treatment:    The following diet has been recommended: Goal: Glucerna 1.5 @ 60ml/h x 16h (960ml, 1440kcal, 79g protein, 729ml water at goal)   MD to adjust free water as needed; Vit C supplements as medically feasible, Monitor needs for Zn supplement       PROVIDER Section:     By signing this assessment you are acknowledging and agree with the diagnosis/diagnoses assigned by the Registered Dietitian    Comments:

## 2020-07-10 NOTE — PROGRESS NOTE ADULT - SUBJECTIVE AND OBJECTIVE BOX
NP Note discussed with  Primary Attending    Patient is a 64y old  Male who presents with a chief complaint of Hypernatremia (10 Jul 2020 11:04)      INTERVAL HPI/OVERNIGHT EVENTS: no new complaints    MEDICATIONS  (STANDING):  cholecalciferol 1000 Unit(s) Oral daily  Dakins Solution - 1/4 Strength 1 Application(s) Topical daily  dextrose 5%. 1000 milliLiter(s) (50 mL/Hr) IV Continuous <Continuous>  dextrose 5%. 1000 milliLiter(s) (50 mL/Hr) IV Continuous <Continuous>  dextrose 5%. 1000 milliLiter(s) (50 mL/Hr) IV Continuous <Continuous>  dextrose 50% Injectable 12.5 Gram(s) IV Push once  dextrose 50% Injectable 25 Gram(s) IV Push once  dextrose 50% Injectable 25 Gram(s) IV Push once  famotidine    Tablet 20 milliGRAM(s) Oral two times a day  heparin   Injectable 5000 Unit(s) SubCutaneous every 12 hours  insulin glargine Injectable (LANTUS) 10 Unit(s) SubCutaneous every morning  insulin lispro (HumaLOG) corrective regimen sliding scale   SubCutaneous every 6 hours  mirtazapine 7.5 milliGRAM(s) Oral at bedtime  multivitamin/minerals 1 Tablet(s) Oral daily  OLANZapine 5 milliGRAM(s) Oral daily  QUEtiapine 25 milliGRAM(s) Oral at bedtime    MEDICATIONS  (PRN):  acetaminophen    Suspension .. 649.92 milliGRAM(s) Oral every 6 hours PRN Mild Pain (1 - 3), Moderate Pain (4 - 6)  dextrose 40% Gel 15 Gram(s) Oral once PRN Blood Glucose LESS THAN 70 milliGRAM(s)/deciliter  glucagon  Injectable 1 milliGRAM(s) IntraMuscular once PRN Glucose LESS THAN 70 milligrams/deciliter      __________________________________________________  REVIEW OF SYSTEMS:  Unable to obtain, pt. is minimally verbal          Vital Signs Last 24 Hrs  T(C): 36.4 (10 Jul 2020 05:20), Max: 37.1 (09 Jul 2020 20:51)  T(F): 97.5 (10 Jul 2020 05:20), Max: 98.7 (09 Jul 2020 20:51)  HR: 77 (10 Jul 2020 05:20) (77 - 90)  BP: 118/76 (10 Jul 2020 05:20) (93/72 - 130/88)  BP(mean): 78 (09 Jul 2020 16:00) (78 - 78)  RR: 17 (10 Jul 2020 05:20) (16 - 18)  SpO2: 97% (10 Jul 2020 05:20) (97% - 100%)    ________________________________________________  PHYSICAL EXAM: Chronically ill appearing, minimallyverbal  GENERAL: NAD  HEENT: Normocephalic;  conjunctivae and sclerae clear; moist mucous membranes;   NECK : supple  CHEST/LUNG: Clear to auscultation bilaterally with good air entry   HEART: S1 S2  regular; no murmurs, gallops or rubs  ABDOMEN: PEG in place, Soft, Nontender, Nondistended; Bowel sounds present  EXTREMITIES: no cyanosis; no edema; no calf tenderness  SKIN: warm and dry; no rash  NERVOUS SYSTEM:  Awake and alert; Unable to assess orientation as pt. is minimally verbal  _________________________________________________  LABS:                        11.3   6.94  )-----------( 214      ( 10 Jul 2020 06:45 )             36.4     07-10    151<H>  |  114<H>  |  37<H>  ----------------------------<  152<H>  3.3<L>   |  32<H>  |  0.97    Ca    9.3      10 Jul 2020 06:45  Phos  3.7     07-09  Mg     2.2     07-09    TPro  8.0  /  Alb  3.1<L>  /  TBili  0.3  /  DBili  x   /  AST  25  /  ALT  32  /  AlkPhos  115  07-08        CAPILLARY BLOOD GLUCOSE      POCT Blood Glucose.: 220 mg/dL (10 Jul 2020 12:05)  POCT Blood Glucose.: 159 mg/dL (10 Jul 2020 05:44)  POCT Blood Glucose.: 205 mg/dL (10 Jul 2020 00:13)  POCT Blood Glucose.: 130 mg/dL (09 Jul 2020 18:07)    RADIOLOGY & ADDITIONAL TESTS:    CT Abdomen and Pelvis w/ IV Cont (07.08.20 @ 17:35) >  EXAM:  CT ABDOMEN AND PELVIS IC                            PROCEDURE DATE:  07/08/2020          INTERPRETATION:  CLINICAL INFORMATION: Rule out infection    COMPARISON: None.    PROCEDURE:   CT of the Abdomen and Pelvis was performed with intravenous contrast.   Intravenous contrast: 90 ml Omnipaque 350. 10 ml discarded.  Oral contrast: positive contrast was administered.  Sagittal and coronal reformats were performed.    FINDINGS:  LOWER CHEST: Within normal limits.    LIVER: Within normal limits.  BILE DUCTS: Normal caliber.  GALLBLADDER: Within normal limits.  SPLEEN: Within normal limits.  PANCREAS: Within normal limits.  ADRENALS: Within normal limits.  KIDNEYS/URETERS: Within normal limits.    BLADDER: Within normal limits.  REPRODUCTIVE ORGANS: Prostate is enlarged.    BOWEL: Gastrostomy tube in place. No bowel obstruction. Appendix is not visualized.  PERITONEUM: No ascites.  VESSELS: Mild atherosclerotic calcification.  RETROPERITONEUM/LYMPH NODES: No lymphadenopathy.    ABDOMINAL WALL: Within normal limits.  BONES: Degenerative changes. Minimal anterior listhesis of L4 on L5.    IMPRESSION:   No intra-abdominal abnormality to elucidate the presenting symptoms.    < end of copied text >    COVID-19  Antibody - for prior infection screening (07.09.20 @ 05:11)    COVID-19 IgG Antibody Index: 6.30: Abbott CMIA  Negative Result    <= 1.39 Index  Positive Result      >= 1.40 Index Index    COVID-19 IgG Antibody Interpretation: Positive: This test has not been reviewed by the FDA by the standard review  process. It has been authorized for emergency use by the FDA. [facility] has validated this test to be accurate.  Negative results do not rule out SARS-CoV-2 infection, particularly in  those who have been in recent contact with the virus. Follow-up testing  with a molecular diagnostic test should be considered to rule out  infection in these individuals.  Results from antibody testing should not be used as thesole basis to  diagnose or exclude SARS-CoV-2 infection, or to inform infection status.  Positive results may rarely be due to past or present infection with  non-SARS-CoV-2 coronavirus strains, such as coronavirus HKU1, NL63, OC43,  or 229E. [facility], through extensive validation  testing, has confirmed that this risk is minimal with this test.    COVID-19 PCR . (07.08.20 @ 20:27)    COVID-19 PCR: NotDetec: Testing is performed using polymerase chain reaction (PCR) or  transcription mediated amplification (TMA). This COVID-19 (SARS-CoV-2)  nucleic acid amplification test was validated by [facility] and is  in use under the FDA Emergency Use Authorization (EUA) for clinical labs  CLIA-certified to perform high complexity testing. Test results should be  correlated with clinical presentation, patient history, and epidemiology.      Imaging Personally Reviewed:  YES/NO    Consultant(s) Notes Reviewed:   YES/ No    Care Discussed with Consultants :     Plan of care was discussed with patient and /or primary care giver; all questions and concerns were addressed and care was aligned with patient's wishes.

## 2020-07-11 LAB
ANION GAP SERPL CALC-SCNC: 5 MMOL/L — SIGNIFICANT CHANGE UP (ref 5–17)
BUN SERPL-MCNC: 26 MG/DL — HIGH (ref 7–18)
CALCIUM SERPL-MCNC: 9.1 MG/DL — SIGNIFICANT CHANGE UP (ref 8.4–10.5)
CHLORIDE SERPL-SCNC: 112 MMOL/L — HIGH (ref 96–108)
CO2 SERPL-SCNC: 28 MMOL/L — SIGNIFICANT CHANGE UP (ref 22–31)
CREAT SERPL-MCNC: 0.78 MG/DL — SIGNIFICANT CHANGE UP (ref 0.5–1.3)
GLUCOSE BLDC GLUCOMTR-MCNC: 120 MG/DL — HIGH (ref 70–99)
GLUCOSE BLDC GLUCOMTR-MCNC: 190 MG/DL — HIGH (ref 70–99)
GLUCOSE BLDC GLUCOMTR-MCNC: 194 MG/DL — HIGH (ref 70–99)
GLUCOSE BLDC GLUCOMTR-MCNC: 201 MG/DL — HIGH (ref 70–99)
GLUCOSE BLDC GLUCOMTR-MCNC: 214 MG/DL — HIGH (ref 70–99)
GLUCOSE SERPL-MCNC: 128 MG/DL — HIGH (ref 70–99)
HCT VFR BLD CALC: 38 % — LOW (ref 39–50)
HGB BLD-MCNC: 12.2 G/DL — LOW (ref 13–17)
MAGNESIUM SERPL-MCNC: 2.1 MG/DL — SIGNIFICANT CHANGE UP (ref 1.6–2.6)
MCHC RBC-ENTMCNC: 28.5 PG — SIGNIFICANT CHANGE UP (ref 27–34)
MCHC RBC-ENTMCNC: 32.1 GM/DL — SIGNIFICANT CHANGE UP (ref 32–36)
MCV RBC AUTO: 88.8 FL — SIGNIFICANT CHANGE UP (ref 80–100)
NRBC # BLD: 0 /100 WBCS — SIGNIFICANT CHANGE UP (ref 0–0)
PHOSPHATE SERPL-MCNC: 2.9 MG/DL — SIGNIFICANT CHANGE UP (ref 2.5–4.5)
PLATELET # BLD AUTO: 227 K/UL — SIGNIFICANT CHANGE UP (ref 150–400)
POTASSIUM SERPL-MCNC: 3.6 MMOL/L — SIGNIFICANT CHANGE UP (ref 3.5–5.3)
POTASSIUM SERPL-SCNC: 3.6 MMOL/L — SIGNIFICANT CHANGE UP (ref 3.5–5.3)
RBC # BLD: 4.28 M/UL — SIGNIFICANT CHANGE UP (ref 4.2–5.8)
RBC # FLD: 13.2 % — SIGNIFICANT CHANGE UP (ref 10.3–14.5)
SODIUM SERPL-SCNC: 145 MMOL/L — SIGNIFICANT CHANGE UP (ref 135–145)
WBC # BLD: 8.11 K/UL — SIGNIFICANT CHANGE UP (ref 3.8–10.5)
WBC # FLD AUTO: 8.11 K/UL — SIGNIFICANT CHANGE UP (ref 3.8–10.5)

## 2020-07-11 RX ADMIN — Medication 1: at 12:20

## 2020-07-11 RX ADMIN — MIRTAZAPINE 7.5 MILLIGRAM(S): 45 TABLET, ORALLY DISINTEGRATING ORAL at 22:38

## 2020-07-11 RX ADMIN — INSULIN GLARGINE 10 UNIT(S): 100 INJECTION, SOLUTION SUBCUTANEOUS at 08:18

## 2020-07-11 RX ADMIN — Medication 1 APPLICATION(S): at 12:21

## 2020-07-11 RX ADMIN — Medication 1: at 23:32

## 2020-07-11 RX ADMIN — Medication 2: at 18:14

## 2020-07-11 RX ADMIN — HEPARIN SODIUM 5000 UNIT(S): 5000 INJECTION INTRAVENOUS; SUBCUTANEOUS at 18:15

## 2020-07-11 RX ADMIN — HEPARIN SODIUM 5000 UNIT(S): 5000 INJECTION INTRAVENOUS; SUBCUTANEOUS at 05:37

## 2020-07-11 RX ADMIN — Medication 1000 UNIT(S): at 12:20

## 2020-07-11 RX ADMIN — FAMOTIDINE 20 MILLIGRAM(S): 10 INJECTION INTRAVENOUS at 05:37

## 2020-07-11 RX ADMIN — FAMOTIDINE 20 MILLIGRAM(S): 10 INJECTION INTRAVENOUS at 18:15

## 2020-07-11 RX ADMIN — QUETIAPINE FUMARATE 25 MILLIGRAM(S): 200 TABLET, FILM COATED ORAL at 22:38

## 2020-07-11 RX ADMIN — OLANZAPINE 5 MILLIGRAM(S): 15 TABLET, FILM COATED ORAL at 12:20

## 2020-07-11 RX ADMIN — Medication 2: at 00:28

## 2020-07-11 RX ADMIN — Medication 1 TABLET(S): at 12:20

## 2020-07-11 NOTE — PROGRESS NOTE ADULT - SUBJECTIVE AND OBJECTIVE BOX
CARDIOLOGY/MEDICAL ATTENDING    CHIEF COMPLAINT:Patient is a 64y old  Male who presents with a chief complaint of Hypernatremia .Pt appears comfortable.    	  REVIEW OF SYSTEMS:  [x ] Unable to obtain    PHYSICAL EXAM:  T(C): 36.4 (07-11-20 @ 05:30), Max: 36.4 (07-10-20 @ 14:03)  HR: 81 (07-11-20 @ 05:30) (81 - 87)  BP: 112/72 (07-11-20 @ 05:30) (107/68 - 112/72)  RR: 17 (07-11-20 @ 05:30) (16 - 17)  SpO2: 99% (07-11-20 @ 05:30) (99% - 99%)  Wt(kg): --  I&O's Summary      Appearance: Normal	  HEENT:   Normal oral mucosa, PERRL, EOMI	  Lymphatic: No lymphadenopathy  Cardiovascular: Normal S1 S2, No JVD, No murmurs, No edema  Respiratory: Lungs clear to auscultation	  Gastrointestinal:  Soft, Non-tender, + BS	  Skin: No rashes, No ecchymoses, No cyanosis	  Extremities: Normal range of motion, No clubbing, cyanosis or edema  Vascular: Peripheral pulses palpable 2+ bilaterally    MEDICATIONS  (STANDING):  cholecalciferol 1000 Unit(s) Oral daily  Dakins Solution - 1/4 Strength 1 Application(s) Topical daily  dextrose 5%. 1000 milliLiter(s) (50 mL/Hr) IV Continuous <Continuous>  dextrose 5%. 1000 milliLiter(s) (50 mL/Hr) IV Continuous <Continuous>  dextrose 50% Injectable 12.5 Gram(s) IV Push once  dextrose 50% Injectable 25 Gram(s) IV Push once  dextrose 50% Injectable 25 Gram(s) IV Push once  famotidine    Tablet 20 milliGRAM(s) Oral two times a day  heparin   Injectable 5000 Unit(s) SubCutaneous every 12 hours  insulin glargine Injectable (LANTUS) 10 Unit(s) SubCutaneous every morning  insulin lispro (HumaLOG) corrective regimen sliding scale   SubCutaneous every 6 hours  mirtazapine 7.5 milliGRAM(s) Oral at bedtime  multivitamin/minerals 1 Tablet(s) Oral daily  OLANZapine 5 milliGRAM(s) Oral daily  QUEtiapine 25 milliGRAM(s) Oral at bedtime    	  	  LABS:	 	                         12.2   8.11  )-----------( 227      ( 11 Jul 2020 07:29 )             38.0     07-11    145  |  112<H>  |  26<H>  ----------------------------<  128<H>  3.6   |  28  |  0.78    Ca    9.1      11 Jul 2020 07:29  Phos  2.9     07-11  Mg     2.1     07-11        Lipid Profile: Cholesterol 196    HDL 50        TSH: Thyroid Stimulating Hormone, Serum: 2.63 uU/mL (07-09 @ 07:02)

## 2020-07-12 LAB
ANION GAP SERPL CALC-SCNC: 6 MMOL/L — SIGNIFICANT CHANGE UP (ref 5–17)
BUN SERPL-MCNC: 25 MG/DL — HIGH (ref 7–18)
CALCIUM SERPL-MCNC: 8.9 MG/DL — SIGNIFICANT CHANGE UP (ref 8.4–10.5)
CHLORIDE SERPL-SCNC: 109 MMOL/L — HIGH (ref 96–108)
CO2 SERPL-SCNC: 28 MMOL/L — SIGNIFICANT CHANGE UP (ref 22–31)
CREAT SERPL-MCNC: 0.76 MG/DL — SIGNIFICANT CHANGE UP (ref 0.5–1.3)
GLUCOSE BLDC GLUCOMTR-MCNC: 128 MG/DL — HIGH (ref 70–99)
GLUCOSE BLDC GLUCOMTR-MCNC: 162 MG/DL — HIGH (ref 70–99)
GLUCOSE BLDC GLUCOMTR-MCNC: 260 MG/DL — HIGH (ref 70–99)
GLUCOSE BLDC GLUCOMTR-MCNC: 265 MG/DL — HIGH (ref 70–99)
GLUCOSE SERPL-MCNC: 124 MG/DL — HIGH (ref 70–99)
POTASSIUM SERPL-MCNC: 3.9 MMOL/L — SIGNIFICANT CHANGE UP (ref 3.5–5.3)
POTASSIUM SERPL-SCNC: 3.9 MMOL/L — SIGNIFICANT CHANGE UP (ref 3.5–5.3)
SODIUM SERPL-SCNC: 143 MMOL/L — SIGNIFICANT CHANGE UP (ref 135–145)

## 2020-07-12 RX ORDER — DIPHENHYDRAMINE HCL 50 MG
50 CAPSULE ORAL ONCE
Refills: 0 | Status: COMPLETED | OUTPATIENT
Start: 2020-07-11 | End: 2020-07-11

## 2020-07-12 RX ADMIN — Medication 50 MILLIGRAM(S): at 00:34

## 2020-07-12 NOTE — PROGRESS NOTE ADULT - SUBJECTIVE AND OBJECTIVE BOX
CARDIOLOGY/MEDICAL ATTENDING    CHIEF COMPLAINT:Patient is a 64y old  Male who presents with a chief complaint of Hypernatremia.Pt appears comfortable.    	  REVIEW OF SYSTEMS:  [x ] Unable to obtain    PHYSICAL EXAM:  T(C): 36.4 (07-12-20 @ 05:24), Max: 36.4 (07-11-20 @ 14:49)  HR: 87 (07-12-20 @ 05:24) (81 - 87)  BP: 105/70 (07-12-20 @ 05:24) (101/71 - 106/66)  RR: 17 (07-12-20 @ 05:24) (17 - 17)  SpO2: 100% (07-12-20 @ 05:24) (97% - 100%)  Wt(kg): --  I&O's Summary      Appearance: Normal	  HEENT:   Normal oral mucosa, PERRL, EOMI	  Lymphatic: No lymphadenopathy  Cardiovascular: Normal S1 S2, No JVD, No murmurs, No edema  Respiratory: Lungs clear to auscultation	  Gastrointestinal:  Soft, Non-tender, + BS	  Skin: No rashes, No ecchymoses, No cyanosis	  Extremities: Normal range of motion, No clubbing, cyanosis or edema  Vascular: Peripheral pulses palpable 2+ bilaterally    MEDICATIONS  (STANDING):  cholecalciferol 1000 Unit(s) Oral daily  Dakins Solution - 1/4 Strength 1 Application(s) Topical daily  dextrose 5%. 1000 milliLiter(s) (50 mL/Hr) IV Continuous <Continuous>  dextrose 5%. 1000 milliLiter(s) (50 mL/Hr) IV Continuous <Continuous>  dextrose 50% Injectable 12.5 Gram(s) IV Push once  dextrose 50% Injectable 25 Gram(s) IV Push once  dextrose 50% Injectable 25 Gram(s) IV Push once  famotidine    Tablet 20 milliGRAM(s) Oral two times a day  heparin   Injectable 5000 Unit(s) SubCutaneous every 12 hours  insulin glargine Injectable (LANTUS) 10 Unit(s) SubCutaneous every morning  insulin lispro (HumaLOG) corrective regimen sliding scale   SubCutaneous every 6 hours  mirtazapine 7.5 milliGRAM(s) Oral at bedtime  multivitamin/minerals 1 Tablet(s) Oral daily  OLANZapine 5 milliGRAM(s) Oral daily  QUEtiapine 25 milliGRAM(s) Oral at bedtime        	  LABS:	 	                          12.2   8.11  )-----------( 227      ( 11 Jul 2020 07:29 )             38.0     07-12    143  |  109<H>  |  25<H>  ----------------------------<  124<H>  3.9   |  28  |  0.76    Ca    8.9      12 Jul 2020 06:16  Phos  2.9     07-11  Mg     2.1     07-11        Lipid Profile: Cholesterol 196    HDL 50        TSH: Thyroid Stimulating Hormone, Serum: 2.63 uU/mL (07-09 @ 07:02)      	    COVID-19  Antibody - for prior infection screening (07.09.20 @ 05:11)    COVID-19 IgG Antibody Index: 6.30: Abbott CMIA  Negative Result    <= 1.39 Index  Positive Result      >= 1.40 Index Index    COVID-19 IgG Antibody Interpretation: Positive: This test has not been reviewed by the FDA by the standard review  process. It has been authorized for emergency use by the FDA. [facility] has validated this test to be accurate.  Negative results do not rule out SARS-CoV-2 infection, particularly in  those who have been in recent contact with the virus. Follow-up testing  with a molecular diagnostic test should be considered to rule out  infection in these individuals.  Results from antibody testing should not be used as thesole basis to  diagnose or exclude SARS-CoV-2 infection, or to inform infection status.  Positive results may rarely be due to past or present infection with  non-SARS-CoV-2 coronavirus strains, such as coronavirus HKU1, NL63, OC43,  or 229E. [facility], through extensive validation  testing, has confirmed that this risk is minimal with this test.    COVID-19  Antibody - for prior infection screening (07.09.20 @ 05:11)    COVID-19 IgG Antibody Index: 6.30: Abbott CMIA  Negative Result    <= 1.39 Index  Positive Result      >= 1.40 Index Index    COVID-19 IgG Antibody Interpretation: Positive: This test has not been reviewed by the FDA by the standard review  process. It has been authorized for emergency use by the FDA. [facility] has validated this test to be accurate.  Negative results do not rule out SARS-CoV-2 infection, particularly in  those who have been in recent contact with the virus. Follow-up testing  with a molecular diagnostic test should be considered to rule out  infection in these individuals.  Results from antibody testing should not be used as thesole basis to  diagnose or exclude SARS-CoV-2 infection, or to inform infection status.  Positive results may rarely be due to past or present infection with  non-SARS-CoV-2 coronavirus strains, such as coronavirus HKU1, NL63, OC43,  or 229E. [facility], through extensive validation  testing, has confirmed that this risk is minimal with this test.

## 2020-07-13 DIAGNOSIS — Z02.9 ENCOUNTER FOR ADMINISTRATIVE EXAMINATIONS, UNSPECIFIED: ICD-10-CM

## 2020-07-13 LAB
ANION GAP SERPL CALC-SCNC: 6 MMOL/L — SIGNIFICANT CHANGE UP (ref 5–17)
BUN SERPL-MCNC: 21 MG/DL — HIGH (ref 7–18)
CALCIUM SERPL-MCNC: 9.2 MG/DL — SIGNIFICANT CHANGE UP (ref 8.4–10.5)
CHLORIDE SERPL-SCNC: 106 MMOL/L — SIGNIFICANT CHANGE UP (ref 96–108)
CO2 SERPL-SCNC: 30 MMOL/L — SIGNIFICANT CHANGE UP (ref 22–31)
CREAT SERPL-MCNC: 0.79 MG/DL — SIGNIFICANT CHANGE UP (ref 0.5–1.3)
CULTURE RESULTS: SIGNIFICANT CHANGE UP
CULTURE RESULTS: SIGNIFICANT CHANGE UP
GLUCOSE BLDC GLUCOMTR-MCNC: 167 MG/DL — HIGH (ref 70–99)
GLUCOSE BLDC GLUCOMTR-MCNC: 205 MG/DL — HIGH (ref 70–99)
GLUCOSE BLDC GLUCOMTR-MCNC: 234 MG/DL — HIGH (ref 70–99)
GLUCOSE BLDC GLUCOMTR-MCNC: 257 MG/DL — HIGH (ref 70–99)
GLUCOSE SERPL-MCNC: 181 MG/DL — HIGH (ref 70–99)
POTASSIUM SERPL-MCNC: 3.9 MMOL/L — SIGNIFICANT CHANGE UP (ref 3.5–5.3)
POTASSIUM SERPL-SCNC: 3.9 MMOL/L — SIGNIFICANT CHANGE UP (ref 3.5–5.3)
SARS-COV-2 RNA SPEC QL NAA+PROBE: DETECTED
SODIUM SERPL-SCNC: 142 MMOL/L — SIGNIFICANT CHANGE UP (ref 135–145)
SPECIMEN SOURCE: SIGNIFICANT CHANGE UP
SPECIMEN SOURCE: SIGNIFICANT CHANGE UP

## 2020-07-13 NOTE — PROGRESS NOTE ADULT - SUBJECTIVE AND OBJECTIVE BOX
NP Note discussed with  Primary Attending    65 yo M from [facility] IDDM, dementia, Pressure ulcer stage 4, GERD, S/p peg placement on 06/02/2020 for FTT 2/2 to dysphagia and poor oral intake, related to changes in mental status. Patient was found to have redness around peg tube with slight serosanguineous discharge and was sent to hospital to evaluate for possible infection. Patient is AAOX0 , history is based on AL papers. Abdominal CT scan which showed PEG tube in place without obstruction . Patient found to have Hypernatremia 154 and high lactate 2.8. lactate improved s/p 2 liters bolus NS. Admitted for hypernatremia, now resolved. Pt with previous COVID infection in April, positive antibodies. PCR now positive.     INTERVAL HPI/OVERNIGHT EVENTS: no new complaints    MEDICATIONS  (STANDING):  cholecalciferol 1000 Unit(s) Oral daily  Dakins Solution - 1/4 Strength 1 Application(s) Topical daily  dextrose 5%. 1000 milliLiter(s) (50 mL/Hr) IV Continuous <Continuous>  dextrose 5%. 1000 milliLiter(s) (50 mL/Hr) IV Continuous <Continuous>  dextrose 50% Injectable 12.5 Gram(s) IV Push once  dextrose 50% Injectable 25 Gram(s) IV Push once  dextrose 50% Injectable 25 Gram(s) IV Push once  famotidine    Tablet 20 milliGRAM(s) Oral two times a day  heparin   Injectable 5000 Unit(s) SubCutaneous every 12 hours  insulin glargine Injectable (LANTUS) 10 Unit(s) SubCutaneous every morning  insulin lispro (HumaLOG) corrective regimen sliding scale   SubCutaneous every 6 hours  mirtazapine 7.5 milliGRAM(s) Oral at bedtime  multivitamin/minerals 1 Tablet(s) Oral daily  OLANZapine 5 milliGRAM(s) Oral daily  QUEtiapine 25 milliGRAM(s) Oral at bedtime    MEDICATIONS  (PRN):  acetaminophen    Suspension .. 649.92 milliGRAM(s) Oral every 6 hours PRN Mild Pain (1 - 3), Moderate Pain (4 - 6)  dextrose 40% Gel 15 Gram(s) Oral once PRN Blood Glucose LESS THAN 70 milliGRAM(s)/deciliter  glucagon  Injectable 1 milliGRAM(s) IntraMuscular once PRN Glucose LESS THAN 70 milligrams/deciliter      __________________________________________________  REVIEW OF SYSTEMS:    limited due to mental status   CONSTITUTIONAL: No fever,   EYES: no acute visual disturbances  NECK: No pain or stiffness  RESPIRATORY: No cough; No shortness of breath  CARDIOVASCULAR: No chest pain, no palpitations  GASTROINTESTINAL: No pain. No nausea or vomiting; No diarrhea   NEUROLOGICAL: No headache or numbness, no tremors  MUSCULOSKELETAL: No joint pain, no muscle pain  GENITOURINARY: no dysuria, no frequency, no hesitancy  PSYCHIATRY: no depression , no anxiety  ALL OTHER  ROS negative        Vital Signs Last 24 Hrs  T(C): 36.3 (13 Jul 2020 05:25), Max: 36.3 (12 Jul 2020 14:40)  T(F): 97.3 (13 Jul 2020 05:25), Max: 97.4 (12 Jul 2020 14:40)  HR: 87 (13 Jul 2020 05:25) (83 - 87)  BP: 110/72 (13 Jul 2020 05:25) (104/64 - 130/68)  BP(mean): --  RR: 18 (13 Jul 2020 05:25) (17 - 18)  SpO2: 99% (13 Jul 2020 05:25) (97% - 99%)    ________________________________________________  PHYSICAL EXAM:  GENERAL: NAD  HEENT: Normocephalic;  conjunctivae and sclerae clear; moist mucous membranes;   NECK : supple  CHEST/LUNG: Clear to auscultation bilaterally with good air entry   HEART: S1 S2  regular; no murmurs, gallops or rubs  ABDOMEN: +PEG, Soft, Nontender, Nondistended; Bowel sounds present  EXTREMITIES: no cyanosis; no edema; no calf tenderness  SKIN: warm and dry; no rash  NERVOUS SYSTEM:  Awake and alert x0  _________________________________________________  LABS:    07-13    142  |  106  |  21<H>  ----------------------------<  181<H>  3.9   |  30  |  0.79    Ca    9.2      13 Jul 2020 07:32      CAPILLARY BLOOD GLUCOSE    POCT Blood Glucose.: 257 mg/dL (13 Jul 2020 12:41)  POCT Blood Glucose.: 234 mg/dL (13 Jul 2020 11:25)  POCT Blood Glucose.: 167 mg/dL (13 Jul 2020 05:55)  POCT Blood Glucose.: 162 mg/dL (12 Jul 2020 23:59)  POCT Blood Glucose.: 260 mg/dL (12 Jul 2020 17:40)        RADIOLOGY & ADDITIONAL TESTS:    Imaging  Reviewed:  YES/NO    Consultant(s) Notes Reviewed:   YES/ No      Plan of care was discussed with patient and /or primary care giver; all questions and concerns were addressed

## 2020-07-13 NOTE — PROGRESS NOTE ADULT - SUBJECTIVE AND OBJECTIVE BOX
CARDIOLOGY/MEDICAL ATTENDING    CHIEF COMPLAINT:Patient is a 64y old  Male who presents with a chief complaint of Hypernatremia. Pt appears comfortable.    	  REVIEW OF SYSTEMS:  unable to obtain    PHYSICAL EXAM:  T(C): 36.3 (07-13-20 @ 05:25), Max: 36.3 (07-12-20 @ 14:40)  HR: 87 (07-13-20 @ 05:25) (83 - 87)  BP: 110/72 (07-13-20 @ 05:25) (104/64 - 130/68)  RR: 18 (07-13-20 @ 05:25) (17 - 18)  SpO2: 99% (07-13-20 @ 05:25) (97% - 99%)  Wt(kg): --  I&O's Summary      Appearance: Normal	  HEENT:   Normal oral mucosa, PERRL, EOMI	  Lymphatic: No lymphadenopathy  Cardiovascular: Normal S1 S2, No JVD, No murmurs, No edema  Respiratory: Lungs clear to auscultation	  Gastrointestinal:  Soft, Non-tender, + BS	  Skin: No rashes, No ecchymoses, No cyanosis	  Extremities: Normal range of motion, No clubbing, cyanosis or edema  Vascular: Peripheral pulses palpable 2+ bilaterally    MEDICATIONS  (STANDING):  cholecalciferol 1000 Unit(s) Oral daily  Dakins Solution - 1/4 Strength 1 Application(s) Topical daily  dextrose 5%. 1000 milliLiter(s) (50 mL/Hr) IV Continuous <Continuous>  dextrose 5%. 1000 milliLiter(s) (50 mL/Hr) IV Continuous <Continuous>  dextrose 50% Injectable 12.5 Gram(s) IV Push once  dextrose 50% Injectable 25 Gram(s) IV Push once  dextrose 50% Injectable 25 Gram(s) IV Push once  famotidine    Tablet 20 milliGRAM(s) Oral two times a day  heparin   Injectable 5000 Unit(s) SubCutaneous every 12 hours  insulin glargine Injectable (LANTUS) 10 Unit(s) SubCutaneous every morning  insulin lispro (HumaLOG) corrective regimen sliding scale   SubCutaneous every 6 hours  mirtazapine 7.5 milliGRAM(s) Oral at bedtime  multivitamin/minerals 1 Tablet(s) Oral daily  OLANZapine 5 milliGRAM(s) Oral daily  QUEtiapine 25 milliGRAM(s) Oral at bedtime      	  	  LABS:	 	    07-13    142  |  106  |  21<H>  ----------------------------<  181<H>  3.9   |  30  |  0.79    Ca    9.2      13 Jul 2020 07:32      Lipid Profile: Cholesterol 196    HDL 50        TSH: Thyroid Stimulating Hormone, Serum: 2.63 uU/mL (07-09 @ 07:02)

## 2020-07-14 LAB
GLUCOSE BLDC GLUCOMTR-MCNC: 175 MG/DL — HIGH (ref 70–99)
GLUCOSE BLDC GLUCOMTR-MCNC: 180 MG/DL — HIGH (ref 70–99)
GLUCOSE BLDC GLUCOMTR-MCNC: 180 MG/DL — HIGH (ref 70–99)
GLUCOSE BLDC GLUCOMTR-MCNC: 214 MG/DL — HIGH (ref 70–99)
GLUCOSE BLDC GLUCOMTR-MCNC: 225 MG/DL — HIGH (ref 70–99)
GLUCOSE BLDC GLUCOMTR-MCNC: 241 MG/DL — HIGH (ref 70–99)

## 2020-07-14 NOTE — PROGRESS NOTE ADULT - SUBJECTIVE AND OBJECTIVE BOX
CHIEF COMPLAINT:Patient is a 64y old  Male who presents with a chief complaint of Hypernatremia .Pt appears comfortable.    	  REVIEW OF SYSTEMS:  unable to obtain        PHYSICAL EXAM:  T(C): 36.3 (07-14-20 @ 05:59), Max: 36.5 (07-13-20 @ 20:55)  HR: 77 (07-14-20 @ 05:59) (77 - 86)  BP: 111/77 (07-14-20 @ 05:59) (87/61 - 118/77)  RR: 18 (07-14-20 @ 05:59) (16 - 18)  SpO2: 98% (07-14-20 @ 05:59) (95% - 98%)        Appearance: Normal	  HEENT:   Normal oral mucosa, PERRL, EOMI	  Lymphatic: No lymphadenopathy  Cardiovascular: Normal S1 S2, No JVD, No murmurs, No edema  Respiratory: Lungs clear to auscultation	  Gastrointestinal:  Soft, Non-tender, + BS	  Skin: No rashes, No ecchymoses, No cyanosis	  Extremities: Normal range of motion, No clubbing, cyanosis or edema  Vascular: Peripheral pulses palpable 2+ bilaterally    MEDICATIONS  (STANDING):  cholecalciferol 1000 Unit(s) Oral daily  Dakins Solution - 1/4 Strength 1 Application(s) Topical daily  dextrose 5%. 1000 milliLiter(s) (50 mL/Hr) IV Continuous <Continuous>  dextrose 5%. 1000 milliLiter(s) (50 mL/Hr) IV Continuous <Continuous>  dextrose 50% Injectable 12.5 Gram(s) IV Push once  dextrose 50% Injectable 25 Gram(s) IV Push once  dextrose 50% Injectable 25 Gram(s) IV Push once  famotidine    Tablet 20 milliGRAM(s) Oral two times a day  heparin   Injectable 5000 Unit(s) SubCutaneous every 12 hours  insulin glargine Injectable (LANTUS) 10 Unit(s) SubCutaneous every morning  insulin lispro (HumaLOG) corrective regimen sliding scale   SubCutaneous every 6 hours  mirtazapine 7.5 milliGRAM(s) Oral at bedtime  multivitamin/minerals 1 Tablet(s) Oral daily  OLANZapine 5 milliGRAM(s) Oral daily  QUEtiapine 25 milliGRAM(s) Oral at bedtime    	  	  LABS:	 	    07-13    142  |  106  |  21<H>  ----------------------------<  181<H>  3.9   |  30  |  0.79    Ca    9.2      13 Jul 2020 07:32        Lipid Profile: Cholesterol 196    HDL 50        TSH: Thyroid Stimulating Hormone, Serum: 2.63 uU/mL (07-09 @ 07:02)      	    COVID-19 PCR . (07.13.20 @ 11:16)    COVID-19 PCR: Detected: This test has been validated by [employer] to be accurate;  though it has not been FDA cleared/approved by the usual pathway  As with all laboratory test, results should be correlated with clinical  findings.  https://www.fda.gov/media/462102/download  https://www.fda.gov/media/077484/download

## 2020-07-14 NOTE — PROGRESS NOTE ADULT - SUBJECTIVE AND OBJECTIVE BOX
NP Note discussed with  Primary Attending    Patient is a 64y old  Male who presents with a chief complaint of Hypernatremia (14 Jul 2020 11:09)    HPI- 63 yo M from [facility] assisted living [facility] IDDM, dementia, Pressure ulcer stage 4, GERD, S/p peg placement on 06/02/2020 for FTT 2/2 to dysphagia and poor oral intake, related to changes in mental status. Patient was found to have redness around peg tube with slight serosanguineous discharge and was sent to hospital to evaluate for possible infection. Patient is AAOX0 , history is based on AL papers. Abdominal CT scan which showed PEG tube in place without obstruction . Patient found to have Hypernatremia 154 and high lactate 2.8. lactate improved s/p 2 liters bolus NS. Admitted for hypernatremia, now resolved. Pt with previous COVID infection in April, positive antibodies. PCR now positive. Next COVID 19 will be tested on 7/16.     INTERVAL HPI/OVERNIGHT EVENTS: no new complaints    MEDICATIONS  (STANDING):  cholecalciferol 1000 Unit(s) Oral daily  Dakins Solution - 1/4 Strength 1 Application(s) Topical daily  dextrose 5%. 1000 milliLiter(s) (50 mL/Hr) IV Continuous <Continuous>  dextrose 5%. 1000 milliLiter(s) (50 mL/Hr) IV Continuous <Continuous>  dextrose 50% Injectable 12.5 Gram(s) IV Push once  dextrose 50% Injectable 25 Gram(s) IV Push once  dextrose 50% Injectable 25 Gram(s) IV Push once  famotidine    Tablet 20 milliGRAM(s) Oral two times a day  heparin   Injectable 5000 Unit(s) SubCutaneous every 12 hours  insulin glargine Injectable (LANTUS) 10 Unit(s) SubCutaneous every morning  insulin lispro (HumaLOG) corrective regimen sliding scale   SubCutaneous every 6 hours  mirtazapine 7.5 milliGRAM(s) Oral at bedtime  multivitamin/minerals 1 Tablet(s) Oral daily  OLANZapine 5 milliGRAM(s) Oral daily  QUEtiapine 25 milliGRAM(s) Oral at bedtime    MEDICATIONS  (PRN):  acetaminophen    Suspension .. 649.92 milliGRAM(s) Oral every 6 hours PRN Mild Pain (1 - 3), Moderate Pain (4 - 6)  dextrose 40% Gel 15 Gram(s) Oral once PRN Blood Glucose LESS THAN 70 milliGRAM(s)/deciliter  glucagon  Injectable 1 milliGRAM(s) IntraMuscular once PRN Glucose LESS THAN 70 milligrams/deciliter      __________________________________________________  REVIEW OF SYSTEMS:  unable to assess ROS due to impaired cognition.  no facial grimacing       Vital Signs Last 24 Hrs  T(C): 36.2 (14 Jul 2020 14:54), Max: 36.5 (13 Jul 2020 20:55)  T(F): 97.1 (14 Jul 2020 14:54), Max: 97.7 (13 Jul 2020 20:55)  HR: 70 (14 Jul 2020 14:54) (70 - 86)  BP: 118/61 (14 Jul 2020 14:54) (87/61 - 118/61)  BP(mean): --  RR: 16 (14 Jul 2020 14:54) (16 - 18)  SpO2: 97% (14 Jul 2020 14:54) (97% - 98%)    ________________________________________________  PHYSICAL EXAM:  GENERAL: NAD, non verbal  HEENT: Normocephalic;  conjunctivae and sclerae clear; moist mucous membranes;   NECK : supple  CHEST/LUNG: Clear to auscultation bilaterally with good air entry   HEART: S1 S2  regular; no murmurs, gallops or rubs  ABDOMEN: Soft, Nontender, Nondistended; Bowel sounds present, + Peg tube  EXTREMITIES: no cyanosis; no edema; no calf tenderness  SKIN: warm and dry; no rash  NERVOUS SYSTEM:  Awake and alert; Oriented  to self     _________________________________________________  LABS:    07-13    142  |  106  |  21<H>  ----------------------------<  181<H>  3.9   |  30  |  0.79    Ca    9.2      13 Jul 2020 07:32          CAPILLARY BLOOD GLUCOSE      POCT Blood Glucose.: 180 mg/dL (14 Jul 2020 11:56)  POCT Blood Glucose.: 180 mg/dL (14 Jul 2020 08:53)  POCT Blood Glucose.: 175 mg/dL (14 Jul 2020 06:05)  POCT Blood Glucose.: 225 mg/dL (14 Jul 2020 00:04)  POCT Blood Glucose.: 205 mg/dL (13 Jul 2020 21:12)        RADIOLOGY & ADDITIONAL TESTS:    Imaging  Reviewed:  YES  < from: CT Abdomen and Pelvis w/ IV Cont (07.08.20 @ 17:35) >    EXAM:  CT ABDOMEN AND PELVIS IC                            PROCEDURE DATE:  07/08/2020          INTERPRETATION:  CLINICAL INFORMATION: Rule out infection    COMPARISON: None.    PROCEDURE:   CT of the Abdomen and Pelvis was performed with intravenous contrast.   Intravenous contrast: 90 ml Omnipaque 350. 10 ml discarded.  Oral contrast: positive contrast was administered.  Sagittal and coronal reformats were performed.    FINDINGS:  LOWER CHEST: Within normal limits.    LIVER: Within normal limits.  BILE DUCTS: Normal caliber.  GALLBLADDER: Within normal limits.  SPLEEN: Within normal limits.  PANCREAS: Within normal limits.  ADRENALS: Within normal limits.  KIDNEYS/URETERS: Within normal limits.    BLADDER: Within normal limits.  REPRODUCTIVE ORGANS: Prostate is enlarged.    BOWEL: Gastrostomy tube in place. No bowel obstruction. Appendix is not visualized.  PERITONEUM: No ascites.  VESSELS: Mild atherosclerotic calcification.  RETROPERITONEUM/LYMPH NODES: No lymphadenopathy.    ABDOMINAL WALL: Within normal limits.  BONES: Degenerative changes. Minimal anterior listhesis of L4 on L5.    IMPRESSION:   No intra-abdominal abnormality to elucidate the presenting symptoms.                  TOM DAMON M.D., ATTENDING RADIOLOGIST  This document has been electronically signed. Jul 8 2020  6:11PM                < end of copied text >        Consultant(s) Notes Reviewed:   YES      Plan of care was discussed with patient and /or primary care giver; all questions and concerns were addressed

## 2020-07-15 LAB
ALBUMIN SERPL ELPH-MCNC: 2.6 G/DL — LOW (ref 3.5–5)
ALP SERPL-CCNC: 108 U/L — SIGNIFICANT CHANGE UP (ref 40–120)
ALT FLD-CCNC: 28 U/L DA — SIGNIFICANT CHANGE UP (ref 10–60)
ANION GAP SERPL CALC-SCNC: 6 MMOL/L — SIGNIFICANT CHANGE UP (ref 5–17)
AST SERPL-CCNC: 22 U/L — SIGNIFICANT CHANGE UP (ref 10–40)
BASOPHILS # BLD AUTO: 0.02 K/UL — SIGNIFICANT CHANGE UP (ref 0–0.2)
BASOPHILS NFR BLD AUTO: 0.3 % — SIGNIFICANT CHANGE UP (ref 0–2)
BILIRUB SERPL-MCNC: 0.3 MG/DL — SIGNIFICANT CHANGE UP (ref 0.2–1.2)
BUN SERPL-MCNC: 23 MG/DL — HIGH (ref 7–18)
CALCIUM SERPL-MCNC: 9 MG/DL — SIGNIFICANT CHANGE UP (ref 8.4–10.5)
CHLORIDE SERPL-SCNC: 104 MMOL/L — SIGNIFICANT CHANGE UP (ref 96–108)
CO2 SERPL-SCNC: 29 MMOL/L — SIGNIFICANT CHANGE UP (ref 22–31)
CREAT SERPL-MCNC: 0.74 MG/DL — SIGNIFICANT CHANGE UP (ref 0.5–1.3)
EOSINOPHIL # BLD AUTO: 0.04 K/UL — SIGNIFICANT CHANGE UP (ref 0–0.5)
EOSINOPHIL NFR BLD AUTO: 0.5 % — SIGNIFICANT CHANGE UP (ref 0–6)
GLUCOSE BLDC GLUCOMTR-MCNC: 183 MG/DL — HIGH (ref 70–99)
GLUCOSE BLDC GLUCOMTR-MCNC: 183 MG/DL — HIGH (ref 70–99)
GLUCOSE BLDC GLUCOMTR-MCNC: 205 MG/DL — HIGH (ref 70–99)
GLUCOSE BLDC GLUCOMTR-MCNC: 213 MG/DL — HIGH (ref 70–99)
GLUCOSE SERPL-MCNC: 184 MG/DL — HIGH (ref 70–99)
HCT VFR BLD CALC: 34.3 % — LOW (ref 39–50)
HGB BLD-MCNC: 11.2 G/DL — LOW (ref 13–17)
IMM GRANULOCYTES NFR BLD AUTO: 0.4 % — SIGNIFICANT CHANGE UP (ref 0–1.5)
LYMPHOCYTES # BLD AUTO: 0.8 K/UL — LOW (ref 1–3.3)
LYMPHOCYTES # BLD AUTO: 10.7 % — LOW (ref 13–44)
MCHC RBC-ENTMCNC: 28.3 PG — SIGNIFICANT CHANGE UP (ref 27–34)
MCHC RBC-ENTMCNC: 32.7 GM/DL — SIGNIFICANT CHANGE UP (ref 32–36)
MCV RBC AUTO: 86.6 FL — SIGNIFICANT CHANGE UP (ref 80–100)
MONOCYTES # BLD AUTO: 0.53 K/UL — SIGNIFICANT CHANGE UP (ref 0–0.9)
MONOCYTES NFR BLD AUTO: 7.1 % — SIGNIFICANT CHANGE UP (ref 2–14)
NEUTROPHILS # BLD AUTO: 6.03 K/UL — SIGNIFICANT CHANGE UP (ref 1.8–7.4)
NEUTROPHILS NFR BLD AUTO: 81 % — HIGH (ref 43–77)
NRBC # BLD: 0 /100 WBCS — SIGNIFICANT CHANGE UP (ref 0–0)
PLATELET # BLD AUTO: 272 K/UL — SIGNIFICANT CHANGE UP (ref 150–400)
POTASSIUM SERPL-MCNC: 3.9 MMOL/L — SIGNIFICANT CHANGE UP (ref 3.5–5.3)
POTASSIUM SERPL-SCNC: 3.9 MMOL/L — SIGNIFICANT CHANGE UP (ref 3.5–5.3)
PROT SERPL-MCNC: 7.6 G/DL — SIGNIFICANT CHANGE UP (ref 6–8.3)
RBC # BLD: 3.96 M/UL — LOW (ref 4.2–5.8)
RBC # FLD: 13 % — SIGNIFICANT CHANGE UP (ref 10.3–14.5)
SODIUM SERPL-SCNC: 139 MMOL/L — SIGNIFICANT CHANGE UP (ref 135–145)
WBC # BLD: 7.45 K/UL — SIGNIFICANT CHANGE UP (ref 3.8–10.5)
WBC # FLD AUTO: 7.45 K/UL — SIGNIFICANT CHANGE UP (ref 3.8–10.5)

## 2020-07-15 NOTE — PROGRESS NOTE ADULT - SUBJECTIVE AND OBJECTIVE BOX
CARDIOLOGY/MEDICAL ATTENDING    CHIEF COMPLAINT:Patient is a 64y old  Male who presents with a chief complaint of Hypernatremia. Pt appears comfortable.    	  REVIEW OF SYSTEMS:  [ x] Unable to obtain    PHYSICAL EXAM:  T(C): 36.3 (07-15-20 @ 05:20), Max: 36.3 (07-14-20 @ 21:05)  HR: 73 (07-15-20 @ 05:20) (70 - 78)  BP: 102/71 (07-15-20 @ 05:20) (102/71 - 118/61)  RR: 18 (07-15-20 @ 05:20) (16 - 18)  SpO2: 98% (07-15-20 @ 05:20) (97% - 99%)  Wt(kg): --  I&O's Summary      Appearance: Normal	  HEENT:   Normal oral mucosa, PERRL, EOMI	  Lymphatic: No lymphadenopathy  Cardiovascular: Normal S1 S2, No JVD, No murmurs, No edema  Respiratory: Lungs clear to auscultation	  Gastrointestinal:  Soft, Non-tender, + BS	  Skin: No rashes, No ecchymoses, No cyanosis	  Extremities: Normal range of motion, No clubbing, cyanosis or edema  Vascular: Peripheral pulses palpable 2+ bilaterally    MEDICATIONS  (STANDING):  cholecalciferol 1000 Unit(s) Oral daily  Dakins Solution - 1/4 Strength 1 Application(s) Topical daily  dextrose 5%. 1000 milliLiter(s) (50 mL/Hr) IV Continuous <Continuous>  dextrose 5%. 1000 milliLiter(s) (50 mL/Hr) IV Continuous <Continuous>  dextrose 50% Injectable 12.5 Gram(s) IV Push once  dextrose 50% Injectable 25 Gram(s) IV Push once  dextrose 50% Injectable 25 Gram(s) IV Push once  famotidine    Tablet 20 milliGRAM(s) Oral two times a day  heparin   Injectable 5000 Unit(s) SubCutaneous every 12 hours  insulin glargine Injectable (LANTUS) 10 Unit(s) SubCutaneous every morning  insulin lispro (HumaLOG) corrective regimen sliding scale   SubCutaneous every 6 hours  mirtazapine 7.5 milliGRAM(s) Oral at bedtime  multivitamin/minerals 1 Tablet(s) Oral daily  OLANZapine 5 milliGRAM(s) Oral daily  QUEtiapine 25 milliGRAM(s) Oral at bedtime        LABS:	 	                        11.2   7.45  )-----------( 272      ( 15 Jul 2020 08:08 )             34.3     07-15    139  |  104  |  23<H>  ----------------------------<  184<H>  3.9   |  29  |  0.74    Ca    9.0      15 Jul 2020 08:08    TPro  7.6  /  Alb  2.6<L>  /  TBili  0.3  /  DBili  x   /  AST  22  /  ALT  28  /  AlkPhos  108  07-15      Lipid Profile: Cholesterol 196    HDL 50        TSH: Thyroid Stimulating Hormone, Serum: 2.63 uU/mL (07-09 @ 07:02)

## 2020-07-15 NOTE — PROGRESS NOTE ADULT - SUBJECTIVE AND OBJECTIVE BOX
NP Note discussed with  Primary Attending    65 yo M from [facility] IDDM, dementia, Pressure ulcer stage 4, GERD, S/p peg placement on 06/02/2020 for FTT 2/2 to dysphagia and poor oral intake, related to changes in mental status. Patient was found to have redness around peg tube with slight serosanguineous discharge and was sent to hospital to evaluate for possible infection. Patient is AAOX0 , history is based on AL papers. Abdominal CT scan which showed PEG tube in place without obstruction . Patient found to have Hypernatremia 154 and high lactate 2.8. lactate improved s/p 2 liters bolus NS. Admitted for hypernatremia, now resolved. Pt with previous COVID infection in April, positive antibodies. PCR now positive. Will recheck covid 7/16 AM.     INTERVAL HPI/OVERNIGHT EVENTS: Agitated overnight trying to pull at lines, mittens ordered by night coverage     MEDICATIONS  (STANDING):  cholecalciferol 1000 Unit(s) Oral daily  Dakins Solution - 1/4 Strength 1 Application(s) Topical daily  dextrose 5%. 1000 milliLiter(s) (50 mL/Hr) IV Continuous <Continuous>  dextrose 5%. 1000 milliLiter(s) (50 mL/Hr) IV Continuous <Continuous>  dextrose 50% Injectable 12.5 Gram(s) IV Push once  dextrose 50% Injectable 25 Gram(s) IV Push once  dextrose 50% Injectable 25 Gram(s) IV Push once  famotidine    Tablet 20 milliGRAM(s) Oral two times a day  heparin   Injectable 5000 Unit(s) SubCutaneous every 12 hours  insulin glargine Injectable (LANTUS) 10 Unit(s) SubCutaneous every morning  insulin lispro (HumaLOG) corrective regimen sliding scale   SubCutaneous every 6 hours  mirtazapine 7.5 milliGRAM(s) Oral at bedtime  multivitamin/minerals 1 Tablet(s) Oral daily  OLANZapine 5 milliGRAM(s) Oral daily  QUEtiapine 25 milliGRAM(s) Oral at bedtime    MEDICATIONS  (PRN):  acetaminophen    Suspension .. 649.92 milliGRAM(s) Oral every 6 hours PRN Mild Pain (1 - 3), Moderate Pain (4 - 6)  dextrose 40% Gel 15 Gram(s) Oral once PRN Blood Glucose LESS THAN 70 milliGRAM(s)/deciliter  glucagon  Injectable 1 milliGRAM(s) IntraMuscular once PRN Glucose LESS THAN 70 milligrams/deciliter      __________________________________________________  REVIEW OF SYSTEMS:  limited due to mental status   CONSTITUTIONAL: No fever,   EYES: no acute visual disturbances  NECK: No pain or stiffness  RESPIRATORY: No cough; No shortness of breath  CARDIOVASCULAR: No chest pain, no palpitations  GASTROINTESTINAL: No pain. No nausea or vomiting; No diarrhea   NEUROLOGICAL: No headache or numbness, no tremors  MUSCULOSKELETAL: No joint pain, no muscle pain  GENITOURINARY: no dysuria, no frequency, no hesitancy  PSYCHIATRY: no depression , no anxiety  ALL OTHER  ROS negative        Vital Signs Last 24 Hrs  T(C): 36.3 (15 Jul 2020 05:20), Max: 36.3 (14 Jul 2020 21:05)  T(F): 97.4 (15 Jul 2020 05:20), Max: 97.4 (15 Jul 2020 05:20)  HR: 73 (15 Jul 2020 05:20) (70 - 78)  BP: 102/71 (15 Jul 2020 05:20) (102/71 - 118/61)  BP(mean): --  RR: 18 (15 Jul 2020 05:20) (16 - 18)  SpO2: 98% (15 Jul 2020 05:20) (97% - 99%)    ________________________________________________  PHYSICAL EXAM:  GENERAL: NAD  HEENT: Normocephalic;  conjunctivae and sclerae clear; moist mucous membranes;   NECK : supple  CHEST/LUNG: Clear to auscultation bilaterally with good air entry   HEART: S1 S2  regular; no murmurs, gallops or rubs  ABDOMEN: +PEG, Soft, Nontender, Nondistended; Bowel sounds present  EXTREMITIES: no cyanosis; no edema; no calf tenderness  SKIN: warm and dry; no rash  NERVOUS SYSTEM:  Awake and alert x0    _________________________________________________  LABS:                        11.2   7.45  )-----------( 272      ( 15 Jul 2020 08:08 )             34.3     07-15    139  |  104  |  23<H>  ----------------------------<  184<H>  3.9   |  29  |  0.74    Ca    9.0      15 Jul 2020 08:08    TPro  7.6  /  Alb  2.6<L>  /  TBili  0.3  /  DBili  x   /  AST  22  /  ALT  28  /  AlkPhos  108  07-15        CAPILLARY BLOOD GLUCOSE      POCT Blood Glucose.: 183 mg/dL (15 Jul 2020 06:10)  POCT Blood Glucose.: 205 mg/dL (15 Jul 2020 00:13)  POCT Blood Glucose.: 241 mg/dL (14 Jul 2020 18:36)  POCT Blood Glucose.: 214 mg/dL (14 Jul 2020 17:55)  POCT Blood Glucose.: 180 mg/dL (14 Jul 2020 11:56)    RADIOLOGY & ADDITIONAL TESTS:    Imaging  Reviewed:  YES    Consultant(s) Notes Reviewed:   YES    Plan of care was discussed with patient and /or primary care giver; all questions and concerns were addressed

## 2020-07-15 NOTE — CHART NOTE - NSCHARTNOTEFT_GEN_A_CORE
EVENT:   - HPI:  63 yo M from [facility] assisted living [facility] IDDM, dementia, Pressure ulcer stage 4, GERD, S/p peg placement on 06/02/2020 for FTT 2/2 to dysphagia and poor oral intake, related to changes in mental status. Patient was found to have redness around peg tube with slight serosanguineous discharge and was sent to hospital to evaluate for possible infection. Patient is AAOX0 , history is based on AL papers. On ED patient was found to have hypernatremia.     Ed course : Patient received abdominal CT scan which showed PEG tube in place without obstruction . Patient found to have Hypernatremia 154 and high lactate 2.8. lactate improved s/p 2 liters bolus NS. (08 Jul 2020 19:28)    - Received text page from RN that pt is pulling on his PEG tube & pulling out his stage IV sacral decubitus ulcer dressing out.     OBJECTIVE:  Vital Signs Last 24 Hrs  T(C): 36.3 (14 Jul 2020 21:05), Max: 36.3 (14 Jul 2020 05:59)  T(F): 97.3 (14 Jul 2020 21:05), Max: 97.4 (14 Jul 2020 05:59)  HR: 78 (14 Jul 2020 21:05) (70 - 78)  BP: 103/46 (14 Jul 2020 21:05) (103/46 - 118/61)  BP(mean): --  RR: 16 (14 Jul 2020 21:05) (16 - 18)  SpO2: 99% (14 Jul 2020 21:05) (97% - 99%)    FOCUSED PHYSICAL EXAM:  Neuro: awake, confused.   Cardiovascular: Pulses +2 B/L in lower and upper extremities, HR regular, BP stable, No edema.  Respiratory: Respirations regular, unlabored, breath sounds clear B/L.   GI: Abdomen soft, non-tender, positive bowel sounds.  : no bladder distention noted. No complaints at this time.  Skin: Dry, intact, no bruising, no diaphoresis.    LABS:    07-13    142  |  106  |  21<H>  ----------------------------<  181<H>  3.9   |  30  |  0.79    Ca    9.2      13 Jul 2020 07:32        Assessment/Problem: Pulling on his PEG & his stage IV sacral decubitus ulcer dressing out 2/2 to AMS/confusion???    PLAN:   1. Sky secured mittens ordered  2. Re-evaluate need for mittens  3. Supportive care provided  4. Cont present treatment

## 2020-07-16 LAB
GLUCOSE BLDC GLUCOMTR-MCNC: 140 MG/DL — HIGH (ref 70–99)
GLUCOSE BLDC GLUCOMTR-MCNC: 229 MG/DL — HIGH (ref 70–99)
GLUCOSE BLDC GLUCOMTR-MCNC: 237 MG/DL — HIGH (ref 70–99)
GLUCOSE BLDC GLUCOMTR-MCNC: 242 MG/DL — HIGH (ref 70–99)
SARS-COV-2 RNA SPEC QL NAA+PROBE: SIGNIFICANT CHANGE UP

## 2020-07-16 NOTE — PROGRESS NOTE ADULT - SUBJECTIVE AND OBJECTIVE BOX
NP Note discussed with  Primary Attending    65 yo M from [facility] IDDM, dementia, Pressure ulcer stage 4, GERD, S/p peg placement on 06/02/2020 for FTT 2/2 to dysphagia and poor oral intake, related to changes in mental status. Patient was found to have redness around peg tube with slight serosanguineous discharge and was sent to hospital to evaluate for possible infection. Patient is AAOX0 , history is based on AL papers. Abdominal CT scan which showed PEG tube in place without obstruction . Patient found to have Hypernatremia 154 and high lactate 2.8. lactate improved s/p 2 liters bolus NS. Admitted for hypernatremia, now resolved. Pt with previous COVID infection in April, positive antibodies. PCR now positive. F/U covid sent this AM.     INTERVAL HPI/OVERNIGHT EVENTS: no new complaints    MEDICATIONS  (STANDING):  cholecalciferol 1000 Unit(s) Oral daily  Dakins Solution - 1/4 Strength 1 Application(s) Topical daily  dextrose 5%. 1000 milliLiter(s) (50 mL/Hr) IV Continuous <Continuous>  dextrose 5%. 1000 milliLiter(s) (50 mL/Hr) IV Continuous <Continuous>  dextrose 50% Injectable 12.5 Gram(s) IV Push once  dextrose 50% Injectable 25 Gram(s) IV Push once  dextrose 50% Injectable 25 Gram(s) IV Push once  famotidine    Tablet 20 milliGRAM(s) Oral two times a day  heparin   Injectable 5000 Unit(s) SubCutaneous every 12 hours  insulin glargine Injectable (LANTUS) 10 Unit(s) SubCutaneous every morning  insulin lispro (HumaLOG) corrective regimen sliding scale   SubCutaneous every 6 hours  mirtazapine 7.5 milliGRAM(s) Oral at bedtime  multivitamin/minerals 1 Tablet(s) Oral daily  OLANZapine 5 milliGRAM(s) Oral daily  QUEtiapine 25 milliGRAM(s) Oral at bedtime    MEDICATIONS  (PRN):  acetaminophen    Suspension .. 649.92 milliGRAM(s) Oral every 6 hours PRN Mild Pain (1 - 3), Moderate Pain (4 - 6)  dextrose 40% Gel 15 Gram(s) Oral once PRN Blood Glucose LESS THAN 70 milliGRAM(s)/deciliter  glucagon  Injectable 1 milliGRAM(s) IntraMuscular once PRN Glucose LESS THAN 70 milligrams/deciliter      __________________________________________________  REVIEW OF SYSTEMS:    CONSTITUTIONAL: No fever,   EYES: no acute visual disturbances  NECK: No pain or stiffness  RESPIRATORY: No cough; No shortness of breath  CARDIOVASCULAR: No chest pain, no palpitations  GASTROINTESTINAL: No pain. No nausea or vomiting; No diarrhea   NEUROLOGICAL: No headache or numbness, no tremors  MUSCULOSKELETAL: No joint pain, no muscle pain  GENITOURINARY: no dysuria, no frequency, no hesitancy  PSYCHIATRY: no depression , no anxiety  ALL OTHER  ROS negative        Vital Signs Last 24 Hrs  T(C): 36.7 (16 Jul 2020 05:20), Max: 36.8 (15 Jul 2020 14:00)  T(F): 98 (16 Jul 2020 05:20), Max: 98.3 (15 Jul 2020 14:00)  HR: 76 (16 Jul 2020 05:20) (68 - 89)  BP: 103/64 (16 Jul 2020 05:20) (102/52 - 104/66)  BP(mean): --  RR: 18 (16 Jul 2020 05:20) (18 - 18)  SpO2: 99% (16 Jul 2020 05:20) (98% - 100%)    ________________________________________________  PHYSICAL EXAM:  GENERAL: NAD  HEENT: Normocephalic;  conjunctivae and sclerae clear; moist mucous membranes;   NECK : supple  CHEST/LUNG: Clear to auscultation bilaterally with good air entry   HEART: S1 S2  regular; no murmurs, gallops or rubs  ABDOMEN: +PEG, Soft, Nontender, Nondistended; Bowel sounds present  EXTREMITIES: no cyanosis; no edema; no calf tenderness  SKIN: warm and dry; no rash  NERVOUS SYSTEM:  Awake and alert x0  _________________________________________________  LABS:                        11.2   7.45  )-----------( 272      ( 15 Jul 2020 08:08 )             34.3     07-15    139  |  104  |  23<H>  ----------------------------<  184<H>  3.9   |  29  |  0.74    Ca    9.0      15 Jul 2020 08:08    TPro  7.6  /  Alb  2.6<L>  /  TBili  0.3  /  DBili  x   /  AST  22  /  ALT  28  /  AlkPhos  108  07-15        CAPILLARY BLOOD GLUCOSE      POCT Blood Glucose.: 140 mg/dL (16 Jul 2020 06:28)  POCT Blood Glucose.: 237 mg/dL (16 Jul 2020 00:12)  POCT Blood Glucose.: 213 mg/dL (15 Jul 2020 17:10)  POCT Blood Glucose.: 183 mg/dL (15 Jul 2020 11:45)    RADIOLOGY & ADDITIONAL TESTS:    Imaging  Reviewed:  YES    Consultant(s) Notes Reviewed:   YES      Plan of care was discussed with patient and /or primary care giver; all questions and concerns were addressed

## 2020-07-16 NOTE — PROGRESS NOTE ADULT - PROBLEM SELECTOR PLAN 1
Likely secondary to dehydration, now resolved  Continue free water 250ml q6 via PEG  Does not need daily labs  Labs q3 days

## 2020-07-16 NOTE — PROGRESS NOTE ADULT - SUBJECTIVE AND OBJECTIVE BOX
CARDIOLOGY/MEDICAL ATTENDING    CHIEF COMPLAINT:Patient is a 64y old  Male who presents with a chief complaint of Hypernatremia.Pt appears comfortable.    	  REVIEW OF SYSTEMS:  [x ] Unable to obtain    PHYSICAL EXAM:  T(C): 36.7 (07-16-20 @ 05:20), Max: 36.8 (07-15-20 @ 14:00)  HR: 76 (07-16-20 @ 05:20) (68 - 89)  BP: 103/64 (07-16-20 @ 05:20) (102/52 - 104/66)  RR: 18 (07-16-20 @ 05:20) (18 - 18)  SpO2: 99% (07-16-20 @ 05:20) (98% - 100%)  Wt(kg): --  I&O's Summary      Appearance: Normal	  HEENT:   Normal oral mucosa, PERRL, EOMI	  Lymphatic: No lymphadenopathy  Cardiovascular: Normal S1 S2, No JVD, No murmurs, No edema  Respiratory: Lungs clear to auscultation	  Gastrointestinal:  Soft, Non-tender, + BS	  Skin: No rashes, No ecchymoses, No cyanosis	  Extremities: Normal range of motion, No clubbing, cyanosis or edema  Vascular: Peripheral pulses palpable 2+ bilaterally    MEDICATIONS  (STANDING):  cholecalciferol 1000 Unit(s) Oral daily  Dakins Solution - 1/4 Strength 1 Application(s) Topical daily  dextrose 5%. 1000 milliLiter(s) (50 mL/Hr) IV Continuous <Continuous>  dextrose 5%. 1000 milliLiter(s) (50 mL/Hr) IV Continuous <Continuous>  dextrose 50% Injectable 12.5 Gram(s) IV Push once  dextrose 50% Injectable 25 Gram(s) IV Push once  dextrose 50% Injectable 25 Gram(s) IV Push once  famotidine    Tablet 20 milliGRAM(s) Oral two times a day  heparin   Injectable 5000 Unit(s) SubCutaneous every 12 hours  insulin glargine Injectable (LANTUS) 10 Unit(s) SubCutaneous every morning  insulin lispro (HumaLOG) corrective regimen sliding scale   SubCutaneous every 6 hours  mirtazapine 7.5 milliGRAM(s) Oral at bedtime  multivitamin/minerals 1 Tablet(s) Oral daily  OLANZapine 5 milliGRAM(s) Oral daily  QUEtiapine 25 milliGRAM(s) Oral at bedtime      	  	  LABS:	 	                        11.2   7.45  )-----------( 272      ( 15 Jul 2020 08:08 )             34.3     07-15    139  |  104  |  23<H>  ----------------------------<  184<H>  3.9   |  29  |  0.74    Ca    9.0      15 Jul 2020 08:08    TPro  7.6  /  Alb  2.6<L>  /  TBili  0.3  /  DBili  x   /  AST  22  /  ALT  28  /  AlkPhos  108  07-15      Lipid Profile: Cholesterol 196    HDL 50        TSH: Thyroid Stimulating Hormone, Serum: 2.63 uU/mL (07-09 @ 07:02)

## 2020-07-17 LAB
ANION GAP SERPL CALC-SCNC: 5 MMOL/L — SIGNIFICANT CHANGE UP (ref 5–17)
BUN SERPL-MCNC: 19 MG/DL — HIGH (ref 7–18)
CALCIUM SERPL-MCNC: 9.1 MG/DL — SIGNIFICANT CHANGE UP (ref 8.4–10.5)
CHLORIDE SERPL-SCNC: 104 MMOL/L — SIGNIFICANT CHANGE UP (ref 96–108)
CO2 SERPL-SCNC: 32 MMOL/L — HIGH (ref 22–31)
CREAT SERPL-MCNC: 0.7 MG/DL — SIGNIFICANT CHANGE UP (ref 0.5–1.3)
GLUCOSE BLDC GLUCOMTR-MCNC: 118 MG/DL — HIGH (ref 70–99)
GLUCOSE BLDC GLUCOMTR-MCNC: 193 MG/DL — HIGH (ref 70–99)
GLUCOSE BLDC GLUCOMTR-MCNC: 196 MG/DL — HIGH (ref 70–99)
GLUCOSE BLDC GLUCOMTR-MCNC: 201 MG/DL — HIGH (ref 70–99)
GLUCOSE SERPL-MCNC: 114 MG/DL — HIGH (ref 70–99)
HCT VFR BLD CALC: 33.8 % — LOW (ref 39–50)
HGB BLD-MCNC: 11.2 G/DL — LOW (ref 13–17)
MAGNESIUM SERPL-MCNC: 1.9 MG/DL — SIGNIFICANT CHANGE UP (ref 1.6–2.6)
MCHC RBC-ENTMCNC: 28.2 PG — SIGNIFICANT CHANGE UP (ref 27–34)
MCHC RBC-ENTMCNC: 33.1 GM/DL — SIGNIFICANT CHANGE UP (ref 32–36)
MCV RBC AUTO: 85.1 FL — SIGNIFICANT CHANGE UP (ref 80–100)
NRBC # BLD: 0 /100 WBCS — SIGNIFICANT CHANGE UP (ref 0–0)
PHOSPHATE SERPL-MCNC: 2.8 MG/DL — SIGNIFICANT CHANGE UP (ref 2.5–4.5)
PLATELET # BLD AUTO: 276 K/UL — SIGNIFICANT CHANGE UP (ref 150–400)
POTASSIUM SERPL-MCNC: 3.9 MMOL/L — SIGNIFICANT CHANGE UP (ref 3.5–5.3)
POTASSIUM SERPL-SCNC: 3.9 MMOL/L — SIGNIFICANT CHANGE UP (ref 3.5–5.3)
RBC # BLD: 3.97 M/UL — LOW (ref 4.2–5.8)
RBC # FLD: 13 % — SIGNIFICANT CHANGE UP (ref 10.3–14.5)
SARS-COV-2 RNA SPEC QL NAA+PROBE: DETECTED
SODIUM SERPL-SCNC: 141 MMOL/L — SIGNIFICANT CHANGE UP (ref 135–145)
WBC # BLD: 7.91 K/UL — SIGNIFICANT CHANGE UP (ref 3.8–10.5)
WBC # FLD AUTO: 7.91 K/UL — SIGNIFICANT CHANGE UP (ref 3.8–10.5)

## 2020-07-17 RX ORDER — SODIUM HYPOCHLORITE 0.125 %
1 SOLUTION, NON-ORAL MISCELLANEOUS DAILY
Refills: 0 | Status: DISCONTINUED | OUTPATIENT
Start: 2020-07-17 | End: 2020-07-21

## 2020-07-17 RX ORDER — HEPARIN SODIUM 5000 [USP'U]/ML
5000 INJECTION INTRAVENOUS; SUBCUTANEOUS EVERY 12 HOURS
Refills: 0 | Status: DISCONTINUED | OUTPATIENT
Start: 2020-07-17 | End: 2020-07-21

## 2020-07-17 RX ADMIN — HEPARIN SODIUM 5000 UNIT(S): 5000 INJECTION INTRAVENOUS; SUBCUTANEOUS at 17:01

## 2020-07-17 RX ADMIN — Medication 1 MILLIGRAM(S): at 15:25

## 2020-07-17 RX ADMIN — Medication 1 APPLICATION(S): at 11:53

## 2020-07-17 NOTE — PROVIDER CONTACT NOTE (OTHER) - REASON
Patient is constantly pulling on his PEG tube and becoming increasingly combative when staff works with him.

## 2020-07-17 NOTE — PROGRESS NOTE ADULT - PROBLEM SELECTOR PLAN 1
Likely secondary to dehydration, now resolved, Na 141 today  Continue free water 250ml q6 via PEG  Does not need daily labs  Labs q3 days

## 2020-07-17 NOTE — PROGRESS NOTE ADULT - SUBJECTIVE AND OBJECTIVE BOX
NP Note discussed with  Primary Attending    Patient is a 64y old  Male who presents with a chief complaint of Hypernatremia (17 Jul 2020 09:45)      INTERVAL HPI/OVERNIGHT EVENTS: no new complaints    MEDICATIONS  (STANDING):  cholecalciferol 1000 Unit(s) Oral daily  Dakins Solution - 1/4 Strength 1 Application(s) Topical daily  dextrose 5%. 1000 milliLiter(s) (50 mL/Hr) IV Continuous <Continuous>  dextrose 5%. 1000 milliLiter(s) (50 mL/Hr) IV Continuous <Continuous>  dextrose 50% Injectable 12.5 Gram(s) IV Push once  dextrose 50% Injectable 25 Gram(s) IV Push once  dextrose 50% Injectable 25 Gram(s) IV Push once  famotidine    Tablet 20 milliGRAM(s) Oral two times a day  heparin   Injectable 5000 Unit(s) SubCutaneous every 12 hours  insulin glargine Injectable (LANTUS) 10 Unit(s) SubCutaneous every morning  insulin lispro (HumaLOG) corrective regimen sliding scale   SubCutaneous every 6 hours  mirtazapine 7.5 milliGRAM(s) Oral at bedtime  multivitamin/minerals 1 Tablet(s) Oral daily  OLANZapine 5 milliGRAM(s) Oral daily  QUEtiapine 25 milliGRAM(s) Oral at bedtime    MEDICATIONS  (PRN):  acetaminophen    Suspension .. 649.92 milliGRAM(s) Oral every 6 hours PRN Mild Pain (1 - 3), Moderate Pain (4 - 6)  dextrose 40% Gel 15 Gram(s) Oral once PRN Blood Glucose LESS THAN 70 milliGRAM(s)/deciliter  glucagon  Injectable 1 milliGRAM(s) IntraMuscular once PRN Glucose LESS THAN 70 milligrams/deciliter      __________________________________________________  REVIEW OF SYSTEMS:  Unable to obtain, pt. is minimally verbal         Vital Signs Last 24 Hrs  T(C): 36.3 (17 Jul 2020 04:50), Max: 36.5 (16 Jul 2020 20:49)  T(F): 97.3 (17 Jul 2020 04:50), Max: 97.7 (16 Jul 2020 20:49)  HR: 94 (17 Jul 2020 04:50) (84 - 94)  BP: 97/44 (17 Jul 2020 04:50) (97/44 - 110/75)  BP(mean): --  RR: 18 (17 Jul 2020 04:50) (17 - 18)  SpO2: 97% (17 Jul 2020 04:50) (97% - 98%)    ________________________________________________  PHYSICAL EXAM:  chronically ill appearing, restless at times  GENERAL: NAD  HEENT: Normocephalic;  conjunctivae and sclerae clear; moist mucous membranes;   NECK : supple  CHEST/LUNG: Clear to auscultation bilaterally with good air entry   HEART: S1 S2  regular; no murmurs, gallops or rubs  ABDOMEN: PEG in place, Soft, Nontender, Nondistended; Bowel sounds present  EXTREMITIES: no cyanosis; no edema; no calf tenderness  SKIN: warm and dry; no rash  NERVOUS SYSTEM:  Awake, restless at times, non verbal    _________________________________________________  LABS:                        11.2   7.91  )-----------( 276      ( 17 Jul 2020 06:46 )             33.8     07-17    141  |  104  |  19<H>  ----------------------------<  114<H>  3.9   |  32<H>  |  0.70    Ca    9.1      17 Jul 2020 06:46  Phos  2.8     07-17  Mg     1.9     07-17          CAPILLARY BLOOD GLUCOSE      POCT Blood Glucose.: 193 mg/dL (17 Jul 2020 11:27)  POCT Blood Glucose.: 118 mg/dL (17 Jul 2020 05:59)  POCT Blood Glucose.: 196 mg/dL (16 Jul 2020 23:59)  POCT Blood Glucose.: 242 mg/dL (16 Jul 2020 18:19)        RADIOLOGY & ADDITIONAL TESTS:    COVID-19 PCR . (07.17.20 @ 08:59)    COVID-19 PCR: Detected: This test has been validated by [facility] to be accurate;  though it has not been FDA cleared/approved by the usual pathway  As with all laboratory test, results should be correlated with clinical  findings.  https://www.fda.gov/media/144131/download  https://www.fda.gov/media/710149/download    COVID-19 PCR . (07.16.20 @ 06:41)    COVID-19 PCR: NotDetec: Testing is performed using polymerase chain reaction (PCR) or  transcription mediated amplification (TMA). This COVID-19 (SARS-CoV-2)  nucleic acid amplification test was validated by [employer] [place] and is  in use under the FDA Emergency Use Authorization (EUA) for clinical labs  CLIA-certified to perform high complexity testing. Test results should be  correlated with clinical presentation, patient history, and epidemiology.    COVID-19 PCR . (07.13.20 @ 11:16)    COVID-19 PCR: Detected: This test has been validated by [facility] to be accurate;  though it has not been FDA cleared/approved by the usual pathway  As with all laboratory test, results should be correlated with clinical  findings.  https://www.fda.gov/media/619329/download  https://www.fda.gov/media/468580/download    COVID-19 PCR . (07.08.20 @ 20:27)    COVID-19 PCR: NotDetec: Testing is performed using polymerase chain reaction (PCR) or  transcription mediated amplification (TMA). This COVID-19 (SARS-CoV-2)  nucleic acid amplification test was validated by [employer] [place] and is  in use under the FDA Emergency Use Authorization (EUA) for clinical labs  CLIA-certified to perform high complexity testing. Test results should be  correlated with clinical presentation, patient history, and epidemiology.                  Imaging Personally Reviewed:  YES/NO    Consultant(s) Notes Reviewed:   YES/ No    Care Discussed with Consultants :     Plan of care was discussed with patient and /or primary care giver; all questions and concerns were addressed and care was aligned with patient's wishes.

## 2020-07-17 NOTE — PROGRESS NOTE ADULT - SUBJECTIVE AND OBJECTIVE BOX
CARDIOLOGY/MEDICAL ATTENDING    CHIEF COMPLAINT:Patient is a 64y old  Male who presents with a chief complaint of Hypernatremia.Pt appears comfortable.    	  REVIEW OF SYSTEMS:  unable to obtain        PHYSICAL EXAM:  T(C): 36.3 (07-17-20 @ 04:50), Max: 36.5 (07-16-20 @ 20:49)  HR: 94 (07-17-20 @ 04:50) (84 - 94)  BP: 97/44 (07-17-20 @ 04:50) (97/44 - 110/75)  RR: 18 (07-17-20 @ 04:50) (17 - 18)  SpO2: 97% (07-17-20 @ 04:50) (97% - 98%)  Wt(kg): --  I&O's Summary      Appearance: Normal	  HEENT:   Normal oral mucosa, PERRL, EOMI	  Lymphatic: No lymphadenopathy  Cardiovascular: Normal S1 S2, No JVD, No murmurs, No edema  Respiratory: Lungs clear to auscultation	  Gastrointestinal:  Soft, Non-tender, + BS	  Skin: No rashes, No ecchymoses, No cyanosis	  Extremities: Normal range of motion, No clubbing, cyanosis or edema  Vascular: Peripheral pulses palpable 2+ bilaterally    MEDICATIONS  (STANDING):  cholecalciferol 1000 Unit(s) Oral daily  Dakins Solution - 1/4 Strength 1 Application(s) Topical daily  dextrose 5%. 1000 milliLiter(s) (50 mL/Hr) IV Continuous <Continuous>  dextrose 5%. 1000 milliLiter(s) (50 mL/Hr) IV Continuous <Continuous>  dextrose 50% Injectable 12.5 Gram(s) IV Push once  dextrose 50% Injectable 25 Gram(s) IV Push once  dextrose 50% Injectable 25 Gram(s) IV Push once  famotidine    Tablet 20 milliGRAM(s) Oral two times a day  heparin   Injectable 5000 Unit(s) SubCutaneous every 12 hours  insulin glargine Injectable (LANTUS) 10 Unit(s) SubCutaneous every morning  insulin lispro (HumaLOG) corrective regimen sliding scale   SubCutaneous every 6 hours  mirtazapine 7.5 milliGRAM(s) Oral at bedtime  multivitamin/minerals 1 Tablet(s) Oral daily  OLANZapine 5 milliGRAM(s) Oral daily  QUEtiapine 25 milliGRAM(s) Oral at bedtime        	  LABS:	 	                       11.2   7.91  )-----------( 276      ( 17 Jul 2020 06:46 )             33.8     07-17    141  |  104  |  19<H>  ----------------------------<  114<H>  3.9   |  32<H>  |  0.70    Ca    9.1      17 Jul 2020 06:46  Phos  2.8     07-17  Mg     1.9     07-17        Lipid Profile: Cholesterol 196    HDL 50        TSH: Thyroid Stimulating Hormone, Serum: 2.63 uU/mL (07-09 @ 07:02)      	    COVID-19 PCR . (07.16.20 @ 06:41)    COVID-19 PCR: NotDetec: Testing is performed using polymerase chain reaction (PCR) or  transcription mediated amplification (TMA). This COVID-19 (SARS-CoV-2)  nucleic acid amplification test was validated by [employer] and is  in use under the FDA Emergency Use Authorization (EUA) for clinical labs  CLIA-certified to perform high complexity testing. Test results should be  correlated with clinical presentation, patient history, and epidemiology.

## 2020-07-18 LAB
GLUCOSE BLDC GLUCOMTR-MCNC: 126 MG/DL — HIGH (ref 70–99)
GLUCOSE BLDC GLUCOMTR-MCNC: 153 MG/DL — HIGH (ref 70–99)
GLUCOSE BLDC GLUCOMTR-MCNC: 171 MG/DL — HIGH (ref 70–99)
GLUCOSE BLDC GLUCOMTR-MCNC: 198 MG/DL — HIGH (ref 70–99)

## 2020-07-18 PROCEDURE — 74019 RADEX ABDOMEN 2 VIEWS: CPT | Mod: 26

## 2020-07-18 RX ADMIN — HEPARIN SODIUM 5000 UNIT(S): 5000 INJECTION INTRAVENOUS; SUBCUTANEOUS at 05:32

## 2020-07-18 RX ADMIN — Medication 1 APPLICATION(S): at 12:31

## 2020-07-18 RX ADMIN — HEPARIN SODIUM 5000 UNIT(S): 5000 INJECTION INTRAVENOUS; SUBCUTANEOUS at 18:36

## 2020-07-18 NOTE — PROGRESS NOTE ADULT - SUBJECTIVE AND OBJECTIVE BOX
CARDIOLOGY/MEDICAL ATTENDING    CHIEF COMPLAINT:Patient is a 64y old  Male who presents with a chief complaint of Hypernatremia.Pt appears comfortable.    	  REVIEW OF SYSTEMS:  [x ] Unable to obtain    PHYSICAL EXAM:  T(C): 36.3 (07-18-20 @ 05:41), Max: 36.7 (07-17-20 @ 21:36)  HR: 85 (07-18-20 @ 05:41) (68 - 85)  BP: 92/56 (07-18-20 @ 05:41) (92/56 - 120/61)  RR: 18 (07-18-20 @ 05:41) (16 - 18)  SpO2: 96% (07-18-20 @ 05:41) (96% - 100%)  Wt(kg): --  I&O's Summary      Appearance: Normal	  HEENT:   Normal oral mucosa, PERRL, EOMI	  Lymphatic: No lymphadenopathy  Cardiovascular: Normal S1 S2, No JVD, No murmurs, No edema  Respiratory: Lungs clear to auscultation	  Gastrointestinal:  Soft, Non-tender, + BS	  Skin: No rashes, No ecchymoses, No cyanosis	  Extremities: Normal range of motion, No clubbing, cyanosis or edema  Vascular: Peripheral pulses palpable 2+ bilaterally    MEDICATIONS  (STANDING):  cholecalciferol 1000 Unit(s) Oral daily  Dakins Solution - 1/4 Strength 1 Application(s) Topical daily  dextrose 5%. 1000 milliLiter(s) (50 mL/Hr) IV Continuous <Continuous>  dextrose 5%. 1000 milliLiter(s) (50 mL/Hr) IV Continuous <Continuous>  dextrose 50% Injectable 12.5 Gram(s) IV Push once  dextrose 50% Injectable 25 Gram(s) IV Push once  dextrose 50% Injectable 25 Gram(s) IV Push once  famotidine    Tablet 20 milliGRAM(s) Oral two times a day  heparin   Injectable 5000 Unit(s) SubCutaneous every 12 hours  insulin glargine Injectable (LANTUS) 10 Unit(s) SubCutaneous every morning  insulin lispro (HumaLOG) corrective regimen sliding scale   SubCutaneous every 6 hours  mirtazapine 7.5 milliGRAM(s) Oral at bedtime  multivitamin/minerals 1 Tablet(s) Oral daily  OLANZapine 5 milliGRAM(s) Oral daily  QUEtiapine 25 milliGRAM(s) Oral at bedtime      	  	  LABS:	 	                       11.2   7.91  )-----------( 276      ( 17 Jul 2020 06:46 )             33.8     07-17    141  |  104  |  19<H>  ----------------------------<  114<H>  3.9   |  32<H>  |  0.70    Ca    9.1      17 Jul 2020 06:46  Phos  2.8     07-17  Mg     1.9     07-17        Lipid Profile: Cholesterol 196    HDL 50        TSH: Thyroid Stimulating Hormone, Serum: 2.63 uU/mL (07-09 @ 07:02)      	  EXAM:  XR ABDOMEN 2V                            PROCEDURE DATE:  07/18/2020          INTERPRETATION:    XR ABDOMEN 2 VIEWS    History: PEG tube placement    FINDINGS /   IMPRESSION:  1.  2 images were submitted for review and show contrast instilled into a percutaneous gastrostomy tube.  2.  Collapsed stomach with the gastrostomy tube noted within the gastric fundus  3.  No evidence of antegrade contrast obstruction or leakage.    COVID-19 PCR . (07.17.20 @ 08:59)    COVID-19 PCR: Detected: This test has been validated by [employer] to be accurate;  though it has not been FDA cleared/approved by the usual pathway  As with all laboratory test, results should be correlated with clinical  findings.  https://www.fda.gov/media/269025/download  https://www.fda.gov/media/305990/download

## 2020-07-18 NOTE — CHART NOTE - NSCHARTNOTEFT_GEN_A_CORE
EVENT:  RN called NP and requested a Diet order    SUBJECTIVE:  Pt pulling PEG and abd xray for placement was ordered    OBJECTIVE:  Vital Signs Last 24 Hrs  T(C): 36.3 (18 Jul 2020 05:41), Max: 36.7 (17 Jul 2020 21:36)  T(F): 97.4 (18 Jul 2020 05:41), Max: 98 (17 Jul 2020 21:36)  HR: 85 (18 Jul 2020 05:41) (68 - 85)  BP: 92/56 (18 Jul 2020 05:41) (92/56 - 120/61)  BP(mean): --  RR: 18 (18 Jul 2020 05:41) (16 - 18)  SpO2: 96% (18 Jul 2020 05:41) (96% - 100%)    LABS:                        11.2   7.91  )-----------( 276      ( 17 Jul 2020 06:46 )             33.8     07-17    141  |  104  |  19<H>  ----------------------------<  114<H>  3.9   |  32<H>  |  0.70    Ca    9.1      17 Jul 2020 06:46  Phos  2.8     07-17  Mg     1.9     07-17        IMGAGING:  < from: Xray Abdomen 2 Views (07.18.20 @ 07:17) >      EXAM:  XR ABDOMEN 2V                            PROCEDURE DATE:  07/18/2020          INTERPRETATION:    XR ABDOMEN 2 VIEWS    History: PEG tube placement    FINDINGS /   IMPRESSION:  1.  2 images were submitted for review and show contrast instilled into a percutaneous gastrostomy tube.  2.  Collapsed stomach with the gastrostomy tube noted within the gastric fundus  3.  No evidence of antegrade contrast obstruction or leakage.                  OLIVER LLANES M.D., ATTENDING RADIOLOGIST  This document has been electronically signed. Jul 18 2020  8:28AM    < end of copied text >        ASSESSMENT:  65yo male w/ PEG and active tube feed diet order        PLAN:  Abd xray reviewed and PEG in place.  PEG may be used for diet and meds. EVENT:  RN called NP and requested a diet order    SUBJECTIVE:  Per report, pt pulling at PEG and abd xray for placement was ordered    OBJECTIVE:  Vital Signs Last 24 Hrs  T(C): 36.3 (18 Jul 2020 05:41), Max: 36.7 (17 Jul 2020 21:36)  T(F): 97.4 (18 Jul 2020 05:41), Max: 98 (17 Jul 2020 21:36)  HR: 85 (18 Jul 2020 05:41) (68 - 85)  BP: 92/56 (18 Jul 2020 05:41) (92/56 - 120/61)  RR: 18 (18 Jul 2020 05:41) (16 - 18)  SpO2: 96% (18 Jul 2020 05:41) (96% - 100%)    LABS:                        11.2   7.91  )-----------( 276      ( 17 Jul 2020 06:46 )             33.8     07-17    141  |  104  |  19<H>  ----------------------------<  114<H>  3.9   |  32<H>  |  0.70    Ca    9.1      17 Jul 2020 06:46  Phos  2.8     07-17  Mg     1.9     07-17        IMGAGING:  < from: Xray Abdomen 2 Views (07.18.20 @ 07:17) >      EXAM:  XR ABDOMEN 2V                            PROCEDURE DATE:  07/18/2020          INTERPRETATION:    XR ABDOMEN 2 VIEWS    History: PEG tube placement    FINDINGS /   IMPRESSION:  1.  2 images were submitted for review and show contrast instilled into a percutaneous gastrostomy tube.  2.  Collapsed stomach with the gastrostomy tube noted within the gastric fundus  3.  No evidence of antegrade contrast obstruction or leakage.                  OLIVER LLANES M.D., ATTENDING RADIOLOGIST  This document has been electronically signed. Jul 18 2020  8:28AM    < end of copied text >        ASSESSMENT:  65yo male w/ PEG and active tube feed diet order        PLAN:  Abd xray reviewed and PEG in place.  PEG may be used for diet and meds.

## 2020-07-18 NOTE — CHART NOTE - NSCHARTNOTEFT_GEN_A_CORE
Patient notified around 2am that patient was pulling at his PEG tube. Mittens and abdominal binder placed. Patient seen and examined at bedside, disoriented and agitated, per progress notes has been his baseline this admission. Tube appears to still be in place, will consider contrast study in the morning. Patient notified around 2am that patient was pulling at his PEG tube. Mittens and abdominal binder placed. Patient seen and examined at bedside, disoriented and agitated, per progress notes has been his baseline this admission. Tube appears to still be in place, but RN unable to get return from tube, will obtain X-Ray for placement in the morning.

## 2020-07-18 NOTE — CHART NOTE - NSCHARTNOTEFT_GEN_A_CORE
Reassessment:   64yMalePatient is a 64y old  Male who presents with a chief complaint of Hypernatremia (2020 13:00)  pt awaiting covid neg test 2x to return to NH. pt was pulling on NGT today, x-ray confirmed placement. Pt visited, sleeping with mittens. TF running at 50ml/hr. Per RN will return TF to goal, temporarily reduced after incident today. Per PCA no diarrhea.     Factors impacting intake: [ ] none [ ] nausea  [ ] vomiting [ ] diarrhea [ ] constipation  [ ]chewing problems [ ] swallowing issues  [ ] other:     Diet Presciption: Diet, NPO with Tube Feed:   Tube Feeding Modality: Gastrostomy  Glucerna 1.5 Mihir  Total Volume for 24 Hours (mL): 960  Continuous  Starting Tube Feed Rate {mL per Hour}: 40  Increase Tube Feed Rate by (mL): 10     Every 6 hours  Until Goal Tube Feed Rate (mL per Hour): 60  Tube Feed Duration (in Hours): 16  Tube Feed Start Time: 00:00 (07-10-20 @ 09:53)    Daily Weight in k.5 (15 Jul 2020 05:20)    % Weight Change    Pertinent Medications: MEDICATIONS  (STANDING):  cholecalciferol 1000 Unit(s) Oral daily  Dakins Solution - 1/4 Strength 1 Application(s) Topical daily  dextrose 5%. 1000 milliLiter(s) (50 mL/Hr) IV Continuous <Continuous>  dextrose 5%. 1000 milliLiter(s) (50 mL/Hr) IV Continuous <Continuous>  dextrose 50% Injectable 12.5 Gram(s) IV Push once  dextrose 50% Injectable 25 Gram(s) IV Push once  dextrose 50% Injectable 25 Gram(s) IV Push once  famotidine    Tablet 20 milliGRAM(s) Oral two times a day  heparin   Injectable 5000 Unit(s) SubCutaneous every 12 hours  insulin glargine Injectable (LANTUS) 10 Unit(s) SubCutaneous every morning  insulin lispro (HumaLOG) corrective regimen sliding scale   SubCutaneous every 6 hours  mirtazapine 7.5 milliGRAM(s) Oral at bedtime  multivitamin/minerals 1 Tablet(s) Oral daily  OLANZapine 5 milliGRAM(s) Oral daily  QUEtiapine 25 milliGRAM(s) Oral at bedtime    MEDICATIONS  (PRN):  acetaminophen    Suspension .. 649.92 milliGRAM(s) Oral every 6 hours PRN Mild Pain (1 - 3), Moderate Pain (4 - 6)  dextrose 40% Gel 15 Gram(s) Oral once PRN Blood Glucose LESS THAN 70 milliGRAM(s)/deciliter  glucagon  Injectable 1 milliGRAM(s) IntraMuscular once PRN Glucose LESS THAN 70 milligrams/deciliter    Pertinent Labs:  Na141 mmol/L Glu 114 mg/dL<H> K+ 3.9 mmol/L Cr  0.70 mg/dL BUN 19 mg/dL<H>  Phos 2.8 mg/dL 07-15 Alb 2.6 g/dL<L>  Chol 196 mg/dL  mg/dL HDL 50 mg/dL Trig 147 mg/dL     CAPILLARY BLOOD GLUCOSE      POCT Blood Glucose.: 126 mg/dL (2020 05:47)  POCT Blood Glucose.: 171 mg/dL (2020 02:10)  POCT Blood Glucose.: 201 mg/dL (2020 18:08)  POCT Blood Glucose.: 193 mg/dL (2020 11:27)    Skin: multiple PU    Estimated Needs:   [ X] no change since previous assessment  [ ] recalculated:     Previous Nutrition Diagnosis:   [ ] Inadequate Energy Intake [ ]Inadequate Oral Intake [ ] Excessive Energy Intake   [ ] Underweight [ ] Increased Nutrient Needs [ ] Overweight/Obesity   [ ] Altered GI Function [ ] Unintended Weight Loss [ ] Food & Nutrition Related Knowledge Deficit [ X] Malnutrition -moderate    Nutrition Diagnosis is [X ] ongoing  [ ] resolved [ ] not applicable     New Nutrition Diagnosis: [ ] not applicable       Interventions:   Recommend  [ ] Change Diet To:  [ ] Nutrition Supplement  [X ] Nutrition Support-continue with glucerna 1.5 60ml/hr x16hrs  [X ] Other: advance to goal. Rec vit C, zn supp as feasible for wound healing. Continue MVI/minerals.    Monitoring and Evaluation:   [ ] PO intake [ x ] Tolerance to diet prescription [ x ] weights [ x ] labs[ x ] follow up per protocol  [ ] other:

## 2020-07-19 LAB
GLUCOSE BLDC GLUCOMTR-MCNC: 125 MG/DL — HIGH (ref 70–99)
GLUCOSE BLDC GLUCOMTR-MCNC: 160 MG/DL — HIGH (ref 70–99)
GLUCOSE BLDC GLUCOMTR-MCNC: 176 MG/DL — HIGH (ref 70–99)
GLUCOSE BLDC GLUCOMTR-MCNC: 202 MG/DL — HIGH (ref 70–99)
GLUCOSE BLDC GLUCOMTR-MCNC: 210 MG/DL — HIGH (ref 70–99)

## 2020-07-19 RX ADMIN — HEPARIN SODIUM 5000 UNIT(S): 5000 INJECTION INTRAVENOUS; SUBCUTANEOUS at 06:24

## 2020-07-19 RX ADMIN — HEPARIN SODIUM 5000 UNIT(S): 5000 INJECTION INTRAVENOUS; SUBCUTANEOUS at 18:12

## 2020-07-19 RX ADMIN — Medication 1 APPLICATION(S): at 14:23

## 2020-07-19 NOTE — PROGRESS NOTE ADULT - SUBJECTIVE AND OBJECTIVE BOX
CARDIOLOGY/MEDICAL ATTENDING    CHIEF COMPLAINT:Patient is a 64y old  Male who presents with a chief complaint of Hypernatremia .Pt appears comfortable.    	  REVIEW OF SYSTEMS:  [ x] Unable to obtain    PHYSICAL EXAM:  T(C): 36.6 (07-19-20 @ 05:36), Max: 36.8 (07-18-20 @ 21:15)  HR: 82 (07-19-20 @ 05:36) (68 - 82)  BP: 92/51 (07-19-20 @ 05:36) (92/51 - 110/71)  RR: 17 (07-19-20 @ 05:36) (17 - 17)  SpO2: 100% (07-19-20 @ 05:36) (96% - 100%)        Appearance: Normal	  HEENT:   Normal oral mucosa, PERRL, EOMI	  Lymphatic: No lymphadenopathy  Cardiovascular: Normal S1 S2, No JVD, No murmurs, No edema  Respiratory: Lungs clear to auscultation	  Gastrointestinal:  Soft, Non-tender, + BS	  Skin: No rashes, No ecchymoses, No cyanosis	  Extremities: Normal range of motion, No clubbing, cyanosis or edema  Vascular: Peripheral pulses palpable 2+ bilaterally    MEDICATIONS  (STANDING):  cholecalciferol 1000 Unit(s) Oral daily  Dakins Solution - 1/4 Strength 1 Application(s) Topical daily  dextrose 5%. 1000 milliLiter(s) (50 mL/Hr) IV Continuous <Continuous>  dextrose 5%. 1000 milliLiter(s) (50 mL/Hr) IV Continuous <Continuous>  dextrose 50% Injectable 12.5 Gram(s) IV Push once  dextrose 50% Injectable 25 Gram(s) IV Push once  dextrose 50% Injectable 25 Gram(s) IV Push once  famotidine    Tablet 20 milliGRAM(s) Oral two times a day  heparin   Injectable 5000 Unit(s) SubCutaneous every 12 hours  insulin glargine Injectable (LANTUS) 10 Unit(s) SubCutaneous every morning  insulin lispro (HumaLOG) corrective regimen sliding scale   SubCutaneous every 6 hours  mirtazapine 7.5 milliGRAM(s) Oral at bedtime  multivitamin/minerals 1 Tablet(s) Oral daily  OLANZapine 5 milliGRAM(s) Oral daily  QUEtiapine 25 milliGRAM(s) Oral at bedtime    	  	  LABS:	 	       Lipid Profile: Cholesterol 196    HDL 50        TSH: Thyroid Stimulating Hormone, Serum: 2.63 uU/mL (07-09 @ 07:02)      	        COVID-19 PCR . (07.17.20 @ 08:59)    COVID-19 PCR: Detected: This test has been validated by [employer] to be accurate;  though it has not been FDA cleared/approved by the usual pathway  As with all laboratory test, results should be correlated with clinical  findings.  https://www.fda.gov/media/797932/download  https://www.fda.gov/media/701876/download

## 2020-07-20 LAB
ANION GAP SERPL CALC-SCNC: 7 MMOL/L — SIGNIFICANT CHANGE UP (ref 5–17)
BUN SERPL-MCNC: 20 MG/DL — HIGH (ref 7–18)
CALCIUM SERPL-MCNC: 9.2 MG/DL — SIGNIFICANT CHANGE UP (ref 8.4–10.5)
CHLORIDE SERPL-SCNC: 102 MMOL/L — SIGNIFICANT CHANGE UP (ref 96–108)
CO2 SERPL-SCNC: 30 MMOL/L — SIGNIFICANT CHANGE UP (ref 22–31)
CREAT SERPL-MCNC: 0.78 MG/DL — SIGNIFICANT CHANGE UP (ref 0.5–1.3)
GLUCOSE BLDC GLUCOMTR-MCNC: 183 MG/DL — HIGH (ref 70–99)
GLUCOSE BLDC GLUCOMTR-MCNC: 193 MG/DL — HIGH (ref 70–99)
GLUCOSE BLDC GLUCOMTR-MCNC: 211 MG/DL — HIGH (ref 70–99)
GLUCOSE BLDC GLUCOMTR-MCNC: 253 MG/DL — HIGH (ref 70–99)
GLUCOSE SERPL-MCNC: 160 MG/DL — HIGH (ref 70–99)
HCT VFR BLD CALC: 32.4 % — LOW (ref 39–50)
HGB BLD-MCNC: 10.6 G/DL — LOW (ref 13–17)
MCHC RBC-ENTMCNC: 28.3 PG — SIGNIFICANT CHANGE UP (ref 27–34)
MCHC RBC-ENTMCNC: 32.7 GM/DL — SIGNIFICANT CHANGE UP (ref 32–36)
MCV RBC AUTO: 86.6 FL — SIGNIFICANT CHANGE UP (ref 80–100)
NRBC # BLD: 0 /100 WBCS — SIGNIFICANT CHANGE UP (ref 0–0)
PLATELET # BLD AUTO: 302 K/UL — SIGNIFICANT CHANGE UP (ref 150–400)
POTASSIUM SERPL-MCNC: 4.3 MMOL/L — SIGNIFICANT CHANGE UP (ref 3.5–5.3)
POTASSIUM SERPL-SCNC: 4.3 MMOL/L — SIGNIFICANT CHANGE UP (ref 3.5–5.3)
RBC # BLD: 3.74 M/UL — LOW (ref 4.2–5.8)
RBC # FLD: 13.2 % — SIGNIFICANT CHANGE UP (ref 10.3–14.5)
SARS-COV-2 RNA SPEC QL NAA+PROBE: SIGNIFICANT CHANGE UP
SODIUM SERPL-SCNC: 139 MMOL/L — SIGNIFICANT CHANGE UP (ref 135–145)
WBC # BLD: 6.99 K/UL — SIGNIFICANT CHANGE UP (ref 3.8–10.5)
WBC # FLD AUTO: 6.99 K/UL — SIGNIFICANT CHANGE UP (ref 3.8–10.5)

## 2020-07-20 RX ADMIN — HEPARIN SODIUM 5000 UNIT(S): 5000 INJECTION INTRAVENOUS; SUBCUTANEOUS at 05:00

## 2020-07-20 RX ADMIN — Medication 1 APPLICATION(S): at 13:58

## 2020-07-20 RX ADMIN — HEPARIN SODIUM 5000 UNIT(S): 5000 INJECTION INTRAVENOUS; SUBCUTANEOUS at 18:13

## 2020-07-20 NOTE — PROGRESS NOTE ADULT - PROBLEM SELECTOR PLAN 3
Continue psych meds - Seroquel, Zyprexa and Mirtazapine.
Continue psych meds - Seroquel, Zyprexa and Mirtazapine.  Abdominal binder order to prevent pt from pulling at PEG  Pt does not need IV
Continue psych meds - Seroquel, Zyprexa and Mirtazapine.  Pt agitated overnight, place in mittens  Mittens now discontinued   Abdominal binder order to prevent pt from pulling at PEG  Pt does not need IV
Continue psych meds - Seroquel, Zyprexa and Mirtazapine.  Abdominal binder order to prevent pt from pulling at PEG  Pt does not need IV
Continue psych meds - Seroquel, Zyprexa and Mirtazapine.  Abdominal binder order to prevent pt from pulling at PEG  Pt does not need IV

## 2020-07-20 NOTE — PROGRESS NOTE ADULT - PROBLEM SELECTOR PLAN 6
No need for labs every day, can check q3 days   Pt with previous COVID infection in April, positive antibodies. PCR now positive.  F/U covid 7/16 AM  -if positive, recheck 7/19 AM  -if negative, recheck 7/17 AM  Will likely need two negative results for transfer back to facility    Case management following
No need for labs every day, can check q3 days   Pt with previous COVID infection in April, positive antibodies. PCR now positive.  Will recheck covid 7/16 AM  -if positive, recheck 7/19 AM  -if negative, recheck 7/17 AM  Will likely need two negative results for transfer back to facility    Case management following
Pt with previous COVID infection in April, positive antibodies. PCR now positive.  Will likely need two negative results for transfer back to facility    Case management following
Pt with previous COVID infection in April, positive antibodies. PCR now positive.  Will likely need two negative results for transfer back to facility    Case management following  next COVID test to be done on 7/16.
Pt. for discharge to [facility] pending negative COVID x 2  No need for labs every day, can check q3 days   Pt with previous COVID infection in April, positive antibodies. PCR now positive.   covid 7/16  negative, 7/17 positive  f/u COVID19 PCR 7/20-ordered  -if positive, recheck 7/19 AM  -if negative, recheck 7/17 AM  Will likely need two negative results for transfer back to facility    Case management following
Pt. for discharge to [facility] pending negative COVID x 2  No need for labs every day, can check q3 days   Pt with previous COVID infection in April, positive antibodies. PCR now positive.   covid 7/16  negative, 7/17 positive  COVID19 PCR 7/20-testing  f/u COVID19 PCR 7/20-ordered  -if positive, recheck 7/19 AM  -if negative, recheck 7/17 AM  Will likely need two negative results for transfer back to facility    Case management following

## 2020-07-20 NOTE — PROGRESS NOTE ADULT - PROBLEM SELECTOR PLAN 4
DVT ppx - lovenox

## 2020-07-20 NOTE — ED ADULT NURSE NOTE - DISCHARGE DATE/TIME
23-Jun-2020 06:15 Complex Repair And O-T Advancement Flap Text: The defect edges were debeveled with a #15 scalpel blade.  The primary defect was closed partially with a complex linear closure.  Given the location of the remaining defect, shape of the defect and the proximity to free margins an O-T advancement flap was deemed most appropriate for complete closure of the defect.  Using a sterile surgical marker, an appropriate advancement flap was drawn incorporating the defect and placing the expected incisions within the relaxed skin tension lines where possible.    The area thus outlined was incised deep to adipose tissue with a #15 scalpel blade.  The skin margins were undermined to an appropriate distance in all directions utilizing iris scissors.

## 2020-07-20 NOTE — PROGRESS NOTE ADULT - PROBLEM SELECTOR PLAN 5
Daughter is [facility] - Ailin Sterling [phone])  Full code
Daughter is HCP - Ailin Sterling [phone])  Daughter updated, discussed goals of care. Pt is full code for now, encouraged HCP to consider completing MOLST at some point (not ready during this conversation).
Daughter is HCP - Ailin Sterling [phone])  Daughter updated, discussed goals of care. Pt is full code for now, encouraged HCP to consider completing MOLST at some point (not ready during this conversation).
Daughter is [facility] - Ailin Sterling [phone])  Full code
Daughter is [facility] - Ailin Sterling [phone])  Full code
Daughter is [facility] - Ailin Sterling [phone]  Full code
Daughter is [facility] - Ailin Sterling [phone]  Full code
Daughter is [facility] - Ailin Sterling [phone])  Full code

## 2020-07-20 NOTE — PROGRESS NOTE ADULT - SUBJECTIVE AND OBJECTIVE BOX
NP Note discussed with  Primary Attending    Patient is a 64y old  Male who presents with a chief complaint of Hypernatremia (20 Jul 2020 10:52)      INTERVAL HPI/OVERNIGHT EVENTS: no new complaints    MEDICATIONS  (STANDING):  cholecalciferol 1000 Unit(s) Oral daily  Dakins Solution - 1/4 Strength 1 Application(s) Topical daily  dextrose 5%. 1000 milliLiter(s) (50 mL/Hr) IV Continuous <Continuous>  dextrose 5%. 1000 milliLiter(s) (50 mL/Hr) IV Continuous <Continuous>  dextrose 50% Injectable 12.5 Gram(s) IV Push once  dextrose 50% Injectable 25 Gram(s) IV Push once  dextrose 50% Injectable 25 Gram(s) IV Push once  famotidine    Tablet 20 milliGRAM(s) Oral two times a day  heparin   Injectable 5000 Unit(s) SubCutaneous every 12 hours  insulin glargine Injectable (LANTUS) 10 Unit(s) SubCutaneous every morning  insulin lispro (HumaLOG) corrective regimen sliding scale   SubCutaneous every 6 hours  mirtazapine 7.5 milliGRAM(s) Oral at bedtime  multivitamin/minerals 1 Tablet(s) Oral daily  OLANZapine 5 milliGRAM(s) Oral daily  QUEtiapine 25 milliGRAM(s) Oral at bedtime    MEDICATIONS  (PRN):  acetaminophen    Suspension .. 649.92 milliGRAM(s) Oral every 6 hours PRN Mild Pain (1 - 3), Moderate Pain (4 - 6)  dextrose 40% Gel 15 Gram(s) Oral once PRN Blood Glucose LESS THAN 70 milliGRAM(s)/deciliter  glucagon  Injectable 1 milliGRAM(s) IntraMuscular once PRN Glucose LESS THAN 70 milligrams/deciliter      __________________________________________________  REVIEW OF SYSTEMS:  Unable to obtain, pt. is non verbal      Vital Signs Last 24 Hrs  T(C): 36.8 (20 Jul 2020 14:20), Max: 36.9 (20 Jul 2020 05:10)  T(F): 98.2 (20 Jul 2020 14:20), Max: 98.4 (20 Jul 2020 05:10)  HR: 75 (20 Jul 2020 14:20) (75 - 94)  BP: 101/52 (20 Jul 2020 14:20) (101/52 - 143/75)  BP(mean): --  RR: 17 (20 Jul 2020 14:20) (17 - 17)  SpO2: 100% (20 Jul 2020 14:20) (95% - 100%)    ________________________________________________  PHYSICAL EXAM: chronically ill appearing  GENERAL: NAD  HEENT: Normocephalic;  conjunctivae and sclerae clear; moist mucous membranes;   NECK : supple  CHEST/LUNG: Clear to auscultation bilaterally with good air entry   HEART: S1 S2  regular; no murmurs, gallops or rubs  ABDOMEN: PEG in place, TF ongoing, Soft, Nontender, Nondistended; Bowel sounds present  EXTREMITIES: no cyanosis; no edema; no calf tenderness  SKIN: warm and dry; no rash  NERVOUS SYSTEM:  Awake , restless, non verbal    _________________________________________________  LABS:                        10.6   6.99  )-----------( 302      ( 20 Jul 2020 08:41 )             32.4     07-20    139  |  102  |  20<H>  ----------------------------<  160<H>  4.3   |  30  |  0.78    Ca    9.2      20 Jul 2020 08:41          CAPILLARY BLOOD GLUCOSE      POCT Blood Glucose.: 183 mg/dL (20 Jul 2020 06:05)  POCT Blood Glucose.: 210 mg/dL (19 Jul 2020 23:34)  POCT Blood Glucose.: 202 mg/dL (19 Jul 2020 18:02)        RADIOLOGY & ADDITIONAL TESTS:    Xray Abdomen 2 Views (07.18.20 @ 07:17) >  EXAM:  XR ABDOMEN 2V                            PROCEDURE DATE:  07/18/2020          INTERPRETATION:    XR ABDOMEN 2 VIEWS    History: PEG tube placement    FINDINGS /   IMPRESSION:  1.  2 images were submitted for review and show contrast instilled into a percutaneous gastrostomy tube.  2.  Collapsed stomach with the gastrostomy tube noted within the gastric fundus  3.  No evidence of antegrade contrast obstruction or leakage.    < end of copied text >      Imaging Personally Reviewed:  YES/NO    Consultant(s) Notes Reviewed:   YES/ No    Care Discussed with Consultants :     Plan of care was discussed with patient and /or primary care giver; all questions and concerns were addressed and care was aligned with patient's wishes.

## 2020-07-20 NOTE — PROGRESS NOTE ADULT - SUBJECTIVE AND OBJECTIVE BOX
CARDIOLOGY/MEDICAL ATTENDING    CHIEF COMPLAINT:Patient is a 64y old  Male who presents with a chief complaint of Hypernatremia.Pt appears comfortable.    	  REVIEW OF SYSTEMS:  CONSTITUTIONAL: No fever, weight loss, or fatigue  EYES: No eye pain, visual disturbances, or discharge  ENT:  No difficulty hearing, tinnitus, vertigo; No sinus or throat pain  NECK: No pain or stiffness  RESPIRATORY: No cough, wheezing, chills or hemoptysis; No Shortness of Breath  CARDIOVASCULAR: No chest pain, palpitations, passing out, dizziness, or leg swelling  GASTROINTESTINAL: No abdominal or epigastric pain. No nausea, vomiting, or hematemesis; No diarrhea or constipation. No melena or hematochezia.  GENITOURINARY: No dysuria, frequency, hematuria, or incontinence  NEUROLOGICAL: No headaches, memory loss, loss of strength, numbness, or tremors  SKIN: No itching, burning, rashes, or lesions   LYMPH Nodes: No enlarged glands  ENDOCRINE: No heat or cold intolerance; No hair loss  MUSCULOSKELETAL: No joint pain or swelling; No muscle, back, or extremity pain  PSYCHIATRIC: No depression, anxiety, mood swings, or difficulty sleeping  HEME/LYMPH: No easy bruising, or bleeding gums  ALLERGY AND IMMUNOLOGIC: No hives or eczema	      PHYSICAL EXAM:  T(C): 36.9 (07-20-20 @ 05:10), Max: 36.9 (07-20-20 @ 05:10)  HR: 94 (07-20-20 @ 05:10) (70 - 94)  BP: 143/75 (07-20-20 @ 05:10) (100/64 - 143/75)  RR: 17 (07-20-20 @ 05:10) (17 - 17)  SpO2: 95% (07-20-20 @ 05:10) (95% - 100%)  Wt(kg): --  I&O's Summary      Appearance: Normal	  HEENT:   Normal oral mucosa, PERRL, EOMI	  Lymphatic: No lymphadenopathy  Cardiovascular: Normal S1 S2, No JVD, No murmurs, No edema  Respiratory: Lungs clear to auscultation	  Gastrointestinal:  Soft, Non-tender, + BS	  Skin: No rashes, No ecchymoses, No cyanosis	  Extremities: Normal range of motion, No clubbing, cyanosis or edema  Vascular: Peripheral pulses palpable 2+ bilaterally    MEDICATIONS  (STANDING):  cholecalciferol 1000 Unit(s) Oral daily  Dakins Solution - 1/4 Strength 1 Application(s) Topical daily  dextrose 5%. 1000 milliLiter(s) (50 mL/Hr) IV Continuous <Continuous>  dextrose 5%. 1000 milliLiter(s) (50 mL/Hr) IV Continuous <Continuous>  dextrose 50% Injectable 12.5 Gram(s) IV Push once  dextrose 50% Injectable 25 Gram(s) IV Push once  dextrose 50% Injectable 25 Gram(s) IV Push once  famotidine    Tablet 20 milliGRAM(s) Oral two times a day  heparin   Injectable 5000 Unit(s) SubCutaneous every 12 hours  insulin glargine Injectable (LANTUS) 10 Unit(s) SubCutaneous every morning  insulin lispro (HumaLOG) corrective regimen sliding scale   SubCutaneous every 6 hours  mirtazapine 7.5 milliGRAM(s) Oral at bedtime  multivitamin/minerals 1 Tablet(s) Oral daily  OLANZapine 5 milliGRAM(s) Oral daily  QUEtiapine 25 milliGRAM(s) Oral at bedtime    	  	  LABS:	 	                       10.6   6.99  )-----------( 302      ( 20 Jul 2020 08:41 )             32.4     07-20    139  |  102  |  20<H>  ----------------------------<  160<H>  4.3   |  30  |  0.78    Ca    9.2      20 Jul 2020 08:41      Lipid Profile: Cholesterol 196    HDL 50        TSH: Thyroid Stimulating Hormone, Serum: 2.63 uU/mL (07-09 @ 07:02)

## 2020-07-20 NOTE — PROGRESS NOTE ADULT - PROBLEM SELECTOR PROBLEM 1
Hypernatremia

## 2020-07-20 NOTE — PROGRESS NOTE ADULT - PROBLEM SELECTOR PLAN 2
A1C 6.9   Continue accuchecks and sliding scale q6   On lantus 20 units at facility, continue lantus 10 units, increase as needed

## 2020-07-20 NOTE — PROGRESS NOTE ADULT - PROBLEM SELECTOR PLAN 1
Likely secondary to dehydration, now resolved, Na 139 today  Continue free water 250ml q6 via PEG  Does not need daily labs  Labs q3 days

## 2020-07-21 ENCOUNTER — TRANSCRIPTION ENCOUNTER (OUTPATIENT)
Age: 65
End: 2020-07-21

## 2020-07-21 VITALS
SYSTOLIC BLOOD PRESSURE: 97 MMHG | TEMPERATURE: 97 F | OXYGEN SATURATION: 98 % | DIASTOLIC BLOOD PRESSURE: 57 MMHG | RESPIRATION RATE: 16 BRPM | HEART RATE: 75 BPM

## 2020-07-21 LAB
GLUCOSE BLDC GLUCOMTR-MCNC: 151 MG/DL — HIGH (ref 70–99)
SARS-COV-2 RNA SPEC QL NAA+PROBE: SIGNIFICANT CHANGE UP

## 2020-07-21 PROCEDURE — 87635 SARS-COV-2 COVID-19 AMP PRB: CPT

## 2020-07-21 PROCEDURE — 80048 BASIC METABOLIC PNL TOTAL CA: CPT

## 2020-07-21 PROCEDURE — 74177 CT ABD & PELVIS W/CONTRAST: CPT

## 2020-07-21 PROCEDURE — 99285 EMERGENCY DEPT VISIT HI MDM: CPT | Mod: 25

## 2020-07-21 PROCEDURE — 82306 VITAMIN D 25 HYDROXY: CPT

## 2020-07-21 PROCEDURE — 84100 ASSAY OF PHOSPHORUS: CPT

## 2020-07-21 PROCEDURE — 80053 COMPREHEN METABOLIC PANEL: CPT

## 2020-07-21 PROCEDURE — 87040 BLOOD CULTURE FOR BACTERIA: CPT

## 2020-07-21 PROCEDURE — U0003: CPT

## 2020-07-21 PROCEDURE — 36415 COLL VENOUS BLD VENIPUNCTURE: CPT

## 2020-07-21 PROCEDURE — 96372 THER/PROPH/DIAG INJ SC/IM: CPT | Mod: XU

## 2020-07-21 PROCEDURE — 85027 COMPLETE CBC AUTOMATED: CPT

## 2020-07-21 PROCEDURE — 83036 HEMOGLOBIN GLYCOSYLATED A1C: CPT

## 2020-07-21 PROCEDURE — 74019 RADEX ABDOMEN 2 VIEWS: CPT

## 2020-07-21 PROCEDURE — 82962 GLUCOSE BLOOD TEST: CPT

## 2020-07-21 PROCEDURE — 86769 SARS-COV-2 COVID-19 ANTIBODY: CPT

## 2020-07-21 PROCEDURE — 80061 LIPID PANEL: CPT

## 2020-07-21 PROCEDURE — 96374 THER/PROPH/DIAG INJ IV PUSH: CPT

## 2020-07-21 PROCEDURE — 83735 ASSAY OF MAGNESIUM: CPT

## 2020-07-21 PROCEDURE — 93005 ELECTROCARDIOGRAM TRACING: CPT

## 2020-07-21 PROCEDURE — 83605 ASSAY OF LACTIC ACID: CPT

## 2020-07-21 PROCEDURE — 84443 ASSAY THYROID STIM HORMONE: CPT

## 2020-07-21 RX ORDER — SODIUM CHLORIDE 9 MG/ML
1000 INJECTION, SOLUTION INTRAVENOUS
Qty: 0 | Refills: 0 | DISCHARGE
Start: 2020-07-21

## 2020-07-21 RX ORDER — INSULIN GLARGINE 100 [IU]/ML
20 INJECTION, SOLUTION SUBCUTANEOUS
Qty: 0 | Refills: 0 | DISCHARGE

## 2020-07-21 RX ORDER — INSULIN GLARGINE 100 [IU]/ML
10 INJECTION, SOLUTION SUBCUTANEOUS
Qty: 0 | Refills: 0 | DISCHARGE
Start: 2020-07-21

## 2020-07-21 RX ORDER — SODIUM HYPOCHLORITE 0.125 %
1 SOLUTION, NON-ORAL MISCELLANEOUS
Qty: 0 | Refills: 0 | DISCHARGE
Start: 2020-07-21

## 2020-07-21 RX ADMIN — HEPARIN SODIUM 5000 UNIT(S): 5000 INJECTION INTRAVENOUS; SUBCUTANEOUS at 05:39

## 2020-07-21 RX ADMIN — Medication 1 APPLICATION(S): at 12:28

## 2020-07-21 NOTE — DISCHARGE NOTE PROVIDER - HOSPITAL COURSE
63 yo M from [facility] assisted living [facility] [place] IDDM, dementia, Pressure ulcer stage 4, GERD, S/p peg placement on 06/02/2020 for FTT 2/2 to dysphagia and poor oral intake, related to changes in mental status. Patient was found to have redness around peg tube with slight serosanguineous discharge and was sent to hospital to evaluate for possible infection.  Abdominal CT scan which showed PEG tube in place without obstruction. However, pt was  found to have Hypernatremia 154 and high lactate 2.8. lactate improved s/p 2 liters bolus NS. Admitted for hypernatremia, now resolved. Pt with previous COVID infection in April, positive antibodies. PCR positive then negative then positive again. Will send repeat 7/20          Problem/Plan - 1:    ·  Problem: Hypernatremia.  Plan: Likely secondary to dehydration, now resolved, Na 139 today    Continue free water 250ml q6 via PEG    Does not need daily labs    Labs q3 days.          Problem/Plan - 2:    ·  Problem: Diabetes mellitus.  Plan: A1C 6.9     Continue accuchecks and sliding scale q6     On lantus 20 units at facility, continue lantus 10 units, increase as needed.          Problem/Plan - 3:    ·  Problem: Dementia.  Plan: Continue psych meds - Seroquel, Zyprexa and Mirtazapine.    Abdominal binder order to prevent pt from pulling at PEG    Pt does not need IV.          Problem/Plan - 4:    ·  Problem: Prophylactic measure.  Plan: DVT ppx - lovenox.          Problem/Plan - 5:    ·  Problem: Goals of care, counseling/discussion.  Plan: Daughter is HCP - Ailin Asher ([phone])    Full code. 65 yo M from [facility] assisted living [facility] IDDM, dementia, Pressure ulcer stage 4, GERD, S/p peg placement on 06/02/2020 for FTT 2/2 to dysphagia and poor oral intake, related to changes in mental status. Patient was found to have redness around peg tube with slight serosanguineous discharge and was sent to hospital to evaluate for possible infection.  Abdominal CT scan which showed PEG tube in place without obstruction. continue abdominal binder to prevent pt from pulling PEG. However, pt was  found to have Hypernatremia 154 and high lactate 2.8 so admitted to medicine.  Lactate improved s/p 2 liters bolus NS. Hypernatremia resolved.  Advised to have free water 250ml q 6hours with  follow up BMP in 3 days upon discharge. Pt with previous COVID infection in April, positive antibodies. COVID PCR negative on 7.20 and 7.21.     Daughter Ailin Sterling is  HCP, remain Full code .    Given patient's improved clinical status and current hemodynamic stability decision was made to discharge. Pt is stable for discharge per attending and is advised to f/u with PCP as out-patient. Please refer to Pt's complete medical chart with documents for a full hospital course, for this is only a brief summary. 63 yo M from [facility] assisted living [facility] IDDM, dementia, Pressure ulcer stage 4, GERD, S/p peg placement on 06/02/2020 for FTT 2/2 to dysphagia and poor oral intake, related to changes in mental status. Patient was found to have redness around peg tube with slight serosanguineous discharge and was sent to hospital to evaluate for possible infection.  Abdominal CT scan which showed PEG tube in place without obstruction. continue abdominal binder to prevent pt from pulling PEG. However, pt was  found to have Hypernatremia 154 and high lactate 2.8 so admitted to medicine.  Lactate improved s/p 2 liters bolus NS. Hypernatremia resolved.  Advised to have free water 250ml q 6hours with  follow up BMP in 3 days upon discharge. Pt with previous COVID infection in April, positive antibodies. COVID PCR negative on 7.20 and 7.21.     Given patient's improved clinical status and current hemodynamic stability decision was made to discharge. Pt is stable for discharge per attending and is advised to f/u with PCP as outpatient. Please refer to pt's complete medical chart with documents for a full hospital course, for this is only a brief summary.

## 2020-07-21 NOTE — DISCHARGE NOTE NURSING/CASE MANAGEMENT/SOCIAL WORK - PATIENT PORTAL LINK FT
You can access the FollowMyHealth Patient Portal offered by [facility] by registering at the following website: [email or web address]. By joining [facility]’s FollowMyHealth portal, you will also be able to view your health information using other applications (apps) compatible with our system.

## 2020-07-21 NOTE — PROGRESS NOTE ADULT - PROVIDER SPECIALTY LIST ADULT
Cardiology
Internal Medicine

## 2020-07-21 NOTE — PROGRESS NOTE ADULT - ASSESSMENT
63 yo M from [facility] living [facility] IDDM, dementia, Pressure ulcer stage 4, GERD, S/p peg placement on 06/02/2020 for FTT 2/2 to dysphagia and poor oral intake, related to changes in mental status. Patient was found to have redness around peg tube with slight serosanguineous discharge and was sent to hospital to evaluate for possible infection. Patient is AAOX0 , history is based on AL papers. Abdominal CT scan which showed PEG tube in place without obstruction . Patient found to have Hypernatremia 154 and high lactate 2.8. lactate improved s/p 2 liters bolus NS. Admitted for hypernatremia.   Pt. received lethargic but arousable, non verbal at time of exam, in NAD.  Hyponatremia slowly improving, Na today 151, free water and IVF continue.  CT A/P shows No intra-abdominal abnormality to elucidate the presenting symptoms.  Gastrostomy tube in place. No bowel obstruction. Appendix is not visualized.
64 yr old male with PMHX of DM,Dementia,delirium,s/p COVID,s/p PEG here with ,hypernatremia.  1.Hypernatremia- improved cont free water via peg and d/c IVF.  2.DM-Insulin.  3.Delirium,Delirium-cont psych meds.  4.TF via PEG.  5.GI and DVT prophylaxis.  6.D/C planning to NH.
64 yr old male with PMHX of DM,Dementia,delirium,s/p COVID,s/p PEG here with ,hypernatremia.  1.Hypernatremia- improved cont free water via peg.  2.DM-Insulin.  3.Delirium,Delirium-cont psych meds.  4.TF via PEG.  5.GI and DVT prophylaxis.  6.D/C planning to NH.
64 yr old male with PMHX of DM,Dementia,delirium,s/p COVID,s/p PEG here with ,hypernatremia.  1.Hypernatremia- resolved, cont free water via peg.  2.DM-Insulin.  3.Delirium,Delirium-cont psych meds.  4.TF via PEG.  5.GI and DVT prophylaxis.  6.D/C planning to NH when COVID x2-.
64 yr old male with PMHX of DM,Dementia,delirium,s/p COVID,s/p PEG here with ,hypernatremia.  1.Hypernatremia- resolved, cont free water via peg.  2.DM-Insulin.  3.Delirium,Delirium-cont psych meds.  4.TF via PEG.  5.GI and DVT prophylaxis.  6.D/C planning to NH when COVID-.
64 yr old male with PMHX of DM,Dementia,delirium,s/p COVID,s/p PEG here with ,hypernatremia.  1.Hypernatremia- resolved, cont free water via peg.  2.DM-Insulin.  3.Delirium,Delirium-cont psych meds.  4.TF via PEG.  5.GI and DVT prophylaxis.  6.D/C planning to NH.
64 yr old male with PMHX of DM,Dementia,delirium,s/p COVID,s/p PEG here with ,hypernatremia.  1.Hypernatremia-free water via peg and IVF.  2.DM-Insulin.  3.Delirium,Delirium-cont psych meds.  4.TF via PEG.  5.GI and DVT prophylaxis.
64 yr old male with PMHX of DM,Dementia,delirium,s/p COVID,s/p PEG here with ,hypernatremia.  1.Hypernatremia-free water via peg and IVF.  2.DM-Insulin.  3.Delirium,Delirium-cont psych meds.  4.TF via PEG.  5.Replace k+.  6.GI and DVT prophylaxis.
65 yo M from [facility] assisted living [facility] IDDM, dementia, Pressure ulcer stage 4, GERD, S/p peg placement on 06/02/2020 for FTT 2/2 to dysphagia and poor oral intake, related to changes in mental status. Patient was found to have redness around peg tube with slight serosanguineous discharge and was sent to hospital to evaluate for possible infection. Patient is AAOX0 , history is based on AL papers. Abdominal CT scan which showed PEG tube in place without obstruction . Patient found to have Hypernatremia 154 and high lactate 2.8. lactate improved s/p 2 liters bolus NS. Admitted for hypernatremia, now resolved. Pt with previous COVID infection in April, positive antibodies. PCR positive then negative then positive again. Will send repeat 7/20
65 yo M from [facility] assisted living [facility] IDDM, dementia, Pressure ulcer stage 4, GERD, S/p peg placement on 06/02/2020 for FTT 2/2 to dysphagia and poor oral intake, related to changes in mental status. Patient was found to have redness around peg tube with slight serosanguineous discharge and was sent to hospital to evaluate for possible infection. Patient is AAOX0 , history is based on AL papers. Abdominal CT scan which showed PEG tube in place without obstruction . Patient found to have Hypernatremia 154 and high lactate 2.8. lactate improved s/p 2 liters bolus NS. Admitted for hypernatremia, now resolved. Pt with previous COVID infection in April, positive antibodies. PCR positive then negative then positive again. Will send repeat 7/20

## 2020-07-21 NOTE — DISCHARGE NOTE PROVIDER - NSDCCPCAREPLAN_GEN_ALL_CORE_FT
PRINCIPAL DISCHARGE DIAGNOSIS  Diagnosis: Hypernatremia  Assessment and Plan of Treatment: PRINCIPAL DISCHARGE DIAGNOSIS  Diagnosis: Hypernatremia  Assessment and Plan of Treatment: You were admitted to the hospital because your sodium levels were high. You sodium levels are now back to normal. Please have your BMP drawn every 3 days in order to closely monitor your sodium levels.         SECONDARY DISCHARGE DIAGNOSES  Diagnosis: COVID-19  Assessment and Plan of Treatment: You have history of covid-19 infection in April. You are now covid negative. You were tested for covid anitbodies and were found to be positive. Please continue to perform strict hand washing. Wear a mask when in public, avoid sick contacts and adhere to current guidelines.

## 2020-07-21 NOTE — DISCHARGE NOTE PROVIDER - NSDCMRMEDTOKEN_GEN_ALL_CORE_FT
acetaminophen 160 mg/5 mL oral suspension: 20.31 milliliter(s) orally every 6 hours via PEG, As needed, Temp greater or equal to 38C (100.4F)  cholecalciferol oral tablet: 1000 unit(s) orally once a day via PEG  HumaLOG 100 units/mL subcutaneous solution: 2 unit(s) subcutaneous 2 times a day - 200  4 Unit(s) if Glucose 201 - 250  6 Unit(s) if Glucose 251 - 300  8 Unit(s) if Glucose 301 - 350  10 Unit(s) if Glucose 351 - 400  12 Unit(s) if Glucose Greater Than 400  Lantus 100 units/mL subcutaneous solution: 20 unit(s) subcutaneous once a day (at bedtime)  mirtazapine 7.5 mg oral tablet: 1 tab(s) orally once a day (at bedtime) via PEG  Multiple Vitamins with Minerals oral tablet: 1 tab(s) orally once a day via PEG  OLANZapine 2.5 mg oral tablet: 1 tab(s) orally once a day (at bedtime) via PEG  Pepcid 20 mg oral tablet: 1 tab(s) orally 2 times a day  QUEtiapine 25 mg oral tablet: 1 tab(s) orally once a day (at bedtime)  zinc oxide topical cream: Apply topically to affected area 6 times a day  zinc sulfate 220 mg oral capsule: 220 milligram(s) orally once a day  ZyPREXA 5 mg oral tablet: 1 tab(s) orally once a day acetaminophen 160 mg/5 mL oral suspension: 20.31 milliliter(s) orally every 6 hours via PEG, As needed, Temp greater or equal to 38C (100.4F)  cholecalciferol oral tablet: 1000 unit(s) orally once a day via PEG  Dextrose 5% in Water intravenous solution: 1000 milliliter(s) intravenous   Dextrose 5% in Water intravenous solution: 1000 milliliter(s) intravenous   HumaLOG 100 units/mL subcutaneous solution: 2 unit(s) subcutaneous 2 times a day - 200  4 Unit(s) if Glucose 201 - 250  6 Unit(s) if Glucose 251 - 300  8 Unit(s) if Glucose 301 - 350  10 Unit(s) if Glucose 351 - 400  12 Unit(s) if Glucose Greater Than 400  Lantus 100 units/mL subcutaneous solution: 20 unit(s) subcutaneous once a day (at bedtime)  mirtazapine 7.5 mg oral tablet: 1 tab(s) orally once a day (at bedtime) via PEG  Multiple Vitamins with Minerals oral tablet: 1 tab(s) orally once a day via PEG  OLANZapine 2.5 mg oral tablet: 1 tab(s) orally once a day (at bedtime) via PEG  Pepcid 20 mg oral tablet: 1 tab(s) orally 2 times a day  QUEtiapine 25 mg oral tablet: 1 tab(s) orally once a day (at bedtime)  sodium hypochlorite 0.125% topical solution: 1 application topically once a day  zinc oxide topical cream: Apply topically to affected area 6 times a day  zinc sulfate 220 mg oral capsule: 220 milligram(s) orally once a day  ZyPREXA 5 mg oral tablet: 1 tab(s) orally once a day acetaminophen 160 mg/5 mL oral suspension: 20.31 milliliter(s) orally every 6 hours via PEG, As needed, Temp greater or equal to 38C (100.4F)  cholecalciferol oral tablet: 1000 unit(s) orally once a day via PEG  Dextrose 5% in Water intravenous solution: 1000 milliliter(s) intravenous   Dextrose 5% in Water intravenous solution: 1000 milliliter(s) intravenous   HumaLOG 100 units/mL subcutaneous solution: 2 unit(s) subcutaneous 2 times a day - 200  4 Unit(s) if Glucose 201 - 250  6 Unit(s) if Glucose 251 - 300  8 Unit(s) if Glucose 301 - 350  10 Unit(s) if Glucose 351 - 400  12 Unit(s) if Glucose Greater Than 400  insulin glargine: 10 unit(s) subcutaneous once a day (at bedtime)  mirtazapine 7.5 mg oral tablet: 1 tab(s) orally once a day (at bedtime) via PEG  Multiple Vitamins with Minerals oral tablet: 1 tab(s) orally once a day via PEG  OLANZapine 2.5 mg oral tablet: 1 tab(s) orally once a day (at bedtime) via PEG  Pepcid 20 mg oral tablet: 1 tab(s) orally 2 times a day  QUEtiapine 25 mg oral tablet: 1 tab(s) orally once a day (at bedtime)  sodium hypochlorite 0.125% topical solution: 1 application topically once a day  zinc oxide topical cream: Apply topically to affected area 6 times a day  zinc sulfate 220 mg oral capsule: 220 milligram(s) orally once a day  ZyPREXA 5 mg oral tablet: 1 tab(s) orally once a day acetaminophen 160 mg/5 mL oral suspension: 20.31 milliliter(s) orally every 6 hours via PEG, As needed, Temp greater or equal to 38C (100.4F)  cholecalciferol oral tablet: 1000 unit(s) orally once a day via PEG  HumaLOG 100 units/mL subcutaneous solution: 2 unit(s) subcutaneous 2 times a day - 200  4 Unit(s) if Glucose 201 - 250  6 Unit(s) if Glucose 251 - 300  8 Unit(s) if Glucose 301 - 350  10 Unit(s) if Glucose 351 - 400  12 Unit(s) if Glucose Greater Than 400  insulin glargine: 10 unit(s) subcutaneous once a day (at bedtime)  mirtazapine 7.5 mg oral tablet: 1 tab(s) orally once a day (at bedtime) via PEG  Multiple Vitamins with Minerals oral tablet: 1 tab(s) orally once a day via PEG  OLANZapine 2.5 mg oral tablet: 1 tab(s) orally once a day (at bedtime) via PEG  Pepcid 20 mg oral tablet: 1 tab(s) orally 2 times a day  QUEtiapine 25 mg oral tablet: 1 tab(s) orally once a day (at bedtime)  sodium hypochlorite 0.125% topical solution: 1 application topically once a day  zinc oxide topical cream: Apply topically to affected area 6 times a day  zinc sulfate 220 mg oral capsule: 220 milligram(s) orally once a day  ZyPREXA 5 mg oral tablet: 1 tab(s) orally once a day

## 2020-07-21 NOTE — PROGRESS NOTE ADULT - REASON FOR ADMISSION
Hypernatremia

## 2020-07-21 NOTE — PROGRESS NOTE ADULT - SUBJECTIVE AND OBJECTIVE BOX
CARDIOLOGY/MEDICAL ATTENDING    CHIEF COMPLAINT:Patient is a 64y old  Male who presents with a chief complaint of Hypernatremia .Pt appears comfortable.    	  REVIEW OF SYSTEMS:  [x ] Unable to obtain    PHYSICAL EXAM:  T(C): 36.2 (07-21-20 @ 05:27), Max: 36.8 (07-20-20 @ 14:20)  HR: 87 (07-21-20 @ 05:27) (71 - 87)  BP: 97/56 (07-21-20 @ 07:01) (90/59 - 102/69)  RR: 18 (07-21-20 @ 05:27) (16 - 18)  SpO2: 100% (07-21-20 @ 05:27) (100% - 100%)  Wt(kg): --  I&O's Summary      Appearance: Normal	  HEENT:   Normal oral mucosa, PERRL, EOMI	  Lymphatic: No lymphadenopathy  Cardiovascular: Normal S1 S2, No JVD, No murmurs, No edema  Respiratory: Lungs clear to auscultation	  Gastrointestinal:  Soft, Non-tender, + BS	  Skin: No rashes, No ecchymoses, No cyanosis	  Extremities: Normal range of motion, No clubbing, cyanosis or edema  Vascular: Peripheral pulses palpable 2+ bilaterally    MEDICATIONS  (STANDING):  cholecalciferol 1000 Unit(s) Oral daily  Dakins Solution - 1/4 Strength 1 Application(s) Topical daily  dextrose 5%. 1000 milliLiter(s) (50 mL/Hr) IV Continuous <Continuous>  dextrose 5%. 1000 milliLiter(s) (50 mL/Hr) IV Continuous <Continuous>  dextrose 50% Injectable 12.5 Gram(s) IV Push once  dextrose 50% Injectable 25 Gram(s) IV Push once  dextrose 50% Injectable 25 Gram(s) IV Push once  famotidine    Tablet 20 milliGRAM(s) Oral two times a day  heparin   Injectable 5000 Unit(s) SubCutaneous every 12 hours  insulin glargine Injectable (LANTUS) 10 Unit(s) SubCutaneous every morning  insulin lispro (HumaLOG) corrective regimen sliding scale   SubCutaneous every 6 hours  mirtazapine 7.5 milliGRAM(s) Oral at bedtime  multivitamin/minerals 1 Tablet(s) Oral daily  OLANZapine 5 milliGRAM(s) Oral daily  QUEtiapine 25 milliGRAM(s) Oral at bedtime    	  	  LABS:	 	                     10.6   6.99  )-----------( 302      ( 20 Jul 2020 08:41 )             32.4     07-20    139  |  102  |  20<H>  ----------------------------<  160<H>  4.3   |  30  |  0.78    Ca    9.2      20 Jul 2020 08:41        Lipid Profile: Cholesterol 196    HDL 50        TSH: Thyroid Stimulating Hormone, Serum: 2.63 uU/mL (07-09 @ 07:02)      	  COVID-19 PCR . (07.20.20 @ 03:34)    COVID-19 PCR: NotDetec: Testing is performed using polymerase chain reaction (PCR) or  transcription mediated amplification (TMA). This COVID-19 (SARS-CoV-2)  nucleic acid amplification test was validated by [employer] and is  in use under the FDA Emergency Use Authorization (EUA) for clinical labs  CLIA-certified to perform high complexity testing. Test results should be  correlated with clinical presentation, patient history, and epidemiology.

## 2020-07-21 NOTE — DISCHARGE NOTE PROVIDER - CARE PROVIDER_API CALL
Scout Raymundo  [address] [address]  Phone: [phone]  Fax: [phone]  Follow Up Time: 1-3 days

## 2020-07-24 LAB — GLUCOSE BLDC GLUCOMTR-MCNC: 225 MG/DL — HIGH (ref 70–99)

## 2020-08-28 ENCOUNTER — EMERGENCY (EMERGENCY)
Facility: HOSPITAL | Age: 65
LOS: 1 days | Discharge: HOME | End: 2020-08-28
Attending: EMERGENCY MEDICINE
Payer: MEDICARE

## 2020-08-28 VITALS
TEMPERATURE: 98 F | OXYGEN SATURATION: 98 % | SYSTOLIC BLOOD PRESSURE: 117 MMHG | RESPIRATION RATE: 18 BRPM | HEART RATE: 82 BPM | DIASTOLIC BLOOD PRESSURE: 81 MMHG

## 2020-08-28 VITALS
RESPIRATION RATE: 18 BRPM | OXYGEN SATURATION: 98 % | HEART RATE: 85 BPM | SYSTOLIC BLOOD PRESSURE: 113 MMHG | DIASTOLIC BLOOD PRESSURE: 73 MMHG | HEIGHT: 68 IN | WEIGHT: 315 LBS | TEMPERATURE: 98 F

## 2020-08-28 PROBLEM — E11.9 TYPE 2 DIABETES MELLITUS WITHOUT COMPLICATIONS: Chronic | Status: ACTIVE | Noted: 2020-07-08

## 2020-08-28 LAB
ALBUMIN SERPL ELPH-MCNC: 2.5 G/DL — LOW (ref 3.5–5)
ALP SERPL-CCNC: 77 U/L — SIGNIFICANT CHANGE UP (ref 40–120)
ALT FLD-CCNC: 35 U/L DA — SIGNIFICANT CHANGE UP (ref 10–60)
ANION GAP SERPL CALC-SCNC: 3 MMOL/L — LOW (ref 5–17)
AST SERPL-CCNC: 19 U/L — SIGNIFICANT CHANGE UP (ref 10–40)
BASOPHILS # BLD AUTO: 0.02 K/UL — SIGNIFICANT CHANGE UP (ref 0–0.2)
BASOPHILS NFR BLD AUTO: 0.3 % — SIGNIFICANT CHANGE UP (ref 0–2)
BILIRUB SERPL-MCNC: 0.1 MG/DL — LOW (ref 0.2–1.2)
BUN SERPL-MCNC: 14 MG/DL — SIGNIFICANT CHANGE UP (ref 7–18)
CALCIUM SERPL-MCNC: 8.4 MG/DL — SIGNIFICANT CHANGE UP (ref 8.4–10.5)
CHLORIDE SERPL-SCNC: 108 MMOL/L — SIGNIFICANT CHANGE UP (ref 96–108)
CO2 SERPL-SCNC: 30 MMOL/L — SIGNIFICANT CHANGE UP (ref 22–31)
CREAT SERPL-MCNC: 0.71 MG/DL — SIGNIFICANT CHANGE UP (ref 0.5–1.3)
EOSINOPHIL # BLD AUTO: 0.1 K/UL — SIGNIFICANT CHANGE UP (ref 0–0.5)
EOSINOPHIL NFR BLD AUTO: 1.5 % — SIGNIFICANT CHANGE UP (ref 0–6)
GLUCOSE SERPL-MCNC: 132 MG/DL — HIGH (ref 70–99)
HCT VFR BLD CALC: 31.4 % — LOW (ref 39–50)
HGB BLD-MCNC: 9.9 G/DL — LOW (ref 13–17)
IMM GRANULOCYTES NFR BLD AUTO: 0.3 % — SIGNIFICANT CHANGE UP (ref 0–1.5)
LYMPHOCYTES # BLD AUTO: 0.79 K/UL — LOW (ref 1–3.3)
LYMPHOCYTES # BLD AUTO: 12.2 % — LOW (ref 13–44)
MAGNESIUM SERPL-MCNC: 1.6 MG/DL — SIGNIFICANT CHANGE UP (ref 1.6–2.6)
MCHC RBC-ENTMCNC: 28.3 PG — SIGNIFICANT CHANGE UP (ref 27–34)
MCHC RBC-ENTMCNC: 31.5 GM/DL — LOW (ref 32–36)
MCV RBC AUTO: 89.7 FL — SIGNIFICANT CHANGE UP (ref 80–100)
MONOCYTES # BLD AUTO: 0.47 K/UL — SIGNIFICANT CHANGE UP (ref 0–0.9)
MONOCYTES NFR BLD AUTO: 7.2 % — SIGNIFICANT CHANGE UP (ref 2–14)
NEUTROPHILS # BLD AUTO: 5.1 K/UL — SIGNIFICANT CHANGE UP (ref 1.8–7.4)
NEUTROPHILS NFR BLD AUTO: 78.5 % — HIGH (ref 43–77)
NRBC # BLD: 0 /100 WBCS — SIGNIFICANT CHANGE UP (ref 0–0)
PHOSPHATE SERPL-MCNC: 2.3 MG/DL — LOW (ref 2.5–4.5)
PLATELET # BLD AUTO: 215 K/UL — SIGNIFICANT CHANGE UP (ref 150–400)
POTASSIUM SERPL-MCNC: 4.2 MMOL/L — SIGNIFICANT CHANGE UP (ref 3.5–5.3)
POTASSIUM SERPL-SCNC: 4.2 MMOL/L — SIGNIFICANT CHANGE UP (ref 3.5–5.3)
PROT SERPL-MCNC: 6.4 G/DL — SIGNIFICANT CHANGE UP (ref 6–8.3)
RBC # BLD: 3.5 M/UL — LOW (ref 4.2–5.8)
RBC # FLD: 14.5 % — SIGNIFICANT CHANGE UP (ref 10.3–14.5)
SODIUM SERPL-SCNC: 141 MMOL/L — SIGNIFICANT CHANGE UP (ref 135–145)
VALPROATE SERPL-MCNC: 15 UG/ML — LOW (ref 50–100)
WBC # BLD: 6.5 K/UL — SIGNIFICANT CHANGE UP (ref 3.8–10.5)
WBC # FLD AUTO: 6.5 K/UL — SIGNIFICANT CHANGE UP (ref 3.8–10.5)

## 2020-08-28 PROCEDURE — 99285 EMERGENCY DEPT VISIT HI MDM: CPT | Mod: 25

## 2020-08-28 PROCEDURE — 96375 TX/PRO/DX INJ NEW DRUG ADDON: CPT

## 2020-08-28 PROCEDURE — 96374 THER/PROPH/DIAG INJ IV PUSH: CPT

## 2020-08-28 PROCEDURE — 80053 COMPREHEN METABOLIC PANEL: CPT

## 2020-08-28 PROCEDURE — 84100 ASSAY OF PHOSPHORUS: CPT

## 2020-08-28 PROCEDURE — 36415 COLL VENOUS BLD VENIPUNCTURE: CPT

## 2020-08-28 PROCEDURE — 71045 X-RAY EXAM CHEST 1 VIEW: CPT | Mod: 26

## 2020-08-28 PROCEDURE — 99284 EMERGENCY DEPT VISIT MOD MDM: CPT | Mod: 25

## 2020-08-28 PROCEDURE — 71045 X-RAY EXAM CHEST 1 VIEW: CPT

## 2020-08-28 PROCEDURE — 85027 COMPLETE CBC AUTOMATED: CPT

## 2020-08-28 PROCEDURE — 80164 ASSAY DIPROPYLACETIC ACD TOT: CPT

## 2020-08-28 PROCEDURE — 93005 ELECTROCARDIOGRAM TRACING: CPT

## 2020-08-28 PROCEDURE — 83735 ASSAY OF MAGNESIUM: CPT

## 2020-08-28 PROCEDURE — 82962 GLUCOSE BLOOD TEST: CPT

## 2020-08-28 RX ORDER — SODIUM CHLORIDE 9 MG/ML
3 INJECTION INTRAMUSCULAR; INTRAVENOUS; SUBCUTANEOUS ONCE
Refills: 0 | Status: DISCONTINUED | OUTPATIENT
Start: 2020-08-28 | End: 2020-09-01

## 2020-08-28 RX ORDER — LEVETIRACETAM 250 MG/1
1000 TABLET, FILM COATED ORAL ONCE
Refills: 0 | Status: COMPLETED | OUTPATIENT
Start: 2020-08-28 | End: 2020-08-28

## 2020-08-28 RX ADMIN — LEVETIRACETAM 400 MILLIGRAM(S): 250 TABLET, FILM COATED ORAL at 05:45

## 2020-08-28 RX ADMIN — Medication 1 MILLIGRAM(S): at 07:58

## 2020-08-28 NOTE — ED ADULT NURSE NOTE - OBJECTIVE STATEMENT
The patient presents from [facility] with alleged seizure activity.  The patient is a poor historian.  No visible injuries.  He has a peg tube.

## 2020-08-28 NOTE — ED PROVIDER NOTE - OBJECTIVE STATEMENT
BIBEMS from [facility] for seizure. I spoke to Rn at NH who states pt had seizure at 4am while sitting in chair, lasting 2 minutes. No reported falls or head trauma. On arrival pt is awake and conversant.   Endorsed by EMS and NH RN that as per PMD pt has not had seizure in '"a long time".

## 2020-08-28 NOTE — ED PROVIDER NOTE - PATIENT PORTAL LINK FT
You can access the FollowMyHealth Patient Portal offered by [facility] by registering at the following website: [email or web address]. By joining [facility]’s FollowMyHealth portal, you will also be able to view your health information using other applications (apps) compatible with our system.

## 2020-08-28 NOTE — ED PROVIDER NOTE - PROGRESS NOTE DETAILS
AJIT: patient signed out to me by Dr. Hammer. Known seizure disorder. had witnessed seizure at NH in chair with no head injury (per NH nurse to Dr. Hammer). CT head delayed because AJIT: patient signed out to me by Dr. Hammer. Known seizure disorder. had witnessed seizure at NH in chair with no head injury (per NH nurse to Dr. Hammer). CT head delayed because patient moving on table. Patient now trying to climb out of bed. Intermittently redirectable  but then tries again. Ativan 1mg ordered. Spoke with Dr. Raymundo. Discussed all results including change in Hb from July. He agrees CT head unnecessary as pt has known seizure history and did not hit head. He stated no need to get COVID swab in ED, NH will perform it. Will get depakote level, but do not need to keep pt for result. I will call Breanne if abnormal result. Pt sleeping comfortably.

## 2020-08-28 NOTE — ED PROVIDER NOTE - PHYSICAL EXAMINATION
GCS 15, no raccoon eyes, no Battles sign, no scalp step off deformities.   No cervical, thoracic or lumbosacral midline bony deformities,  +rotation and flexion-extension of neck and truncal area intact.   No nystagmus.  NIH stroke scale is zero. Pt passed dysphagia screen.

## 2020-08-28 NOTE — ED PROVIDER NOTE - RECEIVING PHYSICIAN
PT UNDERSTANDS THAT THERE IS A CLASS ACTION LAWSUIT OVER FLOATERS THAT CAN RESULT FROM  AVASTIN TREATMENT. Dr. Quintero

## 2020-08-28 NOTE — ED PROVIDER NOTE - CLINICAL SUMMARY MEDICAL DECISION MAKING FREE TEXT BOX
Pt remains seizure free, received IV Keppra.   PMD Dr. Raymundo was paged to be informed, awaiting call back.

## 2020-08-28 NOTE — ED ADULT NURSE NOTE - NSIMPLEMENTINTERV_GEN_ALL_ED
Implemented All Fall Risk Interventions:  [place] to call system. Call bell, personal items and telephone within reach. Instruct patient to call for assistance. Room bathroom lighting operational. Non-slip footwear when patient is off stretcher. Physically safe environment: no spills, clutter or unnecessary equipment. Stretcher in lowest position, wheels locked, appropriate side rails in place. Provide visual cue, wrist band, yellow gown, etc. Monitor gait and stability. Monitor for mental status changes and reorient to person, place, and time. Review medications for side effects contributing to fall risk. Reinforce activity limits and safety measures with patient and family.

## 2020-08-28 NOTE — ED PROVIDER NOTE - NSFOLLOWUPINSTRUCTIONS_ED_ALL_ED_FT
Generalized Tonic Clonic Seizures    WHAT YOU NEED TO KNOW:    A generalized tonic-clonic seizure may also be called a grand mal seizure. A seizure means an abnormal area in your brain sometimes sends bursts of electrical activity. A generalized seizure affects both sides of your brain. Tonic and clonic are phases that happen during the seizure. The tonic phase causes your muscles to become stiff. You lose consciousness and may fall down. The clonic phase causes convulsions (repeated muscle contractions). A seizure may last from a few seconds up to 3 minutes. It is an emergency if it lasts longer than 5 minutes.    DISCHARGE INSTRUCTIONS:    Call your local emergency number (911 in the ), or have someone else call, for any of the following:     This is the first seizure you have ever had.      You have trouble breathing or feeling alert after a seizure.      The seizure lasts longer than 5 minutes.      You had a seizure in water, such as in a swimming pool or hot tub.      You have diabetes or are pregnant and had a seizure.    Call your doctor if:     You have a second seizure within 24 hours of the first.       You are injured during a seizure.       You feel you are not able to cope with your condition.      Your seizures start to happen more often.      You are confused longer than usual after a seizure.      You are planning to get pregnant or are currently pregnant.      You have questions or concerns about your condition or care.    Medicines:     Medicines may be given to treat certain health conditions. You may need antiepileptic medicine if your seizures are caused by epilepsy. You may need medicine daily to prevent seizures or during a seizure to stop it. Do not stop taking your medicine unless directed by your healthcare provider.       Take your medicine as directed. Contact your healthcare provider if you think your medicine is not helping or if you have side effects. Tell him of her if you are allergic to any medicine. Keep a list of the medicines, vitamins, and herbs you take. Include the amounts, and when and why you take them. Bring the list or the pill bottles to follow-up visits. Carry your medicine list with you in case of an emergency.    What you can do to prevent a tonic-clonic seizure: You may not be able to prevent every seizure. The following can help you manage triggers that may make a seizure start:     Take antiseizure medicine every day at the same time. This will also help prevent medicine side effects. Set an alarm to help remind you to take your medicine every day. If you are a woman, talk to your provider about family planning while you are taking this medicine.      Manage stress. Stress can be a trigger for epilepsy. Exercise can help you reduce stress. Talk to your healthcare provider about exercise that is safe for you. Illness can be a form of stress. Eat a variety of healthy foods and drink plenty of liquids during an illness. Talk to your healthcare provider about other ways to manage stress.      Set a regular sleep schedule. A lack of sleep can trigger a seizure. Try to go to sleep and wake up at the same time every day. Keep your bedroom quiet and dark. Talk to your healthcare provider if you are having trouble sleeping.      Limit or do not drink alcohol as directed. Alcohol can trigger a seizure, especially if you drink a large amount at one time. A drink of alcohol is 12 ounces of beer, 1½ ounces of liquor, or 5 ounces of wine. Talk to your healthcare provider about a safe amount of alcohol for you. Your provider may recommend that you do not drink any alcohol. Tell him or her if you need help to quit drinking.    What you can do to manage tonic-clonic seizures: The following can help you manage the seizures if you have more than one:     Keep a seizure diary. This can help you find your triggers and avoid them. Possible triggers include illness, lack of sleep, hormone changes, alcohol, drugs, lights, and stress. Write down the dates of your seizures, where you were, and what you were doing. Include how you felt before and after.      Record any auras you have before a seizure. An aura is a sign that you are about to have a seizure. Auras happen before certain types of seizures that are in only 1 part of the brain. The aura may happen seconds before a seizure, or up to an hour before. You may feel, see, hear, or smell something. Examples include part of your body becoming hot. You may see a flash of light or hear something. You may have anxiety or déjà vu. If you have an aura, include it in your seizure diary.      Create a care plan. Tell family, friends, and coworkers about your epilepsy. Give them instructions that tell them how they can keep you safe if you have a seizure.      Ask what safety precautions you should take. Talk with your healthcare provider about driving. You may not be able to drive until you are seizure-free for a period of time. You will need to check the law where you live. Also talk to your healthcare provider about swimming and bathing. You may drown or develop life-threatening heart or lung damage if you have a seizure in water.      Carry medical alert identification. Wear medical alert jewelry or carry a card that says you have tonic-clonic seizures. Ask your healthcare provider where to get these items. Medical Alert Jewelry         How others can keep you safe during a seizure: Give the following instructions to family, friends, and coworkers:     Do not panic.      Do not hold me down or put anything in my mouth.      Gently guide me to the floor or a soft surface.       Place me on my side to help prevent me from swallowing saliva or vomit.First Aid: Seizures (ADULT)           Protect me from injury. Remove sharp or hard objects from the area surrounding me, or cushion my head.      Loosen the clothing around my head and neck.       Time how long my seizure lasts. Call 911 if my seizure lasts longer than 5 minutes or if I have a second seizure.      Stay with me until my seizure ends. Let me rest until I am fully awake.       Perform CPR if I stop breathing or you cannot feel my pulse.       Do not give me anything to eat or drink until I am fully awake.     Follow up with your doctor or neurologist as directed: If you take antiseizure medicine, you will need blood tests to check the level in your blood. The medicine may need to be changed or adjusted. Write down your questions so you remember to ask them during your visits.        © Copyright [employer] 2020       back to top                      © Copyright [employer] 2020

## 2020-08-31 NOTE — ED POST DISCHARGE NOTE - RESULT SUMMARY
Pt is NH resident. Spoke with Cristina BERGER on pts floor. aware of result and will tell MD. States pt is comfortable and doing well.

## 2020-09-11 NOTE — ED PROVIDER NOTE - CARE PLAN
Principal Discharge DX:	Dementia O-Z Plasty Text: The defect edges were debeveled with a #15 scalpel blade.  Given the location of the defect, shape of the defect and the proximity to free margins an O-Z plasty (double transposition flap) was deemed most appropriate.  Using a sterile surgical marker, the appropriate transposition flaps were drawn incorporating the defect and placing the expected incisions within the relaxed skin tension lines where possible.    The area thus outlined was incised deep to adipose tissue with a #15 scalpel blade.  The skin margins were undermined to an appropriate distance in all directions utilizing iris scissors.  Hemostasis was achieved with electrocautery.  The flaps were then transposed into place, one clockwise and the other counterclockwise, and anchored with interrupted buried subcutaneous sutures.

## 2020-09-14 NOTE — ED ADULT TRIAGE NOTE - CHIEF COMPLAINT QUOTE
s/p seizures as per nh Detail Level: Detailed Quality 226: Preventive Care And Screening: Tobacco Use: Screening And Cessation Intervention: Patient screened for tobacco use and is an ex/non-smoker

## 2020-10-28 NOTE — PROGRESS NOTE BEHAVIORAL HEALTH - BEHAVIOR
"Rimma Sarmiento is a 58 year old female who is being evaluated via a billable telephone visit.      The patient has been notified of following:     \"This telephone visit will be conducted via a call between you and your physician/provider. We have found that certain health care needs can be provided without the need for a physical exam.  This service lets us provide the care you need with a short phone conversation.  If a prescription is necessary we can send it directly to your pharmacy.  If lab work is needed we can place an order for that and you can then stop by our lab to have the test done at a later time.    Telephone visits are billed at different rates depending on your insurance coverage. During this emergency period, for some insurers they may be billed the same as an in-person visit.  Please reach out to your insurance provider with any questions.    If during the course of the call the physician/provider feels a telephone visit is not appropriate, you will not be charged for this service.\"    Patient has given verbal consent for Telephone visit?  Yes    What phone number would you like to be contacted at? [phone]     How would you like to obtain your AVS? Mail a copy    Phone call duration: 30 minutes    Aki Betancur PA-C     Does Rimma have a CPAP/Bipap?  No     [place] Sleep Scale: 0     Sleep Consultation:    Date on this visit: 10/28/2020    Rimma Sarmiento is sent by Bucky Pretty for a sleep consultation regarding hx sleep apnea. .    Primary Physician: Melida Turner     Rimma Sarmiento reports nightly snoring and occasional gasping, snorting and poor quality of sleep for many years.     Rimma goes to sleep at 1:30 AM during the week. She wakes up at 7:30 AM with an alarm. She falls asleep in 5-60 minutes.  Rimma has difficulty falling asleep.  She wakes up 5-8 times a night for 5-60 minutes before falling back to sleep.  Rimma wakes up to go to the bathroom and "
uncertain reasons.  On weekends, Rimma keeps the same schedule.  Patient gets an average of 6 hours of sleep per night.     Patient does use electronics in bed and watch TV in bed.     Rimma does not do shift work.  She worked day and evening shifts.      Rimma does snore every night. Patient does not have a regular bed partner. There is report of snoring, gasping, snorting and poor quality of sleep.  She does have witnessed apneas.  Patient sleeps on her side and stomach. She has occasional snort arousals, morning dry mouth and morning headaches,. Rimma has occasional bruxism, sleep walking, sleep talking and dream enactment.    She denies sleep walking, sleep talking, enuresis and sleep terrors as a child.  Rimma has difficulty breathing through her nose, claustrophobia, reflux at night, heartburn and depression.      Rimma has gained 20-30 pounds in the last year.  Patient describes themself as neither a morning or night person.  She would prefer to go to sleep at 9:30 PM and wake up at 8:30 PM.  Patient's Arnold Sleepiness score 0/24 inconsistent with no daytime sleepiness.      Rimma naps 0 times per week. She takes no inadvertant naps.  She denies closing eyes, dozing and falling asleep while driving.Patient was counseled on the importance of driving while alert, to pull over if drowsy, or nap before getting into the vehicle if sleepy.  She uses rare caffeine.     Allergies:    Allergies   Allergen Reactions     Penicillins Angioedema, Other (See Comments), Hives and Rash     Codeine Nausea and Vomiting and Other (See Comments)     sweats     Etodolac Nausea and Vomiting     Hydrocodone      Metformin Diarrhea     Severe diarrhea     Trazodone Other (See Comments)     Felt suicidal while on Trazodone     Venlafaxine Nausea and Vomiting     Lisinopril Cough       Medications:    Current Outpatient Medications   Medication Sig Dispense Refill     aspirin 81 MG EC tablet Take 81 mg by mouth       
atorvastatin (LIPITOR) 80 MG tablet Take 80 mg by mouth       Blood Glucose Monitoring Suppl (FIFTY50 GLUCOSE METER 2.0) w/Device KIT Dispense meter, test strips, lancets covered by pt ins. E11.9 IDDM type II - Test 3 times/day.       carvedilol (COREG) 6.25 MG tablet Take 1 tablet (6.25 mg) by mouth 2 times daily (with meals) 180 tablet 3     clindamycin (CLINDAMAX) 1 % external gel        clopidogrel (PLAVIX) 75 MG tablet Take 75 mg by mouth       escitalopram (LEXAPRO) 10 MG tablet Take 10 mg by mouth       fluocinonide (LIDEX) 0.05 % external solution APPLY TOPICALLY AS NEEDED 60 mL 0     hydrochlorothiazide (HYDRODIURIL) 25 MG tablet Take 25 mg by mouth       hydrOXYzine (ATARAX) 25 MG tablet TAKE 1 TABLET(25 MG) BY MOUTH EVERY NIGHT AS NEEDED FOR ANXIETY 30 tablet 3     ibuprofen (ADVIL/MOTRIN) 200 MG tablet Take 200 mg by mouth       insulin aspart (NOVOLOG FLEXPEN) 100 UNIT/ML pen INJECT 6 UNITS UNDER THE SKIN WITH BREAKFAST, 4 UNITS WITH LUNCH AND 3 UNITS WITH DINNER       insulin aspart (NOVOLOG VIAL) 100 UNITS/ML vial        insulin glargine (LANTUS SOLOSTAR) 100 UNIT/ML pen Inject 32 Units Subcutaneous       insulin glargine (LANTUS VIAL) 100 UNIT/ML vial        losartan (COZAAR) 100 MG tablet TAKE 1 TABLET BY MOUTH DAILY 30 tablet 3     nicotine (NICOTROL) 10 MG inhaler        pantoprazole (PROTONIX) 40 MG EC tablet Take 1 tablet (40 mg) by mouth daily 90 tablet 1       Problem List:  Patient Active Problem List    Diagnosis Date Noted     History of electrophysiologic study for VT which was not found on 9/20/2019 10/12/2020     Priority: Medium     History of CVA (cerebrovascular accident) 10/12/2020     Priority: Medium     Chronic diastolic heart failure (H) 10/12/2020     Priority: Medium     Tobacco abuse counseling 10/12/2020     Priority: Medium     History of colonic polyps 09/24/2020     Priority: Medium     4/2/2019--hyperplastic x 1--repeat colonoscopy 5 years  6/29/2016--6 polyps, mix of 
tubular adenoma and hyperplastic polyps.       Nontraumatic complete tear of right rotator cuff 09/08/2020     Priority: Medium     Morbid obesity with BMI of 40.0-44.9, adult (H) 09/18/2019     Priority: Medium     Ventricular tachycardia, non-sustained (H) 09/18/2019     Priority: Medium     History of NSVT run of 27 beats on ACT   Refusing the idc   EP study done 9/20/19, unable to induce VT despite aggressive stim per EP    TTE Normal LV size and systolic function.     Normal RV size and function.     Severe LA dilatation.     Trace to mild aortic regurgitation.     Negative bubble study.       Left Ventricular Ejection Fraction: 60 %    Coronary CT arteries done and ruled out subclinical CAD       Cerebrovascular accident (CVA) due to bilateral embolism of posterior cerebral arteries (H) 09/05/2019     Priority: Medium     MRI IMPRESSION:  1.  No acute intracranial abnormality.  2.  Normal MRI of the internal auditory canals and labyrinthine structures.  3.  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 
planopilaris bx proven       Microalbuminuria 11/27/2008     Priority: Medium     Routine general medical examination at a health care facility 11/23/2008     Priority: Medium     Annual exam  11/08  BP normal 2008- elevated in the past  Lipid 2/ 2009  dyslipidemia on simvastatin  BMP 10/2008 creat 1.0   Mammo 2/2009  Pap 2005 JOSE with ovarian preservation  Colon  DXA  TSH  DM A1C 5.9 missed eye exam adm dietician  Annual exam       Type 2 diabetes mellitus with hyperglycemia, with long-term current use of insulin (H) 10/23/2008     Priority: Medium     Diabetes dx'd:       Last A1c: HGA1C      7.0   1/4/2013      Last Chol: CHOL      199   10/16/2012 TRIG      193   10/16/2012 HDL       39   10/16/2012 LDL      171   6/9/2011      Microalbumin:  ALBCR     2832   2/8/2012        GFR      >60   10/16/2012   CREAT     0.93   10/16/2012     Smoker:  YES.  Aspirin:     Yes      Last eye exam:  Diabetes dx'd:       Last A1c: HGA1C      7.0   1/4/2013      Last Chol: CHOL      199   10/16/2012 TRIG      193   10/16/2012 HDL       39   10/16/2012 LDL      171   6/9/2011      Microalbumin:  ALBCR     2832   2/8/2012        GFR      >60   10/16/2012   CREAT     0.93   10/16/2012     Smoker:  YES.  Aspirin:     Yes      Last eye exam:       Tobacco use disorder 10/03/2008     Priority: Medium     Undersocialized conduct disorder, aggressive type 05/10/2006     Priority: Medium     IMO Update       Pain of lower extremity 01/13/2006     Priority: Medium     Gastroesophageal reflux disease 10/15/2004     Priority: Medium     Morbid obesity (H) 01/27/2004     Priority: Medium     Recurrent major depressive episodes (H) 01/27/2004     Priority: Medium     Enrolled in [employer] 3/35/2010 - Inactive in [place] 7/6/2010 due to no pt contact   5/1/2013: [facility] chart review completed.  Last PHQ-9 was 20 on 4/20/13.  Consider [facility] if 9 or above/angelique; letter mailed  Enrolled in [email or web address] 3/35/2010 - Inactive in [place] 7/6/2010 due to 
no pt contact   2013: [facility] chart review completed.  Last PHQ-9 was 20 on 13.  Consider [facility] if 9 or above/angelique; letter mailed          Past Medical/Surgical History:  Past Medical History:   Diagnosis Date     Anxiety      Depression      DM (diabetes mellitus) (H)      High cholesterol 2019     Hypertension      Sleep apnea      Stroke (H)      Type 2 diabetes mellitus (H) 10/23/2008     Past Surgical History:   Procedure Laterality Date     CHOLECYSTECTOMY       COLONOSCOPY  2016    done at [facility] in [place]-6 polyps, mix of tubular adenoma and hyperplastic     COLONOSCOPY  2019    [facility]-hyperplastic polyp x 1--repeat 5 years     HYSTERECTOMY TOTAL ABDOMINAL, BILATERAL SALPINGO-OOPHORECTOMY, COMBINED         Social History:  Social History     Socioeconomic History     Marital status: Legally      Spouse name: Not on file     Number of children: Not on file     Years of education: Not on file     Highest education level: Not on file   Occupational History     Not on file   Social Needs     Financial resource strain: Not on file     Food insecurity     Worry: Not on file     Inability: Not on file     Transportation needs     Medical: Not on file     Non-medical: Not on file   Tobacco Use     Smoking status: Former Smoker     Packs/day: 0.25     Types: Cigarettes     Quit date: 2019     Years since quittin.2     Smokeless tobacco: Current User   Substance and Sexual Activity     Alcohol use: Not Currently     Drug use: Not Currently     Sexual activity: Not on file   Lifestyle     Physical activity     Days per week: Not on file     Minutes per session: Not on file     Stress: Not on file   Relationships     Social connections     Talks on phone: Not on file     Gets together: Not on file     Attends [employer] service: Not on file     Active member of club or organization: Not on file     Attends meetings of clubs or organizations: Not on file     
Relationship status: Not on file     Intimate partner violence     Fear of current or ex partner: Not on file     Emotionally abused: Not on file     Physically abused: Not on file     Forced sexual activity: Not on file   Other Topics Concern     Not on file   Social History Narrative     Not on file       Family History:  Family History   Problem Relation Age of Onset     Diabetes Mother      Hypertension Mother      Cerebrovascular Disease Mother      Alcoholism Brother      Diabetes Brother      Cerebrovascular Disease Maternal Grandmother      Alcoholism Brother          Impression/Plan:  Previous hx of sleep apnea per 2012 study, reports negative sleep study 2016. Has has increased daytime fatigue, waking herself up with the snoring. Recent non sustained v tach. Prior hx stroke, T2DM, htn, morbid obesity. Will request a sleep study for suspected sleep apnea to be done in [place] as she lives in [place].     Insomnia- frequent sleep onset insomnia. Reviewed sleep hygiene, made suggestions. We will discuss on follow up.   Literature provided regarding sleep apnea.      She will follow up with me in approximately two weeks after her sleep study has been competed to review the results and discuss plan of care.       Polysomnography reviewed.  Obstructive sleep apnea reviewed.  Complications of untreated sleep apnea were reviewed.        CC: Bucky Pretty        
Other
Other

## 2020-11-03 NOTE — DIETITIAN INITIAL EVALUATION ADULT. - PROBLEM/PLAN-5
I called the patient back after speaking to [facility] and she wants to do a virtual visit with her this afternoon. I transferred her to the  to make her appointment. DISPLAY PLAN FREE TEXT

## 2020-11-14 ENCOUNTER — INPATIENT (INPATIENT)
Facility: HOSPITAL | Age: 65
LOS: 5 days | Discharge: NURSING, REHAB OR LONG-TERM CARE | DRG: 884 | End: 2020-11-20
Attending: INTERNAL MEDICINE | Admitting: INTERNAL MEDICINE
Payer: MEDICARE

## 2020-11-14 VITALS
DIASTOLIC BLOOD PRESSURE: 67 MMHG | OXYGEN SATURATION: 95 % | RESPIRATION RATE: 19 BRPM | SYSTOLIC BLOOD PRESSURE: 120 MMHG | HEIGHT: 68 IN | HEART RATE: 67 BPM

## 2020-11-14 DIAGNOSIS — F29 UNSPECIFIED PSYCHOSIS NOT DUE TO A SUBSTANCE OR KNOWN PHYSIOLOGICAL CONDITION: ICD-10-CM

## 2020-11-14 DIAGNOSIS — R60.0 LOCALIZED EDEMA: ICD-10-CM

## 2020-11-14 DIAGNOSIS — F03.90 UNSPECIFIED DEMENTIA, UNSPECIFIED SEVERITY, WITHOUT BEHAVIORAL DISTURBANCE, PSYCHOTIC DISTURBANCE, MOOD DISTURBANCE, AND ANXIETY: ICD-10-CM

## 2020-11-14 DIAGNOSIS — R45.1 RESTLESSNESS AND AGITATION: ICD-10-CM

## 2020-11-14 DIAGNOSIS — G40.909 EPILEPSY, UNSPECIFIED, NOT INTRACTABLE, WITHOUT STATUS EPILEPTICUS: ICD-10-CM

## 2020-11-14 DIAGNOSIS — E87.5 HYPERKALEMIA: ICD-10-CM

## 2020-11-14 DIAGNOSIS — Z29.9 ENCOUNTER FOR PROPHYLACTIC MEASURES, UNSPECIFIED: ICD-10-CM

## 2020-11-14 DIAGNOSIS — E11.9 TYPE 2 DIABETES MELLITUS WITHOUT COMPLICATIONS: ICD-10-CM

## 2020-11-14 LAB
ALBUMIN SERPL ELPH-MCNC: 3.6 G/DL — SIGNIFICANT CHANGE UP (ref 3.5–5)
ALP SERPL-CCNC: 61 U/L — SIGNIFICANT CHANGE UP (ref 40–120)
ALT FLD-CCNC: 26 U/L DA — SIGNIFICANT CHANGE UP (ref 10–60)
ANION GAP SERPL CALC-SCNC: 3 MMOL/L — LOW (ref 5–17)
ANION GAP SERPL CALC-SCNC: 9 MMOL/L — SIGNIFICANT CHANGE UP (ref 5–17)
AST SERPL-CCNC: 50 U/L — HIGH (ref 10–40)
BASOPHILS # BLD AUTO: 0.01 K/UL — SIGNIFICANT CHANGE UP (ref 0–0.2)
BASOPHILS NFR BLD AUTO: 0.2 % — SIGNIFICANT CHANGE UP (ref 0–2)
BILIRUB SERPL-MCNC: 0.5 MG/DL — SIGNIFICANT CHANGE UP (ref 0.2–1.2)
BUN SERPL-MCNC: 20 MG/DL — HIGH (ref 7–18)
BUN SERPL-MCNC: 24 MG/DL — HIGH (ref 7–18)
CALCIUM SERPL-MCNC: 8.7 MG/DL — SIGNIFICANT CHANGE UP (ref 8.4–10.5)
CALCIUM SERPL-MCNC: 8.7 MG/DL — SIGNIFICANT CHANGE UP (ref 8.4–10.5)
CHLORIDE SERPL-SCNC: 107 MMOL/L — SIGNIFICANT CHANGE UP (ref 96–108)
CHLORIDE SERPL-SCNC: 107 MMOL/L — SIGNIFICANT CHANGE UP (ref 96–108)
CO2 SERPL-SCNC: 25 MMOL/L — SIGNIFICANT CHANGE UP (ref 22–31)
CO2 SERPL-SCNC: 28 MMOL/L — SIGNIFICANT CHANGE UP (ref 22–31)
CREAT SERPL-MCNC: 0.94 MG/DL — SIGNIFICANT CHANGE UP (ref 0.5–1.3)
CREAT SERPL-MCNC: 1.03 MG/DL — SIGNIFICANT CHANGE UP (ref 0.5–1.3)
EOSINOPHIL # BLD AUTO: 0.09 K/UL — SIGNIFICANT CHANGE UP (ref 0–0.5)
EOSINOPHIL NFR BLD AUTO: 1.4 % — SIGNIFICANT CHANGE UP (ref 0–6)
GLUCOSE SERPL-MCNC: 117 MG/DL — HIGH (ref 70–99)
GLUCOSE SERPL-MCNC: 181 MG/DL — HIGH (ref 70–99)
HCT VFR BLD CALC: 39.9 % — SIGNIFICANT CHANGE UP (ref 39–50)
HGB BLD-MCNC: 13.1 G/DL — SIGNIFICANT CHANGE UP (ref 13–17)
IMM GRANULOCYTES NFR BLD AUTO: 0.3 % — SIGNIFICANT CHANGE UP (ref 0–1.5)
LYMPHOCYTES # BLD AUTO: 1.31 K/UL — SIGNIFICANT CHANGE UP (ref 1–3.3)
LYMPHOCYTES # BLD AUTO: 19.9 % — SIGNIFICANT CHANGE UP (ref 13–44)
MAGNESIUM SERPL-MCNC: 1.9 MG/DL — SIGNIFICANT CHANGE UP (ref 1.6–2.6)
MCHC RBC-ENTMCNC: 27.9 PG — SIGNIFICANT CHANGE UP (ref 27–34)
MCHC RBC-ENTMCNC: 32.8 GM/DL — SIGNIFICANT CHANGE UP (ref 32–36)
MCV RBC AUTO: 84.9 FL — SIGNIFICANT CHANGE UP (ref 80–100)
MONOCYTES # BLD AUTO: 0.38 K/UL — SIGNIFICANT CHANGE UP (ref 0–0.9)
MONOCYTES NFR BLD AUTO: 5.8 % — SIGNIFICANT CHANGE UP (ref 2–14)
NEUTROPHILS # BLD AUTO: 4.77 K/UL — SIGNIFICANT CHANGE UP (ref 1.8–7.4)
NEUTROPHILS NFR BLD AUTO: 72.4 % — SIGNIFICANT CHANGE UP (ref 43–77)
NRBC # BLD: 0 /100 WBCS — SIGNIFICANT CHANGE UP (ref 0–0)
PHOSPHATE SERPL-MCNC: 4.3 MG/DL — SIGNIFICANT CHANGE UP (ref 2.5–4.5)
PLATELET # BLD AUTO: 196 K/UL — SIGNIFICANT CHANGE UP (ref 150–400)
POTASSIUM SERPL-MCNC: 3.8 MMOL/L — SIGNIFICANT CHANGE UP (ref 3.5–5.3)
POTASSIUM SERPL-MCNC: 5.4 MMOL/L — HIGH (ref 3.5–5.3)
POTASSIUM SERPL-SCNC: 3.8 MMOL/L — SIGNIFICANT CHANGE UP (ref 3.5–5.3)
POTASSIUM SERPL-SCNC: 5.4 MMOL/L — HIGH (ref 3.5–5.3)
PROT SERPL-MCNC: 7.9 G/DL — SIGNIFICANT CHANGE UP (ref 6–8.3)
RBC # BLD: 4.7 M/UL — SIGNIFICANT CHANGE UP (ref 4.2–5.8)
RBC # FLD: 12.6 % — SIGNIFICANT CHANGE UP (ref 10.3–14.5)
SARS-COV-2 RNA SPEC QL NAA+PROBE: SIGNIFICANT CHANGE UP
SODIUM SERPL-SCNC: 138 MMOL/L — SIGNIFICANT CHANGE UP (ref 135–145)
SODIUM SERPL-SCNC: 141 MMOL/L — SIGNIFICANT CHANGE UP (ref 135–145)
VALPROATE SERPL-MCNC: 53 UG/ML — SIGNIFICANT CHANGE UP (ref 50–100)
WBC # BLD: 6.58 K/UL — SIGNIFICANT CHANGE UP (ref 3.8–10.5)
WBC # FLD AUTO: 6.58 K/UL — SIGNIFICANT CHANGE UP (ref 3.8–10.5)

## 2020-11-14 PROCEDURE — 70450 CT HEAD/BRAIN W/O DYE: CPT | Mod: 26

## 2020-11-14 PROCEDURE — 99285 EMERGENCY DEPT VISIT HI MDM: CPT

## 2020-11-14 RX ORDER — OLANZAPINE 15 MG/1
5 TABLET, FILM COATED ORAL AT BEDTIME
Refills: 0 | Status: DISCONTINUED | OUTPATIENT
Start: 2020-11-14 | End: 2020-11-20

## 2020-11-14 RX ORDER — SENNA PLUS 8.6 MG/1
2 TABLET ORAL AT BEDTIME
Refills: 0 | Status: DISCONTINUED | OUTPATIENT
Start: 2020-11-14 | End: 2020-11-20

## 2020-11-14 RX ORDER — INSULIN LISPRO 100/ML
VIAL (ML) SUBCUTANEOUS
Refills: 0 | Status: DISCONTINUED | OUTPATIENT
Start: 2020-11-14 | End: 2020-11-20

## 2020-11-14 RX ORDER — VALPROIC ACID (AS SODIUM SALT) 250 MG/5ML
5 SOLUTION, ORAL ORAL
Refills: 0 | Status: DISCONTINUED | OUTPATIENT
Start: 2020-11-14 | End: 2020-11-14

## 2020-11-14 RX ORDER — ENOXAPARIN SODIUM 100 MG/ML
40 INJECTION SUBCUTANEOUS DAILY
Refills: 0 | Status: DISCONTINUED | OUTPATIENT
Start: 2020-11-14 | End: 2020-11-20

## 2020-11-14 RX ORDER — MAGNESIUM HYDROXIDE 400 MG/1
30 TABLET, CHEWABLE ORAL EVERY 6 HOURS
Refills: 0 | Status: DISCONTINUED | OUTPATIENT
Start: 2020-11-14 | End: 2020-11-20

## 2020-11-14 RX ORDER — INSULIN LISPRO 100/ML
8 VIAL (ML) SUBCUTANEOUS
Qty: 0 | Refills: 0 | DISCHARGE

## 2020-11-14 RX ORDER — HALOPERIDOL DECANOATE 100 MG/ML
5 INJECTION INTRAMUSCULAR ONCE
Refills: 0 | Status: COMPLETED | OUTPATIENT
Start: 2020-11-14 | End: 2020-11-14

## 2020-11-14 RX ORDER — FAMOTIDINE 10 MG/ML
1 INJECTION INTRAVENOUS
Qty: 0 | Refills: 0 | DISCHARGE

## 2020-11-14 RX ORDER — ASCORBIC ACID 60 MG
500 TABLET,CHEWABLE ORAL DAILY
Refills: 0 | Status: DISCONTINUED | OUTPATIENT
Start: 2020-11-14 | End: 2020-11-20

## 2020-11-14 RX ORDER — OLANZAPINE 15 MG/1
2.5 TABLET, FILM COATED ORAL DAILY
Refills: 0 | Status: DISCONTINUED | OUTPATIENT
Start: 2020-11-14 | End: 2020-11-17

## 2020-11-14 RX ORDER — ZINC OXIDE 200 MG/G
1 OINTMENT TOPICAL
Refills: 0 | Status: DISCONTINUED | OUTPATIENT
Start: 2020-11-14 | End: 2020-11-20

## 2020-11-14 RX ORDER — ZINC SULFATE TAB 220 MG (50 MG ZINC EQUIVALENT) 220 (50 ZN) MG
220 TAB ORAL DAILY
Refills: 0 | Status: DISCONTINUED | OUTPATIENT
Start: 2020-11-14 | End: 2020-11-20

## 2020-11-14 RX ORDER — VALPROIC ACID (AS SODIUM SALT) 250 MG/5ML
250 SOLUTION, ORAL ORAL
Refills: 0 | Status: DISCONTINUED | OUTPATIENT
Start: 2020-11-14 | End: 2020-11-15

## 2020-11-14 RX ORDER — INSULIN LISPRO 100/ML
2 VIAL (ML) SUBCUTANEOUS
Qty: 0 | Refills: 0 | DISCHARGE

## 2020-11-14 RX ORDER — OLANZAPINE 15 MG/1
2.5 TABLET, FILM COATED ORAL
Refills: 0 | Status: DISCONTINUED | OUTPATIENT
Start: 2020-11-14 | End: 2020-11-20

## 2020-11-14 RX ORDER — MULTIVIT-MIN/FERROUS GLUCONATE 9 MG/15 ML
1 LIQUID (ML) ORAL DAILY
Refills: 0 | Status: DISCONTINUED | OUTPATIENT
Start: 2020-11-14 | End: 2020-11-20

## 2020-11-14 RX ORDER — QUETIAPINE FUMARATE 200 MG/1
1 TABLET, FILM COATED ORAL
Qty: 0 | Refills: 0 | DISCHARGE

## 2020-11-14 RX ORDER — MEMANTINE HYDROCHLORIDE 10 MG/1
5 TABLET ORAL
Refills: 0 | Status: DISCONTINUED | OUTPATIENT
Start: 2020-11-14 | End: 2020-11-20

## 2020-11-14 RX ORDER — ZINC OXIDE 200 MG/G
1 OINTMENT TOPICAL
Qty: 0 | Refills: 0 | DISCHARGE

## 2020-11-14 RX ORDER — LANOLIN ALCOHOL/MO/W.PET/CERES
10 CREAM (GRAM) TOPICAL AT BEDTIME
Refills: 0 | Status: DISCONTINUED | OUTPATIENT
Start: 2020-11-14 | End: 2020-11-20

## 2020-11-14 RX ORDER — HALOPERIDOL DECANOATE 100 MG/ML
2 INJECTION INTRAMUSCULAR EVERY 6 HOURS
Refills: 0 | Status: DISCONTINUED | OUTPATIENT
Start: 2020-11-14 | End: 2020-11-20

## 2020-11-14 RX ORDER — OLANZAPINE 15 MG/1
1 TABLET, FILM COATED ORAL
Qty: 0 | Refills: 0 | DISCHARGE

## 2020-11-14 RX ADMIN — HALOPERIDOL DECANOATE 2 MILLIGRAM(S): 100 INJECTION INTRAMUSCULAR at 21:28

## 2020-11-14 RX ADMIN — HALOPERIDOL DECANOATE 5 MILLIGRAM(S): 100 INJECTION INTRAMUSCULAR at 15:24

## 2020-11-14 RX ADMIN — Medication 2 MILLIGRAM(S): at 15:48
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
## 2020-11-14 NOTE — H&P ADULT - NSICDXPASTMEDICALHX_GEN_ALL_CORE_FT
PAST MEDICAL HISTORY:  Convulsion     Dementia     Diabetes mellitus     H/O gastroesophageal reflux (GERD)     Seizure disorder

## 2020-11-14 NOTE — ED ADULT NURSE NOTE - ED STAT RN HANDOFF DETAILS
Patient observed sleeping easily arose by verbal stimuli, appearing not distress, admitted to ed awaiting for bed availability, endorsed to Cydney BERGER
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
## 2020-11-14 NOTE — H&P ADULT - NSTELEHEALTH_GEN_ALL_CORE
Established Patient    Kimberly presents today with the following:    CC: Refill and follow up on the preventive medicine.     HPI:     62 year old F with Hx of anxiety disorder, mild dyslipidemia and essential HTN presented to our clinic to day to have a early refill because she is going to have a trip up to [place] soon.     The patient is actually doing very well in terms of her HTN and anxiety. No major event in the past 6 months regarding her mood/anxiety, no major change of her body weight/appetitie and blood pressure.   She is still taking her celexa and lisinopril-HCTZ regularly. Last CMP from late 2017 was normal.     We also discuss the need of zoster shot, mammogram and pap smear. Simple/basic travel medicine also discussed.         Patient Active Problem List    Diagnosis Date Noted   • Encounter for preventive health examination 11/22/2016   • Essential hypertension 11/22/2016   • Anxiety 11/22/2016   • Dyslipidemia 11/22/2016       Current Outpatient Prescriptions   Medication Sig Dispense Refill   • citalopram (CELEXA) 20 MG Tab Take 1 Tab by mouth every day. 30 Tab 11   • lisinopril-hydrochlorothiazide (PRINZIDE, ZESTORETIC) 20-25 MG per tablet Take 1 Tab by mouth every day. 30 Tab 11   • aspirin 81 MG tablet Take 81 mg by mouth every day.     • Multiple Vitamin (MULTI VITAMIN DAILY PO) Take  by mouth.     • brimonidine (ALPHAGAN) 0.15 % Solution      • latanoprost (XALATAN) 0.005 % Solution        No current facility-administered medications for this visit.        ROS: As per HPI. Additional pertinent symptoms as noted below.  Constitutional: Denies fevers.  Eyes: Denies changes in vision, no eye pain  Ears/Nose/Throat/Mouth: Denies nasal congestion or sore throat   Cardiovascular: Denies chest pain or palpitations   Respiratory: Denies shortness of breath , Denies cough  Gastrointestinal/Hepatic: Denies abdominal pain, nausea, vomiting, or diarrhea   Genitourinary: Denies bladder dysfunction,  "dysuria or frequency  Musculoskeletal/Rheum: No complaints   Skin/Breast: Denies rash   Neurological: Denies headache. Denied focal weakness/parasthesias  Psychiatric: stable mood now  All other systems were reviewed and are negative (AMA/CMS criteria)    /79   Pulse 65   Temp 37 °C (98.6 °F)   Ht 1.778 m (5' 10\")   Wt 92.7 kg (204 lb 6.4 oz)   SpO2 96%   BMI 29.33 kg/m²     Physical Exam   HEENT: grossly normal   Cardiovascular: Normal heart rate, Normal rhythm   Lungs: Respiratory effort is normal. Normal breath sounds  Abdomen: Bowel sounds normal, Soft, No tenderness  Skin: No erythema, No rash  Lower limbs: normal, no pitting edema   Neurologic: Alert & oriented x 3, Normal motor function, Normal sensory function, No focal deficits noted, cranial nerves II through XII are normal  PSY: stable mood.   Other systems also examined, grossly normal.       Note: I have reviewed all pertinent labs and diagnostic tests associated with this visit with specific comments listed under the assessment and plan below    Assessment and Plan  Problem List Items Addressed This Visit     Encounter for preventive health examination  - Mammo ordered, will have pap smear next time  - Will wait for the new zoster shot  - Others are up to date.     Relevant Orders    MA-MAMMO SCREENING BILAT W/LIZBETH W/CAD    Essential hypertension  - Stable, good compliance, meds refilled.     Relevant Medications    lisinopril-hydrochlorothiazide (PRINZIDE, ZESTORETIC) 20-25 MG per tablet    Anxiety  - quite stable for more than 1 yr, no event noted, no side effect noted by the patient.   - Continue current management.     Relevant Medications    citalopram (CELEXA) 20 MG Tab              Followup: Return in about 3 months (around 5/26/2018) for Long for pap smear .      Signed by: Wero Olivarez M.D.    " No

## 2020-11-14 NOTE — H&P ADULT - PROBLEM SELECTOR PLAN 8
IMPROVE VTE Individual Risk Assessment  RISK                                                                Points  [  ] Previous VTE                                                  3  [  ] Thrombophilia                                               2  [  ] Lower limb paralysis                                      2        (unable to hold up >15 seconds)    [  ] Current Cancer                                              2         (within 6 months)  [ x ] Immobilization > 24 hrs                                1  [  ] ICU/CCU stay > 24 hours                              1  [x ] Age > 60                                                      1  IMPROVE VTE Score ___2______  Lovenox for DVT ppx

## 2020-11-14 NOTE — ED ADULT NURSE NOTE - INTERVENTIONS DEFINITIONS
Call bell, personal items and telephone within reach/Provide visual cue, wrist band, yellow gown, etc./Physically safe environment: no spills, clutter or unnecessary equipment/Provide visual clues: red socks/Stretcher in lowest position, wheels locked, appropriate side rails in place

## 2020-11-14 NOTE — H&P ADULT - PROBLEM SELECTOR PLAN 3
p/w K 5.4, EKG couldn't be obtained due to agitation  - as elevation is mild, monitor BMP for now p/w K 5.4, EKG couldn't be obtained due to agitation  - as elevation is mild, monitor BMP for now  - f/u BMP @10pm
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
## 2020-11-14 NOTE — H&P ADULT - PROBLEM SELECTOR PLAN 6
- Patient takes glipizide at home  - will hold home medications  - will start sliding scale  - f/u HgA1c  - diabetic diet
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
## 2020-11-15 DIAGNOSIS — F03.91 UNSPECIFIED DEMENTIA WITH BEHAVIORAL DISTURBANCE: ICD-10-CM

## 2020-11-15 LAB
24R-OH-CALCIDIOL SERPL-MCNC: 21.9 NG/ML — LOW (ref 30–80)
A1C WITH ESTIMATED AVERAGE GLUCOSE RESULT: 8.4 % — HIGH (ref 4–5.6)
ALBUMIN SERPL ELPH-MCNC: 3.1 G/DL — LOW (ref 3.5–5)
ALP SERPL-CCNC: 55 U/L — SIGNIFICANT CHANGE UP (ref 40–120)
ALT FLD-CCNC: 18 U/L DA — SIGNIFICANT CHANGE UP (ref 10–60)
AMMONIA BLD-MCNC: 39 UMOL/L — HIGH (ref 11–32)
ANION GAP SERPL CALC-SCNC: 11 MMOL/L — SIGNIFICANT CHANGE UP (ref 5–17)
APPEARANCE UR: CLEAR — SIGNIFICANT CHANGE UP
AST SERPL-CCNC: 17 U/L — SIGNIFICANT CHANGE UP (ref 10–40)
BASOPHILS # BLD AUTO: 0.01 K/UL — SIGNIFICANT CHANGE UP (ref 0–0.2)
BASOPHILS NFR BLD AUTO: 0.2 % — SIGNIFICANT CHANGE UP (ref 0–2)
BILIRUB SERPL-MCNC: 0.5 MG/DL — SIGNIFICANT CHANGE UP (ref 0.2–1.2)
BILIRUB UR-MCNC: NEGATIVE — SIGNIFICANT CHANGE UP
BUN SERPL-MCNC: 17 MG/DL — SIGNIFICANT CHANGE UP (ref 7–18)
CALCIUM SERPL-MCNC: 8.7 MG/DL — SIGNIFICANT CHANGE UP (ref 8.4–10.5)
CHLORIDE SERPL-SCNC: 106 MMOL/L — SIGNIFICANT CHANGE UP (ref 96–108)
CHOLEST SERPL-MCNC: 216 MG/DL — HIGH
CO2 SERPL-SCNC: 24 MMOL/L — SIGNIFICANT CHANGE UP (ref 22–31)
COLOR SPEC: YELLOW — SIGNIFICANT CHANGE UP
CREAT SERPL-MCNC: 0.87 MG/DL — SIGNIFICANT CHANGE UP (ref 0.5–1.3)
DIFF PNL FLD: NEGATIVE — SIGNIFICANT CHANGE UP
EOSINOPHIL # BLD AUTO: 0.1 K/UL — SIGNIFICANT CHANGE UP (ref 0–0.5)
EOSINOPHIL NFR BLD AUTO: 1.7 % — SIGNIFICANT CHANGE UP (ref 0–6)
ESTIMATED AVERAGE GLUCOSE: 194 MG/DL — HIGH (ref 68–114)
FOLATE SERPL-MCNC: >20 NG/ML — SIGNIFICANT CHANGE UP
GLUCOSE BLDC GLUCOMTR-MCNC: 158 MG/DL — HIGH (ref 70–99)
GLUCOSE BLDC GLUCOMTR-MCNC: 222 MG/DL — HIGH (ref 70–99)
GLUCOSE BLDC GLUCOMTR-MCNC: 224 MG/DL — HIGH (ref 70–99)
GLUCOSE SERPL-MCNC: 152 MG/DL — HIGH (ref 70–99)
GLUCOSE UR QL: NEGATIVE — SIGNIFICANT CHANGE UP
HCT VFR BLD CALC: 40.4 % — SIGNIFICANT CHANGE UP (ref 39–50)
HCYS SERPL-MCNC: 11.2 UMOL/L — SIGNIFICANT CHANGE UP
HDLC SERPL-MCNC: 48 MG/DL — SIGNIFICANT CHANGE UP
HGB BLD-MCNC: 13.3 G/DL — SIGNIFICANT CHANGE UP (ref 13–17)
IMM GRANULOCYTES NFR BLD AUTO: 0.3 % — SIGNIFICANT CHANGE UP (ref 0–1.5)
KETONES UR-MCNC: NEGATIVE — SIGNIFICANT CHANGE UP
LEUKOCYTE ESTERASE UR-ACNC: NEGATIVE — SIGNIFICANT CHANGE UP
LIPID PNL WITH DIRECT LDL SERPL: 136 MG/DL — HIGH
LYMPHOCYTES # BLD AUTO: 1.11 K/UL — SIGNIFICANT CHANGE UP (ref 1–3.3)
LYMPHOCYTES # BLD AUTO: 19.1 % — SIGNIFICANT CHANGE UP (ref 13–44)
MAGNESIUM SERPL-MCNC: 1.9 MG/DL — SIGNIFICANT CHANGE UP (ref 1.6–2.6)
MCHC RBC-ENTMCNC: 27.8 PG — SIGNIFICANT CHANGE UP (ref 27–34)
MCHC RBC-ENTMCNC: 32.9 GM/DL — SIGNIFICANT CHANGE UP (ref 32–36)
MCV RBC AUTO: 84.3 FL — SIGNIFICANT CHANGE UP (ref 80–100)
MONOCYTES # BLD AUTO: 0.34 K/UL — SIGNIFICANT CHANGE UP (ref 0–0.9)
MONOCYTES NFR BLD AUTO: 5.9 % — SIGNIFICANT CHANGE UP (ref 2–14)
NEUTROPHILS # BLD AUTO: 4.23 K/UL — SIGNIFICANT CHANGE UP (ref 1.8–7.4)
NEUTROPHILS NFR BLD AUTO: 72.8 % — SIGNIFICANT CHANGE UP (ref 43–77)
NITRITE UR-MCNC: NEGATIVE — SIGNIFICANT CHANGE UP
NON HDL CHOLESTEROL: 168 MG/DL — HIGH
NRBC # BLD: 0 /100 WBCS — SIGNIFICANT CHANGE UP (ref 0–0)
PCP SPEC-MCNC: SIGNIFICANT CHANGE UP
PH UR: 7 — SIGNIFICANT CHANGE UP (ref 5–8)
PHOSPHATE SERPL-MCNC: 3.6 MG/DL — SIGNIFICANT CHANGE UP (ref 2.5–4.5)
PLATELET # BLD AUTO: 167 K/UL — SIGNIFICANT CHANGE UP (ref 150–400)
POTASSIUM SERPL-MCNC: 3.7 MMOL/L — SIGNIFICANT CHANGE UP (ref 3.5–5.3)
POTASSIUM SERPL-SCNC: 3.7 MMOL/L — SIGNIFICANT CHANGE UP (ref 3.5–5.3)
PROT SERPL-MCNC: 7 G/DL — SIGNIFICANT CHANGE UP (ref 6–8.3)
PROT UR-MCNC: NEGATIVE — SIGNIFICANT CHANGE UP
RBC # BLD: 4.79 M/UL — SIGNIFICANT CHANGE UP (ref 4.2–5.8)
RBC # FLD: 12.5 % — SIGNIFICANT CHANGE UP (ref 10.3–14.5)
SODIUM SERPL-SCNC: 141 MMOL/L — SIGNIFICANT CHANGE UP (ref 135–145)
SP GR SPEC: 1.01 — SIGNIFICANT CHANGE UP (ref 1.01–1.02)
TRIGL SERPL-MCNC: 161 MG/DL — HIGH
TSH SERPL-MCNC: 3.25 UU/ML — SIGNIFICANT CHANGE UP (ref 0.34–4.82)
UROBILINOGEN FLD QL: NEGATIVE — SIGNIFICANT CHANGE UP
VIT B12 SERPL-MCNC: 583 PG/ML — SIGNIFICANT CHANGE UP (ref 232–1245)
WBC # BLD: 5.81 K/UL — SIGNIFICANT CHANGE UP (ref 3.8–10.5)
WBC # FLD AUTO: 5.81 K/UL — SIGNIFICANT CHANGE UP (ref 3.8–10.5)

## 2020-11-15 PROCEDURE — 90792 PSYCH DIAG EVAL W/MED SRVCS: CPT | Mod: 95

## 2020-11-15 RX ORDER — OLANZAPINE 15 MG/1
2.5 TABLET, FILM COATED ORAL ONCE
Refills: 0 | Status: COMPLETED | OUTPATIENT
Start: 2020-11-15 | End: 2020-11-15

## 2020-11-15 RX ORDER — VALPROIC ACID (AS SODIUM SALT) 250 MG/5ML
500 SOLUTION, ORAL ORAL AT BEDTIME
Refills: 0 | Status: DISCONTINUED | OUTPATIENT
Start: 2020-11-15 | End: 2020-11-20

## 2020-11-15 RX ORDER — VALPROIC ACID (AS SODIUM SALT) 250 MG/5ML
250 SOLUTION, ORAL ORAL
Refills: 0 | Status: DISCONTINUED | OUTPATIENT
Start: 2020-11-15 | End: 2020-11-17

## 2020-11-15 RX ORDER — VALPROIC ACID (AS SODIUM SALT) 250 MG/5ML
250 SOLUTION, ORAL ORAL ONCE
Refills: 0 | Status: COMPLETED | OUTPATIENT
Start: 2020-11-15 | End: 2020-11-15

## 2020-11-15 RX ADMIN — SENNA PLUS 2 TABLET(S): 8.6 TABLET ORAL at 21:00

## 2020-11-15 RX ADMIN — Medication 500 MILLIGRAM(S): at 11:56

## 2020-11-15 RX ADMIN — Medication 250 MILLIGRAM(S): at 17:21

## 2020-11-15 RX ADMIN — OLANZAPINE 2.5 MILLIGRAM(S): 15 TABLET, FILM COATED ORAL at 11:55

## 2020-11-15 RX ADMIN — MEMANTINE HYDROCHLORIDE 5 MILLIGRAM(S): 10 TABLET ORAL at 17:19

## 2020-11-15 RX ADMIN — Medication 2: at 11:55

## 2020-11-15 RX ADMIN — OLANZAPINE 5 MILLIGRAM(S): 15 TABLET, FILM COATED ORAL at 21:01

## 2020-11-15 RX ADMIN — Medication 250 MILLIGRAM(S): at 17:19

## 2020-11-15 RX ADMIN — ENOXAPARIN SODIUM 40 MILLIGRAM(S): 100 INJECTION SUBCUTANEOUS at 11:55

## 2020-11-15 RX ADMIN — Medication 1 TABLET(S): at 11:56

## 2020-11-15 RX ADMIN — HALOPERIDOL DECANOATE 2 MILLIGRAM(S): 100 INJECTION INTRAMUSCULAR at 13:23

## 2020-11-15 RX ADMIN — ZINC SULFATE TAB 220 MG (50 MG ZINC EQUIVALENT) 220 MILLIGRAM(S): 220 (50 ZN) TAB at 11:55

## 2020-11-15 RX ADMIN — OLANZAPINE 2.5 MILLIGRAM(S): 15 TABLET, FILM COATED ORAL at 14:48

## 2020-11-15 RX ADMIN — Medication 2: at 17:19

## 2020-11-15 RX ADMIN — Medication 1: at 08:28

## 2020-11-15 RX ADMIN — Medication 500 MILLIGRAM(S): at 21:01

## 2020-11-15 RX ADMIN — HALOPERIDOL DECANOATE 2 MILLIGRAM(S): 100 INJECTION INTRAMUSCULAR at 21:08

## 2020-11-15 RX ADMIN — Medication 10 MILLIGRAM(S): at 21:01
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
## 2020-11-15 NOTE — PROGRESS NOTE ADULT - ASSESSMENT
64 yr old male with PMHX of DM,Dementia,delirium,s/p COVID,s/p PEG here with agitation.  1.Agitation-Psych eval called.  2.DM-Insulin.  3.Delirium-cont psych meds.  4.Abnormal EKG-Echocardiogram.  5.DM-Insulin.  6.GI and DVT prophylaxis.

## 2020-11-15 NOTE — BEHAVIORAL HEALTH ASSESSMENT NOTE - RISK ASSESSMENT
Low Acute Suicide Risk chronic risk due to impulsivity, medical illness, dementia, impaired reasoning/executive function, but has no SA in life time, no access to firearm, patient in supervised environment
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
## 2020-11-15 NOTE — BEHAVIORAL HEALTH ASSESSMENT NOTE - NSBHCONSULTFOLLOWAFTERCARE_PSY_A_CORE FT
On license of [facility] agreed with patient to follow up with psychiatrist on site within 1 week of DC from hospital

## 2020-11-15 NOTE — BEHAVIORAL HEALTH ASSESSMENT NOTE - OTHER
[facility] other residents difficult to engage in control during evaluation, impaired as per history unable to test as patient unable to follow command requires assistance for ambulation unable to assess

## 2020-11-15 NOTE — BEHAVIORAL HEALTH ASSESSMENT NOTE - NSBHADMITCOUNSEL_PSY_A_CORE
diagnostic results/impressions and/or recommended studies/risks and benefits of treatment options/importance of adherence to chosen treatment/prognosis/instructions for management, treatment and follow up/risk factor reduction

## 2020-11-15 NOTE — PROGRESS NOTE ADULT - SUBJECTIVE AND OBJECTIVE BOX
CARDIOLOGY/MEDICAL ATTENDING    CHIEF COMPLAINT:Patient is a 65y old  Male who presents with a chief complaint of Agitation. Pt appears comfortable.    	  REVIEW OF SYSTEMS:  [ x] Unable to obtain    PHYSICAL EXAM:  T(C): 36.3 (11-15-20 @ 05:20), Max: 37.3 (11-14-20 @ 19:03)  HR: 55 (11-15-20 @ 05:20) (55 - 91)  BP: 110/69 (11-15-20 @ 05:20) (110/69 - 148/77)  RR: 16 (11-15-20 @ 05:20) (16 - 19)  SpO2: 100% (11-15-20 @ 05:20) (95% - 100%)  Wt(kg): --  I&O's Summary      Appearance: Normal	  HEENT:   Normal oral mucosa, PERRL, EOMI	  Lymphatic: No lymphadenopathy  Cardiovascular: Normal S1 S2, No JVD, No murmurs, No edema  Respiratory: Lungs clear to auscultation	  Gastrointestinal:  Soft, Non-tender, + BS	  Skin: No rashes, No ecchymoses, No cyanosis	  Extremities: Normal range of motion, No clubbing, cyanosis or edema  Vascular: Peripheral pulses palpable 2+ bilaterally    MEDICATIONS  (STANDING):  ascorbic acid 500 milliGRAM(s) Oral daily  enoxaparin Injectable 40 milliGRAM(s) SubCutaneous daily  insulin lispro (ADMELOG) corrective regimen sliding scale   SubCutaneous three times a day before meals  melatonin 10 milliGRAM(s) Oral at bedtime  memantine 5 milliGRAM(s) Oral two times a day  multivitamin/minerals 1 Tablet(s) Oral daily  OLANZapine 2.5 milliGRAM(s) Oral <User Schedule>  OLANZapine 5 milliGRAM(s) Oral at bedtime  senna 2 Tablet(s) Oral at bedtime  valproic  acid Syrup 250 milliGRAM(s) Oral two times a day  zinc oxide 20% Ointment 1 Application(s) Topical two times a day  zinc sulfate 220 milliGRAM(s) Oral daily      	  	  LABS:	 	                         13.3   5.81  )-----------( 167      ( 15 Nov 2020 07:29 )             40.4     11-15    141  |  106  |  17  ----------------------------<  152<H>  3.7   |  24  |  0.87    Ca    8.7      15 Nov 2020 07:29  Phos  3.6     11-15  Mg     1.9     11-15    TPro  7.0  /  Alb  3.1<L>  /  TBili  0.5  /  DBili  x   /  AST  17  /  ALT  18  /  AlkPhos  55  11-15      Lipid Profile: Cholesterol 216  LDL --  HDL 48        TSH: Thyroid Stimulating Hormone, Serum: 3.25 uU/mL (11-15 @ 07:29)      	    EXAM:  CT BRAIN                            PROCEDURE DATE:  11/14/2020          INTERPRETATION:  CLINICAL INFORMATION: Increased agitation.    COMPARISON: CT head of 5/14/2020.    PROCEDURE:  Noncontrast CT of the Head was performed from the skull base to the vertex. Coronal and Sagittal reformats were obtained.    FINDINGS:    There is no acute intracranial hemorrhage, mass effect, midline shift, extra-axial collection, hydrocephalus, or evidence of acute vascular territorial infarction.    Mild patchy hypodensities within the periventricular and subcortical white matter, although nonspecific, likely reflect chronic microvascular disease. Cerebral volume loss contributes to prominence of the ventricles and sulci.    The visualized paranasal sinuses and mastoid air cells are clear. Intraorbital contents are unremarkable. Calvarium is intact.    IMPRESSION:    No acute intracranial hemorrhage, mass effect, or evidence of acute vascular territorial infarction.    If clinical symptoms persist or worsen, more sensitive evaluation with brain MRI may be obtained, if no contraindications exist.          KEILA MARTE M.D., RADIOLOGY RESIDENT

## 2020-11-15 NOTE — BEHAVIORAL HEALTH ASSESSMENT NOTE - NSBHCONSULTMEDAGITATION_PSY_A_CORE FT
For mild to moderate agitation, try verbal deescalation, can offer zyprexa/Zydis 2.5mg po q8H PRN  For severe agitation/po not possible, can give Zyprexa 2.5mg q8H IM injection PRN, do not give with Ativan due to risk of respiratory suppression  Avoid restraint unless causing harm to self/others For mild to moderate agitation, try verbal deescalation, can offer zyprexa/Zydis 2.5mg po q8H PRN  For severe agitation/po not possible, can give Zyprexa 2.5mg q8H IM injection PRN, can repeat dose if continues to be agitated, do not give with Ativan within 2 hours of Zyprexa due to risk of respiratory suppression.    -Can give Haldol 1-2mg if QTc <500 IM/IV if still agitated on above PRN.    Avoid restraint unless causing harm to self/others

## 2020-11-15 NOTE — CHART NOTE - NSCHARTNOTEFT_GEN_A_CORE
MEDICAL ATTENDING NOTE  Patient is a 65y old  Male who presents with a chief complaint of Agitation (15 Nov 2020 10:09).  Pt is agitated, combative and confused. Pt was seen by behavior team earlier where he was more calm and cooperative. Attempted to reorient patient many times but was unsuccessful.  Code grey called. Pt given zyprexa 2.5mg ivp x2 1 hour apart.  Depakote 250mg stat given.  Pt now placed back in bed.        INTERVAL HPI/OVERNIGHT EVENTS: no new complaints    MEDICATIONS  (STANDING):  ascorbic acid 500 milliGRAM(s) Oral daily  enoxaparin Injectable 40 milliGRAM(s) SubCutaneous daily  insulin lispro (ADMELOG) corrective regimen sliding scale   SubCutaneous three times a day before meals  melatonin 10 milliGRAM(s) Oral at bedtime  memantine 5 milliGRAM(s) Oral two times a day  multivitamin/minerals 1 Tablet(s) Oral daily  OLANZapine 2.5 milliGRAM(s) Oral <User Schedule>  OLANZapine 5 milliGRAM(s) Oral at bedtime  senna 2 Tablet(s) Oral at bedtime  valproic  acid Syrup 250 milliGRAM(s) Oral <User Schedule>  valproic  acid Syrup 500 milliGRAM(s) Oral at bedtime  valproic acid 250 milliGRAM(s) Oral once  zinc oxide 20% Ointment 1 Application(s) Topical two times a day  zinc sulfate 220 milliGRAM(s) Oral daily    MEDICATIONS  (PRN):  haloperidol    Injectable 2 milliGRAM(s) IV Push every 6 hours PRN Agitation  magnesium hydroxide Suspension 30 milliLiter(s) Oral every 6 hours PRN for congestion, for constipation  OLANZapine 2.5 milliGRAM(s) Oral daily PRN for psychosis      __________________________________________________  REVIEW OF SYSTEMS:    CONSTITUTIONAL: No fever,   unable to obtain    Vital Signs Last 24 Hrs  T(C): 36.6 (15 Nov 2020 14:50), Max: 37.3 (2020 19:03)  T(F): 97.8 (15 Nov 2020 14:50), Max: 99.1 (2020 19:03)  HR: 57 (15 Nov 2020 14:50) (55 - 91)  BP: 156/75 (15 Nov 2020 14:50) (110/69 - 156/75)  BP(mean): --  RR: 16 (15 Nov 2020 14:50) (16 - 19)  SpO2: 99% (15 Nov 2020 14:50) (96% - 100%)    ________________________________________________  PHYSICAL EXAM:  GENERAL: NAD  HEENT: dry  CHEST/LUNG: Clear to auscultation bilaterally with good air entry   HEART: S1 S2  regular; no murmurs, gallops or rubs  ABDOMEN: Soft, Nontender, Nondistended; Bowel sounds present  EXTREMITIES: no cyanosis; no edema; no calf tenderness  SKIN: warm and dry; no rash  NERVOUS SYSTEM: confused    _________________________________________________  LABS:                        13.3   5.81  )-----------( 167      ( 15 Nov 2020 07:29 )             40.4     11-15    141  |  106  |  17  ----------------------------<  152<H>  3.7   |  24  |  0.87    Ca    8.7      15 Nov 2020 07:29  Phos  3.6     11-15  Mg     1.9     -15    TPro  7.0  /  Alb  3.1<L>  /  TBili  0.5  /  DBili  x   /  AST  17  /  ALT  18  /  AlkPhos  55  11-15      Urinalysis Basic - ( 15 Nov 2020 02:11 )    Color: Yellow / Appearance: Clear / S.010 / pH: x  Gluc: x / Ketone: Negative  / Bili: Negative / Urobili: Negative   Blood: x / Protein: Negative / Nitrite: Negative   Leuk Esterase: Negative / RBC: x / WBC x   Sq Epi: x / Non Sq Epi: x / Bacteria: x      CAPILLARY BLOOD GLUCOSE      POCT Blood Glucose.: 222 mg/dL (15 Nov 2020 11:36)  POCT Blood Glucose.: 158 mg/dL (15 Nov 2020 08:01)        RADIOLOGY & ADDITIONAL TESTS:  < from: CT Head No Cont (20 @ 22:20) >    EXAM:  CT BRAIN                            PROCEDURE DATE:  2020          INTERPRETATION:  CLINICAL INFORMATION: Increased agitation.    COMPARISON: CT head of 2020.    PROCEDURE:  Noncontrast CT of the Head was performed from the skull base to the vertex. Coronal and Sagittal reformats were obtained.    FINDINGS:    There is no acute intracranial hemorrhage, mass effect, midline shift, extra-axial collection, hydrocephalus, or evidence of acute vascular territorial infarction.    Mild patchy hypodensities within the periventricular and subcortical white matter, although nonspecific, likely reflect chronic microvascular disease. Cerebral volume loss contributes to prominence of the ventricles and sulci.    The visualized paranasal sinuses and mastoid air cells are clear. Intraorbital contents are unremarkable. Calvarium is intact.    IMPRESSION:    No acute intracranial hemorrhage, mass effect, or evidence of acute vascular territorial infarction.    If clinical symptoms persist or worsen, more sensitive evaluation with brain MRI may be obtained, if no contraindications exist.    < end of copied text >      Assessment and plan:    Acute delirium and agitation  - s/p code grey  - zyprexa 2.5mg ivp x2 given  - no ativan  - can given haldol if qt interval is wnl  - depakene 250mg stat  - if pt continues to need closer monitoring - will order 1:1  - Dr. morris is aware - no active signs of infection
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
## 2020-11-15 NOTE — BEHAVIORAL HEALTH ASSESSMENT NOTE - NSBHMEDSOTHERFT_PSY_A_CORE
February 10, 2020        Kristy Pastrana  [address]  [facility] [address]  Phone: [phone]  Fax: [phone]             [facility]  [address]  [facility] [address]  Phone: [phone]   Patient: Haroldo Dickinson   MR Number: [id number]   YOB: 1975   Date of Visit: 2/6/2020       Dear Dr. Kristy Pastrana    Thank you for referring Haroldo Dickinson to me for evaluation. Attached you will find relevant portions of my assessment and plan of care.    If you have questions, please do not hesitate to call me. I look forward to following Haroldo Dickinson along with you.    Sincerely,    Cordelia Singer NP    Enclosure    If you would like to receive this communication electronically, please contact [email or web address] or [phone] to request Echo it Link access.    Echo it Link is a tool which provides read-only access to select patient information with whom you have a relationship. Its easy to use and provides real time access to review your patients record including encounter summaries, notes, results, and demographic information.    If you feel you have received this communication in error or would no longer like to receive these types of communications, please e-mail [email or web address]        zyprexa 2.5mg qAM, 5mg qHS  Depakene 250mg bid

## 2020-11-15 NOTE — BEHAVIORAL HEALTH ASSESSMENT NOTE - NSBHCONSULTOBSREASON_PSY_A_CORE FT
observation level per nursing observation level per nursing, low threshold for 1:1 if behavior escalates.

## 2020-11-16 ENCOUNTER — TRANSCRIPTION ENCOUNTER (OUTPATIENT)
Age: 65
End: 2020-11-16

## 2020-11-16 LAB
ALBUMIN SERPL ELPH-MCNC: 3.3 G/DL — LOW (ref 3.5–5)
ALP SERPL-CCNC: 56 U/L — SIGNIFICANT CHANGE UP (ref 40–120)
ALT FLD-CCNC: 20 U/L DA — SIGNIFICANT CHANGE UP (ref 10–60)
ANION GAP SERPL CALC-SCNC: 8 MMOL/L — SIGNIFICANT CHANGE UP (ref 5–17)
AST SERPL-CCNC: 19 U/L — SIGNIFICANT CHANGE UP (ref 10–40)
BASOPHILS # BLD AUTO: 0.02 K/UL — SIGNIFICANT CHANGE UP (ref 0–0.2)
BASOPHILS NFR BLD AUTO: 0.4 % — SIGNIFICANT CHANGE UP (ref 0–2)
BILIRUB SERPL-MCNC: 0.5 MG/DL — SIGNIFICANT CHANGE UP (ref 0.2–1.2)
BUN SERPL-MCNC: 18 MG/DL — SIGNIFICANT CHANGE UP (ref 7–18)
CALCIUM SERPL-MCNC: 9.3 MG/DL — SIGNIFICANT CHANGE UP (ref 8.4–10.5)
CHLORIDE SERPL-SCNC: 105 MMOL/L — SIGNIFICANT CHANGE UP (ref 96–108)
CO2 SERPL-SCNC: 27 MMOL/L — SIGNIFICANT CHANGE UP (ref 22–31)
CREAT SERPL-MCNC: 1.06 MG/DL — SIGNIFICANT CHANGE UP (ref 0.5–1.3)
CULTURE RESULTS: NO GROWTH — SIGNIFICANT CHANGE UP
EOSINOPHIL # BLD AUTO: 0.12 K/UL — SIGNIFICANT CHANGE UP (ref 0–0.5)
EOSINOPHIL NFR BLD AUTO: 2.4 % — SIGNIFICANT CHANGE UP (ref 0–6)
GLUCOSE BLDC GLUCOMTR-MCNC: 144 MG/DL — HIGH (ref 70–99)
GLUCOSE BLDC GLUCOMTR-MCNC: 230 MG/DL — HIGH (ref 70–99)
GLUCOSE BLDC GLUCOMTR-MCNC: 257 MG/DL — HIGH (ref 70–99)
GLUCOSE SERPL-MCNC: 161 MG/DL — HIGH (ref 70–99)
HCT VFR BLD CALC: 40.2 % — SIGNIFICANT CHANGE UP (ref 39–50)
HGB BLD-MCNC: 13.5 G/DL — SIGNIFICANT CHANGE UP (ref 13–17)
IMM GRANULOCYTES NFR BLD AUTO: 0.4 % — SIGNIFICANT CHANGE UP (ref 0–1.5)
LYMPHOCYTES # BLD AUTO: 1.37 K/UL — SIGNIFICANT CHANGE UP (ref 1–3.3)
LYMPHOCYTES # BLD AUTO: 27.3 % — SIGNIFICANT CHANGE UP (ref 13–44)
MAGNESIUM SERPL-MCNC: 2 MG/DL — SIGNIFICANT CHANGE UP (ref 1.6–2.6)
MCHC RBC-ENTMCNC: 28.7 PG — SIGNIFICANT CHANGE UP (ref 27–34)
MCHC RBC-ENTMCNC: 33.6 GM/DL — SIGNIFICANT CHANGE UP (ref 32–36)
MCV RBC AUTO: 85.4 FL — SIGNIFICANT CHANGE UP (ref 80–100)
MONOCYTES # BLD AUTO: 0.42 K/UL — SIGNIFICANT CHANGE UP (ref 0–0.9)
MONOCYTES NFR BLD AUTO: 8.4 % — SIGNIFICANT CHANGE UP (ref 2–14)
MRSA PCR RESULT.: SIGNIFICANT CHANGE UP
NEUTROPHILS # BLD AUTO: 3.07 K/UL — SIGNIFICANT CHANGE UP (ref 1.8–7.4)
NEUTROPHILS NFR BLD AUTO: 61.1 % — SIGNIFICANT CHANGE UP (ref 43–77)
NRBC # BLD: 0 /100 WBCS — SIGNIFICANT CHANGE UP (ref 0–0)
PHOSPHATE SERPL-MCNC: 3.9 MG/DL — SIGNIFICANT CHANGE UP (ref 2.5–4.5)
PLATELET # BLD AUTO: 162 K/UL — SIGNIFICANT CHANGE UP (ref 150–400)
POTASSIUM SERPL-MCNC: 3.7 MMOL/L — SIGNIFICANT CHANGE UP (ref 3.5–5.3)
POTASSIUM SERPL-SCNC: 3.7 MMOL/L — SIGNIFICANT CHANGE UP (ref 3.5–5.3)
PROT SERPL-MCNC: 7.2 G/DL — SIGNIFICANT CHANGE UP (ref 6–8.3)
RBC # BLD: 4.71 M/UL — SIGNIFICANT CHANGE UP (ref 4.2–5.8)
RBC # FLD: 12.6 % — SIGNIFICANT CHANGE UP (ref 10.3–14.5)
S AUREUS DNA NOSE QL NAA+PROBE: SIGNIFICANT CHANGE UP
SARS-COV-2 IGG SERPL QL IA: POSITIVE
SARS-COV-2 IGM SERPL IA-ACNC: 3.56 RATIO — HIGH
SODIUM SERPL-SCNC: 140 MMOL/L — SIGNIFICANT CHANGE UP (ref 135–145)
SPECIMEN SOURCE: SIGNIFICANT CHANGE UP
T PALLIDUM AB TITR SER: NEGATIVE — SIGNIFICANT CHANGE UP
WBC # BLD: 5.02 K/UL — SIGNIFICANT CHANGE UP (ref 3.8–10.5)
WBC # FLD AUTO: 5.02 K/UL — SIGNIFICANT CHANGE UP (ref 3.8–10.5)

## 2020-11-16 PROCEDURE — 99232 SBSQ HOSP IP/OBS MODERATE 35: CPT

## 2020-11-16 RX ORDER — VALPROIC ACID (AS SODIUM SALT) 250 MG/5ML
5 SOLUTION, ORAL ORAL
Qty: 0 | Refills: 0 | DISCHARGE

## 2020-11-16 RX ORDER — VALPROIC ACID (AS SODIUM SALT) 250 MG/5ML
500 SOLUTION, ORAL ORAL
Qty: 15000 | Refills: 0
Start: 2020-11-16 | End: 2020-12-15

## 2020-11-16 RX ORDER — VALPROIC ACID (AS SODIUM SALT) 250 MG/5ML
250 SOLUTION, ORAL ORAL
Qty: 7500 | Refills: 0
Start: 2020-11-16 | End: 2020-12-15

## 2020-11-16 RX ORDER — CHOLECALCIFEROL (VITAMIN D3) 125 MCG
1000 CAPSULE ORAL
Qty: 0 | Refills: 0 | DISCHARGE
Start: 2020-11-16

## 2020-11-16 RX ORDER — CHOLECALCIFEROL (VITAMIN D3) 125 MCG
1000 CAPSULE ORAL DAILY
Refills: 0 | Status: DISCONTINUED | OUTPATIENT
Start: 2020-11-16 | End: 2020-11-20

## 2020-11-16 RX ADMIN — Medication 1000 UNIT(S): at 12:34

## 2020-11-16 RX ADMIN — Medication 2: at 17:34

## 2020-11-16 RX ADMIN — MEMANTINE HYDROCHLORIDE 5 MILLIGRAM(S): 10 TABLET ORAL at 06:05

## 2020-11-16 RX ADMIN — OLANZAPINE 2.5 MILLIGRAM(S): 15 TABLET, FILM COATED ORAL at 10:54

## 2020-11-16 RX ADMIN — ZINC OXIDE 1 APPLICATION(S): 200 OINTMENT TOPICAL at 06:06

## 2020-11-16 RX ADMIN — Medication 500 MILLIGRAM(S): at 12:34

## 2020-11-16 RX ADMIN — OLANZAPINE 2.5 MILLIGRAM(S): 15 TABLET, FILM COATED ORAL at 12:35

## 2020-11-16 RX ADMIN — MEMANTINE HYDROCHLORIDE 5 MILLIGRAM(S): 10 TABLET ORAL at 17:35

## 2020-11-16 RX ADMIN — Medication 10 MILLIGRAM(S): at 21:12

## 2020-11-16 RX ADMIN — Medication 1 TABLET(S): at 12:41

## 2020-11-16 RX ADMIN — OLANZAPINE 5 MILLIGRAM(S): 15 TABLET, FILM COATED ORAL at 21:12

## 2020-11-16 RX ADMIN — ZINC OXIDE 1 APPLICATION(S): 200 OINTMENT TOPICAL at 19:12

## 2020-11-16 RX ADMIN — Medication 3: at 12:34

## 2020-11-16 RX ADMIN — HALOPERIDOL DECANOATE 2 MILLIGRAM(S): 100 INJECTION INTRAMUSCULAR at 19:11

## 2020-11-16 RX ADMIN — Medication 500 MILLIGRAM(S): at 21:12

## 2020-11-16 RX ADMIN — ZINC SULFATE TAB 220 MG (50 MG ZINC EQUIVALENT) 220 MILLIGRAM(S): 220 (50 ZN) TAB at 12:34

## 2020-11-16 RX ADMIN — SENNA PLUS 2 TABLET(S): 8.6 TABLET ORAL at 21:12

## 2020-11-16 RX ADMIN — Medication 250 MILLIGRAM(S): at 10:54

## 2020-11-16 RX ADMIN — ENOXAPARIN SODIUM 40 MILLIGRAM(S): 100 INJECTION SUBCUTANEOUS at 12:36

## 2020-11-16 NOTE — DISCHARGE NOTE PROVIDER - NSDCMRMEDTOKEN_GEN_ALL_CORE_FT
cholecalciferol oral tablet: 1000 unit(s) orally once a day  GlipiZIDE XL 2.5 mg oral tablet, extended release: 1 tab(s) orally once a day  Melatonin 10 mg oral tablet, disintegratin tab(s) orally once a day (at bedtime)  memantine 5 mg oral tablet: 1 tab(s) orally 2 times a day  Milk of Magnesia 8% oral suspension: 30 milliliter(s) orally every 6 hours, As Needed - for constipation  Multiple Vitamins with Minerals oral tablet: 1 tab(s) orally once a day via PEG  OLANZapine 2.5 mg oral tablet: 1 tab(s) orally once a day, As Needed - for agitation  OLANZapine 2.5 mg oral tablet: 1 tab(s) orally once a day (in the morning)  Senna 8.6 mg oral tablet: 1 tab(s) orally once a day (at bedtime)  valproic acid 250 mg/5 mL oral liquid: 500 milligram(s) orally once a day (at bedtime)   valproic acid 250 mg/5 mL oral liquid: 250 milligram(s) orally once a day   Vitamin C 500 mg oral tablet, chewable: 1 tab(s) orally once a day  zinc oxide topical cream: Apply topically to affected area 2 times a day  zinc sulfate 220 mg oral capsule: 220 milligram(s) orally once a day  ZyPREXA 5 mg oral tablet: 1 tab(s) orally once a day (at bedtime)   cholecalciferol oral tablet: 1000 unit(s) orally once a day  GlipiZIDE XL 2.5 mg oral tablet, extended release: 1 tab(s) orally once a day  Melatonin 10 mg oral tablet, disintegratin tab(s) orally once a day (at bedtime)  memantine 5 mg oral tablet: 1 tab(s) orally 2 times a day  Milk of Magnesia 8% oral suspension: 30 milliliter(s) orally every 6 hours, As Needed - for constipation  Multiple Vitamins with Minerals oral tablet: 1 tab(s) orally once a day via PEG  OLANZapine 2.5 mg oral tablet: 1 tab(s) orally once a day, As Needed - for agitation  OLANZapine 2.5 mg oral tablet: 1 tab(s) orally once a day (in the morning)  Senna 8.6 mg oral tablet: 1 tab(s) orally once a day (at bedtime)  valproic acid 250 mg/5 mL oral liquid: 500 milligram(s) orally once a day  in the morning   valproic acid 250 mg/5 mL oral liquid: 500 milligram(s) orally once a day (at bedtime)   Vitamin C 500 mg oral tablet, chewable: 1 tab(s) orally once a day  zinc oxide topical cream: Apply topically to affected area 2 times a day  zinc sulfate 220 mg oral capsule: 220 milligram(s) orally once a day  ZyPREXA 5 mg oral tablet: 1 tab(s) orally once a day (at bedtime)

## 2020-11-16 NOTE — PROGRESS NOTE BEHAVIORAL HEALTH - NSBHCHARTREVIEWINVESTIGATE_PSY_A_CORE FT
< from: 12 Lead ECG (11.14.20 @ 16:52) >      Ventricular Rate 74 BPM    Atrial Rate 74 BPM    P-R Interval 150 ms    QRS Duration 62 ms    Q-T Interval 390 ms    QTC Calculation(Bazett) 432 ms    < end of copied text >
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
## 2020-11-16 NOTE — DISCHARGE NOTE PROVIDER - NSDCCPCAREPLAN_GEN_ALL_CORE_FT
PRINCIPAL DISCHARGE DIAGNOSIS  Diagnosis: Agitation  Assessment and Plan of Treatment: You were admitted for agitation likely due to worsening dementia. You were seen by psych and your medications were adjusted. You were cleared by psych to return to your facility. Follow up with your psychiatrist and PMD upon return to the [facility].      SECONDARY DISCHARGE DIAGNOSES  Diagnosis: Seizure disorder  Assessment and Plan of Treatment: Continue medications and follow up with neurology.    Diagnosis: Diabetes mellitus  Assessment and Plan of Treatment: Continue medications and follow up with primary care doctor.

## 2020-11-16 NOTE — PROGRESS NOTE BEHAVIORAL HEALTH - NSBHCHARTREVIEWVS_PSY_A_CORE FT
Vital Signs Last 24 Hrs  T(C): 36.3 (16 Nov 2020 04:55), Max: 36.6 (15 Nov 2020 14:50)  T(F): 97.4 (16 Nov 2020 04:55), Max: 97.9 (15 Nov 2020 20:15)  HR: 62 (16 Nov 2020 04:55) (57 - 67)  BP: 120/81 (16 Nov 2020 04:55) (120/81 - 156/75)  BP(mean): --  RR: 16 (16 Nov 2020 04:55) (16 - 16)  SpO2: 99% (16 Nov 2020 04:55) (98% - 99%)

## 2020-11-16 NOTE — PROGRESS NOTE ADULT - SUBJECTIVE AND OBJECTIVE BOX
CARDIOLOGY/MEDICAL ATTENDING    CHIEF COMPLAINT:Patient is a 65y old  Male who presents with a chief complaint of Agitation. Pt appears comfortable.    	  REVIEW OF SYSTEMS:  [x ] Unable to obtain    PHYSICAL EXAM:  T(C): 36.3 (11-16-20 @ 04:55), Max: 36.6 (11-15-20 @ 14:50)  HR: 62 (11-16-20 @ 04:55) (57 - 67)  BP: 120/81 (11-16-20 @ 04:55) (120/81 - 156/75)  RR: 16 (11-16-20 @ 04:55) (16 - 16)  SpO2: 99% (11-16-20 @ 04:55) (98% - 99%)        Appearance: Normal	  HEENT:   Normal oral mucosa, PERRL, EOMI	  Lymphatic: No lymphadenopathy  Cardiovascular: Normal S1 S2, No JVD, No murmurs, No edema  Respiratory: Lungs clear to auscultation	  Gastrointestinal:  Soft, Non-tender, + BS	  Skin: No rashes, No ecchymoses, No cyanosis	  Extremities: Normal range of motion, No clubbing, cyanosis or edema  Vascular: Peripheral pulses palpable 2+ bilaterally    MEDICATIONS  (STANDING):  ascorbic acid 500 milliGRAM(s) Oral daily  cholecalciferol 1000 Unit(s) Oral daily  enoxaparin Injectable 40 milliGRAM(s) SubCutaneous daily  insulin lispro (ADMELOG) corrective regimen sliding scale   SubCutaneous three times a day before meals  melatonin 10 milliGRAM(s) Oral at bedtime  memantine 5 milliGRAM(s) Oral two times a day  multivitamin/minerals 1 Tablet(s) Oral daily  OLANZapine 2.5 milliGRAM(s) Oral <User Schedule>  OLANZapine 5 milliGRAM(s) Oral at bedtime  senna 2 Tablet(s) Oral at bedtime  valproic  acid Syrup 250 milliGRAM(s) Oral <User Schedule>  valproic  acid Syrup 500 milliGRAM(s) Oral at bedtime  zinc oxide 20% Ointment 1 Application(s) Topical two times a day  zinc sulfate 220 milliGRAM(s) Oral daily    	  	  LABS:	 	                        13.5   5.02  )-----------( 162      ( 16 Nov 2020 05:54 )             40.2     11-16    140  |  105  |  18  ----------------------------<  161<H>  3.7   |  27  |  1.06    Ca    9.3      16 Nov 2020 05:54  Phos  3.9     11-16  Mg     2.0     11-16    TPro  7.2  /  Alb  3.3<L>  /  TBili  0.5  /  DBili  x   /  AST  19  /  ALT  20  /  AlkPhos  56  11-16      Lipid Profile: Cholesterol 216  LDL --  HDL 48        TSH: Thyroid Stimulating Hormone, Serum: 3.25 uU/mL (11-15 @ 07:29)      	    Transthoracic Echocardiogram (11.15.20 @ 06:36)   OBSERVATIONS:  Mitral Valve: Normal mitral valve.  Aortic Root: Aortic Root: 3.8 cm.    Aortic Valve: Normal trileaflet aortic valve.  Left Atrium: LA volume index = 15 cc/m2.  Left Ventricle: Normal Left Ventricular Systolic Function,  (EF = 55 to 60%) Normal left ventricular internal  dimensions and wall thicknesses. Grade I diastolic  dysfunction (Impaired relaxation).  Right Heart: Normal right atrium. Normal right ventricular  size and function. Normal tricuspid valve. Normal pulmonic  valve.  Pericardium/PleuraNormal pericardium with no pericardial  effusion.  Hemodynamic: Unable to estimate RVSP.
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
## 2020-11-16 NOTE — PROGRESS NOTE BEHAVIORAL HEALTH - NSBHCHARTREVIEWIMAGING_PSY_A_CORE FT
< from: CT Head No Cont (11.14.20 @ 22:20) >    EXAM:  CT BRAIN                            PROCEDURE DATE:  11/14/2020          INTERPRETATION:  CLINICAL INFORMATION: Increased agitation.    COMPARISON: CT head of 5/14/2020.    PROCEDURE:  Noncontrast CT of the Head was performed from the skull base to the vertex. Coronal and Sagittal reformats were obtained.    FINDINGS:    There is no acute intracranial hemorrhage, mass effect, midline shift, extra-axial collection, hydrocephalus, or evidence of acute vascular territorial infarction.    Mild patchy hypodensities within the periventricular and subcortical white matter, although nonspecific, likely reflect chronic microvascular disease. Cerebral volume loss contributes to prominence of the ventricles and sulci.    The visualized paranasal sinuses and mastoid air cells are clear. Intraorbital contents are unremarkable. Calvarium is intact.      < end of copied text >

## 2020-11-16 NOTE — PROGRESS NOTE BEHAVIORAL HEALTH - NSBHFUPINTERVALHXFT_PSY_A_CORE
64 y/o M with a significant PMHx of HTN, HLD, DM, severe dementia required total care, and depression presents to the ED for agitation and increased combativeness at [facility]. Patient seen today for f/u encountered sitting up comfortably in his hospital bed. Patient denies acute symptoms, aaox 0-1 at baseline.  Patient is minimally verbal during evaluation. When asked how he is doing, he says "OK", did not answer questions pertaining to SI/HI/AVH.  Patient is not visibly in distress and is taking scheduled medication. Does not appear agitated at time of assessment.

## 2020-11-16 NOTE — PROGRESS NOTE ADULT - ASSESSMENT
64 yr old male with PMHX of DM,Dementia,delirium,s/p COVID,s/p PEG here with agitation.  1.Agitation-Psych eval appreciated, depakote inc 500mg qhs.  2.DM-Insulin.  3.Delirium-cont psych meds.  4.DM-Insulin.  5.GI and DVT prophylaxis.  6.D/C back to [facility] today.

## 2020-11-17 DIAGNOSIS — Z02.9 ENCOUNTER FOR ADMINISTRATIVE EXAMINATIONS, UNSPECIFIED: ICD-10-CM

## 2020-11-17 LAB
GLUCOSE BLDC GLUCOMTR-MCNC: 185 MG/DL — HIGH (ref 70–99)
GLUCOSE BLDC GLUCOMTR-MCNC: 186 MG/DL — HIGH (ref 70–99)
GLUCOSE BLDC GLUCOMTR-MCNC: 205 MG/DL — HIGH (ref 70–99)
GLUCOSE BLDC GLUCOMTR-MCNC: 233 MG/DL — HIGH (ref 70–99)
SARS-COV-2 RNA SPEC QL NAA+PROBE: DETECTED

## 2020-11-17 RX ORDER — OLANZAPINE 15 MG/1
2.5 TABLET, FILM COATED ORAL EVERY 8 HOURS
Refills: 0 | Status: DISCONTINUED | OUTPATIENT
Start: 2020-11-17 | End: 2020-11-20

## 2020-11-17 RX ORDER — VALPROIC ACID (AS SODIUM SALT) 250 MG/5ML
250 SOLUTION, ORAL ORAL ONCE
Refills: 0 | Status: COMPLETED | OUTPATIENT
Start: 2020-11-17 | End: 2020-11-17

## 2020-11-17 RX ORDER — OLANZAPINE 15 MG/1
2.5 TABLET, FILM COATED ORAL ONCE
Refills: 0 | Status: COMPLETED | OUTPATIENT
Start: 2020-11-17 | End: 2020-11-17

## 2020-11-17 RX ORDER — VALPROIC ACID (AS SODIUM SALT) 250 MG/5ML
500 SOLUTION, ORAL ORAL
Qty: 15000 | Refills: 0
Start: 2020-11-17 | End: 2020-12-16

## 2020-11-17 RX ORDER — VALPROIC ACID (AS SODIUM SALT) 250 MG/5ML
500 SOLUTION, ORAL ORAL
Refills: 0 | Status: DISCONTINUED | OUTPATIENT
Start: 2020-11-17 | End: 2020-11-20

## 2020-11-17 RX ADMIN — Medication 10 MILLIGRAM(S): at 23:03

## 2020-11-17 RX ADMIN — HALOPERIDOL DECANOATE 2 MILLIGRAM(S): 100 INJECTION INTRAMUSCULAR at 05:24

## 2020-11-17 RX ADMIN — SENNA PLUS 2 TABLET(S): 8.6 TABLET ORAL at 23:03

## 2020-11-17 RX ADMIN — HALOPERIDOL DECANOATE 2 MILLIGRAM(S): 100 INJECTION INTRAMUSCULAR at 23:03

## 2020-11-17 RX ADMIN — Medication 1: at 17:05

## 2020-11-17 RX ADMIN — OLANZAPINE 5 MILLIGRAM(S): 15 TABLET, FILM COATED ORAL at 23:03

## 2020-11-17 RX ADMIN — Medication 1 TABLET(S): at 11:02

## 2020-11-17 RX ADMIN — ZINC SULFATE TAB 220 MG (50 MG ZINC EQUIVALENT) 220 MILLIGRAM(S): 220 (50 ZN) TAB at 11:03

## 2020-11-17 RX ADMIN — Medication 2: at 08:45

## 2020-11-17 RX ADMIN — Medication 250 MILLIGRAM(S): at 10:02

## 2020-11-17 RX ADMIN — ENOXAPARIN SODIUM 40 MILLIGRAM(S): 100 INJECTION SUBCUTANEOUS at 11:03

## 2020-11-17 RX ADMIN — HALOPERIDOL DECANOATE 2 MILLIGRAM(S): 100 INJECTION INTRAMUSCULAR at 11:49

## 2020-11-17 RX ADMIN — OLANZAPINE 2.5 MILLIGRAM(S): 15 TABLET, FILM COATED ORAL at 00:46

## 2020-11-17 RX ADMIN — Medication 500 MILLIGRAM(S): at 11:02

## 2020-11-17 RX ADMIN — Medication 500 MILLIGRAM(S): at 11:03

## 2020-11-17 RX ADMIN — Medication 500 MILLIGRAM(S): at 23:03

## 2020-11-17 RX ADMIN — OLANZAPINE 2.5 MILLIGRAM(S): 15 TABLET, FILM COATED ORAL at 06:11

## 2020-11-17 RX ADMIN — ZINC OXIDE 1 APPLICATION(S): 200 OINTMENT TOPICAL at 17:08

## 2020-11-17 RX ADMIN — Medication 2: at 11:49

## 2020-11-17 RX ADMIN — MEMANTINE HYDROCHLORIDE 5 MILLIGRAM(S): 10 TABLET ORAL at 08:50

## 2020-11-17 RX ADMIN — Medication 1000 UNIT(S): at 11:02

## 2020-11-17 RX ADMIN — ZINC OXIDE 1 APPLICATION(S): 200 OINTMENT TOPICAL at 08:47

## 2020-11-17 RX ADMIN — OLANZAPINE 2.5 MILLIGRAM(S): 15 TABLET, FILM COATED ORAL at 08:48

## 2020-11-17 RX ADMIN — MEMANTINE HYDROCHLORIDE 5 MILLIGRAM(S): 10 TABLET ORAL at 17:06

## 2020-11-17 NOTE — CHART NOTE - NSCHARTNOTEFT_GEN_A_CORE
EVENT: Notified by RN, pt is confused and agitated.     HPI:  66 y/o M with a significant PMHx of HTN, HLD, DMT2, dementia, dysphagia, localized edema, GERD, psychosis, unspecified convulsions and depression presents to the ED for agitation and increased combativeness at [facility]. Admitted for work up of increased agitation.     OBJECTIVE:  Vital Signs Last 24 Hrs  T(C): 36.7 (16 Nov 2020 20:15), Max: 36.8 (16 Nov 2020 14:24)  T(F): 98 (16 Nov 2020 20:15), Max: 98.2 (16 Nov 2020 14:24)  HR: 94 (16 Nov 2020 20:15) (88 - 94)  BP: 144/89 (16 Nov 2020 20:15) (140/85 - 144/89)  BP(mean): --  RR: 14 (16 Nov 2020 20:15) (14 - 18)  SpO2: 97% (16 Nov 2020 20:15) (97% - 100%)    FOCUSED PHYSICAL EXAM:  Neuro: A&O x 1  Cardiovascular: S1, S2,  Pulses +2 B/L in lower and upper extremities  Respiratory: Respirations regular, unlabored, breath sounds clear B/L.   GI: Abdomen soft, non-tender, positive bowel sounds  Skin: dry and intact    LABS:                        13.5   5.02  )-----------( 162      ( 16 Nov 2020 05:54 )             40.2     11-16    140  |  105  |  18  ----------------------------<  161<H>  3.7   |  27  |  1.06    Ca    9.3      16 Nov 2020 05:54  Phos  3.9     11-16  Mg     2.0     11-16    TPro  7.2  /  Alb  3.3<L>  /  TBili  0.5  /  DBili  x   /  AST  19  /  ALT  20  /  AlkPhos  56  11-16      EKG:   IMAGING:    ASSESSMENT: Acute agitation likely related to advancing dementia     PLAN:     -Zyprexa 2.5 mg IM x 1 dose, ordered  -Continue standing dose of Zyprexa  -Continue Haldol 2 mg IVP q 6 hrs PRN   -Maintain safety precautions   -Please see, psych Dr. Bush's note regarding recommendations    FOLLOW UP / RESULT:    -Monitor effectiveness of Zyprexa EVENT: Notified by RN, pt is confused and agitated.     HPI:  66 y/o M with a significant PMHx of HTN, HLD, DMT2, dementia, dysphagia, localized edema, GERD, psychosis, unspecified convulsions and depression presents to the ED for agitation and increased combativeness at [facility]. Admitted for work up of increased agitation.     OBJECTIVE:  Vital Signs Last 24 Hrs  T(C): 36.7 (16 Nov 2020 20:15), Max: 36.8 (16 Nov 2020 14:24)  T(F): 98 (16 Nov 2020 20:15), Max: 98.2 (16 Nov 2020 14:24)  HR: 94 (16 Nov 2020 20:15) (88 - 94)  BP: 144/89 (16 Nov 2020 20:15) (140/85 - 144/89)  BP(mean): --  RR: 14 (16 Nov 2020 20:15) (14 - 18)  SpO2: 97% (16 Nov 2020 20:15) (97% - 100%)    FOCUSED PHYSICAL EXAM:  Neuro: A&O x 1, agitated   Cardiovascular: S1, S2,  Pulses +2 B/L in lower and upper extremities  Respiratory: Respirations regular, unlabored, breath sounds clear B/L.   GI: Abdomen soft, non-tender, positive bowel sounds  Skin: dry and intact    LABS:                        13.5   5.02  )-----------( 162      ( 16 Nov 2020 05:54 )             40.2     11-16    140  |  105  |  18  ----------------------------<  161<H>  3.7   |  27  |  1.06    Ca    9.3      16 Nov 2020 05:54  Phos  3.9     11-16  Mg     2.0     11-16    TPro  7.2  /  Alb  3.3<L>  /  TBili  0.5  /  DBili  x   /  AST  19  /  ALT  20  /  AlkPhos  56  11-16      EKG:   IMAGING:    ASSESSMENT: Acute agitation likely related to advancing dementia     PLAN:     -Zyprexa 2.5 mg IM x 1 dose, ordered  -Continue standing dose of Zyprexa  -Continue Haldol 2 mg IVP q 6 hrs PRN   -Maintain safety precautions   -Please see, psych Dr. Bush's note regarding recommendations    FOLLOW UP / RESULT:    -Monitor effectiveness of Zyprexa
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
## 2020-11-17 NOTE — PROGRESS NOTE ADULT - SUBJECTIVE AND OBJECTIVE BOX
NP Note discussed with  Primary Attending    Patient is a 65y old  Male who presents with a chief complaint of Agitation (16 Nov 2020 12:32)    INTERVAL HPI/OVERNIGHT EVENTS: agitated overnight, required PRNs     MEDICATIONS  (STANDING):  ascorbic acid 500 milliGRAM(s) Oral daily  cholecalciferol 1000 Unit(s) Oral daily  enoxaparin Injectable 40 milliGRAM(s) SubCutaneous daily  insulin lispro (ADMELOG) corrective regimen sliding scale   SubCutaneous three times a day before meals  melatonin 10 milliGRAM(s) Oral at bedtime  memantine 5 milliGRAM(s) Oral two times a day  multivitamin/minerals 1 Tablet(s) Oral daily  OLANZapine 2.5 milliGRAM(s) Oral <User Schedule>  OLANZapine 5 milliGRAM(s) Oral at bedtime  senna 2 Tablet(s) Oral at bedtime  valproic  acid Syrup 500 milliGRAM(s) Oral at bedtime  valproic  acid Syrup 500 milliGRAM(s) Oral <User Schedule>  valproic  acid Syrup 250 milliGRAM(s) Oral once  zinc oxide 20% Ointment 1 Application(s) Topical two times a day  zinc sulfate 220 milliGRAM(s) Oral daily    MEDICATIONS  (PRN):  haloperidol    Injectable 2 milliGRAM(s) IV Push every 6 hours PRN Agitation  magnesium hydroxide Suspension 30 milliLiter(s) Oral every 6 hours PRN for congestion, for constipation  OLANZapine 2.5 milliGRAM(s) Oral daily PRN for psychosis      __________________________________________________  REVIEW OF SYSTEMS:    CONSTITUTIONAL: No fever,   EYES: no acute visual disturbances  NECK: No pain or stiffness  RESPIRATORY: No cough; No shortness of breath  CARDIOVASCULAR: No chest pain, no palpitations  GASTROINTESTINAL: No pain. No nausea or vomiting; No diarrhea   NEUROLOGICAL: No headache or numbness, no tremors  MUSCULOSKELETAL: No joint pain, no muscle pain  GENITOURINARY: no dysuria, no frequency, no hesitancy  PSYCHIATRY: no depression , no anxiety  ALL OTHER  ROS negative        Vital Signs Last 24 Hrs  T(C): 36.7 (16 Nov 2020 20:15), Max: 36.8 (16 Nov 2020 14:24)  T(F): 98 (16 Nov 2020 20:15), Max: 98.2 (16 Nov 2020 14:24)  HR: 94 (16 Nov 2020 20:15) (88 - 94)  BP: 144/89 (16 Nov 2020 20:15) (140/85 - 144/89)  BP(mean): --  RR: 14 (16 Nov 2020 20:15) (14 - 18)  SpO2: 97% (16 Nov 2020 20:15) (97% - 100%)    ________________________________________________  PHYSICAL EXAM:  GENERAL: NAD  HEENT: Normocephalic;  conjunctivae and sclerae clear; moist mucous membranes;   NECK : supple  CHEST/LUNG: Clear to auscultation bilaterally with good air entry   HEART: S1 S2  regular; no murmurs, gallops or rubs  ABDOMEN: Soft, Nontender, Nondistended; Bowel sounds present  EXTREMITIES: no cyanosis; no edema; no calf tenderness  SKIN: warm and dry; no rash  NERVOUS SYSTEM:  Awake and alert; Oriented  to place, person and time ; no new deficits    _________________________________________________  LABS:                        13.5   5.02  )-----------( 162      ( 16 Nov 2020 05:54 )             40.2     11-16    140  |  105  |  18  ----------------------------<  161<H>  3.7   |  27  |  1.06    Ca    9.3      16 Nov 2020 05:54  Phos  3.9     11-16  Mg     2.0     11-16    TPro  7.2  /  Alb  3.3<L>  /  TBili  0.5  /  DBili  x   /  AST  19  /  ALT  20  /  AlkPhos  56  11-16    CAPILLARY BLOOD GLUCOSE    POCT Blood Glucose.: 205 mg/dL (17 Nov 2020 08:24)  POCT Blood Glucose.: 230 mg/dL (16 Nov 2020 16:54)  POCT Blood Glucose.: 257 mg/dL (16 Nov 2020 12:15)    RADIOLOGY & ADDITIONAL TESTS:    Imaging  Reviewed:  YES    Consultant(s) Notes Reviewed:   YES      Plan of care was discussed with patient and /or primary care giver; all questions and concerns were addressed

## 2020-11-17 NOTE — PROGRESS NOTE ADULT - SUBJECTIVE AND OBJECTIVE BOX
CHIEF COMPLAINT:Patient is a 65y old  Male who presents with a chief complaint of Agitation .Pt required medication overnight for agitation.    	  REVIEW OF SYSTEMS:  [x ] Unable to obtain    PHYSICAL EXAM:  T(C): 36.7 (11-16-20 @ 20:15), Max: 36.8 (11-16-20 @ 14:24)  HR: 94 (11-16-20 @ 20:15) (88 - 94)  BP: 144/89 (11-16-20 @ 20:15) (140/85 - 144/89)  RR: 14 (11-16-20 @ 20:15) (14 - 18)  SpO2: 97% (11-16-20 @ 20:15) (97% - 100%)  Wt(kg): --  I&O's Summary      Appearance: Normal	  HEENT:   Normal oral mucosa, PERRL, EOMI	  Lymphatic: No lymphadenopathy  Cardiovascular: Normal S1 S2, No JVD, No murmurs, No edema  Respiratory: Lungs clear to auscultation	  Gastrointestinal:  Soft, Non-tender, + BS	  Skin: No rashes, No ecchymoses, No cyanosis	  Extremities: Normal range of motion, No clubbing, cyanosis or edema  Vascular: Peripheral pulses palpable 2+ bilaterally    MEDICATIONS  (STANDING):  ascorbic acid 500 milliGRAM(s) Oral daily  cholecalciferol 1000 Unit(s) Oral daily  enoxaparin Injectable 40 milliGRAM(s) SubCutaneous daily  insulin lispro (ADMELOG) corrective regimen sliding scale   SubCutaneous three times a day before meals  melatonin 10 milliGRAM(s) Oral at bedtime  memantine 5 milliGRAM(s) Oral two times a day  multivitamin/minerals 1 Tablet(s) Oral daily  OLANZapine 2.5 milliGRAM(s) Oral <User Schedule>  OLANZapine 5 milliGRAM(s) Oral at bedtime  senna 2 Tablet(s) Oral at bedtime  valproic  acid Syrup 500 milliGRAM(s) Oral at bedtime  valproic  acid Syrup 500 milliGRAM(s) Oral <User Schedule>  zinc oxide 20% Ointment 1 Application(s) Topical two times a day  zinc sulfate 220 milliGRAM(s) Oral daily      	  	  LABS:	 	                       13.5   5.02  )-----------( 162      ( 16 Nov 2020 05:54 )             40.2     11-16    140  |  105  |  18  ----------------------------<  161<H>  3.7   |  27  |  1.06    Ca    9.3      16 Nov 2020 05:54  Phos  3.9     11-16  Mg     2.0     11-16    TPro  7.2  /  Alb  3.3<L>  /  TBili  0.5  /  DBili  x   /  AST  19  /  ALT  20  /  AlkPhos  56  11-16    proBNP:   Lipid Profile: Cholesterol 216  LDL --  HDL 48        TSH: Thyroid Stimulating Hormone, Serum: 3.25 uU/mL (11-15 @ 07:29)      	    COVID+

## 2020-11-17 NOTE — PROGRESS NOTE ADULT - ASSESSMENT
64 yr old male with PMHX of DM,Dementia,delirium,s/p COVID,s/p PEG here with agitation.  1.Agitation-Psych eval appreciated, depakote inc 500mg bid.  2.DM-Insulin.  3.Delirium-cont psych meds.  4.DM-Insulin.  5.GI and DVT prophylaxis.  6.COVID+-?false positive.

## 2020-11-18 DIAGNOSIS — U07.1 COVID-19: ICD-10-CM

## 2020-11-18 LAB
ANION GAP SERPL CALC-SCNC: 7 MMOL/L — SIGNIFICANT CHANGE UP (ref 5–17)
BUN SERPL-MCNC: 22 MG/DL — HIGH (ref 7–18)
CALCIUM SERPL-MCNC: 9.5 MG/DL — SIGNIFICANT CHANGE UP (ref 8.4–10.5)
CHLORIDE SERPL-SCNC: 103 MMOL/L — SIGNIFICANT CHANGE UP (ref 96–108)
CO2 SERPL-SCNC: 30 MMOL/L — SIGNIFICANT CHANGE UP (ref 22–31)
CREAT SERPL-MCNC: 1.08 MG/DL — SIGNIFICANT CHANGE UP (ref 0.5–1.3)
GLUCOSE BLDC GLUCOMTR-MCNC: 172 MG/DL — HIGH (ref 70–99)
GLUCOSE BLDC GLUCOMTR-MCNC: 183 MG/DL — HIGH (ref 70–99)
GLUCOSE BLDC GLUCOMTR-MCNC: 192 MG/DL — HIGH (ref 70–99)
GLUCOSE BLDC GLUCOMTR-MCNC: 305 MG/DL — HIGH (ref 70–99)
GLUCOSE SERPL-MCNC: 178 MG/DL — HIGH (ref 70–99)
HCT VFR BLD CALC: 40.3 % — SIGNIFICANT CHANGE UP (ref 39–50)
HGB BLD-MCNC: 13.4 G/DL — SIGNIFICANT CHANGE UP (ref 13–17)
LEVETIRACETAM SERPL-MCNC: 12.6 UG/ML — SIGNIFICANT CHANGE UP (ref 10–40)
MCHC RBC-ENTMCNC: 28.5 PG — SIGNIFICANT CHANGE UP (ref 27–34)
MCHC RBC-ENTMCNC: 33.3 GM/DL — SIGNIFICANT CHANGE UP (ref 32–36)
MCV RBC AUTO: 85.6 FL — SIGNIFICANT CHANGE UP (ref 80–100)
NRBC # BLD: 0 /100 WBCS — SIGNIFICANT CHANGE UP (ref 0–0)
PLATELET # BLD AUTO: 177 K/UL — SIGNIFICANT CHANGE UP (ref 150–400)
POTASSIUM SERPL-MCNC: 3.6 MMOL/L — SIGNIFICANT CHANGE UP (ref 3.5–5.3)
POTASSIUM SERPL-SCNC: 3.6 MMOL/L — SIGNIFICANT CHANGE UP (ref 3.5–5.3)
RBC # BLD: 4.71 M/UL — SIGNIFICANT CHANGE UP (ref 4.2–5.8)
RBC # FLD: 12.7 % — SIGNIFICANT CHANGE UP (ref 10.3–14.5)
SARS-COV-2 RNA SPEC QL NAA+PROBE: SIGNIFICANT CHANGE UP
SODIUM SERPL-SCNC: 140 MMOL/L — SIGNIFICANT CHANGE UP (ref 135–145)
VALPROATE SERPL-MCNC: 66 UG/ML — SIGNIFICANT CHANGE UP (ref 50–100)
WBC # BLD: 6.56 K/UL — SIGNIFICANT CHANGE UP (ref 3.8–10.5)
WBC # FLD AUTO: 6.56 K/UL — SIGNIFICANT CHANGE UP (ref 3.8–10.5)

## 2020-11-18 RX ADMIN — OLANZAPINE 5 MILLIGRAM(S): 15 TABLET, FILM COATED ORAL at 20:43

## 2020-11-18 RX ADMIN — MEMANTINE HYDROCHLORIDE 5 MILLIGRAM(S): 10 TABLET ORAL at 06:54

## 2020-11-18 RX ADMIN — ZINC OXIDE 1 APPLICATION(S): 200 OINTMENT TOPICAL at 06:54

## 2020-11-18 RX ADMIN — ZINC OXIDE 1 APPLICATION(S): 200 OINTMENT TOPICAL at 17:06

## 2020-11-18 RX ADMIN — OLANZAPINE 2.5 MILLIGRAM(S): 15 TABLET, FILM COATED ORAL at 08:20

## 2020-11-18 RX ADMIN — Medication 1: at 08:19

## 2020-11-18 RX ADMIN — HALOPERIDOL DECANOATE 2 MILLIGRAM(S): 100 INJECTION INTRAMUSCULAR at 20:08

## 2020-11-18 RX ADMIN — ZINC SULFATE TAB 220 MG (50 MG ZINC EQUIVALENT) 220 MILLIGRAM(S): 220 (50 ZN) TAB at 12:10

## 2020-11-18 RX ADMIN — SENNA PLUS 2 TABLET(S): 8.6 TABLET ORAL at 20:43

## 2020-11-18 RX ADMIN — Medication 1 TABLET(S): at 12:10

## 2020-11-18 RX ADMIN — Medication 500 MILLIGRAM(S): at 13:53

## 2020-11-18 RX ADMIN — Medication 500 MILLIGRAM(S): at 12:09

## 2020-11-18 RX ADMIN — Medication 1: at 17:05

## 2020-11-18 RX ADMIN — ENOXAPARIN SODIUM 40 MILLIGRAM(S): 100 INJECTION SUBCUTANEOUS at 12:09

## 2020-11-18 RX ADMIN — Medication 1000 UNIT(S): at 12:09

## 2020-11-18 RX ADMIN — Medication 500 MILLIGRAM(S): at 20:43

## 2020-11-18 RX ADMIN — Medication 1: at 12:09

## 2020-11-18 RX ADMIN — Medication 10 MILLIGRAM(S): at 20:44

## 2020-11-18 RX ADMIN — MEMANTINE HYDROCHLORIDE 5 MILLIGRAM(S): 10 TABLET ORAL at 17:05
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
## 2020-11-18 NOTE — PROGRESS NOTE ADULT - PROBLEM SELECTOR PLAN 6
Plan for discharge to [facility]  Covid positive 11/17  Covid ordered for 11/18 - f/u result  Will need two negative before return to [facility]

## 2020-11-18 NOTE — PROGRESS NOTE ADULT - SUBJECTIVE AND OBJECTIVE BOX
CARDIOLOGY/MEDICAL ATTENDING    CHIEF COMPLAINT:Patient is a 65y old  Male who presents with a chief complaint of Agitation.Pt appears comfortable.    	  REVIEW OF SYSTEMS:  unable to obtain        PHYSICAL EXAM:  T(C): 36.3 (11-18-20 @ 05:04), Max: 37.2 (11-17-20 @ 20:20)  HR: 61 (11-18-20 @ 05:04) (61 - 121)  BP: 122/75 (11-18-20 @ 05:04) (98/63 - 148/72)  RR: 18 (11-18-20 @ 05:04) (16 - 18)  SpO2: 98% (11-18-20 @ 05:04) (98% - 99%)        Appearance: Normal	  HEENT:   Normal oral mucosa, PERRL, EOMI	  Lymphatic: No lymphadenopathy  Cardiovascular: Normal S1 S2, No JVD, No murmurs, No edema  Respiratory: Lungs clear to auscultation	  Gastrointestinal:  Soft, Non-tender, + BS	  Skin: No rashes, No ecchymoses, No cyanosis	  Extremities: Normal range of motion, No clubbing, cyanosis or edema  Vascular: Peripheral pulses palpable 2+ bilaterally    MEDICATIONS  (STANDING):  ascorbic acid 500 milliGRAM(s) Oral daily  cholecalciferol 1000 Unit(s) Oral daily  enoxaparin Injectable 40 milliGRAM(s) SubCutaneous daily  insulin lispro (ADMELOG) corrective regimen sliding scale   SubCutaneous three times a day before meals  melatonin 10 milliGRAM(s) Oral at bedtime  memantine 5 milliGRAM(s) Oral two times a day  multivitamin/minerals 1 Tablet(s) Oral daily  OLANZapine 2.5 milliGRAM(s) Oral <User Schedule>  OLANZapine 5 milliGRAM(s) Oral at bedtime  senna 2 Tablet(s) Oral at bedtime  valproic  acid Syrup 500 milliGRAM(s) Oral at bedtime  valproic  acid Syrup 500 milliGRAM(s) Oral <User Schedule>  zinc oxide 20% Ointment 1 Application(s) Topical two times a day  zinc sulfate 220 milliGRAM(s) Oral daily    	  	  LABS:	 	                          13.4   6.56  )-----------( 177      ( 18 Nov 2020 06:21 )             40.3     11-18    140  |  103  |  22<H>  ----------------------------<  178<H>  3.6   |  30  |  1.08    Ca    9.5      18 Nov 2020 06:21      Lipid Profile: Cholesterol 216  LDL --  HDL 48        TSH: Thyroid Stimulating Hormone, Serum: 3.25 uU/mL (11-15 @ 07:29)

## 2020-11-18 NOTE — PROGRESS NOTE ADULT - ASSESSMENT
64 yr old male with PMHX of DM,Dementia,delirium,s/p COVID,s/p PEG here with agitation.  1.Agitation-depakote  500mg bid.  2.DM-Insulin.  3.Delirium-cont psych meds.  4.DM-Insulin.  5.GI and DVT prophylaxis.  6.Need COVIDx2- before going back to [facility].

## 2020-11-19 LAB
GLUCOSE BLDC GLUCOMTR-MCNC: 177 MG/DL — HIGH (ref 70–99)
GLUCOSE BLDC GLUCOMTR-MCNC: 192 MG/DL — HIGH (ref 70–99)
GLUCOSE BLDC GLUCOMTR-MCNC: 248 MG/DL — HIGH (ref 70–99)
GLUCOSE BLDC GLUCOMTR-MCNC: 330 MG/DL — HIGH (ref 70–99)

## 2020-11-19 RX ADMIN — Medication 500 MILLIGRAM(S): at 22:24

## 2020-11-19 RX ADMIN — Medication 2: at 08:31

## 2020-11-19 RX ADMIN — MEMANTINE HYDROCHLORIDE 5 MILLIGRAM(S): 10 TABLET ORAL at 17:36

## 2020-11-19 RX ADMIN — Medication 500 MILLIGRAM(S): at 12:51

## 2020-11-19 RX ADMIN — ZINC OXIDE 1 APPLICATION(S): 200 OINTMENT TOPICAL at 17:36

## 2020-11-19 RX ADMIN — Medication 1 TABLET(S): at 12:51

## 2020-11-19 RX ADMIN — Medication 1: at 17:36

## 2020-11-19 RX ADMIN — OLANZAPINE 2.5 MILLIGRAM(S): 15 TABLET, FILM COATED ORAL at 09:04

## 2020-11-19 RX ADMIN — Medication 4: at 12:52

## 2020-11-19 RX ADMIN — OLANZAPINE 5 MILLIGRAM(S): 15 TABLET, FILM COATED ORAL at 22:29

## 2020-11-19 RX ADMIN — ENOXAPARIN SODIUM 40 MILLIGRAM(S): 100 INJECTION SUBCUTANEOUS at 12:51

## 2020-11-19 RX ADMIN — Medication 10 MILLIGRAM(S): at 22:24

## 2020-11-19 RX ADMIN — ZINC SULFATE TAB 220 MG (50 MG ZINC EQUIVALENT) 220 MILLIGRAM(S): 220 (50 ZN) TAB at 12:51

## 2020-11-19 RX ADMIN — HALOPERIDOL DECANOATE 2 MILLIGRAM(S): 100 INJECTION INTRAMUSCULAR at 16:22

## 2020-11-19 RX ADMIN — ZINC OXIDE 1 APPLICATION(S): 200 OINTMENT TOPICAL at 06:26

## 2020-11-19 RX ADMIN — Medication 1000 UNIT(S): at 12:51

## 2020-11-19 RX ADMIN — OLANZAPINE 2.5 MILLIGRAM(S): 15 TABLET, FILM COATED ORAL at 22:24

## 2020-11-19 RX ADMIN — SENNA PLUS 2 TABLET(S): 8.6 TABLET ORAL at 22:24

## 2020-11-19 RX ADMIN — MEMANTINE HYDROCHLORIDE 5 MILLIGRAM(S): 10 TABLET ORAL at 09:04

## 2020-11-19 RX ADMIN — Medication 500 MILLIGRAM(S): at 09:05

## 2020-11-19 NOTE — PROGRESS NOTE ADULT - PROBLEM SELECTOR PLAN 2
COVID 19 PCR   tested positive on 11/17   antibody positive - discussed with infection control, no need for isolation   Covid negative 11/18   Will repeat tomorrow for discharge
COVID 19 PCR sent for routine discharge w/u   tested positive on 11/17   antibody positive - discussed with infection control, will get rpt COVID 19 PCR today
A1C 8.4  Continue sliding scale

## 2020-11-19 NOTE — PROGRESS NOTE ADULT - PROBLEM SELECTOR PLAN 6
Plan for discharge to [facility]  Covid positive 11/17  Covid negative 11/18   Will repeat today   Will need two negative before return to [facility]

## 2020-11-19 NOTE — PROGRESS NOTE ADULT - PROBLEM SELECTOR PLAN 1
Uncooperative and agitated at times during this admission, seen by psych and adjusted medication -- no behavioral issue reported today   Continue zyprexa 2.5 qAM, 5mg qhs  Depakene increased, now 500mg in AM and qhs  For mild to moderate agitation - zyprexa 2.5mg po q8H PRN  For severe agitation/po not possible, can give Zyprexa 2.5mg q8H IM injection PRN, can repeat dose if continues to be agitated   Can give Haldol 1-2mg if QTc <500 IM/IV if still agitated on above PRN.   Avoid restraints   Psych Dr. Packer -cleared pt for discharge
Uncooperative and agitated at times during this admission, seen by psych and adjusted medication -- no behavioral issue reported today   Continue zyprexa 2.5 qAM, 5mg qhs  Depakene increased, now 500mg in AM and qhs - f/u VPA level   For mild to moderate agitation - zyprexa 2.5mg po q8H PRN  For severe agitation/po not possible, can give Zyprexa 2.5mg q8H IM injection PRN, can repeat dose if continues to be agitated   Can give Haldol 1-2mg if QTc <500 IM/IV if still agitated on above PRN.   Avoid restraints   Psych Dr. Packer -cleared pt for discharge
Uncooperative and agitated at times  Continue zyprexa 2.5 qAM, 5mg qhs  Depakene increased, now 500mg in AM and qhs  For mild to moderate agitation - zyprexa 2.5mg po q8H PRN  For severe agitation/po not possible, can give Zyprexa 2.5mg q8H IM injection PRN, can repeat dose if continues to be agitated   Can give Haldol 1-2mg if QTc <500 IM/IV if still agitated on above PRN.   Avoid restraints   Carolee Packer

## 2020-11-19 NOTE — PROGRESS NOTE ADULT - SUBJECTIVE AND OBJECTIVE BOX
NP Note discussed with  Primary Attending    Patient is a 65y old  Male who presents with a chief complaint of Agitation (19 Nov 2020 11:40)      INTERVAL HPI/OVERNIGHT EVENTS: no new complaints    MEDICATIONS  (STANDING):  ascorbic acid 500 milliGRAM(s) Oral daily  cholecalciferol 1000 Unit(s) Oral daily  enoxaparin Injectable 40 milliGRAM(s) SubCutaneous daily  insulin lispro (ADMELOG) corrective regimen sliding scale   SubCutaneous three times a day before meals  melatonin 10 milliGRAM(s) Oral at bedtime  memantine 5 milliGRAM(s) Oral two times a day  multivitamin/minerals 1 Tablet(s) Oral daily  OLANZapine 2.5 milliGRAM(s) Oral <User Schedule>  OLANZapine 5 milliGRAM(s) Oral at bedtime  senna 2 Tablet(s) Oral at bedtime  valproic  acid Syrup 500 milliGRAM(s) Oral <User Schedule>  valproic  acid Syrup 500 milliGRAM(s) Oral at bedtime  zinc oxide 20% Ointment 1 Application(s) Topical two times a day  zinc sulfate 220 milliGRAM(s) Oral daily    MEDICATIONS  (PRN):  haloperidol    Injectable 2 milliGRAM(s) IV Push every 6 hours PRN Agitation  magnesium hydroxide Suspension 30 milliLiter(s) Oral every 6 hours PRN for congestion, for constipation  OLANZapine 2.5 milliGRAM(s) Oral every 8 hours PRN agitation      __________________________________________________  REVIEW OF SYSTEMS:    CONSTITUTIONAL: No fever,   EYES: no acute visual disturbances  NECK: No pain or stiffness  RESPIRATORY: No cough; No shortness of breath  CARDIOVASCULAR: No chest pain, no palpitations  GASTROINTESTINAL: No pain. No nausea or vomiting; No diarrhea   NEUROLOGICAL: No headache or numbness, no tremors  MUSCULOSKELETAL: No joint pain, no muscle pain  GENITOURINARY: no dysuria, no frequency, no hesitancy  PSYCHIATRY: no depression , no anxiety  ALL OTHER  ROS negative        Vital Signs Last 24 Hrs  T(C): 36.2 (19 Nov 2020 05:00), Max: 36.3 (18 Nov 2020 20:20)  T(F): 97.2 (19 Nov 2020 05:00), Max: 97.3 (18 Nov 2020 20:20)  HR: 56 (19 Nov 2020 05:00) (56 - 100)  BP: 120/70 (19 Nov 2020 05:00) (120/70 - 127/77)  BP(mean): --  RR: 17 (19 Nov 2020 05:00) (17 - 18)  SpO2: 100% (19 Nov 2020 05:00) (97% - 100%)    ________________________________________________  PHYSICAL EXAM:  GENERAL: NAD  HEENT: Normocephalic;  conjunctivae and sclerae clear; moist mucous membranes;   NECK : supple  CHEST/LUNG: Clear to auscultation bilaterally with good air entry   HEART: S1 S2  regular; no murmurs, gallops or rubs  ABDOMEN: Soft, Nontender, Nondistended; Bowel sounds present  EXTREMITIES: no cyanosis; no edema; no calf tenderness  SKIN: warm and dry; no rash  NERVOUS SYSTEM:  Awake and alert x1 confused   _________________________________________________  LABS:                        13.4   6.56  )-----------( 177      ( 18 Nov 2020 06:21 )             40.3     11-18    140  |  103  |  22<H>  ----------------------------<  178<H>  3.6   |  30  |  1.08    Ca    9.5      18 Nov 2020 06:21          CAPILLARY BLOOD GLUCOSE      POCT Blood Glucose.: 248 mg/dL (19 Nov 2020 08:12)  POCT Blood Glucose.: 305 mg/dL (18 Nov 2020 21:24)  POCT Blood Glucose.: 183 mg/dL (18 Nov 2020 16:48)        RADIOLOGY & ADDITIONAL TESTS:    Imaging  Reviewed:  YES    Consultant(s) Notes Reviewed:   YES      Plan of care was discussed with patient and /or primary care giver; all questions and concerns were addressed

## 2020-11-19 NOTE — PROGRESS NOTE ADULT - PROBLEM SELECTOR PLAN 4
A1C 8.4  Continue sliding scale  FS remain 180- 250
A1C 8.4  Continue sliding scale  FS remain 180- 250
DVT ppx - lovenox

## 2020-11-19 NOTE — PROGRESS NOTE ADULT - SUBJECTIVE AND OBJECTIVE BOX
CARDIOLOGY/MEDICAL ATTENDING    CHIEF COMPLAINT:Patient is a 65y old  Male who presents with a chief complaint of Agitation.Pt appears comfortable.    	  REVIEW OF SYSTEMS:  CONSTITUTIONAL: No fever, weight loss, or fatigue  EYES: No eye pain, visual disturbances, or discharge  ENT:  No difficulty hearing, tinnitus, vertigo; No sinus or throat pain  NECK: No pain or stiffness  RESPIRATORY: No cough, wheezing, chills or hemoptysis; No Shortness of Breath  CARDIOVASCULAR: No chest pain, palpitations, passing out, dizziness, or leg swelling  GASTROINTESTINAL: No abdominal or epigastric pain. No nausea, vomiting, or hematemesis; No diarrhea or constipation. No melena or hematochezia.  GENITOURINARY: No dysuria, frequency, hematuria, or incontinence  NEUROLOGICAL: No headaches, memory loss, loss of strength, numbness, or tremors  SKIN: No itching, burning, rashes, or lesions   LYMPH Nodes: No enlarged glands  ENDOCRINE: No heat or cold intolerance; No hair loss  MUSCULOSKELETAL: No joint pain or swelling; No muscle, back, or extremity pain  PSYCHIATRIC: No depression, anxiety, mood swings, or difficulty sleeping  HEME/LYMPH: No easy bruising, or bleeding gums  ALLERGY AND IMMUNOLOGIC: No hives or eczema	        PHYSICAL EXAM:  T(C): 36.2 (11-19-20 @ 05:00), Max: 36.6 (11-18-20 @ 12:02)  HR: 56 (11-19-20 @ 05:00) (56 - 100)  BP: 120/70 (11-19-20 @ 05:00) (118/66 - 127/77)  RR: 17 (11-19-20 @ 05:00) (17 - 18)  SpO2: 100% (11-19-20 @ 05:00) (97% - 100%)  Wt(kg): --  I&O's Summary      Appearance: Normal	  HEENT:   Normal oral mucosa, PERRL, EOMI	  Lymphatic: No lymphadenopathy  Cardiovascular: Normal S1 S2, No JVD, No murmurs, No edema  Respiratory: Lungs clear to auscultation	  Gastrointestinal:  Soft, Non-tender, + BS	  Skin: No rashes, No ecchymoses, No cyanosis	  Extremities: Normal range of motion, No clubbing, cyanosis or edema  Vascular: Peripheral pulses palpable 2+ bilaterally    MEDICATIONS  (STANDING):  ascorbic acid 500 milliGRAM(s) Oral daily  cholecalciferol 1000 Unit(s) Oral daily  enoxaparin Injectable 40 milliGRAM(s) SubCutaneous daily  insulin lispro (ADMELOG) corrective regimen sliding scale   SubCutaneous three times a day before meals  melatonin 10 milliGRAM(s) Oral at bedtime  memantine 5 milliGRAM(s) Oral two times a day  multivitamin/minerals 1 Tablet(s) Oral daily  OLANZapine 2.5 milliGRAM(s) Oral <User Schedule>  OLANZapine 5 milliGRAM(s) Oral at bedtime  senna 2 Tablet(s) Oral at bedtime  valproic  acid Syrup 500 milliGRAM(s) Oral <User Schedule>  valproic  acid Syrup 500 milliGRAM(s) Oral at bedtime  zinc oxide 20% Ointment 1 Application(s) Topical two times a day  zinc sulfate 220 milliGRAM(s) Oral daily     	  	  LABS:	 	                        13.4   6.56  )-----------( 177      ( 18 Nov 2020 06:21 )             40.3     11-18    140  |  103  |  22<H>  ----------------------------<  178<H>  3.6   |  30  |  1.08    Ca    9.5      18 Nov 2020 06:21        Lipid Profile: Cholesterol 216  LDL --  HDL 48        TSH: Thyroid Stimulating Hormone, Serum: 3.25 uU/mL (11-15 @ 07:29)      	      COVID-.

## 2020-11-19 NOTE — PROGRESS NOTE ADULT - PROBLEM SELECTOR PLAN 3
Continue depakene  no acitve seizure noted  f/u VPA level
Continue depakene  no active seizure noted
Continue depakene

## 2020-11-19 NOTE — PROGRESS NOTE ADULT - PROBLEM SELECTOR PLAN 5
DVT ppx - lovenox.
DVT ppx - lovenox.
Plan for discharge to [facility]  Covid positive 11/17  Covid ordered for 11/18  Will need two negative before return to [facility]

## 2020-11-20 ENCOUNTER — TRANSCRIPTION ENCOUNTER (OUTPATIENT)
Age: 65
End: 2020-11-20

## 2020-11-20 VITALS — DIASTOLIC BLOOD PRESSURE: 64 MMHG | HEART RATE: 70 BPM | OXYGEN SATURATION: 99 % | SYSTOLIC BLOOD PRESSURE: 127 MMHG

## 2020-11-20 LAB
GLUCOSE BLDC GLUCOMTR-MCNC: 201 MG/DL — HIGH (ref 70–99)
GLUCOSE BLDC GLUCOMTR-MCNC: 207 MG/DL — HIGH (ref 70–99)
GLUCOSE BLDC GLUCOMTR-MCNC: 280 MG/DL — HIGH (ref 70–99)
METHYLMALONATE SERPL-SCNC: 87 NMOL/L — SIGNIFICANT CHANGE UP (ref 0–378)
SARS-COV-2 RNA SPEC QL NAA+PROBE: SIGNIFICANT CHANGE UP

## 2020-11-20 PROCEDURE — 84100 ASSAY OF PHOSPHORUS: CPT

## 2020-11-20 PROCEDURE — 87635 SARS-COV-2 COVID-19 AMP PRB: CPT

## 2020-11-20 PROCEDURE — 99285 EMERGENCY DEPT VISIT HI MDM: CPT

## 2020-11-20 PROCEDURE — 80061 LIPID PANEL: CPT

## 2020-11-20 PROCEDURE — 36415 COLL VENOUS BLD VENIPUNCTURE: CPT

## 2020-11-20 PROCEDURE — 80177 DRUG SCRN QUAN LEVETIRACETAM: CPT

## 2020-11-20 PROCEDURE — 87640 STAPH A DNA AMP PROBE: CPT

## 2020-11-20 PROCEDURE — 86780 TREPONEMA PALLIDUM: CPT

## 2020-11-20 PROCEDURE — 96372 THER/PROPH/DIAG INJ SC/IM: CPT | Mod: XU

## 2020-11-20 PROCEDURE — 83036 HEMOGLOBIN GLYCOSYLATED A1C: CPT

## 2020-11-20 PROCEDURE — 93005 ELECTROCARDIOGRAM TRACING: CPT

## 2020-11-20 PROCEDURE — 93306 TTE W/DOPPLER COMPLETE: CPT

## 2020-11-20 PROCEDURE — 86769 SARS-COV-2 COVID-19 ANTIBODY: CPT

## 2020-11-20 PROCEDURE — 83921 ORGANIC ACID SINGLE QUANT: CPT

## 2020-11-20 PROCEDURE — 80307 DRUG TEST PRSMV CHEM ANLYZR: CPT

## 2020-11-20 PROCEDURE — 96374 THER/PROPH/DIAG INJ IV PUSH: CPT

## 2020-11-20 PROCEDURE — 87086 URINE CULTURE/COLONY COUNT: CPT

## 2020-11-20 PROCEDURE — 70450 CT HEAD/BRAIN W/O DYE: CPT

## 2020-11-20 PROCEDURE — 87641 MR-STAPH DNA AMP PROBE: CPT

## 2020-11-20 PROCEDURE — 82306 VITAMIN D 25 HYDROXY: CPT

## 2020-11-20 PROCEDURE — 81003 URINALYSIS AUTO W/O SCOPE: CPT

## 2020-11-20 PROCEDURE — 84443 ASSAY THYROID STIM HORMONE: CPT

## 2020-11-20 PROCEDURE — 97162 PT EVAL MOD COMPLEX 30 MIN: CPT

## 2020-11-20 PROCEDURE — 85025 COMPLETE CBC W/AUTO DIFF WBC: CPT

## 2020-11-20 PROCEDURE — 80053 COMPREHEN METABOLIC PANEL: CPT

## 2020-11-20 PROCEDURE — 80048 BASIC METABOLIC PNL TOTAL CA: CPT

## 2020-11-20 PROCEDURE — 83735 ASSAY OF MAGNESIUM: CPT

## 2020-11-20 PROCEDURE — 82962 GLUCOSE BLOOD TEST: CPT

## 2020-11-20 PROCEDURE — 83090 ASSAY OF HOMOCYSTEINE: CPT

## 2020-11-20 PROCEDURE — 85027 COMPLETE CBC AUTOMATED: CPT

## 2020-11-20 PROCEDURE — 82607 VITAMIN B-12: CPT

## 2020-11-20 PROCEDURE — 82140 ASSAY OF AMMONIA: CPT

## 2020-11-20 PROCEDURE — 80164 ASSAY DIPROPYLACETIC ACD TOT: CPT

## 2020-11-20 PROCEDURE — 82746 ASSAY OF FOLIC ACID SERUM: CPT

## 2020-11-20 RX ADMIN — ZINC OXIDE 1 APPLICATION(S): 200 OINTMENT TOPICAL at 05:42

## 2020-11-20 RX ADMIN — Medication 1 TABLET(S): at 12:26

## 2020-11-20 RX ADMIN — MEMANTINE HYDROCHLORIDE 5 MILLIGRAM(S): 10 TABLET ORAL at 05:42

## 2020-11-20 RX ADMIN — MEMANTINE HYDROCHLORIDE 5 MILLIGRAM(S): 10 TABLET ORAL at 17:47

## 2020-11-20 RX ADMIN — Medication 3: at 12:18

## 2020-11-20 RX ADMIN — ZINC SULFATE TAB 220 MG (50 MG ZINC EQUIVALENT) 220 MILLIGRAM(S): 220 (50 ZN) TAB at 12:26

## 2020-11-20 RX ADMIN — Medication 500 MILLIGRAM(S): at 12:26

## 2020-11-20 RX ADMIN — Medication 2: at 08:00

## 2020-11-20 RX ADMIN — Medication 1000 UNIT(S): at 12:26

## 2020-11-20 RX ADMIN — Medication 2: at 17:43

## 2020-11-20 RX ADMIN — OLANZAPINE 2.5 MILLIGRAM(S): 15 TABLET, FILM COATED ORAL at 08:22

## 2020-11-20 RX ADMIN — Medication 500 MILLIGRAM(S): at 12:27

## 2020-11-20 RX ADMIN — ENOXAPARIN SODIUM 40 MILLIGRAM(S): 100 INJECTION SUBCUTANEOUS at 12:26

## 2020-11-20 RX ADMIN — ZINC OXIDE 1 APPLICATION(S): 200 OINTMENT TOPICAL at 18:51

## 2020-11-20 NOTE — PROGRESS NOTE ADULT - SUBJECTIVE AND OBJECTIVE BOX
CARDIOLOGY/MEDICAL ATTENDING    CHIEF COMPLAINT:Patient is a 65y old  Male who presents with a chief complaint of Agitation.Pt appears comfortable.    	  REVIEW OF SYSTEMS:  [ x] Unable to obtain    PHYSICAL EXAM:  T(C): 36.2 (11-20-20 @ 05:13), Max: 37.1 (11-19-20 @ 13:28)  HR: 59 (11-20-20 @ 05:13) (59 - 80)  BP: 133/79 (11-20-20 @ 05:13) (125/71 - 136/76)  RR: 18 (11-20-20 @ 05:13) (17 - 18)  SpO2: 100% (11-20-20 @ 05:13) (98% - 100%)  Wt(kg): --  I&O's Summary      Appearance: Normal	  HEENT:   Normal oral mucosa, PERRL, EOMI	  Lymphatic: No lymphadenopathy  Cardiovascular: Normal S1 S2, No JVD, No murmurs, No edema  Respiratory: Lungs clear to auscultation	  Gastrointestinal:  Soft, Non-tender, + BS	  Skin: No rashes, No ecchymoses, No cyanosis	  Extremities: Normal range of motion, No clubbing, cyanosis or edema  Vascular: Peripheral pulses palpable 2+ bilaterally    MEDICATIONS  (STANDING):  ascorbic acid 500 milliGRAM(s) Oral daily  cholecalciferol 1000 Unit(s) Oral daily  enoxaparin Injectable 40 milliGRAM(s) SubCutaneous daily  insulin lispro (ADMELOG) corrective regimen sliding scale   SubCutaneous three times a day before meals  melatonin 10 milliGRAM(s) Oral at bedtime  memantine 5 milliGRAM(s) Oral two times a day  multivitamin/minerals 1 Tablet(s) Oral daily  OLANZapine 2.5 milliGRAM(s) Oral <User Schedule>  OLANZapine 5 milliGRAM(s) Oral at bedtime  senna 2 Tablet(s) Oral at bedtime  valproic  acid Syrup 500 milliGRAM(s) Oral at bedtime  valproic  acid Syrup 500 milliGRAM(s) Oral <User Schedule>  zinc oxide 20% Ointment 1 Application(s) Topical two times a day  zinc sulfate 220 milliGRAM(s) Oral daily    	  	  LABS:	 	      Lipid Profile: Cholesterol 216  LDL --  HDL 48         TSH: Thyroid Stimulating Hormone, Serum: 3.25 uU/mL (11-15 @ 07:29)

## 2020-11-20 NOTE — PHYSICAL THERAPY INITIAL EVALUATION ADULT - GENERAL OBSERVATIONS, REHAB EVAL
Consult received, chart reviewed. Patient received supine in bed, NAD, flat affect. Patient agreed to EVALUATION from Physical Therapist.

## 2020-11-20 NOTE — PHYSICAL THERAPY INITIAL EVALUATION ADULT - GAIT DEVIATIONS NOTED, PT EVAL
decreased velocity of limb motion/decreased step length/decreased jones/decreased stride length/decreased weight-shifting ability

## 2020-11-20 NOTE — PHYSICAL THERAPY INITIAL EVALUATION ADULT - IMPAIRMENTS FOUND, PT EVAL
arousal, attention, and cognition/cognitive impairment/muscle strength/posture/gait, locomotion, and balance/ROM

## 2020-11-20 NOTE — PHYSICAL THERAPY INITIAL EVALUATION ADULT - PLANNED THERAPY INTERVENTIONS, PT EVAL
postural re-education/strengthening/neuromuscular re-education/balance training/bed mobility training/transfer training/ROM

## 2020-11-20 NOTE — PHYSICAL THERAPY INITIAL EVALUATION ADULT - ACTIVE RANGE OF MOTION EXAMINATION, REHAB EVAL
Highly limited assessment secondary to cognition: BUE WFL except shoulders ~2/3 range. BLE ~1/2 range.

## 2020-11-20 NOTE — DISCHARGE NOTE NURSING/CASE MANAGEMENT/SOCIAL WORK - PATIENT PORTAL LINK FT
You can access the FollowMyHealth Patient Portal offered by [facility] by registering at the following website: [email or web address]. By joining [employer]’s FollowMyHealth portal, you will also be able to view your health information using other applications (apps) compatible with our system.

## 2020-11-20 NOTE — PHYSICAL THERAPY INITIAL EVALUATION ADULT - DISCHARGE DISPOSITION, PT EVAL
Sub- Acute Rehab. at this time. Pt. ability likely far greater than demonstrated at IE. However, should pt cogntion and ability to follow commands improve, pt. ability likely to improve as well. Will follow and update as appropriate.

## 2020-11-20 NOTE — PHYSICAL THERAPY INITIAL EVALUATION ADULT - CRITERIA FOR SKILLED THERAPEUTIC INTERVENTIONS
rehab potential/predicted duration of therapy intervention/functional limitations in following categories/risk reduction/prevention/impairments found/anticipated discharge recommendation/therapy frequency

## 2020-11-20 NOTE — PHYSICAL THERAPY INITIAL EVALUATION ADULT - DIAGNOSIS, PT EVAL
impaired balance, strength, ROM. Additional diagnoses pending further assessment of Pt. functional mobility and ability to participate

## 2020-11-21 NOTE — CHART NOTE - NSCHARTNOTEFT_GEN_A_CORE
Pt s/p discharge to [facility] yesterday 11/20: Received numerous phone calls back and forth with staff at [facility] (RN and NP) plus nursing staff at [facility] regarding discharge plan of care. [facility] staff concern with dc plans and indication for change, Spoke with Rosibel (NP at [facility]) regarding their concerns: DC summary printed and fax to Rosibel; also behavioral health note fax to [facility] team for indication of med change.

## 2020-11-24 ENCOUNTER — EMERGENCY (EMERGENCY)
Facility: HOSPITAL | Age: 65
LOS: 1 days | Discharge: NURSING, REHAB OR LONG-TERM CARE | End: 2020-11-24
Attending: EMERGENCY MEDICINE | Admitting: EMERGENCY MEDICINE
Payer: MEDICARE

## 2020-11-24 VITALS
HEART RATE: 97 BPM | TEMPERATURE: 98 F | DIASTOLIC BLOOD PRESSURE: 74 MMHG | OXYGEN SATURATION: 98 % | RESPIRATION RATE: 16 BRPM | SYSTOLIC BLOOD PRESSURE: 127 MMHG | HEIGHT: 68 IN

## 2020-11-24 LAB
ALBUMIN SERPL ELPH-MCNC: 4.4 G/DL — SIGNIFICANT CHANGE UP (ref 3.3–5)
ALP SERPL-CCNC: 48 U/L — SIGNIFICANT CHANGE UP (ref 40–120)
ALT FLD-CCNC: 18 U/L — SIGNIFICANT CHANGE UP (ref 4–41)
ANION GAP SERPL CALC-SCNC: 12 MMO/L — SIGNIFICANT CHANGE UP (ref 7–14)
APPEARANCE UR: CLEAR — SIGNIFICANT CHANGE UP
AST SERPL-CCNC: 29 U/L — SIGNIFICANT CHANGE UP (ref 4–40)
BACTERIA # UR AUTO: NEGATIVE — SIGNIFICANT CHANGE UP
BASOPHILS # BLD AUTO: 0.02 K/UL — SIGNIFICANT CHANGE UP (ref 0–0.2)
BASOPHILS NFR BLD AUTO: 0.3 % — SIGNIFICANT CHANGE UP (ref 0–2)
BILIRUB SERPL-MCNC: 0.4 MG/DL — SIGNIFICANT CHANGE UP (ref 0.2–1.2)
BILIRUB UR-MCNC: NEGATIVE — SIGNIFICANT CHANGE UP
BLOOD UR QL VISUAL: NEGATIVE — SIGNIFICANT CHANGE UP
BUN SERPL-MCNC: 27 MG/DL — HIGH (ref 7–23)
CALCIUM SERPL-MCNC: 10 MG/DL — SIGNIFICANT CHANGE UP (ref 8.4–10.5)
CHLORIDE SERPL-SCNC: 105 MMOL/L — SIGNIFICANT CHANGE UP (ref 98–107)
CO2 SERPL-SCNC: 23 MMOL/L — SIGNIFICANT CHANGE UP (ref 22–31)
COLOR SPEC: YELLOW — SIGNIFICANT CHANGE UP
CREAT SERPL-MCNC: 0.87 MG/DL — SIGNIFICANT CHANGE UP (ref 0.5–1.3)
EOSINOPHIL # BLD AUTO: 0.1 K/UL — SIGNIFICANT CHANGE UP (ref 0–0.5)
EOSINOPHIL NFR BLD AUTO: 1.7 % — SIGNIFICANT CHANGE UP (ref 0–6)
GLUCOSE SERPL-MCNC: 136 MG/DL — HIGH (ref 70–99)
GLUCOSE UR-MCNC: 100 — SIGNIFICANT CHANGE UP
HCT VFR BLD CALC: 46 % — SIGNIFICANT CHANGE UP (ref 39–50)
HGB BLD-MCNC: 14.2 G/DL — SIGNIFICANT CHANGE UP (ref 13–17)
HYALINE CASTS # UR AUTO: NEGATIVE — SIGNIFICANT CHANGE UP
IMM GRANULOCYTES NFR BLD AUTO: 0.3 % — SIGNIFICANT CHANGE UP (ref 0–1.5)
KETONES UR-MCNC: SIGNIFICANT CHANGE UP
LEUKOCYTE ESTERASE UR-ACNC: NEGATIVE — SIGNIFICANT CHANGE UP
LYMPHOCYTES # BLD AUTO: 1.83 K/UL — SIGNIFICANT CHANGE UP (ref 1–3.3)
LYMPHOCYTES # BLD AUTO: 30.3 % — SIGNIFICANT CHANGE UP (ref 13–44)
MCHC RBC-ENTMCNC: 27.4 PG — SIGNIFICANT CHANGE UP (ref 27–34)
MCHC RBC-ENTMCNC: 30.9 % — LOW (ref 32–36)
MCV RBC AUTO: 88.6 FL — SIGNIFICANT CHANGE UP (ref 80–100)
MONOCYTES # BLD AUTO: 0.54 K/UL — SIGNIFICANT CHANGE UP (ref 0–0.9)
MONOCYTES NFR BLD AUTO: 9 % — SIGNIFICANT CHANGE UP (ref 2–14)
NEUTROPHILS # BLD AUTO: 3.52 K/UL — SIGNIFICANT CHANGE UP (ref 1.8–7.4)
NEUTROPHILS NFR BLD AUTO: 58.4 % — SIGNIFICANT CHANGE UP (ref 43–77)
NITRITE UR-MCNC: NEGATIVE — SIGNIFICANT CHANGE UP
NRBC # FLD: 0 K/UL — SIGNIFICANT CHANGE UP (ref 0–0)
PH UR: 7 — SIGNIFICANT CHANGE UP (ref 5–8)
PLATELET # BLD AUTO: 204 K/UL — SIGNIFICANT CHANGE UP (ref 150–400)
PMV BLD: 10.4 FL — SIGNIFICANT CHANGE UP (ref 7–13)
POTASSIUM SERPL-MCNC: 5 MMOL/L — SIGNIFICANT CHANGE UP (ref 3.5–5.3)
POTASSIUM SERPL-SCNC: 5 MMOL/L — SIGNIFICANT CHANGE UP (ref 3.5–5.3)
PROT SERPL-MCNC: 7.7 G/DL — SIGNIFICANT CHANGE UP (ref 6–8.3)
PROT UR-MCNC: 20 — SIGNIFICANT CHANGE UP
RBC # BLD: 5.19 M/UL — SIGNIFICANT CHANGE UP (ref 4.2–5.8)
RBC # FLD: 12.3 % — SIGNIFICANT CHANGE UP (ref 10.3–14.5)
RBC CASTS # UR COMP ASSIST: SIGNIFICANT CHANGE UP (ref 0–?)
SODIUM SERPL-SCNC: 140 MMOL/L — SIGNIFICANT CHANGE UP (ref 135–145)
SP GR SPEC: 1.03 — SIGNIFICANT CHANGE UP (ref 1–1.04)
SQUAMOUS # UR AUTO: SIGNIFICANT CHANGE UP
UROBILINOGEN FLD QL: NORMAL — SIGNIFICANT CHANGE UP
WBC # BLD: 6.03 K/UL — SIGNIFICANT CHANGE UP (ref 3.8–10.5)
WBC # FLD AUTO: 6.03 K/UL — SIGNIFICANT CHANGE UP (ref 3.8–10.5)
WBC UR QL: SIGNIFICANT CHANGE UP (ref 0–?)

## 2020-11-24 PROCEDURE — 99284 EMERGENCY DEPT VISIT MOD MDM: CPT | Mod: CS,GC

## 2020-11-24 RX ORDER — HALOPERIDOL DECANOATE 100 MG/ML
5 INJECTION INTRAMUSCULAR ONCE
Refills: 0 | Status: COMPLETED | OUTPATIENT
Start: 2020-11-24 | End: 2020-11-24

## 2020-11-24 RX ADMIN — HALOPERIDOL DECANOATE 5 MILLIGRAM(S): 100 INJECTION INTRAMUSCULAR at 21:18

## 2020-11-24 RX ADMIN — Medication 2 MILLIGRAM(S): at 23:00
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
## 2020-11-24 NOTE — ED PROVIDER NOTE - PATIENT PORTAL LINK FT
You can access the FollowMyHealth Patient Portal offered by [facility] by registering at the following website: [email or web address]. By joining [employer]’s FollowMyHealth portal, you will also be able to view your health information using other applications (apps) compatible with our system.

## 2020-11-24 NOTE — ED PROVIDER NOTE - NSFOLLOWUPINSTRUCTIONS_ED_ALL_ED_FT
Please continue to take all of your medications as prescribed and follow up with your psychiatrist as soon  as possible.    Call 911 or return to the emergency department if you experience any thoughts of hurting yourself, hurting others, or any physical symptoms that are concerning to you.

## 2020-11-24 NOTE — ED PROVIDER NOTE - PHYSICAL EXAMINATION
Vital signs noted. no sign of head/facial trauma. sitting up, cooperative alert to name, following simple commands. no work of breathing soft nondistended nontender abdomen moving all ext.
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
## 2020-11-24 NOTE — ED PROVIDER NOTE - PROGRESS NOTE DETAILS
Rec'd signout from this pt at 12am, awaiting covid pcr results for transfer back to facility.  -Dr. Dodge
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
## 2020-11-25 VITALS
RESPIRATION RATE: 16 BRPM | SYSTOLIC BLOOD PRESSURE: 131 MMHG | OXYGEN SATURATION: 100 % | DIASTOLIC BLOOD PRESSURE: 62 MMHG | TEMPERATURE: 98 F | HEART RATE: 63 BPM

## 2020-11-25 PROBLEM — R56.9 UNSPECIFIED CONVULSIONS: Chronic | Status: ACTIVE | Noted: 2020-11-14

## 2020-11-25 PROBLEM — Z87.19 PERSONAL HISTORY OF OTHER DISEASES OF THE DIGESTIVE SYSTEM: Chronic | Status: ACTIVE | Noted: 2020-11-14

## 2020-11-25 LAB
APTT BLD: 31.3 SEC — SIGNIFICANT CHANGE UP (ref 27–36.3)
B PERT DNA SPEC QL NAA+PROBE: SIGNIFICANT CHANGE UP
C PNEUM DNA SPEC QL NAA+PROBE: SIGNIFICANT CHANGE UP
FLUAV H1 2009 PAND RNA SPEC QL NAA+PROBE: SIGNIFICANT CHANGE UP
FLUAV H1 RNA SPEC QL NAA+PROBE: SIGNIFICANT CHANGE UP
FLUAV H3 RNA SPEC QL NAA+PROBE: SIGNIFICANT CHANGE UP
FLUAV SUBTYP SPEC NAA+PROBE: SIGNIFICANT CHANGE UP
FLUBV RNA SPEC QL NAA+PROBE: SIGNIFICANT CHANGE UP
HADV DNA SPEC QL NAA+PROBE: SIGNIFICANT CHANGE UP
HCOV PNL SPEC NAA+PROBE: SIGNIFICANT CHANGE UP
HMPV RNA SPEC QL NAA+PROBE: SIGNIFICANT CHANGE UP
HPIV1 RNA SPEC QL NAA+PROBE: SIGNIFICANT CHANGE UP
HPIV2 RNA SPEC QL NAA+PROBE: SIGNIFICANT CHANGE UP
HPIV3 RNA SPEC QL NAA+PROBE: SIGNIFICANT CHANGE UP
HPIV4 RNA SPEC QL NAA+PROBE: SIGNIFICANT CHANGE UP
INR BLD: 1.12 — SIGNIFICANT CHANGE UP (ref 0.88–1.16)
PROTHROM AB SERPL-ACNC: 12.8 SEC — SIGNIFICANT CHANGE UP (ref 10.6–13.6)
RAPID RVP RESULT: SIGNIFICANT CHANGE UP
RSV RNA SPEC QL NAA+PROBE: SIGNIFICANT CHANGE UP
RV+EV RNA SPEC QL NAA+PROBE: SIGNIFICANT CHANGE UP
SARS-COV-2 RNA SPEC QL NAA+PROBE: SIGNIFICANT CHANGE UP

## 2020-11-25 PROCEDURE — 71045 X-RAY EXAM CHEST 1 VIEW: CPT | Mod: 26

## 2020-11-25 NOTE — PROVIDER CONTACT NOTE (OTHER) - SITUATION
Notified by MICAELA Mayberry that pt is cleared to return to assisted living facility; transportation is needed.

## 2020-11-25 NOTE — ED POST DISCHARGE NOTE - REASON FOR FOLLOW-UP
Other Janelle sil[facility] called asking that we fax over ED discharge note. Faxed to [facility] at fax # [phone].

## 2020-11-25 NOTE — PROVIDER CONTACT NOTE (OTHER) - BACKGROUND
Pt resides at [facility] at [address] [place] [address] Rd. [place], [place].

## 2020-11-26 LAB
CULTURE RESULTS: NO GROWTH — SIGNIFICANT CHANGE UP
SPECIMEN SOURCE: SIGNIFICANT CHANGE UP

## 2020-11-30 ENCOUNTER — INPATIENT (INPATIENT)
Facility: HOSPITAL | Age: 65
LOS: 2 days | Discharge: HOME | DRG: 884 | End: 2020-12-03
Attending: STUDENT IN AN ORGANIZED HEALTH CARE EDUCATION/TRAINING PROGRAM | Admitting: HOSPITALIST
Payer: MEDICARE

## 2020-11-30 VITALS — WEIGHT: 179.9 LBS | HEIGHT: 68 IN

## 2020-11-30 DIAGNOSIS — R41.82 ALTERED MENTAL STATUS, UNSPECIFIED: ICD-10-CM

## 2020-11-30 LAB
ALBUMIN SERPL ELPH-MCNC: 4.4 G/DL — SIGNIFICANT CHANGE UP (ref 3.3–5)
ALP SERPL-CCNC: 57 U/L — SIGNIFICANT CHANGE UP (ref 40–120)
ALT FLD-CCNC: 21 U/L — SIGNIFICANT CHANGE UP (ref 10–45)
ANION GAP SERPL CALC-SCNC: 15 MMOL/L — SIGNIFICANT CHANGE UP (ref 5–17)
APPEARANCE UR: CLEAR — SIGNIFICANT CHANGE UP
APTT BLD: 27.3 SEC — LOW (ref 27.5–35.5)
AST SERPL-CCNC: 22 U/L — SIGNIFICANT CHANGE UP (ref 10–40)
BASOPHILS # BLD AUTO: 0.02 K/UL — SIGNIFICANT CHANGE UP (ref 0–0.2)
BASOPHILS NFR BLD AUTO: 0.3 % — SIGNIFICANT CHANGE UP (ref 0–2)
BILIRUB SERPL-MCNC: 0.3 MG/DL — SIGNIFICANT CHANGE UP (ref 0.2–1.2)
BILIRUB UR-MCNC: NEGATIVE — SIGNIFICANT CHANGE UP
BUN SERPL-MCNC: 32 MG/DL — HIGH (ref 7–23)
CALCIUM SERPL-MCNC: 9.8 MG/DL — SIGNIFICANT CHANGE UP (ref 8.4–10.5)
CHLORIDE SERPL-SCNC: 100 MMOL/L — SIGNIFICANT CHANGE UP (ref 96–108)
CO2 SERPL-SCNC: 21 MMOL/L — LOW (ref 22–31)
COLOR SPEC: YELLOW — SIGNIFICANT CHANGE UP
CREAT SERPL-MCNC: 0.98 MG/DL — SIGNIFICANT CHANGE UP (ref 0.5–1.3)
CULTURE RESULTS: SIGNIFICANT CHANGE UP
CULTURE RESULTS: SIGNIFICANT CHANGE UP
DIFF PNL FLD: NEGATIVE — SIGNIFICANT CHANGE UP
EOSINOPHIL # BLD AUTO: 0.04 K/UL — SIGNIFICANT CHANGE UP (ref 0–0.5)
EOSINOPHIL NFR BLD AUTO: 0.7 % — SIGNIFICANT CHANGE UP (ref 0–6)
GAS PNL BLDV: SIGNIFICANT CHANGE UP
GLUCOSE SERPL-MCNC: 233 MG/DL — HIGH (ref 70–99)
GLUCOSE UR QL: ABNORMAL
HCT VFR BLD CALC: 37.9 % — LOW (ref 39–50)
HGB BLD-MCNC: 12.2 G/DL — LOW (ref 13–17)
IMM GRANULOCYTES NFR BLD AUTO: 0.3 % — SIGNIFICANT CHANGE UP (ref 0–1.5)
INR BLD: 1.08 RATIO — SIGNIFICANT CHANGE UP (ref 0.88–1.16)
KETONES UR-MCNC: ABNORMAL
LEUKOCYTE ESTERASE UR-ACNC: NEGATIVE — SIGNIFICANT CHANGE UP
LYMPHOCYTES # BLD AUTO: 0.77 K/UL — LOW (ref 1–3.3)
LYMPHOCYTES # BLD AUTO: 12.9 % — LOW (ref 13–44)
MCHC RBC-ENTMCNC: 28.2 PG — SIGNIFICANT CHANGE UP (ref 27–34)
MCHC RBC-ENTMCNC: 32.2 GM/DL — SIGNIFICANT CHANGE UP (ref 32–36)
MCV RBC AUTO: 87.7 FL — SIGNIFICANT CHANGE UP (ref 80–100)
MONOCYTES # BLD AUTO: 0.35 K/UL — SIGNIFICANT CHANGE UP (ref 0–0.9)
MONOCYTES NFR BLD AUTO: 5.9 % — SIGNIFICANT CHANGE UP (ref 2–14)
NEUTROPHILS # BLD AUTO: 4.77 K/UL — SIGNIFICANT CHANGE UP (ref 1.8–7.4)
NEUTROPHILS NFR BLD AUTO: 79.9 % — HIGH (ref 43–77)
NITRITE UR-MCNC: NEGATIVE — SIGNIFICANT CHANGE UP
NRBC # BLD: 0 /100 WBCS — SIGNIFICANT CHANGE UP (ref 0–0)
PH UR: 6 — SIGNIFICANT CHANGE UP (ref 5–8)
PLATELET # BLD AUTO: 183 K/UL — SIGNIFICANT CHANGE UP (ref 150–400)
POTASSIUM SERPL-MCNC: 4.5 MMOL/L — SIGNIFICANT CHANGE UP (ref 3.5–5.3)
POTASSIUM SERPL-SCNC: 4.5 MMOL/L — SIGNIFICANT CHANGE UP (ref 3.5–5.3)
PROT SERPL-MCNC: 7.4 G/DL — SIGNIFICANT CHANGE UP (ref 6–8.3)
PROT UR-MCNC: ABNORMAL
PROTHROM AB SERPL-ACNC: 12.9 SEC — SIGNIFICANT CHANGE UP (ref 10.6–13.6)
RBC # BLD: 4.32 M/UL — SIGNIFICANT CHANGE UP (ref 4.2–5.8)
RBC # FLD: 12.3 % — SIGNIFICANT CHANGE UP (ref 10.3–14.5)
SODIUM SERPL-SCNC: 136 MMOL/L — SIGNIFICANT CHANGE UP (ref 135–145)
SP GR SPEC: 1.03 — HIGH (ref 1.01–1.02)
SPECIMEN SOURCE: SIGNIFICANT CHANGE UP
SPECIMEN SOURCE: SIGNIFICANT CHANGE UP
UROBILINOGEN FLD QL: NEGATIVE — SIGNIFICANT CHANGE UP
VALPROATE SERPL-MCNC: 47 UG/ML — LOW (ref 50–100)
WBC # BLD: 5.97 K/UL — SIGNIFICANT CHANGE UP (ref 3.8–10.5)
WBC # FLD AUTO: 5.97 K/UL — SIGNIFICANT CHANGE UP (ref 3.8–10.5)

## 2020-11-30 PROCEDURE — 70450 CT HEAD/BRAIN W/O DYE: CPT | Mod: 26

## 2020-11-30 PROCEDURE — 93010 ELECTROCARDIOGRAM REPORT: CPT

## 2020-11-30 PROCEDURE — 99285 EMERGENCY DEPT VISIT HI MDM: CPT | Mod: CS,GC

## 2020-11-30 PROCEDURE — 71045 X-RAY EXAM CHEST 1 VIEW: CPT | Mod: 26

## 2020-11-30 RX ORDER — SODIUM CHLORIDE 9 MG/ML
1000 INJECTION INTRAMUSCULAR; INTRAVENOUS; SUBCUTANEOUS ONCE
Refills: 0 | Status: COMPLETED | OUTPATIENT
Start: 2020-11-30 | End: 2020-11-30

## 2020-11-30 RX ADMIN — SODIUM CHLORIDE 1000 MILLILITER(S): 9 INJECTION INTRAMUSCULAR; INTRAVENOUS; SUBCUTANEOUS at 21:40

## 2020-11-30 NOTE — ED PROVIDER NOTE - CROS ED NEURO POS
SEIZURES/**ATTENDING ADDENDUM (Dr. Ronnie Packer): POSITIVE history of demetia. **ATTENDING ADDENDUM (Dr. Ronnie Packer): POSITIVE history of dementia./SEIZURES

## 2020-11-30 NOTE — ED PROVIDER NOTE - NEUROLOGICAL LEVEL OF CONSCIOUSNESS
OBTUNDED/**ATTENDING ADDENDUM (Dr. Ronnie Packer): responsive to voice and spontaneous eye opening. NOT completely following single step commands, and not able to follow multistep commands./alert/CONFUSED

## 2020-11-30 NOTE — ED PROVIDER NOTE - SKIN, MLM
Skin normal color for race, warm, dry and intact. No evidence of rash. **ATTENDING ADDENDUM (Dr. Ronnie Packer): NO rashes, lesions, ulcers, vesicles, erythema, streaking, lymphangitic spread, crepitus, cellulitis, petechiae, purpurae, track marks or ecchymoses.

## 2020-11-30 NOTE — ED PROVIDER NOTE - OBJECTIVE STATEMENT
64 y/o M with a significant PMHx of HTN, HLD, DMT2, dementia, dysphagia, localized edema, GERD, psychosis, unspecified convulsions and depression sent in from [facility] for AMS starting this moring. Usually walks unassisted and will talk and follow commands but today has required assistance and has more quiet and confused not following commands. With EMS felt warm but afebrile, with FS in 300s intermittently following commands and acting slower. 66 y/o M with a significant PMHx of HTN, HLD, DMT2, dementia, dysphagia, localized edema, GERD, psychosis, unspecified convulsions and depression sent in from [facility] for AMS starting this moring. Usually walks unassisted and will talk and follow commands but today has required assistance and has more quiet and confused not following commands. With EMS felt warm but afebrile, with FS in 300s intermittently following commands and acting slower.  **ATTENDING ADDENDUM (Dr. Ronnie Packer): I attest that I have directly and personally interviewed and examined this patient and elicited a comparable history of present illness and review of systems as documented, along with my EM resident. I attest that I have made significant contributions to the documentation where necessary and as noted in the EMR. 64 y/o M with a significant PMHx of HTN, HLD, DMT2, dementia, dysphagia, localized edema, GERD, psychosis, unspecified convulsions and depression sent in from [facility] for AMS starting this moring. Usually walks unassisted and will talk and follow commands but today has required assistance and has more quiet and confused not following commands. With EMS felt warm but afebrile, with FS in 300s intermittently following commands and acting slower.  **ATTENDING ADDENDUM (Dr. Ronnie Packer): I attest that I have directly and personally interviewed and examined this patient and elicited a comparable history of present illness and review of systems as documented, along with my EM resident. I attest that I have made significant contributions to the documentation where necessary and as noted in the EMR. Of note, and in addition to the above, the patient's mental status is reportedly off his normal baseline level of alertness and function. VAS 1/10. 64 y/o M with a significant PMHx of HTN, HLD, DMT2, dementia, dysphagia, localized edema, GERD, psychosis, unspecified convulsions and depression sent in from [facility] for AMS starting this morning. Usually walks unassisted and will talk and follow commands but today has required assistance and has more quiet and confused not following commands. With EMS felt warm but afebrile, with FS in 300s intermittently following commands and acting slower.  **ATTENDING ADDENDUM (Dr. Ronnie Packer): I attest that I have directly and personally interviewed and examined this patient and elicited a comparable history of present illness and review of systems as documented, along with my EM resident. I attest that I have made significant contributions to the documentation where necessary and as noted in the EMR. Of note, and in addition to the above, the patient's mental status is reportedly off his normal baseline level of alertness and function. VAS 1/10.

## 2020-11-30 NOTE — ED PROVIDER NOTE - PLAN OF CARE
**ATTENDING ADDENDUM (Dr. Ronnie Packer): Goals of care include resolution of emergent/urgent symptoms and concerns, and restoration to baseline level of homeostasis.

## 2020-11-30 NOTE — ED PROVIDER NOTE - PHYSICAL EXAMINATION
**ATTENDING ADDENDUM (Dr. Ronnie Packer): I have reviewed and substantially contributed to the elements of the PE as documented above. I have directly performed an examination of this patient in conjunction with the other members (EM resident/PA/NP) of the patient care team. I have personally reviewed the patient's vital signs at the time of the patient's initial presentation to the ED and repeatedly throughout the ED course. **ATTENDING ADDENDUM (Dr. Ronnie Packer): I have reviewed and substantially contributed to the elements of the PE as documented above. I have directly performed an examination of this patient in conjunction with the other members (EM resident/PA/NP) of the patient care team. I have personally reviewed the patient's vital signs at the time of the patient's initial presentation to the ED and repeatedly throughout the ED course.    Gen: NAD, not following commands, not speaking, looking around room spontaneously  Head: NCAT  HEENT: PERRL, MMM, normal conjunctiva, anicteric, neck supple  Lung: CTAB, no adventitious sounds  CV: RRR, no murmurs  Abd: soft, NTND, no rebound or guarding, no CVAT  MSK: No edema, no visible deformities  Neuro: Moving all extremity grossly  Skin: Warm and dry, no evidence of rash, feels warm Gen: NAD, not following commands, not speaking, looking around room spontaneously  Head: NCAT  HEENT: PERRL, MMM, normal conjunctiva, anicteric, neck supple  Lung: CTAB, no adventitious sounds  CV: RRR, no murmurs  Abd: soft, NTND, no rebound or guarding, no CVAT  MSK: No edema, no visible deformities  Neuro: Moving all extremity grossly  Skin: Warm and dry, no evidence of rash, feels warm  **ATTENDING ADDENDUM (Dr. Ronnie Packer): I have reviewed and substantially contributed to the elements of the PE as documented above. I have directly performed an examination of this patient in conjunction with the other members (EM resident/PA/NP) of the patient care team. I have personally reviewed the patient's vital signs at the time of the patient's initial presentation to the ED and repeatedly throughout the ED course.

## 2020-11-30 NOTE — ED PROVIDER NOTE - EYES, MLM
Clear bilaterally, pupils equal, round and reactive to light. **ATTENDING ADDENDUM (Dr. Ronnie Packer): Extraocular muscle movements intact. Clear corneas bilaterally, pupils equal and round. NO nystagmus. NO photophobia.

## 2020-11-30 NOTE — ED ADULT NURSE REASSESSMENT NOTE - NS ED NURSE REASSESS COMMENT FT1
Pt much improved since initial presentation, sitting up in stretcher, interacting/smiling with staff and eating food

## 2020-11-30 NOTE — ED PROVIDER NOTE - CARE PLAN
Principal Discharge DX:	Altered mental status  Goal:	**ATTENDING ADDENDUM (Dr. Ronnie Packer): Goals of care include resolution of emergent/urgent symptoms and concerns, and restoration to baseline level of homeostasis.

## 2020-11-30 NOTE — ED PROVIDER NOTE - GASTROINTESTINAL NEGATIVE STATEMENT, MLM
no abdominal pain, no bloating, no constipation, no diarrhea, no nausea and no vomiting. no abdominal pain, no bloating, no constipation, no diarrhea, no nausea and no vomiting. **ATTENDING ADDENDUM (Dr. Ronnie Packer): POSITIVE history of gastroesophageal reflux disease.

## 2020-11-30 NOTE — ED PROVIDER NOTE - CHIEF COMPLAINT
The patient is a 65y Male complaining of The patient is a 65y Male sent from skilled nursing facility with altered mental status (POSITIVE known history of dementia, Diabetes Mellitus, gastroesophageal reflux disease, and seizure disoreder)

## 2020-11-30 NOTE — ED PROVIDER NOTE - ENMT, MLM
Airway patent, Nasal mucosa clear. Mouth with normal mucosa. Throat has no vesicles, no oropharyngeal exudates and uvula is midline. **ATTENDING ADDENDUM (Dr. Ronnie Packer): NO droop.

## 2020-11-30 NOTE — ED PROVIDER NOTE - NS ED ROS FT
**ATTENDING ADDENDUM (Dr. Ronnie Packer): During my interview with the patient, I have personally obtained and/or have directly verified the elements in the past medical/surgical history and other histories as noted earlier in the EMR, in conjunction with the other members (EM resident/PA/NP) of the patient care team. I have also personally obtained and/or have directly verified/reviewed the review of systems as documented below, in conjunction with the other members (EM resident/PA/NP) of the patient care team. **ATTENDING ADDENDUM (Dr. Ronnie Packer): During my interview with the patient, I have personally obtained and/or have directly verified the elements in the past medical/surgical history and other histories as noted earlier in the EMR, in conjunction with the other members (EM resident/PA/NP) of the patient care team. I have also personally obtained and/or have directly verified/reviewed the review of systems as documented below, in conjunction with the other members (EM resident/PA/NP) of the patient care team.    Pt unable to complete ROS d/t ams **ATTENDING ADDENDUM (Dr. Ronnie Packer): During my interview with the patient, I have personally obtained and/or have directly verified the elements in the past medical/surgical history and other histories as noted earlier in the EMR, in conjunction with the other members (EM resident/PA/NP) of the patient care team. I have also personally obtained and/or have directly verified/reviewed the review of systems as documented below, in conjunction with the other members (EM resident/PA/NP) of the patient care team.  Pt unable to complete ROS d/t ams

## 2020-11-30 NOTE — ED PROVIDER NOTE - PMH
Convulsion    Dementia    Diabetes mellitus    H/O gastroesophageal reflux (GERD)    Seizure disorder

## 2020-11-30 NOTE — ED PROVIDER NOTE - CONSTITUTIONAL MENTATION
alert/awake/**ATTENDING ADDENDUM (Dr. Ronnie Packer): staring out into space, responds to voice but does not follow commands.

## 2020-11-30 NOTE — ED PROVIDER NOTE - GASTROINTESTINAL, MLM
Abdomen soft, non-tender **ATTENDING ADDENDUM (Dr. Ronnie Packer): non-distended. NO guarding, rebound, or rigidity. NO pulsatile or non-pulsatile masses. NO hernias. NO obvious hepatosplenomegaly.

## 2020-11-30 NOTE — ED PROVIDER NOTE - CLINICAL SUMMARY MEDICAL DECISION MAKING FREE TEXT BOX
**ATTENDING MEDICAL DECISION MAKING/SYNTHESIS (Dr. Ronnie Packer): I have reviewed the Chief Concern(s), the HPI, the ROS, and have directly performed and confirmed the findings on the Physical Examination. I have reviewed the medical decision making with all providers, as applicable. The PROBLEM REPRESENTATION at this time is: 67-year-old man presenting with altered mental status and increased confusion from skilled nursing facility. NO obvious chest pain, palpitations, shortness of breath, abdominal pain, or back pain. The MOST LIKELY DIAGNOSIS, and the LIST OF DIFFERENTIAL DIAGNOSES, includes (but is not limited to) the following: cerebrovascular accident, transient ischemic attack, vertebrobasilar insufficiency or equivalent, mass/tumor, serious bacterial infection or sepsis/severe sepsis e.g. meningococcemia, meningitis, encephalitis, or equivalent, dehydration, electrolyte-metabolic-endocrine derangements, vascular cause e.g. carotid dissection or equivalent, demyelinating disorder, serious bacterial infection or sepsis/severe sepsis. The likelihood of each of these diagnoses has been appropriately considered in the context of this patient's presentation and my evaluation. PLAN: as described in EMR, including diagnostics, therapeutics and consultation as clinically warranted. I will continue to reevaluate the patient, including the results of all testing, and monitor response to therapy throughout the patient's course in the ED. **ATTENDING MEDICAL DECISION MAKING/SYNTHESIS (Dr. Ronnie Packer): I have reviewed the Chief Concern(s), the HPI, the ROS, and have directly performed and confirmed the findings on the Physical Examination. I have reviewed the medical decision making with all providers, as applicable. The PROBLEM REPRESENTATION at this time is: 67-year-old man presenting with altered mental status and increased confusion from skilled nursing facility. NO obvious chest pain, palpitations, shortness of breath, abdominal pain, or back pain. The MOST LIKELY DIAGNOSIS, and the LIST OF DIFFERENTIAL DIAGNOSES, includes (but is not limited to) the following: cerebrovascular accident, transient ischemic attack, vertebrobasilar insufficiency or equivalent, mass/tumor, serious bacterial infection or sepsis/severe sepsis e.g. meningococcemia, meningitis, encephalitis, or equivalent, dehydration, toxic cause for altered mental status, ADR, seizure, electrolyte-metabolic-endocrine derangements, vascular cause e.g. carotid dissection or equivalent, demyelinating disorder, serious bacterial infection or sepsis/severe sepsis. The likelihood of each of these diagnoses has been appropriately considered in the context of this patient's presentation and my evaluation. PLAN: as described in EMR, including diagnostics, therapeutics and consultation as clinically warranted. I will continue to reevaluate the patient, including the results of all testing, and monitor response to therapy throughout the patient's course in the ED.

## 2020-11-30 NOTE — ED PROVIDER NOTE - UNABLE TO OBTAIN
**ATTENDING ADDENDUM (Dr. Ronnie Packer): Exploration of CC, HPI and review of systems limited by patient's acuity and altered mental status. Unresponsive **ATTENDING ADDENDUM (Dr. Ronnie Packer): Exploration of CC, HPI and review of systems limited by patient's acuity and newly acute? on chronic altered mental status. Dementia **ATTENDING ADDENDUM (Dr. Ronnie Packer): Exploration of CC, HPI and review of systems limited by patient's acuity and acute on chronic dementia.

## 2020-11-30 NOTE — ED PROVIDER NOTE - SKIN NEGATIVE STATEMENT, MLM
no abrasions, no jaundice, no lesions, no pruritis, and no rashes. **ATTENDING ADDENDUM (Dr. Ronnie Packer): NO rashes, lesions, ulcers, vesicles, erythema, streaking, lymphangitic spread, crepitus, cellulitis, petechiae, purpurae, track marks or ecchymoses.

## 2020-11-30 NOTE — ED PROVIDER NOTE - ATTENDING CONTRIBUTION TO CARE
**ATTENDING ADDENDUM (Dr. Ronnie Packer): I attest that I have directly examined this patient and reviewed and formulated the diagnostic and therapeutic management plan in collaboration with the EM resident. Please see MDM note and remainder of EMR for findings from CC, HPI, ROS, and PE. (Tim)
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
## 2020-11-30 NOTE — ED PROVIDER NOTE - RESPIRATORY, MLM
Breath sounds clear and equal bilaterally. **ATTENDING ADDENDUM (Dr. Ronnie Packer): NO wheezing, rales, rhonchi, crackles, stridor, drooling, retractions, nasal flaring, or tripoding.

## 2020-11-30 NOTE — ED ADULT NURSE NOTE - OBJECTIVE STATEMENT
Pt brought in by ambulance from assisted living for altered mental status.  PMH HTN, HLD, DMT2, dementia, dysphagia, localized edema, GERD, psychosis, unspecified convulsions and depression.  Per report received from EMS pt is usually able to ambulate independently, interacts with staff and follows commands but today pt has been lethargic and unable to walk well.  Pt arrives not answering questions, not following any commands, abdomen soft/non-distended/does not appear to be ttp, skin warm, dry, intact, pt unable to contribute to exam.  Straight cath for urine as per orders, pt tolerated well, responds appropriately to discomfort, 2 RNs present, small amount of clear yellow urine output, 2 saturated diapers removed and pt cleaned and changed.

## 2020-11-30 NOTE — ED PROVIDER NOTE - LAB INTERPRETATION
**ATTENDING ADDENDUM (Dr. Ronnie Packer): Labs reviewed and analyzed. Pertinent findings include: NO profound leukocytosis. Lactate 2.1 and hyperglycemia noted.  **ATTENDING ADDENDUM (Dr. Ronnie Packer): Radiographs reviewed and analyzed. Pertinent findings include: Portable CXR without obvious infiltrate, consolidation, or effusion. NO cardiomegaly. **ATTENDING ADDENDUM (Dr. Ronnie Packer): Labs reviewed and analyzed. Pertinent findings include: NO profound leukocytosis. Lactate 2.1 and hyperglycemia noted.  **ATTENDING ADDENDUM (Dr. Ronnie Packer): Radiographs reviewed and analyzed. Pertinent findings include: Portable CXR without obvious infiltrate, consolidation, or effusion. NO cardiomegaly.   **ATTENDING ADDENDUM (Dr. Ronnie Packer): Labs reviewed and analyzed. Pertinent findings include: UA with glycosuria, otherwise NO evidence of urinary tract infection or pyelonephritis. **ATTENDING ADDENDUM (Dr. Ronnie Packer): Labs reviewed and analyzed. Pertinent findings include: NO profound leukocytosis. Lactate 2.1 and hyperglycemia noted.  **ATTENDING ADDENDUM (Dr. Ronnie Packer): Radiographs reviewed and analyzed. Pertinent findings include: Portable CXR without obvious infiltrate, consolidation, or effusion. NO cardiomegaly.   **ATTENDING ADDENDUM (Dr. Ronnie Packer): Labs reviewed and analyzed. Pertinent findings include: UA with glycosuria, otherwise NO evidence of urinary tract infection or pyelonephritis.   **ATTENDING ADDENDUM (Dr. Ronnie Packer): Labs reviewed and analyzed. Pertinent findings include: mild dehydration implied on CMP. NO coagulopathy.

## 2020-12-01 DIAGNOSIS — E11.9 TYPE 2 DIABETES MELLITUS WITHOUT COMPLICATIONS: ICD-10-CM

## 2020-12-01 DIAGNOSIS — F03.90 UNSPECIFIED DEMENTIA WITHOUT BEHAVIORAL DISTURBANCE: ICD-10-CM

## 2020-12-01 DIAGNOSIS — K59.00 CONSTIPATION, UNSPECIFIED: ICD-10-CM

## 2020-12-01 DIAGNOSIS — Z98.890 OTHER SPECIFIED POSTPROCEDURAL STATES: Chronic | ICD-10-CM

## 2020-12-01 DIAGNOSIS — Z29.9 ENCOUNTER FOR PROPHYLACTIC MEASURES, UNSPECIFIED: ICD-10-CM

## 2020-12-01 DIAGNOSIS — G93.41 METABOLIC ENCEPHALOPATHY: ICD-10-CM

## 2020-12-01 DIAGNOSIS — F05 DELIRIUM DUE TO KNOWN PHYSIOLOGICAL CONDITION: ICD-10-CM

## 2020-12-01 LAB
ALBUMIN SERPL ELPH-MCNC: 3.8 G/DL — SIGNIFICANT CHANGE UP (ref 3.3–5)
ALP SERPL-CCNC: 51 U/L — SIGNIFICANT CHANGE UP (ref 40–120)
ALT FLD-CCNC: 15 U/L — SIGNIFICANT CHANGE UP (ref 10–45)
ANION GAP SERPL CALC-SCNC: 10 MMOL/L — SIGNIFICANT CHANGE UP (ref 5–17)
AST SERPL-CCNC: 14 U/L — SIGNIFICANT CHANGE UP (ref 10–40)
BASE EXCESS BLDV CALC-SCNC: 2.9 MMOL/L — HIGH (ref -2–2)
BILIRUB SERPL-MCNC: 0.5 MG/DL — SIGNIFICANT CHANGE UP (ref 0.2–1.2)
BUN SERPL-MCNC: 21 MG/DL — SIGNIFICANT CHANGE UP (ref 7–23)
CA-I SERPL-SCNC: 1.17 MMOL/L — SIGNIFICANT CHANGE UP (ref 1.12–1.3)
CALCIUM SERPL-MCNC: 8.9 MG/DL — SIGNIFICANT CHANGE UP (ref 8.4–10.5)
CHLORIDE BLDV-SCNC: 109 MMOL/L — HIGH (ref 96–108)
CHLORIDE SERPL-SCNC: 101 MMOL/L — SIGNIFICANT CHANGE UP (ref 96–108)
CO2 BLDV-SCNC: 29 MMOL/L — SIGNIFICANT CHANGE UP (ref 22–30)
CO2 SERPL-SCNC: 24 MMOL/L — SIGNIFICANT CHANGE UP (ref 22–31)
CREAT SERPL-MCNC: 0.77 MG/DL — SIGNIFICANT CHANGE UP (ref 0.5–1.3)
CULTURE RESULTS: NO GROWTH — SIGNIFICANT CHANGE UP
GAS PNL BLDV: 138 MMOL/L — SIGNIFICANT CHANGE UP (ref 135–145)
GAS PNL BLDV: SIGNIFICANT CHANGE UP
GAS PNL BLDV: SIGNIFICANT CHANGE UP
GLUCOSE BLDC GLUCOMTR-MCNC: 172 MG/DL — HIGH (ref 70–99)
GLUCOSE BLDC GLUCOMTR-MCNC: 228 MG/DL — HIGH (ref 70–99)
GLUCOSE BLDC GLUCOMTR-MCNC: 244 MG/DL — HIGH (ref 70–99)
GLUCOSE BLDV-MCNC: 194 MG/DL — HIGH (ref 70–99)
GLUCOSE SERPL-MCNC: 193 MG/DL — HIGH (ref 70–99)
HCO3 BLDV-SCNC: 28 MMOL/L — SIGNIFICANT CHANGE UP (ref 21–29)
HCT VFR BLD CALC: 37.2 % — LOW (ref 39–50)
HCT VFR BLDA CALC: 39 % — SIGNIFICANT CHANGE UP (ref 39–50)
HGB BLD CALC-MCNC: 12.7 G/DL — LOW (ref 13–17)
HGB BLD-MCNC: 12.6 G/DL — LOW (ref 13–17)
LACTATE BLDV-MCNC: 1.6 MMOL/L — SIGNIFICANT CHANGE UP (ref 0.7–2)
MAGNESIUM SERPL-MCNC: 1.8 MG/DL — SIGNIFICANT CHANGE UP (ref 1.6–2.6)
MCHC RBC-ENTMCNC: 28.6 PG — SIGNIFICANT CHANGE UP (ref 27–34)
MCHC RBC-ENTMCNC: 33.9 GM/DL — SIGNIFICANT CHANGE UP (ref 32–36)
MCV RBC AUTO: 84.5 FL — SIGNIFICANT CHANGE UP (ref 80–100)
NRBC # BLD: 0 /100 WBCS — SIGNIFICANT CHANGE UP (ref 0–0)
OTHER CELLS CSF MANUAL: 16 ML/DL — LOW (ref 18–22)
PCO2 BLDV: 45 MMHG — SIGNIFICANT CHANGE UP (ref 35–50)
PH BLDV: 7.41 — SIGNIFICANT CHANGE UP (ref 7.35–7.45)
PHOSPHATE SERPL-MCNC: 3 MG/DL — SIGNIFICANT CHANGE UP (ref 2.5–4.5)
PLATELET # BLD AUTO: 182 K/UL — SIGNIFICANT CHANGE UP (ref 150–400)
PO2 BLDV: 68 MMHG — HIGH (ref 25–45)
POTASSIUM BLDV-SCNC: 3.4 MMOL/L — LOW (ref 3.5–5.3)
POTASSIUM SERPL-MCNC: 3.5 MMOL/L — SIGNIFICANT CHANGE UP (ref 3.5–5.3)
POTASSIUM SERPL-SCNC: 3.5 MMOL/L — SIGNIFICANT CHANGE UP (ref 3.5–5.3)
PROT SERPL-MCNC: 6.5 G/DL — SIGNIFICANT CHANGE UP (ref 6–8.3)
RBC # BLD: 4.4 M/UL — SIGNIFICANT CHANGE UP (ref 4.2–5.8)
RBC # FLD: 12.1 % — SIGNIFICANT CHANGE UP (ref 10.3–14.5)
SAO2 % BLDV: 93 % — HIGH (ref 67–88)
SARS-COV-2 RNA SPEC QL NAA+PROBE: SIGNIFICANT CHANGE UP
SODIUM SERPL-SCNC: 135 MMOL/L — SIGNIFICANT CHANGE UP (ref 135–145)
SPECIMEN SOURCE: SIGNIFICANT CHANGE UP
VALPROATE SERPL-MCNC: 30 UG/ML — LOW (ref 50–100)
WBC # BLD: 5.11 K/UL — SIGNIFICANT CHANGE UP (ref 3.8–10.5)
WBC # FLD AUTO: 5.11 K/UL — SIGNIFICANT CHANGE UP (ref 3.8–10.5)

## 2020-12-01 PROCEDURE — 12345: CPT | Mod: NC,GC

## 2020-12-01 PROCEDURE — 99223 1ST HOSP IP/OBS HIGH 75: CPT | Mod: GC

## 2020-12-01 PROCEDURE — 99223 1ST HOSP IP/OBS HIGH 75: CPT

## 2020-12-01 RX ORDER — SODIUM CHLORIDE 9 MG/ML
1000 INJECTION, SOLUTION INTRAVENOUS
Refills: 0 | Status: DISCONTINUED | OUTPATIENT
Start: 2020-12-01 | End: 2020-12-03

## 2020-12-01 RX ORDER — ASCORBIC ACID 60 MG
1 TABLET,CHEWABLE ORAL
Qty: 0 | Refills: 0 | DISCHARGE

## 2020-12-01 RX ORDER — GLUCAGON INJECTION, SOLUTION 0.5 MG/.1ML
1 INJECTION, SOLUTION SUBCUTANEOUS ONCE
Refills: 0 | Status: DISCONTINUED | OUTPATIENT
Start: 2020-12-01 | End: 2020-12-03

## 2020-12-01 RX ORDER — DEXTROSE 50 % IN WATER 50 %
25 SYRINGE (ML) INTRAVENOUS ONCE
Refills: 0 | Status: DISCONTINUED | OUTPATIENT
Start: 2020-12-01 | End: 2020-12-03

## 2020-12-01 RX ORDER — VALPROIC ACID (AS SODIUM SALT) 250 MG/5ML
500 SOLUTION, ORAL ORAL DAILY
Refills: 0 | Status: DISCONTINUED | OUTPATIENT
Start: 2020-12-01 | End: 2020-12-03

## 2020-12-01 RX ORDER — LANOLIN ALCOHOL/MO/W.PET/CERES
1 CREAM (GRAM) TOPICAL
Qty: 0 | Refills: 0 | DISCHARGE

## 2020-12-01 RX ORDER — ENOXAPARIN SODIUM 100 MG/ML
40 INJECTION SUBCUTANEOUS DAILY
Refills: 0 | Status: DISCONTINUED | OUTPATIENT
Start: 2020-12-01 | End: 2020-12-03

## 2020-12-01 RX ORDER — INSULIN LISPRO 100/ML
VIAL (ML) SUBCUTANEOUS
Refills: 0 | Status: DISCONTINUED | OUTPATIENT
Start: 2020-12-01 | End: 2020-12-03

## 2020-12-01 RX ORDER — HALOPERIDOL DECANOATE 100 MG/ML
2.5 INJECTION INTRAMUSCULAR ONCE
Refills: 0 | Status: COMPLETED | OUTPATIENT
Start: 2020-12-01 | End: 2020-12-01

## 2020-12-01 RX ORDER — MEMANTINE HYDROCHLORIDE 10 MG/1
5 TABLET ORAL
Refills: 0 | Status: DISCONTINUED | OUTPATIENT
Start: 2020-12-01 | End: 2020-12-03

## 2020-12-01 RX ORDER — LANOLIN ALCOHOL/MO/W.PET/CERES
9 CREAM (GRAM) TOPICAL AT BEDTIME
Refills: 0 | Status: DISCONTINUED | OUTPATIENT
Start: 2020-12-01 | End: 2020-12-03

## 2020-12-01 RX ORDER — OLANZAPINE 15 MG/1
2.5 TABLET, FILM COATED ORAL DAILY
Refills: 0 | Status: DISCONTINUED | OUTPATIENT
Start: 2020-12-01 | End: 2020-12-03

## 2020-12-01 RX ORDER — SENNA PLUS 8.6 MG/1
2 TABLET ORAL AT BEDTIME
Refills: 0 | Status: DISCONTINUED | OUTPATIENT
Start: 2020-12-01 | End: 2020-12-03

## 2020-12-01 RX ORDER — DEXTROSE 50 % IN WATER 50 %
15 SYRINGE (ML) INTRAVENOUS ONCE
Refills: 0 | Status: DISCONTINUED | OUTPATIENT
Start: 2020-12-01 | End: 2020-12-03

## 2020-12-01 RX ORDER — SENNA PLUS 8.6 MG/1
1 TABLET ORAL
Qty: 0 | Refills: 0 | DISCHARGE

## 2020-12-01 RX ORDER — MAGNESIUM HYDROXIDE 400 MG/1
30 TABLET, CHEWABLE ORAL
Qty: 0 | Refills: 0 | DISCHARGE

## 2020-12-01 RX ORDER — ZINC SULFATE TAB 220 MG (50 MG ZINC EQUIVALENT) 220 (50 ZN) MG
220 TAB ORAL DAILY
Refills: 0 | Status: DISCONTINUED | OUTPATIENT
Start: 2020-12-01 | End: 2020-12-03

## 2020-12-01 RX ORDER — ZINC SULFATE TAB 220 MG (50 MG ZINC EQUIVALENT) 220 (50 ZN) MG
220 TAB ORAL
Qty: 0 | Refills: 0 | DISCHARGE

## 2020-12-01 RX ORDER — DEXTROSE 50 % IN WATER 50 %
12.5 SYRINGE (ML) INTRAVENOUS ONCE
Refills: 0 | Status: DISCONTINUED | OUTPATIENT
Start: 2020-12-01 | End: 2020-12-03

## 2020-12-01 RX ORDER — OLANZAPINE 15 MG/1
5 TABLET, FILM COATED ORAL AT BEDTIME
Refills: 0 | Status: DISCONTINUED | OUTPATIENT
Start: 2020-12-01 | End: 2020-12-03

## 2020-12-01 RX ORDER — MEMANTINE HYDROCHLORIDE 10 MG/1
1 TABLET ORAL
Qty: 0 | Refills: 0 | DISCHARGE

## 2020-12-01 RX ORDER — VALPROIC ACID (AS SODIUM SALT) 250 MG/5ML
500 SOLUTION, ORAL ORAL AT BEDTIME
Refills: 0 | Status: DISCONTINUED | OUTPATIENT
Start: 2020-12-01 | End: 2020-12-03

## 2020-12-01 RX ORDER — OLANZAPINE 15 MG/1
2.5 TABLET, FILM COATED ORAL EVERY 8 HOURS
Refills: 0 | Status: DISCONTINUED | OUTPATIENT
Start: 2020-12-01 | End: 2020-12-03

## 2020-12-01 RX ORDER — OLANZAPINE 15 MG/1
2.5 TABLET, FILM COATED ORAL DAILY
Refills: 0 | Status: DISCONTINUED | OUTPATIENT
Start: 2020-12-01 | End: 2020-12-01

## 2020-12-01 RX ORDER — ZINC OXIDE 200 MG/G
1 OINTMENT TOPICAL
Qty: 0 | Refills: 0 | DISCHARGE

## 2020-12-01 RX ADMIN — ENOXAPARIN SODIUM 40 MILLIGRAM(S): 100 INJECTION SUBCUTANEOUS at 11:50

## 2020-12-01 RX ADMIN — MEMANTINE HYDROCHLORIDE 5 MILLIGRAM(S): 10 TABLET ORAL at 06:30

## 2020-12-01 RX ADMIN — Medication 1: at 18:11

## 2020-12-01 RX ADMIN — Medication 9 MILLIGRAM(S): at 22:08

## 2020-12-01 RX ADMIN — ZINC SULFATE TAB 220 MG (50 MG ZINC EQUIVALENT) 220 MILLIGRAM(S): 220 (50 ZN) TAB at 11:50

## 2020-12-01 RX ADMIN — MEMANTINE HYDROCHLORIDE 5 MILLIGRAM(S): 10 TABLET ORAL at 17:11

## 2020-12-01 RX ADMIN — Medication 500 MILLIGRAM(S): at 11:50

## 2020-12-01 RX ADMIN — Medication 500 MILLIGRAM(S): at 22:08

## 2020-12-01 RX ADMIN — OLANZAPINE 5 MILLIGRAM(S): 15 TABLET, FILM COATED ORAL at 22:08

## 2020-12-01 RX ADMIN — SENNA PLUS 2 TABLET(S): 8.6 TABLET ORAL at 22:07

## 2020-12-01 RX ADMIN — HALOPERIDOL DECANOATE 2.5 MILLIGRAM(S): 100 INJECTION INTRAMUSCULAR at 19:04

## 2020-12-01 RX ADMIN — Medication 1: at 09:15

## 2020-12-01 RX ADMIN — Medication 2: at 12:53

## 2020-12-01 RX ADMIN — OLANZAPINE 2.5 MILLIGRAM(S): 15 TABLET, FILM COATED ORAL at 11:50

## 2020-12-01 NOTE — H&P ADULT - ASSESSMENT
66 y/o M with HTN, HLD, T2DM, dementia, psychosis, dysphagia, localized edema, GERD, convulsion disorder, depression presents for inability to walk and agitation likely progression of dementia.     # Encephalopathy  Likely with acute to subacute progression of dementia. Recently increased on valproic acid  - behavioral health in AM for medical optimization  - f/u valproic acid  - c/w psych meds    # Dementia   Unclear type of dementia but with family history of early onset dementia (father passed from complications at 62. Pt was dx in 2014, at age 59)  - c/w home memantine  - c/w melatonin  - outpatient neurology f/u if further workup indicated for prognostication    # DM  A1c 8.4 11/2020 only on oral medications. Unlikely to clinically benefit from tighter glycemic control  - FS, PETER qac, qhs     # Constipation  - c/w shelby, hold for BM    # DVT  DVT: lovenox  Diet: CC  Dispo: return to [facility]   64 y/o M with HTN, HLD, T2DM, dementia, psychosis, dysphagia, localized edema, GERD, convulsion disorder, depression presents for inability to walk and agitation likely progression of dementia.      64 y/o M with HTN, HLD, T2DM, dementia, psychosis, dysphagia, localized edema, GERD, convulsion disorder, depression presents for inability to walk and agitation.

## 2020-12-01 NOTE — BEHAVIORAL HEALTH ASSESSMENT NOTE - SUMMARY
FC is a 65 year old man, , domiciled at [facility], on disability, with no prior psychiatric admissions and a PMH of dementia, seizure disorder, DM2, HLD, and HTN and a family history of early onset dementia who presents with confusion, agitation, and impoverished movement. Psychiatry was consulted to assess the patient's mental status and determine if any changes in his medication is indicated.    The patient was compliant with exam, but was unable to respond to questions adequately. The patient demonstrated adequate muscle tone/strength and only responded to his name. However, the patient is not alert and is only oriented to his name. Although the patient will consent to exam, he does not demonstrate understanding of the questions being asked of him, nor is he capable of formulating a response. The patient often stares into space, not acknowledging the presence of others unless directly engaged.     Patient's UA, CBC, and temperature give no indication of infection and review of head CT was consistent with previous imaging. Given the patient's history of dementia, recent change in valproic acid dosage, and history of covid infection, it is most likely that the patient is in a state of delirium. Continue supportive therapy and no changes to the patient's medications are indicated at this time. Psychiatric admission is unlikely to be beneficial to the patient in this case. FC is a 65 year old man, , domiciled at [facility], on disability, with no prior psychiatric admissions and a PMH of dementia, seizure disorder, DM2, HLD, and HTN and a family history of early onset dementia who presents with confusion, agitation, and impoverished movement. Psychiatry was consulted to assess the patient's mental status and determine if any changes in his medication is indicated.    The patient was compliant with exam, but was unable to respond to questions adequately. The patient demonstrated adequate muscle tone/strength and only responded to his name. However, the patient is not alert and is only oriented to his name. Although the patient will consent to exam, he does not demonstrate understanding of the questions being asked of him, nor is he capable of formulating a response. The patient often stares into space, not acknowledging the presence of others unless directly engaged.     Patient's UA, CBC, chest x-ray and temperature give no indication of infection and review of head CT showed no evidence of acute intracranial hemorrhage, mass effect, or midline shift; age-related involutional and microvascular changes were present. Given the patient's history of dementia, recent change in valproic acid dosage, and history of covid infection, it is most likely that the patient is in a state of delirium. Continue supportive therapy and no changes to the patient's medications are indicated at this time. Psychiatric admission is unlikely to be beneficial to the patient in this case.

## 2020-12-01 NOTE — BEHAVIORAL HEALTH ASSESSMENT NOTE - RISK ASSESSMENT
Low Acute Suicide Risk The patient experiences chronic risk due to his impulsivity, concurrent medical illnesses, history of dementia, and impaired reasoning/executive functions.    However, the patient has no history of suicide attempts, no current access to firearms, and lives in a supervised environment.

## 2020-12-01 NOTE — BEHAVIORAL HEALTH ASSESSMENT NOTE - BODY HABITUS
08-Apr-2017 22:41
09-Apr-2017 00:00
08-Apr-2017 00:20
11-Apr-2017 14:00
13-Apr-2017 19:30
Well nourished

## 2020-12-01 NOTE — H&P ADULT - PROBLEM SELECTOR PLAN 2
Unclear type of dementia but with family history of early onset dementia (father passed from complications at 62. Pt was dx in 2014, at age 59)  - c/w home memantine  - c/w melatonin  - outpatient neurology f/u if further workup indicated for prognostication

## 2020-12-01 NOTE — BEHAVIORAL HEALTH ASSESSMENT NOTE - NSBHCONSULTOBSREASON_PSY_A_CORE FT
With the patient's unpredictable impulse to get out of bed and inability to ambulate, it is important to monitor the patient to prevent self-injury

## 2020-12-01 NOTE — PHYSICAL THERAPY INITIAL EVALUATION ADULT - GAIT DEVIATIONS NOTED, PT EVAL
decreased weight-shifting ability/decreased stride length/decreased jones/increased time in double stance/decreased velocity of limb motion/decreased step length

## 2020-12-01 NOTE — H&P ADULT - NSHPPHYSICALEXAM_GEN_ALL_CORE
Vital Signs Last 24 Hrs  T(C): 36.8 (01 Dec 2020 05:58), Max: 37.4 (30 Nov 2020 16:30)  T(F): 98.2 (01 Dec 2020 05:58), Max: 99.3 (30 Nov 2020 16:30)  HR: 60 (01 Dec 2020 05:58) (58 - 79)  BP: 126/63 (01 Dec 2020 05:58) (122/69 - 132/65)  BP(mean): --  RR: 14 (01 Dec 2020 05:58) (14 - 18)  SpO2: 99% (01 Dec 2020 05:58) (97% - 99%) Vital Signs Last 24 Hrs  T(C): 36.8 (01 Dec 2020 05:58), Max: 37.4 (30 Nov 2020 16:30)  T(F): 98.2 (01 Dec 2020 05:58), Max: 99.3 (30 Nov 2020 16:30)  HR: 60 (01 Dec 2020 05:58) (58 - 79)  BP: 126/63 (01 Dec 2020 05:58) (122/69 - 132/65)  RR: 14 (01 Dec 2020 05:58) (14 - 18)  SpO2: 99% (01 Dec 2020 05:58) (97% - 99%)    GENERAL: NAD, appears lethargic  HEAD:  NCAT  EYES: EOMI, PERRLA, sclera clear  NECK: Supple, No JVD  CHEST/LUNG: CTABL, no wheezes  HEART: Regular rate and rhythm; No murmurs, rubs, or gallops  ABDOMEN: Soft, Nontender, Nondistended; Bowel sounds present  EXTREMITIES:  2+ Peripheral Pulses, No clubbing, cyanosis, or edema  PSYCH: AAO to self, able to follow simple commands  NEUROLOGY: non-focal  SKIN: No rashes or lesions Vital Signs Last 24 Hrs  T(C): 36.8 (01 Dec 2020 05:58), Max: 37.4 (30 Nov 2020 16:30)  T(F): 98.2 (01 Dec 2020 05:58), Max: 99.3 (30 Nov 2020 16:30)  HR: 60 (01 Dec 2020 05:58) (58 - 79)  BP: 126/63 (01 Dec 2020 05:58) (122/69 - 132/65)  RR: 14 (01 Dec 2020 05:58) (14 - 18)  SpO2: 99% (01 Dec 2020 05:58) (97% - 99%)    GENERAL: NAD, appears lethargic  HEAD:  NCAT  EYES: EOMI, PERRLA, sclera clear  NECK: Supple, No JVD  CHEST/LUNG: CTABL, no wheezes  HEART: Regular rate and rhythm; No murmurs, rubs, or gallops  ABDOMEN: Soft, Nontender, Nondistended; Bowel sounds present  EXTREMITIES:  2+ Peripheral Pulses, No clubbing, cyanosis, or edema  PSYCH: AAO to self, able to follow simple commands  NEUROLOGY: arouses to name, disoriented to place and tmie non-focal  SKIN: No rashes or lesions

## 2020-12-01 NOTE — BEHAVIORAL HEALTH ASSESSMENT NOTE - NSBHCONSULTMEDS_PSY_A_CORE FT
Continue current regimen:  Depakote 500mg p.o. BID  olanzapine 2.5mg p.o. AM + 5mg p.o. at bedtime  melatonin

## 2020-12-01 NOTE — PROGRESS NOTE ADULT - ASSESSMENT
66 y/o M with HTN, HLD, T2DM, dementia, psychosis, dysphagia, localized edema, GERD, convulsion disorder, depression presents for inability to walk and agitation likely progression of dementia.

## 2020-12-01 NOTE — BEHAVIORAL HEALTH ASSESSMENT NOTE - NSBHCHARTREVIEWVS_PSY_A_CORE FT
Vital Signs Last 24 Hrs  T(C): 36.5 (01 Dec 2020 14:05), Max: 37.4 (30 Nov 2020 16:30)  T(F): 97.7 (01 Dec 2020 14:05), Max: 99.3 (30 Nov 2020 16:30)  HR: 82 (01 Dec 2020 14:05) (58 - 82)  BP: 156/70 (01 Dec 2020 14:05) (122/69 - 156/70)  BP(mean): --  RR: 18 (01 Dec 2020 10:09) (14 - 18)  SpO2: 96% (01 Dec 2020 14:05) (96% - 99%)  T(C): 36.5 (12-01-20 @ 14:05), Max: 37.4 (11-30-20 @ 16:30)  HR: 82 (12-01-20 @ 14:05) (58 - 82)  BP: 156/70 (12-01-20 @ 14:05) (122/69 - 156/70)  RR: 18 (12-01-20 @ 10:09) (14 - 18)  SpO2: 96% (12-01-20 @ 14:05) (96% - 99%)  Wt(kg): --

## 2020-12-01 NOTE — H&P ADULT - NSHPSOCIALHISTORY_GEN_ALL_CORE
Lives at [facility]. Possibly remote smoking hx, social etoh, denies drugs. . Lives at [facility] nursing [place]. Possibly remote smoking hx, social etoh, denies drugs.

## 2020-12-01 NOTE — H&P ADULT - NSHPLABSRESULTS_GEN_ALL_CORE
12.2   5.97  )-----------( 183      ( 2020 16:32 )             37.9         136  |  100  |  32<H>  ----------------------------<  233<H>  4.5   |  21<L>  |  0.98    Ca    9.8      2020 16:32    TPro  7.4  /  Alb  4.4  /  TBili  0.3  /  DBili  x   /  AST  22  /  ALT  21  /  AlkPhos  57      PT/INR - ( 2020 17:35 )   PT: 12.9 sec;   INR: 1.08 ratio         PTT - ( 2020 17:35 )  PTT:27.3 sec      Urinalysis Basic - ( 2020 16:41 )    Color: Yellow / Appearance: Clear / S.028 / pH: x  Gluc: x / Ketone: Small  / Bili: Negative / Urobili: Negative   Blood: x / Protein: Trace / Nitrite: Negative   Leuk Esterase: Negative / RBC: 1 /hpf / WBC 0 /HPF   Sq Epi: x / Non Sq Epi: 0 /hpf / Bacteria: Negative        pH, Venous: 7.43 (20 @ 16:32)  pCO2, Venous: 43 mmHg (20 @ 16:32)  pO2, Venous: 67 mmHg (20 @ 16:32)  HCO3, Venous: 28 mmol/L (20 @ 16:32)    Blood Gas Venous - Lactate: 2.1 mmoL/L (20 @ 16:32) 12.2   5.97  )-----------( 183      ( 2020 16:32 )             37.9         136  |  100  |  32<H>  ----------------------------<  233<H>  4.5   |  21<L>  |  0.98    Ca    9.8      2020 16:32    TPro  7.4  /  Alb  4.4  /  TBili  0.3  /  DBili  x   /  AST  22  /  ALT  21  /  AlkPhos  57      PT/INR - ( 2020 17:35 )   PT: 12.9 sec;   INR: 1.08 ratio         PTT - ( 2020 17:35 )  PTT:27.3 sec      Urinalysis Basic - ( 2020 16:41 )    Color: Yellow / Appearance: Clear / S.028 / pH: x  Gluc: x / Ketone: Small  / Bili: Negative / Urobili: Negative   Blood: x / Protein: Trace / Nitrite: Negative   Leuk Esterase: Negative / RBC: 1 /hpf / WBC 0 /HPF   Sq Epi: x / Non Sq Epi: 0 /hpf / Bacteria: Negative    pH, Venous: 7.43 (20 @ 16:32)  pCO2, Venous: 43 mmHg (20 @ 16:32)  pO2, Venous: 67 mmHg (20 @ 16:32)  HCO3, Venous: 28 mmol/L (20 @ 16:32)    Blood Gas Venous - Lactate: 2.1 mmoL/L (20 @ 16:32)    CXR  Clear lungs  EKG  Personally reviewed EKG: SR 74 bpm QTc 424no ST segment changes. Nonspecific Twave changes               12.2   5.97  )-----------( 183      ( 2020 16:32 )             37.9         136  |  100  |  32<H>  ----------------------------<  233<H>  4.5   |  21<L>  |  0.98    Ca    9.8      2020 16:32    TPro  7.4  /  Alb  4.4  /  TBili  0.3  /  DBili  x   /  AST  22  /  ALT  21  /  AlkPhos  57      PT/INR - ( 2020 17:35 )   PT: 12.9 sec;   INR: 1.08 ratio         PTT - ( 2020 17:35 )  PTT:27.3 sec      Urinalysis Basic - ( 2020 16:41 )    Color: Yellow / Appearance: Clear / S.028 / pH: x  Gluc: x / Ketone: Small  / Bili: Negative / Urobili: Negative   Blood: x / Protein: Trace / Nitrite: Negative   Leuk Esterase: Negative / RBC: 1 /hpf / WBC 0 /HPF   Sq Epi: x / Non Sq Epi: 0 /hpf / Bacteria: Negative    pH, Venous: 7.43 (20 @ 16:32)  pCO2, Venous: 43 mmHg (20 @ 16:32)  pO2, Venous: 67 mmHg (20 @ 16:32)  HCO3, Venous: 28 mmol/L (20 @ 16:32)    Blood Gas Venous - Lactate: 2.1 mmoL/L (20 @ 16:32)    Personally reviewed CXR  Clear lungs    Personally reviewed EKG: SR 74 bpm QTc 424no ST segment changes. Nonspecific Twave changes
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
## 2020-12-01 NOTE — BEHAVIORAL HEALTH ASSESSMENT NOTE - SUICIDE RISK FACTORS
Unable to engage in safety planning/Agitation/Severe Anxiety/Panic TBI current/past/Impulsivity/Unable to engage in safety planning

## 2020-12-01 NOTE — PROGRESS NOTE ADULT - PROBLEM SELECTOR PLAN 1
Likely with acute to subacute progression of dementia. Recently increased on valproic acid. Lethargy w/ hx of convulsion disorder, cannot exclude post-ictal state.  - UA, CXR negative, no leukocytosis, no fever. Unlikely infectious contribution.   - behavioral health in AM for medical optimization  - f/u valproic acid  - c/w psych meds  - previously saw neurology in May; did not suspect new seizure disorder, however, clinical presentation here with lethargy and diminished responsiveness may be postictal state  - spot EEG -> consult neuro if positive Likely with acute to subacute progression of dementia. Recently increased on valproic acid. Lethargy w/ hx of convulsion disorder, cannot exclude post-ictal state.  - UA, CXR negative, no leukocytosis, no fever. Unlikely infectious contribution.   - CT head w/age related involutional and microvascular changes  - VPA level 30 (low)  - c/w psych meds  - previously saw neurology in May; did not suspect new seizure disorder, however, clinical presentation here with lethargy and diminished responsiveness may be postictal state  - spot EEG -> consult neuro if positive  - f/u psych consult for medication management

## 2020-12-01 NOTE — H&P ADULT - ATTENDING COMMENTS
66 y/o M with HTN, HLD, T2DM, dementia, psychosis, dysphagia, localized edema, GERD, convulsion disorder, depression presents for inability to walk and agitation. Reviewed HPI and ROS  Vitals reviewed and physically examined patient at bedside. Agree with resident's findings. patient in no Acute distress. Well appearing.  Labs imaging and EKG personally reviewed.   Encephalopathy Etiology includes but not limited to Dementia with behavioral disturbance vs seizure vs medication side effect. No clear infectious foci based on labs or imaging. No electrolyte disturbances, CT head negative for acute pathology. Mild uremia and mild anemia. Mild elevation of lactate. Consider Neuro and Psych eval. Recheck Keppra level. Consider EEG as patient's presentation could be 2/2 post-ictal state. And current presentation different from last presentation.  Monitor anemia. Check Ferritin Iron studies, folate, B12  Remainder of plan as above.  Patient previously unknown to me and I was assigned to precept this case with housestaff resident, Dr Steel.  My involvement in this case consisted only of the initial history, physical and management plan.

## 2020-12-01 NOTE — PROGRESS NOTE ADULT - SUBJECTIVE AND OBJECTIVE BOX
Contact Information:  Christine Natarajan MD,  PGY-1, Internal Medicine  Pager: [phone] ([facility]) ///     JAZIEL LOPEZ, [id number]    Patient is a 65y old  Male who presents with a chief complaint of generalized weakness, lethargy (01 Dec 2020 06:28)      OVERNIGHT EVENTS:    SUBJECTIVE:        OBJECTIVE:  Vital Signs Last 24 Hrs  T(C): 36.8 (01 Dec 2020 05:58), Max: 37.4 (2020 16:30)  T(F): 98.2 (01 Dec 2020 05:58), Max: 99.3 (2020 16:30)  HR: 60 (01 Dec 2020 05:58) (58 - 79)  BP: 126/63 (01 Dec 2020 05:58) (122/69 - 132/65)  BP(mean): --  RR: 14 (01 Dec 2020 05:58) (14 - 18)  SpO2: 99% (01 Dec 2020 05:58) (97% - 99%)  I&O's Summary      MEDICATIONS  (STANDING):  dextrose 40% Gel 15 Gram(s) Oral once  dextrose 5%. 1000 milliLiter(s) (50 mL/Hr) IV Continuous <Continuous>  dextrose 5%. 1000 milliLiter(s) (100 mL/Hr) IV Continuous <Continuous>  dextrose 50% Injectable 25 Gram(s) IV Push once  dextrose 50% Injectable 12.5 Gram(s) IV Push once  dextrose 50% Injectable 25 Gram(s) IV Push once  enoxaparin Injectable 40 milliGRAM(s) SubCutaneous daily  glucagon  Injectable 1 milliGRAM(s) IntraMuscular once  insulin lispro (ADMELOG) corrective regimen sliding scale   SubCutaneous three times a day before meals  melatonin 9 milliGRAM(s) Oral at bedtime  memantine 5 milliGRAM(s) Oral two times a day  OLANZapine 2.5 milliGRAM(s) Oral daily  OLANZapine 5 milliGRAM(s) Oral at bedtime  senna 2 Tablet(s) Oral at bedtime  valproic  acid Syrup 500 milliGRAM(s) Oral daily  valproic  acid Syrup 500 milliGRAM(s) Oral at bedtime  zinc sulfate 220 milliGRAM(s) Oral daily    MEDICATIONS  (PRN):  OLANZapine 2.5 milliGRAM(s) Oral daily PRN for agitation    Allergies    No Known Allergies    Intolerances          GENERAL: NAD, appears lethargic  HEAD:  NCAT  EYES: EOMI, PERRLA, sclera clear  NECK: Supple, No JVD  CHEST/LUNG: CTABL, no wheezes  HEART: Regular rate and rhythm; No murmurs, rubs, or gallops  ABDOMEN: Soft, Nontender, Nondistended; Bowel sounds present  EXTREMITIES:  2+ Peripheral Pulses, No clubbing, cyanosis, or edema  PSYCH: AAO to self, able to follow simple commands  NEUROLOGY: non-focal  SKIN: No rashes or lesions                        12.6   5.11  )-----------( 182      ( 01 Dec 2020 07:06 )             37.2     PT/INR - ( 2020 17:35 )   PT: 12.9 sec;   INR: 1.08 ratio         PTT - ( 2020 17:35 )  PTT:27.3 sec  11    136  |  100  |  32<H>  ----------------------------<  233<H>  4.5   |  21<L>  |  0.98    Ca    9.8      2020 16:32    TPro  7.4  /  Alb  4.4  /  TBili  0.3  /  DBili  x   /  AST  22  /  ALT  21  /  AlkPhos  57  11-30    CAPILLARY BLOOD GLUCOSE      POCT Blood Glucose.: 231 mg/dL (2020 16:07)    LIVER FUNCTIONS - ( 2020 16:32 )  Alb: 4.4 g/dL / Pro: 7.4 g/dL / ALK PHOS: 57 U/L / ALT: 21 U/L / AST: 22 U/L / GGT: x               Urinalysis Basic - ( 2020 16:41 )    Color: Yellow / Appearance: Clear / S.028 / pH: x  Gluc: x / Ketone: Small  / Bili: Negative / Urobili: Negative   Blood: x / Protein: Trace / Nitrite: Negative   Leuk Esterase: Negative / RBC: 1 /hpf / WBC 0 /HPF   Sq Epi: x / Non Sq Epi: 0 /hpf / Bacteria: Negative            RADIOLOGY AND ADDITIONAL TESTS:    CONSULTANT NOTES REVIEWED:    CARE DISCUSSED WITH THE FOLLOWING CONSULTANTS/PROVIDERS: Contact Information:  Christine Natarajan MD,  PGY-1, Internal Medicine  Pager: [phone] ([facility]) ///     JAZIEL LOPEZ, MRN-[id number]    Patient is a 65y old  Male who presents with a chief complaint of generalized weakness, lethargy (01 Dec 2020 06:28)      OVERNIGHT EVENTS:  Pt admitted.  SUBJECTIVE:  Pt seen and examined at bedside. Unable to obtain ROS due to mental status. Pt opens eyes and intermittently says tangential phrases.       OBJECTIVE:  Vital Signs Last 24 Hrs  T(C): 36.8 (01 Dec 2020 05:58), Max: 37.4 (2020 16:30)  T(F): 98.2 (01 Dec 2020 05:58), Max: 99.3 (2020 16:30)  HR: 60 (01 Dec 2020 05:58) (58 - 79)  BP: 126/63 (01 Dec 2020 05:58) (122/69 - 132/65)  BP(mean): --  RR: 14 (01 Dec 2020 05:58) (14 - 18)  SpO2: 99% (01 Dec 2020 05:58) (97% - 99%)  I&O's Summary      MEDICATIONS  (STANDING):  dextrose 40% Gel 15 Gram(s) Oral once  dextrose 5%. 1000 milliLiter(s) (50 mL/Hr) IV Continuous <Continuous>  dextrose 5%. 1000 milliLiter(s) (100 mL/Hr) IV Continuous <Continuous>  dextrose 50% Injectable 25 Gram(s) IV Push once  dextrose 50% Injectable 12.5 Gram(s) IV Push once  dextrose 50% Injectable 25 Gram(s) IV Push once  enoxaparin Injectable 40 milliGRAM(s) SubCutaneous daily  glucagon  Injectable 1 milliGRAM(s) IntraMuscular once  insulin lispro (ADMELOG) corrective regimen sliding scale   SubCutaneous three times a day before meals  melatonin 9 milliGRAM(s) Oral at bedtime  memantine 5 milliGRAM(s) Oral two times a day  OLANZapine 2.5 milliGRAM(s) Oral daily  OLANZapine 5 milliGRAM(s) Oral at bedtime  senna 2 Tablet(s) Oral at bedtime  valproic  acid Syrup 500 milliGRAM(s) Oral daily  valproic  acid Syrup 500 milliGRAM(s) Oral at bedtime  zinc sulfate 220 milliGRAM(s) Oral daily    MEDICATIONS  (PRN):  OLANZapine 2.5 milliGRAM(s) Oral daily PRN for agitation    Allergies    No Known Allergies    Intolerances          GENERAL: NAD, appears lethargic  HEAD:  NCAT  EYES: EOMI, PERRLA, sclera clear  NECK: Supple, No JVD  CHEST/LUNG: CTABL, no wheezes  HEART: Regular rate and rhythm; No murmurs, rubs, or gallops  ABDOMEN: Soft, Nontender, Nondistended; Bowel sounds present  EXTREMITIES:  2+ Peripheral Pulses, No clubbing, cyanosis, or edema  PSYCH: AAO, intermittently following simple commands  NEUROLOGY: non-focal  SKIN: No rashes or lesions                        12.6   5.11  )-----------( 182      ( 01 Dec 2020 07:06 )             37.2     PT/INR - ( 2020 17:35 )   PT: 12.9 sec;   INR: 1.08 ratio         PTT - ( 2020 17:35 )  PTT:27.3 sec  11-30    136  |  100  |  32<H>  ----------------------------<  233<H>  4.5   |  21<L>  |  0.98    Ca    9.8      2020 16:32    TPro  7.4  /  Alb  4.4  /  TBili  0.3  /  DBili  x   /  AST  22  /  ALT  21  /  AlkPhos  57  11-30    CAPILLARY BLOOD GLUCOSE      POCT Blood Glucose.: 231 mg/dL (2020 16:07)    LIVER FUNCTIONS - ( 2020 16:32 )  Alb: 4.4 g/dL / Pro: 7.4 g/dL / ALK PHOS: 57 U/L / ALT: 21 U/L / AST: 22 U/L / GGT: x               Urinalysis Basic - ( 2020 16:41 )    Color: Yellow / Appearance: Clear / S.028 / pH: x  Gluc: x / Ketone: Small  / Bili: Negative / Urobili: Negative   Blood: x / Protein: Trace / Nitrite: Negative   Leuk Esterase: Negative / RBC: 1 /hpf / WBC 0 /HPF   Sq Epi: x / Non Sq Epi: 0 /hpf / Bacteria: Negative            RADIOLOGY AND ADDITIONAL TESTS:    CONSULTANT NOTES REVIEWED:    CARE DISCUSSED WITH THE FOLLOWING CONSULTANTS/PROVIDERS:

## 2020-12-01 NOTE — H&P ADULT - HISTORY OF PRESENT ILLNESS
66 y/o M with HTN, HLD, T2DM, dementia, psychosis, dysphagia, localized edema, GERD, convulsion disorder, depression presents for inability to walk and agitation. Normally he is able to walk without assistance, however, at [facility] nursing home he required total assistance and was more quiet and confused, not following commands and sent to ED for evaluation.     Of note, pt presented to [facility] from [facility] (11/14 - 11/20) with agitation and increased combativeness. Psych was consulted and felt his symptoms were a progression of his dementia with associated impulsivity in abscence of overt medical cause. VPA levels were low and his night time depakote was increased. He was cleared for discharge and sent back to [facility].     In the ED, T99.3 rectal, /69, RR 18, 98% on RA. Patient was given 1L NS.     Additionally hx obtained from Ailin, patient's daughter. Endorses behavioral disturbances recently, possibly progression of disease. Plan discussed with Ailin. 64 y/o M with HTN, HLD, T2DM, dementia, psychosis, dysphagia, localized edema, GERD, convulsion disorder, depression presents for inability to walk and agitation. Normally he is able to walk without assistance, however, at [facility] nursing home he required total assistance and was more quiet and confused, not following commands and sent to ED for evaluation.     Of note, pt presented to [facility] from [facility] (11/14 - 11/20) with agitation and increased combativeness. Psych was consulted and felt his symptoms were a progression of his dementia with associated impulsivity in abscence of overt medical cause. VPA levels were low and his night time depakote was increased. He was cleared for discharge and sent back to [facility].     In the ED, T99.3 rectal, /69, RR 18, 98% on RA. Patient was given 1L NS.     Additionally hx obtained from Ailin, patient's daughter. Endorses behavioral disturbances recently, possibly progression of disease. Per Ailin pt had COVID in April, hospitalized for 2 months for noninvasive oxygen support. Decline since April. Had a PEG at one point for dysphagia, pulled out by patient but not replaced because he was able to tolerate PO. Plan discussed with Ailin.

## 2020-12-01 NOTE — BEHAVIORAL HEALTH ASSESSMENT NOTE - DIFFERENTIAL
Dementia with behavioral disturbances  Medication side effect  Post-ictal state  Covid 19 complication

## 2020-12-01 NOTE — BEHAVIORAL HEALTH ASSESSMENT NOTE - NSBHCHARTREVIEWIMAGING_PSY_A_CORE FT
< from: CT Head No Cont (11.30.20 @ 17:07) >    IMPRESSION:    No CT evidence of acute intracranial hemorrhage, mass effect, or midline shift. Age-related involutional and microvascular changes.    < end of copied text >

## 2020-12-01 NOTE — BEHAVIORAL HEALTH ASSESSMENT NOTE - NSBHCHARTREVIEWLAB_PSY_A_CORE FT
12.6   5.11  )-----------( 182      ( 01 Dec 2020 07:06 )             37.2     12-    135  |  101  |  21  ----------------------------<  193<H>  3.5   |  24  |  0.77    Ca    8.9      01 Dec 2020 07:01  Phos  3.0     12  Mg     1.8         TPro  6.5  /  Alb  3.8  /  TBili  0.5  /  DBili  x   /  AST  14  /  ALT  15  /  AlkPhos  51      Urinalysis Basic - ( 2020 16:41 )    Color: Yellow / Appearance: Clear / S.028 / pH: x  Gluc: x / Ketone: Small  / Bili: Negative / Urobili: Negative   Blood: x / Protein: Trace / Nitrite: Negative   Leuk Esterase: Negative / RBC: 1 /hpf / WBC 0 /HPF   Sq Epi: x / Non Sq Epi: 0 /hpf / Bacteria: Negative

## 2020-12-01 NOTE — H&P ADULT - PROBLEM SELECTOR PLAN 1
Likely with acute to subacute progression of dementia. Recently increased on valproic acid. Lethargy w/ hx of convulsion disorder, cannot exclude post-ictal state.  - UA, CXR negative, no leukocytosis, no fever. Unlikely infectious contribution.   - behavioral health in AM for medical optimization  - f/u valproic acid  - c/w psych meds  - previously saw neurology in May; did not suspect new seizure disorder, however, clinical presentation here with lethargy and diminished responsiveness may be postictal state  - spot EEG -> consult neuro if positive

## 2020-12-01 NOTE — H&P ADULT - NSICDXPASTMEDICALHX_GEN_ALL_CORE_FT
PAST MEDICAL HISTORY:  Convulsion     Dementia dx 2016    Diabetes mellitus     H/O gastroesophageal reflux (GERD)     Seizure disorder

## 2020-12-01 NOTE — PHYSICAL THERAPY INITIAL EVALUATION ADULT - IMPAIRMENTS FOUND, PT EVAL
gross motor/posture/aerobic capacity/endurance/cognitive impairment/arousal, attention, and cognition/gait, locomotion, and balance/muscle strength/poor safety awareness

## 2020-12-01 NOTE — PROGRESS NOTE ADULT - PROBLEM SELECTOR PLAN 3
A1c 8.4 11/2020 only on oral medications. Unlikely to clinically benefit from tighter glycemic control  - FS, PETER qac, qhs   - monitor FS, may benefit from long acting insulin

## 2020-12-01 NOTE — H&P ADULT - PROBLEM SELECTOR PLAN 3
A1c 8.4 11/2020 only on oral medications. Unlikely to clinically benefit from tighter glycemic control  - FS, PETER qac, qhs   - monitor FS, may benefit from long acting insulin DMT2  A1c 8.4 11/2020 only on oral medications. Unlikely to clinically benefit from tighter glycemic control  - FS, PETER qac, qhs   - monitor FS, may benefit from long acting insulin

## 2020-12-01 NOTE — PHYSICAL THERAPY INITIAL EVALUATION ADULT - PERTINENT HX OF CURRENT PROBLEM, REHAB EVAL
64 y/o M with HTN, HLD, T2DM, dementia, psychosis, dysphagia, localized edema, GERD, convulsion disorder, depression presents for inability to walk and agitation.

## 2020-12-02 LAB
ALBUMIN SERPL ELPH-MCNC: 3.8 G/DL — SIGNIFICANT CHANGE UP (ref 3.3–5)
ALP SERPL-CCNC: 47 U/L — SIGNIFICANT CHANGE UP (ref 40–120)
ALT FLD-CCNC: 15 U/L — SIGNIFICANT CHANGE UP (ref 10–45)
ANION GAP SERPL CALC-SCNC: 12 MMOL/L — SIGNIFICANT CHANGE UP (ref 5–17)
AST SERPL-CCNC: 16 U/L — SIGNIFICANT CHANGE UP (ref 10–40)
BILIRUB SERPL-MCNC: 0.3 MG/DL — SIGNIFICANT CHANGE UP (ref 0.2–1.2)
BUN SERPL-MCNC: 15 MG/DL — SIGNIFICANT CHANGE UP (ref 7–23)
CALCIUM SERPL-MCNC: 9.4 MG/DL — SIGNIFICANT CHANGE UP (ref 8.4–10.5)
CHLORIDE SERPL-SCNC: 104 MMOL/L — SIGNIFICANT CHANGE UP (ref 96–108)
CO2 SERPL-SCNC: 24 MMOL/L — SIGNIFICANT CHANGE UP (ref 22–31)
CREAT SERPL-MCNC: 0.81 MG/DL — SIGNIFICANT CHANGE UP (ref 0.5–1.3)
GLUCOSE BLDC GLUCOMTR-MCNC: 140 MG/DL — HIGH (ref 70–99)
GLUCOSE BLDC GLUCOMTR-MCNC: 170 MG/DL — HIGH (ref 70–99)
GLUCOSE BLDC GLUCOMTR-MCNC: 176 MG/DL — HIGH (ref 70–99)
GLUCOSE BLDC GLUCOMTR-MCNC: 280 MG/DL — HIGH (ref 70–99)
GLUCOSE BLDC GLUCOMTR-MCNC: 499 MG/DL — CRITICAL HIGH (ref 70–99)
GLUCOSE SERPL-MCNC: 175 MG/DL — HIGH (ref 70–99)
HCT VFR BLD CALC: 37.9 % — LOW (ref 39–50)
HGB BLD-MCNC: 12.4 G/DL — LOW (ref 13–17)
MAGNESIUM SERPL-MCNC: 1.8 MG/DL — SIGNIFICANT CHANGE UP (ref 1.6–2.6)
MCHC RBC-ENTMCNC: 28.1 PG — SIGNIFICANT CHANGE UP (ref 27–34)
MCHC RBC-ENTMCNC: 32.7 GM/DL — SIGNIFICANT CHANGE UP (ref 32–36)
MCV RBC AUTO: 85.7 FL — SIGNIFICANT CHANGE UP (ref 80–100)
NRBC # BLD: 0 /100 WBCS — SIGNIFICANT CHANGE UP (ref 0–0)
PHOSPHATE SERPL-MCNC: 3.1 MG/DL — SIGNIFICANT CHANGE UP (ref 2.5–4.5)
PLATELET # BLD AUTO: 177 K/UL — SIGNIFICANT CHANGE UP (ref 150–400)
POTASSIUM SERPL-MCNC: 3.4 MMOL/L — LOW (ref 3.5–5.3)
POTASSIUM SERPL-SCNC: 3.4 MMOL/L — LOW (ref 3.5–5.3)
PROT SERPL-MCNC: 6.4 G/DL — SIGNIFICANT CHANGE UP (ref 6–8.3)
RBC # BLD: 4.42 M/UL — SIGNIFICANT CHANGE UP (ref 4.2–5.8)
RBC # FLD: 12.1 % — SIGNIFICANT CHANGE UP (ref 10.3–14.5)
SODIUM SERPL-SCNC: 140 MMOL/L — SIGNIFICANT CHANGE UP (ref 135–145)
WBC # BLD: 4.36 K/UL — SIGNIFICANT CHANGE UP (ref 3.8–10.5)
WBC # FLD AUTO: 4.36 K/UL — SIGNIFICANT CHANGE UP (ref 3.8–10.5)

## 2020-12-02 PROCEDURE — 95816 EEG AWAKE AND DROWSY: CPT | Mod: 26

## 2020-12-02 PROCEDURE — 99233 SBSQ HOSP IP/OBS HIGH 50: CPT | Mod: GC

## 2020-12-02 RX ORDER — POTASSIUM CHLORIDE 20 MEQ
10 PACKET (EA) ORAL
Refills: 0 | Status: DISCONTINUED | OUTPATIENT
Start: 2020-12-02 | End: 2020-12-02

## 2020-12-02 RX ORDER — POTASSIUM CHLORIDE 20 MEQ
20 PACKET (EA) ORAL
Refills: 0 | Status: COMPLETED | OUTPATIENT
Start: 2020-12-02 | End: 2020-12-02

## 2020-12-02 RX ADMIN — Medication 3: at 18:22

## 2020-12-02 RX ADMIN — Medication 9 MILLIGRAM(S): at 21:14

## 2020-12-02 RX ADMIN — OLANZAPINE 5 MILLIGRAM(S): 15 TABLET, FILM COATED ORAL at 21:14

## 2020-12-02 RX ADMIN — Medication 1: at 09:00

## 2020-12-02 RX ADMIN — OLANZAPINE 2.5 MILLIGRAM(S): 15 TABLET, FILM COATED ORAL at 15:00

## 2020-12-02 RX ADMIN — Medication 20 MILLIEQUIVALENT(S): at 15:00

## 2020-12-02 RX ADMIN — Medication 500 MILLIGRAM(S): at 15:00

## 2020-12-02 RX ADMIN — Medication 20 MILLIEQUIVALENT(S): at 17:29

## 2020-12-02 RX ADMIN — ENOXAPARIN SODIUM 40 MILLIGRAM(S): 100 INJECTION SUBCUTANEOUS at 15:00

## 2020-12-02 RX ADMIN — MEMANTINE HYDROCHLORIDE 5 MILLIGRAM(S): 10 TABLET ORAL at 17:29

## 2020-12-02 RX ADMIN — SENNA PLUS 2 TABLET(S): 8.6 TABLET ORAL at 21:14

## 2020-12-02 RX ADMIN — MEMANTINE HYDROCHLORIDE 5 MILLIGRAM(S): 10 TABLET ORAL at 06:48

## 2020-12-02 RX ADMIN — Medication 500 MILLIGRAM(S): at 21:14

## 2020-12-02 RX ADMIN — ZINC SULFATE TAB 220 MG (50 MG ZINC EQUIVALENT) 220 MILLIGRAM(S): 220 (50 ZN) TAB at 15:00

## 2020-12-02 NOTE — OCCUPATIONAL THERAPY INITIAL EVALUATION ADULT - ADDITIONAL COMMENTS
CTH: No CT evidence of acute intracranial hemorrhage, mass effect, or midline shift. Age-related involutional and microvascular changes. XRAY Chest: (-)

## 2020-12-02 NOTE — DIETITIAN INITIAL EVALUATION ADULT. - PROBLEM SELECTOR PLAN 3
DMT2  A1c 8.4 11/2020 only on oral medications. Unlikely to clinically benefit from tighter glycemic control  - FS, PETER qac, qhs   - monitor FS, may benefit from long acting insulin

## 2020-12-02 NOTE — PROGRESS NOTE ADULT - PROBLEM SELECTOR PLAN 5
DVT: lovenox  Diet: CC  Dispo: return to [facility]    PT evaluation for recent inability to walk DVT: lovenox  Diet: CC  Dispo: return to [facility]    PT: RODNEY  OT: ADL retraining; cognitive, visual perceptual; transfer training; bed mobility training; balance training

## 2020-12-02 NOTE — DIETITIAN INITIAL EVALUATION ADULT. - PHYSCIAL ASSESSMENT
well nourished Skin: stage 2 sacral ulcer  Nutrition-focused physical exam not appropriate at this time

## 2020-12-02 NOTE — DIETITIAN INITIAL EVALUATION ADULT. - PERTINENT LABORATORY DATA
12-02 Na 140 mmol/L Glu 175 mg/dL<H> K+ 3.4 mmol/L<L> Cr  0.81 mg/dL BUN 15 mg/dL Phos 3.1 mg/dL Alb 3.8 g/dL PAB n/a   Hgb 12.4 g/dL<L> Hct 37.9 %<L>  A1C 8.4(H)  CAPILLARY BLOOD GLUCOSE      POCT Blood Glucose.: 176 mg/dL (02 Dec 2020 12:15)  POCT Blood Glucose.: 170 mg/dL (02 Dec 2020 08:32)  POCT Blood Glucose.: 244 mg/dL (01 Dec 2020 22:36)  POCT Blood Glucose.: 172 mg/dL (01 Dec 2020 17:30)  POCT Blood Glucose.: 228 mg/dL (01 Dec 2020 12:45)

## 2020-12-02 NOTE — OCCUPATIONAL THERAPY INITIAL EVALUATION ADULT - BALANCE TRAINING, PT EVAL
GOAL: Pt will demonstrate improved static/dynamic balance by 1/2 grade throughout in order to increase safety and independence in ADLs within 4 weeks

## 2020-12-02 NOTE — DIETITIAN INITIAL EVALUATION ADULT. - REASON FOR ADMISSION
66 y/o M with HTN, HLD, T2DM, dementia, psychosis, dysphagia, localized edema, GERD, convulsion disorder, depression presents for inability to walk and agitation likely progression of dementia vs. state of delirium.

## 2020-12-02 NOTE — PROGRESS NOTE ADULT - SUBJECTIVE AND OBJECTIVE BOX
Contact Information:  Christine Natarajan MD,  PGY-1, Internal Medicine  Pager: [phone] ([facility]) ///     JAZIEL LOPEZ, MRN-[id number]    Patient is a 65y old  Male who presents with a chief complaint of generalized weakness, lethargy (01 Dec 2020 07:40)      OVERNIGHT EVENTS:  Around 7 pm on , pt was agitated, requiring 2.5 mg of IM haldol x1. 2.5 mg zyprexa q8h PRN ordered.  SUBJECTIVE:        OBJECTIVE:  Vital Signs Last 24 Hrs  T(C): 36.5 (02 Dec 2020 06:34), Max: 36.6 (02 Dec 2020 00:08)  T(F): 97.7 (02 Dec 2020 06:34), Max: 97.9 (02 Dec 2020 00:08)  HR: 63 (02 Dec 2020 06:34) (63 - 82)  BP: 145/87 (02 Dec 2020 06:34) (134/71 - 156/70)  BP(mean): --  RR: 16 (02 Dec 2020 06:34) (16 - 18)  SpO2: 99% (02 Dec 2020 06:34) (96% - 99%)  I&O's Summary    01 Dec 2020 07:01  -  02 Dec 2020 07:00  --------------------------------------------------------  IN: 1440 mL / OUT: 0 mL / NET: 1440 mL        MEDICATIONS  (STANDING):  dextrose 40% Gel 15 Gram(s) Oral once  dextrose 5%. 1000 milliLiter(s) (50 mL/Hr) IV Continuous <Continuous>  dextrose 5%. 1000 milliLiter(s) (100 mL/Hr) IV Continuous <Continuous>  dextrose 50% Injectable 25 Gram(s) IV Push once  dextrose 50% Injectable 12.5 Gram(s) IV Push once  dextrose 50% Injectable 25 Gram(s) IV Push once  enoxaparin Injectable 40 milliGRAM(s) SubCutaneous daily  glucagon  Injectable 1 milliGRAM(s) IntraMuscular once  insulin lispro (ADMELOG) corrective regimen sliding scale   SubCutaneous three times a day before meals  melatonin 9 milliGRAM(s) Oral at bedtime  memantine 5 milliGRAM(s) Oral two times a day  OLANZapine 2.5 milliGRAM(s) Oral daily  OLANZapine 5 milliGRAM(s) Oral at bedtime  senna 2 Tablet(s) Oral at bedtime  valproic  acid Syrup 500 milliGRAM(s) Oral daily  valproic  acid Syrup 500 milliGRAM(s) Oral at bedtime  zinc sulfate 220 milliGRAM(s) Oral daily    MEDICATIONS  (PRN):  OLANZapine Injectable 2.5 milliGRAM(s) IntraMuscular every 8 hours PRN agitation    Allergies    No Known Allergies    Intolerances            GENERAL: NAD, appears lethargic, opens eyes with loud sounds  HEAD:  NCAT  EYES: EOMI, PERRLA, sclera clear  NECK: Supple, No JVD  CHEST/LUNG: CTABL, no wheezes  HEART: Regular rate and rhythm; No murmurs, rubs, or gallops  ABDOMEN: Soft, Nontender, Nondistended; Bowel sounds present  EXTREMITIES:  2+ Peripheral Pulses, No clubbing, cyanosis, or edema  PSYCH: AAO, intermittently following simple commands  NEUROLOGY: non-focal  SKIN: No rashes or lesions                        12.6   5.11  )-----------( 182      ( 01 Dec 2020 07:06 )             37.2     PT/INR - ( 2020 17:35 )   PT: 12.9 sec;   INR: 1.08 ratio         PTT - ( 2020 17:35 )  PTT:27.3 sec      135  |  101  |  21  ----------------------------<  193<H>  3.5   |  24  |  0.77    Ca    8.9      01 Dec 2020 07:01  Phos  3.0     12  Mg     1.8         TPro  6.5  /  Alb  3.8  /  TBili  0.5  /  DBili  x   /  AST  14  /  ALT  15  /  AlkPhos  51      CAPILLARY BLOOD GLUCOSE      POCT Blood Glucose.: 244 mg/dL (01 Dec 2020 22:36)  POCT Blood Glucose.: 172 mg/dL (01 Dec 2020 17:30)  POCT Blood Glucose.: 228 mg/dL (01 Dec 2020 12:45)  POCT Blood Glucose.: 179 mg/dL (01 Dec 2020 08:25)    LIVER FUNCTIONS - ( 01 Dec 2020 07:01 )  Alb: 3.8 g/dL / Pro: 6.5 g/dL / ALK PHOS: 51 U/L / ALT: 15 U/L / AST: 14 U/L / GGT: x               Urinalysis Basic - ( 2020 16:41 )    Color: Yellow / Appearance: Clear / S.028 / pH: x  Gluc: x / Ketone: Small  / Bili: Negative / Urobili: Negative   Blood: x / Protein: Trace / Nitrite: Negative   Leuk Esterase: Negative / RBC: 1 /hpf / WBC 0 /HPF   Sq Epi: x / Non Sq Epi: 0 /hpf / Bacteria: Negative        Culture - Blood (collected 2020 20:26)  Source: .Blood Blood-Peripheral  Preliminary Report (01 Dec 2020 21:02):    No growth to date.    Culture - Blood (collected 2020 20:26)  Source: .Blood Blood-Peripheral  Preliminary Report (01 Dec 2020 21:02):    No growth to date.    Culture - Urine (collected 2020 20:12)  Source: .Urine Clean Catch (Midstream)  Final Report (01 Dec 2020 16:34):    No growth          RADIOLOGY AND ADDITIONAL TESTS:    CONSULTANT NOTES REVIEWED:    CARE DISCUSSED WITH THE FOLLOWING CONSULTANTS/PROVIDERS: Contact Information:  Christine Natarajan MD,  PGY-1, Internal Medicine  Pager: [phone] ([facility]) ///     JAZIEL LOPEZ, MRN-[id number]    Patient is a 65y old  Male who presents with a chief complaint of generalized weakness, lethargy (01 Dec 2020 07:40)      OVERNIGHT EVENTS:  Around 7 pm on , pt was agitated, requiring 2.5 mg of IM haldol x1. 2.5 mg zyprexa q8h PRN ordered.  SUBJECTIVE:  Pt seen and examined at bedside. Pt arousable, but does not open eyes to verbal stimuli. Unable to obtain ROS due to mental status. Pt lethargic without verbal expression this AM.      OBJECTIVE:  Vital Signs Last 24 Hrs  T(C): 36.5 (02 Dec 2020 06:34), Max: 36.6 (02 Dec 2020 00:08)  T(F): 97.7 (02 Dec 2020 06:34), Max: 97.9 (02 Dec 2020 00:08)  HR: 63 (02 Dec 2020 06:34) (63 - 82)  BP: 145/87 (02 Dec 2020 06:34) (134/71 - 156/70)  BP(mean): --  RR: 16 (02 Dec 2020 06:34) (16 - 18)  SpO2: 99% (02 Dec 2020 06:34) (96% - 99%)  I&O's Summary    01 Dec 2020 07:01  -  02 Dec 2020 07:00  --------------------------------------------------------  IN: 1440 mL / OUT: 0 mL / NET: 1440 mL        MEDICATIONS  (STANDING):  dextrose 40% Gel 15 Gram(s) Oral once  dextrose 5%. 1000 milliLiter(s) (50 mL/Hr) IV Continuous <Continuous>  dextrose 5%. 1000 milliLiter(s) (100 mL/Hr) IV Continuous <Continuous>  dextrose 50% Injectable 25 Gram(s) IV Push once  dextrose 50% Injectable 12.5 Gram(s) IV Push once  dextrose 50% Injectable 25 Gram(s) IV Push once  enoxaparin Injectable 40 milliGRAM(s) SubCutaneous daily  glucagon  Injectable 1 milliGRAM(s) IntraMuscular once  insulin lispro (ADMELOG) corrective regimen sliding scale   SubCutaneous three times a day before meals  melatonin 9 milliGRAM(s) Oral at bedtime  memantine 5 milliGRAM(s) Oral two times a day  OLANZapine 2.5 milliGRAM(s) Oral daily  OLANZapine 5 milliGRAM(s) Oral at bedtime  senna 2 Tablet(s) Oral at bedtime  valproic  acid Syrup 500 milliGRAM(s) Oral daily  valproic  acid Syrup 500 milliGRAM(s) Oral at bedtime  zinc sulfate 220 milliGRAM(s) Oral daily    MEDICATIONS  (PRN):  OLANZapine Injectable 2.5 milliGRAM(s) IntraMuscular every 8 hours PRN agitation    Allergies    No Known Allergies    Intolerances            GENERAL: NAD, appears lethargic, opens eyes with loud sounds  HEAD:  NCAT  EYES: EOMI, PERRLA, sclera clear  NECK: Supple, No JVD  CHEST/LUNG: CTABL, no wheezes  HEART: Regular rate and rhythm; No murmurs, rubs, or gallops  ABDOMEN: Soft, Nontender, Nondistended; Bowel sounds present  EXTREMITIES:  2+ Peripheral Pulses, No clubbing, cyanosis, or edema  PSYCH: AAO, intermittently following simple commands  NEUROLOGY: non-focal  SKIN: No rashes or lesions                        12.6   5.11  )-----------( 182      ( 01 Dec 2020 07:06 )             37.2     PT/INR - ( 2020 17:35 )   PT: 12.9 sec;   INR: 1.08 ratio         PTT - ( 2020 17:35 )  PTT:27.3 sec      135  |  101  |  21  ----------------------------<  193<H>  3.5   |  24  |  0.77    Ca    8.9      01 Dec 2020 07:01  Phos  3.0       Mg     1.8         TPro  6.5  /  Alb  3.8  /  TBili  0.5  /  DBili  x   /  AST  14  /  ALT  15  /  AlkPhos  51      CAPILLARY BLOOD GLUCOSE      POCT Blood Glucose.: 244 mg/dL (01 Dec 2020 22:36)  POCT Blood Glucose.: 172 mg/dL (01 Dec 2020 17:30)  POCT Blood Glucose.: 228 mg/dL (01 Dec 2020 12:45)  POCT Blood Glucose.: 179 mg/dL (01 Dec 2020 08:25)    LIVER FUNCTIONS - ( 01 Dec 2020 07:01 )  Alb: 3.8 g/dL / Pro: 6.5 g/dL / ALK PHOS: 51 U/L / ALT: 15 U/L / AST: 14 U/L / GGT: x               Urinalysis Basic - ( 2020 16:41 )    Color: Yellow / Appearance: Clear / S.028 / pH: x  Gluc: x / Ketone: Small  / Bili: Negative / Urobili: Negative   Blood: x / Protein: Trace / Nitrite: Negative   Leuk Esterase: Negative / RBC: 1 /hpf / WBC 0 /HPF   Sq Epi: x / Non Sq Epi: 0 /hpf / Bacteria: Negative        Culture - Blood (collected 2020 20:26)  Source: .Blood Blood-Peripheral  Preliminary Report (01 Dec 2020 21:02):    No growth to date.    Culture - Blood (collected 2020 20:26)  Source: .Blood Blood-Peripheral  Preliminary Report (01 Dec 2020 21:02):    No growth to date.    Culture - Urine (collected 2020 20:12)  Source: .Urine Clean Catch (Midstream)  Final Report (01 Dec 2020 16:34):    No growth          RADIOLOGY AND ADDITIONAL TESTS:    CONSULTANT NOTES REVIEWED:    CARE DISCUSSED WITH THE FOLLOWING CONSULTANTS/PROVIDERS:

## 2020-12-02 NOTE — DIETITIAN INITIAL EVALUATION ADULT. - PERTINENT MEDS FT
MEDICATIONS  (STANDING):  dextrose 40% Gel 15 Gram(s) Oral once  dextrose 5%. 1000 milliLiter(s) (50 mL/Hr) IV Continuous <Continuous>  dextrose 5%. 1000 milliLiter(s) (100 mL/Hr) IV Continuous <Continuous>  dextrose 50% Injectable 25 Gram(s) IV Push once  dextrose 50% Injectable 12.5 Gram(s) IV Push once  dextrose 50% Injectable 25 Gram(s) IV Push once  enoxaparin Injectable 40 milliGRAM(s) SubCutaneous daily  glucagon  Injectable 1 milliGRAM(s) IntraMuscular once  insulin lispro (ADMELOG) corrective regimen sliding scale   SubCutaneous three times a day before meals  melatonin 9 milliGRAM(s) Oral at bedtime  memantine 5 milliGRAM(s) Oral two times a day  OLANZapine 2.5 milliGRAM(s) Oral daily  OLANZapine 5 milliGRAM(s) Oral at bedtime  senna 2 Tablet(s) Oral at bedtime  valproic  acid Syrup 500 milliGRAM(s) Oral daily  valproic  acid Syrup 500 milliGRAM(s) Oral at bedtime  zinc sulfate 220 milliGRAM(s) Oral daily    MEDICATIONS  (PRN):  OLANZapine Injectable 2.5 milliGRAM(s) IntraMuscular every 8 hours PRN agitation

## 2020-12-02 NOTE — OCCUPATIONAL THERAPY INITIAL EVALUATION ADULT - PRECAUTIONS/LIMITATIONS, REHAB EVAL
Additional hx obtained from pt's daughter; Endorses behavioral disturbances recently, possibly progression of disease. Chin [place]i pt had COVID in April, hospitalized for 2 months for noninvasive oxygen support. Decline since April.

## 2020-12-02 NOTE — PROGRESS NOTE ADULT - ASSESSMENT
66 y/o M with HTN, HLD, T2DM, dementia, psychosis, dysphagia, localized edema, GERD, convulsion disorder, depression presents for inability to walk and agitation likely progression of dementia vs. state of deliruim.

## 2020-12-02 NOTE — OCCUPATIONAL THERAPY INITIAL EVALUATION ADULT - PERTINENT HX OF CURRENT PROBLEM, REHAB EVAL
64 y/o M with HTN, HLD, T2DM, dementia, psychosis, dysphagia, localized edema, GERD, convulsion disorder, depression presents for inability to walk and agitation. Normally he is able to walk without assistance, however, at [facility] he required total assistance and was more quiet and confused, not following commands and sent to ED for evaluation from [facility].

## 2020-12-02 NOTE — PROGRESS NOTE ADULT - PROBLEM SELECTOR PLAN 1
Likely with acute to subacute progression of dementia. Recently increased on valproic acid. Lethargy w/ hx of convulsion disorder, cannot exclude post-ictal state.  - UA, CXR negative, no leukocytosis, no fever. Unlikely infectious contribution.   - CT head w/age related involutional and microvascular changes  - VPA level 30 (low)  - previously saw neurology in May; did not suspect new seizure disorder, however, clinical presentation here with lethargy and diminished responsiveness may be postictal state  - spot EEG -> consult neuro if positive  - psych consult recs appreciated  - per psych, pt most likely in state of delirium; will c/w depakote 500 mg BID, olanzapine 2.5 mg po AM + 5 mg po bedtime.  - ddx includes dementia w/behavioral disturbances, med side effect, post-ictal state, covid complication   - 2.5 mg zyprexa q8h PRN for agitation Likely with acute to subacute progression of dementia. Recently increased on valproic acid. Lethargy w/ hx of convulsion disorder, cannot exclude post-ictal state.  - UA, CXR negative, no leukocytosis, no fever. Unlikely infectious contribution.   - CT head w/age related involutional and microvascular changes  - VPA level 30 (low)  - previously saw neurology in May; did not suspect new seizure disorder, however, clinical presentation here with lethargy and diminished responsiveness may be postictal state  - EEG 12/2 showing moderate nonspecific diffuse or multifocal cerebral dysfunction, no epileptiform activity or seizure  - psych consult recs appreciated  - per psych, pt most likely in state of delirium; will c/w depakote 500 mg BID, olanzapine 2.5 mg po AM + 5 mg po bedtime.  - ddx includes dementia w/behavioral disturbances, med side effect, post-ictal state, covid complication   - 2.5 mg zyprexa q8h PRN for agitation  - BCx/UCx NGTD

## 2020-12-02 NOTE — DIETITIAN INITIAL EVALUATION ADULT. - OTHER INFO
Per RN, pt with fair to good appetite when he is awake and alert consuming % of meals per flowsheet. Pt observed to be lethargic this AM (12/2) and has not had breakfast or lunch yet. Pt sleeping all day. Pt normally with no chewing/swallowing difficulty per RN. Pt with NKFA or intolerances per chart. Per chart, pt was taking Multivitamin, zinc, vitamin C and D PTA. Per RN, pt with fair to good appetite when he is awake and alert consuming % of meals per flowsheet. Pt observed to be lethargic this AM (12/2) and has not had breakfast or lunch yet. Pt sleeping all day. Pt normally with no chewing/swallowing difficulty per RN. Pt with NKFA or intolerances per chart. Per chart, pt was taking Multivitamin, zinc, vitamin C and D PTA.    Weight hx per HIE: 179lbs (11/30), 158lbs (8/28), 164lbs (7/9), 175lbs (6/23), 164lbs (6/22), 170lbs (1/28). Pt with highly fluctuation weight hx. Will continue to monitor in-house weights for trend.     Diet education not appropriate at this time.

## 2020-12-02 NOTE — OCCUPATIONAL THERAPY INITIAL EVALUATION ADULT - DIAGNOSIS, OT EVAL
66 y/o M with HTN, HLD, T2DM, dementia, psychosis, dysphagia, localized edema, GERD, convulsion disorder, depression presents for inability to walk and agitation. Normally he is able to walk without assistance, however, at [facility] he required total assistance and was more quiet and confused, not following commands and sent to ED for evaluation from [facility]. Pt presents with decreased cognition, strength, balance, ROM, motor coordination and endurance impacting participation in ADLs and functional mobility.

## 2020-12-02 NOTE — EEG REPORT - NS EEG TEXT BOX
[facility]   COMPREHENSIVE EPILEPSY CENTER   REPORT OF ROUTINE VIDEO EEG     [facility]: [address] [place] [address] , [address], [place] [address], Ph#: [phone]  [facility]: [address], [place] [address], Ph#: [phone]      Patient Name: JAZIEL LOPEZ  Age and : 65y (59)  MRN #: [id number]  Location: [place] [address]  Referring Physician: Prachi Walker    Study Date: 20    _____________________________________________________________  TECHNICAL INFORMATION    Placement and Labeling of Electrodes:  The EEG was performed utilizing 20 channels referential EEG connections (coronal over temporal over parasagittal montage) using all standard 10-20 electrode placements with EKG.  Recording was at a sampling rate of 256 samples per second per channel.  Time synchronized digital video recording was done simultaneously with EEG recording.  A low light infrared camera was used for low light recording.  Lanre and seizure detection algorithms were utilized.    _____________________________________________________________  HISTORY    Patient is a 65y old  Male who presents with a chief complaint of 66 y/o M with HTN, HLD, T2DM, dementia, psychosis, dysphagia, localized edema, GERD, convulsion disorder, depression presents for inability to walk and agitation likely progression of dementia vs. state of delirium.     PERTINENT MEDICATION:  valproic  acid Syrup 500 milliGRAM(s) Oral daily  valproic  acid Syrup 500 milliGRAM(s) Oral at bedtime    _____________________________________________________________  STUDY INTERPRETATION    Findings: The background was continuous, spontaneously variable and reactive. No posterior dominant rhythm seen.    Background Slowing:  Diffuse theta and polymorphic delta slowing.      Focal Slowing:   None were present.    Sleep Background:  Drowsiness and stage II sleep transients were not recorded.    Other Non-Epileptiform Findings:  None were present.    Interictal Epileptiform Activity:   None were present.    Events:  Clinical events: None recorded.  Seizures: None recorded.    Activation Procedures:   Hyperventilation was not performed.    Photic stimulation was not performed.     Artifacts:  Intermittent myogenic and movement artifacts were noted.    ECG:  The heart rate on single channel ECG was predominantly between 70-80 BPM.    _____________________________________________________________  EEG SUMMARY/CLASSIFICATION    Abnormal EEG in the awake, states.  - Moderate generalized slowing.    _____________________________________________________________  EEG IMPRESSION/CLINICAL CORRELATE    Abnormal EEG study.  1. Moderate nonspecific diffuse or multifocal cerebral dysfunction.   2. No epileptiform pattern or seizure seen.    ______________________________________________________        Prelim read     Reagan Ruiz MD  Epilepsy Fellow, [facility]      Epilepsy Reading Room Ph# [phone]  Epilepsy Answering Service after 5 pm and before 8:30 am: Ph# [phone] [facility]   COMPREHENSIVE EPILEPSY CENTER   REPORT OF ROUTINE VIDEO EEG     [facility]: [address] [place] [address] , [address], [place] [address], Ph#: [phone]  [facility]: [address], [place] [address], Ph#: [phone]      Patient Name: JAZIEL LOPEZ  Age and : 65y (59)  MRN #: [id number]  Location: [place] [address]  Referring Physician: Prachi Walker    Study Date: 20    _____________________________________________________________  TECHNICAL INFORMATION    Placement and Labeling of Electrodes:  The EEG was performed utilizing 20 channels referential EEG connections (coronal over temporal over parasagittal montage) using all standard 10-20 electrode placements with EKG.  Recording was at a sampling rate of 256 samples per second per channel.  Time synchronized digital video recording was done simultaneously with EEG recording.  A low light infrared camera was used for low light recording.  Lanre and seizure detection algorithms were utilized.    _____________________________________________________________  HISTORY    Patient is a 65y old  Male who presents with a chief complaint of 64 y/o M with HTN, HLD, T2DM, dementia, psychosis, dysphagia, localized edema, GERD, convulsion disorder, depression presents for inability to walk and agitation likely progression of dementia vs. state of delirium.     PERTINENT MEDICATION:  valproic  acid Syrup 500 milliGRAM(s) Oral daily  valproic  acid Syrup 500 milliGRAM(s) Oral at bedtime    _____________________________________________________________  STUDY INTERPRETATION    Findings: The background was continuous, spontaneously variable and reactive. No posterior dominant rhythm seen.    Background Slowing:  Diffuse theta and polymorphic delta slowing.      Focal Slowing:   None were present.    Sleep Background:  Drowsiness and stage II sleep transients were not recorded.    Other Non-Epileptiform Findings:  None were present.    Interictal Epileptiform Activity:   None were present.    Events:  Clinical events: None recorded.  Seizures: None recorded.    Activation Procedures:   Hyperventilation was not performed.    Photic stimulation was not performed.     Artifacts:  Intermittent myogenic and movement artifacts were noted.    ECG:  The heart rate on single channel ECG was predominantly between 70-80 BPM.    _____________________________________________________________  EEG SUMMARY/CLASSIFICATION    Abnormal EEG in the awake, states.  - Moderate generalized slowing.    _____________________________________________________________  EEG IMPRESSION/CLINICAL CORRELATE    Abnormal EEG study.  1. Moderate nonspecific diffuse or multifocal cerebral dysfunction.   2. No epileptiform pattern or seizure seen.    ______________________________________________________    Reagan Ruiz MD  Epilepsy Fellow, [facility]      Epilepsy Reading Room Ph# [phone]  Epilepsy Answering Service after 5 pm and before 8:30 am: Ph# [phone]

## 2020-12-03 ENCOUNTER — TRANSCRIPTION ENCOUNTER (OUTPATIENT)
Age: 65
End: 2020-12-03

## 2020-12-03 VITALS
OXYGEN SATURATION: 94 % | TEMPERATURE: 98 F | DIASTOLIC BLOOD PRESSURE: 71 MMHG | HEART RATE: 68 BPM | SYSTOLIC BLOOD PRESSURE: 124 MMHG | RESPIRATION RATE: 18 BRPM

## 2020-12-03 LAB
ALBUMIN SERPL ELPH-MCNC: 3.9 G/DL — SIGNIFICANT CHANGE UP (ref 3.3–5)
ALP SERPL-CCNC: 47 U/L — SIGNIFICANT CHANGE UP (ref 40–120)
ALT FLD-CCNC: 17 U/L — SIGNIFICANT CHANGE UP (ref 10–45)
ANION GAP SERPL CALC-SCNC: 12 MMOL/L — SIGNIFICANT CHANGE UP (ref 5–17)
AST SERPL-CCNC: 19 U/L — SIGNIFICANT CHANGE UP (ref 10–40)
BILIRUB SERPL-MCNC: 0.4 MG/DL — SIGNIFICANT CHANGE UP (ref 0.2–1.2)
BUN SERPL-MCNC: 16 MG/DL — SIGNIFICANT CHANGE UP (ref 7–23)
CALCIUM SERPL-MCNC: 9.6 MG/DL — SIGNIFICANT CHANGE UP (ref 8.4–10.5)
CHLORIDE SERPL-SCNC: 103 MMOL/L — SIGNIFICANT CHANGE UP (ref 96–108)
CO2 SERPL-SCNC: 26 MMOL/L — SIGNIFICANT CHANGE UP (ref 22–31)
CREAT SERPL-MCNC: 0.91 MG/DL — SIGNIFICANT CHANGE UP (ref 0.5–1.3)
GLUCOSE BLDC GLUCOMTR-MCNC: 153 MG/DL — HIGH (ref 70–99)
GLUCOSE BLDC GLUCOMTR-MCNC: 158 MG/DL — HIGH (ref 70–99)
GLUCOSE BLDC GLUCOMTR-MCNC: 219 MG/DL — HIGH (ref 70–99)
GLUCOSE SERPL-MCNC: 102 MG/DL — HIGH (ref 70–99)
HCT VFR BLD CALC: 37 % — LOW (ref 39–50)
HGB BLD-MCNC: 12.2 G/DL — LOW (ref 13–17)
MAGNESIUM SERPL-MCNC: 1.8 MG/DL — SIGNIFICANT CHANGE UP (ref 1.6–2.6)
MCHC RBC-ENTMCNC: 27.9 PG — SIGNIFICANT CHANGE UP (ref 27–34)
MCHC RBC-ENTMCNC: 33 GM/DL — SIGNIFICANT CHANGE UP (ref 32–36)
MCV RBC AUTO: 84.7 FL — SIGNIFICANT CHANGE UP (ref 80–100)
NRBC # BLD: 0 /100 WBCS — SIGNIFICANT CHANGE UP (ref 0–0)
PHOSPHATE SERPL-MCNC: 3.3 MG/DL — SIGNIFICANT CHANGE UP (ref 2.5–4.5)
PLATELET # BLD AUTO: 172 K/UL — SIGNIFICANT CHANGE UP (ref 150–400)
POTASSIUM SERPL-MCNC: 3.6 MMOL/L — SIGNIFICANT CHANGE UP (ref 3.5–5.3)
POTASSIUM SERPL-SCNC: 3.6 MMOL/L — SIGNIFICANT CHANGE UP (ref 3.5–5.3)
PROT SERPL-MCNC: 6.6 G/DL — SIGNIFICANT CHANGE UP (ref 6–8.3)
RBC # BLD: 4.37 M/UL — SIGNIFICANT CHANGE UP (ref 4.2–5.8)
RBC # FLD: 12.1 % — SIGNIFICANT CHANGE UP (ref 10.3–14.5)
SARS-COV-2 IGG SERPL QL IA: POSITIVE
SARS-COV-2 IGM SERPL IA-ACNC: 2.09 INDEX — HIGH
SODIUM SERPL-SCNC: 141 MMOL/L — SIGNIFICANT CHANGE UP (ref 135–145)
VALPROATE FREE SERPL-MCNC: 3.8 UG/ML — LOW (ref 6–22)
WBC # BLD: 4.64 K/UL — SIGNIFICANT CHANGE UP (ref 3.8–10.5)
WBC # FLD AUTO: 4.64 K/UL — SIGNIFICANT CHANGE UP (ref 3.8–10.5)

## 2020-12-03 PROCEDURE — 99239 HOSP IP/OBS DSCHRG MGMT >30: CPT

## 2020-12-03 PROCEDURE — 83735 ASSAY OF MAGNESIUM: CPT

## 2020-12-03 PROCEDURE — 71045 X-RAY EXAM CHEST 1 VIEW: CPT

## 2020-12-03 PROCEDURE — 85018 HEMOGLOBIN: CPT

## 2020-12-03 PROCEDURE — 85730 THROMBOPLASTIN TIME PARTIAL: CPT

## 2020-12-03 PROCEDURE — 85027 COMPLETE CBC AUTOMATED: CPT

## 2020-12-03 PROCEDURE — 80053 COMPREHEN METABOLIC PANEL: CPT

## 2020-12-03 PROCEDURE — 84100 ASSAY OF PHOSPHORUS: CPT

## 2020-12-03 PROCEDURE — 97161 PT EVAL LOW COMPLEX 20 MIN: CPT

## 2020-12-03 PROCEDURE — 85610 PROTHROMBIN TIME: CPT

## 2020-12-03 PROCEDURE — 85014 HEMATOCRIT: CPT

## 2020-12-03 PROCEDURE — 82435 ASSAY OF BLOOD CHLORIDE: CPT

## 2020-12-03 PROCEDURE — 82962 GLUCOSE BLOOD TEST: CPT

## 2020-12-03 PROCEDURE — 87040 BLOOD CULTURE FOR BACTERIA: CPT

## 2020-12-03 PROCEDURE — 84295 ASSAY OF SERUM SODIUM: CPT

## 2020-12-03 PROCEDURE — 86769 SARS-COV-2 COVID-19 ANTIBODY: CPT

## 2020-12-03 PROCEDURE — 99285 EMERGENCY DEPT VISIT HI MDM: CPT | Mod: 25

## 2020-12-03 PROCEDURE — 97166 OT EVAL MOD COMPLEX 45 MIN: CPT

## 2020-12-03 PROCEDURE — 80165 DIPROPYLACETIC ACID FREE: CPT

## 2020-12-03 PROCEDURE — 87086 URINE CULTURE/COLONY COUNT: CPT

## 2020-12-03 PROCEDURE — 51701 INSERT BLADDER CATHETER: CPT

## 2020-12-03 PROCEDURE — 82803 BLOOD GASES ANY COMBINATION: CPT

## 2020-12-03 PROCEDURE — U0003: CPT

## 2020-12-03 PROCEDURE — 82947 ASSAY GLUCOSE BLOOD QUANT: CPT

## 2020-12-03 PROCEDURE — 95816 EEG AWAKE AND DROWSY: CPT

## 2020-12-03 PROCEDURE — 70450 CT HEAD/BRAIN W/O DYE: CPT

## 2020-12-03 PROCEDURE — 80164 ASSAY DIPROPYLACETIC ACD TOT: CPT

## 2020-12-03 PROCEDURE — 83605 ASSAY OF LACTIC ACID: CPT

## 2020-12-03 PROCEDURE — 85025 COMPLETE CBC W/AUTO DIFF WBC: CPT

## 2020-12-03 PROCEDURE — 82330 ASSAY OF CALCIUM: CPT

## 2020-12-03 PROCEDURE — 93005 ELECTROCARDIOGRAM TRACING: CPT | Mod: XU

## 2020-12-03 PROCEDURE — 81001 URINALYSIS AUTO W/SCOPE: CPT

## 2020-12-03 PROCEDURE — 84132 ASSAY OF SERUM POTASSIUM: CPT

## 2020-12-03 RX ORDER — OLANZAPINE 15 MG/1
1 TABLET, FILM COATED ORAL
Qty: 0 | Refills: 0 | DISCHARGE
Start: 2020-12-03

## 2020-12-03 RX ORDER — POLYETHYLENE GLYCOL 3350 17 G/17G
17 POWDER, FOR SOLUTION ORAL DAILY
Refills: 0 | Status: DISCONTINUED | OUTPATIENT
Start: 2020-12-03 | End: 2020-12-03

## 2020-12-03 RX ORDER — OLANZAPINE 15 MG/1
1 TABLET, FILM COATED ORAL
Qty: 0 | Refills: 0 | DISCHARGE

## 2020-12-03 RX ORDER — VALPROIC ACID (AS SODIUM SALT) 250 MG/5ML
500 SOLUTION, ORAL ORAL
Qty: 15000 | Refills: 0
Start: 2020-12-03 | End: 2021-01-01

## 2020-12-03 RX ORDER — OLANZAPINE 15 MG/1
2.5 TABLET, FILM COATED ORAL ONCE
Refills: 0 | Status: COMPLETED | OUTPATIENT
Start: 2020-12-03 | End: 2020-12-03

## 2020-12-03 RX ORDER — ENOXAPARIN SODIUM 100 MG/ML
40 INJECTION SUBCUTANEOUS
Qty: 0 | Refills: 0 | DISCHARGE
Start: 2020-12-03

## 2020-12-03 RX ADMIN — ZINC SULFATE TAB 220 MG (50 MG ZINC EQUIVALENT) 220 MILLIGRAM(S): 220 (50 ZN) TAB at 12:21

## 2020-12-03 RX ADMIN — MEMANTINE HYDROCHLORIDE 5 MILLIGRAM(S): 10 TABLET ORAL at 05:47

## 2020-12-03 RX ADMIN — OLANZAPINE 2.5 MILLIGRAM(S): 15 TABLET, FILM COATED ORAL at 12:21

## 2020-12-03 RX ADMIN — Medication 1: at 09:48

## 2020-12-03 RX ADMIN — OLANZAPINE 2.5 MILLIGRAM(S): 15 TABLET, FILM COATED ORAL at 14:58

## 2020-12-03 RX ADMIN — Medication 1: at 12:20

## 2020-12-03 RX ADMIN — POLYETHYLENE GLYCOL 3350 17 GRAM(S): 17 POWDER, FOR SOLUTION ORAL at 12:25

## 2020-12-03 RX ADMIN — ENOXAPARIN SODIUM 40 MILLIGRAM(S): 100 INJECTION SUBCUTANEOUS at 12:20

## 2020-12-03 RX ADMIN — Medication 500 MILLIGRAM(S): at 12:21

## 2020-12-03 RX ADMIN — OLANZAPINE 2.5 MILLIGRAM(S): 15 TABLET, FILM COATED ORAL at 15:20

## 2020-12-03 NOTE — PROGRESS NOTE ADULT - PROBLEM SELECTOR PLAN 5
DVT: lovenox  Diet: CC  Dispo: return to [facility]    PT: RODNEY  OT: ADL retraining; cognitive, visual perceptual; transfer training; bed mobility training; balance training

## 2020-12-03 NOTE — DISCHARGE NOTE PROVIDER - NSDCMRMEDTOKEN_GEN_ALL_CORE_FT
cholecalciferol oral tablet: 1000 unit(s) orally once a day  GlipiZIDE XL 2.5 mg oral tablet, extended release: 1 tab(s) orally once a day  Melatonin 10 mg oral tablet, disintegratin tab(s) orally once a day (at bedtime)  memantine 5 mg oral tablet: 1 tab(s) orally 2 times a day  Milk of Magnesia 8% oral suspension: 30 milliliter(s) orally every 6 hours, As Needed - for constipation  Multiple Vitamins with Minerals oral tablet: 1 tab(s) orally once a day via PEG  OLANZapine 2.5 mg oral tablet: 1 tab(s) orally once a day, As Needed - for agitation  OLANZapine 2.5 mg oral tablet: 1 tab(s) orally once a day (in the morning)  Senna 8.6 mg oral tablet: 1 tab(s) orally once a day (at bedtime)  valproic acid 250 mg/5 mL oral liquid: 500 milligram(s) orally once a day  in the morning   valproic acid 250 mg/5 mL oral liquid: 500 milligram(s) orally once a day (at bedtime)   Vitamin C 500 mg oral tablet, chewable: 1 tab(s) orally once a day  zinc oxide topical cream: Apply topically to affected area 2 times a day  zinc sulfate 220 mg oral capsule: 220 milligram(s) orally once a day  ZyPREXA 5 mg oral tablet: 1 tab(s) orally once a day (at bedtime)   cholecalciferol oral tablet: 1000 unit(s) orally once a day  enoxaparin: 40 milligram(s) subcutaneous once a day  GlipiZIDE XL 2.5 mg oral tablet, extended release: 1 tab(s) orally once a day  Home Physical Therapy: Home physical therapy 2-3 times each week.  Melatonin 10 mg oral tablet, disintegratin tab(s) orally once a day (at bedtime)  memantine 5 mg oral tablet: 1 tab(s) orally 2 times a day  Milk of Magnesia 8% oral suspension: 30 milliliter(s) orally every 6 hours, As Needed - for constipation  Multiple Vitamins with Minerals oral tablet: 1 tab(s) orally once a day via PEG  OLANZapine 2.5 mg oral tablet: 1 tab(s) orally once a day  OLANZapine 5 mg oral tablet: 1 tab(s) orally once a day (at bedtime)  Senna 8.6 mg oral tablet: 1 tab(s) orally once a day (at bedtime)  valproic acid 250 mg/5 mL oral liquid: 500 milligram(s) orally once a day in AM   valproic acid 250 mg/5 mL oral liquid: 500 milligram(s) orally once a day (at bedtime)   Vitamin C 500 mg oral tablet, chewable: 1 tab(s) orally once a day  zinc oxide topical cream: Apply topically to affected area 2 times a day  zinc sulfate 220 mg oral capsule: 220 milligram(s) orally once a day

## 2020-12-03 NOTE — PROGRESS NOTE ADULT - ATTENDING COMMENTS
Patient seen and examined, agree with resident note.  EEG negative for seizures. Infectious workup continues to be negative, B12/folate/TSH normal 11/15, electrolytes normal. Psychiatry recommendations appreciated. Seen by PT/OT.  Medically stable for d/c with outpatient psychiatry and neurology follow up.  Time spent coordinating discharge: 35 minutes
Patient seen and examined, agree with resident note.  Is able to open his eyes to voice/touch but does not answer questions appropriately. Able to ambulate as per [facility] facility at baseline but mental status appears progression of dementia. r/o seizures with EEG, f/u infectious workup. Psychiatry consulted for medication management w/ decreased valproic acid level. PT/OT
Patient seen and examined, agree with resident note.  Infectious workup continues to be negative, B12/folate/TSH normal 11/15. Psychiatry recommendations appreciated. F/u EEG, CTH on admission without acute findings   PT/OT

## 2020-12-03 NOTE — DISCHARGE NOTE PROVIDER - HOSPITAL COURSE
64 y/o M with HTN, HLD, T2DM, dementia, psychosis, dysphagia, localized edema, GERD, convulsion disorder, depression presents for inability to walk and agitation. Normally he is able to walk without assistance, however, at [facility] nursing home he required total assistance and was more quiet and confused, not following commands and sent to ED for evaluation.   Of note, pt presented to [facility] from [facility] (11/14 - 11/20) with agitation and increased combativeness. Psych was consulted and felt his symptoms were a progression of his dementia with associated impulsivity in abscence of overt medical cause. VPA levels were low and his night time depakote was increased. He was cleared for discharge and sent back to [facility].     In the ED, T99.3 rectal, /69, RR 18, 98% on RA. Patient was given 1L NS.     Additionally hx obtained from Ailin, patient's daughter. Endorses behavioral disturbances recently, possibly progression of disease. Per Ailin pt had COVID in April, hospitalized for 2 months for noninvasive oxygen support. Decline since April. Had a PEG at one point for dysphagia, pulled out by patient but not replaced because he was able to tolerate PO. previously saw neurology in May; did not suspect new seizure disorder, however, clinical presentation here with lethargy and diminished responsiveness initially thought to be due to possible post-ictal state.     UA, CXR negative. BCx/UCx NGTD. CT head w/age related involutional and microvascular changes. EEG 12/2 showing moderate nonspecific diffuse or multifocal cerebral dysfunction, no epileptiform activity or seizure Pt remained afebrile with no leukocytosis. Psychiatry consulted. Per psych, pt's AMS most likely due to delirium vs dementia with behavioral disturbances vs. medication side effect vs. complications from covid hx; recommended depakote 500 mg BID, olanzapine 2.5 mg po AM + 5 mg po bedtime and 2.5 mg zyprexa q8h PRN for agitation.  PT recommended RODNEY; family opted for return to [facility].  Patient should follow up with neurology and psychiatry within 1 week of discharge.     64 y/o M with HTN, HLD, T2DM, dementia, psychosis, dysphagia, localized edema, GERD, convulsion disorder, depression presents for inability to walk and agitation. Normally he is able to walk without assistance, however, at [facility] nursing home he required total assistance and was more quiet and confused, not following commands and sent to ED for evaluation.   Of note, pt presented to [facility] from [facility] (11/14 - 11/20) with agitation and increased combativeness. Psych was consulted and felt his symptoms were a progression of his dementia with associated impulsivity in abscence of overt medical cause. VPA levels were low and his night time depakote was increased. He was cleared for discharge and sent back to [facility].     In the ED, T99.3 rectal, /69, RR 18, 98% on RA. Patient was given 1L NS.     Additionally hx obtained from Ailin, patient's daughter. Endorses behavioral disturbances recently, possibly progression of disease. Per Ailin pt had COVID in April, hospitalized for 2 months for noninvasive oxygen support. Decline since April. Had a PEG at one point for dysphagia, pulled out by patient but not replaced because he was able to tolerate PO. previously saw neurology in May; did not suspect new seizure disorder, however, clinical presentation here with lethargy and diminished responsiveness initially thought to be due to possible post-ictal state.     UA, CXR negative. BCx/UCx NGTD. CT head w/age related involutional and microvascular changes. EEG 12/2 showing moderate nonspecific diffuse or multifocal cerebral dysfunction, no epileptiform activity or seizure Pt remained afebrile with no leukocytosis. Psychiatry consulted. Per psych, pt's AMS most likely due to delirium vs dementia with behavioral disturbances vs. medication side effect vs. complications from covid hx; recommended depakote 500 mg BID, olanzapine 2.5 mg po AM + 5 mg po bedtime and 2.5 mg zyprexa q8h PRN for agitation.  PT recommended RODNEY; family opted for return to [facility].  Pt deemed medically stable for discharge. Patient should follow up with neurology and psychiatry within 1 week of discharge.

## 2020-12-03 NOTE — DISCHARGE NOTE PROVIDER - CARE PROVIDERS DIRECT ADDRESSES
[email or web address]

## 2020-12-03 NOTE — DISCHARGE NOTE PROVIDER - CARE PROVIDER_API CALL
Oz Marie  NEUROLOGY  [address], [address]  [place] [address]  Phone: [phone]  Fax: [phone]  Established Patient  Follow Up Time:     Eriberto Vo; [facility])  Psychiatry  [address] [place] [id number]  Phone: [phone]  Fax: [phone]  Follow Up Time:

## 2020-12-03 NOTE — PROGRESS NOTE ADULT - SUBJECTIVE AND OBJECTIVE BOX
Contact Information:  Christine Natarajan MD,  PGY-1, Internal Medicine  Pager: [phone] ([facility]) ///     JAZIEL LOPEZ, MRN-[id number]    Patient is a 65y old  Male who presents with a chief complaint of 64 y/o M with HTN, HLD, T2DM, dementia, psychosis, dysphagia, localized edema, GERD, convulsion disorder, depression presents for inability to walk and agitation likely progression of dementia vs. state of delirium. (02 Dec 2020 12:28)      OVERNIGHT EVENTS:  No acute overnight events.  SUBJECTIVE:        OBJECTIVE:  Vital Signs Last 24 Hrs  T(C): 36.3 (03 Dec 2020 04:35), Max: 36.8 (02 Dec 2020 22:02)  T(F): 97.3 (03 Dec 2020 04:35), Max: 98.3 (02 Dec 2020 22:02)  HR: 61 (03 Dec 2020 04:35) (61 - 97)  BP: 110/67 (03 Dec 2020 04:35) (110/67 - 146/89)  BP(mean): --  RR: 18 (03 Dec 2020 04:35) (18 - 18)  SpO2: 97% (03 Dec 2020 04:35) (96% - 100%)  I&O's Summary    02 Dec 2020 07:01  -  03 Dec 2020 07:00  --------------------------------------------------------  IN: 1200 mL / OUT: 0 mL / NET: 1200 mL        MEDICATIONS  (STANDING):  dextrose 40% Gel 15 Gram(s) Oral once  dextrose 5%. 1000 milliLiter(s) (50 mL/Hr) IV Continuous <Continuous>  dextrose 5%. 1000 milliLiter(s) (100 mL/Hr) IV Continuous <Continuous>  dextrose 50% Injectable 25 Gram(s) IV Push once  dextrose 50% Injectable 12.5 Gram(s) IV Push once  dextrose 50% Injectable 25 Gram(s) IV Push once  enoxaparin Injectable 40 milliGRAM(s) SubCutaneous daily  glucagon  Injectable 1 milliGRAM(s) IntraMuscular once  insulin lispro (ADMELOG) corrective regimen sliding scale   SubCutaneous three times a day before meals  melatonin 9 milliGRAM(s) Oral at bedtime  memantine 5 milliGRAM(s) Oral two times a day  OLANZapine 2.5 milliGRAM(s) Oral daily  OLANZapine 5 milliGRAM(s) Oral at bedtime  senna 2 Tablet(s) Oral at bedtime  valproic  acid Syrup 500 milliGRAM(s) Oral daily  valproic  acid Syrup 500 milliGRAM(s) Oral at bedtime  zinc sulfate 220 milliGRAM(s) Oral daily    MEDICATIONS  (PRN):  OLANZapine Injectable 2.5 milliGRAM(s) IntraMuscular every 8 hours PRN agitation    Allergies    No Known Allergies    Intolerances      GENERAL: NAD, appears lethargic, opens eyes with loud sounds  HEAD:  NCAT  EYES: EOMI, PERRLA, sclera clear  NECK: Supple, No JVD  CHEST/LUNG: CTABL, no wheezes  HEART: Regular rate and rhythm; No murmurs, rubs, or gallops  ABDOMEN: Soft, Nontender, Nondistended; Bowel sounds present  EXTREMITIES:  2+ Peripheral Pulses, No clubbing, cyanosis, or edema  PSYCH: AAO, intermittently following simple commands  NEUROLOGY: non-focal  SKIN: No rashes or lesions                        12.4   4.36  )-----------( 177      ( 02 Dec 2020 10:52 )             37.9       12-02    140  |  104  |  15  ----------------------------<  175<H>  3.4<L>   |  24  |  0.81    Ca    9.4      02 Dec 2020 10:52  Phos  3.1     12-02  Mg     1.8     12-02    TPro  6.4  /  Alb  3.8  /  TBili  0.3  /  DBili  x   /  AST  16  /  ALT  15  /  AlkPhos  47  12-02    CAPILLARY BLOOD GLUCOSE      POCT Blood Glucose.: 140 mg/dL (02 Dec 2020 21:55)  POCT Blood Glucose.: 280 mg/dL (02 Dec 2020 17:41)  POCT Blood Glucose.: 499 mg/dL (02 Dec 2020 17:40)  POCT Blood Glucose.: 176 mg/dL (02 Dec 2020 12:15)  POCT Blood Glucose.: 170 mg/dL (02 Dec 2020 08:32)    LIVER FUNCTIONS - ( 02 Dec 2020 10:52 )  Alb: 3.8 g/dL / Pro: 6.4 g/dL / ALK PHOS: 47 U/L / ALT: 15 U/L / AST: 16 U/L / GGT: x                   Culture - Blood (collected 30 Nov 2020 20:26)  Source: .Blood Blood-Peripheral  Preliminary Report (01 Dec 2020 21:02):    No growth to date.    Culture - Blood (collected 30 Nov 2020 20:26)  Source: .Blood Blood-Peripheral  Preliminary Report (01 Dec 2020 21:02):    No growth to date.    Culture - Urine (collected 30 Nov 2020 20:12)  Source: .Urine Clean Catch (Midstream)  Final Report (01 Dec 2020 16:34):    No growth          RADIOLOGY AND ADDITIONAL TESTS:    CONSULTANT NOTES REVIEWED:    CARE DISCUSSED WITH THE FOLLOWING CONSULTANTS/PROVIDERS: Contact Information:  Christine Natarajan MD,  PGY-1, Internal Medicine  Pager: [phone] ([facility]) ///     JAZIEL LOPEZ, MRN-[id number]    Patient is a 65y old  Male who presents with a chief complaint of 64 y/o M with HTN, HLD, T2DM, dementia, psychosis, dysphagia, localized edema, GERD, convulsion disorder, depression presents for inability to walk and agitation likely progression of dementia vs. state of delirium. (02 Dec 2020 12:28)      OVERNIGHT EVENTS:  No acute overnight events.  SUBJECTIVE:  Pt seen and examined at bedside. Pt found with eyes closed, did not open with sternal rub, but arousable. Intermittently mumbling sentences. Does not respond to commands or answer questions. No abnormal gestures or muscle fasciculations noted today. AAxO. Unable to obtain ROS due to mental status.      OBJECTIVE:  Vital Signs Last 24 Hrs  T(C): 36.3 (03 Dec 2020 04:35), Max: 36.8 (02 Dec 2020 22:02)  T(F): 97.3 (03 Dec 2020 04:35), Max: 98.3 (02 Dec 2020 22:02)  HR: 61 (03 Dec 2020 04:35) (61 - 97)  BP: 110/67 (03 Dec 2020 04:35) (110/67 - 146/89)  BP(mean): --  RR: 18 (03 Dec 2020 04:35) (18 - 18)  SpO2: 97% (03 Dec 2020 04:35) (96% - 100%)  I&O's Summary    02 Dec 2020 07:01  -  03 Dec 2020 07:00  --------------------------------------------------------  IN: 1200 mL / OUT: 0 mL / NET: 1200 mL        MEDICATIONS  (STANDING):  dextrose 40% Gel 15 Gram(s) Oral once  dextrose 5%. 1000 milliLiter(s) (50 mL/Hr) IV Continuous <Continuous>  dextrose 5%. 1000 milliLiter(s) (100 mL/Hr) IV Continuous <Continuous>  dextrose 50% Injectable 25 Gram(s) IV Push once  dextrose 50% Injectable 12.5 Gram(s) IV Push once  dextrose 50% Injectable 25 Gram(s) IV Push once  enoxaparin Injectable 40 milliGRAM(s) SubCutaneous daily  glucagon  Injectable 1 milliGRAM(s) IntraMuscular once  insulin lispro (ADMELOG) corrective regimen sliding scale   SubCutaneous three times a day before meals  melatonin 9 milliGRAM(s) Oral at bedtime  memantine 5 milliGRAM(s) Oral two times a day  OLANZapine 2.5 milliGRAM(s) Oral daily  OLANZapine 5 milliGRAM(s) Oral at bedtime  senna 2 Tablet(s) Oral at bedtime  valproic  acid Syrup 500 milliGRAM(s) Oral daily  valproic  acid Syrup 500 milliGRAM(s) Oral at bedtime  zinc sulfate 220 milliGRAM(s) Oral daily    MEDICATIONS  (PRN):  OLANZapine Injectable 2.5 milliGRAM(s) IntraMuscular every 8 hours PRN agitation    Allergies    No Known Allergies    Intolerances      GENERAL: NAD, appears lethargic, opens eyes with loud sounds  HEAD:  NCAT  EYES: EOMI, PERRLA, sclera clear  NECK: Supple, No JVD  CHEST/LUNG: CTABL, no wheezes  HEART: Regular rate and rhythm; No murmurs, rubs, or gallops  ABDOMEN: Soft, Nontender, Nondistended; Bowel sounds present  EXTREMITIES:  2+ Peripheral Pulses, No clubbing, cyanosis, or edema  PSYCH: AAO, intermittently following simple commands  NEUROLOGY: non-focal  SKIN: No rashes or lesions                        12.4   4.36  )-----------( 177      ( 02 Dec 2020 10:52 )             37.9       12-02    140  |  104  |  15  ----------------------------<  175<H>  3.4<L>   |  24  |  0.81    Ca    9.4      02 Dec 2020 10:52  Phos  3.1     12-02  Mg     1.8     12-02    TPro  6.4  /  Alb  3.8  /  TBili  0.3  /  DBili  x   /  AST  16  /  ALT  15  /  AlkPhos  47  12-02    CAPILLARY BLOOD GLUCOSE      POCT Blood Glucose.: 140 mg/dL (02 Dec 2020 21:55)  POCT Blood Glucose.: 280 mg/dL (02 Dec 2020 17:41)  POCT Blood Glucose.: 499 mg/dL (02 Dec 2020 17:40)  POCT Blood Glucose.: 176 mg/dL (02 Dec 2020 12:15)  POCT Blood Glucose.: 170 mg/dL (02 Dec 2020 08:32)    LIVER FUNCTIONS - ( 02 Dec 2020 10:52 )  Alb: 3.8 g/dL / Pro: 6.4 g/dL / ALK PHOS: 47 U/L / ALT: 15 U/L / AST: 16 U/L / GGT: x                   Culture - Blood (collected 30 Nov 2020 20:26)  Source: .Blood Blood-Peripheral  Preliminary Report (01 Dec 2020 21:02):    No growth to date.    Culture - Blood (collected 30 Nov 2020 20:26)  Source: .Blood Blood-Peripheral  Preliminary Report (01 Dec 2020 21:02):    No growth to date.    Culture - Urine (collected 30 Nov 2020 20:12)  Source: .Urine Clean Catch (Midstream)  Final Report (01 Dec 2020 16:34):    No growth          RADIOLOGY AND ADDITIONAL TESTS:    CONSULTANT NOTES REVIEWED:    CARE DISCUSSED WITH THE FOLLOWING CONSULTANTS/PROVIDERS:

## 2020-12-03 NOTE — DISCHARGE NOTE NURSING/CASE MANAGEMENT/SOCIAL WORK - PATIENT PORTAL LINK FT
You can access the FollowMyHealth Patient Portal offered by [facility] by registering at the following website: [email or web address]. By joining [employer]’s FollowMyHealth portal, you will also be able to view your health information using other applications (apps) compatible with our system.

## 2020-12-03 NOTE — PROGRESS NOTE ADULT - PROBLEM SELECTOR PLAN 1
Likely with acute to subacute progression of dementia. Recently increased on valproic acid. Lethargy w/ hx of convulsion disorder, cannot exclude post-ictal state.  - UA, CXR negative, no leukocytosis, no fever. Unlikely infectious contribution.   - CT head w/age related involutional and microvascular changes  - VPA level 30 (low)  - previously saw neurology in May; did not suspect new seizure disorder, however, clinical presentation here with lethargy and diminished responsiveness may be postictal state  - EEG 12/2 showing moderate nonspecific diffuse or multifocal cerebral dysfunction, no epileptiform activity or seizure  - psych consult recs appreciated  - per psych, pt most likely in state of delirium; will c/w depakote 500 mg BID, olanzapine 2.5 mg po AM + 5 mg po bedtime.  - ddx includes dementia w/behavioral disturbances, med side effect, post-ictal state, covid complication   - 2.5 mg zyprexa q8h PRN for agitation  - BCx/UCx NGTD

## 2020-12-03 NOTE — PROVIDER CONTACT NOTE (CHANGE IN STATUS NOTIFICATION) - BACKGROUND
pt admitted for altered mental status, was given prn Zyprexa 2.5mg at 1458. pt scheduled for discharge today

## 2020-12-04 ENCOUNTER — NON-APPOINTMENT (OUTPATIENT)
Age: 65
End: 2020-12-04

## 2020-12-11 ENCOUNTER — EMERGENCY (EMERGENCY)
Facility: HOSPITAL | Age: 65
LOS: 1 days | Discharge: HOME | End: 2020-12-11
Attending: EMERGENCY MEDICINE
Payer: MEDICARE

## 2020-12-11 VITALS
OXYGEN SATURATION: 99 % | SYSTOLIC BLOOD PRESSURE: 142 MMHG | DIASTOLIC BLOOD PRESSURE: 86 MMHG | TEMPERATURE: 99 F | HEART RATE: 62 BPM | RESPIRATION RATE: 18 BRPM

## 2020-12-11 VITALS
SYSTOLIC BLOOD PRESSURE: 102 MMHG | HEIGHT: 68 IN | HEART RATE: 67 BPM | RESPIRATION RATE: 19 BRPM | DIASTOLIC BLOOD PRESSURE: 73 MMHG | TEMPERATURE: 98 F | WEIGHT: 179.9 LBS | OXYGEN SATURATION: 99 %

## 2020-12-11 DIAGNOSIS — Z98.890 OTHER SPECIFIED POSTPROCEDURAL STATES: Chronic | ICD-10-CM

## 2020-12-11 PROBLEM — F03.90 UNSPECIFIED DEMENTIA WITHOUT BEHAVIORAL DISTURBANCE: Chronic | Status: ACTIVE | Noted: 2019-12-31

## 2020-12-11 LAB
ALBUMIN SERPL ELPH-MCNC: 3.9 G/DL — SIGNIFICANT CHANGE UP (ref 3.3–5)
ALP SERPL-CCNC: 47 U/L — SIGNIFICANT CHANGE UP (ref 40–120)
ALT FLD-CCNC: 15 U/L — SIGNIFICANT CHANGE UP (ref 10–45)
ANION GAP SERPL CALC-SCNC: 13 MMOL/L — SIGNIFICANT CHANGE UP (ref 5–17)
APPEARANCE UR: CLEAR — SIGNIFICANT CHANGE UP
APTT BLD: 30.6 SEC — SIGNIFICANT CHANGE UP (ref 27.5–35.5)
AST SERPL-CCNC: 19 U/L — SIGNIFICANT CHANGE UP (ref 10–40)
BACTERIA # UR AUTO: NEGATIVE — SIGNIFICANT CHANGE UP
BASOPHILS # BLD AUTO: 0.02 K/UL — SIGNIFICANT CHANGE UP (ref 0–0.2)
BASOPHILS NFR BLD AUTO: 0.4 % — SIGNIFICANT CHANGE UP (ref 0–2)
BILIRUB SERPL-MCNC: 0.4 MG/DL — SIGNIFICANT CHANGE UP (ref 0.2–1.2)
BILIRUB UR-MCNC: NEGATIVE — SIGNIFICANT CHANGE UP
BUN SERPL-MCNC: 19 MG/DL — SIGNIFICANT CHANGE UP (ref 7–23)
CALCIUM SERPL-MCNC: 9.4 MG/DL — SIGNIFICANT CHANGE UP (ref 8.4–10.5)
CHLORIDE SERPL-SCNC: 101 MMOL/L — SIGNIFICANT CHANGE UP (ref 96–108)
CO2 SERPL-SCNC: 26 MMOL/L — SIGNIFICANT CHANGE UP (ref 22–31)
COLOR SPEC: SIGNIFICANT CHANGE UP
CREAT SERPL-MCNC: 1 MG/DL — SIGNIFICANT CHANGE UP (ref 0.5–1.3)
DIFF PNL FLD: NEGATIVE — SIGNIFICANT CHANGE UP
EOSINOPHIL # BLD AUTO: 0.08 K/UL — SIGNIFICANT CHANGE UP (ref 0–0.5)
EOSINOPHIL NFR BLD AUTO: 1.6 % — SIGNIFICANT CHANGE UP (ref 0–6)
EPI CELLS # UR: 0 /HPF — SIGNIFICANT CHANGE UP
GLUCOSE SERPL-MCNC: 234 MG/DL — HIGH (ref 70–99)
GLUCOSE UR QL: ABNORMAL
HCT VFR BLD CALC: 38.8 % — LOW (ref 39–50)
HGB BLD-MCNC: 12.6 G/DL — LOW (ref 13–17)
IMM GRANULOCYTES NFR BLD AUTO: 0.4 % — SIGNIFICANT CHANGE UP (ref 0–1.5)
KETONES UR-MCNC: SIGNIFICANT CHANGE UP
LEUKOCYTE ESTERASE UR-ACNC: NEGATIVE — SIGNIFICANT CHANGE UP
LYMPHOCYTES # BLD AUTO: 1.08 K/UL — SIGNIFICANT CHANGE UP (ref 1–3.3)
LYMPHOCYTES # BLD AUTO: 21.6 % — SIGNIFICANT CHANGE UP (ref 13–44)
MCHC RBC-ENTMCNC: 28.3 PG — SIGNIFICANT CHANGE UP (ref 27–34)
MCHC RBC-ENTMCNC: 32.5 GM/DL — SIGNIFICANT CHANGE UP (ref 32–36)
MCV RBC AUTO: 87.2 FL — SIGNIFICANT CHANGE UP (ref 80–100)
MONOCYTES # BLD AUTO: 0.42 K/UL — SIGNIFICANT CHANGE UP (ref 0–0.9)
MONOCYTES NFR BLD AUTO: 8.4 % — SIGNIFICANT CHANGE UP (ref 2–14)
NEUTROPHILS # BLD AUTO: 3.38 K/UL — SIGNIFICANT CHANGE UP (ref 1.8–7.4)
NEUTROPHILS NFR BLD AUTO: 67.6 % — SIGNIFICANT CHANGE UP (ref 43–77)
NITRITE UR-MCNC: NEGATIVE — SIGNIFICANT CHANGE UP
NRBC # BLD: 0 /100 WBCS — SIGNIFICANT CHANGE UP (ref 0–0)
PH UR: 7 — SIGNIFICANT CHANGE UP (ref 5–8)
PLATELET # BLD AUTO: 170 K/UL — SIGNIFICANT CHANGE UP (ref 150–400)
POTASSIUM SERPL-MCNC: 4.1 MMOL/L — SIGNIFICANT CHANGE UP (ref 3.5–5.3)
POTASSIUM SERPL-SCNC: 4.1 MMOL/L — SIGNIFICANT CHANGE UP (ref 3.5–5.3)
PROT SERPL-MCNC: 6.2 G/DL — SIGNIFICANT CHANGE UP (ref 6–8.3)
PROT UR-MCNC: SIGNIFICANT CHANGE UP
RBC # BLD: 4.45 M/UL — SIGNIFICANT CHANGE UP (ref 4.2–5.8)
RBC # FLD: 12.4 % — SIGNIFICANT CHANGE UP (ref 10.3–14.5)
RBC CASTS # UR COMP ASSIST: 8 /HPF — HIGH (ref 0–4)
SARS-COV-2 RNA SPEC QL NAA+PROBE: SIGNIFICANT CHANGE UP
SODIUM SERPL-SCNC: 140 MMOL/L — SIGNIFICANT CHANGE UP (ref 135–145)
SP GR SPEC: 1.02 — SIGNIFICANT CHANGE UP (ref 1.01–1.02)
TROPONIN T, HIGH SENSITIVITY RESULT: 25 NG/L — SIGNIFICANT CHANGE UP (ref 0–51)
TROPONIN T, HIGH SENSITIVITY RESULT: 25 NG/L — SIGNIFICANT CHANGE UP (ref 0–51)
UROBILINOGEN FLD QL: NEGATIVE — SIGNIFICANT CHANGE UP
VALPROATE SERPL-MCNC: 56 UG/ML — SIGNIFICANT CHANGE UP (ref 50–100)
WBC # BLD: 5 K/UL — SIGNIFICANT CHANGE UP (ref 3.8–10.5)
WBC # FLD AUTO: 5 K/UL — SIGNIFICANT CHANGE UP (ref 3.8–10.5)
WBC UR QL: 0 /HPF — SIGNIFICANT CHANGE UP (ref 0–5)

## 2020-12-11 PROCEDURE — 90715 TDAP VACCINE 7 YRS/> IM: CPT

## 2020-12-11 PROCEDURE — 81001 URINALYSIS AUTO W/SCOPE: CPT

## 2020-12-11 PROCEDURE — 90471 IMMUNIZATION ADMIN: CPT

## 2020-12-11 PROCEDURE — 84484 ASSAY OF TROPONIN QUANT: CPT

## 2020-12-11 PROCEDURE — 72125 CT NECK SPINE W/O DYE: CPT

## 2020-12-11 PROCEDURE — 71045 X-RAY EXAM CHEST 1 VIEW: CPT

## 2020-12-11 PROCEDURE — 85730 THROMBOPLASTIN TIME PARTIAL: CPT

## 2020-12-11 PROCEDURE — 72170 X-RAY EXAM OF PELVIS: CPT | Mod: 26

## 2020-12-11 PROCEDURE — 72170 X-RAY EXAM OF PELVIS: CPT

## 2020-12-11 PROCEDURE — 70450 CT HEAD/BRAIN W/O DYE: CPT

## 2020-12-11 PROCEDURE — 85025 COMPLETE CBC W/AUTO DIFF WBC: CPT

## 2020-12-11 PROCEDURE — 87086 URINE CULTURE/COLONY COUNT: CPT

## 2020-12-11 PROCEDURE — 85610 PROTHROMBIN TIME: CPT

## 2020-12-11 PROCEDURE — 71045 X-RAY EXAM CHEST 1 VIEW: CPT | Mod: 26

## 2020-12-11 PROCEDURE — 70450 CT HEAD/BRAIN W/O DYE: CPT | Mod: 26

## 2020-12-11 PROCEDURE — 72125 CT NECK SPINE W/O DYE: CPT | Mod: 26

## 2020-12-11 PROCEDURE — 80164 ASSAY DIPROPYLACETIC ACD TOT: CPT

## 2020-12-11 PROCEDURE — 82962 GLUCOSE BLOOD TEST: CPT

## 2020-12-11 PROCEDURE — U0003: CPT

## 2020-12-11 PROCEDURE — 99285 EMERGENCY DEPT VISIT HI MDM: CPT

## 2020-12-11 PROCEDURE — 93010 ELECTROCARDIOGRAM REPORT: CPT

## 2020-12-11 PROCEDURE — 80053 COMPREHEN METABOLIC PANEL: CPT

## 2020-12-11 PROCEDURE — 99284 EMERGENCY DEPT VISIT MOD MDM: CPT | Mod: 25

## 2020-12-11 PROCEDURE — 93005 ELECTROCARDIOGRAM TRACING: CPT

## 2020-12-11 RX ORDER — TETANUS TOXOID, REDUCED DIPHTHERIA TOXOID AND ACELLULAR PERTUSSIS VACCINE, ADSORBED 5; 2.5; 8; 8; 2.5 [IU]/.5ML; [IU]/.5ML; UG/.5ML; UG/.5ML; UG/.5ML
0.5 SUSPENSION INTRAMUSCULAR ONCE
Refills: 0 | Status: COMPLETED | OUTPATIENT
Start: 2020-12-11 | End: 2020-12-11

## 2020-12-11 RX ADMIN — TETANUS TOXOID, REDUCED DIPHTHERIA TOXOID AND ACELLULAR PERTUSSIS VACCINE, ADSORBED 0.5 MILLILITER(S): 5; 2.5; 8; 8; 2.5 SUSPENSION INTRAMUSCULAR at 11:19

## 2020-12-11 NOTE — ED PROVIDER NOTE - PROGRESS NOTE DETAILS
.me patient remains stable; at baseline mental status; is well appearing in NAD; afebrile; no acute injuries/pathology on imaging / labs.  UA not c/w infection.  Stable for dc, as likely minor head injury; trop 25->25 q3h.  D/w patients daughter, baseline, unchanged; agrees with plan for discharge. likely attempted walking at night without staff in dark and hit head.

## 2020-12-11 NOTE — ED PROVIDER NOTE - MUSCULOSKELETAL, MLM
Spine appears normal, range of motion is not limited, no muscle or joint tenderness; pelvis stable; moves all extremities independently without apparent difficulty.

## 2020-12-11 NOTE — ED PROVIDER NOTE - PATIENT PORTAL LINK FT
You can access the FollowMyHealth Patient Portal offered by [facility] by registering at the following website: [email or web address]. By joining [employer]’s FollowMyHealth portal, you will also be able to view your health information using other applications (apps) compatible with our system.

## 2020-12-11 NOTE — ED PROVIDER NOTE - OBJECTIVE STATEMENT
Attending note (Ki): 64y/o M with h/o dementia, DM, GERD, and seizure d/o, presenting from SNF ([facility]) for reported unwitnessed fall; reportedly found in bed this morning by staff at [facility] with a new abrasion to the left forehead; patient unable to state when/how this happened (patient unable to give any history due to underlying mental status; A&Ox1, reportedly at baseline according to EMS (per report by SNF staff)

## 2020-12-11 NOTE — ED PROVIDER NOTE - CLINICAL SUMMARY MEDICAL DECISION MAKING FREE TEXT BOX
Attending note (Ki): 64y/o M h/o dementia, SNF resident; h/o seizure d/o; DM; presenting with forehead abrasion; unclear etiology (unwitnessed by staff); patient appears to be at his baseline mental status, is afebrile, not hypotensive/tachycardic, not hypoglycemic on FSG; will check labs: cbc (to evaluate for leukocytosis or anemia), CMP (to evaluate for electrolyte abnormalities or renal/liver dysfunction), troponin (Screening for NSTEMI); obtain ECG; obtain UA (eval for UTI); obtain imaging: CT head/c-spine to eval for injury, low suspicion, no AC use; screening CXR and Pelvis x-ray.  Disposition pending results of initial w/u.

## 2020-12-11 NOTE — ED PROVIDER NOTE - SKIN, MLM
Skin normal color for race, warm, dry and intact. No evidence of rash.  L forehead abrasions (as noted above); no other abrasions/lacerations/ecchymoses noted; patient thoroughly examined with RN.

## 2020-12-11 NOTE — ED ADULT NURSE REASSESSMENT NOTE - NS ED NURSE REASSESS COMMENT FT1
Pt straight cathed using sterile technique. Second RN present to confirm sterility.  Pt tolerated procedure well. 200mL of yellow urine output noted. Sterile specimens collected and sent to lab. Will cont to monitor.

## 2020-12-11 NOTE — ED ADULT NURSE NOTE - OBJECTIVE STATEMENT
65 year old A&Ox1-2, always disoriented to time, at baseline BIB EMS for unwitnessed fall. PMH GERD, HTN, HLD, dementia, DM2. Per EMS pt was found in bed this AM with abrasion to head, unk mechanism of fall or when it occurred, reported no a/c use, unk LOC. Pt unable to provide any history, able to speak but answers questions inappropriately. ROSS x 4, abrasion noted to head, not actively bleeding, no other obvious signs of trauma, denies cspine tenderness. Pt placed on 1-1 for safety due to hx of dementia and aggression. Denies CP, SOB, n/v/d, fevers, chills, abdominal pain, urinary symptoms, weakness, fatigue, numbness, tingling in upper and lower extremities, HA, blurry vision. VSS updated on plan of care.

## 2020-12-11 NOTE — ED ADULT NURSE NOTE - PMH
Convulsion    Dementia  dx 2016  Diabetes mellitus    H/O gastroesophageal reflux (GERD)    Seizure disorder

## 2020-12-11 NOTE — ED PROVIDER NOTE - NEUROLOGICAL, MLM
10 Awake/alert, no facial asymmetry, moving all extremities, no nuchal rigidity. Not appearing encephalopathic.

## 2020-12-11 NOTE — ED PROVIDER NOTE - NSFOLLOWUPINSTRUCTIONS_ED_ALL_ED_FT
You were evaluated in the Emergency Department for head injury.  You were evaluated and examined by a physician, and you had blood tests, urine tests, a ct scan and x-rays.      Based on your evaluation: no injuries were found.    We recommend monitoring by staff at the nursing home for any new symptoms, such as passing out, difficulty walking or seizures.    There are no signs of emergency conditions requiring admission to the hospital on today's workup.  Based on the evaluation, a presumptive diagnosis was made, however, further evaluation may be required by your primary care physician or a specialist for a more definitive diagnosis.  Therefore, please follow-up as directed or return to the Emergency Department if your symptoms change or worsen.    We recommend that you:  1. See your primary care physician within the next 72 hours for follow up.  Bring a copy of your discharge paperwork (including any test results) to your doctor.  2. Continue your home medications as prescribed.      *** Return immediately if you have repeat falls, change in mental status, or any other new/concerning symptoms. ***

## 2020-12-11 NOTE — ED ADULT NURSE REASSESSMENT NOTE - NS ED NURSE REASSESS COMMENT FT1
Report called to Janelle Sommer. AB aware of labs/imaging performed here at [facility], no new questions at this time. Awaiting [facility] for nonemergent transport. VSS.

## 2020-12-11 NOTE — ED PROVIDER NOTE - CONSTITUTIONAL, MLM
normal... Awake/alert, can speak in full sentences but not able to answer questions appropriately (reportedly at baseline mental status); appears in NAD

## 2020-12-12 LAB
CULTURE RESULTS: NO GROWTH — SIGNIFICANT CHANGE UP
SPECIMEN SOURCE: SIGNIFICANT CHANGE UP

## 2020-12-31 ENCOUNTER — EMERGENCY (EMERGENCY)
Facility: HOSPITAL | Age: 65
LOS: 1 days | End: 2020-12-31
Attending: EMERGENCY MEDICINE
Payer: MEDICARE

## 2020-12-31 VITALS
HEART RATE: 76 BPM | OXYGEN SATURATION: 98 % | RESPIRATION RATE: 16 BRPM | DIASTOLIC BLOOD PRESSURE: 87 MMHG | TEMPERATURE: 97 F | SYSTOLIC BLOOD PRESSURE: 138 MMHG | HEIGHT: 68 IN

## 2020-12-31 VITALS
SYSTOLIC BLOOD PRESSURE: 128 MMHG | DIASTOLIC BLOOD PRESSURE: 68 MMHG | RESPIRATION RATE: 16 BRPM | OXYGEN SATURATION: 99 % | HEART RATE: 71 BPM

## 2020-12-31 DIAGNOSIS — Z98.890 OTHER SPECIFIED POSTPROCEDURAL STATES: Chronic | ICD-10-CM

## 2020-12-31 LAB
ALBUMIN SERPL ELPH-MCNC: 4.3 G/DL — SIGNIFICANT CHANGE UP (ref 3.3–5)
ALP SERPL-CCNC: 48 U/L — SIGNIFICANT CHANGE UP (ref 40–120)
ALT FLD-CCNC: 18 U/L — SIGNIFICANT CHANGE UP (ref 10–45)
ANION GAP SERPL CALC-SCNC: 8 MMOL/L — SIGNIFICANT CHANGE UP (ref 5–17)
APPEARANCE UR: CLEAR — SIGNIFICANT CHANGE UP
APTT BLD: 27.8 SEC — SIGNIFICANT CHANGE UP (ref 27.5–35.5)
AST SERPL-CCNC: 16 U/L — SIGNIFICANT CHANGE UP (ref 10–40)
BACTERIA # UR AUTO: NEGATIVE — SIGNIFICANT CHANGE UP
BASOPHILS # BLD AUTO: 0.02 K/UL — SIGNIFICANT CHANGE UP (ref 0–0.2)
BASOPHILS NFR BLD AUTO: 0.2 % — SIGNIFICANT CHANGE UP (ref 0–2)
BILIRUB SERPL-MCNC: 0.2 MG/DL — SIGNIFICANT CHANGE UP (ref 0.2–1.2)
BILIRUB UR-MCNC: NEGATIVE — SIGNIFICANT CHANGE UP
BUN SERPL-MCNC: 22 MG/DL — SIGNIFICANT CHANGE UP (ref 7–23)
CALCIUM SERPL-MCNC: 9.7 MG/DL — SIGNIFICANT CHANGE UP (ref 8.4–10.5)
CHLORIDE SERPL-SCNC: 103 MMOL/L — SIGNIFICANT CHANGE UP (ref 96–108)
CO2 SERPL-SCNC: 28 MMOL/L — SIGNIFICANT CHANGE UP (ref 22–31)
COLOR SPEC: SIGNIFICANT CHANGE UP
CREAT SERPL-MCNC: 0.94 MG/DL — SIGNIFICANT CHANGE UP (ref 0.5–1.3)
DIFF PNL FLD: NEGATIVE — SIGNIFICANT CHANGE UP
EOSINOPHIL # BLD AUTO: 0.04 K/UL — SIGNIFICANT CHANGE UP (ref 0–0.5)
EOSINOPHIL NFR BLD AUTO: 0.5 % — SIGNIFICANT CHANGE UP (ref 0–6)
EPI CELLS # UR: 0 /HPF — SIGNIFICANT CHANGE UP
GLUCOSE SERPL-MCNC: 230 MG/DL — HIGH (ref 70–99)
GLUCOSE UR QL: ABNORMAL
HCT VFR BLD CALC: 36.8 % — LOW (ref 39–50)
HGB BLD-MCNC: 12.1 G/DL — LOW (ref 13–17)
HYALINE CASTS # UR AUTO: 1 /LPF — SIGNIFICANT CHANGE UP (ref 0–2)
IMM GRANULOCYTES NFR BLD AUTO: 0.5 % — SIGNIFICANT CHANGE UP (ref 0–1.5)
INR BLD: 0.97 RATIO — SIGNIFICANT CHANGE UP (ref 0.88–1.16)
KETONES UR-MCNC: ABNORMAL
LEUKOCYTE ESTERASE UR-ACNC: NEGATIVE — SIGNIFICANT CHANGE UP
LYMPHOCYTES # BLD AUTO: 0.83 K/UL — LOW (ref 1–3.3)
LYMPHOCYTES # BLD AUTO: 9.9 % — LOW (ref 13–44)
MCHC RBC-ENTMCNC: 28.9 PG — SIGNIFICANT CHANGE UP (ref 27–34)
MCHC RBC-ENTMCNC: 32.9 GM/DL — SIGNIFICANT CHANGE UP (ref 32–36)
MCV RBC AUTO: 87.8 FL — SIGNIFICANT CHANGE UP (ref 80–100)
MONOCYTES # BLD AUTO: 0.6 K/UL — SIGNIFICANT CHANGE UP (ref 0–0.9)
MONOCYTES NFR BLD AUTO: 7.2 % — SIGNIFICANT CHANGE UP (ref 2–14)
NEUTROPHILS # BLD AUTO: 6.85 K/UL — SIGNIFICANT CHANGE UP (ref 1.8–7.4)
NEUTROPHILS NFR BLD AUTO: 81.7 % — HIGH (ref 43–77)
NITRITE UR-MCNC: NEGATIVE — SIGNIFICANT CHANGE UP
NRBC # BLD: 0 /100 WBCS — SIGNIFICANT CHANGE UP (ref 0–0)
PH UR: 6 — SIGNIFICANT CHANGE UP (ref 5–8)
PLATELET # BLD AUTO: 181 K/UL — SIGNIFICANT CHANGE UP (ref 150–400)
POTASSIUM SERPL-MCNC: 4.3 MMOL/L — SIGNIFICANT CHANGE UP (ref 3.5–5.3)
POTASSIUM SERPL-SCNC: 4.3 MMOL/L — SIGNIFICANT CHANGE UP (ref 3.5–5.3)
PROT SERPL-MCNC: 6.9 G/DL — SIGNIFICANT CHANGE UP (ref 6–8.3)
PROT UR-MCNC: ABNORMAL
PROTHROM AB SERPL-ACNC: 11.7 SEC — SIGNIFICANT CHANGE UP (ref 10.6–13.6)
RBC # BLD: 4.19 M/UL — LOW (ref 4.2–5.8)
RBC # FLD: 13.5 % — SIGNIFICANT CHANGE UP (ref 10.3–14.5)
RBC CASTS # UR COMP ASSIST: 2 /HPF — SIGNIFICANT CHANGE UP (ref 0–4)
SARS-COV-2 RNA SPEC QL NAA+PROBE: SIGNIFICANT CHANGE UP
SODIUM SERPL-SCNC: 139 MMOL/L — SIGNIFICANT CHANGE UP (ref 135–145)
SP GR SPEC: 1.03 — HIGH (ref 1.01–1.02)
UROBILINOGEN FLD QL: NEGATIVE — SIGNIFICANT CHANGE UP
WBC # BLD: 8.38 K/UL — SIGNIFICANT CHANGE UP (ref 3.8–10.5)
WBC # FLD AUTO: 8.38 K/UL — SIGNIFICANT CHANGE UP (ref 3.8–10.5)
WBC UR QL: 2 /HPF — SIGNIFICANT CHANGE UP (ref 0–5)

## 2020-12-31 PROCEDURE — 70450 CT HEAD/BRAIN W/O DYE: CPT | Mod: 26

## 2020-12-31 PROCEDURE — 76377 3D RENDER W/INTRP POSTPROCES: CPT | Mod: 26

## 2020-12-31 PROCEDURE — 71045 X-RAY EXAM CHEST 1 VIEW: CPT

## 2020-12-31 PROCEDURE — 87086 URINE CULTURE/COLONY COUNT: CPT

## 2020-12-31 PROCEDURE — 21310: CPT

## 2020-12-31 PROCEDURE — 70450 CT HEAD/BRAIN W/O DYE: CPT

## 2020-12-31 PROCEDURE — 90471 IMMUNIZATION ADMIN: CPT

## 2020-12-31 PROCEDURE — 70486 CT MAXILLOFACIAL W/O DYE: CPT | Mod: 26

## 2020-12-31 PROCEDURE — 85025 COMPLETE CBC W/AUTO DIFF WBC: CPT

## 2020-12-31 PROCEDURE — 85610 PROTHROMBIN TIME: CPT

## 2020-12-31 PROCEDURE — 76377 3D RENDER W/INTRP POSTPROCES: CPT

## 2020-12-31 PROCEDURE — 96372 THER/PROPH/DIAG INJ SC/IM: CPT | Mod: XU

## 2020-12-31 PROCEDURE — 99284 EMERGENCY DEPT VISIT MOD MDM: CPT | Mod: CS,25,GC

## 2020-12-31 PROCEDURE — 80053 COMPREHEN METABOLIC PANEL: CPT

## 2020-12-31 PROCEDURE — 70486 CT MAXILLOFACIAL W/O DYE: CPT

## 2020-12-31 PROCEDURE — 90715 TDAP VACCINE 7 YRS/> IM: CPT

## 2020-12-31 PROCEDURE — 85730 THROMBOPLASTIN TIME PARTIAL: CPT

## 2020-12-31 PROCEDURE — 87635 SARS-COV-2 COVID-19 AMP PRB: CPT

## 2020-12-31 PROCEDURE — 81001 URINALYSIS AUTO W/SCOPE: CPT

## 2020-12-31 PROCEDURE — 99284 EMERGENCY DEPT VISIT MOD MDM: CPT | Mod: 25

## 2020-12-31 PROCEDURE — 72125 CT NECK SPINE W/O DYE: CPT

## 2020-12-31 PROCEDURE — 72125 CT NECK SPINE W/O DYE: CPT | Mod: 26

## 2020-12-31 PROCEDURE — 71045 X-RAY EXAM CHEST 1 VIEW: CPT | Mod: 26

## 2020-12-31 RX ORDER — TETANUS TOXOID, REDUCED DIPHTHERIA TOXOID AND ACELLULAR PERTUSSIS VACCINE, ADSORBED 5; 2.5; 8; 8; 2.5 [IU]/.5ML; [IU]/.5ML; UG/.5ML; UG/.5ML; UG/.5ML
0.5 SUSPENSION INTRAMUSCULAR ONCE
Refills: 0 | Status: COMPLETED | OUTPATIENT
Start: 2020-12-31 | End: 2020-12-31

## 2020-12-31 RX ORDER — OLANZAPINE 15 MG/1
5 TABLET, FILM COATED ORAL ONCE
Refills: 0 | Status: COMPLETED | OUTPATIENT
Start: 2020-12-31 | End: 2020-12-31

## 2020-12-31 RX ADMIN — OLANZAPINE 5 MILLIGRAM(S): 15 TABLET, FILM COATED ORAL at 13:44

## 2020-12-31 RX ADMIN — OLANZAPINE 5 MILLIGRAM(S): 15 TABLET, FILM COATED ORAL at 07:58

## 2020-12-31 RX ADMIN — TETANUS TOXOID, REDUCED DIPHTHERIA TOXOID AND ACELLULAR PERTUSSIS VACCINE, ADSORBED 0.5 MILLILITER(S): 5; 2.5; 8; 8; 2.5 SUSPENSION INTRAMUSCULAR at 07:26

## 2020-12-31 NOTE — ED ADULT NURSE NOTE - OBJECTIVE STATEMENT
65y old male PMH Dementia, psychosis, seizures BIB EMS from [facility] for fall. A&Ox0. As per EMS patient was found on bed by staff at [facility] during morning checks, he had abrasion noted to face with blood on clothes, presumed fall by staff unwitnessed and unknown LOC, patient altered as per usual baseline. He is unable to speak and not following commands as per EMS his baseline is confused but is usually able to speak. Patient appears confused, refusing to speak and unaware of situation, abrasion present to left upper cheek, bleeding controlled on its own, no other lacerations present, abrasions noted to both knees b/l,  PERRL, sensory intact, equal strength b/l in all upper and lower extremities. IV inserted, cardiac monitor in place, safety being maintained.

## 2020-12-31 NOTE — ED PROVIDER NOTE - PATIENT PORTAL LINK FT
You can access the FollowMyHealth Patient Portal offered by [facility] by registering at the following website: [email or web address]. By joining [facility]’s FollowMyHealth portal, you will also be able to view your health information using other applications (apps) compatible with our system.

## 2020-12-31 NOTE — ED PROVIDER NOTE - PROGRESS NOTE DETAILS
Robert ONEAL MD PGY3: SW spoke to daughter. Daughter has concerns about patient going back to [facility]. SW discussed with her. Okay with father going back and arranging for transfer to another facility.

## 2020-12-31 NOTE — CHART NOTE - NSCHARTNOTEFT_GEN_A_CORE
ED SW: Call received from patient's daughter regarding return to SNF. Chart reviewed. Patient is a 64 y/o, male, A&Ox1, PMH of Dementia and Seizure d/om presents from SNF with left forehead abrasion s/p fall. Per daughter, Ailin (ph. [phone]), since admission to SNF, patient has had multiple unwitnessed falls with subsequent injuries including a broken nose. Daughter confirmed patient is a resident of [facility]. Per daughter, patient is fully dependent in needs, able to communicate pain, and unable to verbally express needs and wants. Daughter requesting admission in order for patient to be transferred to another facility for long term care placement. Daughter informed patient is not medically acute for hospitalization and has been cleared for return to SNF. Daughter's concerns validated as it pertains to patient's safety at SNF. LCSW asked what safety measures have been placed at SNF since patient's 1st fall. Daughter reports she is unable to visit SNF due to COVID restrictions and plan is to remove patient from facility as soon as possible. LCSW encouraged daughter to speak with RN Team to coordinate an action plan due to risk for falls including hiring private aide, placing hospital bed at lowest position, frequent checks by staff, placing black mats at the bedside, etc. Daughter reports she has spoken to another SNF that she believes would better care for patient; daughter informed current SNF can coordinate facility to facility transfer. Daughter reasonable and in agreement for patient to return to SNF. RN handoff provided to Janelle Sommer. All parties aware of return to SNF. Non emergent transportation arranged with [facility]. Patient to travel with [facility] due to dementia diagnosis. No further SW needs indicated at this time.

## 2020-12-31 NOTE — ED PROVIDER NOTE - CLINICAL SUMMARY MEDICAL DECISION MAKING FREE TEXT BOX
64yo m pmhx dementia, DM, GERD, and seizure d/o, presenting from SNF ([facility]) for reported unwitnessed fall; reportedly found in bed this morning by staff w/ ? change in baseline but pw normal vs isolated left sided facial trauma will update tetanus, ct's, xray labs, dispo pending eval

## 2020-12-31 NOTE — ED PROVIDER NOTE - PHYSICAL EXAMINATION
Primary Survey  A - airway intact  B - bilateral breath sounds and good chest rise  C - initially BP: 140/90, palpable pulses in all extremities  D - GCS 13 on arrival  Exposure obtained    Secondary survey  Gen: NAD  HEENT: NC, C-spine no ttp, left sided forehead abrasion and left eye swelling with eomi pupils 3mm reactive bilaterally, left maxillary swelling, normal nasal septal appearance no other facial ttp   CV: pulse regularly present  Pulm: CTA B/L  Chest: intact without ttp  Abd: Soft, ND, NT, no rebound, no guarding  Groin: Normal appearing  Ext: Palp radial b/l, palp DP b/l  Back: no TTP, no palpable runoff, stepoff, or deformity, pelvis stable, bilateral les with chronic well healing knee abrasions

## 2020-12-31 NOTE — ED PROVIDER NOTE - ATTENDING CONTRIBUTION TO CARE
Attending Statement (MARILEE Emerson MD):    HPI: 66y/o M with h/o Dementia, convulsive d/o v seizure d/o (recently dc'd AED was on keppra and VPA dc'd 12/4 per paperwork from transferring facility), presenting after unwitnessed fall from SNF ([facility]) found in bed with abrasion to the left forehead, and reportedly was difficult to awaken.  Now back to baseline mental status per staff.  No recent fever, no reported vomiting or diarrhea,     Review of Systems:  -General: no fever or chills  -ENT: no congestion, no difficulty swallowing  -Pulmonary: no cough, no shortness of breath  -Cardiac: no chest pain, no palpitations  -Gastrointestinal: no abdominal pain, no nausea, no vomiting, and no diarrhea.  -Genitourinary: no blood or pain with urination  -Musculoskeletal: no back or neck pain  -Skin: no rashes  -Endocrine: No h/o diabetes or thyroid disease  -Neurologic: No focal weakness or numbness    All else negative unless otherwise specified elsewhere in this note.    PSH/PMH as noted above    On Physical Exam:  General: awake; opening eyes spontaneously, conversant but confused (baseline) and not following commands but withdrawals from pain   HEENT: PERRL, MMM, airwy patent w/o blood; no visible tongue bites/lacerations  Neck: no neck tenderness, no nuchal rigidity  Cardiac: normal s1, s2; RRR; no MGR  Lungs: CTABL  Abdomen: soft nontender/nondistended  : no bladder tenderness or distension  Skin: intact, no rash  Extremities: no peripheral edema, no gross deformities  Neuro: CN II-XII grossly intact; moving all extremities equally; withdrawals from pain in all extremities.    MDM: Attending Statement (MARILEE Emerson MD):    HPI: 64y/o M with h/o Dementia, convulsive d/o v seizure d/o (recently dc'd AED was on keppra and VPA dc'd 12/4 per paperwork from transferring facility), presenting after unwitnessed fall from SNF ([facility]) found in bed with abrasion to the left forehead, and reportedly was difficult to awaken.  Now back to baseline mental status per staff.  No recent fever, no reported vomiting or diarrhea,     Review of Systems:  -General: no fever or chills  -ENT: no congestion, no difficulty swallowing  -Pulmonary: no cough, no shortness of breath  -Cardiac: no chest pain, no palpitations  -Gastrointestinal: no abdominal pain, no nausea, no vomiting, and no diarrhea.  -Genitourinary: no blood or pain with urination  -Musculoskeletal: no back or neck pain  -Skin: no rashes  -Endocrine: No h/o diabetes or thyroid disease  -Neurologic: No focal weakness or numbness    All else negative unless otherwise specified elsewhere in this note.    PSH/PMH as noted above    On Physical Exam:  General: awake; opening eyes spontaneously, conversant but confused (baseline) and not following commands but withdrawals from pain   HEENT: PERRL, MMM, airwy patent w/o blood; no visible tongue bites/lacerations  Neck: no neck tenderness, no nuchal rigidity  Cardiac: normal s1, s2; RRR; no MGR  Lungs: CTABL  Abdomen: soft nontender/nondistended  : no bladder tenderness or distension  Skin: intact, no rash  Extremities: no peripheral edema, no gross deformities  Neuro: CN II-XII grossly intact; moving all extremities equally; withdrawals from pain in all extremities.    MDM: unwitnessed fall with h/o falls; dementia, ? seizure activity; +facial trauma, possible nasal fracture, need to eval for periorbital trauma (no visible ocular trauma on exam) and for intrcranial hemorrhage.  Eval for infectious/toxic/metabolic derangement Attending Statement (MARILEE Emerson MD):    HPI: 64y/o M with h/o Dementia, convulsive d/o v seizure d/o (recently dc'd AED was on keppra and VPA dc'd 12/4 per paperwork from transferring facility), presenting after unwitnessed fall from SNF ([facility]) found in bed with abrasion to the left forehead, and reportedly was difficult to awaken.  Now back to baseline mental status per staff.  No recent fever, no reported vomiting or diarrhea,     Review of Systems:  -General: no fever or chills  -ENT: no congestion, no difficulty swallowing  -Pulmonary: no cough, no shortness of breath  -Cardiac: no chest pain, no palpitations  -Gastrointestinal: no abdominal pain, no nausea, no vomiting, and no diarrhea.  -Genitourinary: no blood or pain with urination  -Musculoskeletal: no back or neck pain  -Skin: no rashes  -Endocrine: No h/o diabetes or thyroid disease  -Neurologic: No focal weakness or numbness    All else negative unless otherwise specified elsewhere in this note.    PSH/PMH as noted above    On Physical Exam:  General: awake; opening eyes spontaneously, conversant but confused (baseline) and not following commands but withdrawals from pain   HEENT: PERRL, MMM, airwy patent w/o blood; no visible tongue bites/lacerations  Neck: no neck tenderness, no nuchal rigidity  Cardiac: normal s1, s2; RRR; no MGR  Lungs: CTABL  Abdomen: soft nontender/nondistended  : no bladder tenderness or distension  Skin: intact, no rash  Extremities: no peripheral edema, no gross deformities  Neuro: CN II-XII grossly intact; moving all extremities equally; withdrawals from pain in all extremities.    MDM: unwitnessed fall with h/o falls; dementia, ? seizure activity; +facial trauma, possible nasal fracture, need to eval for periorbital trauma (no visible ocular trauma on exam) and for intracranial hemorrhage.  Eval for infectious/toxic/metabolic derangement: cbc (to evaluate for leukocytosis or anemia), CMP (to evaluate for electrolyte abnormalities or renal/liver dysfunction).    ED course: remains stable; mild agitation at times related to care, improved with zyprexa.  Remains stable; per d/w patient's daughter, and agreeable with plan for discharge back to [facility] as

## 2020-12-31 NOTE — ED PROVIDER NOTE - NSFOLLOWUPINSTRUCTIONS_ED_ALL_ED_FT
Please return to the ED for any concerns, including recurrent fall. We cannot exclude seizure as a possibility, You should talk to neurology to discuss potentially resuming your antiseizure medications if they feel that could be a cause.

## 2020-12-31 NOTE — ED PROVIDER NOTE - OBJECTIVE STATEMENT
66yo m pmhx dementia, DM, GERD, and seizure d/o, presenting from SNF ([facility]) for reported unwitnessed fall; reportedly found in bed this morning by staff at [facility] with a new abrasion to the left forehead; patient unable to state when/how this happened (patient unable to give any history due to underlying mental status; A&Ox1, reportedly at baseline according to EMS per report by SNF staff but maybe less oriented? patient awake and follows some basic commands and is disoriented to environment

## 2021-01-01 LAB
CULTURE RESULTS: NO GROWTH — SIGNIFICANT CHANGE UP
SPECIMEN SOURCE: SIGNIFICANT CHANGE UP

## 2021-03-30 ENCOUNTER — NON-APPOINTMENT (OUTPATIENT)
Age: 66
End: 2021-03-30

## 2021-04-03 NOTE — PROGRESS NOTE ADULT - PROBLEM SELECTOR PROBLEM 3
Patient Education     Viral Syndrome (Adult)  A viral illness may cause a number of symptoms such as fever. Other symptoms depend on the part of the body that the virus affects. If it settles in your nose, throat, and lungs, it may cause cough, sore throat, congestion, runny nose, headache, earache and other ear symptoms, or shortness of breath. If it settles in your stomach and intestinal tract, it may cause nausea, vomiting, cramping, and diarrhea. Sometimes it causes generalized symptoms like \"aching all over,\" feeling tired, loss of energy, or loss of appetite.  A viral illness usually lasts anywhere from several days to several weeks, but sometimes it lasts longer. In some cases, a more serious infection can look like a viral syndrome in the first few days of the illness. You may need another exam and additional tests to know the difference. Watch for the warning signs listed below for when to seek medical advice.  Home care  Follow these guidelines for taking care of yourself at home:  · If symptoms are severe, rest at home for the first 2 to 3 days.  · Stay away from cigarette smoke - both your smoke and the smoke from others.  · You may use over-the-counter acetaminophen or ibuprofen for fever, muscle aching, and headache, unless another medicine was prescribed for this. If you have chronic liver or kidney disease or ever had a stomach ulcer or gastrointestinal bleeding, talk with your healthcare provider before using these medicines. No one who is younger than 18 and ill with a fever should take aspirin. It may cause severe disease or death.  · Your appetite may be poor, so a light diet is fine. Avoid dehydration by drinking 8 to 12, 8-ounce glasses of fluids each day. This may include water; orange juice; lemonade; apple, grape, and cranberry juice; clear fruit drinks; electrolyte replacement and sports drinks; and decaffeinated teas and coffee. If you have been diagnosed with a kidney disease, ask your  healthcare provider how much and what types of fluids you should drink to prevent dehydration. If you have kidney disease, drinking too much fluid can cause it build up in the your body and be dangerous to your health.  · Over-the-counter remedies won't shorten the length of the illness but may be helpful for symptoms such as cough, sore throat, nasal and sinus congestion, or diarrhea. Don't use decongestants if you have high blood pressure.  Follow-up care  Follow up with your healthcare provider if you do not improve over the next week.  Call 911  Call 911 if any of the following occur:  · Convulsion  · Feeling weak, dizzy, or like you are going to faint  · Chest pain, or more than mild shortness of breath   When to seek medical advice  Call your healthcare provider right away if any of these occur:  · Cough with lots of colored sputum (mucus) or blood in your sputum  · Chest pain, shortness of breath, wheezing, or trouble breathing  · Severe headache; face, neck, or ear pain  · Severe, constant pain in the lower right side of your belly (abdominal)  · Continued vomiting (can’t keep liquids down)  · Frequent diarrhea (more than 5 times a day); blood (red or black color) or mucus in diarrhea  · Feeling weak, dizzy, or like you are going to faint  · Extreme thirst  · Fever of 100.4°F (38°C) or higher, or as directed by your healthcare provider  Date Last Reviewed: 4/1/2018  © 0441-9093 [employer]. [address] [place] [address], [place] [address]. All rights reserved. This information is not intended as a substitute for professional medical care. Always follow your healthcare professional's instructions.            Dementia with behavioral disturbance, unspecified dementia type

## 2021-06-02 NOTE — PHYSICAL THERAPY INITIAL EVALUATION ADULT - MANUAL MUSCLE TESTING RESULTS, REHAB EVAL
Unable to assess as patient does not follow command
grossly assessed due to/BUE 3/5 ; trunk 3-/5 and BLE 2+/5
BUE 4/5; trunk 3-/5 and BLE 3+/5/grossly assessed due to
Yes

## 2021-06-04 NOTE — ED ADULT NURSE NOTE - PAIN RATING/NUMBER SCALE (0-10): REST
Pt arrived via [facility], 72y/o male, called 911 himself to report that he was vomiting blood, began unresponsive on the phone, pt asystole upon EMS arrival, found on bathroom floor with large blood clots present around pt, 3 epis given & cpr started by EMS. Pt asystole upon arrival.   0

## 2021-09-14 NOTE — PROGRESS NOTE ADULT - ASSESSMENT
64 yr old male with PMHX of DM,dementia sent to ER from facility for seizure and now COVID+,Delirium,MAXIME,hypernatremia and suspected aspiration pneumonia.  1.Speech and swallow re-eval noted, will transition from TF to oral feeds.  2.DM-Insulin, Endo f/u.  3.Delirium-Psych f/u appreciated.  4.GI and DVT prophylaxis. Labs within last 12 months Labs within last 12 months Labs within last 12 months Labs within last 12 months Labs within last 12 months Labs within last 12 months Labs within last 12 months

## 2021-11-16 NOTE — ED PROVIDER NOTE - NS_ATTENDINGSCRIBE_ED_ALL_ED
DEM Solutionst message sent to patient, awaiting response.     I personally performed the service described in the documentation recorded by the scribe in my presence, and it accurately and completely records my words and actions.

## 2022-02-25 NOTE — PROGRESS NOTE ADULT - PROVIDER SPECIALTY LIST ADULT
Internal Medicine Placed patient call at number provided in chart. Spoke with Liliana. Verified patient name and date of birth. Informed of new prescription for Zofran sent to [facility]. Instructed to contact clinic staff with any questions or concerns. Verbalized understanding.

## 2022-05-05 NOTE — ED PROVIDER NOTE - NSDCPRINTRESULTS_ED_ALL_ED
Pt on O2. Will continue to monitor.     Patient requests all Lab and Radiology Results on their Discharge Instructions

## 2022-06-10 NOTE — ED PROVIDER NOTE - PROGRESS NOTE DETAILS
Addended by: Miguel Angel Smith on: 6/10/2022 08:37 AM     Modules accepted: Orders **ATTENDING ADDENDUM (Dr. Ronnie Packer): personally assessed. Vital signs: non-tachycardic at 66-70s/minute, /69, SpO2 100% with FiO2=0.21. Agree with goals/plan of ED care as described in EMR, including diagnostics, therapeutics and consultation as clinically warranted. Will continue to observe and monitor closely. **ATTENDING ADDENDUM (Dr. Ronnie Packer): patient serially evaluated throughout ED course. NO acute deterioration up to this time in the ED. Personally reassessed. ED diagnostics up to this time acknowledged, reviewed and noted. Likely admission for altered mental status. Will continue to observe and monitor closely. Ronnie Dumont DO PGY3 - will page neuro Ronnie Dumont DO PGY3 - mental status improving, asking for food. Will still require admission for neuro input given hx and possible change of med dosing and pending depakote level for titration Ronnie Dumont DO PGY3 - will page neuro  **ATTENDING ADDENDUM (Dr. Ronnie Packer): Agree with above notation by EM resident Tim. Will continue to observe and monitor closely. Ronnie Dumont DO PGY3 - mental status improving, asking for food. Will still require admission for neuro input given hx and possible change of med dosing and pending depakote level for titration  **ATTENDING ADDENDUM (Dr. Ronnie Packer): patient serially evaluated throughout ED course. NO acute deterioration up to this time in the ED. Agree with above notation by EM resident Tim. Does indeed appear more awake and interactive. Will continue to observe and monitor closely.

## 2022-08-01 NOTE — ED ADULT NURSE REASSESSMENT NOTE - GENERAL PATIENT STATE
unable to lay still
comfortable appearance
Doxepin Counseling:  Patient advised that the medication is sedating and not to drive a car after taking this medication. Patient informed of potential adverse effects including but not limited to dry mouth, urinary retention, and blurry vision.  The patient verbalized understanding of the proper use and possible adverse effects of doxepin.  All of the patient's questions and concerns were addressed.

## 2022-10-13 NOTE — ED PROCEDURE NOTE - NS ED SCRIBE STATEMENT
Attending
Show Aperture Variable?: Yes
Duration Of Freeze Thaw-Cycle (Seconds): 0
Render Note In Bullet Format When Appropriate: No
Consent: The patient's consent was obtained including but not limited to risks of crusting, scabbing, blistering, scarring, darker or lighter pigmentary change, recurrence, incomplete removal and infection.
Post-Care Instructions: I reviewed with the patient in detail post-care instructions. Patient is to wear sunprotection, and avoid picking at any of the treated lesions. Pt may apply Vaseline to crusted or scabbing areas.
Detail Level: Detailed

## 2022-10-27 NOTE — PROGRESS NOTE ADULT - PROBLEM SELECTOR PROBLEM 5
[FreeTextEntry2] : Patient receiving psychotherapy to address symptoms of anxiety and depression related to family stressors Hypokalemia [Integrative Therapy] : Integrative Therapy  [de-identified] : Patient presented to session on time.  Patient discussed her recent dental procedures and their impact on her physically and emotionally.  Patient discussed the progress she has helped her son make in the job market and her disillusionment with her  for having rigid views about gender roles that she was not aware of.  This writer explored these feelings and offered emotional support.  Session ended with patient emotionally stable and behaviorally in control. [Return in ____ week(s)] : Return in [unfilled] week(s) [FreeTextEntry1] : 1x/weekly psychotherapy and regular psychiatric treatment

## 2022-11-01 NOTE — PROGRESS NOTE BEHAVIORAL HEALTH - NS ED BHA REVIEW OF ED CHART AVAILABLE IMAGING REVIEWED
Yes
Wartpeel Counseling:  I discussed with the patient the risks of Wartpeel including but not limited to erythema, scaling, itching, weeping, crusting, and pain.

## 2022-12-09 NOTE — DISCHARGE NOTE PROVIDER - CARE PROVIDER_API CALL
Scout Raymundo  [address], [address] [place], [place] [address]  Phone: [phone]  Fax: [phone]  Follow Up Time:    Cyclophosphamide Counseling:  I discussed with the patient the risks of cyclophosphamide including but not limited to hair loss, hormonal abnormalities, decreased fertility, abdominal pain, diarrhea, nausea and vomiting, bone marrow suppression and infection. The patient understands that monitoring is required while taking this medication.

## 2023-01-30 NOTE — H&P ADULT - PROBLEM SELECTOR PLAN 2
Dr Jennifer Gan Office Visit Note  23     Shaina Singh 64 y o  male MRN: [id number]  : 1963    Assessment:     1  Benign essential hypertension  Assessment & Plan:  Blood pressure controlled continue low-salt diet Maxide amlodipine    Orders:  -     Comprehensive metabolic panel; Future    2  Lumbar foraminal stenosis  Assessment & Plan:  No change plan as follows from my previous progress note  Patient on Percocet 10/325 4 times a day was prescribed 1 month supply been treated with epidural injection no improvement also received physical therapy no improvement for now continue current therapy  All medical records reviewed and reconciled patient PDMP database reviewed to ensure compliant with the treatment plan for pain management benefits due to the fact that patient has not responded to other measures for pain control so far and severe the pain is significant and interfering with normal daily activities I believe it is reasonable and appropriate to continue the controlled substance in order to improve his ability to function in enhance quality of life the patient has signed a narcotic agreement patient should it was utilize 1 pharmacy and not receiving narcotic from any other provider patient verbalizes understanding that breaching the contract will affect future prescription decision making and critical results in the dismissal from the practice if specifically the has been explained in understood to patient patient demonstrates following informed use of appropriate medicine to analgesia increase activity explained the side effects recommended that thisto the patient patient understands that also advised that appeared to expose him order an abuse and addiction and overdose counseling provided for prevention of any overuse abuse or addiction    Orders:  -     oxyCODONE-acetaminophen (PERCOCET)  mg per tablet;  Take 1 tablet by mouth every 8 (eight) hours as needed for severe pain Max Daily Amount: 3 tablets    3  Benign prostatic hyperplasia with urinary frequency  Assessment & Plan:  Controlled with Flomax continue Flomax follow-up with the urologist      4  Chronic intractable pain  Assessment & Plan:  Continue Percocet 10/325 1 every 6 hours patient's pain control somewhat so able to work full-time and carry out day-to-day activity      5  Neuropathy  Assessment & Plan:  Headache symptoms controlled      6  Vitamin D deficiency  -     Vitamin D 25 hydroxy; Future; Expected date: 02/28/2023    7  Screening for deficiency anemia  -     CBC and differential; Future  -     Urinalysis with microscopic; Future    8  Hyperglycemia  -     Hemoglobin A1C; Future  -     Microalbumin / creatinine urine ratio; Future  -     Urinalysis with microscopic; Future    9  Mixed hyperlipidemia  -     Lipid Panel with Direct LDL reflex; Future    10  DDD (degenerative disc disease), lumbar  Assessment & Plan:  Reaction Percocet 10-3 25 every 6 for pain control continue current treatment    Orders:  -     oxyCODONE-acetaminophen (PERCOCET)  mg per tablet; Take 1 tablet by mouth every 8 (eight) hours as needed for severe pain Max Daily Amount: 3 tablets    11  Lumbar disc herniation  -     oxyCODONE-acetaminophen (PERCOCET)  mg per tablet; Take 1 tablet by mouth every 8 (eight) hours as needed for severe pain Max Daily Amount: 3 tablets    12  Chronic cough  Assessment & Plan:  Persistent post bronchitis cough chronic we will treat with Medrol Dosepak    Orders:  -     methylPREDNISolone 4 MG tablet therapy pack; Use as directed on package          Discussion Summary and Plan: Today's care plan and medications were reviewed with patient in detail and all their questions answered to their satisfaction  No chief complaint on file       Subjective:  Patient came in for follow-up chronic medical conditions listed under visit diagnoses and also for refill for Percocet 10/325 1 every 6 hours medication was refilled and reviewed also complains of persistent coughing after the episode of influenza which subsided chest pain or difficulty breathing      The following portions of the patient's history were reviewed and updated as appropriate: allergies, current medications, past family history, past medical history, past social history, past surgical history and problem list     Review of Systems   Constitutional: Negative for activity change, appetite change, chills, diaphoresis, fatigue, fever and unexpected weight change  HENT: Negative for congestion, dental problem, drooling, ear discharge, ear pain, facial swelling, hearing loss, mouth sores, nosebleeds, postnasal drip, rhinorrhea, sinus pressure, sneezing, sore throat, tinnitus, trouble swallowing and voice change  Eyes: Negative for photophobia, pain, discharge, redness, itching and visual disturbance  Respiratory: Positive for cough  Negative for apnea, choking, chest tightness, shortness of breath, wheezing and stridor  Cardiovascular: Negative for chest pain, palpitations and leg swelling  Gastrointestinal: Negative for abdominal distention, abdominal pain, anal bleeding, blood in stool, constipation, diarrhea, nausea, rectal pain and vomiting  Endocrine: Negative for cold intolerance, heat intolerance, polydipsia, polyphagia and polyuria  Genitourinary: Positive for difficulty urinating  Negative for decreased urine volume, dysuria, enuresis, flank pain, frequency, genital sores, hematuria and urgency  Erectile dysfunction   Musculoskeletal: Positive for arthralgias, back pain, gait problem, joint swelling, myalgias, neck pain and neck stiffness  Skin: Negative for color change, pallor, rash and wound  Allergic/Immunologic: Negative  Negative for environmental allergies, food allergies and immunocompromised state  Neurological: Positive for dizziness   Negative for tremors, seizures, syncope, facial asymmetry, speech difficulty, weakness, light-headedness, numbness and headaches  Psychiatric/Behavioral: Negative for agitation, behavioral problems, confusion, decreased concentration, dysphoric mood, hallucinations, self-injury, sleep disturbance and suicidal ideas  The patient is not nervous/anxious and is not hyperactive  Historical Information   Patient Active Problem List   Diagnosis   • Benign essential hypertension   • Benign prostatic hyperplasia   • DDD (degenerative disc disease), lumbar   • Incomplete emptying of bladder   • Lumbar disc herniation   • Lumbar foraminal stenosis   • Neuropathy   • Chronic intractable pain   • Mild episode of recurrent major depressive disorder (HCC)   • Hematospermia   • Prostatitis   • Other male erectile dysfunction   • Annual physical exam   • Acute non-recurrent frontal sinusitis   • Acute infective tracheobronchitis   • Primary osteoarthritis of both knees   • Chronic cough     Past Medical History:   Diagnosis Date   • Abdominal pain    • Anxiety disorder    • Arthritis    • Back pain    • Dizziness    • Headache    • Hypertension    • Lightheadedness    • Obesity    • Palpitations    • SOB (shortness of breath)      Past Surgical History:   Procedure Laterality Date   • CHOLECYSTECTOMY     • GASTRIC BYPASS       Social History     Substance and Sexual Activity   Alcohol Use None    Comment: none per [place]     Social History     Substance and Sexual Activity   Drug Use Not on file    Comment: none per [place]     Social History     Tobacco Use   Smoking Status Never   Smokeless Tobacco Never     History reviewed  No pertinent family history    Health Maintenance Due   Topic   • Hepatitis C Screening    • HIV Screening    • COVID-19 Vaccine (4 - Booster for Moderna series)      Meds/Allergies       Current Outpatient Medications:   •  amlodipine-olmesartan (Soni) 5-40 MG, Take 1 tablet by mouth daily, Disp: 90 tablet, Rfl: 1  •  celecoxib (CeleBREX) 200 mg capsule, Take 1 capsule (200 mg total) by mouth daily, Disp: 90 capsule, Rfl: 0  •  escitalopram (LEXAPRO) 20 mg tablet, Take 1 tablet (20 mg total) by mouth daily, Disp: 90 tablet, Rfl: 0  •  fluticasone (FLONASE) 50 mcg/act nasal spray, 1 spray into each nostril 2 (two) times a day, Disp: 15 8 mL, Rfl: 6  •  methylPREDNISolone 4 MG tablet therapy pack, Use as directed on package, Disp: 21 each, Rfl: 0  •  oxyCODONE-acetaminophen (PERCOCET)  mg per tablet, Take 1 tablet by mouth every 8 (eight) hours as needed for severe pain Max Daily Amount: 3 tablets, Disp: 90 tablet, Rfl: 0  •  pregabalin (LYRICA) 100 mg capsule, Take 1 capsule (100 mg total) by mouth 2 (two) times a day, Disp: 180 capsule, Rfl: 0  •  sildenafil (Viagra) 100 mg tablet, Take 1 tablet (100 mg total) by mouth daily as needed for erectile dysfunction, Disp: 5 tablet, Rfl: 0  •  tamsulosin (FLOMAX) 0 4 mg, Take 1 capsule (0 4 mg total) by mouth daily with dinner, Disp: 90 capsule, Rfl: 0  •  triamterene-hydrochlorothiazide (MAXZIDE-25) 37 5-25 mg per tablet, Take 1 tablet by mouth daily, Disp: 90 tablet, Rfl: 0  •  methylPREDNISolone 4 MG tablet therapy pack, Use as directed on package (Patient not taking: Reported on 1/30/2023), Disp: 21 each, Rfl: 0  •  neomycin-polymyxin-hydrocortisone (CORTISPORIN) 0 35%-10,000 units/mL-1% otic suspension, Administer 3 drops into the left ear 4 (four) times a day for 7 days, Disp: 10 mL, Rfl: 0      Objective:    Vitals:   /82   Pulse 78   Temp 98 °F (36 7 °C)   Resp 17   Ht 5' 11" (1 803 m)   Wt 109 kg (240 lb)   SpO2 98%   BMI 33 47 kg/m²   Body mass index is 33 47 kg/m²  Vitals:    01/30/23 1612   Weight: 109 kg (240 lb)       Physical Exam  Vitals and nursing note reviewed  Constitutional:       General: He is not in acute distress  Appearance: He is well-developed  He is obese  He is not ill-appearing, toxic-appearing or diaphoretic  HENT:      Head: Normocephalic and atraumatic        Right Ear: External ear normal  Left Ear: External ear normal       Nose: Nose normal       Mouth/Throat:      Pharynx: No oropharyngeal exudate  Eyes:      General: Lids are normal  Lids are everted, no foreign bodies appreciated  No scleral icterus  Right eye: No discharge  Left eye: No discharge  Conjunctiva/sclera: Conjunctivae normal       Pupils: Pupils are equal, round, and reactive to light  Neck:      Thyroid: No thyromegaly  Vascular: Normal carotid pulses  No carotid bruit, hepatojugular reflux or JVD  Trachea: No tracheal tenderness or tracheal deviation  Cardiovascular:      Rate and Rhythm: Normal rate and regular rhythm  Pulses: Normal pulses  Heart sounds: Normal heart sounds  No murmur heard  No friction rub  No gallop  Pulmonary:      Effort: Pulmonary effort is normal  No respiratory distress  Breath sounds: Normal breath sounds  No stridor  No wheezing or rales  Chest:      Chest wall: No tenderness  Abdominal:      General: Bowel sounds are normal  There is no distension  Palpations: Abdomen is soft  There is no mass  Tenderness: There is no abdominal tenderness  There is no guarding or rebound  Musculoskeletal:         General: Swelling and deformity present  No tenderness  Normal range of motion  Cervical back: Normal range of motion and neck supple  No edema, erythema or rigidity  No spinous process tenderness or muscular tenderness  Normal range of motion  Lymphadenopathy:      Head:      Right side of head: No submental, submandibular, tonsillar, preauricular or posterior auricular adenopathy  Left side of head: No submental, submandibular, tonsillar, preauricular, posterior auricular or occipital adenopathy  Cervical: No cervical adenopathy  Right cervical: No superficial, deep or posterior cervical adenopathy  Left cervical: No superficial, deep or posterior cervical adenopathy        Upper Body:      Right upper body: No pectoral adenopathy  Left upper body: No pectoral adenopathy  Skin:     General: Skin is warm and dry  Coloration: Skin is not pale  Findings: No erythema or rash  Neurological:      General: No focal deficit present  Mental Status: He is alert and oriented to person, place, and time  Cranial Nerves: No cranial nerve deficit  Sensory: No sensory deficit  Motor: Weakness present  No tremor, abnormal muscle tone or seizure activity  Coordination: Coordination normal       Gait: Gait abnormal       Deep Tendon Reflexes: Reflexes are normal and symmetric  Reflexes normal    Psychiatric:         Behavior: Behavior normal          Thought Content: Thought content normal          Judgment: Judgment normal          Lab Review   No visits with results within 2 Month(s) from this visit  Latest known visit with results is:   No results found for any previous visit  Patient Instructions   Acute Cough   WHAT YOU NEED TO KNOW:   An acute cough can last up to 3 weeks  Common causes of an acute cough include a cold, allergies, or a lung infection  DISCHARGE INSTRUCTIONS:   Return to the emergency department if:   · You have trouble breathing or feel short of breath  · You cough up blood, or you see blood in your mucus  · You faint or feel weak or dizzy  · You have chest pain when you cough or take a deep breath  · You have new wheezing  Contact your healthcare provider if:   · You have a fever  · Your cough lasts longer than 4 weeks  · Your symptoms do not improve with treatment  · You have questions or concerns about your condition or care  Medicines:   · Medicines  may be needed to stop the cough, decrease swelling in your airways, or help open your airways  Medicine may also be given to help you cough up mucus  Ask your healthcare provider what over-the-counter medicines you can take   If you have an infection caused by bacteria, you may need antibiotics  · Take your medicine as directed  Contact your healthcare provider if you think your medicine is not helping or if you have side effects  Tell him or her if you are allergic to any medicine  Keep a list of the medicines, vitamins, and herbs you take  Include the amounts, and when and why you take them  Bring the list or the pill bottles to follow-up visits  Carry your medicine list with you in case of an emergency  Manage your symptoms:   · Do not smoke and stay away from others who smoke  Nicotine and other chemicals in cigarettes and cigars can cause lung damage and make your cough worse  Ask your healthcare provider for information if you currently smoke and need help to quit  E-cigarettes or smokeless tobacco still contain nicotine  Talk to your healthcare provider before you use these products  · Drink extra liquids as directed  Liquids will help thin and loosen mucus so you can cough it up  Liquids will also help prevent dehydration  Examples of good liquids to drink include water, fruit juice, and broth  Do not drink liquids that contain caffeine  Caffeine can increase your risk for dehydration  Ask your healthcare provider how much liquid to drink each day  · Rest as directed  Do not do activities that make your cough worse, such as exercise  · Use a humidifier or vaporizer  Use a cool mist humidifier or a vaporizer to increase air moisture in your home  This may make it easier for you to breathe and help decrease your cough  · Eat 2 to 5 mL of honey 2 times each day  Honey can help thin mucus and decrease your cough  · Use cough drops or lozenges  These can help decrease throat irritation and your cough  Follow up with your healthcare provider as directed:  Write down your questions so you remember to ask them during your visits    © Copyright [employer] 2022 Information is for End User's use only and may not be sold, redistributed or otherwise used for commercial purposes  All illustrations and images included in CareNotes® are the copyrighted property of A [employer] M [employer]  or Gabriela Gray  The above information is an  only  It is not intended as medical advice for individual conditions or treatments  Talk to your doctor, nurse or pharmacist before following any medical regimen to see if it is safe and effective for you  Ethan Morales MD        "This note has been constructed using a voice recognition system  Therefore there may be syntax, spelling, and/or grammatical errors   Please call if you have any questions  " Patient with extended psych history   c/w home Seroquel, Zyprexa and Mirtazapine

## 2023-03-12 NOTE — BEHAVIORAL HEALTH ASSESSMENT NOTE - CASE SUMMARY
see record
This is a 65-year-old  patient, , domiciled at [facility], on disability, with no prior psychiatric admissions and a PMH of dementia, seizure disorder, DM2, HLD, and HTN and a family history of early onset dementia who presents with confusion, agitation, and impoverished movement. Psychiatry was consulted to assess the patient's mental status and determine if any changes in his medication is indicated.    I have seen and evaluated this patient myself. Chart, labs, meds reviewed. Patient presents with agitation in the context of dementia/medical issues (possibly delirium). I agree with trainee's assessment and plan.

## 2023-03-31 NOTE — PROGRESS NOTE ADULT - PROBLEM SELECTOR PLAN 5
Monitor labs  Replace lytes. Banner Transposition Flap Text: The defect edges were debeveled with a #15 scalpel blade.  Given the location of the defect and the proximity to free margins a Banner transposition flap was deemed most appropriate.  Using a sterile surgical marker, an appropriate flap drawn around the defect. The area thus outlined was incised deep to adipose tissue with a #15 scalpel blade.  The skin margins were undermined to an appropriate distance in all directions utilizing iris scissors.

## 2023-04-10 NOTE — PROGRESS NOTE ADULT - PROBLEM SELECTOR PROBLEM 3
Seizure Aklief counseling:  Patient advised to apply a pea-sized amount only at bedtime and wait 30 minutes after washing their face before applying.  If too drying, patient may add a non-comedogenic moisturizer.  The most commonly reported side effects including irritation, redness, scaling, dryness, stinging, burning, itching, and increased risk of sunburn.  The patient verbalized understanding of the proper use and possible adverse effects of retinoids.  All of the patient's questions and concerns were addressed.

## 2023-04-26 NOTE — PROGRESS NOTE ADULT - PROBLEM SELECTOR PLAN 9
normal... Discharge to [facility]  after 2 consecutive negative testings for Covid-19 as required by [facility]. 06/03, 06/06, 6/9 testing was positive. repeat on 6/12.

## 2023-06-14 NOTE — PHYSICAL THERAPY INITIAL EVALUATION ADULT - PHYSICAL ASSIST/NONPHYSICAL ASSIST: SIT/SUPINE, REHAB EVAL
Subjective     Lambert Bryant is a 72 y.o. male who presents for No chief complaint on file..     This was completed via telephone due to the restrictions of the COVID-19 pandemic.  All issues as below were discussed and addressed but no physical exam was performed.  If it was felt that the patient should be evaluated in clinic then they were director there.  The patient/parent verbally consented to the visit.  TELE MEDICINE/TELEPHONE  HPI  Has been experiencing right elbow pain.  Stated that recently he had played golf and after the golf he started experiencing pain in the right.  On pain scale 7-8/10.  Squeezing throbbing pain.  Denies trauma.  Denies injury.  Over-the-counter medications not helping.        Review of Systems  Dictated as above  Objective   Virtual visit    Physical Exam  Virtual visit  Assessment/Plan     1.  Right elbow pain.  Educated on elbow exercises.  Recommend x-ray and recommended blood work    Time spent on phone was 8 minutes  Problem List Items Addressed This Visit    None  Visit Diagnoses       Right elbow pain    -  Primary    Relevant Orders    Uric acid (Completed)    [facility] Modified 2-Tier Testing, 1st Tier (Completed)    TORRES (Completed)    Rheumatoid factor (Completed)    CBC and Auto Differential (Completed)    Comprehensive metabolic panel (Completed)    XR elbow (Completed)          
verbal cues/nonverbal cues (demo/gestures)/1 person assist

## 2023-06-29 NOTE — PROGRESS NOTE ADULT - PROBLEM SELECTOR PLAN 5
[de-identified] : CC: CI ETD\par \par HISTORY OF PRESENT ILLNESS: Ms. Cage is a pleasant 70 year old lady with ETD CI\par previously seen by Dr. Jung\par known CI issues\par also have significant discomfort when plane is landing\par tried autoinsufflation techniques\par \par \par REVIEW OF SYSTEMS: \par General ROS: negative for - chills, fatigue or fever\par Psychological ROS: negative for - anxiety or depression\par Ophthalmic ROS: negative for - blurry vision, decreased vision or double vision \par ENT ROS: negative except as noted from HPI\par Allergy and Immunology ROS: negative except as noted from HPI\par Hematological and Lymphatic ROS: negative for - bleeding problems \par Endocrine ROS: negative for - malaise/lethargy\par Respiratory ROS: negative for - stridor\par Cardiovascular ROS: negative for - chest pain\par Gastrointestinal ROS: negative for - appetite loss or nausea/vomiting\par Genitourinary ROS: negative for - incontinence\par Musculoskeletal ROS: negative for - gait disturbance \par Neurological ROS: negative for - behavioral changes\par Dermatological ROS: negative for - nail changes\par \par Physical Exam:\par \par GENERAL APPEARANCE: Well-developed and No Acute Distress.\par COMMUNICATION: Able to Communicate. Strong Voice.\par \par HEAD AND FACE\par Eyes: Testing of ocular motility including primary gaze alignment normal.\par Inspection and Appearance: No evidence of lesions or masses\par Palpation: Palpation of the face reveals no sinus tenderness\par Salivary Glands: Symmetric without masses\par Facial Strength: Symmetric without evidence of facial paralysis\par \par EAR, NOSE, MOUTH, and THROAT:\par Ear Canals and Tympanic Membranes, Bilateral: No evidence of inflammation or lesions.\par Thresholds: Clinical speech reception thresholds normal.\par External, Nose and Auricle: No lesions or masses.\par \par NECK:\par Evaluation: No evidence of masses or crepitus. The neck is symmetric and the trachea is in the midline position.\par Thyroid: No evidence of enlargement, tenderness or mass.\par Neck Lymph Nodes: WNL.\par Respiratory: Inspection of the chest including symmetry, expansion and/or assessment of respiratory effort normal.\par Cardiovascular: Evaluation of peripheral vascular system by observation and palpation of capillary refill, normal.\par Neurological/Psychiatric: Alert, Oriented, Mood, and Affect Normal.\par \par PROCEDURE: Removal impacted cerumen requiring instrumentation. ([id number])\par \par PREOPERATIVE DIAGNOSIS: Cerumen Impaction\par \par POSTOPERATIVE DIAGNOSIS Dx: Same\par \par INDICATION FOR PROCEDURE: Patient has noted fullness and hearing loss in the affected ears for significant period of time.\par \par EXAM FINDINGS: Auditory canal(s) was obstructed with cerumen.  Cerumen was observed with the microscope after the ear speculum was placed in the EAC, and gently loosened with the cerumen loop circumferentially.  The cerumen was then removed using suction, cerumen loop, and irrigation.  The tympanic membranes are intact following the procedure.  Auditory canals appear minimally inflamed.\par \par Left ear: significant CI removed, TMI Right ear: same as left ear\par \par IMPRESSION: Ms. aCge is a pleasant 70 year old lady with AU CI s/p removal, ETD\par \par PLAN:\par -afrin as needed, autoinsufflation technqiues\par -RTC q6-12 months for CI removal\par \par \par Gurmeet Watt MD [facility]\par Rhinology and Skull Base Surgery\par Department of Otolaryngology- Head and Neck Surgery\par [facility]\par [facility]\par  Resolved.   Monitor labs  Replace lytes PRN

## 2023-07-20 NOTE — ED ADULT NURSE NOTE - NS ED NURSE RECORD ANOTHER VITAL SIGN
Patient is feeling better from a nausea perspective, but notes pretty intense fatigue.  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  Thyroid hormone level screened today is normal, Hgb at first OB visit was in normal range.  Likely pregnancy-related fatigue along with other life factors.  She denies LOF or bleeding, is unsure if she has felt fetal movement.  She denies LE edema.  Plan order for fetal survey ultrasound at next visit.  Concerns: fatigue  Discussed anenatal screening.  She declines testing at this time.  Reportable signs and symptoms discussed.  Will schedule anatomy ultrasound after next visit.  RTC 4 weeks.      Felicia Umana MD  
Yes

## 2023-07-27 NOTE — ED ADULT NURSE NOTE - NSSUHOSCREENINGYN_ED_ALL_ED
No - the patient is unable to be screened due to medical condition
Quality 226: Preventive Care And Screening: Tobacco Use: Screening And Cessation Intervention: Patient screened for tobacco use and is an ex/non-smoker
Quality 130: Documentation Of Current Medications In The Medical Record: Current Medications Documented
Quality 431: Preventive Care And Screening: Unhealthy Alcohol Use - Screening: Patient not identified as an unhealthy alcohol user when screened for unhealthy alcohol use using a systematic screening method
Detail Level: Detailed

## 2023-12-19 NOTE — PHYSICAL THERAPY INITIAL EVALUATION ADULT - TRANSFER SAFETY CONCERNS NOTED: SIT/STAND, REHAB EVAL
decreased balance during turns/decreased weight-shifting ability/decreased safety awareness/losing balance Her/She

## 2024-01-09 NOTE — PROGRESS NOTE ADULT - ASSESSMENT
Radha Chao  [facility] [address]    01/09/24    I have been trying to reach you by phone to see how you are doing and if I can help you in any way. As your Care Manager, my role is to support you and make it easier to make sure you get the best health care possible. This includes:    Helping you know your choices    Teaming up with you to arrange health care services. Providing education and community support           Please call me ASAP to let me know how you are doing. You can reach me at    [phone]  8:00 AM to 5:00 PM CST (Monday-Friday)     If I am not free when you call, you can leave a private message on my voicemail, and I will call you back within one business day. I look forward to hearing from you soon.      Sincerely,     Karina Peterson, [address]ley 64 year old male from [facility]  with PMH dementia and DM coming in with seizures like activity while sitting at AL. pt unable to provide further history (poor historian) . denies all complaints at this time. NKDA   Pt. seen and evaluated, noted laying comfortably in bed, does not respond to verbal stimuli.  Pt. is with NGT feeding, needs psych eval for capacity to make medical decisions, however since he is non verbal, will likely not be possible to assess, psych consulted.  SLP requested (pt. failed past consultation).  Palliative care consulted for GOC/peg placement.  5/28-Pt. is more awake today, able to say few words at a time.  Pt. will be reevaluated by SLP, currently still with NGT with Glucerna feeding.  Palliative following, awaiting s/s to plan further mode of feeding.     5/29-OK to start PO mechanical soft diet with nectar consistency fluid per SLP, strict aspiration precautions to be maintained as pt. is lethargic at times.  Per dietician, will also continue TF via NGT at this time.  Will assess nutritional status and determine ?need for PEG.       6/1-Pt. is more awake today, speaks with incoherent speech at this time, able to eat portion of breakfast with no difficulty swallowing.  Pt. will be assisted OOB to chair today, further assessment of PO intake and possible transition to PO in progress.

## 2024-02-06 NOTE — PROGRESS NOTE ADULT - SUBJECTIVE AND OBJECTIVE BOX
NP Note discussed with  Primary Attending    Patient is a 64y old  Male who presents with a chief complaint of seizures (02 Jun 2020 12:10)    HPI - 64 year old male from [facility]  with PMH dementia and DM coming in with seizures like activity while sitting at AL. pt unable to provide further history (poor historian) . denies all complaints at this time. NKDA   Pt. seen and evaluated, noted laying comfortably in bed, does not respond to verbal stimuli.  Pt. is with NGT feeding, needs psych eval for capacity to make medical decisions, however since he is non verbal, will likely not be possible to assess, psych consulted.  SLP requested (pt. failed past consultation).  Palliative care consulted for GOC/peg placement.  5/28-Pt. is more awake today, able to say few words at a time.  Pt. will be reevaluated by SLP, currently still with NGT with Glucerna feeding.  Palliative following, awaiting s/s to plan further mode of feeding.     5/29-OK to start PO mechanical soft diet with nectar consistency fluid per SLP, strict aspiration precautions to be maintained as pt. is lethargic at times.  Per dietician, will also continue TF via NGT at this time.  Will assess nutritional status and determine ?need for PEG.       6/1-Pt. is more awake today, speaks with incoherent speech at this time, able to eat portion of breakfast with no difficulty swallowing.  Pt. will be assisted OOB to chair today, further assessment of PO intake and possible transition to PO in progress.    6.2 - plan for PEG tube placement today   6/3 - s/p PEG tube placement, can use tube at 5:30 pm today as per GI Dr. Valencia.     INTERVAL HPI/OVERNIGHT EVENTS: unable to verbalize c/o       MEDICATIONS  (STANDING):  ascorbic acid 500 milliGRAM(s) Oral two times a day  dextrose 5% + sodium chloride 0.9%. 1000 milliLiter(s) (50 mL/Hr) IV Continuous <Continuous>  dextrose 5% + sodium chloride 0.9%. 1000 milliLiter(s) (50 mL/Hr) IV Continuous <Continuous>  dextrose 5% + sodium chloride 0.9%. 1000 milliLiter(s) (50 mL/Hr) IV Continuous <Continuous>  dextrose 50% Injectable 12.5 Gram(s) IV Push once  dextrose 50% Injectable 25 Gram(s) IV Push once  dextrose 50% Injectable 25 Gram(s) IV Push once  ergocalciferol 25045 Unit(s) Oral every week  heparin   Injectable 5000 Unit(s) SubCutaneous every 12 hours  insulin glargine Injectable (LANTUS) 4 Unit(s) SubCutaneous at bedtime  insulin lispro (HumaLOG) corrective regimen sliding scale   SubCutaneous every 6 hours  mirtazapine 7.5 milliGRAM(s) Oral at bedtime  multivitamin/minerals 1 Tablet(s) Oral daily  nystatin Powder 1 Application(s) Topical two times a day  OLANZapine 2.5 milliGRAM(s) Oral at bedtime  pantoprazole   Suspension 40 milliGRAM(s) Enteral Tube daily  pantoprazole  Injectable 40 milliGRAM(s) IV Push once  QUEtiapine 25 milliGRAM(s) Oral at bedtime  zinc sulfate 220 milliGRAM(s) Oral daily    MEDICATIONS  (PRN):  acetaminophen  Suppository .. 650 milliGRAM(s) Rectal every 6 hours PRN Temp greater or equal to 38C (100.4F)  ALBUTerol    90 MICROgram(s) HFA Inhaler 2 Puff(s) Inhalation every 6 hours PRN Shortness of Breath and/or Wheezing  dextrose 40% Gel 15 Gram(s) Oral once PRN Blood Glucose LESS THAN 70 milliGRAM(s)/deciliter  guaiFENesin   Syrup  (Sugar-Free) 100 milliGRAM(s) Oral every 6 hours PRN Cough      __________________________________________________  REVIEW OF SYSTEMS:    unable to obtain ROS       Vital Signs Last 24 Hrs  T(C): 36.7 (03 Jun 2020 05:20), Max: 36.8 (02 Jun 2020 17:15)  T(F): 98.1 (03 Jun 2020 05:20), Max: 98.2 (02 Jun 2020 17:15)  HR: 102 (03 Jun 2020 05:20) (64 - 102)  BP: 121/74 (03 Jun 2020 05:20) (110/76 - 125/69)  BP(mean): 83 (02 Jun 2020 17:45) (80 - 89)  RR: 18 (03 Jun 2020 05:20) (14 - 20)  SpO2: 100% (03 Jun 2020 05:20) (97% - 100%)    ________________________________________________  PHYSICAL EXAM:  GENERAL: NAD  HEENT: Normocephalic;  conjunctivae and sclerae clear; moist mucous membranes;   NECK : supple  CHEST/LUNG: Clear to auscultation bilaterally with good air entry   HEART: S1 S2  regular; no murmurs, gallops or rubs  ABDOMEN: Soft, Nontender, Nondistended; Bowel sounds present (+) PEG tube   EXTREMITIES: no cyanosis; no edema; no calf tenderness  SKIN: warm and dry; no rash  NERVOUS SYSTEM:  Awake, confuse     _________________________________________________  LABS:                        12.6   13.01 )-----------( 241      ( 03 Jun 2020 07:47 )             37.5     06-03    138  |  103  |  15  ----------------------------<  194<H>  4.2   |  27  |  0.94    Ca    9.6      03 Jun 2020 07:47      PT/INR - ( 02 Jun 2020 07:44 )   PT: 12.6 sec;   INR: 1.11 ratio             CAPILLARY BLOOD GLUCOSE      POCT Blood Glucose.: 196 mg/dL (03 Jun 2020 05:47)  POCT Blood Glucose.: 171 mg/dL (03 Jun 2020 00:37)  POCT Blood Glucose.: 155 mg/dL (02 Jun 2020 17:19)  POCT Blood Glucose.: 131 mg/dL (02 Jun 2020 11:20)        RADIOLOGY & ADDITIONAL TESTS:    Imaging Personally Reviewed:  NO    Consultant(s) Notes Reviewed:   YES    Care Discussed with Consultants : GI     Plan of care was discussed with patient and /or primary care giver; all questions and concerns were addressed and care was aligned with patient's wishes. NP Note discussed with  Primary Attending    Patient is a 64y old  Male who presents with a chief complaint of seizures (02 Jun 2020 12:10)    HPI - 64 year old male from [facility]  with PMH dementia and DM coming in with seizures like activity while sitting at AL. pt unable to provide further history (poor historian) . denies all complaints at this time. NKDA   Pt. seen and evaluated, noted laying comfortably in bed, does not respond to verbal stimuli.  Pt. is with NGT feeding, needs psych eval for capacity to make medical decisions, however since he is non verbal, will likely not be possible to assess, psych consulted.  SLP requested (pt. failed past consultation).  Palliative care consulted for GOC/peg placement.  5/28-Pt. is more awake today, able to say few words at a time.  Pt. will be reevaluated by SLP, currently still with NGT with Glucerna feeding.  Palliative following, awaiting s/s to plan further mode of feeding.     5/29-OK to start PO mechanical soft diet with nectar consistency fluid per SLP, strict aspiration precautions to be maintained as pt. is lethargic at times.  Per dietician, will also continue TF via NGT at this time.  Will assess nutritional status and determine ?need for PEG.       6/1-Pt. is more awake today, speaks with incoherent speech at this time, able to eat portion of breakfast with no difficulty swallowing.  Pt. will be assisted OOB to chair today, further assessment of PO intake and possible transition to PO in progress.    6.2 - plan for PEG tube placement today   6/3 - s/p PEG tube placement, can use tube now as per GI Dr. Valencia.     INTERVAL HPI/OVERNIGHT EVENTS: unable to verbalize c/o       MEDICATIONS  (STANDING):  ascorbic acid 500 milliGRAM(s) Oral two times a day  dextrose 5% + sodium chloride 0.9%. 1000 milliLiter(s) (50 mL/Hr) IV Continuous <Continuous>  dextrose 5% + sodium chloride 0.9%. 1000 milliLiter(s) (50 mL/Hr) IV Continuous <Continuous>  dextrose 5% + sodium chloride 0.9%. 1000 milliLiter(s) (50 mL/Hr) IV Continuous <Continuous>  dextrose 50% Injectable 12.5 Gram(s) IV Push once  dextrose 50% Injectable 25 Gram(s) IV Push once  dextrose 50% Injectable 25 Gram(s) IV Push once  ergocalciferol 30226 Unit(s) Oral every week  heparin   Injectable 5000 Unit(s) SubCutaneous every 12 hours  insulin glargine Injectable (LANTUS) 4 Unit(s) SubCutaneous at bedtime  insulin lispro (HumaLOG) corrective regimen sliding scale   SubCutaneous every 6 hours  mirtazapine 7.5 milliGRAM(s) Oral at bedtime  multivitamin/minerals 1 Tablet(s) Oral daily  nystatin Powder 1 Application(s) Topical two times a day  OLANZapine 2.5 milliGRAM(s) Oral at bedtime  pantoprazole   Suspension 40 milliGRAM(s) Enteral Tube daily  pantoprazole  Injectable 40 milliGRAM(s) IV Push once  QUEtiapine 25 milliGRAM(s) Oral at bedtime  zinc sulfate 220 milliGRAM(s) Oral daily    MEDICATIONS  (PRN):  acetaminophen  Suppository .. 650 milliGRAM(s) Rectal every 6 hours PRN Temp greater or equal to 38C (100.4F)  ALBUTerol    90 MICROgram(s) HFA Inhaler 2 Puff(s) Inhalation every 6 hours PRN Shortness of Breath and/or Wheezing  dextrose 40% Gel 15 Gram(s) Oral once PRN Blood Glucose LESS THAN 70 milliGRAM(s)/deciliter  guaiFENesin   Syrup  (Sugar-Free) 100 milliGRAM(s) Oral every 6 hours PRN Cough      __________________________________________________  REVIEW OF SYSTEMS:    unable to obtain ROS       Vital Signs Last 24 Hrs  T(C): 36.7 (03 Jun 2020 05:20), Max: 36.8 (02 Jun 2020 17:15)  T(F): 98.1 (03 Jun 2020 05:20), Max: 98.2 (02 Jun 2020 17:15)  HR: 102 (03 Jun 2020 05:20) (64 - 102)  BP: 121/74 (03 Jun 2020 05:20) (110/76 - 125/69)  BP(mean): 83 (02 Jun 2020 17:45) (80 - 89)  RR: 18 (03 Jun 2020 05:20) (14 - 20)  SpO2: 100% (03 Jun 2020 05:20) (97% - 100%)    ________________________________________________  PHYSICAL EXAM:  GENERAL: NAD  HEENT: Normocephalic;  conjunctivae and sclerae clear; moist mucous membranes;   NECK : supple  CHEST/LUNG: Clear to auscultation bilaterally with good air entry   HEART: S1 S2  regular; no murmurs, gallops or rubs  ABDOMEN: Soft, Nontender, Nondistended; Bowel sounds present (+) PEG tube   EXTREMITIES: no cyanosis; no edema; no calf tenderness  SKIN: warm and dry; no rash  NERVOUS SYSTEM:  Awake, confuse     _________________________________________________  LABS:                        12.6   13.01 )-----------( 241      ( 03 Jun 2020 07:47 )             37.5     06-03    138  |  103  |  15  ----------------------------<  194<H>  4.2   |  27  |  0.94    Ca    9.6      03 Jun 2020 07:47      PT/INR - ( 02 Jun 2020 07:44 )   PT: 12.6 sec;   INR: 1.11 ratio             CAPILLARY BLOOD GLUCOSE      POCT Blood Glucose.: 196 mg/dL (03 Jun 2020 05:47)  POCT Blood Glucose.: 171 mg/dL (03 Jun 2020 00:37)  POCT Blood Glucose.: 155 mg/dL (02 Jun 2020 17:19)  POCT Blood Glucose.: 131 mg/dL (02 Jun 2020 11:20)        RADIOLOGY & ADDITIONAL TESTS:    Imaging Personally Reviewed:  NO    Consultant(s) Notes Reviewed:   YES    Care Discussed with Consultants : GI     Plan of care was discussed with patient and /or primary care giver; all questions and concerns were addressed and care was aligned with patient's wishes. no

## 2024-02-15 NOTE — PROGRESS NOTE BEHAVIORAL HEALTH - NSBHCONSULTMEDAGITATION_PSY_A_CORE FT
independent
For mild to moderate agitation, try verbal deescalation, can offer zyprexa/Zydis 2.5mg po q8H PRN  For severe agitation/po not possible, can give Zyprexa 2.5mg q8H IM injection PRN, can repeat dose if continues to be agitated, do not give with Ativan within 2 hours of Zyprexa due to risk of respiratory suppression.    -Can give Haldol 1-2mg if QTc <500 IM/IV if still agitated on above PRN.    Avoid restraint unless causing harm to self/others

## 2024-02-28 NOTE — ED PROVIDER NOTE - COVID-19 RESULT
NEGATIVE Noe Fenton  Thoracic and Cardiac Surgery  [address] [place] [id number]  Phone: [phone]  Fax: [phone]  Scheduled Appointment: 03/13/2024 03:00 PM   Noe Fenton  Thoracic and Cardiac Surgery  [address] [place] [id number]  Phone: [phone]  Fax: [phone]  Scheduled Appointment: 03/13/2024 03:00 PM    Enmanuel Ruiz  Adv [facility]  [address] [place] [address] - [place] [address]  Phone: [phone]  Fax: [phone]  Scheduled Appointment: 03/12/2024 03:00 PM

## 2024-04-25 NOTE — DISCHARGE NOTE PROVIDER - NSDCCPCAREPLAN_GEN_ALL_CORE_FT
Pt to u/s   PRINCIPAL DISCHARGE DIAGNOSIS  Diagnosis: Altered mental status  Assessment and Plan of Treatment: You came to the hospital with inability to walk and increased lethargy combined with increased agitation. We conducted an EEG, a study used to investigate whether you could have had a seizure. However, your EEG was negative for seizure activity, rather revealing a diffuse cerebral dysfunction. Your blood and urine cultures were negative, an imaging of your head did not show new changes that could explain your symptoms. It is suspected that your symptoms are due to a progression of dementia. Psychiatry was consulted, who believed that your symptoms are consistent with delirium, or a waxing and waning of mental function that results in confused thinking and behavioral disturbance. You were continued on depakote 500 mg BID, olanzapine 2.5 mg po AM + 5 mg po bedtime. Please continue to take your medications as prescribed. Please follow up with your neurologist Dr. Marie within 1 week of discharge ([phone]) for management of your dementia. Please also follow up with a psychiatrist within 1 week of discharge (Dr. Vo).  If you experience increased lethargy, chest pain, palpitations, weakness, nw neurological defits, or other alarming symptoms, please return to the emergency department.

## 2024-08-01 NOTE — ED ADULT NURSE NOTE - SUICIDE SCREENING QUESTION 3
Anesthesia Post-op Note    Patient: Liliana Bates  Procedure(s) Performed: COLONOSCOPY (Anus)  EGD (Esophagus)   Anesthesia type: MAC    Vitals Value Taken Time   Temp 97.1F 08/01/24 0923   Pulse 61 08/01/24 0923   Resp 14 08/01/24 0923   SpO2 99% 08/01/24 0923   /59 08/01/24 0923         Patient Location: Phase II  Post-op Vital Signs:stable  Level of Consciousness: participates in exam, awake and alert  Respiratory Status: spontaneous ventilation and room air  Cardiovascular blood pressure returned to baseline  Hydration: euvolemic  Pain Management: well controlled  Handoff: Handoff to receiving clinician was performed and questions were answered  Vomiting: none  Nausea: None  Airway Patency:patent  Post-op Assessment: awake, alert, appropriately conversant, or baseline, no complications, patient tolerated procedure well, no evidence of recall, dentition within defined limits, moving all extremities and No Corneal Abrasion      No notable events documented.                       No

## 2024-12-18 NOTE — CONSULT NOTE ADULT - CONSULT REQUESTED BY NAME
Problem: Adult Inpatient Plan of Care  Goal: Plan of Care Review  Outcome: Progressing  Goal: Patient-Specific Goal (Individualized)  Outcome: Progressing  Goal: Absence of Hospital-Acquired Illness or Injury  Outcome: Progressing  Goal: Optimal Comfort and Wellbeing  Outcome: Progressing  Goal: Readiness for Transition of Care  Outcome: Progressing     Problem: Wound  Goal: Optimal Coping  Outcome: Progressing  Goal: Optimal Functional Ability  Outcome: Progressing  Goal: Absence of Infection Signs and Symptoms  Outcome: Progressing  Goal: Improved Oral Intake  Outcome: Progressing  Goal: Optimal Pain Control and Function  Outcome: Progressing  Goal: Skin Health and Integrity  Outcome: Progressing  Goal: Optimal Wound Healing  Outcome: Progressing     Problem: Skin Injury Risk Increased  Goal: Skin Health and Integrity  Outcome: Progressing      Dr. Josette Mirza

## 2024-12-23 NOTE — PHYSICAL THERAPY INITIAL EVALUATION ADULT - LEVEL OF INDEPENDENCE: SUPINE/SIT, REHAB EVAL
Continue vitamin D-calcium supplementation daily and regular weight bearing exercise  
dependent (less than 25% patients effort)
minimum assist (75% patients effort)
moderate assist (50% patients effort)

## 2025-03-22 NOTE — ED ADULT NURSE NOTE - NSSUHOSCREENINGYN_ED_ALL_ED
Care Due:                  Date            Visit Type   Department     Provider  --------------------------------------------------------------------------------                                MYCHART                              FOLLOWUP/OF  [facility]  Last Visit: 03-      FICE VISIT   MEDICINE       Emily Dumont                               -                              PRIMARY      [facility]  Next Visit: 03-      CARE ([facility])   MEDICINE       Emily Dumont                                                            Last  Test          Frequency    Reason                     Performed    Due Date  --------------------------------------------------------------------------------    Lipid Panel.  12 months..  atorvastatin.............  Not Found    Overdue    [employer] Embedded Care Due Messages. Reference number: [id number].   3/22/2025 3:26:10 PM CDT   No - the patient is unable to be screened due to medical condition

## 2025-06-12 NOTE — BEHAVIORAL HEALTH ASSESSMENT NOTE - LANGUAGE
Patient now seeing [facility] providers. Orders for  removed.    22 Other

## 2025-07-17 NOTE — PROGRESS NOTE ADULT - PROBLEM SELECTOR PLAN 3
"Anesthesia Transfer of Care Note    Patient: Aury Felix    Procedure(s) Performed: * No procedures listed *    Patient location: ICU    Anesthesia Type: general    Transport from OR: Transported from OR on 100% O2 by closed face mask with adequate spontaneous ventilation. Continuous ECG monitoring in transport. Continuous SpO2 monitoring in transport    Post pain: adequate analgesia    Post assessment: no apparent anesthetic complications and tolerated procedure well    Post vital signs: stable    Level of consciousness: sedated and awake    Nausea/Vomiting: no nausea/vomiting    Complications: none    Transfer of care protocol was followedComments: Report given to Isadora ICU RN    Last vitals: Visit Vitals  /80   Pulse (!) 127   Temp 37 °C (98.6 °F) (Oral)   Resp (!) 25   Ht 5' 5" (1.651 m)   Wt 79 kg (174 lb 2.6 oz)   SpO2 95%   Breastfeeding No   BMI 28.98 kg/m²     " due to dementia with dysphagia  poor po intake during this hospital course  pt could take soft / nectar thick liquids as per speech and swallow but due to dementia pts po intake was poor.  Palliative consulted - pt will got PEG tube placement   Dietician consulted  Tolerates peg tube feeding due to dementia with dysphagia  poor po intake during this hospital course  Palliative consulted - pt got PEG tube placement   Dietician consulted  Tolerates peg tube feeding